# Patient Record
Sex: FEMALE | Race: WHITE | NOT HISPANIC OR LATINO | Employment: OTHER | URBAN - METROPOLITAN AREA
[De-identification: names, ages, dates, MRNs, and addresses within clinical notes are randomized per-mention and may not be internally consistent; named-entity substitution may affect disease eponyms.]

---

## 2017-01-10 ENCOUNTER — TELEPHONE (OUTPATIENT)
Dept: GENERAL RADIOLOGY | Facility: HOSPITAL | Age: 62
End: 2017-01-10

## 2017-01-10 ENCOUNTER — HOSPITAL ENCOUNTER (OUTPATIENT)
Dept: PET IMAGING | Facility: HOSPITAL | Age: 62
End: 2017-01-10
Attending: INTERNAL MEDICINE

## 2017-01-18 ENCOUNTER — APPOINTMENT (OUTPATIENT)
Dept: ONCOLOGY | Facility: HOSPITAL | Age: 62
End: 2017-01-18

## 2017-01-25 RX ORDER — MULTIVITAMIN WITH IRON
TABLET ORAL
COMMUNITY
Start: 2013-01-01 | End: 2019-08-13

## 2017-01-27 ENCOUNTER — HOSPITAL ENCOUNTER (OUTPATIENT)
Dept: PET IMAGING | Facility: HOSPITAL | Age: 62
Discharge: HOME | End: 2017-01-27
Attending: INTERNAL MEDICINE | Admitting: INTERNAL MEDICINE

## 2017-01-27 ENCOUNTER — LAB (OUTPATIENT)
Dept: LAB | Facility: HOSPITAL | Age: 62
End: 2017-01-27

## 2017-01-27 DIAGNOSIS — C50.912 CARCINOMA OF LEFT BREAST METASTATIC TO LUNG (HCC): ICD-10-CM

## 2017-01-27 DIAGNOSIS — C78.02 CARCINOMA OF LEFT BREAST METASTATIC TO LUNG (HCC): ICD-10-CM

## 2017-01-27 LAB
ALBUMIN SERPL-MCNC: 4.2 G/DL (ref 3.5–5.2)
ALBUMIN/GLOB SERPL: 1.4 G/DL (ref 1.1–2.4)
ALP SERPL-CCNC: 155 U/L (ref 38–116)
ALT SERPL W P-5'-P-CCNC: 20 U/L (ref 0–33)
ANION GAP SERPL CALCULATED.3IONS-SCNC: 11.7 MMOL/L
AST SERPL-CCNC: 18 U/L (ref 0–32)
BASOPHILS # BLD AUTO: 0.03 10*3/MM3 (ref 0–0.1)
BASOPHILS NFR BLD AUTO: 0.6 % (ref 0–1.1)
BILIRUB SERPL-MCNC: 0.3 MG/DL (ref 0.1–1.2)
BUN BLD-MCNC: 20 MG/DL (ref 6–20)
BUN/CREAT SERPL: 23.8 (ref 7.3–30)
CALCIUM SPEC-SCNC: 10.5 MG/DL (ref 8.5–10.2)
CANCER AG15-3 SERPL-ACNC: 21.8 U/ML
CHLORIDE SERPL-SCNC: 102 MMOL/L (ref 98–107)
CO2 SERPL-SCNC: 27.3 MMOL/L (ref 22–29)
CREAT BLD-MCNC: 0.84 MG/DL (ref 0.6–1.1)
CREAT BLDA-MCNC: 0.8 MG/DL (ref 0.6–1.3)
DEPRECATED RDW RBC AUTO: 39.9 FL (ref 37–49)
EOSINOPHIL # BLD AUTO: 0.16 10*3/MM3 (ref 0–0.36)
EOSINOPHIL NFR BLD AUTO: 3.3 % (ref 1–5)
ERYTHROCYTE [DISTWIDTH] IN BLOOD BY AUTOMATED COUNT: 12.1 % (ref 11.7–14.5)
GFR SERPL CREATININE-BSD FRML MDRD: 69 ML/MIN/1.73
GLOBULIN UR ELPH-MCNC: 3.1 GM/DL (ref 1.8–3.5)
GLUCOSE BLD-MCNC: 91 MG/DL (ref 74–124)
HCT VFR BLD AUTO: 40.4 % (ref 34–45)
HGB BLD-MCNC: 13.5 G/DL (ref 11.5–14.9)
IMM GRANULOCYTES # BLD: 0.01 10*3/MM3 (ref 0–0.03)
IMM GRANULOCYTES NFR BLD: 0.2 % (ref 0–0.5)
LYMPHOCYTES # BLD AUTO: 1.74 10*3/MM3 (ref 1–3.5)
LYMPHOCYTES NFR BLD AUTO: 35.7 % (ref 20–49)
MCH RBC QN AUTO: 30.3 PG (ref 27–33)
MCHC RBC AUTO-ENTMCNC: 33.4 G/DL (ref 32–35)
MCV RBC AUTO: 90.8 FL (ref 83–97)
MONOCYTES # BLD AUTO: 0.44 10*3/MM3 (ref 0.25–0.8)
MONOCYTES NFR BLD AUTO: 9 % (ref 4–12)
NEUTROPHILS # BLD AUTO: 2.5 10*3/MM3 (ref 1.5–7)
NEUTROPHILS NFR BLD AUTO: 51.2 % (ref 39–75)
NRBC BLD MANUAL-RTO: 0 /100 WBC (ref 0–0)
PLATELET # BLD AUTO: 315 10*3/MM3 (ref 150–375)
PMV BLD AUTO: 9.1 FL (ref 8.9–12.1)
POTASSIUM BLD-SCNC: 4.2 MMOL/L (ref 3.5–4.7)
PROT SERPL-MCNC: 7.3 G/DL (ref 6.3–8)
RBC # BLD AUTO: 4.45 10*6/MM3 (ref 3.9–5)
SODIUM BLD-SCNC: 141 MMOL/L (ref 134–145)
WBC NRBC COR # BLD: 4.88 10*3/MM3 (ref 4–10)

## 2017-01-27 PROCEDURE — 25510000001 DIATRIZOATE MEGLUMINE & SODIUM PER 1 ML: Performed by: INTERNAL MEDICINE

## 2017-01-27 PROCEDURE — 80053 COMPREHEN METABOLIC PANEL: CPT | Performed by: INTERNAL MEDICINE

## 2017-01-27 PROCEDURE — 82565 ASSAY OF CREATININE: CPT

## 2017-01-27 PROCEDURE — 85025 COMPLETE CBC W/AUTO DIFF WBC: CPT | Performed by: INTERNAL MEDICINE

## 2017-01-27 PROCEDURE — 74177 CT ABD & PELVIS W/CONTRAST: CPT

## 2017-01-27 PROCEDURE — 71260 CT THORAX DX C+: CPT

## 2017-01-27 PROCEDURE — 36415 COLL VENOUS BLD VENIPUNCTURE: CPT | Performed by: INTERNAL MEDICINE

## 2017-01-27 PROCEDURE — 0 IOPAMIDOL 61 % SOLUTION: Performed by: INTERNAL MEDICINE

## 2017-01-27 PROCEDURE — 86300 IMMUNOASSAY TUMOR CA 15-3: CPT | Performed by: INTERNAL MEDICINE

## 2017-01-27 RX ADMIN — IOPAMIDOL 85 ML: 612 INJECTION, SOLUTION INTRAVENOUS at 11:06

## 2017-01-27 RX ADMIN — DIATRIZOATE MEGLUMINE AND DIATRIZOATE SODIUM 30 ML: 660; 100 LIQUID ORAL; RECTAL at 10:25

## 2017-02-01 ENCOUNTER — OFFICE VISIT (OUTPATIENT)
Dept: ONCOLOGY | Facility: CLINIC | Age: 62
End: 2017-02-01

## 2017-02-01 ENCOUNTER — APPOINTMENT (OUTPATIENT)
Dept: ONCOLOGY | Facility: HOSPITAL | Age: 62
End: 2017-02-01

## 2017-02-01 VITALS
DIASTOLIC BLOOD PRESSURE: 69 MMHG | TEMPERATURE: 97.5 F | RESPIRATION RATE: 14 BRPM | OXYGEN SATURATION: 97 % | BODY MASS INDEX: 32.44 KG/M2 | HEIGHT: 64 IN | SYSTOLIC BLOOD PRESSURE: 103 MMHG | WEIGHT: 190 LBS | HEART RATE: 117 BPM

## 2017-02-01 DIAGNOSIS — C78.02 CARCINOMA OF LEFT BREAST METASTATIC TO LUNG (HCC): Primary | ICD-10-CM

## 2017-02-01 DIAGNOSIS — C50.912 CARCINOMA OF LEFT BREAST METASTATIC TO LUNG (HCC): Primary | ICD-10-CM

## 2017-02-01 PROCEDURE — 99215 OFFICE O/P EST HI 40 MIN: CPT | Performed by: INTERNAL MEDICINE

## 2017-02-01 PROCEDURE — G0463 HOSPITAL OUTPT CLINIC VISIT: HCPCS | Performed by: INTERNAL MEDICINE

## 2017-02-01 NOTE — PROGRESS NOTES
Subjective .     REASONS FOR FOLLOWUP:  Metastatic breast cancer.    HISTORY OF PRESENT ILLNESS:  The patient is a 61 y.o. year old female  who is here for follow-up with the above-mentioned history.    Denies neurological symptoms.  Denies nausea   Denies weight loss.  No significant problems with hot flashes.    Occasional discomfort with moving in certain directions, right lower anterior rib area.  Mild.  Present ×2 months.  Denies any significant areas of pain.    States her depression/anxiety has improved slightly but with like a second opinion.    Past Medical History   Diagnosis Date   • Allergy    • Asthma    • Breast cancer 2993     Left node positive   • Depression    • Esophageal reflux    • Hyperlipidemia    • Hypertension    • Obstructive sleep apnea    • Ovarian cyst      Past Surgical History   Procedure Laterality Date   • Colonoscopy     • Cholecystectomy     • Mastectomy radical Left 1993   • Knee arthroplasty         HEMATOLOGIC/ONCOLOGIC HISTORY:  (History from previous dates can be found in the separate document.)    MEDICATIONS    Current Outpatient Prescriptions:   •  anastrozole (ARIMIDEX) 1 MG tablet, TAKE 1 TABLET BY MOUTH EVERY DAY, Disp: 90 tablet, Rfl: 1  •  aspirin 81 MG chewable tablet, Chew daily., Disp: , Rfl:   •  Calcium Carb-Cholecalciferol (CALTRATE 600+D) 600-800 MG-UNIT tablet, , Disp: , Rfl:   •  Calcium-Vitamin D-Vitamin K 500-500-40 MG-UNT-MCG chewable tablet, , Disp: , Rfl:   •  Cholecalciferol 2000 UNITS capsule, Take by mouth., Disp: , Rfl:   •  desvenlafaxine (PRISTIQ) 100 MG 24 hr tablet, Take by mouth., Disp: , Rfl:   •  eszopiclone (LUNESTA) tablet, Take by mouth., Disp: , Rfl:   •  LORazepam (ATIVAN) 0.5 MG tablet, Take 1 tablet by mouth., Disp: , Rfl:   •  losartan (COZAAR) 100 MG tablet, Take 1 tablet by mouth daily., Disp: 90 tablet, Rfl: 1  •  Magnesium 250 MG tablet, , Disp: , Rfl:   •  Multiple Vitamin (MULTI VITAMIN PO), Take  by mouth Daily. Nikia pulse,  Disp: , Rfl:   •  Probiotic Product (PROBIOTIC PEARLS) capsule, Take by mouth., Disp: , Rfl:   •  Turmeric Curcumin 500 MG capsule, Take by mouth., Disp: , Rfl:     ALLERGIES:     Allergies   Allergen Reactions   • Ciprofloxacin        SOCIAL HISTORY:       Social History     Social History   • Marital status: Single     Spouse name: N/A   • Number of children: N/A   • Years of education: College     Occupational History   • RN [employer]     Social History Main Topics   • Smoking status: Former Smoker     Packs/day: 2.50     Years: 30.00     Types: Cigarettes     Quit date: 2008   • Smokeless tobacco: Not on file   • Alcohol use No   • Drug use: No   • Sexual activity: Not on file     Other Topics Concern   • Not on file     Social History Narrative         FAMILY HISTORY:  Family History   Problem Relation Age of Onset   • Hypertension Mother    • Leukemia Mother 72   • Diabetes Brother    • Pancreatic cancer Brother    • Glaucoma Brother    • Heart disease Brother    • Hypertension Brother    • Gallbladder disease Brother    • Gallbladder disease Father        REVIEW OF SYSTEMS:  GENERAL: No change in appetite or weight;   No fevers, chills, sweats.    SKIN: No nonhealing lesions.   No rashes.  HEME/LYMPH: No easy bruising, bleeding.   No swollen nodes.   EYES: No vision changes or diplopia.   ENT: No tinnitus, hearing loss, gum bleeding, epistaxis, hoarseness or dysphagia.   RESPIRATORY: No cough, shortness of breath, hemoptysis or wheezing.   CVS: No chest pain, palpitations, orthopnea, dyspnea on exertion or PND.   GI: No melena or hematochezia.   No abdominal pain.  No nausea, vomiting, constipation, diarrhea  : No lower tract obstructive symptoms, dysuria or hematuria.   MUSCULOSKELETAL: see HPI   NEUROLOGICAL: No global weakness, loss of consciousness or seizures.   PSYCHIATRIC: No increased nervousness, mood changes or depression.          Objective    Vitals:    02/01/17 1118   BP:  "103/69   Pulse: 117   Resp: 14   Temp: 97.5 °F (36.4 °C)   SpO2: 97%   Weight: 190 lb (86.2 kg)   Height: 64.17\" (163 cm)  Comment: NEW HT   PainSc: 0-No pain     Current Status 2/1/2017   ECOG score 0      PHYSICAL EXAM:    GENERAL:  Well-developed, well-nourished in no acute distress.   SKIN:  Warm, dry without rashes, purpura or petechiae.  HEAD:  Normocephalic.  EYES:  Pupils equal, round and reactive to light.  EOMs intact.  Conjunctivae normal.  EARS:  Hearing intact.  NOSE:  Septum midline.  No excoriations or nasal discharge.  MOUTH:  Tongue is well-papillated; no stomatitis or ulcers.  Lips normal.  THROAT:  Oropharynx without lesions or exudates.  NECK:  Supple with good range of motion; no thyromegaly or masses, no JVD.  LYMPHATICS:  No cervical, supraclavicular, axillary or inguinal adenopathy.  CHEST:  Lungs clear to percussion and auscultation. Good airflow.  CARDIAC:  Regular rate and rhythm without murmurs, rubs or gallops. Normal S1,S2.  ABDOMEN:  Soft, nontender with no organomegaly or masses.  EXTREMITIES:  No clubbing, cyanosis or edema.  NEUROLOGICAL:  Cranial Nerves II-XII grossly intact.  No focal neurological deficits.  PSYCHIATRIC:  Normal affect and mood.      RECENT LABS:        WBC   Date/Time Value Ref Range Status   01/27/2017 11:05 AM 4.88 4.00 - 10.00 10*3/mm3 Final   01/26/2016 11:27 AM 6.25 4.50 - 10.70 K/Cumm Final     HEMOGLOBIN   Date/Time Value Ref Range Status   01/27/2017 11:05 AM 13.5 11.5 - 14.9 g/dL Final   01/26/2016 11:27 AM 14.4 11.9 - 15.5 g/dL Final     PLATELETS   Date/Time Value Ref Range Status   01/27/2017 11:05  150 - 375 10*3/mm3 Final   01/26/2016 11:27  140 - 500 K/Cumm Final       Assessment/Plan     1.  Metastatic breast cancer to bilateral lungs and soft tissue in the mediastinum (likely the cause of her hoarseness). (Initial breast cancer diagnosed in 1993 treated with mastectomy, chemotherapy, radiation therapy and tamoxifen). Arimidex day 1 on " 02/17/2010. PET scan late August 2011 shows no abnormal hypermetabolic activity. This has improved compared to the prior PET scan January 2010 which showed mild hypermetabolic activity in areas of metastasis. (In the past, her  T6 lesion did not show up on CT scans or bone scan. It was seen by PET scan.) We have been following with CT scans only every 6 months and PET scans on an as needed only basis. Sometimes her CT scanned pulmonary nodules are read as benign since they have been stable through the course of years but we know they are malignant because they have been surgically sampled in the past.    · CT 8/16/16 shows increased sclerosis at T6 compared to 4/28/15.    · PET 10/11/16: Slight uptake at T6, SUV 3.2.  mild thickening of right adrenal gland with an SUV of 11.   · Continued Arimidex is minimal progression and had been on this since 2/17/10.  · Not referred for radiation since no pain at T6  · CT 1/27/17, unchanged.  CT images personally reviewed by me  2.  Bony metastases at T6. She was not started on Zometa as this was the only area of bony disease. We will hold off on Zometa until she has evidence of significant bone involvement. Not given radiation is asymptomatic.  I recommended she not allow the chiropractor to manipulate the T6 area.  If she develops pain at T6, would refer her to radiation.    3.  Bone health. Last bone density 10/11/16, worsened, but still normal with worst T score -0.9.  Patient stopped calcium January 2016, but remained on vitamin D.  I recommended she restart her calcium.      4.  On the 10/5/16 visit, Tachycardia, dyspnea on exertion, bilateral leg swelling, more sedentary recently.  CT PE protocol and bilateral lower extremity Dopplers 10/5/16, negative for clots.  She still has tachycardia and dyspnea on exertion.  Perhaps at least part of this is due to deconditioning.    5.  Right lower anterior rib discomfort.  CT unremarkable in that area.  No specific pain, just  discomfort.  I explained to the patient if disease was found by PET scan or bone scan, I would not  since she has been doing well on Arimidex since 2010.      6.  Depression/anxiety.  Although she has a psychiatrist and counselor, she would like a second opinion from Dr. Enma Bach.  This will be arranged.    PLAN:   · M.D. 6 months with 1 week prior:   · CAT scan CAP with contrast and CBC, CMP, CA-15-3.      The plan was:   MD visit in 6 months with CBC, CMP and CA 15-3 that day.   CT scans chest abdomen and pelvis with contrast annually.  We are now doing CT scans every 6 months.

## 2017-02-09 ENCOUNTER — TELEPHONE (OUTPATIENT)
Dept: PSYCHIATRY | Facility: HOSPITAL | Age: 62
End: 2017-02-09

## 2017-02-09 NOTE — TELEPHONE ENCOUNTER
Received a return call from patient.  Patient states she was hesitant to call and make this appt to see Behavioral Oncologist, Dr. Enma Bach , but now she is ready and agreeable to do this.  OSW scheduled her to see Dr. Bach in the [facility] on 3/8/2017 at 1pm.  OSW also mailed patient packet of information for her to complete prior to appointment.    Thank you,  Gale Harding, SONDRAW, CSW, OSW-C

## 2017-03-09 ENCOUNTER — DOCUMENTATION (OUTPATIENT)
Dept: PSYCHIATRY | Facility: HOSPITAL | Age: 62
End: 2017-03-09

## 2017-03-09 NOTE — PROGRESS NOTES
Patient scheduled to be seen in [facility], yesterday 3/8/2017.  Patient was a no call, no show.  OSW to follow up with pt via phone regarding supportive services.  Patient current with a Psychiatrist in the community.  Thank you,  Gale Harding, MSSW, CSW, OSW-C

## 2017-05-07 DIAGNOSIS — I10 BENIGN ESSENTIAL HTN: ICD-10-CM

## 2017-05-08 RX ORDER — LOSARTAN POTASSIUM 100 MG/1
TABLET ORAL
Qty: 90 TABLET | Refills: 0 | OUTPATIENT
Start: 2017-05-08

## 2017-05-12 DIAGNOSIS — I10 BENIGN ESSENTIAL HTN: ICD-10-CM

## 2017-05-12 RX ORDER — LOSARTAN POTASSIUM 100 MG/1
100 TABLET ORAL DAILY
Qty: 30 TABLET | Refills: 0 | Status: SHIPPED | OUTPATIENT
Start: 2017-05-12 | End: 2017-05-26 | Stop reason: SDUPTHER

## 2017-05-18 RX ORDER — ANASTROZOLE 1 MG/1
TABLET ORAL
Qty: 90 TABLET | Refills: 0 | Status: SHIPPED | OUTPATIENT
Start: 2017-05-18 | End: 2017-08-12 | Stop reason: SDUPTHER

## 2017-05-26 ENCOUNTER — OFFICE VISIT (OUTPATIENT)
Dept: FAMILY MEDICINE CLINIC | Facility: CLINIC | Age: 62
End: 2017-05-26

## 2017-05-26 VITALS
SYSTOLIC BLOOD PRESSURE: 126 MMHG | TEMPERATURE: 98.2 F | DIASTOLIC BLOOD PRESSURE: 80 MMHG | BODY MASS INDEX: 31.65 KG/M2 | OXYGEN SATURATION: 96 % | WEIGHT: 190 LBS | HEART RATE: 101 BPM | HEIGHT: 65 IN

## 2017-05-26 DIAGNOSIS — E78.2 MIXED HYPERLIPIDEMIA: ICD-10-CM

## 2017-05-26 DIAGNOSIS — Z13.9 SCREENING: ICD-10-CM

## 2017-05-26 DIAGNOSIS — I10 BENIGN ESSENTIAL HTN: ICD-10-CM

## 2017-05-26 DIAGNOSIS — E55.9 VITAMIN D DEFICIENCY: Primary | ICD-10-CM

## 2017-05-26 PROCEDURE — 99214 OFFICE O/P EST MOD 30 MIN: CPT | Performed by: FAMILY MEDICINE

## 2017-05-26 RX ORDER — LOSARTAN POTASSIUM 100 MG/1
100 TABLET ORAL DAILY
Qty: 90 TABLET | Refills: 1 | Status: SHIPPED | OUTPATIENT
Start: 2017-05-26 | End: 2017-09-18 | Stop reason: SDUPTHER

## 2017-05-27 LAB
25(OH)D3+25(OH)D2 SERPL-MCNC: 43.5 NG/ML (ref 30–100)
CHOLEST SERPL-MCNC: 262 MG/DL (ref 0–200)
HCV AB S/CO SERPL IA: <0.1 S/CO RATIO (ref 0–0.9)
HDLC SERPL-MCNC: 54 MG/DL (ref 40–60)
LDLC SERPL CALC-MCNC: 167 MG/DL (ref 0–100)
LDLC/HDLC SERPL: 3.1 {RATIO}
TRIGL SERPL-MCNC: 204 MG/DL (ref 0–150)
TSH SERPL DL<=0.005 MIU/L-ACNC: 2.36 UIU/ML (ref 0.45–4.5)
VLDLC SERPL CALC-MCNC: 40.8 MG/DL (ref 5–40)

## 2017-06-08 DIAGNOSIS — I10 BENIGN ESSENTIAL HTN: ICD-10-CM

## 2017-06-08 RX ORDER — LOSARTAN POTASSIUM 100 MG/1
TABLET ORAL
Qty: 90 TABLET | Refills: 0 | Status: SHIPPED | OUTPATIENT
Start: 2017-06-08 | End: 2018-05-09

## 2017-08-01 ENCOUNTER — LAB (OUTPATIENT)
Dept: LAB | Facility: HOSPITAL | Age: 62
End: 2017-08-01

## 2017-08-01 ENCOUNTER — HOSPITAL ENCOUNTER (OUTPATIENT)
Dept: PET IMAGING | Facility: HOSPITAL | Age: 62
Discharge: HOME | End: 2017-08-01
Attending: INTERNAL MEDICINE | Admitting: INTERNAL MEDICINE

## 2017-08-01 DIAGNOSIS — C50.912 CARCINOMA OF LEFT BREAST METASTATIC TO LUNG (HCC): ICD-10-CM

## 2017-08-01 DIAGNOSIS — C78.02 CARCINOMA OF LEFT BREAST METASTATIC TO LUNG (HCC): ICD-10-CM

## 2017-08-01 LAB
ALBUMIN SERPL-MCNC: 4.2 G/DL (ref 3.5–5.2)
ALBUMIN/GLOB SERPL: 1.4 G/DL (ref 1.1–2.4)
ALP SERPL-CCNC: 146 U/L (ref 38–116)
ALT SERPL W P-5'-P-CCNC: 39 U/L (ref 0–33)
ANION GAP SERPL CALCULATED.3IONS-SCNC: 15.9 MMOL/L
AST SERPL-CCNC: 25 U/L (ref 0–32)
BASOPHILS # BLD AUTO: 0.03 10*3/MM3 (ref 0–0.1)
BASOPHILS NFR BLD AUTO: 0.6 % (ref 0–1.1)
BILIRUB SERPL-MCNC: 0.3 MG/DL (ref 0.1–1.2)
BUN BLD-MCNC: 19 MG/DL (ref 6–20)
BUN/CREAT SERPL: 21.8 (ref 7.3–30)
CALCIUM SPEC-SCNC: 9.9 MG/DL (ref 8.5–10.2)
CANCER AG15-3 SERPL-ACNC: 21.6 U/ML
CHLORIDE SERPL-SCNC: 99 MMOL/L (ref 98–107)
CO2 SERPL-SCNC: 24.1 MMOL/L (ref 22–29)
CREAT BLD-MCNC: 0.87 MG/DL (ref 0.6–1.1)
CREAT BLDA-MCNC: 0.9 MG/DL (ref 0.6–1.3)
DEPRECATED RDW RBC AUTO: 40.3 FL (ref 37–49)
EOSINOPHIL # BLD AUTO: 0.14 10*3/MM3 (ref 0–0.36)
EOSINOPHIL NFR BLD AUTO: 2.8 % (ref 1–5)
ERYTHROCYTE [DISTWIDTH] IN BLOOD BY AUTOMATED COUNT: 12.1 % (ref 11.7–14.5)
GFR SERPL CREATININE-BSD FRML MDRD: 66 ML/MIN/1.73
GLOBULIN UR ELPH-MCNC: 3.1 GM/DL (ref 1.8–3.5)
GLUCOSE BLD-MCNC: 101 MG/DL (ref 74–124)
HCT VFR BLD AUTO: 41.5 % (ref 34–45)
HGB BLD-MCNC: 13.9 G/DL (ref 11.5–14.9)
IMM GRANULOCYTES # BLD: 0.01 10*3/MM3 (ref 0–0.03)
IMM GRANULOCYTES NFR BLD: 0.2 % (ref 0–0.5)
LYMPHOCYTES # BLD AUTO: 1.71 10*3/MM3 (ref 1–3.5)
LYMPHOCYTES NFR BLD AUTO: 33.7 % (ref 20–49)
MCH RBC QN AUTO: 30.7 PG (ref 27–33)
MCHC RBC AUTO-ENTMCNC: 33.5 G/DL (ref 32–35)
MCV RBC AUTO: 91.6 FL (ref 83–97)
MONOCYTES # BLD AUTO: 0.42 10*3/MM3 (ref 0.25–0.8)
MONOCYTES NFR BLD AUTO: 8.3 % (ref 4–12)
NEUTROPHILS # BLD AUTO: 2.76 10*3/MM3 (ref 1.5–7)
NEUTROPHILS NFR BLD AUTO: 54.4 % (ref 39–75)
NRBC BLD MANUAL-RTO: 0 /100 WBC (ref 0–0)
PLATELET # BLD AUTO: 324 10*3/MM3 (ref 150–375)
PMV BLD AUTO: 9.1 FL (ref 8.9–12.1)
POTASSIUM BLD-SCNC: 3.8 MMOL/L (ref 3.5–4.7)
PROT SERPL-MCNC: 7.3 G/DL (ref 6.3–8)
RBC # BLD AUTO: 4.53 10*6/MM3 (ref 3.9–5)
SODIUM BLD-SCNC: 139 MMOL/L (ref 134–145)
WBC NRBC COR # BLD: 5.07 10*3/MM3 (ref 4–10)

## 2017-08-01 PROCEDURE — 82565 ASSAY OF CREATININE: CPT

## 2017-08-01 PROCEDURE — 74177 CT ABD & PELVIS W/CONTRAST: CPT

## 2017-08-01 PROCEDURE — 0 IOPAMIDOL 61 % SOLUTION: Performed by: INTERNAL MEDICINE

## 2017-08-01 PROCEDURE — 80053 COMPREHEN METABOLIC PANEL: CPT | Performed by: INTERNAL MEDICINE

## 2017-08-01 PROCEDURE — 36415 COLL VENOUS BLD VENIPUNCTURE: CPT | Performed by: INTERNAL MEDICINE

## 2017-08-01 PROCEDURE — 0 DIATRIZOATE MEGLUMINE & SODIUM PER 1 ML: Performed by: INTERNAL MEDICINE

## 2017-08-01 PROCEDURE — 71260 CT THORAX DX C+: CPT

## 2017-08-01 PROCEDURE — 85025 COMPLETE CBC W/AUTO DIFF WBC: CPT | Performed by: INTERNAL MEDICINE

## 2017-08-01 PROCEDURE — 86300 IMMUNOASSAY TUMOR CA 15-3: CPT | Performed by: INTERNAL MEDICINE

## 2017-08-01 RX ADMIN — DIATRIZOATE MEGLUMINE AND DIATRIZOATE SODIUM 30 ML: 660; 100 LIQUID ORAL; RECTAL at 11:55

## 2017-08-01 RX ADMIN — IOPAMIDOL 85 ML: 612 INJECTION, SOLUTION INTRAVENOUS at 12:33

## 2017-08-14 RX ORDER — ANASTROZOLE 1 MG/1
TABLET ORAL
Qty: 90 TABLET | Refills: 0 | Status: SHIPPED | OUTPATIENT
Start: 2017-08-14 | End: 2017-11-06 | Stop reason: SDUPTHER

## 2017-09-18 ENCOUNTER — OFFICE VISIT (OUTPATIENT)
Dept: INTERNAL MEDICINE | Facility: CLINIC | Age: 62
End: 2017-09-18

## 2017-09-18 VITALS
BODY MASS INDEX: 31.32 KG/M2 | OXYGEN SATURATION: 96 % | HEIGHT: 65 IN | SYSTOLIC BLOOD PRESSURE: 128 MMHG | DIASTOLIC BLOOD PRESSURE: 80 MMHG | TEMPERATURE: 98.2 F | HEART RATE: 102 BPM | WEIGHT: 188 LBS

## 2017-09-18 DIAGNOSIS — R68.2 DRY MOUTH: ICD-10-CM

## 2017-09-18 DIAGNOSIS — B37.9 CANDIDIASIS: Primary | ICD-10-CM

## 2017-09-18 DIAGNOSIS — R20.8 BURNING SENSATION OF MOUTH: ICD-10-CM

## 2017-09-18 DIAGNOSIS — I10 BENIGN ESSENTIAL HTN: ICD-10-CM

## 2017-09-18 PROCEDURE — 99213 OFFICE O/P EST LOW 20 MIN: CPT | Performed by: FAMILY MEDICINE

## 2017-09-18 RX ORDER — LOSARTAN POTASSIUM 100 MG/1
100 TABLET ORAL DAILY
Qty: 90 TABLET | Refills: 1 | Status: SHIPPED | OUTPATIENT
Start: 2017-09-18 | End: 2018-03-11 | Stop reason: SDUPTHER

## 2017-09-18 NOTE — PROGRESS NOTES
Subjective   Marialuisa Vivar is a 61 y.o. female.   Chief Complaint   Patient presents with   • Mouth Lesions       History of Present Illness     #1 Mouth lesions-for years.  Patient has super sensitive mouth.  It is inside her lips and under her tongue.  Sometimes she has burning sensation.  Occasionally she gets little white spots, sometimes it gets red and inflamed.  She had biopsy done which showed candidiasis.  Patient was evaluated by dermatologist and was diagnosed  with candidiasis.  She uses nystatin and it helps some.  She has sensation of the dry mouth, but no dry eyes.  Her mother had dry mouth.  She was not diagnosed with Sjögren.    The following portions of the patient's history were reviewed and updated as appropriate: allergies, current medications, past family history, past medical history, past social history, past surgical history and problem list.    Review of Systems   Constitutional: Negative.    HENT: Positive for mouth sores. Negative for sore throat.    Skin: Negative.          Objective   Wt Readings from Last 3 Encounters:   09/18/17 188 lb (85.3 kg)   05/26/17 190 lb (86.2 kg)   02/01/17 190 lb (86.2 kg)      Vitals:    09/18/17 1552   BP: 128/80   Pulse: 102   Temp: 98.2 °F (36.8 °C)   SpO2: 96%     Temp Readings from Last 3 Encounters:   09/18/17 98.2 °F (36.8 °C)   05/26/17 98.2 °F (36.8 °C)   02/01/17 97.5 °F (36.4 °C)     BP Readings from Last 3 Encounters:   09/18/17 128/80   05/26/17 126/80   02/01/17 103/69     Pulse Readings from Last 3 Encounters:   09/18/17 102   05/26/17 101   02/01/17 117       Physical Exam   Constitutional: She appears well-developed and well-nourished.   HENT:   Head: Normocephalic and atraumatic.   Mouth/Throat: Mucous membranes are not pale and not dry. No oropharyngeal exudate, posterior oropharyngeal edema, posterior oropharyngeal erythema or tonsillar abscesses.   small areas of redness on the tongue.   Eyes: Conjunctivae and EOM are normal. Pupils  are equal, round, and reactive to light.   Lymphadenopathy:     She has no cervical adenopathy.       Assessment/Plan   Marialuisa was seen today for mouth lesions.    Diagnoses and all orders for this visit:    Candidiasis    Burning sensation of mouth  -     Vitamin B12  -     Sjogren's Antibody, Anti-SS-A / -SS-B  -     CHERRIE    Dry mouth  -     Vitamin B12  -     Sjogren's Antibody, Anti-SS-A / -SS-B  -     CHERRIE    Other orders  -     nystatin (MYCOSTATIN) [id number] UNIT/ML suspension; Swish and swallow 5 mL 4 (Four) Times a Day.        #1 Candidiasis- continue nystatin as needed.  Patient will continue to follow-up with her dermatologist/dentist.  There is a family history of dry mouth and patient reports similar symptoms.  We are going to check for Sjögren syndrome.

## 2017-09-19 LAB
ANA SER QL: NEGATIVE
ENA SS-A AB SER-ACNC: <0.2 AI (ref 0–0.9)
ENA SS-B AB SER-ACNC: <0.2 AI (ref 0–0.9)
VIT B12 SERPL-MCNC: 441 PG/ML (ref 211–946)

## 2017-11-06 ENCOUNTER — TELEPHONE (OUTPATIENT)
Dept: ONCOLOGY | Facility: HOSPITAL | Age: 62
End: 2017-11-06

## 2017-11-06 ENCOUNTER — TELEPHONE (OUTPATIENT)
Dept: GENERAL RADIOLOGY | Facility: HOSPITAL | Age: 62
End: 2017-11-06

## 2017-11-06 DIAGNOSIS — C78.02 CARCINOMA OF LEFT BREAST METASTATIC TO LUNG (HCC): Primary | ICD-10-CM

## 2017-11-06 DIAGNOSIS — C50.912 CARCINOMA OF LEFT BREAST METASTATIC TO LUNG (HCC): Primary | ICD-10-CM

## 2017-11-06 RX ORDER — ANASTROZOLE 1 MG/1
TABLET ORAL
Qty: 30 TABLET | Refills: 0 | Status: SHIPPED | OUTPATIENT
Start: 2017-11-06 | End: 2017-12-12 | Stop reason: SDUPTHER

## 2017-11-06 NOTE — TELEPHONE ENCOUNTER
Refill for arimidex given for 1 month. Pt needs f/u for refills. Pt missed last appts. Message sent to scheduling to set up f/u

## 2017-11-06 NOTE — TELEPHONE ENCOUNTER
----- Message from Sulema Rodrigues RN sent at 11/6/2017  1:48 PM EST -----  Pt needs f/u appt for refills. Missed last appts? Please call her and reschedule appt

## 2017-11-30 ENCOUNTER — OFFICE VISIT (OUTPATIENT)
Dept: ONCOLOGY | Facility: CLINIC | Age: 62
End: 2017-11-30

## 2017-11-30 ENCOUNTER — LAB (OUTPATIENT)
Dept: LAB | Facility: HOSPITAL | Age: 62
End: 2017-11-30

## 2017-11-30 VITALS
BODY MASS INDEX: 31.56 KG/M2 | WEIGHT: 189.4 LBS | SYSTOLIC BLOOD PRESSURE: 112 MMHG | HEIGHT: 65 IN | DIASTOLIC BLOOD PRESSURE: 70 MMHG | RESPIRATION RATE: 16 BRPM | HEART RATE: 92 BPM | TEMPERATURE: 97.8 F

## 2017-11-30 DIAGNOSIS — C78.02 CARCINOMA OF LEFT BREAST METASTATIC TO LUNG (HCC): ICD-10-CM

## 2017-11-30 DIAGNOSIS — C50.912 CARCINOMA OF LEFT BREAST METASTATIC TO LUNG (HCC): ICD-10-CM

## 2017-11-30 DIAGNOSIS — C50.912 CARCINOMA OF LEFT BREAST METASTATIC TO LUNG (HCC): Primary | ICD-10-CM

## 2017-11-30 DIAGNOSIS — C78.02 CARCINOMA OF LEFT BREAST METASTATIC TO LUNG (HCC): Primary | ICD-10-CM

## 2017-11-30 LAB
ALBUMIN SERPL-MCNC: 4 G/DL (ref 3.5–5.2)
ALBUMIN/GLOB SERPL: 1.3 G/DL (ref 1.1–2.4)
ALP SERPL-CCNC: 133 U/L (ref 38–116)
ALT SERPL W P-5'-P-CCNC: 30 U/L (ref 0–33)
ANION GAP SERPL CALCULATED.3IONS-SCNC: 14.5 MMOL/L
AST SERPL-CCNC: 24 U/L (ref 0–32)
BASOPHILS # BLD AUTO: 0.04 10*3/MM3 (ref 0–0.1)
BASOPHILS NFR BLD AUTO: 0.7 % (ref 0–1.1)
BILIRUB SERPL-MCNC: 0.3 MG/DL (ref 0.1–1.2)
BUN BLD-MCNC: 13 MG/DL (ref 6–20)
BUN/CREAT SERPL: 14.8 (ref 7.3–30)
CALCIUM SPEC-SCNC: 10.3 MG/DL (ref 8.5–10.2)
CHLORIDE SERPL-SCNC: 103 MMOL/L (ref 98–107)
CO2 SERPL-SCNC: 23.5 MMOL/L (ref 22–29)
CREAT BLD-MCNC: 0.88 MG/DL (ref 0.6–1.1)
DEPRECATED RDW RBC AUTO: 39.1 FL (ref 37–49)
EOSINOPHIL # BLD AUTO: 0.13 10*3/MM3 (ref 0–0.36)
EOSINOPHIL NFR BLD AUTO: 2.4 % (ref 1–5)
ERYTHROCYTE [DISTWIDTH] IN BLOOD BY AUTOMATED COUNT: 11.9 % (ref 11.7–14.5)
GFR SERPL CREATININE-BSD FRML MDRD: 65 ML/MIN/1.73
GLOBULIN UR ELPH-MCNC: 3.1 GM/DL (ref 1.8–3.5)
GLUCOSE BLD-MCNC: 123 MG/DL (ref 74–124)
HCT VFR BLD AUTO: 41.4 % (ref 34–45)
HGB BLD-MCNC: 13.8 G/DL (ref 11.5–14.9)
IMM GRANULOCYTES # BLD: 0.01 10*3/MM3 (ref 0–0.03)
IMM GRANULOCYTES NFR BLD: 0.2 % (ref 0–0.5)
LYMPHOCYTES # BLD AUTO: 1.75 10*3/MM3 (ref 1–3.5)
LYMPHOCYTES NFR BLD AUTO: 32.2 % (ref 20–49)
MCH RBC QN AUTO: 30 PG (ref 27–33)
MCHC RBC AUTO-ENTMCNC: 33.3 G/DL (ref 32–35)
MCV RBC AUTO: 90 FL (ref 83–97)
MONOCYTES # BLD AUTO: 0.44 10*3/MM3 (ref 0.25–0.8)
MONOCYTES NFR BLD AUTO: 8.1 % (ref 4–12)
NEUTROPHILS # BLD AUTO: 3.06 10*3/MM3 (ref 1.5–7)
NEUTROPHILS NFR BLD AUTO: 56.4 % (ref 39–75)
NRBC BLD MANUAL-RTO: 0 /100 WBC (ref 0–0)
PLATELET # BLD AUTO: 364 10*3/MM3 (ref 150–375)
PMV BLD AUTO: 8.2 FL (ref 8.9–12.1)
POTASSIUM BLD-SCNC: 4.1 MMOL/L (ref 3.5–4.7)
PROT SERPL-MCNC: 7.1 G/DL (ref 6.3–8)
RBC # BLD AUTO: 4.6 10*6/MM3 (ref 3.9–5)
SODIUM BLD-SCNC: 141 MMOL/L (ref 134–145)
WBC NRBC COR # BLD: 5.43 10*3/MM3 (ref 4–10)

## 2017-11-30 PROCEDURE — 85025 COMPLETE CBC W/AUTO DIFF WBC: CPT | Performed by: INTERNAL MEDICINE

## 2017-11-30 PROCEDURE — 80053 COMPREHEN METABOLIC PANEL: CPT | Performed by: INTERNAL MEDICINE

## 2017-11-30 PROCEDURE — 99215 OFFICE O/P EST HI 40 MIN: CPT | Performed by: INTERNAL MEDICINE

## 2017-11-30 PROCEDURE — 36415 COLL VENOUS BLD VENIPUNCTURE: CPT | Performed by: INTERNAL MEDICINE

## 2017-11-30 RX ORDER — AMOXICILLIN 500 MG/1
CAPSULE ORAL
Refills: 0 | COMMUNITY
Start: 2017-11-21 | End: 2018-03-07

## 2017-11-30 NOTE — PROGRESS NOTES
Subjective .     REASONS FOR FOLLOWUP:  Metastatic breast cancer.    HISTORY OF PRESENT ILLNESS:  The patient is a 62 y.o. year old female  who is here for follow-up with the above-mentioned history.    Denies neurological symptoms.  Denies nausea   Denies weight loss.  Denies pain.    She did not come in August as planned to review her CAT scan.  She continues to struggle with depression.  This limits follow-up.  She sees a psychiatrist regularly and is recently changed counselors.  She thinks this will help some.  She states cancer does not seem to be the driving reason for her depression but instead body image issues.  She would like to get in better shape has not continued with a program to do this.    Past Medical History:   Diagnosis Date   • Allergy    • Anxiety    • Asthma    • Bone metastasis    • Breast cancer     Left node positive   • Depression    • Esophageal reflux     cough   • History of colon polyps    • Hyperlipidemia    • Hypertension    • Left breast cancer metastasized to lung    • Obstructive sleep apnea    • Ovarian cyst      Past Surgical History:   Procedure Laterality Date   • CHOLECYSTECTOMY  2000   • COLONOSCOPY  2014    H/O polyps   • KNEE ARTHROPLASTY     • LUNG SURGERY  2010    Lung wedge resection   • MASTECTOMY RADICAL Left 1993       HEMATOLOGIC/ONCOLOGIC HISTORY:  (History from previous dates can be found in the separate document.)    MEDICATIONS    Current Outpatient Prescriptions:   •  amoxicillin (AMOXIL) 500 MG capsule, TK ONE C PO  TID FOR 10 DAYS, Disp: , Rfl: 0  •  anastrozole (ARIMIDEX) 1 MG tablet, TAKE 1 TABLET BY MOUTH EVERY DAY, Disp: 30 tablet, Rfl: 0  •  aspirin 81 MG chewable tablet, Chew daily., Disp: , Rfl:   •  Calcium-Vitamin D-Vitamin K 500-500-40 MG-UNT-MCG chewable tablet, , Disp: , Rfl:   •  Cholecalciferol 2000 UNITS capsule, Take by mouth., Disp: , Rfl:   •  desvenlafaxine (PRISTIQ) 100 MG 24 hr tablet, Take by mouth., Disp: , Rfl:   •  eszopiclone  (LUNESTA) tablet, Take by mouth., Disp: , Rfl:   •  LORazepam (ATIVAN) 0.5 MG tablet, Take 1 tablet by mouth., Disp: , Rfl:   •  losartan (COZAAR) 100 MG tablet, TAKE 1 TABLET BY MOUTH DAILY, Disp: 90 tablet, Rfl: 0  •  losartan (COZAAR) 100 MG tablet, Take 1 tablet by mouth Daily., Disp: 90 tablet, Rfl: 1  •  Magnesium 250 MG tablet, , Disp: , Rfl:   •  nystatin (MYCOSTATIN) [id number] UNIT/ML suspension, Swish and swallow 5 mL 4 (Four) Times a Day., Disp: 473 mL, Rfl: 1  •  Probiotic Product (PROBIOTIC PEARLS) capsule, Take by mouth., Disp: , Rfl:   •  Turmeric Curcumin 500 MG capsule, Take by mouth., Disp: , Rfl:     ALLERGIES:     Allergies   Allergen Reactions   • Ciprofloxacin        SOCIAL HISTORY:       Social History     Social History   • Marital status: Single     Spouse name: N/A   • Number of children: N/A   • Years of education: College     Occupational History   • RN [employer]     Social History Main Topics   • Smoking status: Former Smoker     Packs/day: 2.00     Years: 30.00     Types: Cigarettes     Quit date: 2008   • Smokeless tobacco: Not on file   • Alcohol use No   • Drug use: No   • Sexual activity: Not on file     Other Topics Concern   • Not on file     Social History Narrative         FAMILY HISTORY:  Family History   Problem Relation Age of Onset   • Hypertension Mother    • Leukemia Mother 72   • COPD Mother      smoker   • Diabetes Brother    • Pancreatic cancer Brother    • Glaucoma Brother    • Heart disease Brother    • Hypertension Brother    • Gallbladder disease Brother    • Gallbladder disease Father    • Stroke Other      Grandmother   • Lupus Other      Aunt   • Alcohol abuse Other      Aunt   • Glaucoma Other      Grandmother       REVIEW OF SYSTEMS:  GENERAL: No change in appetite or weight;   No fevers, chills, sweats.    SKIN: No nonhealing lesions.   No rashes.  HEME/LYMPH: No easy bruising, bleeding.   No swollen nodes.   EYES: No vision changes or  "diplopia.   ENT: No tinnitus, hearing loss, gum bleeding, epistaxis, hoarseness or dysphagia.   RESPIRATORY: No cough, shortness of breath, hemoptysis or wheezing.   CVS: No chest pain, palpitations, orthopnea, dyspnea on exertion or PND.   GI: No melena or hematochezia.   No abdominal pain.  No nausea, vomiting, constipation, diarrhea  : No lower tract obstructive symptoms, dysuria or hematuria.   MUSCULOSKELETAL: No bone pain.  No joint stiffness.   NEUROLOGICAL: No global weakness, loss of consciousness or seizures.   PSYCHIATRIC: see HPI      Objective    Vitals:    11/30/17 0900   BP: 112/70   Pulse: 92   Resp: 16   Temp: 97.8 °F (36.6 °C)   Weight: 189 lb 6.4 oz (85.9 kg)   Height: 64.96\" (165 cm)  Comment: new ht.without shoes   PainSc: 0-No pain     Current Status 11/30/2017   ECOG score 0      PHYSICAL EXAM:    GENERAL:  Well-developed, well-nourished in no acute distress.   SKIN:  Warm, dry without rashes, purpura or petechiae.  HEAD:  Normocephalic.  EYES:  Pupils equal, round and reactive to light.  EOMs intact.  Conjunctivae normal.  EARS:  Hearing intact.  NOSE:  Septum midline.  No excoriations or nasal discharge.  MOUTH:  Tongue is well-papillated; no stomatitis or ulcers.  Lips normal.  THROAT:  Oropharynx without lesions or exudates.  NECK:  Supple with good range of motion; no thyromegaly or masses, no JVD.  LYMPHATICS:  No cervical, supraclavicular, axillary or inguinal adenopathy.  CHEST:  Lungs clear to percussion and auscultation. Good airflow.  CARDIAC:  Regular rate and rhythm without murmurs, rubs or gallops. Normal S1,S2.  ABDOMEN:  Soft, nontender with no organomegaly or masses.  EXTREMITIES:  No clubbing, cyanosis or edema.  NEUROLOGICAL:  Cranial Nerves II-XII grossly intact.  No focal neurological deficits.  PSYCHIATRIC:  Normal affect and mood.      RECENT LABS:        WBC   Date/Time Value Ref Range Status   11/30/2017 08:48 AM 5.43 4.00 - 10.00 10*3/mm3 Final     Hemoglobin "   Date/Time Value Ref Range Status   11/30/2017 08:48 AM 13.8 11.5 - 14.9 g/dL Final     Platelets   Date/Time Value Ref Range Status   11/30/2017 08:48  150 - 375 10*3/mm3 Final       Assessment/Plan     *Metastatic breast cancer to bilateral lungs and soft tissue in the mediastinum (likely the cause of her hoarseness). (Initial breast cancer diagnosed in 1993 treated with mastectomy, chemotherapy, radiation therapy and tamoxifen). Arimidex day 1 on 02/17/2010. PET scan late August 2011 shows no abnormal hypermetabolic activity. This has improved compared to the prior PET scan January 2010 which showed mild hypermetabolic activity in areas of metastasis. (In the past, her  T6 lesion did not show up on CT scans or bone scan. It was seen by PET scan.) We have been following with CT scans only every 6 months and PET scans on an as needed only basis. Sometimes her CT scanned pulmonary nodules are read as benign since they have been stable through the course of years but we know they are malignant because they have been surgically sampled in the past.    · CT 8/16/16 shows increased sclerosis at T6 compared to 4/28/15.    · PET 10/11/16: Slight uptake at T6, SUV 3.2.  mild thickening of right adrenal gland with an SUV of 11.   · Continued Arimidex is minimal progression and had been on this since 2/17/10.  · Not referred for radiation since no pain at T6  · CT 1/27/17, unchanged.  · CT 8/1/17: Unchanged except subtle suggestion of mild increase in thickening of the right adrenal gland.  Patient did not return to the office until 11/30/17, today. She chose a follow-up CT 2 months later.  She does not want another CT at this time.    *Bony metastases at T6. She was not started on Zometa as this was the only area of bony disease. We will hold off on Zometa until she has evidence of significant bone involvement. Not given radiation is asymptomatic.  I recommended she not allow the chiropractor to manipulate the T6  area.  If she develops pain at T6, would refer her to radiation.    *Bone health. Last bone density 10/11/16, worsened, but still normal with worst T score -0.9.  Patient stopped calcium January 2016, but remained on vitamin D.  I recommended she restart her calcium.      *On the 10/5/16 visit, Tachycardia, dyspnea on exertion, bilateral leg swelling, more sedentary recently.  CT PE protocol and bilateral lower extremity Dopplers 10/5/16, negative for clots.  She still has tachycardia and dyspnea on exertion.  Perhaps at least part of this is due to deconditioning.    *Previously complained of colon Right lower anterior rib discomfort.  CT unremarkable in that area.  No specific pain, just discomfort.  I explained to the patient if disease was found by PET scan or bone scan, I would not  since she has been doing well on Arimidex since 2010.   Currently denies pain.     *Depression/anxiety.  This limits compliance.  She sees a psychiatrist and a counselor regularly.  We had a long talk about this today.  Her depression seems to be driven not by cancer but instead by body image issues.  She feels pressure to be thinner and to be more toned.  I reviewed with her the fact that I feel her body shape is typical and not unusually overweight/out of shape.  That being said, I did encourage her to lead a more healthy lifestyle and improve her eating habits and exercise.  I suggested she higher  to help her with these goals.      PLAN:   · M.D. 2 months with 1 week prior: (2 months from now will be 6 months from her last CT as she did not come for follow-up at the time of her CT in August)  · CAT scan CAP with contrast and CBC, CMP, CA-15-3.    · (Typically M.D. and CT every 6 months)    CT images personally reviewed by me.

## 2017-12-12 RX ORDER — ANASTROZOLE 1 MG/1
TABLET ORAL
Qty: 30 TABLET | Refills: 0 | Status: SHIPPED | OUTPATIENT
Start: 2017-12-12 | End: 2018-01-08 | Stop reason: SDUPTHER

## 2018-01-08 RX ORDER — ANASTROZOLE 1 MG/1
TABLET ORAL
Qty: 30 TABLET | Refills: 3 | Status: SHIPPED | OUTPATIENT
Start: 2018-01-08 | End: 2018-05-30 | Stop reason: SDUPTHER

## 2018-01-25 ENCOUNTER — TELEPHONE (OUTPATIENT)
Dept: GENERAL RADIOLOGY | Facility: HOSPITAL | Age: 63
End: 2018-01-25

## 2018-01-25 ENCOUNTER — HOSPITAL ENCOUNTER (OUTPATIENT)
Dept: PET IMAGING | Facility: HOSPITAL | Age: 63
Discharge: HOME | End: 2018-01-25
Attending: INTERNAL MEDICINE

## 2018-01-30 ENCOUNTER — APPOINTMENT (OUTPATIENT)
Dept: ONCOLOGY | Facility: CLINIC | Age: 63
End: 2018-01-30

## 2018-01-30 ENCOUNTER — APPOINTMENT (OUTPATIENT)
Dept: LAB | Facility: HOSPITAL | Age: 63
End: 2018-01-30

## 2018-02-22 ENCOUNTER — LAB (OUTPATIENT)
Dept: LAB | Facility: HOSPITAL | Age: 63
End: 2018-02-22

## 2018-02-22 ENCOUNTER — HOSPITAL ENCOUNTER (OUTPATIENT)
Dept: PET IMAGING | Facility: HOSPITAL | Age: 63
Discharge: HOME | End: 2018-02-22
Attending: INTERNAL MEDICINE | Admitting: INTERNAL MEDICINE

## 2018-02-22 DIAGNOSIS — C78.02 CARCINOMA OF LEFT BREAST METASTATIC TO LUNG (HCC): ICD-10-CM

## 2018-02-22 DIAGNOSIS — C50.912 CARCINOMA OF LEFT BREAST METASTATIC TO LUNG (HCC): ICD-10-CM

## 2018-02-22 LAB
ALBUMIN SERPL-MCNC: 4.8 G/DL (ref 3.5–5.2)
ALBUMIN/GLOB SERPL: 1.4 G/DL (ref 1.1–2.4)
ALP SERPL-CCNC: 162 U/L (ref 38–116)
ALT SERPL W P-5'-P-CCNC: 44 U/L (ref 0–33)
ANION GAP SERPL CALCULATED.3IONS-SCNC: 15.2 MMOL/L
AST SERPL-CCNC: 39 U/L (ref 0–32)
BASOPHILS # BLD AUTO: 0.06 10*3/MM3 (ref 0–0.1)
BASOPHILS NFR BLD AUTO: 1.1 % (ref 0–1.1)
BILIRUB SERPL-MCNC: 0.5 MG/DL (ref 0.1–1.2)
BUN BLD-MCNC: 17 MG/DL (ref 6–20)
BUN/CREAT SERPL: 19.3 (ref 7.3–30)
CALCIUM SPEC-SCNC: 10.9 MG/DL (ref 8.5–10.2)
CANCER AG15-3 SERPL-ACNC: 22.5 U/ML
CHLORIDE SERPL-SCNC: 99 MMOL/L (ref 98–107)
CO2 SERPL-SCNC: 25.8 MMOL/L (ref 22–29)
CREAT BLD-MCNC: 0.88 MG/DL (ref 0.6–1.1)
CREAT BLDA-MCNC: 1 MG/DL (ref 0.6–1.3)
DEPRECATED RDW RBC AUTO: 39.6 FL (ref 37–49)
EOSINOPHIL # BLD AUTO: 0.13 10*3/MM3 (ref 0–0.36)
EOSINOPHIL NFR BLD AUTO: 2.3 % (ref 1–5)
ERYTHROCYTE [DISTWIDTH] IN BLOOD BY AUTOMATED COUNT: 11.9 % (ref 11.7–14.5)
GFR SERPL CREATININE-BSD FRML MDRD: 65 ML/MIN/1.73
GLOBULIN UR ELPH-MCNC: 3.5 GM/DL (ref 1.8–3.5)
GLUCOSE BLD-MCNC: 81 MG/DL (ref 74–124)
HCT VFR BLD AUTO: 46.7 % (ref 34–45)
HGB BLD-MCNC: 15.5 G/DL (ref 11.5–14.9)
IMM GRANULOCYTES # BLD: 0.01 10*3/MM3 (ref 0–0.03)
IMM GRANULOCYTES NFR BLD: 0.2 % (ref 0–0.5)
LYMPHOCYTES # BLD AUTO: 1.92 10*3/MM3 (ref 1–3.5)
LYMPHOCYTES NFR BLD AUTO: 34.6 % (ref 20–49)
MCH RBC QN AUTO: 30.3 PG (ref 27–33)
MCHC RBC AUTO-ENTMCNC: 33.2 G/DL (ref 32–35)
MCV RBC AUTO: 91.4 FL (ref 83–97)
MONOCYTES # BLD AUTO: 0.41 10*3/MM3 (ref 0.25–0.8)
MONOCYTES NFR BLD AUTO: 7.4 % (ref 4–12)
NEUTROPHILS # BLD AUTO: 3.02 10*3/MM3 (ref 1.5–7)
NEUTROPHILS NFR BLD AUTO: 54.4 % (ref 39–75)
NRBC BLD MANUAL-RTO: 0 /100 WBC (ref 0–0)
PLATELET # BLD AUTO: 358 10*3/MM3 (ref 150–375)
PMV BLD AUTO: 9.1 FL (ref 8.9–12.1)
POTASSIUM BLD-SCNC: 3.4 MMOL/L (ref 3.5–4.7)
PROT SERPL-MCNC: 8.3 G/DL (ref 6.3–8)
RBC # BLD AUTO: 5.11 10*6/MM3 (ref 3.9–5)
SODIUM BLD-SCNC: 140 MMOL/L (ref 134–145)
WBC NRBC COR # BLD: 5.55 10*3/MM3 (ref 4–10)

## 2018-02-22 PROCEDURE — 36415 COLL VENOUS BLD VENIPUNCTURE: CPT | Performed by: INTERNAL MEDICINE

## 2018-02-22 PROCEDURE — 74177 CT ABD & PELVIS W/CONTRAST: CPT

## 2018-02-22 PROCEDURE — 0 IOPAMIDOL 61 % SOLUTION: Performed by: INTERNAL MEDICINE

## 2018-02-22 PROCEDURE — 0 DIATRIZOATE MEGLUMINE & SODIUM PER 1 ML: Performed by: INTERNAL MEDICINE

## 2018-02-22 PROCEDURE — 80053 COMPREHEN METABOLIC PANEL: CPT | Performed by: INTERNAL MEDICINE

## 2018-02-22 PROCEDURE — 85025 COMPLETE CBC W/AUTO DIFF WBC: CPT | Performed by: INTERNAL MEDICINE

## 2018-02-22 PROCEDURE — 86300 IMMUNOASSAY TUMOR CA 15-3: CPT | Performed by: INTERNAL MEDICINE

## 2018-02-22 PROCEDURE — 82565 ASSAY OF CREATININE: CPT

## 2018-02-22 PROCEDURE — 71260 CT THORAX DX C+: CPT

## 2018-02-22 RX ADMIN — DIATRIZOATE MEGLUMINE AND DIATRIZOATE SODIUM 30 ML: 660; 100 LIQUID ORAL; RECTAL at 11:23

## 2018-02-22 RX ADMIN — IOPAMIDOL 85 ML: 612 INJECTION, SOLUTION INTRAVENOUS at 11:26

## 2018-02-26 ENCOUNTER — TELEPHONE (OUTPATIENT)
Dept: GENERAL RADIOLOGY | Facility: HOSPITAL | Age: 63
End: 2018-02-26

## 2018-02-26 DIAGNOSIS — C78.02 CARCINOMA OF LEFT BREAST METASTATIC TO LUNG (HCC): Primary | ICD-10-CM

## 2018-02-26 DIAGNOSIS — C50.912 CARCINOMA OF LEFT BREAST METASTATIC TO LUNG (HCC): Primary | ICD-10-CM

## 2018-02-26 NOTE — TELEPHONE ENCOUNTER
----- Message from Jarod Hawkins II, MD sent at 2/26/2018 11:57 AM EST -----   Please call her and tell her because her calcium is slightly high, I would like her to have another lab checked tomorrow when she sees me.    Please arrange a stat BMP when she sees me tomorrow.

## 2018-03-07 ENCOUNTER — LAB (OUTPATIENT)
Dept: OTHER | Facility: HOSPITAL | Age: 63
End: 2018-03-07

## 2018-03-07 ENCOUNTER — OFFICE VISIT (OUTPATIENT)
Dept: ONCOLOGY | Facility: CLINIC | Age: 63
End: 2018-03-07

## 2018-03-07 VITALS
SYSTOLIC BLOOD PRESSURE: 124 MMHG | TEMPERATURE: 98.4 F | HEIGHT: 65 IN | OXYGEN SATURATION: 99 % | WEIGHT: 192.5 LBS | DIASTOLIC BLOOD PRESSURE: 77 MMHG | RESPIRATION RATE: 16 BRPM | BODY MASS INDEX: 32.07 KG/M2 | HEART RATE: 103 BPM

## 2018-03-07 DIAGNOSIS — C50.912 CARCINOMA OF LEFT BREAST METASTATIC TO LUNG (HCC): ICD-10-CM

## 2018-03-07 DIAGNOSIS — C78.02 CARCINOMA OF LEFT BREAST METASTATIC TO LUNG (HCC): ICD-10-CM

## 2018-03-07 DIAGNOSIS — C50.912 CARCINOMA OF LEFT BREAST METASTATIC TO LUNG (HCC): Primary | ICD-10-CM

## 2018-03-07 DIAGNOSIS — C78.02 CARCINOMA OF LEFT BREAST METASTATIC TO LUNG (HCC): Primary | ICD-10-CM

## 2018-03-07 LAB
ANION GAP SERPL CALCULATED.3IONS-SCNC: 12.8 MMOL/L
BUN BLD-MCNC: 18 MG/DL (ref 8–23)
BUN/CREAT SERPL: 20.2 (ref 7–25)
CALCIUM SPEC-SCNC: 9.9 MG/DL (ref 8.6–10.5)
CHLORIDE SERPL-SCNC: 105 MMOL/L (ref 98–107)
CO2 SERPL-SCNC: 25.2 MMOL/L (ref 22–29)
CREAT BLD-MCNC: 0.89 MG/DL (ref 0.57–1)
GFR SERPL CREATININE-BSD FRML MDRD: 64 ML/MIN/1.73
GLUCOSE BLD-MCNC: 137 MG/DL (ref 65–99)
POTASSIUM BLD-SCNC: 4.1 MMOL/L (ref 3.5–5.2)
SODIUM BLD-SCNC: 143 MMOL/L (ref 136–145)

## 2018-03-07 PROCEDURE — 36415 COLL VENOUS BLD VENIPUNCTURE: CPT

## 2018-03-07 PROCEDURE — 99215 OFFICE O/P EST HI 40 MIN: CPT | Performed by: INTERNAL MEDICINE

## 2018-03-07 PROCEDURE — 80048 BASIC METABOLIC PNL TOTAL CA: CPT | Performed by: INTERNAL MEDICINE

## 2018-03-07 NOTE — PROGRESS NOTES
Subjective .     REASONS FOR FOLLOWUP:  Metastatic breast cancer.    HISTORY OF PRESENT ILLNESS:  The patient is a 62 y.o. year old female  who is here for follow-up with the above-mentioned history.    She has missed more appointments recently.  She states overall her depression has improved.  However, she states she had some bad days on those days.    Denies nausea.  Denies weight loss.  Eating well.  Denies pain.  Denies shortness of air.  Hot flashes are tolerable.    Past Medical History:   Diagnosis Date   • Allergy    • Anxiety    • Asthma    • Bone metastasis    • Breast cancer     Left node positive   • Depression    • Esophageal reflux     cough   • History of colon polyps    • Hyperlipidemia    • Hypertension    • Left breast cancer metastasized to lung    • Obstructive sleep apnea    • Ovarian cyst      Past Surgical History:   Procedure Laterality Date   • CHOLECYSTECTOMY  2000   • COLONOSCOPY  2014    H/O polyps   • KNEE ARTHROPLASTY     • LUNG SURGERY  2010    Lung wedge resection   • MASTECTOMY RADICAL Left 1993       HEMATOLOGIC/ONCOLOGIC HISTORY:  (History from previous dates can be found in the separate document.)    MEDICATIONS    Current Outpatient Prescriptions:   •  anastrozole (ARIMIDEX) 1 MG tablet, TAKE 1 TABLET BY MOUTH EVERY DAY, Disp: 30 tablet, Rfl: 3  •  aspirin 81 MG chewable tablet, Chew daily., Disp: , Rfl:   •  Calcium-Vitamin D-Vitamin K 500-500-40 MG-UNT-MCG chewable tablet, , Disp: , Rfl:   •  Cholecalciferol 2000 UNITS capsule, Take by mouth., Disp: , Rfl:   •  desvenlafaxine (PRISTIQ) 100 MG 24 hr tablet, Take by mouth., Disp: , Rfl:   •  eszopiclone (LUNESTA) tablet, Take by mouth., Disp: , Rfl:   •  LORazepam (ATIVAN) 0.5 MG tablet, Take 1 tablet by mouth., Disp: , Rfl:   •  losartan (COZAAR) 100 MG tablet, TAKE 1 TABLET BY MOUTH DAILY, Disp: 90 tablet, Rfl: 0  •  losartan (COZAAR) 100 MG tablet, Take 1 tablet by mouth Daily., Disp: 90 tablet, Rfl: 1  •  Magnesium 250 MG  tablet, , Disp: , Rfl:   •  Probiotic Product (PROBIOTIC PEARLS) capsule, Take by mouth., Disp: , Rfl:     ALLERGIES:     Allergies   Allergen Reactions   • Ciprofloxacin        SOCIAL HISTORY:       Social History     Social History   • Marital status: Single     Spouse name: N/A   • Number of children: N/A   • Years of education: College     Occupational History   • RN [employer]     Social History Main Topics   • Smoking status: Former Smoker     Packs/day: 2.00     Years: 30.00     Types: Cigarettes     Quit date: 2008   • Smokeless tobacco: Not on file   • Alcohol use No   • Drug use: No   • Sexual activity: Not on file     Other Topics Concern   • Not on file     Social History Narrative         FAMILY HISTORY:  Family History   Problem Relation Age of Onset   • Hypertension Mother    • Leukemia Mother 72   • COPD Mother      smoker   • Diabetes Brother    • Pancreatic cancer Brother    • Glaucoma Brother    • Heart disease Brother    • Hypertension Brother    • Gallbladder disease Brother    • Gallbladder disease Father    • Stroke Other      Grandmother   • Lupus Other      Aunt   • Alcohol abuse Other      Aunt   • Glaucoma Other      Grandmother       REVIEW OF SYSTEMS:  Review of Systems   Constitutional: Negative for activity change.   HENT: Negative for nosebleeds and trouble swallowing.    Respiratory: Negative for shortness of breath and wheezing.    Cardiovascular: Negative for chest pain and palpitations.   Gastrointestinal: Negative for constipation, diarrhea and nausea.   Genitourinary: Negative for dysuria and hematuria.   Musculoskeletal: Negative for arthralgias and myalgias.   Skin: Negative for rash and wound.   Neurological: Negative for seizures and syncope.   Hematological: Negative for adenopathy. Does not bruise/bleed easily.   Psychiatric/Behavioral: Negative for confusion.        Objective    Vitals:    03/07/18 1104   BP: 124/77   Pulse: 103   Resp: 16   Temp:  "98.4 °F (36.9 °C)   TempSrc: Oral   SpO2: 99%   Weight: 87.3 kg (192 lb 8 oz)   Height: 165 cm (64.96\")   PainSc: 0-No pain     Current Status 3/7/2018   ECOG score 0      PHYSICAL EXAM:    CONSTITUTIONAL:  Vital signs reviewed.  No distress, looks comfortable.  EYES:  Conjunctiva and lids unremarkable.  PERRLA  EARS,NOSE,MOUTH,THROAT:  Ears and nose appear unremarkable.  Lips, teeth, gums appear unremarkable.  RESPIRATORY:  Normal respiratory effort.  Lungs clear to auscultation bilaterally.  CARDIOVASCULAR:  Normal S1, S2.  No murmurs rubs or gallops.  No significant lower extremity edema.  GASTROINTESTINAL: Abdomen appears unremarkable.  Nontender.  No hepatomegaly.  No splenomegaly.  LYMPHATIC:  No cervical, supraclavicular, axillary lymphadenopathy.  MUSCULOSKELETAL:  Unremarkable gait and station.  Unremarkable digits/nails.  No cyanosis or clubbing.  SKIN:  Warm.  No rashes.  PSYCHIATRIC:  Normal judgment and insight.  Normal mood and affect.     RECENT LABS:        WBC   Date/Time Value Ref Range Status   02/22/2018 11:20 AM 5.55 4.00 - 10.00 10*3/mm3 Final     Hemoglobin   Date/Time Value Ref Range Status   02/22/2018 11:20 AM 15.5 (H) 11.5 - 14.9 g/dL Final     Platelets   Date/Time Value Ref Range Status   02/22/2018 11:20  150 - 375 10*3/mm3 Final       Assessment/Plan     *Metastatic breast cancer to bilateral lungs and soft tissue in the mediastinum (likely the cause of her hoarseness). (Initial breast cancer diagnosed in 1993 treated with mastectomy, chemotherapy, radiation therapy and tamoxifen). Arimidex day 1 on 02/17/2010. PET scan late August 2011 shows no abnormal hypermetabolic activity. This has improved compared to the prior PET scan January 2010 which showed mild hypermetabolic activity in areas of metastasis. (In the past, her  T6 lesion did not show up on CT scans or bone scan. It was seen by PET scan.) We have been following with CT scans only every 6 months and PET scans on an as " needed only basis. Sometimes her CT scanned pulmonary nodules are read as benign since they have been stable through the course of years but we know they are malignant because they have been surgically sampled in the past.    · CT 8/16/16 shows increased sclerosis at T6 compared to 4/28/15.    · PET 10/11/16: Slight uptake at T6, SUV 3.2.  mild thickening of right adrenal gland with an SUV of 11.   · Continued Arimidex is minimal progression and had been on this since 2/17/10.  · Not referred for radiation since no pain at T6  · CT 1/27/17, unchanged.  · CT 8/1/17: Unchanged except subtle suggestion of mild increase in thickening of the right adrenal gland.    · CT 2/22/18, unchanged.  Cancer remains stable and appears to be well-controlled on Arimidex.  Tolerating therapy well.  Continue.  CT images personally reviewed by me.    *Bony metastases at T6. She was not started on Zometa as this was the only area of bony disease. We will hold off on Zometa until she has evidence of significant bone involvement. Not given radiation is asymptomatic.  I recommended she not allow the chiropractor to manipulate the T6 area.  If she develops pain at T6, would refer her to radiation.    *Bone health. Last bone density 10/11/16, worsened, but still normal with worst T score -0.9.  Patient stopped calcium January 2016, but remained on vitamin D.  I recommended she restart her calcium.  Stopped calcium early March 2018 due to hypercalcemia.    *Hypercalcemia.  Repeat calcium 3/7/18 normal, but patient self stopped calcium a few days prior.  Recommended she stay off calcium.    *On the 10/5/16 visit, Tachycardia, dyspnea on exertion, bilateral leg swelling, more sedentary recently.  CT PE protocol and bilateral lower extremity Dopplers 10/5/16, negative for clots.  She still has tachycardia and dyspnea on exertion.  Perhaps at least part of this is due to deconditioning.    *Previously complained of colon Right lower anterior rib  discomfort.  CT unremarkable in that area.  No specific pain, just discomfort.  I explained to the patient if disease was found by PET scan or bone scan, I would not  since she has been doing well on Arimidex since 2010.   Currently denies pain.     *Depression/anxiety.  This limits compliance.  She sees a psychiatrist and a counselor regularly.  She states this is mostly due to body image issues instead of cancer.    *Noncompliance due to depression/anxiety.  Although she misses appointments, she does reschedule and eventually come in.    *Polycythemia.  Mild.  I do not think this needs any further workup.    PLAN:   · M.D. 6 months with 1 week prior:   · CAT scan CAP with contrast and CBC, CMP, CA-15-3.    · (Typically M.D. and CT every 6 months)    CT images personally reviewed by me.

## 2018-03-11 DIAGNOSIS — I10 BENIGN ESSENTIAL HTN: ICD-10-CM

## 2018-03-12 RX ORDER — LOSARTAN POTASSIUM 100 MG/1
100 TABLET ORAL DAILY
Qty: 30 TABLET | Refills: 0 | Status: SHIPPED | OUTPATIENT
Start: 2018-03-12 | End: 2018-04-29 | Stop reason: SDUPTHER

## 2018-04-24 DIAGNOSIS — E55.9 VITAMIN D DEFICIENCY: ICD-10-CM

## 2018-04-24 DIAGNOSIS — C50.912 CARCINOMA OF LEFT BREAST METASTATIC TO LUNG (HCC): ICD-10-CM

## 2018-04-24 DIAGNOSIS — C78.02 CARCINOMA OF LEFT BREAST METASTATIC TO LUNG (HCC): ICD-10-CM

## 2018-04-24 DIAGNOSIS — E78.2 MIXED HYPERLIPIDEMIA: Primary | ICD-10-CM

## 2018-04-29 DIAGNOSIS — I10 BENIGN ESSENTIAL HTN: ICD-10-CM

## 2018-04-30 LAB
25(OH)D3+25(OH)D2 SERPL-MCNC: 31.2 NG/ML (ref 30–100)
ALBUMIN SERPL-MCNC: 4.3 G/DL (ref 3.6–4.8)
ALP SERPL-CCNC: 158 IU/L (ref 39–117)
ALT SERPL-CCNC: 22 IU/L (ref 0–32)
AST SERPL-CCNC: 19 IU/L (ref 0–40)
BILIRUB DIRECT SERPL-MCNC: 0.1 MG/DL (ref 0–0.4)
BILIRUB SERPL-MCNC: 0.3 MG/DL (ref 0–1.2)
CHOLEST SERPL-MCNC: 242 MG/DL (ref 100–199)
HDLC SERPL-MCNC: 49 MG/DL
LDLC SERPL CALC-MCNC: 145 MG/DL (ref 0–99)
LDLC/HDLC SERPL: 3 RATIO (ref 0–3.2)
PROT SERPL-MCNC: 7 G/DL (ref 6–8.5)
TRIGL SERPL-MCNC: 239 MG/DL (ref 0–149)
VLDLC SERPL CALC-MCNC: 48 MG/DL (ref 5–40)

## 2018-04-30 RX ORDER — LOSARTAN POTASSIUM 100 MG/1
100 TABLET ORAL DAILY
Qty: 30 TABLET | Refills: 0 | Status: SHIPPED | OUTPATIENT
Start: 2018-04-30 | End: 2018-05-09 | Stop reason: SDUPTHER

## 2018-05-09 ENCOUNTER — OFFICE VISIT (OUTPATIENT)
Dept: INTERNAL MEDICINE | Facility: CLINIC | Age: 63
End: 2018-05-09

## 2018-05-09 VITALS
WEIGHT: 190 LBS | HEART RATE: 100 BPM | SYSTOLIC BLOOD PRESSURE: 118 MMHG | TEMPERATURE: 98 F | OXYGEN SATURATION: 98 % | HEIGHT: 65 IN | BODY MASS INDEX: 31.65 KG/M2 | DIASTOLIC BLOOD PRESSURE: 70 MMHG

## 2018-05-09 DIAGNOSIS — I10 BENIGN ESSENTIAL HTN: Primary | ICD-10-CM

## 2018-05-09 DIAGNOSIS — E55.9 VITAMIN D DEFICIENCY: ICD-10-CM

## 2018-05-09 PROCEDURE — 99213 OFFICE O/P EST LOW 20 MIN: CPT | Performed by: FAMILY MEDICINE

## 2018-05-09 RX ORDER — LOSARTAN POTASSIUM 100 MG/1
100 TABLET ORAL DAILY
Qty: 90 TABLET | Refills: 1 | Status: SHIPPED | OUTPATIENT
Start: 2018-05-09 | End: 2018-11-06 | Stop reason: SDUPTHER

## 2018-05-09 NOTE — PROGRESS NOTES
Subjective   Marialuisa Vivar is a 62 y.o. female.   Chief Complaint   Patient presents with   • Hypertension   • Vitamin D Deficiency       History of Present Illness     #1 HTN-diagnosed when patient was in her 40s. She is on losartan 100 mg a day. She takes it everyday. She reports no side effects. She reports no chest pain, no dizziness, no palpitations. Sometimes she gets short of breath when she walks faster, she thinks it's secondary to being out of shape because she does not exercise.  She has a very sedentary lifestyle.  Kidney tests are normal.     which is down from 167, HDL 49.     #3 vitamin D deficiency- patient takes vitamin D every day at 2000 units daily.  Vitamin D level is normal at 31.2.    The following portions of the patient's history were reviewed and updated as appropriate: allergies, current medications, past family history, past medical history, past social history, past surgical history and problem list.    Review of Systems   Constitutional: Negative.    Respiratory: Positive for shortness of breath. Negative for cough.    Cardiovascular: Negative.  Negative for chest pain.         Objective   Wt Readings from Last 3 Encounters:   05/09/18 86.2 kg (190 lb)   03/07/18 87.3 kg (192 lb 8 oz)   11/30/17 85.9 kg (189 lb 6.4 oz)      Vitals:    05/09/18 1506   BP: 118/70   Pulse: 100   Temp: 98 °F (36.7 °C)   SpO2: 98%     Temp Readings from Last 3 Encounters:   05/09/18 98 °F (36.7 °C)   03/07/18 98.4 °F (36.9 °C) (Oral)   11/30/17 97.8 °F (36.6 °C)     BP Readings from Last 3 Encounters:   05/09/18 118/70   03/07/18 124/77   11/30/17 112/70     Pulse Readings from Last 3 Encounters:   05/09/18 100   03/07/18 103   11/30/17 92       Physical Exam   Constitutional: She is oriented to person, place, and time. She appears well-developed and well-nourished.   HENT:   Head: Normocephalic and atraumatic.   Neck: Neck supple. Carotid bruit is not present. No thyromegaly present.    Cardiovascular: Normal rate, regular rhythm and normal heart sounds.    Pulmonary/Chest: Effort normal and breath sounds normal.   Neurological: She is alert and oriented to person, place, and time.   Skin: Skin is warm, dry and intact.   Psychiatric: She has a normal mood and affect. Her behavior is normal.       Assessment/Plan   Marilauisa was seen today for hypertension and vitamin d deficiency.    Diagnoses and all orders for this visit:    Benign essential HTN  -     losartan (COZAAR) 100 MG tablet; Take 1 tablet by mouth Daily.    Vitamin D deficiency        #1 hypertension-continue current treatment.  Follow-up in 6 months.  Start exercise at least 30 minutes a day, 5 days a week.      #2 vitamin D deficiency-controlled.  Continue same.  Follow-up in 12 months.

## 2018-05-31 RX ORDER — ANASTROZOLE 1 MG/1
TABLET ORAL
Qty: 30 TABLET | Refills: 3 | Status: SHIPPED | OUTPATIENT
Start: 2018-05-31 | End: 2018-10-09 | Stop reason: SDUPTHER

## 2018-09-06 ENCOUNTER — APPOINTMENT (OUTPATIENT)
Dept: LAB | Facility: HOSPITAL | Age: 63
End: 2018-09-06

## 2018-09-06 ENCOUNTER — HOSPITAL ENCOUNTER (OUTPATIENT)
Dept: PET IMAGING | Facility: HOSPITAL | Age: 63
Discharge: HOME | End: 2018-09-06
Attending: INTERNAL MEDICINE | Admitting: INTERNAL MEDICINE

## 2018-09-06 DIAGNOSIS — C78.02 CARCINOMA OF LEFT BREAST METASTATIC TO LUNG (HCC): ICD-10-CM

## 2018-09-06 DIAGNOSIS — C50.912 CARCINOMA OF LEFT BREAST METASTATIC TO LUNG (HCC): ICD-10-CM

## 2018-09-06 LAB
ALBUMIN SERPL-MCNC: 4.3 G/DL (ref 3.5–5.2)
ALBUMIN/GLOB SERPL: 1.4 G/DL (ref 1.1–2.4)
ALP SERPL-CCNC: 157 U/L (ref 38–116)
ALT SERPL W P-5'-P-CCNC: 20 U/L (ref 0–33)
ANION GAP SERPL CALCULATED.3IONS-SCNC: 10.3 MMOL/L
AST SERPL-CCNC: 17 U/L (ref 0–32)
BASOPHILS # BLD AUTO: 0.05 10*3/MM3 (ref 0–0.1)
BASOPHILS NFR BLD AUTO: 0.9 % (ref 0–1.1)
BILIRUB SERPL-MCNC: 0.3 MG/DL (ref 0.1–1.2)
BUN BLD-MCNC: 15 MG/DL (ref 6–20)
BUN/CREAT SERPL: 18.1 (ref 7.3–30)
CALCIUM SPEC-SCNC: 10.2 MG/DL (ref 8.5–10.2)
CANCER AG15-3 SERPL-ACNC: 20 U/ML
CHLORIDE SERPL-SCNC: 102 MMOL/L (ref 98–107)
CO2 SERPL-SCNC: 26.7 MMOL/L (ref 22–29)
CREAT BLD-MCNC: 0.83 MG/DL (ref 0.6–1.1)
CREAT BLDA-MCNC: 0.8 MG/DL (ref 0.6–1.3)
DEPRECATED RDW RBC AUTO: 39.9 FL (ref 37–49)
EOSINOPHIL # BLD AUTO: 0.22 10*3/MM3 (ref 0–0.36)
EOSINOPHIL NFR BLD AUTO: 4 % (ref 1–5)
ERYTHROCYTE [DISTWIDTH] IN BLOOD BY AUTOMATED COUNT: 12.1 % (ref 11.7–14.5)
GFR SERPL CREATININE-BSD FRML MDRD: 69 ML/MIN/1.73
GLOBULIN UR ELPH-MCNC: 3 GM/DL (ref 1.8–3.5)
GLUCOSE BLD-MCNC: 98 MG/DL (ref 74–124)
HCT VFR BLD AUTO: 42.8 % (ref 34–45)
HGB BLD-MCNC: 14 G/DL (ref 11.5–14.9)
IMM GRANULOCYTES # BLD: 0.02 10*3/MM3 (ref 0–0.03)
IMM GRANULOCYTES NFR BLD: 0.4 % (ref 0–0.5)
LYMPHOCYTES # BLD AUTO: 1.76 10*3/MM3 (ref 1–3.5)
LYMPHOCYTES NFR BLD AUTO: 32.2 % (ref 20–49)
MCH RBC QN AUTO: 29.9 PG (ref 27–33)
MCHC RBC AUTO-ENTMCNC: 32.7 G/DL (ref 32–35)
MCV RBC AUTO: 91.3 FL (ref 83–97)
MONOCYTES # BLD AUTO: 0.53 10*3/MM3 (ref 0.25–0.8)
MONOCYTES NFR BLD AUTO: 9.7 % (ref 4–12)
NEUTROPHILS # BLD AUTO: 2.89 10*3/MM3 (ref 1.5–7)
NEUTROPHILS NFR BLD AUTO: 52.8 % (ref 39–75)
NRBC BLD MANUAL-RTO: 0 /100 WBC (ref 0–0)
PLATELET # BLD AUTO: 360 10*3/MM3 (ref 150–375)
PMV BLD AUTO: 8.9 FL (ref 8.9–12.1)
POTASSIUM BLD-SCNC: 4 MMOL/L (ref 3.5–4.7)
PROT SERPL-MCNC: 7.3 G/DL (ref 6.3–8)
RBC # BLD AUTO: 4.69 10*6/MM3 (ref 3.9–5)
SODIUM BLD-SCNC: 139 MMOL/L (ref 134–145)
WBC NRBC COR # BLD: 5.47 10*3/MM3 (ref 4–10)

## 2018-09-06 PROCEDURE — 71260 CT THORAX DX C+: CPT

## 2018-09-06 PROCEDURE — 85025 COMPLETE CBC W/AUTO DIFF WBC: CPT | Performed by: INTERNAL MEDICINE

## 2018-09-06 PROCEDURE — 0 DIATRIZOATE MEGLUMINE & SODIUM PER 1 ML: Performed by: INTERNAL MEDICINE

## 2018-09-06 PROCEDURE — 25010000002 IOPAMIDOL 61 % SOLUTION: Performed by: INTERNAL MEDICINE

## 2018-09-06 PROCEDURE — 36415 COLL VENOUS BLD VENIPUNCTURE: CPT | Performed by: INTERNAL MEDICINE

## 2018-09-06 PROCEDURE — 82565 ASSAY OF CREATININE: CPT

## 2018-09-06 PROCEDURE — 80053 COMPREHEN METABOLIC PANEL: CPT | Performed by: INTERNAL MEDICINE

## 2018-09-06 PROCEDURE — 86300 IMMUNOASSAY TUMOR CA 15-3: CPT | Performed by: INTERNAL MEDICINE

## 2018-09-06 PROCEDURE — 74177 CT ABD & PELVIS W/CONTRAST: CPT

## 2018-09-06 RX ADMIN — DIATRIZOATE MEGLUMINE AND DIATRIZOATE SODIUM 30 ML: 660; 100 LIQUID ORAL; RECTAL at 12:40

## 2018-09-06 RX ADMIN — IOPAMIDOL 85 ML: 612 INJECTION, SOLUTION INTRAVENOUS at 13:32

## 2018-09-11 ENCOUNTER — APPOINTMENT (OUTPATIENT)
Dept: OTHER | Facility: HOSPITAL | Age: 63
End: 2018-09-11

## 2018-09-11 ENCOUNTER — APPOINTMENT (OUTPATIENT)
Dept: ONCOLOGY | Facility: CLINIC | Age: 63
End: 2018-09-11

## 2018-10-04 ENCOUNTER — OFFICE VISIT (OUTPATIENT)
Dept: ONCOLOGY | Facility: CLINIC | Age: 63
End: 2018-10-04

## 2018-10-04 ENCOUNTER — APPOINTMENT (OUTPATIENT)
Dept: OTHER | Facility: HOSPITAL | Age: 63
End: 2018-10-04

## 2018-10-04 VITALS
WEIGHT: 185 LBS | HEART RATE: 98 BPM | TEMPERATURE: 98.7 F | BODY MASS INDEX: 30.82 KG/M2 | RESPIRATION RATE: 16 BRPM | HEIGHT: 65 IN | SYSTOLIC BLOOD PRESSURE: 106 MMHG | DIASTOLIC BLOOD PRESSURE: 69 MMHG | OXYGEN SATURATION: 96 %

## 2018-10-04 DIAGNOSIS — C78.02 CARCINOMA OF LEFT BREAST METASTATIC TO LUNG (HCC): Primary | ICD-10-CM

## 2018-10-04 DIAGNOSIS — C50.912 CARCINOMA OF LEFT BREAST METASTATIC TO LUNG (HCC): Primary | ICD-10-CM

## 2018-10-04 PROCEDURE — 99215 OFFICE O/P EST HI 40 MIN: CPT | Performed by: INTERNAL MEDICINE

## 2018-10-04 PROCEDURE — G0463 HOSPITAL OUTPT CLINIC VISIT: HCPCS | Performed by: INTERNAL MEDICINE

## 2018-10-04 NOTE — PROGRESS NOTES
Subjective .     REASONS FOR FOLLOWUP:  Metastatic breast cancer.    HISTORY OF PRESENT ILLNESS:  The patient is a 62 y.o. year old female  who is here for follow-up with the above-mentioned history.    Missed some follow-up appointments with me.  CT scan was about 1 month ago.  She has already reviewed the results on line herself    Tolerating Arimidex well.  Hot flashes are tolerable.  Denies shortness of air, pain    Diarrhea and nausea after eating.  Intermittent times 1-2 years.  Worsening.  Now occurring 2-3 times per week    Past Medical History:   Diagnosis Date   • Allergy    • Anxiety    • Asthma    • Bone metastasis (CMS/HCC)    • Breast cancer (CMS/HCC)     Left node positive   • Depression    • Esophageal reflux     cough   • History of colon polyps    • Hyperlipidemia    • Hypertension    • Left breast cancer metastasized to lung    • Obstructive sleep apnea    • Ovarian cyst      Past Surgical History:   Procedure Laterality Date   • CHOLECYSTECTOMY  2000   • COLONOSCOPY  2014    H/O polyps   • KNEE ARTHROPLASTY     • LUNG SURGERY  2010    Lung wedge resection   • MASTECTOMY RADICAL Left 1993       HEMATOLOGIC/ONCOLOGIC HISTORY:  (History from previous dates can be found in the separate document.)    MEDICATIONS    Current Outpatient Prescriptions:   •  anastrozole (ARIMIDEX) 1 MG tablet, TAKE 1 TABLET BY MOUTH EVERY DAY, Disp: 30 tablet, Rfl: 3  •  aspirin 81 MG chewable tablet, Chew daily., Disp: , Rfl:   •  Cholecalciferol 2000 UNITS capsule, Take by mouth., Disp: , Rfl:   •  desvenlafaxine (PRISTIQ) 100 MG 24 hr tablet, Take by mouth., Disp: , Rfl:   •  eszopiclone (LUNESTA) tablet, Take by mouth., Disp: , Rfl:   •  LORazepam (ATIVAN) 0.5 MG tablet, Take 1 tablet by mouth., Disp: , Rfl:   •  losartan (COZAAR) 100 MG tablet, Take 1 tablet by mouth Daily., Disp: 90 tablet, Rfl: 1  •  Magnesium 250 MG tablet, Pt takes this a few times per week, Disp: , Rfl:   •  Probiotic Product (PROBIOTIC PEARLS)  capsule, Take by mouth., Disp: , Rfl:     ALLERGIES:     Allergies   Allergen Reactions   • Ciprofloxacin        SOCIAL HISTORY:       Social History     Social History   • Marital status: Single     Spouse name: N/A   • Number of children: N/A   • Years of education: College     Occupational History   • RN [employer]     Social History Main Topics   • Smoking status: Former Smoker     Packs/day: 2.00     Years: 30.00     Types: Cigarettes     Quit date: 2008   • Smokeless tobacco: Not on file   • Alcohol use No   • Drug use: No   • Sexual activity: Not on file     Other Topics Concern   • Not on file     Social History Narrative   • No narrative on file         FAMILY HISTORY:  Family History   Problem Relation Age of Onset   • Hypertension Mother    • Leukemia Mother 72   • COPD Mother         smoker   • Diabetes Brother    • Pancreatic cancer Brother    • Glaucoma Brother    • Heart disease Brother    • Hypertension Brother    • Gallbladder disease Brother    • Gallbladder disease Father    • Stroke Other         Grandmother   • Lupus Other         Aunt   • Alcohol abuse Other         Aunt   • Glaucoma Other         Grandmother       REVIEW OF SYSTEMS:  Review of Systems   Constitutional: Negative for activity change.   HENT: Negative for nosebleeds and trouble swallowing.    Respiratory: Negative for shortness of breath and wheezing.    Cardiovascular: Negative for chest pain and palpitations.   Gastrointestinal: Negative for constipation, diarrhea and nausea.   Genitourinary: Negative for dysuria and hematuria.   Musculoskeletal: Negative for arthralgias and myalgias.   Skin: Negative for rash and wound.   Neurological: Negative for seizures and syncope.   Hematological: Negative for adenopathy. Does not bruise/bleed easily.   Psychiatric/Behavioral: Negative for confusion.        Objective    Vitals:    10/04/18 1512   BP: 106/69   Pulse: 98   Resp: 16   Temp: 98.7 °F (37.1 °C)   TempSrc:  "Oral   SpO2: 96%   Weight: 83.9 kg (185 lb)   Height: 165 cm (64.96\")   PainSc: 0-No pain     Current Status 10/4/2018   ECOG score 0      PHYSICAL EXAM:    CONSTITUTIONAL:  Vital signs reviewed.  No distress, looks comfortable.  EYES:  Conjunctiva and lids unremarkable.  PERRLA  EARS,NOSE,MOUTH,THROAT:  Ears and nose appear unremarkable.  Lips, teeth, gums appear unremarkable.  RESPIRATORY:  Normal respiratory effort.  Lungs clear to auscultation bilaterally.  CARDIOVASCULAR:  Normal S1, S2.  No murmurs rubs or gallops.  No significant lower extremity edema.  GASTROINTESTINAL: Abdomen appears unremarkable.  Nontender.  No hepatomegaly.  No splenomegaly.  LYMPHATIC:  No cervical, supraclavicular, axillary lymphadenopathy.  SKIN:  Warm.  No rashes.  PSYCHIATRIC:  Normal judgment and insight.  Normal mood and affect.      RECENT LABS:        WBC   Date/Time Value Ref Range Status   09/06/2018 01:25 PM 5.47 4.00 - 10.00 10*3/mm3 Final     Hemoglobin   Date/Time Value Ref Range Status   09/06/2018 01:25 PM 14.0 11.5 - 14.9 g/dL Final     Platelets   Date/Time Value Ref Range Status   09/06/2018 01:25  150 - 375 10*3/mm3 Final       Assessment/Plan     *Metastatic breast cancer to bilateral lungs and soft tissue in the mediastinum (likely the cause of her hoarseness). (Initial breast cancer diagnosed in 1993 treated with mastectomy, chemotherapy, radiation therapy and tamoxifen). Arimidex day 1 on 02/17/2010. PET scan late August 2011 shows no abnormal hypermetabolic activity. This has improved compared to the prior PET scan January 2010 which showed mild hypermetabolic activity in areas of metastasis. (In the past, her  T6 lesion did not show up on CT scans or bone scan. It was seen by PET scan.) We have been following with CT scans only every 6 months and PET scans on an as needed only basis. Sometimes her CT scanned pulmonary nodules are read as benign since they have been stable through the course of years but " we know they are malignant because they have been surgically sampled in the past.    · CT 8/16/16 shows increased sclerosis at T6 compared to 4/28/15.    · PET 10/11/16: Slight uptake at T6, SUV 3.2.  mild thickening of right adrenal gland with an SUV of 11.   · Continued Arimidex is minimal progression and had been on this since 2/17/10.  · Not referred for radiation since no pain at T6  · CT 1/27/17, unchanged.  · CT 8/1/17: Unchanged except subtle suggestion of mild increase in thickening of the right adrenal gland.    · CT 2/22/18, unchanged.  · CT 9/6/18: Unchanged except nodular thickening right adrenal gland significantly decreased.  CT images personally reviewed by me.  CA-15-3 remains normal.  Cancer remains controlled on Arimidex.  Continue Arimidex.    *Bony metastases at T6. She was not started on Zometa as this was the only area of bony disease. We will hold off on Zometa until she has evidence of significant bone involvement. Not given radiation is asymptomatic.  I recommended she not allow the chiropractor to manipulate the T6 area.  If she develops pain at T6, would refer her to radiation.    *Bone health. Last bone density 10/11/16, worsened, but still normal with worst T score -0.9.  Patient stopped calcium January 2016, but remained on vitamin D.  I recommended she restart her calcium.  Stopped calcium early March 2018 due to hypercalcemia.    *Hypercalcemia.  Repeat calcium 3/7/18 normal, but patient self stopped calcium a few days prior.  Recommended she stay off calcium.    *On the 10/5/16 visit, Tachycardia, dyspnea on exertion, bilateral leg swelling, more sedentary recently.  CT PE protocol and bilateral lower extremity Dopplers 10/5/16, negative for clots.  She still has tachycardia and dyspnea on exertion.  Perhaps at least part of this is due to deconditioning.    *Previously complained of colon Right lower anterior rib discomfort.  CT unremarkable in that area.  No specific pain, just  discomfort.  I explained to the patient if disease was found by PET scan or bone scan, I would not  since she has been doing well on Arimidex since 2010.   Currently denies pain.     *Depression/anxiety.  This limits compliance.  She sees a psychiatrist and a counselor regularly.  She states this is mostly due to body image issues instead of cancer.    *Noncompliance due to depression/anxiety.  Although she misses appointments, she does reschedule and eventually come in.    *Polycythemia.  Mild.  I do not think this needs any further workup.    PLAN:   · M.D. CBC CMP CA 15-3 in 6 months  · Annual CAT scan CAP with contrast and CBC, CMP, CA-15-3.  (October)  · (Typically M.D. every 6 months, CT every one year)  · (Patient requested to change CT to yearly instead of 6 months)  · Continue Arimidex.      CT images personally reviewed by me.

## 2018-10-09 RX ORDER — ANASTROZOLE 1 MG/1
TABLET ORAL
Qty: 30 TABLET | Refills: 5 | Status: SHIPPED | OUTPATIENT
Start: 2018-10-09 | End: 2019-04-24 | Stop reason: SDUPTHER

## 2018-11-02 DIAGNOSIS — E78.2 MIXED HYPERLIPIDEMIA: ICD-10-CM

## 2018-11-02 DIAGNOSIS — E78.2 MIXED HYPERLIPIDEMIA: Primary | ICD-10-CM

## 2018-11-06 ENCOUNTER — HOSPITAL ENCOUNTER (OUTPATIENT)
Dept: GENERAL RADIOLOGY | Facility: HOSPITAL | Age: 63
Discharge: HOME | End: 2018-11-06
Admitting: FAMILY MEDICINE

## 2018-11-06 ENCOUNTER — OFFICE VISIT (OUTPATIENT)
Dept: INTERNAL MEDICINE | Facility: CLINIC | Age: 63
End: 2018-11-06

## 2018-11-06 VITALS
HEIGHT: 65 IN | SYSTOLIC BLOOD PRESSURE: 122 MMHG | HEART RATE: 110 BPM | OXYGEN SATURATION: 96 % | WEIGHT: 189 LBS | TEMPERATURE: 98.5 F | BODY MASS INDEX: 31.49 KG/M2 | DIASTOLIC BLOOD PRESSURE: 70 MMHG

## 2018-11-06 DIAGNOSIS — R06.02 SOB (SHORTNESS OF BREATH): ICD-10-CM

## 2018-11-06 DIAGNOSIS — I10 BENIGN ESSENTIAL HTN: ICD-10-CM

## 2018-11-06 DIAGNOSIS — E78.2 MIXED HYPERLIPIDEMIA: Primary | ICD-10-CM

## 2018-11-06 PROCEDURE — 71046 X-RAY EXAM CHEST 2 VIEWS: CPT

## 2018-11-06 PROCEDURE — 99214 OFFICE O/P EST MOD 30 MIN: CPT | Performed by: FAMILY MEDICINE

## 2018-11-06 RX ORDER — ATORVASTATIN CALCIUM 10 MG/1
10 TABLET, FILM COATED ORAL NIGHTLY
Qty: 90 TABLET | Refills: 0 | Status: SHIPPED | OUTPATIENT
Start: 2018-11-06 | End: 2019-02-06 | Stop reason: SDUPTHER

## 2018-11-06 RX ORDER — LOSARTAN POTASSIUM 100 MG/1
100 TABLET ORAL DAILY
Qty: 90 TABLET | Refills: 1 | Status: SHIPPED | OUTPATIENT
Start: 2018-11-06 | End: 2019-06-26 | Stop reason: SDUPTHER

## 2018-11-06 NOTE — PROGRESS NOTES
Subjective   Marialuisa Vivar is a 63 y.o. female.   Chief Complaint   Patient presents with   • Hypertension   • Hyperlipidemia       History of Present Illness     #1 HTN-diagnosed when patient was in her 40s. She is on losartan 100 mg a day. She takes it everyday. She reports no side effects. She reports no chest pain, no dizziness, no palpitations. Sometimes she gets short of breath when she walks faster, she thinks it's secondary to being out of shape because she does not exercise.  No change since last office visit.  Occasionally she has cough on and off.  She has a very sedentary lifestyle.  High stress at work.   Kidney tests are normal.   Last chest CT in March 2018.    #2 HLP-currently on no medications.  Patient had blood work done on 10/20/18 with , HDL 52, triglycerides 244 and total cholesterol 284.    The following portions of the patient's history were reviewed and updated as appropriate: allergies, current medications, past family history, past medical history, past social history, past surgical history and problem list.    Review of Systems   Constitutional: Negative.    HENT: Negative.    Respiratory: Positive for shortness of breath.    Cardiovascular: Negative for chest pain and palpitations.         Objective   Wt Readings from Last 3 Encounters:   11/06/18 85.7 kg (189 lb)   10/04/18 83.9 kg (185 lb)   05/09/18 86.2 kg (190 lb)      Vitals:    11/06/18 1252   BP: 122/70   Pulse: 110   Temp: 98.5 °F (36.9 °C)   SpO2: 96%     Temp Readings from Last 3 Encounters:   11/06/18 98.5 °F (36.9 °C)   10/04/18 98.7 °F (37.1 °C) (Oral)   05/09/18 98 °F (36.7 °C)     BP Readings from Last 3 Encounters:   11/06/18 122/70   10/04/18 106/69   05/09/18 118/70     Pulse Readings from Last 3 Encounters:   11/06/18 110   10/04/18 98   05/09/18 100       Physical Exam   Constitutional: She is oriented to person, place, and time. She appears well-developed and well-nourished.   HENT:   Head: Normocephalic and  atraumatic.   Neck: Neck supple. Carotid bruit is not present. No thyromegaly present.   Cardiovascular: Normal rate, regular rhythm and normal heart sounds.    Pulmonary/Chest: Effort normal and breath sounds normal.   Decreased breath sounds at the left base.   Neurological: She is alert and oriented to person, place, and time.   Skin: Skin is warm, dry and intact.   Psychiatric: She has a normal mood and affect. Her behavior is normal.       Assessment/Plan   Marialuisa was seen today for hypertension and hyperlipidemia.    Diagnoses and all orders for this visit:    Mixed hyperlipidemia  -     Comprehensive Metabolic Panel; Future  -     Lipid Panel With LDL / HDL Ratio; Future    Benign essential HTN  -     losartan (COZAAR) 100 MG tablet; Take 1 tablet by mouth Daily.    SOB (shortness of breath)  -     XR Chest PA & Lateral    Other orders  -     atorvastatin (LIPITOR) 10 MG tablet; Take 1 tablet by mouth Every Night.        #1 HTN-will continue current treatment.  Follow-up in 6 months.    #2 hyperlipidemia-uncontrolled.  We are starting atorvastatin at 10 mg a day.  Labs in 3 months before office visit.    #3 shortness of breath-chronic, decreased breath sounds on the left side-will check an x-ray.

## 2018-12-21 ENCOUNTER — APPOINTMENT (OUTPATIENT)
Dept: WOMENS IMAGING | Facility: HOSPITAL | Age: 63
End: 2018-12-21

## 2018-12-21 PROCEDURE — 77067 SCR MAMMO BI INCL CAD: CPT | Performed by: RADIOLOGY

## 2019-01-31 DIAGNOSIS — E78.2 MIXED HYPERLIPIDEMIA: ICD-10-CM

## 2019-02-05 LAB
ALBUMIN SERPL-MCNC: 4.2 G/DL (ref 3.5–5.2)
ALBUMIN/GLOB SERPL: 1.9 G/DL
ALP SERPL-CCNC: 144 U/L (ref 39–117)
ALT SERPL-CCNC: 22 U/L (ref 1–33)
AST SERPL-CCNC: 21 U/L (ref 1–32)
BILIRUB SERPL-MCNC: 0.4 MG/DL (ref 0.1–1.2)
BUN SERPL-MCNC: 18 MG/DL (ref 8–23)
BUN/CREAT SERPL: 20.7 (ref 7–25)
CALCIUM SERPL-MCNC: 10.2 MG/DL (ref 8.6–10.5)
CHLORIDE SERPL-SCNC: 104 MMOL/L (ref 98–107)
CHOLEST SERPL-MCNC: 152 MG/DL (ref 0–200)
CO2 SERPL-SCNC: 24.9 MMOL/L (ref 22–29)
CREAT SERPL-MCNC: 0.87 MG/DL (ref 0.57–1)
GLOBULIN SER CALC-MCNC: 2.2 GM/DL
GLUCOSE SERPL-MCNC: 101 MG/DL (ref 65–99)
HDLC SERPL-MCNC: 55 MG/DL (ref 40–60)
LDLC SERPL CALC-MCNC: 68 MG/DL (ref 0–100)
LDLC/HDLC SERPL: 1.24 {RATIO}
POTASSIUM SERPL-SCNC: 5.1 MMOL/L (ref 3.5–5.2)
PROT SERPL-MCNC: 6.4 G/DL (ref 6–8.5)
SODIUM SERPL-SCNC: 141 MMOL/L (ref 136–145)
TRIGL SERPL-MCNC: 144 MG/DL (ref 0–150)
VLDLC SERPL CALC-MCNC: 28.8 MG/DL (ref 5–40)

## 2019-02-06 ENCOUNTER — OFFICE VISIT (OUTPATIENT)
Dept: INTERNAL MEDICINE | Facility: CLINIC | Age: 64
End: 2019-02-06

## 2019-02-06 VITALS
DIASTOLIC BLOOD PRESSURE: 64 MMHG | OXYGEN SATURATION: 97 % | HEIGHT: 65 IN | TEMPERATURE: 98 F | WEIGHT: 186 LBS | BODY MASS INDEX: 30.99 KG/M2 | HEART RATE: 110 BPM | SYSTOLIC BLOOD PRESSURE: 110 MMHG

## 2019-02-06 DIAGNOSIS — E66.9 CLASS 1 OBESITY WITHOUT SERIOUS COMORBIDITY WITH BODY MASS INDEX (BMI) OF 31.0 TO 31.9 IN ADULT, UNSPECIFIED OBESITY TYPE: ICD-10-CM

## 2019-02-06 DIAGNOSIS — E55.9 VITAMIN D DEFICIENCY: ICD-10-CM

## 2019-02-06 DIAGNOSIS — E78.2 MIXED HYPERLIPIDEMIA: Primary | ICD-10-CM

## 2019-02-06 PROCEDURE — 99213 OFFICE O/P EST LOW 20 MIN: CPT | Performed by: FAMILY MEDICINE

## 2019-02-06 RX ORDER — ARIPIPRAZOLE 5 MG/1
5 TABLET ORAL DAILY
COMMUNITY
End: 2019-08-13

## 2019-02-06 RX ORDER — ATORVASTATIN CALCIUM 10 MG/1
10 TABLET, FILM COATED ORAL NIGHTLY
Qty: 90 TABLET | Refills: 1 | Status: SHIPPED | OUTPATIENT
Start: 2019-02-06 | End: 2019-05-15 | Stop reason: SDUPTHER

## 2019-02-06 NOTE — PROGRESS NOTES
Subjective   Marialuisa Vivar is a 63 y.o. female.   Chief Complaint   Patient presents with   • Hyperlipidemia       History of Present Illness     #1 HLP-at last office visit we started atorvastatin at 10 mg a day.  Patient takes it everyday.  She has no side effects.  No muscle aches.  She has occasional muscle cramps, but they do not bother her.  She had them before she started atorvastatin.  LDL is at 68 from 145, HDL 55, total cholesterol 152 from 242.  LFTs normal.    #2 Obesity-BMI is 31.  She admits to having sweet tooth.  She tends to overeat in the evenings.  She does not exercise.    Patient was just started on Abilify by her psychiatrist.  She takes 5 mg a day.  She reports improvement.  Her mood is better.    The following portions of the patient's history were reviewed and updated as appropriate: allergies, current medications, past medical history, past social history and problem list.    Review of Systems   Constitutional: Negative.    Cardiovascular: Negative.    Musculoskeletal: Negative for myalgias.         Objective   Wt Readings from Last 3 Encounters:   02/06/19 84.4 kg (186 lb)   11/06/18 85.7 kg (189 lb)   10/04/18 83.9 kg (185 lb)      Vitals:    02/06/19 1304   BP: 110/64   Pulse: 110   Temp: 98 °F (36.7 °C)   SpO2: 97%     Temp Readings from Last 3 Encounters:   02/06/19 98 °F (36.7 °C)   11/06/18 98.5 °F (36.9 °C)   10/04/18 98.7 °F (37.1 °C) (Oral)     BP Readings from Last 3 Encounters:   02/06/19 110/64   11/06/18 122/70   10/04/18 106/69     Pulse Readings from Last 3 Encounters:   02/06/19 110   11/06/18 110   10/04/18 98       Physical Exam   Constitutional: She is oriented to person, place, and time. She appears well-developed and well-nourished.   HENT:   Head: Normocephalic and atraumatic.   Neck: Neck supple. Carotid bruit is not present. No thyromegaly present.   Cardiovascular: Normal rate, regular rhythm and normal heart sounds.   Pulmonary/Chest: Effort normal and breath  sounds normal.   Neurological: She is alert and oriented to person, place, and time.   Skin: Skin is warm, dry and intact.   Psychiatric: She has a normal mood and affect. Her behavior is normal.       Assessment/Plan   Marialuisa was seen today for hyperlipidemia.    Diagnoses and all orders for this visit:    Mixed hyperlipidemia  -     Basic Metabolic Panel; Future    Class 1 obesity without serious comorbidity with body mass index (BMI) of 31.0 to 31.9 in adult, unspecified obesity type    Vitamin D deficiency  -     Vitamin D 25 Hydroxy; Future    Other orders  -     atorvastatin (LIPITOR) 10 MG tablet; Take 1 tablet by mouth Every Night.        #1 hyperlipidemia-will continue current treatment.  Follow-up in 6 months.      #2 obesity-increased risk for developing diabetes and heart disease.  Dietary changes discussed.  Patient will decrease portion size, she will limit sweets to weekends only.  She will start exercise as tolerated and increase it to at least 30 minutes a day, 5 days a week.  Nutritionist offered.

## 2019-03-21 ENCOUNTER — LAB (OUTPATIENT)
Dept: OTHER | Facility: HOSPITAL | Age: 64
End: 2019-03-21

## 2019-03-21 ENCOUNTER — OFFICE VISIT (OUTPATIENT)
Dept: ONCOLOGY | Facility: CLINIC | Age: 64
End: 2019-03-21

## 2019-03-21 VITALS
HEIGHT: 65 IN | DIASTOLIC BLOOD PRESSURE: 81 MMHG | BODY MASS INDEX: 31.49 KG/M2 | RESPIRATION RATE: 12 BRPM | HEART RATE: 98 BPM | OXYGEN SATURATION: 96 % | TEMPERATURE: 98.2 F | WEIGHT: 189 LBS | SYSTOLIC BLOOD PRESSURE: 130 MMHG

## 2019-03-21 DIAGNOSIS — C50.912 CARCINOMA OF LEFT BREAST METASTATIC TO LUNG (HCC): Primary | ICD-10-CM

## 2019-03-21 DIAGNOSIS — Z78.0 POSTMENOPAUSAL: ICD-10-CM

## 2019-03-21 DIAGNOSIS — C50.912 CARCINOMA OF LEFT BREAST METASTATIC TO LUNG (HCC): ICD-10-CM

## 2019-03-21 DIAGNOSIS — C78.02 CARCINOMA OF LEFT BREAST METASTATIC TO LUNG (HCC): Primary | ICD-10-CM

## 2019-03-21 DIAGNOSIS — Z79.811 USE OF ANASTROZOLE (ARIMIDEX): ICD-10-CM

## 2019-03-21 DIAGNOSIS — C78.02 CARCINOMA OF LEFT BREAST METASTATIC TO LUNG (HCC): ICD-10-CM

## 2019-03-21 LAB
ALBUMIN SERPL-MCNC: 4.1 G/DL (ref 3.5–5.2)
ALBUMIN/GLOB SERPL: 1.4 G/DL
ALP SERPL-CCNC: 140 U/L (ref 39–117)
ALT SERPL W P-5'-P-CCNC: 23 U/L (ref 1–33)
ANION GAP SERPL CALCULATED.3IONS-SCNC: 9.4 MMOL/L
AST SERPL-CCNC: 18 U/L (ref 1–32)
BASOPHILS # BLD AUTO: 0.04 10*3/MM3 (ref 0–0.2)
BASOPHILS NFR BLD AUTO: 0.6 % (ref 0–1.5)
BILIRUB SERPL-MCNC: 0.4 MG/DL (ref 0.1–1.2)
BUN BLD-MCNC: 21 MG/DL (ref 8–23)
BUN/CREAT SERPL: 23.3 (ref 7–25)
CALCIUM SPEC-SCNC: 10.2 MG/DL (ref 8.6–10.5)
CANCER AG15-3 SERPL-ACNC: 16.6 U/ML
CHLORIDE SERPL-SCNC: 107 MMOL/L (ref 98–107)
CO2 SERPL-SCNC: 25.6 MMOL/L (ref 22–29)
CREAT BLD-MCNC: 0.9 MG/DL (ref 0.57–1)
DEPRECATED RDW RBC AUTO: 39.5 FL (ref 37–54)
EOSINOPHIL # BLD AUTO: 0.14 10*3/MM3 (ref 0–0.4)
EOSINOPHIL NFR BLD AUTO: 2.2 % (ref 0.3–6.2)
ERYTHROCYTE [DISTWIDTH] IN BLOOD BY AUTOMATED COUNT: 12 % (ref 12.3–15.4)
GFR SERPL CREATININE-BSD FRML MDRD: 63 ML/MIN/1.73
GLOBULIN UR ELPH-MCNC: 3 GM/DL
GLUCOSE BLD-MCNC: 135 MG/DL (ref 65–99)
HCT VFR BLD AUTO: 40 % (ref 34–46.6)
HGB BLD-MCNC: 13.4 G/DL (ref 12–15.9)
IMM GRANULOCYTES # BLD AUTO: 0.02 10*3/MM3 (ref 0–0.05)
IMM GRANULOCYTES NFR BLD AUTO: 0.3 % (ref 0–0.5)
LYMPHOCYTES # BLD AUTO: 1.75 10*3/MM3 (ref 0.7–3.1)
LYMPHOCYTES NFR BLD AUTO: 27.6 % (ref 19.6–45.3)
MCH RBC QN AUTO: 30.1 PG (ref 26.6–33)
MCHC RBC AUTO-ENTMCNC: 33.5 G/DL (ref 31.5–35.7)
MCV RBC AUTO: 89.9 FL (ref 79–97)
MONOCYTES # BLD AUTO: 0.38 10*3/MM3 (ref 0.1–0.9)
MONOCYTES NFR BLD AUTO: 6 % (ref 5–12)
NEUTROPHILS # BLD AUTO: 4 10*3/MM3 (ref 1.4–7)
NEUTROPHILS NFR BLD AUTO: 63.3 % (ref 42.7–76)
NRBC BLD AUTO-RTO: 0 /100 WBC (ref 0–0)
PLATELET # BLD AUTO: 330 10*3/MM3 (ref 140–450)
PMV BLD AUTO: 8.6 FL (ref 6–12)
POTASSIUM BLD-SCNC: 4.5 MMOL/L (ref 3.5–5.2)
PROT SERPL-MCNC: 7.1 G/DL (ref 6–8.5)
RBC # BLD AUTO: 4.45 10*6/MM3 (ref 3.77–5.28)
SODIUM BLD-SCNC: 142 MMOL/L (ref 136–145)
WBC NRBC COR # BLD: 6.33 10*3/MM3 (ref 3.4–10.8)

## 2019-03-21 PROCEDURE — 99214 OFFICE O/P EST MOD 30 MIN: CPT | Performed by: INTERNAL MEDICINE

## 2019-03-21 PROCEDURE — 85025 COMPLETE CBC W/AUTO DIFF WBC: CPT | Performed by: INTERNAL MEDICINE

## 2019-03-21 PROCEDURE — 86300 IMMUNOASSAY TUMOR CA 15-3: CPT | Performed by: INTERNAL MEDICINE

## 2019-03-21 PROCEDURE — 80053 COMPREHEN METABOLIC PANEL: CPT | Performed by: INTERNAL MEDICINE

## 2019-03-21 PROCEDURE — 36415 COLL VENOUS BLD VENIPUNCTURE: CPT

## 2019-03-21 NOTE — PROGRESS NOTES
Subjective .     REASONS FOR FOLLOWUP:  Metastatic breast cancer.    HISTORY OF PRESENT ILLNESS:  The patient is a 63 y.o. year old female  who is here for follow-up with the above-mentioned history.    No new areas of pain.  Continues to have anxiety and depression but states this is manageable.  Continues to see her counselor.    Denies weight loss.        Past Medical History:   Diagnosis Date   • Allergy    • Anxiety    • Asthma    • Bone metastasis (CMS/HCC)    • Breast cancer (CMS/HCC)     Left node positive   • Depression    • Esophageal reflux     cough   • History of colon polyps    • Hyperlipidemia    • Hypertension    • Left breast cancer metastasized to lung    • Obstructive sleep apnea    • Ovarian cyst      Past Surgical History:   Procedure Laterality Date   • CHOLECYSTECTOMY  2000   • COLONOSCOPY  2014    H/O polyps   • KNEE ARTHROPLASTY     • LUNG SURGERY  2010    Lung wedge resection   • MASTECTOMY RADICAL Left 1993       HEMATOLOGIC/ONCOLOGIC HISTORY:  (History from previous dates can be found in the separate document.)    MEDICATIONS    Current Outpatient Medications:   •  anastrozole (ARIMIDEX) 1 MG tablet, TAKE 1 TABLET BY MOUTH EVERY DAY, Disp: 30 tablet, Rfl: 5  •  ARIPiprazole (ABILIFY) 5 MG tablet, Take 5 mg by mouth Daily., Disp: , Rfl:   •  aspirin 81 MG chewable tablet, Chew daily., Disp: , Rfl:   •  atorvastatin (LIPITOR) 10 MG tablet, Take 1 tablet by mouth Every Night., Disp: 90 tablet, Rfl: 1  •  Cholecalciferol 2000 UNITS capsule, Take by mouth., Disp: , Rfl:   •  desvenlafaxine (PRISTIQ) 100 MG 24 hr tablet, Take by mouth., Disp: , Rfl:   •  eszopiclone (LUNESTA) tablet, Take by mouth., Disp: , Rfl:   •  LORazepam (ATIVAN) 0.5 MG tablet, Take 1 tablet by mouth., Disp: , Rfl:   •  losartan (COZAAR) 100 MG tablet, Take 1 tablet by mouth Daily., Disp: 90 tablet, Rfl: 1  •  Magnesium 250 MG tablet, Pt takes this a few times per week, Disp: , Rfl:   •  Probiotic Product (PROBIOTIC  PEARLS) capsule, Take by mouth., Disp: , Rfl:     ALLERGIES:     Allergies   Allergen Reactions   • Ciprofloxacin        SOCIAL HISTORY:       Social History     Socioeconomic History   • Marital status: Single     Spouse name: Not on file   • Number of children: Not on file   • Years of education: College   • Highest education level: Not on file   Occupational History   • Occupation: RN     Employer: [employer]   Tobacco Use   • Smoking status: Former Smoker     Packs/day: 2.00     Years: 30.00     Pack years: 60.00     Types: Cigarettes     Last attempt to quit:      Years since quittin.2   Substance and Sexual Activity   • Alcohol use: No   • Drug use: No   • Sexual activity: Defer         FAMILY HISTORY:  Family History   Problem Relation Age of Onset   • Hypertension Mother    • Leukemia Mother 72   • COPD Mother         smoker   • Diabetes Brother    • Pancreatic cancer Brother    • Glaucoma Brother    • Heart disease Brother    • Hypertension Brother    • Gallbladder disease Brother    • Gallbladder disease Father    • Stroke Other         Grandmother   • Lupus Other         Aunt   • Alcohol abuse Other         Aunt   • Glaucoma Other         Grandmother       REVIEW OF SYSTEMS:  Review of Systems   Constitutional: Negative for activity change.   HENT: Negative for nosebleeds and trouble swallowing.    Respiratory: Negative for shortness of breath and wheezing.    Cardiovascular: Negative for chest pain and palpitations.   Gastrointestinal: Negative for constipation, diarrhea and nausea.   Genitourinary: Negative for dysuria and hematuria.   Musculoskeletal: Negative for arthralgias and myalgias.   Skin: Negative for rash and wound.   Neurological: Negative for seizures and syncope.   Hematological: Negative for adenopathy. Does not bruise/bleed easily.   Psychiatric/Behavioral: Negative for confusion.        Objective    Vitals:    19 1339   BP: 130/81  Comment: right arm  "  Pulse: 98   Resp: 12   Temp: 98.2 °F (36.8 °C)   TempSrc: Oral   SpO2: 96%   Weight: 85.7 kg (189 lb)   Height: 165 cm (64.96\")  Comment: new ht   PainSc: 0-No pain     Current Status 3/21/2019   ECOG score 0      PHYSICAL EXAM:    CONSTITUTIONAL:  Vital signs reviewed.  No distress, looks comfortable.  EYES:  Conjunctiva and lids unremarkable.  PERRLA  EARS,NOSE,MOUTH,THROAT:  Ears and nose appear unremarkable.  Lips, teeth, gums appear unremarkable.  RESPIRATORY:  Normal respiratory effort.  Lungs clear to auscultation bilaterally.  CARDIOVASCULAR:  Normal S1, S2.  No murmurs rubs or gallops.  No significant lower extremity edema.  BREAST: no masses by palpation or inspection status post left mastectomy.  GASTROINTESTINAL: Abdomen appears unremarkable.  Nontender.  No hepatomegaly.  No splenomegaly.  LYMPHATIC:  No cervical, supraclavicular, axillary lymphadenopathy.  SKIN:  Warm.  No rashes.  PSYCHIATRIC:  Normal judgment and insight.  Normal mood and affect.      RECENT LABS:        WBC   Date/Time Value Ref Range Status   03/21/2019 01:18 PM 6.33 3.40 - 10.80 10*3/mm3 Final     Hemoglobin   Date/Time Value Ref Range Status   03/21/2019 01:18 PM 13.4 12.0 - 15.9 g/dL Final     Platelets   Date/Time Value Ref Range Status   03/21/2019 01:18  140 - 450 10*3/mm3 Final       Assessment/Plan     *Metastatic breast cancer to bilateral lungs and soft tissue in the mediastinum (likely the cause of her hoarseness). (Initial breast cancer diagnosed in 1993 treated with mastectomy, chemotherapy, radiation therapy and tamoxifen). Arimidex day 1 on 02/17/2010. PET scan late August 2011 shows no abnormal hypermetabolic activity. This has improved compared to the prior PET scan January 2010 which showed mild hypermetabolic activity in areas of metastasis. (In the past, her  T6 lesion did not show up on CT scans or bone scan. It was seen by PET scan.) We have been following with CT scans only every 6 months and PET " scans on an as needed only basis. Sometimes her CT scanned pulmonary nodules are read as benign since they have been stable through the course of years but we know they are malignant because they have been surgically sampled in the past.    · CT 8/16/16 shows increased sclerosis at T6 compared to 4/28/15.    · PET 10/11/16: Slight uptake at T6, SUV 3.2.  mild thickening of right adrenal gland with an SUV of 11.   · Continued Arimidex is minimal progression and had been on this since 2/17/10.  · Not referred for radiation since no pain at T6  · CT 1/27/17, unchanged.  · CT 8/1/17: Unchanged except subtle suggestion of mild increase in thickening of the right adrenal gland.    · CT 2/22/18, unchanged.  · CT 9/6/18: Unchanged except nodular thickening right adrenal gland significantly decreased.  CT images personally reviewed by me.  CA-15-3 pending  No clinical signs of cancer progression.  Next CT in 6 months    *Bony metastases at T6. She was not started on Zometa as this was the only area of bony disease. We will hold off on Zometa until she has evidence of significant bone involvement. Not given radiation is asymptomatic.  I recommended she not allow the chiropractor to manipulate the T6 area.  If she develops pain at T6, would refer her to radiation.    *Bone health. Last bone density 10/11/16, worsened, but still normal with worst T score -0.9.  Patient stopped calcium January 2016, but remained on vitamin D.  I recommended she restart her calcium.  Stopped calcium early March 2018 due to hypercalcemia.  Check another bone density before next visit.    *Hypercalcemia.  Repeat calcium 3/7/18 normal, but patient self stopped calcium a few days prior.  Recommended she stay off calcium.    *On the 10/5/16 visit, Tachycardia, dyspnea on exertion, bilateral leg swelling, more sedentary recently.  CT PE protocol and bilateral lower extremity Dopplers 10/5/16, negative for clots.  She still has tachycardia and dyspnea  on exertion.  Perhaps at least part of this is due to deconditioning.    *Previously complained of colon Right lower anterior rib discomfort.  CT unremarkable in that area.  No specific pain, just discomfort.  I explained to the patient if disease was found by PET scan or bone scan, I would not  since she has been doing well on Arimidex since 2010.   Currently denies pain.     *Depression/anxiety.  This limits compliance.  She sees a psychiatrist and a counselor regularly.  She states this is mostly due to body image issues instead of cancer.    *Noncompliance due to depression/anxiety.  Although she misses appointments, she does reschedule and eventually come in.    *Polycythemia.  Mild.  I do not think this needs any further workup.  HCT 40 today which is normal.    PLAN:   · MD 6 months.  One week prior:  · CBC, CMP, CA-15-3  · CT CAP with contrast  · DEXA scan  · M.D. CBC CMP CA 15-3 every 6 months (lab one day of MD unless has CT scan)  · Annual CAT scan CAP with contrast and CBC, CMP, CA-15-3.  (October)  · (Typically M.D. every 6 months, CT every one year)  · (Patient requested to change CT to yearly instead of 6 months)  · Continue Arimidex.  · She request to continue breast exams and continue mammograms.  Breast exam performed today.  She states she recently had a mammogram.  (I recommended not going through this and she has metastatic breast cancer but we will continue this since this is what she request)

## 2019-04-25 RX ORDER — ANASTROZOLE 1 MG/1
TABLET ORAL
Qty: 30 TABLET | Refills: 6 | Status: SHIPPED | OUTPATIENT
Start: 2019-04-25 | End: 2020-01-16

## 2019-05-16 RX ORDER — ATORVASTATIN CALCIUM 10 MG/1
10 TABLET, FILM COATED ORAL NIGHTLY
Qty: 90 TABLET | Refills: 1 | Status: SHIPPED | OUTPATIENT
Start: 2019-05-16 | End: 2019-08-13 | Stop reason: SDUPTHER

## 2019-06-26 DIAGNOSIS — I10 BENIGN ESSENTIAL HTN: ICD-10-CM

## 2019-06-26 RX ORDER — LOSARTAN POTASSIUM 100 MG/1
100 TABLET ORAL DAILY
Qty: 90 TABLET | Refills: 0 | Status: SHIPPED | OUTPATIENT
Start: 2019-06-26 | End: 2019-08-13 | Stop reason: SDUPTHER

## 2019-08-06 ENCOUNTER — RESULTS ENCOUNTER (OUTPATIENT)
Dept: INTERNAL MEDICINE | Facility: CLINIC | Age: 64
End: 2019-08-06

## 2019-08-06 DIAGNOSIS — E78.2 MIXED HYPERLIPIDEMIA: ICD-10-CM

## 2019-08-06 DIAGNOSIS — E55.9 VITAMIN D DEFICIENCY: ICD-10-CM

## 2019-08-10 LAB
25(OH)D3+25(OH)D2 SERPL-MCNC: 40 NG/ML (ref 30–100)
BUN SERPL-MCNC: 11 MG/DL (ref 8–23)
BUN/CREAT SERPL: 12.2 (ref 7–25)
CALCIUM SERPL-MCNC: 10.5 MG/DL (ref 8.6–10.5)
CHLORIDE SERPL-SCNC: 103 MMOL/L (ref 98–107)
CO2 SERPL-SCNC: 28.8 MMOL/L (ref 22–29)
CREAT SERPL-MCNC: 0.9 MG/DL (ref 0.57–1)
GLUCOSE SERPL-MCNC: 104 MG/DL (ref 65–99)
POTASSIUM SERPL-SCNC: 5 MMOL/L (ref 3.5–5.2)
SODIUM SERPL-SCNC: 142 MMOL/L (ref 136–145)

## 2019-08-13 ENCOUNTER — OFFICE VISIT (OUTPATIENT)
Dept: INTERNAL MEDICINE | Facility: CLINIC | Age: 64
End: 2019-08-13

## 2019-08-13 ENCOUNTER — HOSPITAL ENCOUNTER (OUTPATIENT)
Dept: GENERAL RADIOLOGY | Facility: HOSPITAL | Age: 64
Discharge: HOME | End: 2019-08-13
Admitting: FAMILY MEDICINE

## 2019-08-13 VITALS
HEART RATE: 100 BPM | SYSTOLIC BLOOD PRESSURE: 110 MMHG | WEIGHT: 162 LBS | DIASTOLIC BLOOD PRESSURE: 68 MMHG | HEIGHT: 65 IN | TEMPERATURE: 98.4 F | BODY MASS INDEX: 26.99 KG/M2 | OXYGEN SATURATION: 98 %

## 2019-08-13 DIAGNOSIS — E78.2 MIXED HYPERLIPIDEMIA: Primary | ICD-10-CM

## 2019-08-13 DIAGNOSIS — E55.9 VITAMIN D DEFICIENCY: ICD-10-CM

## 2019-08-13 DIAGNOSIS — M25.552 PAIN OF LEFT HIP JOINT: ICD-10-CM

## 2019-08-13 DIAGNOSIS — C02.9 TONGUE CANCER (HCC): ICD-10-CM

## 2019-08-13 DIAGNOSIS — I10 BENIGN ESSENTIAL HTN: ICD-10-CM

## 2019-08-13 PROCEDURE — 73502 X-RAY EXAM HIP UNI 2-3 VIEWS: CPT

## 2019-08-13 PROCEDURE — 99214 OFFICE O/P EST MOD 30 MIN: CPT | Performed by: FAMILY MEDICINE

## 2019-08-13 RX ORDER — PAROXETINE HYDROCHLORIDE 40 MG/1
40 TABLET, FILM COATED ORAL NIGHTLY
COMMUNITY

## 2019-08-13 RX ORDER — ATORVASTATIN CALCIUM 10 MG/1
10 TABLET, FILM COATED ORAL NIGHTLY
Qty: 90 TABLET | Refills: 1 | Status: SHIPPED | OUTPATIENT
Start: 2019-08-13 | End: 2020-02-26 | Stop reason: SDUPTHER

## 2019-08-13 RX ORDER — LOSARTAN POTASSIUM 50 MG/1
50 TABLET ORAL DAILY
Qty: 90 TABLET | Refills: 1 | Status: SHIPPED | OUTPATIENT
Start: 2019-08-13 | End: 2020-02-26 | Stop reason: SDUPTHER

## 2019-08-13 NOTE — PROGRESS NOTES
Subjective   Marialuisa Vivar is a 63 y.o. female.   Chief Complaint   Patient presents with   • Hypertension   • Hyperlipidemia   • Hip Pain     2 weeks    • Vitamin D Deficiency       History of Present Illness     #1 HTN-diagnosed when patient was in her 40s. She is on losartan 100 mg a day. She takes it everyday. She reports no side effects. She reports no chest pain, no dizziness, no palpitations. Sometimes she gets lightheaded when she stands up.  She lost 27 pounds from March.  She was diagnosed with tongue cancer in May and had partial tongue resection.  One of the lymph nodes was positive.  Her appetite was decreased.  She felt depressed.  Her depression medications were adjusted.  She retired and is under significantly less stress.  She feels better.  Her appetite is back.  She actually gained a few pounds over the last few weeks.  Kidney test and electrolytes are normal.    #2 HLP- on atorvastatin at 10 mg a day.  She takes it every day.  She has no side effects.  No muscle aches, muscle cramps.    3.  Vitamin D deficiency-currently on vitamin D3 taken 3 times a week.  Patient is not sure about the strength.  Vitamin D is at 40.0.    4.  Left hip pain -started 2 weeks ago.  There was no known injury.  It is worse with walking stairs and after standing from sitting position.  It is achy and sharp pain.  It is on and off, intensity 5 out of 10.  There is no redness, no swelling associated.  She did not use any medications to help it yet.  It resolves with rest.  She has known metastasis at T6.    The following portions of the patient's history were reviewed and updated as appropriate: allergies, current medications, past family history, past medical history, past social history, past surgical history and problem list.    Review of Systems   Constitutional: Negative.    Cardiovascular: Negative for chest pain and palpitations.   Neurological: Positive for light-headedness.         Objective   Wt Readings from  Last 3 Encounters:   08/13/19 73.5 kg (162 lb)   03/21/19 85.7 kg (189 lb)   02/06/19 84.4 kg (186 lb)      Vitals:    08/13/19 1258   BP: 110/68   Pulse: 100   Temp: 98.4 °F (36.9 °C)   SpO2: 98%     Temp Readings from Last 3 Encounters:   08/13/19 98.4 °F (36.9 °C)   03/21/19 98.2 °F (36.8 °C) (Oral)   02/06/19 98 °F (36.7 °C)     BP Readings from Last 3 Encounters:   08/13/19 110/68   03/21/19 130/81   02/06/19 110/64     Pulse Readings from Last 3 Encounters:   08/13/19 100   03/21/19 98   02/06/19 110       Physical Exam   Constitutional: She appears well-developed and well-nourished.   Neck: Neck supple.   Cardiovascular: Normal rate, regular rhythm and normal heart sounds.   Pulmonary/Chest: Effort normal and breath sounds normal.   Musculoskeletal: Normal range of motion.        Lumbar back: Normal. She exhibits no tenderness and no deformity.   SLR negative BL.   Neurological: She has normal strength and normal reflexes.   Skin: Skin is warm, dry and intact. No lesion noted. No erythema.   Psychiatric: She has a normal mood and affect. Her behavior is normal.       Assessment/Plan   Marialuisa was seen today for hypertension, hyperlipidemia, hip pain and vitamin d deficiency.    Diagnoses and all orders for this visit:    Mixed hyperlipidemia    Benign essential HTN  -     losartan (COZAAR) 50 MG tablet; Take 1 tablet by mouth Daily.    Vitamin D deficiency    Pain of left hip joint  -     XR Hip With or Without Pelvis 2 - 3 View Left; Future  -     Ambulatory Referral to Physical Therapy    Tongue cancer (CMS/HCC)    Other orders  -     atorvastatin (LIPITOR) 10 MG tablet; Take 1 tablet by mouth Every Night.        #1 hypertension-patient gets lightheaded after she lost significant amount of weight.  We are decreasing dose of losartan to 50 mg a day.  Follow-up in 6 months, or sooner if problems.    2.  Hyperlipidemia-continue current treatment.  Follow-up in 6 months.    3.  Vitamin D deficiency-continue same.   Follow-up in 12 months.    4.  Hip pain-we are checking x-ray.  Patient will use Tylenol as needed.  Referral to physical therapy.

## 2019-08-14 DIAGNOSIS — R93.7 ABNORMAL X-RAY OF PELVIS: ICD-10-CM

## 2019-08-14 DIAGNOSIS — M25.552 PAIN OF LEFT HIP JOINT: Primary | ICD-10-CM

## 2019-08-14 DIAGNOSIS — C50.919 METASTATIC BREAST CANCER: ICD-10-CM

## 2019-08-21 DIAGNOSIS — C50.919 METASTATIC BREAST CANCER: ICD-10-CM

## 2019-08-21 DIAGNOSIS — R93.7 ABNORMAL X-RAY OF PELVIS: ICD-10-CM

## 2019-08-21 DIAGNOSIS — M25.552 PAIN OF LEFT HIP JOINT: ICD-10-CM

## 2019-09-05 ENCOUNTER — HOSPITAL ENCOUNTER (OUTPATIENT)
Dept: CT IMAGING | Facility: HOSPITAL | Age: 64
Discharge: HOME | End: 2019-09-05
Admitting: INTERNAL MEDICINE

## 2019-09-05 ENCOUNTER — HOSPITAL ENCOUNTER (OUTPATIENT)
Dept: BONE DENSITY | Facility: HOSPITAL | Age: 64
Discharge: HOME | End: 2019-09-05

## 2019-09-05 ENCOUNTER — LAB (OUTPATIENT)
Dept: LAB | Facility: HOSPITAL | Age: 64
End: 2019-09-05

## 2019-09-05 DIAGNOSIS — C78.02 CARCINOMA OF LEFT BREAST METASTATIC TO LUNG (HCC): ICD-10-CM

## 2019-09-05 DIAGNOSIS — C50.912 CARCINOMA OF LEFT BREAST METASTATIC TO LUNG (HCC): ICD-10-CM

## 2019-09-05 DIAGNOSIS — Z78.0 POSTMENOPAUSAL: ICD-10-CM

## 2019-09-05 DIAGNOSIS — Z79.811 USE OF ANASTROZOLE (ARIMIDEX): ICD-10-CM

## 2019-09-05 LAB
ALBUMIN SERPL-MCNC: 4.2 G/DL (ref 3.5–5.2)
ALBUMIN/GLOB SERPL: 1.5 G/DL
ALP SERPL-CCNC: 122 U/L (ref 39–117)
ALT SERPL W P-5'-P-CCNC: 23 U/L (ref 1–33)
ANION GAP SERPL CALCULATED.3IONS-SCNC: 11.5 MMOL/L (ref 5–15)
AST SERPL-CCNC: 23 U/L (ref 1–32)
BASOPHILS # BLD AUTO: 0.05 10*3/MM3 (ref 0–0.2)
BASOPHILS NFR BLD AUTO: 1 % (ref 0–1.5)
BILIRUB SERPL-MCNC: 0.5 MG/DL (ref 0.2–1.2)
BUN BLD-MCNC: 17 MG/DL (ref 8–23)
BUN/CREAT SERPL: 22.4 (ref 7–25)
CALCIUM SPEC-SCNC: 10.1 MG/DL (ref 8.6–10.5)
CANCER AG15-3 SERPL-ACNC: 17.6 U/ML
CHLORIDE SERPL-SCNC: 98 MMOL/L (ref 98–107)
CO2 SERPL-SCNC: 24.5 MMOL/L (ref 22–29)
CREAT BLD-MCNC: 0.76 MG/DL (ref 0.57–1)
CREAT BLDA-MCNC: 0.8 MG/DL (ref 0.6–1.3)
DEPRECATED RDW RBC AUTO: 42.1 FL (ref 37–54)
EOSINOPHIL # BLD AUTO: 0.1 10*3/MM3 (ref 0–0.4)
EOSINOPHIL NFR BLD AUTO: 1.9 % (ref 0.3–6.2)
ERYTHROCYTE [DISTWIDTH] IN BLOOD BY AUTOMATED COUNT: 12.1 % (ref 12.3–15.4)
GFR SERPL CREATININE-BSD FRML MDRD: 77 ML/MIN/1.73
GLOBULIN UR ELPH-MCNC: 2.8 GM/DL
GLUCOSE BLD-MCNC: 92 MG/DL (ref 65–99)
HCT VFR BLD AUTO: 44.4 % (ref 34–46.6)
HGB BLD-MCNC: 14.3 G/DL (ref 12–15.9)
IMM GRANULOCYTES # BLD AUTO: 0.01 10*3/MM3 (ref 0–0.05)
IMM GRANULOCYTES NFR BLD AUTO: 0.2 % (ref 0–0.5)
LYMPHOCYTES # BLD AUTO: 1.95 10*3/MM3 (ref 0.7–3.1)
LYMPHOCYTES NFR BLD AUTO: 37.9 % (ref 19.6–45.3)
MCH RBC QN AUTO: 30.3 PG (ref 26.6–33)
MCHC RBC AUTO-ENTMCNC: 32.2 G/DL (ref 31.5–35.7)
MCV RBC AUTO: 94.1 FL (ref 79–97)
MONOCYTES # BLD AUTO: 0.4 10*3/MM3 (ref 0.1–0.9)
MONOCYTES NFR BLD AUTO: 7.8 % (ref 5–12)
NEUTROPHILS # BLD AUTO: 2.63 10*3/MM3 (ref 1.7–7)
NEUTROPHILS NFR BLD AUTO: 51.2 % (ref 42.7–76)
NRBC BLD AUTO-RTO: 0 /100 WBC (ref 0–0.2)
PLATELET # BLD AUTO: 301 10*3/MM3 (ref 140–450)
PMV BLD AUTO: 9 FL (ref 6–12)
POTASSIUM BLD-SCNC: 4 MMOL/L (ref 3.5–5.2)
PROT SERPL-MCNC: 7 G/DL (ref 6–8.5)
RBC # BLD AUTO: 4.72 10*6/MM3 (ref 3.77–5.28)
SODIUM BLD-SCNC: 134 MMOL/L (ref 136–145)
WBC NRBC COR # BLD: 5.14 10*3/MM3 (ref 3.4–10.8)

## 2019-09-05 PROCEDURE — 86300 IMMUNOASSAY TUMOR CA 15-3: CPT

## 2019-09-05 PROCEDURE — 74177 CT ABD & PELVIS W/CONTRAST: CPT

## 2019-09-05 PROCEDURE — 71260 CT THORAX DX C+: CPT

## 2019-09-05 PROCEDURE — 82565 ASSAY OF CREATININE: CPT

## 2019-09-05 PROCEDURE — 80053 COMPREHEN METABOLIC PANEL: CPT

## 2019-09-05 PROCEDURE — 36415 COLL VENOUS BLD VENIPUNCTURE: CPT

## 2019-09-05 PROCEDURE — 25010000002 IOPAMIDOL 61 % SOLUTION: Performed by: INTERNAL MEDICINE

## 2019-09-05 PROCEDURE — 77080 DXA BONE DENSITY AXIAL: CPT

## 2019-09-05 PROCEDURE — 85025 COMPLETE CBC W/AUTO DIFF WBC: CPT

## 2019-09-05 RX ADMIN — IOPAMIDOL 85 ML: 612 INJECTION, SOLUTION INTRAVENOUS at 12:16

## 2019-09-09 ENCOUNTER — TREATMENT (OUTPATIENT)
Dept: PHYSICAL THERAPY | Facility: CLINIC | Age: 64
End: 2019-09-09

## 2019-09-09 DIAGNOSIS — R26.2 DIFFICULTY WALKING: ICD-10-CM

## 2019-09-09 DIAGNOSIS — M25.552 LEFT HIP PAIN: Primary | ICD-10-CM

## 2019-09-09 DIAGNOSIS — M70.62 TROCHANTERIC BURSITIS OF LEFT HIP: ICD-10-CM

## 2019-09-09 PROCEDURE — 97162 PT EVAL MOD COMPLEX 30 MIN: CPT | Performed by: PHYSICAL THERAPIST

## 2019-09-09 PROCEDURE — 97110 THERAPEUTIC EXERCISES: CPT | Performed by: PHYSICAL THERAPIST

## 2019-09-09 NOTE — PROGRESS NOTES
Physical Therapy Initial Evaluation and Plan of Care    Patient: Marialuisa Vivar   : 1955  Diagnosis/ICD-10 Code:  Left hip pain [M25.552]  Referring practitioner: Jennifer Mantilla MD  Past Medical History Reviewed: 2019    PLOF: Independent and retired.    Subjective Evaluation    History of Present Illness  Date of onset: 2019  Mechanism of injury: I am having some left sided pain in my outside of my hip. A few weeks before it started hurting I did a lot of steps when I cleaning out my closet. It is tender to the touch. I have had some back pain in the past few years.   I have not tried ice or heat.  I wanted to start walking more, but I have not been able to.             Patient Occupation: retired Pain  Current pain ratin  At worst pain ratin  Location: (L) greater trochanter  Quality: dull ache and sharp  Relieving factors: rest  Aggravating factors: ambulation, stairs and sleeping  Progression: improved    Diagnostic Tests  X-ray: normal  MRI studies: normal             Objective       Postural Observations    Additional Postural Observation Details  Good pelvic alignment    Palpation   Left   Tenderness of the piriformis and TFL.     Tenderness     Left Hip   Tenderness in the greater trochanter.     Neurological Testing     Sensation     Lumbar   Left   Intact: light touch    Right   Intact: light touch    Active Range of Motion   Left Hip   Normal active range of motion    Right Hip   Normal active range of motion    Strength/Myotome Testing     Left Hip   Planes of Motion   Flexion: 4  External rotation: 4  Internal rotation: 5    Right Hip   Normal muscle strength    Additional Strength Details  Pain with ER    Tests     Left Hip   Positive BREANN.     Ambulation     Comments   Good gait mechanics    Functional Assessment     Single Leg Stance   Left: 2 seconds  Right: 5 seconds         Assessment & Plan     Assessment  Impairments: activity intolerance, impaired balance, impaired  physical strength, lacks appropriate home exercise program and pain with function  Assessment details: Pt presents to PT with symptoms consistent with (L) greater trochanteric bursitis and weakness in LLE.  Pt would benefit from skilled PT intervention to address the deficits noted.   Prognosis: good  Functional Limitations: sleeping, walking, uncomfortable because of pain and standing  Goals  Plan Goals:  SHORT TERM GOALS: 4-6 visits  1.  Patient to be compliant with HEP and demo good efficiency with TE  2.  Report < 2 sleep disturbances 2° hip pain.     3.  Increased Lumbar and SIJ mobility to allow for improved pelvic alignment and gait mechanics (equal step length and level pelvis throughout gait).    4. Pt will report minimal-no tenderness to palpation with firm pressure.   5. Pt. Able to ambulate up to 20 min with pain < 3/10 with acceptable pattern.      LONG TERM GOALS: 8-12 visits  1.  Pt. to score > 56/60 perceived ability on LEFS  2.  Pain level < 2/10 in hips with all activities including sitting > 1 hr continuously.   3. Hip strength to 5/5  to allow for pushing, pulling and more strenuous activities to occur without pain.  Walk > 30 min.  no pain  5. No palpable tenderness to the hip.         Plan  Therapy options: will be seen for skilled physical therapy services  Planned modality interventions: cryotherapy, electrical stimulation/Russian stimulation, iontophoresis, TENS, thermotherapy (hydrocollator packs), traction and ultrasound  Other planned modality interventions: Dry Needling  Planned therapy interventions: abdominal trunk stabilization, ADL retraining, body mechanics training, balance/weight-bearing training, flexibility, functional ROM exercises, gait training, home exercise program, joint mobilization, manual therapy, neuromuscular re-education, postural training, soft tissue mobilization, spinal/joint mobilization, strengthening, stretching and therapeutic activities  Duration in visits:  12  Treatment plan discussed with: patient        Manual Therapy:    -     mins  11775;  Therapeutic Exercise:    10     mins  69819;     Neuromuscular Jorge:    -    mins  33586;    Therapeutic Activity:     -     mins  53591;     Gait Training:      -     mins  70746;     Ultrasound:     -     mins  27003;    Electrical Stimulation:    -     mins  76788 ( );  Dry Needling     -     mins self-pay    Timed Treatment:   10   mins   Total Treatment:     60   mins      PT SIGNATURE: Iris Acosta, PT   DATE TREATMENT INITIATED: 9/9/2019    Initial Certification  Certification Period: 12/8/2019  I certify that the therapy services are furnished while this patient is under my care.  The services outlined above are required by this patient, and will be reviewed every 90 days.     PHYSICIAN: Jennifer Mantilla MD      DATE:     Please sign and return via fax to [phone].. Thank you, [facility].

## 2019-09-10 ENCOUNTER — OFFICE VISIT (OUTPATIENT)
Dept: ONCOLOGY | Facility: CLINIC | Age: 64
End: 2019-09-10

## 2019-09-10 ENCOUNTER — APPOINTMENT (OUTPATIENT)
Dept: OTHER | Facility: HOSPITAL | Age: 64
End: 2019-09-10

## 2019-09-10 VITALS
DIASTOLIC BLOOD PRESSURE: 66 MMHG | RESPIRATION RATE: 18 BRPM | OXYGEN SATURATION: 97 % | WEIGHT: 160.1 LBS | BODY MASS INDEX: 26.67 KG/M2 | SYSTOLIC BLOOD PRESSURE: 105 MMHG | HEART RATE: 90 BPM | HEIGHT: 65 IN

## 2019-09-10 DIAGNOSIS — C78.02 CARCINOMA OF LEFT BREAST METASTATIC TO LUNG (HCC): Primary | ICD-10-CM

## 2019-09-10 DIAGNOSIS — C50.912 CARCINOMA OF LEFT BREAST METASTATIC TO LUNG (HCC): Primary | ICD-10-CM

## 2019-09-10 PROCEDURE — 99215 OFFICE O/P EST HI 40 MIN: CPT | Performed by: INTERNAL MEDICINE

## 2019-09-10 PROCEDURE — G0463 HOSPITAL OUTPT CLINIC VISIT: HCPCS | Performed by: INTERNAL MEDICINE

## 2019-09-10 NOTE — PROGRESS NOTES
Subjective .     REASONS FOR FOLLOWUP:  Metastatic breast cancer.    HISTORY OF PRESENT ILLNESS:  The patient is a 63 y.o. year old female  who is here for follow-up with the above-mentioned history.    Since her last visit with me was diagnosed with tongue cancer.  States she noticed this because of a sensitive area on her tongue that was persistent.  She told her dentist which led to a work-up which led to resection.  States this was emotionally difficult for her at first but she is now doing much better emotionally.    Tolerating Arimidex well.  No complaints of nausea, pain, shortness of breath, hot flashes.    Past Medical History:   Diagnosis Date   • Allergy    • Anxiety    • Asthma    • Bone metastasis (CMS/HCC)    • Breast cancer (CMS/HCC)     Left node positive   • Depression    • Esophageal reflux     cough   • History of colon polyps    • Hyperlipidemia    • Hypertension    • Left breast cancer metastasized to lung    • Obstructive sleep apnea    • Ovarian cyst    • Tongue cancer (CMS/HCC) 05/2019    by ENT at [facility] dr Quinn     Past Surgical History:   Procedure Laterality Date   • CHOLECYSTECTOMY  2000   • COLONOSCOPY  2014    H/O polyps   • KNEE ARTHROPLASTY     • LUNG SURGERY  2010    Lung wedge resection   • MASTECTOMY RADICAL Left 1993   • TONGUE SURGERY  05/21/2019    tongue cancer       HEMATOLOGIC/ONCOLOGIC HISTORY:  (History from previous dates can be found in the separate document.)    MEDICATIONS    Current Outpatient Medications:   •  anastrozole (ARIMIDEX) 1 MG tablet, TAKE 1 TABLET BY MOUTH EVERY DAY, Disp: 30 tablet, Rfl: 6  •  atorvastatin (LIPITOR) 10 MG tablet, Take 1 tablet by mouth Every Night., Disp: 90 tablet, Rfl: 1  •  Cholecalciferol 2000 UNITS capsule, Take by mouth., Disp: , Rfl:   •  eszopiclone (LUNESTA) tablet, Take by mouth., Disp: , Rfl:   •  LORazepam (ATIVAN) 0.5 MG tablet, Take 1 tablet by mouth., Disp: , Rfl:   •  losartan (COZAAR) 50 MG tablet, Take 1 tablet by  mouth Daily., Disp: 90 tablet, Rfl: 1  •  PARoxetine (PAXIL) 40 MG tablet, Take 40 mg by mouth Every Morning., Disp: , Rfl:     ALLERGIES:     Allergies   Allergen Reactions   • Ciprofloxacin        SOCIAL HISTORY:       Social History     Socioeconomic History   • Marital status: Single     Spouse name: Not on file   • Number of children: Not on file   • Years of education: College   • Highest education level: Not on file   Occupational History   • Occupation: RN     Employer: [employer]   Tobacco Use   • Smoking status: Former Smoker     Packs/day: 2.00     Years: 30.00     Pack years: 60.00     Types: Cigarettes     Start date:      Last attempt to quit: 2008     Years since quittin.2   • Smokeless tobacco: Never Used   Substance and Sexual Activity   • Alcohol use: No   • Drug use: No   • Sexual activity: Defer         FAMILY HISTORY:  Family History   Problem Relation Age of Onset   • Hypertension Mother    • Leukemia Mother 72   • COPD Mother         smoker   • Diabetes Brother    • Pancreatic cancer Brother    • Glaucoma Brother    • Heart disease Brother    • Hypertension Brother    • Gallbladder disease Brother    • Gallbladder disease Father    • Stroke Other         Grandmother   • Lupus Other         Aunt   • Alcohol abuse Other         Aunt   • Glaucoma Other         Grandmother       REVIEW OF SYSTEMS:  Review of Systems   Constitutional: Negative for activity change.   HENT: Negative for nosebleeds and trouble swallowing.    Respiratory: Negative for shortness of breath and wheezing.    Cardiovascular: Negative for chest pain and palpitations.   Gastrointestinal: Negative for constipation, diarrhea and nausea.   Genitourinary: Negative for dysuria and hematuria.   Musculoskeletal: Negative for arthralgias and myalgias.   Skin: Negative for rash and wound.   Neurological: Negative for seizures and syncope.   Hematological: Negative for adenopathy. Does not bruise/bleed  "easily.   Psychiatric/Behavioral: Negative for confusion.        Objective    Vitals:    09/10/19 1309   BP: 105/66   Pulse: 90   Resp: 18   SpO2: 97%   Weight: 72.6 kg (160 lb 1.6 oz)   Height: 165 cm (64.96\")   PainSc: 0-No pain     Current Status 9/10/2019   ECOG score 0      PHYSICAL EXAM:    CONSTITUTIONAL:  Vital signs reviewed.  No distress, looks comfortable.  Overweight  EYES:  Conjunctiva and lids unremarkable.  PERRLA  EARS,NOSE,MOUTH,THROAT:  Ears and nose appear unremarkable.  Lips, teeth, gums appear unremarkable.  RESPIRATORY:  Normal respiratory effort.  Lungs clear to auscultation bilaterally.  CARDIOVASCULAR:  Normal S1, S2.  No murmurs rubs or gallops.  No significant lower extremity edema.  BREAST: no masses by palpation or inspection left mastectomy  GASTROINTESTINAL: Abdomen appears unremarkable.  Nontender.  No hepatomegaly.  No splenomegaly.  LYMPHATIC:  No cervical, supraclavicular, axillary lymphadenopathy.  SKIN:  Warm.  No rashes.  PSYCHIATRIC:  Normal judgment and insight.  Normal mood and affect.      RECENT LABS:        WBC   Date/Time Value Ref Range Status   09/05/2019 12:40 PM 5.14 3.40 - 10.80 10*3/mm3 Final     Hemoglobin   Date/Time Value Ref Range Status   09/05/2019 12:40 PM 14.3 12.0 - 15.9 g/dL Final     Platelets   Date/Time Value Ref Range Status   09/05/2019 12:40  140 - 450 10*3/mm3 Final       Assessment/Plan     *Metastatic breast cancer to bilateral lungs and soft tissue in the mediastinum (likely the cause of her hoarseness). (Initial breast cancer diagnosed in 1993 treated with mastectomy, chemotherapy, radiation therapy and tamoxifen). Arimidex day 1 on 02/17/2010. PET scan late August 2011 shows no abnormal hypermetabolic activity. This has improved compared to the prior PET scan January 2010 which showed mild hypermetabolic activity in areas of metastasis. (In the past, her  T6 lesion did not show up on CT scans or bone scan. It was seen by PET scan.) We " have been following with CT scans only every 6 months and PET scans on an as needed only basis. Sometimes her CT scanned pulmonary nodules are read as benign since they have been stable through the course of years but we know they are malignant because they have been surgically sampled in the past.    · CT 8/16/16 shows increased sclerosis at T6 compared to 4/28/15.    · PET 10/11/16: Slight uptake at T6, SUV 3.2.  mild thickening of right adrenal gland with an SUV of 11.   · Continued Arimidex is minimal progression and had been on this since 2/17/10.  · Not referred for radiation since no pain at T6  · CT 1/27/17, unchanged.  · CT 8/1/17: Unchanged except subtle suggestion of mild increase in thickening of the right adrenal gland.    · CT 2/22/18, unchanged.  · CT 9/6/18: Unchanged except nodular thickening right adrenal gland significantly decreased.  · CT 9/5/2019: Unchanged.  · CT images personally viewed by me.  Recent CA-15-3 normal at 17.6.  Therefore, no signs of progression.  Continue her Arimidex.  She has been on this almost 10 years now.    *Bony metastases at T6. She was not started on Zometa as this was the only area of bony disease. We will hold off on Zometa until she has evidence of significant bone involvement. Not given radiation is asymptomatic.  I recommended she not allow the chiropractor to manipulate the T6 area.  If she develops pain at T6, would refer her to radiation.    *Bone health. Last bone density 10/11/16, worsened, but still normal with worst T score -0.9.  Patient stopped calcium January 2016, but remained on vitamin D.  I recommended she restart her calcium.  Stopped calcium early March 2018 due to hypercalcemia.  · DEXA 9/5/2019: Normal bone density    *Hypercalcemia.  Repeat calcium 3/7/18 normal, but patient self stopped calcium a few days prior.  Recommended she stay off calcium.    *On the 10/5/16 visit, Tachycardia, dyspnea on exertion, bilateral leg swelling, more  sedentary recently.  CT PE protocol and bilateral lower extremity Dopplers 10/5/16, negative for clots.  She still has tachycardia and dyspnea on exertion.  Perhaps at least part of this is due to deconditioning.    *Previously complained of colon Right lower anterior rib discomfort.  CT unremarkable in that area.  No specific pain, just discomfort.  I explained to the patient if disease was found by PET scan or bone scan, I would not  since she has been doing well on Arimidex since 2010.   Currently denies pain.     *Depression/anxiety.  This limits compliance.  She sees a psychiatrist and a counselor regularly.  She states this is mostly due to body image issues instead of cancer.    *Noncompliance due to depression/anxiety.  Although she misses appointments, she does reschedule and eventually come in.    *Polycythemia.  Mild.  I do not think this needs any further workup.  HCT 40 today which is normal.    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  · Left partial glossectomy and left cervical sentinel node biopsy by Dr. Willie Quinn, [facility], on 5/21/2019.  Positive sentinel node.  · Left neck dissection by Dr. Willie Quinn, [facility], on 6/5/2019.    *Left hip pain x2 weeks.  This prompted an MRI pelvis 8/20/2019 at [facility] and open MRI which was negative for malignancy.    PLAN:   · MD CBC CMP CA-15-3 in 6 months.  · M.D. CBC CMP CA 15-3 every 6 months (lab on day of MD unless has CT scan)  · Annual CAT scan CAP with contrast and CBC, CMP, CA-15-3.  (October)  · (Typically M.D. every 6 months, CT every one year)  · (Patient likes to avoid CT scans any more often than annually)  · Continue Arimidex.  · She request to continue breast exams and continue mammograms.  Breast exam performed today.  [facility]  (I recommended not going through this as she has metastatic breast cancer but we will continue this since this is what she request)      For this visit  I reviewed and summarized records from Dr. Quinn regarding the new diagnosis of tongue cancer.       Send a copy of this note to Isaac Valdes MD

## 2019-09-12 ENCOUNTER — OFFICE VISIT (OUTPATIENT)
Dept: PHYSICAL THERAPY | Facility: CLINIC | Age: 64
End: 2019-09-12

## 2019-09-12 DIAGNOSIS — M25.552 LEFT HIP PAIN: Primary | ICD-10-CM

## 2019-09-12 DIAGNOSIS — R26.2 DIFFICULTY WALKING: ICD-10-CM

## 2019-09-12 DIAGNOSIS — M70.62 TROCHANTERIC BURSITIS OF LEFT HIP: ICD-10-CM

## 2019-09-12 PROCEDURE — 97110 THERAPEUTIC EXERCISES: CPT | Performed by: PHYSICAL THERAPIST

## 2019-09-12 NOTE — PROGRESS NOTES
Physical Therapy Daily Progress Note  Visit: 2    Marialuisa Vivar reports: I think the exercises are going well. The ache is a little less    Subjective     Objective   See Exercise, Manual, and Modality Logs for complete treatment.       Assessment & Plan     Assessment  Assessment details: Pt tolerated treatment very well. Was able to progress with standing exercises without difficulty. Added clamshell and TA mini marches to HEP      Plan  Plan details: Progress with rockerboard next session        Manual Therapy:    3     mins  37401;  Therapeutic Exercise:    39     mins  88414;     Neuromuscular Jorge:    -    mins  86714;    Therapeutic Activity:     -     mins  81746;     Gait Training:      -     mins  84233;     Ultrasound:     -     mins  21557;    Electrical Stimulation:    -     mins  11593 ( );  Dry Needling     -     mins self-pay    Timed Treatment:   42   mins   Total Treatment:     52   mins    Iris Acosta PT  [place] License #: [id number]    Physical Therapist

## 2019-09-24 ENCOUNTER — TREATMENT (OUTPATIENT)
Dept: PHYSICAL THERAPY | Facility: CLINIC | Age: 64
End: 2019-09-24

## 2019-09-24 DIAGNOSIS — M70.62 TROCHANTERIC BURSITIS OF LEFT HIP: ICD-10-CM

## 2019-09-24 DIAGNOSIS — R26.2 DIFFICULTY WALKING: ICD-10-CM

## 2019-09-24 DIAGNOSIS — M25.552 LEFT HIP PAIN: Primary | ICD-10-CM

## 2019-09-24 PROCEDURE — 97110 THERAPEUTIC EXERCISES: CPT | Performed by: PHYSICAL THERAPIST

## 2019-09-24 PROCEDURE — 97112 NEUROMUSCULAR REEDUCATION: CPT | Performed by: PHYSICAL THERAPIST

## 2019-09-24 PROCEDURE — 97140 MANUAL THERAPY 1/> REGIONS: CPT | Performed by: PHYSICAL THERAPIST

## 2019-09-24 NOTE — PROGRESS NOTES
Physical Therapy Daily Progress Note  Visit: 3    Marialuisa Vivar reports: My left hip is a little sore today. I have been lying around a little more than usual    Subjective     Objective   See Exercise, Manual, and Modality Logs for complete treatment.       Assessment & Plan     Assessment  Assessment details: Pt tolerated treatment well. Discussed that taking some walks around the house or outside may help with increasing energy levels.     Plan  Plan details: Progress as able        Manual Therapy:    8     mins  69294;  Therapeutic Exercise:    28     mins  94374;     Neuromuscular Jorge:    10    mins  82053;    Therapeutic Activity:     -     mins  99778;     Gait Training:      -     mins  91843;     Ultrasound:     -     mins  74588;    Electrical Stimulation:    -     mins  60934 ( );  Dry Needling     --     mins self-pay    Timed Treatment:   46   mins   Total Treatment:     57   mins    MIGUEL Moy License #: [id number]    Physical Therapist

## 2019-09-26 ENCOUNTER — TREATMENT (OUTPATIENT)
Dept: PHYSICAL THERAPY | Facility: CLINIC | Age: 64
End: 2019-09-26

## 2019-09-26 DIAGNOSIS — R26.2 DIFFICULTY WALKING: ICD-10-CM

## 2019-09-26 DIAGNOSIS — M25.552 LEFT HIP PAIN: Primary | ICD-10-CM

## 2019-09-26 DIAGNOSIS — M70.62 TROCHANTERIC BURSITIS OF LEFT HIP: ICD-10-CM

## 2019-09-26 PROCEDURE — 97112 NEUROMUSCULAR REEDUCATION: CPT | Performed by: PHYSICAL THERAPIST

## 2019-09-26 PROCEDURE — 97110 THERAPEUTIC EXERCISES: CPT | Performed by: PHYSICAL THERAPIST

## 2019-09-26 NOTE — PROGRESS NOTES
Physical Therapy Daily Progress Note  Visit: 4    Marialuisa Vivar reports: I was a little sore yesterday, but I feel good today    Subjective     Objective   See Exercise, Manual, and Modality Logs for complete treatment.       Assessment & Plan     Assessment  Assessment details: Pt doing better. Continues to have low energy levels. Did not progress with exercises due to soreness. Progress next session as able    Plan  Plan details: Begin single leg balance next session        Manual Therapy:    -     mins  14609;  Therapeutic Exercise:    30     mins  53800;     Neuromuscular Jorge:    9    mins  44105;    Therapeutic Activity:     -     mins  84657;     Gait Training:      -     mins  75716;     Ultrasound:     -     mins  54348;    Electrical Stimulation:    -     mins  30068 ( );  Dry Needling     -     mins self-pay    Timed Treatment:   39   mins   Total Treatment:     50   mins    Iris Acosta PT  [place] License #: [id number]    Physical Therapist

## 2019-10-01 ENCOUNTER — TREATMENT (OUTPATIENT)
Dept: PHYSICAL THERAPY | Facility: CLINIC | Age: 64
End: 2019-10-01

## 2019-10-01 DIAGNOSIS — R26.2 DIFFICULTY WALKING: ICD-10-CM

## 2019-10-01 DIAGNOSIS — M25.552 LEFT HIP PAIN: Primary | ICD-10-CM

## 2019-10-01 DIAGNOSIS — M70.62 TROCHANTERIC BURSITIS OF LEFT HIP: ICD-10-CM

## 2019-10-01 PROCEDURE — 97112 NEUROMUSCULAR REEDUCATION: CPT | Performed by: PHYSICAL THERAPIST

## 2019-10-01 PROCEDURE — 97110 THERAPEUTIC EXERCISES: CPT | Performed by: PHYSICAL THERAPIST

## 2019-10-01 NOTE — PROGRESS NOTES
Physical Therapy Daily Progress Note  Visit: 5    Marialuisa Vivar reports: My hip is feeling better    Subjective     Objective   See Exercise, Manual, and Modality Logs for complete treatment.       Assessment & Plan     Assessment  Assessment details: Pt doing very well. Educated to increase reps of strength exercises to hips to 3 x 10. Able to progress with standing exercises due to improved strength    Plan  Plan details: Progress with strength        Manual Therapy:    -     mins  12708;  Therapeutic Exercise:    31     mins  00231;     Neuromuscular Jorge:    12    mins  07748;    Therapeutic Activity:     -     mins  90929;     Gait Training:      -     mins  59158;     Ultrasound:     -     mins  98611;    Electrical Stimulation:    --     mins  45994 ( );  Dry Needling     -     mins self-pay    Timed Treatment:   43   mins   Total Treatment:     55   mins    MIGUEL Moy License #: [id number]    Physical Therapist

## 2019-10-08 ENCOUNTER — TREATMENT (OUTPATIENT)
Dept: PHYSICAL THERAPY | Facility: CLINIC | Age: 64
End: 2019-10-08

## 2019-10-08 DIAGNOSIS — M25.552 LEFT HIP PAIN: Primary | ICD-10-CM

## 2019-10-08 DIAGNOSIS — M70.62 TROCHANTERIC BURSITIS OF LEFT HIP: ICD-10-CM

## 2019-10-08 DIAGNOSIS — R26.2 DIFFICULTY WALKING: ICD-10-CM

## 2019-10-08 PROCEDURE — 97110 THERAPEUTIC EXERCISES: CPT | Performed by: PHYSICAL THERAPIST

## 2019-10-08 PROCEDURE — 97112 NEUROMUSCULAR REEDUCATION: CPT | Performed by: PHYSICAL THERAPIST

## 2019-10-08 NOTE — PROGRESS NOTES
Physical Therapy Daily Progress Note  Visit: 6    Marialuisa Vivar reports: The hip overall is better.     Subjective     Objective   See Exercise, Manual, and Modality Logs for complete treatment.       Assessment & Plan     Assessment  Assessment details: Demonstrated improved balance today. Good demonstration and technique with exercise. Reassess next visit    Plan  Plan details: Reassess next visit with probable DC        Manual Therapy:    -     mins  11947;  Therapeutic Exercise:    31     mins  11173;     Neuromuscular Jorge:    10    mins  32072;    Therapeutic Activity:     -     mins  47635;     Gait Training:      -     mins  06081;     Ultrasound:     -     mins  33297;    Electrical Stimulation:    -     mins  59627 ( );  Dry Needling     -     mins self-pay    Timed Treatment:   41   mins   Total Treatment:     52   mins    Iris Acosta, PT  [place] License #: [id number]    Physical Therapist

## 2019-10-10 ENCOUNTER — TREATMENT (OUTPATIENT)
Dept: PHYSICAL THERAPY | Facility: CLINIC | Age: 64
End: 2019-10-10

## 2019-10-10 DIAGNOSIS — M25.552 LEFT HIP PAIN: Primary | ICD-10-CM

## 2019-10-10 DIAGNOSIS — R26.2 DIFFICULTY WALKING: ICD-10-CM

## 2019-10-10 DIAGNOSIS — M70.62 TROCHANTERIC BURSITIS OF LEFT HIP: ICD-10-CM

## 2019-10-10 PROCEDURE — 97112 NEUROMUSCULAR REEDUCATION: CPT | Performed by: PHYSICAL THERAPIST

## 2019-10-10 PROCEDURE — 97110 THERAPEUTIC EXERCISES: CPT | Performed by: PHYSICAL THERAPIST

## 2019-10-10 NOTE — PROGRESS NOTES
Physical Therapy Daily Progress Note  Visit: 7    Marialuisa Vivar reports: The pain is varied, sometimes I am sore and sometimes I am not. I think it is more muscle soreness. It does not keep me up at night. Pain at worst is 2-3/10. I feel like my strength is a little better when I go up the stairs    Subjective     Objective       Strength/Myotome Testing     Left Hip   Planes of Motion   Flexion: 4+  Abduction: 4+  External rotation: 5    Tests     Left Hip   Negative BREANN.     Functional Assessment     Single Leg Stance   Left: 10 seconds     See Exercise, Manual, and Modality Logs for complete treatment.       Assessment & Plan     Assessment  Assessment details: Pt has made good progress in PT. Her pain levels are reported down, her strength and mobility are improved. Has good endurance in gym. Educated that if she begins at the gym or with a walking routine she ease into it in order to reduce re-injury or inflammation of bursa    Plan  Plan details: DC at this time        Manual Therapy:    -     mins  62225;  Therapeutic Exercise:    34     mins  80439;     Neuromuscular Jorge:    10    mins  61570;    Therapeutic Activity:     -     mins  45191;     Gait Training:      -     mins  66310;     Ultrasound:     -     mins  19912;    Electrical Stimulation:    -     mins  18033 ( );  Dry Needling     -     mins self-pay    Timed Treatment:   44   mins   Total Treatment:     55   mins    Iris Acosta PT  [place] License #: [id number]    Physical Therapist

## 2020-01-16 RX ORDER — ANASTROZOLE 1 MG/1
TABLET ORAL
Qty: 90 TABLET | Refills: 0 | Status: SHIPPED | OUTPATIENT
Start: 2020-01-16 | End: 2020-05-15

## 2020-02-11 DIAGNOSIS — E78.2 MIXED HYPERLIPIDEMIA: ICD-10-CM

## 2020-02-11 DIAGNOSIS — I10 BENIGN ESSENTIAL HTN: Primary | ICD-10-CM

## 2020-02-13 LAB
ALBUMIN SERPL-MCNC: 4.4 G/DL (ref 3.5–5.2)
ALBUMIN/GLOB SERPL: 1.5 G/DL
ALP SERPL-CCNC: 153 U/L (ref 39–117)
ALT SERPL-CCNC: 14 U/L (ref 1–33)
AST SERPL-CCNC: 19 U/L (ref 1–32)
BILIRUB SERPL-MCNC: 0.7 MG/DL (ref 0.2–1.2)
BUN SERPL-MCNC: 16 MG/DL (ref 8–23)
BUN/CREAT SERPL: 16.3 (ref 7–25)
CALCIUM SERPL-MCNC: 10.4 MG/DL (ref 8.6–10.5)
CHLORIDE SERPL-SCNC: 101 MMOL/L (ref 98–107)
CHOLEST SERPL-MCNC: 182 MG/DL (ref 0–200)
CO2 SERPL-SCNC: 25.5 MMOL/L (ref 22–29)
CREAT SERPL-MCNC: 0.98 MG/DL (ref 0.57–1)
GLOBULIN SER CALC-MCNC: 3 GM/DL
GLUCOSE SERPL-MCNC: 121 MG/DL (ref 65–99)
HDLC SERPL-MCNC: 52 MG/DL (ref 40–60)
LDLC SERPL CALC-MCNC: 102 MG/DL (ref 0–100)
LDLC/HDLC SERPL: 1.96 {RATIO}
POTASSIUM SERPL-SCNC: 4.5 MMOL/L (ref 3.5–5.2)
PROT SERPL-MCNC: 7.4 G/DL (ref 6–8.5)
SODIUM SERPL-SCNC: 139 MMOL/L (ref 136–145)
TRIGL SERPL-MCNC: 141 MG/DL (ref 0–150)
VLDLC SERPL CALC-MCNC: 28.2 MG/DL

## 2020-02-26 ENCOUNTER — OFFICE VISIT (OUTPATIENT)
Dept: INTERNAL MEDICINE | Facility: CLINIC | Age: 65
End: 2020-02-26

## 2020-02-26 VITALS
BODY MASS INDEX: 27.49 KG/M2 | DIASTOLIC BLOOD PRESSURE: 50 MMHG | WEIGHT: 165 LBS | HEIGHT: 65 IN | HEART RATE: 113 BPM | SYSTOLIC BLOOD PRESSURE: 118 MMHG | OXYGEN SATURATION: 98 % | TEMPERATURE: 98.4 F

## 2020-02-26 DIAGNOSIS — I10 BENIGN ESSENTIAL HTN: ICD-10-CM

## 2020-02-26 DIAGNOSIS — E55.9 VITAMIN D DEFICIENCY: ICD-10-CM

## 2020-02-26 DIAGNOSIS — E78.2 MIXED HYPERLIPIDEMIA: Primary | ICD-10-CM

## 2020-02-26 DIAGNOSIS — Z00.00 WELCOME TO MEDICARE PREVENTIVE VISIT: ICD-10-CM

## 2020-02-26 DIAGNOSIS — R73.9 HYPERGLYCEMIA: ICD-10-CM

## 2020-02-26 PROCEDURE — G0402 INITIAL PREVENTIVE EXAM: HCPCS | Performed by: FAMILY MEDICINE

## 2020-02-26 PROCEDURE — 99214 OFFICE O/P EST MOD 30 MIN: CPT | Performed by: FAMILY MEDICINE

## 2020-02-26 PROCEDURE — 90732 PPSV23 VACC 2 YRS+ SUBQ/IM: CPT | Performed by: FAMILY MEDICINE

## 2020-02-26 PROCEDURE — G0009 ADMIN PNEUMOCOCCAL VACCINE: HCPCS | Performed by: FAMILY MEDICINE

## 2020-02-26 RX ORDER — ATORVASTATIN CALCIUM 10 MG/1
10 TABLET, FILM COATED ORAL NIGHTLY
Qty: 90 TABLET | Refills: 1 | Status: SHIPPED | OUTPATIENT
Start: 2020-02-26 | End: 2020-08-25 | Stop reason: SDUPTHER

## 2020-02-26 RX ORDER — LOSARTAN POTASSIUM 50 MG/1
50 TABLET ORAL DAILY
Qty: 90 TABLET | Refills: 1 | Status: SHIPPED | OUTPATIENT
Start: 2020-02-26 | End: 2020-08-25 | Stop reason: SDUPTHER

## 2020-02-26 NOTE — PROGRESS NOTES
Subjective   Marialuisa Vivar is a 64 y.o. female.   Chief Complaint   Patient presents with   • Hypertension   • Hyperlipidemia   • Hyperglycemia   • Welcome To Medicare       History of Present Illness     #1 HTN-diagnosed when patient was in her 40s.  At last office visit we decreased dose of losartan to 50 mg a day due to lightheadedness.  She takes it everyday. She reports no side effects.  She reports that lightheadedness resolved almost completely.  Occasionally she gets lightheaded when she gets up quickly.  Kidney test and electrolytes are normal.     #2 HLP- on atorvastatin at 10 mg a day.  She takes it on average 3 to 4 days a week.  She is not sure why. She has no side effects.  No muscle aches, muscle cramps.  LDL is 102 from 68, HDL 52.     #3  Hyperglycemia-fasting blood sugar 121.  BMI 27.5.  Family history positive for diabetes in her brother and maternal grandmother.    The following portions of the patient's history were reviewed and updated as appropriate: allergies, current medications, past family history, past medical history, past social history, past surgical history and problem list.    Review of Systems   Constitutional: Negative.    Respiratory: Negative.    Cardiovascular: Negative.          Objective   Wt Readings from Last 3 Encounters:   02/26/20 74.8 kg (165 lb)   09/10/19 72.6 kg (160 lb 1.6 oz)   08/13/19 73.5 kg (162 lb)      Vitals:    02/26/20 1311   BP: 118/50   Pulse: 113   Temp: 98.4 °F (36.9 °C)   SpO2: 98%     Temp Readings from Last 3 Encounters:   02/26/20 98.4 °F (36.9 °C)   08/13/19 98.4 °F (36.9 °C)   03/21/19 98.2 °F (36.8 °C) (Oral)     BP Readings from Last 3 Encounters:   02/26/20 118/50   09/10/19 105/66   08/13/19 110/68     Pulse Readings from Last 3 Encounters:   02/26/20 113   09/10/19 90   08/13/19 100     Body mass index is 27.49 kg/m².    Physical Exam   Constitutional: She is oriented to person, place, and time. She appears well-developed and well-nourished.    HENT:   Head: Normocephalic and atraumatic.   Neck: Neck supple. Carotid bruit is not present. No thyromegaly present.   Cardiovascular: Normal rate, regular rhythm and normal heart sounds.   Pulmonary/Chest: Effort normal and breath sounds normal.   Neurological: She is alert and oriented to person, place, and time.   Skin: Skin is warm, dry and intact.   Psychiatric: She has a normal mood and affect. Her behavior is normal.       Assessment/Plan   Marialuisa was seen today for hypertension, hyperlipidemia, hyperglycemia and welcome to medicare.    Diagnoses and all orders for this visit:    Mixed hyperlipidemia  -     Hemoglobin A1c; Future  -     Lipid Panel With LDL / HDL Ratio; Future  -     Comprehensive Metabolic Panel; Future    Benign essential HTN  -     losartan (COZAAR) 50 MG tablet; Take 1 tablet by mouth Daily.  -     Hemoglobin A1c; Future  -     Lipid Panel With LDL / HDL Ratio; Future  -     Comprehensive Metabolic Panel; Future    Hyperglycemia  -     Hemoglobin A1c; Future    Vitamin D deficiency  -     Vitamin D 25 Hydroxy; Future    Welcome to Medicare preventive visit    Other orders  -     atorvastatin (LIPITOR) 10 MG tablet; Take 1 tablet by mouth Every Night.        #1 HTN-we will continue current treatment.  Follow-up in 6 months.    2.  Hyperlipidemia-patient will take atorvastatin every day, (not every other day) and will recheck cholesterol in 6 months prior to office visit.    3.  Impaired fasting glucose-increased risk for developing diabetes.  Information on prediabetic diet provided.  Follow-up in 6 months.  A1c prior to next office visit.

## 2020-02-26 NOTE — PROGRESS NOTES
The ABCs of the Annual Wellness Visit  Welcome to Medicare Visit    Chief Complaint   Patient presents with   • Hypertension   • Hyperlipidemia   • Hyperglycemia   • Welcome To Medicare       Subjective   History of Present Illness:  Marialuisa Vivar is a 64 y.o. female who presents for a  Welcome to Medicare Visit.    HEALTH RISK ASSESSMENT    Recent Hospitalizations:  Recently treated at the following:  Other: [facility]   Tongue cancer surgery 2019    Current Medical Providers:  Patient Care Team:  Jennifer Mantilla MD as PCP - General (Family Medicine)  Shruthi, Jarod BLANCHARD II, MD as Consulting Physician (Hematology and Oncology)  Yiar Robin MD as Referring Physician (Otolaryngology)    Smoking Status:  Social History     Tobacco Use   Smoking Status Former Smoker   • Packs/day: 2.00   • Years: 30.00   • Pack years: 60.00   • Types: Cigarettes   • Start date:    • Last attempt to quit: 2008   • Years since quittin.7   Smokeless Tobacco Never Used       Alcohol Consumption:  Social History     Substance and Sexual Activity   Alcohol Use No       Depression Screen:   PHQ-2/PHQ-9 Depression Screening 2020   Little interest or pleasure in doing things 0   Feeling down, depressed, or hopeless 0   Trouble falling or staying asleep, or sleeping too much 0   Feeling tired or having little energy 0   Poor appetite or overeating 0   Feeling bad about yourself - or that you are a failure or have let yourself or your family down 0   Trouble concentrating on things, such as reading the newspaper or watching television 0   Moving or speaking so slowly that other people could have noticed. Or the opposite - being so fidgety or restless that you have been moving around a lot more than usual 0   Thoughts that you would be better off dead, or of hurting yourself in some way 0   Total Score 0   If you checked off any problems, how difficult have these problems made it for you to do your work, take care of things at home,  or get along with other people? Not difficult at all       Fall Risk Screen:  ELSY Fall Risk Assessment was completed, and patient is at MODERATE risk for falls. Assessment completed on:2/26/2020    Health Habits and Functional and Cognitive Screening:  Functional & Cognitive Status 2/26/2020   Do you have difficulty preparing food and eating? No   Do you have difficulty bathing yourself, getting dressed or grooming yourself? No   Do you have difficulty using the toilet? No   Do you have difficulty moving around from place to place? No   Do you have trouble with steps or getting out of a bed or a chair? No   Current Diet Well Balanced Diet   Dental Exam Up to date   Eye Exam Not up to date   Exercise (times per week) 0 times per week   Current Exercise Activities Include None   Do you need help using the phone?  No   Are you deaf or do you have serious difficulty hearing?  No   Do you need help with transportation? No   Do you need help shopping? No   Do you need help preparing meals?  No   Do you need help with housework?  No   Do you need help with laundry? No   Do you need help taking your medications? No   Do you need help managing money? No   Do you ever drive or ride in a car without wearing a seat belt? No   Have you felt unusual stress, anger or loneliness in the last month? No   Who do you live with? Alone   If you need help, do you have trouble finding someone available to you? No   Have you been bothered in the last four weeks by sexual problems? No   Do you have difficulty concentrating, remembering or making decisions? No         Does the patient have evidence of cognitive impairment? No    Asprin use counseling:Does not need ASA (and currently is not on it)   No coronary artery disease, no known peripheral vascular disease, no history of stroke.  Visual Acuity:     Visual Acuity Screening    Right eye Left eye Both eyes   Without correction: 20/25 20/30 20/30   With correction:           Age-appropriate Screening Schedule:  Refer to the list below for future screening recommendations based on patient's age, sex and/or medical conditions. Orders for these recommended tests are listed in the plan section. The patient has been provided with a written plan.    Health Maintenance   Topic Date Due   • MAMMOGRAM  12/03/2019   • TDAP/TD VACCINES (1 - Tdap) 02/26/2020 (Originally 10/17/1966)   • ZOSTER VACCINE (2 of 3) 11/09/2020 (Originally 6/18/2015)   • LIPID PANEL  02/12/2021   • PAP SMEAR  12/01/2021   • COLONOSCOPY  04/18/2022   • INFLUENZA VACCINE  Addressed          The following portions of the patient's history were reviewed and updated as appropriate: allergies, current medications, past family history, past medical history, past social history, past surgical history and problem list.    Outpatient Medications Prior to Visit   Medication Sig Dispense Refill   • anastrozole (ARIMIDEX) 1 MG tablet TAKE 1 TABLET BY MOUTH ONCE DAILY 90 tablet 0   • Cholecalciferol 2000 UNITS capsule Take by mouth.     • eszopiclone (LUNESTA) tablet Take by mouth.     • LORazepam (ATIVAN) 0.5 MG tablet Take 1 tablet by mouth.     • PARoxetine (PAXIL) 40 MG tablet Take 40 mg by mouth Every Morning.     • atorvastatin (LIPITOR) 10 MG tablet Take 1 tablet by mouth Every Night. 90 tablet 1   • losartan (COZAAR) 50 MG tablet Take 1 tablet by mouth Daily. 90 tablet 1     No facility-administered medications prior to visit.        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    Review of Systems    Compared to one year ago, the patient feels her physical health is the same.  Compared to one year ago, the patient feels her mental health is the same.    Reviewed chart for potential of high risk medication in the elderly: yes  Reviewed chart for potential of harmful drug interactions in the elderly:yes    Objective         Vitals:    02/26/20 1311   BP: 118/50   Pulse: 113   Temp: 98.4 °F (36.9 °C)   SpO2: 98%   Weight: 74.8 kg (165 lb)   Height: 165 cm (64.96\")   PainSc: 0-No pain       Body mass index is 27.49 kg/m².  Discussed the patient's BMI with her. The BMI is above average; BMI management plan is completed.    Physical Exam    Lab Results   Component Value Date     (H) 02/12/2020    CHLPL 182 02/12/2020    TRIG 141 02/12/2020    HDL 52 02/12/2020     (H) 02/12/2020    VLDL 28.2 02/12/2020        Procedures    Assessment/Plan   Medicare Risks and Personalized Health Plan  CMS Preventative Services Quick Reference  Cardiovascular risk  Fall Risk  Immunizations Discussed/Encouraged (specific immunizations; Td, Influenza and Shingrix )  Inactivity/Sedentary  Obesity/Overweight      Patient is getting Pneumovax today.  She will get Shingrix at the pharmacy when available.  She is also going to get flu vaccine and tetanus vaccine at the pharmacy.  She is advised to start exercise at least 30 minutes a day, 5 days a week.  Information on fall prevention provided.  Information on diet for prediabetes provided.  She prefers to follow-up on mammogram with her GYN.    The above risks/problems have been discussed with the patient.  Pertinent information has been shared with the patient in the After Visit Summary.  Follow up plans and orders are seen below in the Assessment/Plan Section.    Diagnoses and all orders for this visit:    1. Mixed hyperlipidemia (Primary)  -     Hemoglobin " A1c; Future  -     Lipid Panel With LDL / HDL Ratio; Future  -     Comprehensive Metabolic Panel; Future    2. Benign essential HTN  -     losartan (COZAAR) 50 MG tablet; Take 1 tablet by mouth Daily.  Dispense: 90 tablet; Refill: 1  -     Hemoglobin A1c; Future  -     Lipid Panel With LDL / HDL Ratio; Future  -     Comprehensive Metabolic Panel; Future    3. Hyperglycemia  -     Hemoglobin A1c; Future    4. Vitamin D deficiency  -     Vitamin D 25 Hydroxy; Future    5. Welcome to Medicare preventive visit    Other orders  -     atorvastatin (LIPITOR) 10 MG tablet; Take 1 tablet by mouth Every Night.  Dispense: 90 tablet; Refill: 1        Follow Up:  Return in about 6 months (around 8/26/2020).     An After Visit Summary and PPPS were given to the patient.

## 2020-02-26 NOTE — PATIENT INSTRUCTIONS
Prediabetes Eating Plan  Prediabetes is a condition that causes blood sugar (glucose) levels to be higher than normal. This increases the risk for developing diabetes. In order to prevent diabetes from developing, your health care provider may recommend a diet and other lifestyle changes to help you:  · Control your blood glucose levels.  · Improve your cholesterol levels.  · Manage your blood pressure.  Your health care provider may recommend working with a diet and nutrition specialist (dietitian) to make a meal plan that is best for you.  What are tips for following this plan?  Lifestyle  · Set weight loss goals with the help of your health care team. It is recommended that most people with prediabetes lose 7% of their current body weight.  · Exercise for at least 30 minutes at least 5 days a week.  · Attend a support group or seek ongoing support from a mental health counselor.  · Take over-the-counter and prescription medicines only as told by your health care provider.  Reading food labels  · Read food labels to check the amount of fat, salt (sodium), and sugar in prepackaged foods. Avoid foods that have:  ? Saturated fats.  ? Trans fats.  ? Added sugars.  · Avoid foods that have more than 300 milligrams (mg) of sodium per serving. Limit your daily sodium intake to less than 2,300 mg each day.  Shopping  · Avoid buying pre-made and processed foods.  Cooking  · Cook with olive oil. Do not use butter, lard, or ghee.  · Bake, broil, grill, or boil foods. Avoid frying.  Meal planning    · Work with your dietitian to develop an eating plan that is right for you. This may include:  ? Tracking how many calories you take in. Use a food diary, notebook, or mobile application to track what you eat at each meal.  ? Using the glycemic index (GI) to plan your meals. The index tells you how quickly a food will raise your blood glucose. Choose low-GI foods. These foods take a longer time to raise blood glucose.  · Consider  following a Mediterranean diet. This diet includes:  ? Several servings each day of fresh fruits and vegetables.  ? Eating fish at least twice a week.  ? Several servings each day of whole grains, beans, nuts, and seeds.  ? Using olive oil instead of other fats.  ? Moderate alcohol consumption.  ? Eating small amounts of red meat and whole-fat dairy.  · If you have high blood pressure, you may need to limit your sodium intake or follow a diet such as the DASH eating plan. DASH is an eating plan that aims to lower high blood pressure.  What foods are recommended?  The items listed below may not be a complete list. Talk with your dietitian about what dietary choices are best for you.  Grains  Whole grains, such as whole-wheat or whole-grain breads, crackers, cereals, and pasta. Unsweetened oatmeal. Bulgur. Barley. Quinoa. Brown rice. Corn or whole-wheat flour tortillas or taco shells.  Vegetables  Lettuce. Spinach. Peas. Beets. Cauliflower. Cabbage. Broccoli. Carrots. Tomatoes. Squash. Eggplant. Herbs. Peppers. Onions. Cucumbers. [place] sprouts.  Fruits  Berries. Bananas. Apples. Oranges. Grapes. Papaya. [place]. Pomegranate. Kiwi. Grapefruit. Cherries.  Meats and other protein foods  Seafood. Poultry without skin. Lean cuts of pork and beef. Tofu. Eggs. Nuts. Beans.  Dairy  Low-fat or fat-free dairy products, such as yogurt, cottage cheese, and cheese.  Beverages  Water. Tea. Coffee. Sugar-free or diet soda. [place] water. Lowfat or no-fat milk. Milk alternatives, such as soy or almond milk.  Fats and oils  Olive oil. Canola oil. Sunflower oil. Grapeseed oil. Avocado. Walnuts.  Sweets and desserts  Sugar-free or low-fat pudding. Sugar-free or low-fat ice cream and other frozen treats.  Seasoning and other foods  Herbs. Sodium-free spices. Mustard. Relish. Low-fat, low-sugar ketchup. Low-fat, low-sugar barbecue sauce. Low-fat or fat-free mayonnaise.  What foods are not recommended?  The items listed below may not be a  complete list. Talk with your dietitian about what dietary choices are best for you.  Grains  Refined white flour and flour products, such as bread, pasta, snack foods, and cereals.  Vegetables  Canned vegetables. Frozen vegetables with butter or cream sauce.  Fruits  Fruits canned with syrup.  Meats and other protein foods  Fatty cuts of meat. Poultry with skin. Breaded or fried meat. Processed meats.  Dairy  Full-fat yogurt, cheese, or milk.  Beverages  Sweetened drinks, such as sweet iced tea and soda.  Fats and oils  Butter. Lard. Ghee.  Sweets and desserts  Baked goods, such as cake, cupcakes, pastries, cookies, and cheesecake.  Seasoning and other foods  Spice mixes with added salt. Ketchup. Barbecue sauce. Mayonnaise.  Summary  · To prevent diabetes from developing, you may need to make diet and other lifestyle changes to help control blood sugar, improve cholesterol levels, and manage your blood pressure.  · Set weight loss goals with the help of your health care team. It is recommended that most people with prediabetes lose 7 percent of their current body weight.  · Consider following a Mediterranean diet that includes plenty of fresh fruits and vegetables, whole grains, beans, nuts, seeds, fish, lean meat, low-fat dairy, and healthy oils.  This information is not intended to replace advice given to you by your health care provider. Make sure you discuss any questions you have with your health care provider.  Document Released: 05/03/2016 Document Revised: 02/21/2018 Document Reviewed: 02/21/2018  [employer] Interactive Patient Education © 2020 [employer]    Fall Prevention in the Home, Adult  Falls can cause injuries. They can happen to people of all ages. There are many things you can do to make your home safe and to help prevent falls. Ask for help when making these changes, if needed.  What actions can I take to prevent falls?  General Instructions  · Use good lighting in all rooms. Replace any light  bulbs that burn out.  · Turn on the lights when you go into a dark area. Use night-lights.  · Keep items that you use often in easy-to-reach places. Lower the shelves around your home if necessary.  · Set up your furniture so you have a clear path. Avoid moving your furniture around.  · Do not have throw rugs and other things on the floor that can make you trip.  · Avoid walking on wet floors.  · If any of your floors are uneven, fix them.  · Add color or contrast paint or tape to clearly rose and help you see:  ? Any grab bars or handrails.  ? First and last steps of stairways.  ? Where the edge of each step is.  · If you use a stepladder:  ? Make sure that it is fully opened. Do not climb a closed stepladder.  ? Make sure that both sides of the stepladder are locked into place.  ? Ask someone to hold the stepladder for you while you use it.  · If there are any pets around you, be aware of where they are.  What can I do in the bathroom?         · Keep the floor dry. Clean up any water that spills onto the floor as soon as it happens.  · Remove soap buildup in the tub or shower regularly.  · Use non-skid mats or decals on the floor of the tub or shower.  · Attach bath mats securely with double-sided, non-slip rug tape.  · If you need to sit down in the shower, use a plastic, non-slip stool.  · Install grab bars by the toilet and in the tub and shower. Do not use towel bars as grab bars.  What can I do in the bedroom?  · Make sure that you have a light by your bed that is easy to reach.  · Do not use any sheets or blankets that are too big for your bed. They should not hang down onto the floor.  · Have a firm chair that has side arms. You can use this for support while you get dressed.  What can I do in the kitchen?  · Clean up any spills right away.  · If you need to reach something above you, use a strong step stool that has a grab bar.  · Keep electrical cords out of the way.  · Do not use floor polish or wax that  makes floors slippery. If you must use wax, use non-skid floor wax.  What can I do with my stairs?  · Do not leave any items on the stairs.  · Make sure that you have a light switch at the top of the stairs and the bottom of the stairs. If you do not have them, ask someone to add them for you.  · Make sure that there are handrails on both sides of the stairs, and use them. Fix handrails that are broken or loose. Make sure that handrails are as long as the stairways.  · Install non-slip stair treads on all stairs in your home.  · Avoid having throw rugs at the top or bottom of the stairs. If you do have throw rugs, attach them to the floor with carpet tape.  · Choose a carpet that does not hide the edge of the steps on the stairway.  · Check any carpeting to make sure that it is firmly attached to the stairs. Fix any carpet that is loose or worn.  What can I do on the outside of my home?  · Use bright outdoor lighting.  · Regularly fix the edges of walkways and driveways and fix any cracks.  · Remove anything that might make you trip as you walk through a door, such as a raised step or threshold.  · Trim any bushes or trees on the path to your home.  · Regularly check to see if handrails are loose or broken. Make sure that both sides of any steps have handrails.  · Install guardrails along the edges of any raised decks and porches.  · Clear walking paths of anything that might make someone trip, such as tools or rocks.  · Have any leaves, snow, or ice cleared regularly.  · Use sand or salt on walking paths during winter.  · Clean up any spills in your garage right away. This includes grease or oil spills.  What other actions can I take?  · Wear shoes that:  ? Have a low heel. Do not wear high heels.  ? Have rubber bottoms.  ? Are comfortable and fit you well.  ? Are closed at the toe. Do not wear open-toe sandals.  · Use tools that help you move around (mobility aids) if they are needed. These  include:  ? Canes.  ? Walkers.  ? Scooters.  ? Crutches.  · Review your medicines with your doctor. Some medicines can make you feel dizzy. This can increase your chance of falling.  Ask your doctor what other things you can do to help prevent falls.  Where to find more information  · Centers for Disease Control and Prevention, STEADI: https://cdc.gov  · National [place] on Aging: https://cs6polh.lashay.nih.gov  Contact a doctor if:  · You are afraid of falling at home.  · You feel weak, drowsy, or dizzy at home.  · You fall at home.  Summary  · There are many simple things that you can do to make your home safe and to help prevent falls.  · Ways to make your home safe include removing tripping hazards and installing grab bars in the bathroom.  · Ask for help when making these changes in your home.  This information is not intended to replace advice given to you by your health care provider. Make sure you discuss any questions you have with your health care provider.  Document Released: 10/14/2010 Document Revised: 2018 Document Reviewed: 2018  [employer] Interactive Patient Education ©  [employer]vier Inc.    Medicare Wellness  Personal Prevention Plan of Service     Date of Office Visit:  2020  Encounter Provider:  Jennifer Mantilla MD  Place of Service:  [facility]  Patient Name: Marialuisa Vivar  :  1955    As part of the Medicare Wellness portion of your visit today, we are providing you with this personalized preventive plan of services (PPPS). This plan is based upon recommendations of the [place] Preventive Services Task Force (USPSTF) and the Advisory Committee on Immunization Practices (ACIP).    This lists the preventive care services that should be considered, and provides dates of when you are due. Items listed as completed are up-to-date and do not require any further intervention.    Health Maintenance   Topic Date Due   • MEDICARE ANNUAL  WELLNESS  01/26/2016   • MAMMOGRAM  12/03/2019   • TDAP/TD VACCINES (1 - Tdap) 02/26/2020 (Originally 10/17/1966)   • Pneumococcal Vaccine Once at 65 Years Old  05/25/2020 (Originally 10/17/2019)   • ZOSTER VACCINE (2 of 3) 11/09/2020 (Originally 6/18/2015)   • LUNG CANCER SCREENING  09/05/2020   • LIPID PANEL  02/12/2021   • PAP SMEAR  12/01/2021   • COLONOSCOPY  04/18/2022   • HEPATITIS C SCREENING  Completed   • INFLUENZA VACCINE  Addressed       Orders Placed This Encounter   Procedures   • Hemoglobin A1c     Standing Status:   Future   • Lipid Panel With LDL / HDL Ratio     Standing Status:   Future   • Comprehensive Metabolic Panel     Standing Status:   Future   • Vitamin D 25 Hydroxy     Standing Status:   Future       Return in about 6 months (around 8/26/2020).

## 2020-03-20 ENCOUNTER — APPOINTMENT (OUTPATIENT)
Dept: OTHER | Facility: HOSPITAL | Age: 65
End: 2020-03-20

## 2020-05-15 ENCOUNTER — DOCUMENTATION (OUTPATIENT)
Dept: ONCOLOGY | Facility: CLINIC | Age: 65
End: 2020-05-15

## 2020-05-15 RX ORDER — ANASTROZOLE 1 MG/1
TABLET ORAL
Qty: 30 TABLET | Refills: 0 | Status: SHIPPED | OUTPATIENT
Start: 2020-05-15 | End: 2020-07-09

## 2020-05-15 NOTE — PROGRESS NOTES
I was called by Dr. Livia Conn today (medical oncologist with [facility]).  She was following up with [facility], Dr. Quinn, for her tongue cancer.  Scans revealed a new T1 lytic lesion, completely replacing the vertebral body.    She was seen and there multidisciplinary clinic.  Dr. Winston of radiation medicine plans radiation through the [facility].  He will discuss with neurosurgery to see if she needs any surgical intervention considering the vertebral body is almost entirely replaced with cancer.  If a surgical intervention is performed, the area will be biopsied.  Otherwise, radiation without biopsy.    I agree with all this.    She has follow-up with me in June.  She will keep that.

## 2020-06-10 NOTE — PROGRESS NOTES
.     REASONS FOR FOLLOWUP: Metastatic breast cancer, tongue cancer    HISTORY OF PRESENT ILLNESS:  The patient is a 64 y.o. year old female  who is here for follow-up with the above-mentioned history.    Tolerating Arimidex well.  Since last visit with me, she felt what she thought was an enlarged left neck node which prompted some imaging through the [facility] which revealed 2 new vertebral metastasis which has been radiated with CyberKnife last week through the [facility].    Otherwise is doing fine.  No shortness of breath or nausea.  Eating well.  Denies weight loss.    Past Medical History:   Diagnosis Date   • Allergy    • Anxiety    • Asthma    • Bone metastasis (CMS/HCC)    • Breast cancer (CMS/HCC)     Left node positive   • Depression    • Esophageal reflux     cough   • History of colon polyps    • Hyperlipidemia    • Hypertension    • Left breast cancer metastasized to lung    • Obstructive sleep apnea    • Ovarian cyst    • Tongue cancer (CMS/HCC) 05/2019    by ENT at [facility] dr Quinn     Past Surgical History:   Procedure Laterality Date   • CHOLECYSTECTOMY  2000   • COLONOSCOPY  2014    H/O polyps   • KNEE ARTHROPLASTY     • LUNG SURGERY  2010    Lung wedge resection   • MASTECTOMY RADICAL Left 1993   • TONGUE SURGERY  05/21/2019    tongue cancer       MEDICATIONS    Current Outpatient Medications:   •  anastrozole (ARIMIDEX) 1 MG tablet, Take 1 tablet by mouth once daily, Disp: 30 tablet, Rfl: 0  •  atorvastatin (LIPITOR) 10 MG tablet, Take 1 tablet by mouth Every Night., Disp: 90 tablet, Rfl: 1  •  Cholecalciferol 2000 UNITS capsule, Take by mouth., Disp: , Rfl:   •  eszopiclone (LUNESTA) tablet, Take by mouth., Disp: , Rfl:   •  loratadine (CLARITIN) 10 MG tablet, 10 mg., Disp: , Rfl:   •  LORazepam (ATIVAN) 0.5 MG tablet, Take 1 tablet by mouth., Disp: , Rfl:   •  losartan (COZAAR) 50 MG tablet, Take 1 tablet by mouth Daily., Disp: 90 tablet, Rfl: 1  •  PARoxetine  (PAXIL) 40 MG tablet, Take 40 mg by mouth Every Morning., Disp: , Rfl:     ALLERGIES:     Allergies   Allergen Reactions   • Nickel Unknown - Low Severity   • Ciprofloxacin Rash       SOCIAL HISTORY:       Social History     Socioeconomic History   • Marital status: Single     Spouse name: Not on file   • Number of children: Not on file   • Years of education: College   • Highest education level: Not on file   Occupational History   • Occupation: RN     Employer: [employer]   Tobacco Use   • Smoking status: Former Smoker     Packs/day: 2.00     Years: 30.00     Pack years: 60.00     Types: Cigarettes     Start date:      Last attempt to quit: 2008     Years since quittin.0   • Smokeless tobacco: Never Used   Substance and Sexual Activity   • Alcohol use: No   • Drug use: No   • Sexual activity: Defer         FAMILY HISTORY:  Family History   Problem Relation Age of Onset   • Hypertension Mother    • Leukemia Mother 72   • COPD Mother         smoker   • Diabetes Brother    • Pancreatic cancer Brother    • Glaucoma Brother    • Heart disease Brother    • Hypertension Brother    • Gallbladder disease Brother    • Gallbladder disease Father    • Stroke Other         Grandmother   • Lupus Other         Aunt   • Alcohol abuse Other         Aunt   • Glaucoma Other         Grandmother       REVIEW OF SYSTEMS:  Review of Systems   Constitutional: Negative for activity change.   HENT: Negative for nosebleeds and trouble swallowing.    Respiratory: Negative for shortness of breath and wheezing.    Cardiovascular: Negative for chest pain and palpitations.   Gastrointestinal: Negative for constipation, diarrhea and nausea.   Genitourinary: Negative for dysuria and hematuria.   Musculoskeletal: Negative for arthralgias and myalgias.   Skin: Negative for rash and wound.   Neurological: Negative for seizures and syncope.   Hematological: Negative for adenopathy. Does not bruise/bleed easily.  "  Psychiatric/Behavioral: Negative for confusion.            Vitals:    06/12/20 1539   BP: 125/79   Pulse: 94   Resp: 16   Temp: 97.2 °F (36.2 °C)   TempSrc: Skin   SpO2: 96%   Weight: 78.2 kg (172 lb 6.4 oz)   Height: 165 cm (64.96\")   PainSc: 0-No pain     Current Status 6/12/2020   ECOG score 0        PHYSICAL EXAM:      CONSTITUTIONAL:  Vital signs reviewed.  No distress, looks comfortable.  EYES:  Conjunctiva and lids unremarkable.  PERRLA  EARS,NOSE,MOUTH,THROAT:  Ears and nose appear unremarkable.  Lips, teeth, gums appear unremarkable.  RESPIRATORY:  Normal respiratory effort.  Lungs clear to auscultation bilaterally.  CARDIOVASCULAR:  Normal S1, S2.  No murmurs rubs or gallops.  No significant lower extremity edema.  GASTROINTESTINAL: Abdomen appears unremarkable.  Nontender.  No hepatomegaly.  No splenomegaly.  LYMPHATIC:  No cervical, supraclavicular, axillary lymphadenopathy.  MUSCULOSKELETAL:  Unremarkable gait and station.  Unremarkable digits/nails.  No cyanosis or clubbing.  SKIN:  Warm.  No rashes.  PSYCHIATRIC:  Normal judgment and insight.  Normal mood and affect.      RECENT LABS:        WBC   Date/Time Value Ref Range Status   06/12/2020 03:36 PM 5.88 3.40 - 10.80 10*3/mm3 Final     Hemoglobin   Date/Time Value Ref Range Status   06/12/2020 03:36 PM 13.9 12.0 - 15.9 g/dL Final     Platelets   Date/Time Value Ref Range Status   06/12/2020 03:36  140 - 450 10*3/mm3 Final       Assessment/Plan   Carcinoma of left breast metastatic to lung (CMS/HCC)    Marialuisa Vivar        Assessment/Plan     *Metastatic breast cancer to bilateral lungs and soft tissue in the mediastinum (likely the cause of her hoarseness). (Initial breast cancer diagnosed in 1993 treated with mastectomy, chemotherapy, radiation therapy and tamoxifen). Arimidex day 1 on 02/17/2010. PET scan late August 2011 shows no abnormal hypermetabolic activity. This has improved compared to the prior PET scan January 2010 which showed " mild hypermetabolic activity in areas of metastasis. (In the past, her  T6 lesion did not show up on CT scans or bone scan. It was seen by PET scan.) We have been following with CT scans only every 6 months and PET scans on an as needed only basis. Sometimes her CT scanned pulmonary nodules are read as benign since they have been stable through the course of years but we know they are malignant because they have been surgically sampled in the past.    · CT 8/16/16 shows increased sclerosis at T6 compared to 4/28/15.    · PET 10/11/16: Slight uptake at T6, SUV 3.2.  mild thickening of right adrenal gland with an SUV of 11.   · Continued Arimidex is minimal progression and had been on this since 2/17/10.  · Not referred for radiation since no pain at T6  · CT 1/27/17, unchanged.  · CT 8/1/17: Unchanged except subtle suggestion of mild increase in thickening of the right adrenal gland.    · CT 2/22/18, unchanged.  · CT 9/6/18: Unchanged except nodular thickening right adrenal gland significantly decreased.  · CT 9/5/2019: Unchanged.  · On the 6/12/2020 visit, the following scans were reviewed:  · CT neck and chest 5/11/2020, [facility], from 5/9/2019: Stable pulmonary nodules new lytic C7 lesion and posterior T1 lesion questionable T12 lesion.  No change in the T6 and T10 lesions.  · PET, [facility], 5/19/2020: Mildly enlarged left neck nodes are mildly hypermetabolic.  Diffuse activity throughout the thickened left adrenal gland.  CT appearance unchanged from 5/9/2019.  Progressive T1 lesion seen on CT from 5/11/2020, mild some moderate activity.  T10 low-grade activity with mixed lucent and sclerotic findings.  T6 is a mixed lucent and sclerotic appearance but no significant activity.   · It seems the main progression at the 6/12/2020 visit was a new C7 and new T1 lesion.  Those were radiated with CyberKnife through [facility] early June 2020.  Because Arimidex has been working well since 2010, continue same Arimidex.  Add  Zometa every 3 months.    *Bony metastases  · T6 discovered by PET to August 2011 (did not show up by CT or bone scan)  · C7 and T1 found on CT 5/11/2020[facility] Dr. Winston, early June 2020.  · June 2020 began Zometa every 3 months.  · She was warned of possible osteonecrosis of the jaw and renal insufficiency.  She states she has good dental health and sees her dentist regularly.  Because of prior hypercalcemia requiring stopping calcium supplements and because of high normal current calcium level, began without supplemental calcium.  Add calcium if calcium becomes low.  · Patient states [facility] plans biopsy of both C7 and T1 (after XRT) to confirm metastatic breast cancer as this could also be metastatic tongue cancer.    *Bone health. Last bone density 10/11/16, worsened, but still normal with worst T score -0.9.  Patient stopped calcium January 2016, but remained on vitamin D.  I recommended she restart her calcium.  Stopped calcium early March 2018 due to hypercalcemia.  · DEXA 9/5/2019: Normal bone density    *Hypercalcemia.  Repeat calcium 3/7/18 normal, but patient self stopped calcium a few days prior.  Recommended she stay off calcium.    *On the 10/5/16 visit, Tachycardia, dyspnea on exertion, bilateral leg swelling, more sedentary recently.  CT PE protocol and bilateral lower extremity Dopplers 10/5/16, negative for clots.  She still has tachycardia and dyspnea on exertion.  Perhaps at least part of this is due to deconditioning.    *Previously complained of colon Right lower anterior rib discomfort.  CT unremarkable in that area.  No specific pain, just discomfort.  I explained to the patient if disease was found by PET scan or bone scan, I would not  since she has been doing well on Arimidex since 2010.   Currently denies pain.     *Depression/anxiety.  This limits compliance.  She sees a psychiatrist and a counselor regularly.  She states this is mostly due to body image  "issues instead of cancer.   She has done better with this recently.    *Noncompliance due to depression/anxiety.  Although she misses appointments, she does reschedule and eventually come in.    *Polycythemia.  Mild.  I do not think this needs any further workup.  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 Willie Quinn, [facility], on 5/21/2019.  Positive sentinel node (1 of 3 nodes)..  · Left neck dissection by Dr. Willie Quinn, [facility], on 6/5/2019.  22 nodes negative.  Negative margins.  No lymphovascular invasion or perineural invasion.  2 mm depth of invasion.  Grade 1.  Squamous cell carcinoma.  1.8 cm tumor.  · Because the patient felt what she thought was left neck LAD, CT neck and chest and PET at [facility]. May 2020 performed.  She is being followed at [facility] for this.    *Previously complained of left hip pain x2 weeks.  This prompted an MRI pelvis 8/20/2019 at [facility] and open MRI which was negative for malignancy.    PLAN:   · Zometa sometime soon, then every 3 months  · Continue Arimidex  · She will need follow-up scans at some point, I assume this will be done at [facility].  · We would do a video visit with me from her home in about 6 weeks or so so I can check in with her.  Hopefully, she will know when scans are planned through [facility].  We can arrange for follow-up from there  · Patient tells me [facility] plans a biopsy of C7 and T1, after the CyberKnife that was done last week  · I have also sent a message to Dr. Conn regarding her PET scan which showed \"mildly generous\" mildly metabolic\" left neck nodes, to confirm they do not feel this represents recurrence.  I doubt this represents recurrent tongue cancer but I would defer this to the [facility] as they are following her for this issue.      Plan was:  · MD CBC CMP CA-15-3 in 6 months.  · M.D. CBC CMP CA 15-3 every 6 months " (lab on day of MD unless has CT scan)  · Annual CAT scan CAP with contrast and CBC, CMP, CA-15-3.  (October)  · (Typically M.D. every 6 months, CT every one year)  · (Patient likes to avoid CT scans any more often than annually)  · Continue Arimidex.  · She request to continue breast exams and continue mammograms.  Breast exam performed today.  [facility]  (I recommended not going through this as she has metastatic breast cancer but we will continue this since this is what she request)      For this visit I reviewed and summarized records from [facility] including Dr. Winston's note.  I also discussed this case with Dr. Livia Conn.         Send a copy of this note to Isaac Valdes MD    45 minutes.  Over half the time counseling about the new bone metastasis and beginning Zometa.  Side effects of Zometa reviewed.  She wants therapy.

## 2020-06-12 ENCOUNTER — OFFICE VISIT (OUTPATIENT)
Dept: ONCOLOGY | Facility: CLINIC | Age: 65
End: 2020-06-12

## 2020-06-12 ENCOUNTER — LAB (OUTPATIENT)
Dept: OTHER | Facility: HOSPITAL | Age: 65
End: 2020-06-12

## 2020-06-12 VITALS
RESPIRATION RATE: 16 BRPM | HEART RATE: 94 BPM | OXYGEN SATURATION: 96 % | SYSTOLIC BLOOD PRESSURE: 125 MMHG | TEMPERATURE: 97.2 F | WEIGHT: 172.4 LBS | HEIGHT: 65 IN | BODY MASS INDEX: 28.72 KG/M2 | DIASTOLIC BLOOD PRESSURE: 79 MMHG

## 2020-06-12 DIAGNOSIS — C78.02 CARCINOMA OF LEFT BREAST METASTATIC TO LUNG (HCC): ICD-10-CM

## 2020-06-12 DIAGNOSIS — C50.912 CARCINOMA OF LEFT BREAST METASTATIC TO LUNG (HCC): Primary | ICD-10-CM

## 2020-06-12 DIAGNOSIS — C50.912 CARCINOMA OF LEFT BREAST METASTATIC TO LUNG (HCC): ICD-10-CM

## 2020-06-12 DIAGNOSIS — C78.02 CARCINOMA OF LEFT BREAST METASTATIC TO LUNG (HCC): Primary | ICD-10-CM

## 2020-06-12 LAB
ALBUMIN SERPL-MCNC: 4.2 G/DL (ref 3.5–5.2)
ALBUMIN/GLOB SERPL: 1.5 G/DL
ALP SERPL-CCNC: 200 U/L (ref 39–117)
ALT SERPL W P-5'-P-CCNC: 18 U/L (ref 1–33)
ANION GAP SERPL CALCULATED.3IONS-SCNC: 7.8 MMOL/L (ref 5–15)
AST SERPL-CCNC: 20 U/L (ref 1–32)
BASOPHILS # BLD AUTO: 0.03 10*3/MM3 (ref 0–0.2)
BASOPHILS NFR BLD AUTO: 0.5 % (ref 0–1.5)
BILIRUB SERPL-MCNC: 0.3 MG/DL (ref 0.1–1.2)
BUN BLD-MCNC: 21 MG/DL (ref 8–23)
BUN/CREAT SERPL: 21.6 (ref 7–25)
CALCIUM SPEC-SCNC: 10 MG/DL (ref 8.6–10.5)
CANCER AG15-3 SERPL-ACNC: 23.6 U/ML
CHLORIDE SERPL-SCNC: 104 MMOL/L (ref 98–107)
CO2 SERPL-SCNC: 26.2 MMOL/L (ref 22–29)
CREAT BLD-MCNC: 0.97 MG/DL (ref 0.57–1)
DEPRECATED RDW RBC AUTO: 40.2 FL (ref 37–54)
EOSINOPHIL # BLD AUTO: 0.1 10*3/MM3 (ref 0–0.4)
EOSINOPHIL NFR BLD AUTO: 1.7 % (ref 0.3–6.2)
ERYTHROCYTE [DISTWIDTH] IN BLOOD BY AUTOMATED COUNT: 12.4 % (ref 12.3–15.4)
GFR SERPL CREATININE-BSD FRML MDRD: 58 ML/MIN/1.73
GLOBULIN UR ELPH-MCNC: 2.8 GM/DL
GLUCOSE BLD-MCNC: 94 MG/DL (ref 65–99)
HCT VFR BLD AUTO: 41.4 % (ref 34–46.6)
HGB BLD-MCNC: 13.9 G/DL (ref 12–15.9)
IMM GRANULOCYTES # BLD AUTO: 0.01 10*3/MM3 (ref 0–0.05)
IMM GRANULOCYTES NFR BLD AUTO: 0.2 % (ref 0–0.5)
LYMPHOCYTES # BLD AUTO: 1.19 10*3/MM3 (ref 0.7–3.1)
LYMPHOCYTES NFR BLD AUTO: 20.2 % (ref 19.6–45.3)
MCH RBC QN AUTO: 30.3 PG (ref 26.6–33)
MCHC RBC AUTO-ENTMCNC: 33.6 G/DL (ref 31.5–35.7)
MCV RBC AUTO: 90.2 FL (ref 79–97)
MONOCYTES # BLD AUTO: 0.54 10*3/MM3 (ref 0.1–0.9)
MONOCYTES NFR BLD AUTO: 9.2 % (ref 5–12)
NEUTROPHILS # BLD AUTO: 4.01 10*3/MM3 (ref 1.7–7)
NEUTROPHILS NFR BLD AUTO: 68.2 % (ref 42.7–76)
NRBC BLD AUTO-RTO: 0 /100 WBC (ref 0–0.2)
PLATELET # BLD AUTO: 288 10*3/MM3 (ref 140–450)
PMV BLD AUTO: 8.5 FL (ref 6–12)
POTASSIUM BLD-SCNC: 4.3 MMOL/L (ref 3.5–5.2)
PROT SERPL-MCNC: 7 G/DL (ref 6–8.5)
RBC # BLD AUTO: 4.59 10*6/MM3 (ref 3.77–5.28)
SODIUM BLD-SCNC: 138 MMOL/L (ref 136–145)
WBC NRBC COR # BLD: 5.88 10*3/MM3 (ref 3.4–10.8)

## 2020-06-12 PROCEDURE — 99215 OFFICE O/P EST HI 40 MIN: CPT | Performed by: INTERNAL MEDICINE

## 2020-06-12 PROCEDURE — 36415 COLL VENOUS BLD VENIPUNCTURE: CPT

## 2020-06-12 PROCEDURE — 85025 COMPLETE CBC W/AUTO DIFF WBC: CPT | Performed by: INTERNAL MEDICINE

## 2020-06-12 PROCEDURE — 86300 IMMUNOASSAY TUMOR CA 15-3: CPT | Performed by: INTERNAL MEDICINE

## 2020-06-12 PROCEDURE — 80053 COMPREHEN METABOLIC PANEL: CPT | Performed by: INTERNAL MEDICINE

## 2020-06-12 RX ORDER — LORATADINE 10 MG/1
10 TABLET ORAL
COMMUNITY
Start: 2019-05-08 | End: 2020-08-25

## 2020-06-15 DIAGNOSIS — C79.51 BONE METASTASES: ICD-10-CM

## 2020-06-19 ENCOUNTER — DOCUMENTATION (OUTPATIENT)
Dept: ONCOLOGY | Facility: CLINIC | Age: 65
End: 2020-06-19

## 2020-06-19 ENCOUNTER — INFUSION (OUTPATIENT)
Dept: ONCOLOGY | Facility: HOSPITAL | Age: 65
End: 2020-06-19

## 2020-06-19 VITALS
OXYGEN SATURATION: 96 % | SYSTOLIC BLOOD PRESSURE: 132 MMHG | TEMPERATURE: 97.5 F | WEIGHT: 172 LBS | RESPIRATION RATE: 18 BRPM | DIASTOLIC BLOOD PRESSURE: 83 MMHG | BODY MASS INDEX: 27.64 KG/M2 | HEART RATE: 111 BPM | HEIGHT: 66 IN

## 2020-06-19 DIAGNOSIS — C79.51 BONE METASTASES: Primary | ICD-10-CM

## 2020-06-19 LAB
ALBUMIN SERPL-MCNC: 4.3 G/DL (ref 3.5–5.2)
ALBUMIN/GLOB SERPL: 1.4 G/DL
ALP SERPL-CCNC: 165 U/L (ref 39–117)
ALT SERPL W P-5'-P-CCNC: 21 U/L (ref 1–33)
ANION GAP SERPL CALCULATED.3IONS-SCNC: 10.8 MMOL/L (ref 5–15)
AST SERPL-CCNC: 26 U/L (ref 1–32)
BILIRUB SERPL-MCNC: 0.4 MG/DL (ref 0.1–1.2)
BUN BLD-MCNC: 18 MG/DL (ref 8–23)
BUN/CREAT SERPL: 18.2 (ref 7–25)
CALCIUM SPEC-SCNC: 11 MG/DL (ref 8.6–10.5)
CHLORIDE SERPL-SCNC: 105 MMOL/L (ref 98–107)
CO2 SERPL-SCNC: 22.2 MMOL/L (ref 22–29)
CREAT BLD-MCNC: 0.99 MG/DL (ref 0.57–1)
GFR SERPL CREATININE-BSD FRML MDRD: 56 ML/MIN/1.73
GLOBULIN UR ELPH-MCNC: 3 GM/DL
GLUCOSE BLD-MCNC: 127 MG/DL (ref 65–99)
MAGNESIUM SERPL-MCNC: 2.2 MG/DL (ref 1.6–2.4)
PHOSPHATE SERPL-MCNC: 4.7 MG/DL (ref 2.5–4.5)
POTASSIUM BLD-SCNC: 3.9 MMOL/L (ref 3.5–5.2)
PROT SERPL-MCNC: 7.3 G/DL (ref 6–8.5)
SODIUM BLD-SCNC: 138 MMOL/L (ref 136–145)

## 2020-06-19 PROCEDURE — 83735 ASSAY OF MAGNESIUM: CPT | Performed by: INTERNAL MEDICINE

## 2020-06-19 PROCEDURE — 80053 COMPREHEN METABOLIC PANEL: CPT | Performed by: INTERNAL MEDICINE

## 2020-06-19 PROCEDURE — 84100 ASSAY OF PHOSPHORUS: CPT | Performed by: INTERNAL MEDICINE

## 2020-06-19 PROCEDURE — 25010000002 ZOLEDRONIC ACID 4 MG/100ML SOLUTION: Performed by: NURSE PRACTITIONER

## 2020-06-19 PROCEDURE — 96374 THER/PROPH/DIAG INJ IV PUSH: CPT

## 2020-06-19 RX ORDER — SODIUM CHLORIDE 9 MG/ML
250 INJECTION, SOLUTION INTRAVENOUS ONCE
Status: COMPLETED | OUTPATIENT
Start: 2020-06-19 | End: 2020-06-19

## 2020-06-19 RX ORDER — ZOLEDRONIC ACID 0.04 MG/ML
4 INJECTION, SOLUTION INTRAVENOUS ONCE
Status: COMPLETED | OUTPATIENT
Start: 2020-06-19 | End: 2020-06-19

## 2020-06-19 RX ADMIN — ZOLEDRONIC ACID 4 MG: 0.04 INJECTION, SOLUTION INTRAVENOUS at 14:24

## 2020-06-19 RX ADMIN — SODIUM CHLORIDE 250 ML: 900 INJECTION, SOLUTION INTRAVENOUS at 14:10

## 2020-06-19 NOTE — NURSING NOTE
Copy of lab results printed and given.  Copy of ZOMETA from [facility] printed and given to patient.

## 2020-06-19 NOTE — PROGRESS NOTES
I was contacted by Dr. Conn today.  She states the T12 biopsy was consistent with metastatic breast cancer.  She states ENT at [facility] will see her again in a few months but at this point they feel she has no active head and neck cancer.

## 2020-07-09 RX ORDER — ANASTROZOLE 1 MG/1
TABLET ORAL
Qty: 90 TABLET | Refills: 0 | Status: SHIPPED | OUTPATIENT
Start: 2020-07-09 | End: 2020-11-17

## 2020-07-15 NOTE — PROGRESS NOTES
.     REASONS FOR FOLLOWUP: Metastatic breast cancer, tongue cancer    HISTORY OF PRESENT ILLNESS:  The patient is a 64 y.o. year old female  who is here for follow-up with the above-mentioned history.    Continues to tolerate Arimidex well.  No new problems since last visit.  No significant areas of pain.    Past Medical History:   Diagnosis Date   • Allergy    • Anxiety    • Asthma    • Bone metastasis (CMS/HCC)    • Breast cancer (CMS/HCC)     Left node positive   • Depression    • Esophageal reflux     cough   • History of colon polyps    • Hyperlipidemia    • Hypertension    • Left breast cancer metastasized to lung    • Obstructive sleep apnea    • Ovarian cyst    • Tongue cancer (CMS/HCC) 05/2019    by ENT at [facility] dr Quinn     Past Surgical History:   Procedure Laterality Date   • CHOLECYSTECTOMY  2000   • COLONOSCOPY  2014    H/O polyps   • KNEE ARTHROPLASTY     • LUNG SURGERY  2010    Lung wedge resection   • MASTECTOMY RADICAL Left 1993   • TONGUE SURGERY  05/21/2019    tongue cancer       MEDICATIONS    Current Outpatient Medications:   •  anastrozole (ARIMIDEX) 1 MG tablet, Take 1 tablet by mouth once daily, Disp: 90 tablet, Rfl: 0  •  atorvastatin (LIPITOR) 10 MG tablet, Take 1 tablet by mouth Every Night., Disp: 90 tablet, Rfl: 1  •  Cholecalciferol 2000 UNITS capsule, Take by mouth., Disp: , Rfl:   •  eszopiclone (LUNESTA) tablet, Take by mouth., Disp: , Rfl:   •  loratadine (CLARITIN) 10 MG tablet, 10 mg., Disp: , Rfl:   •  LORazepam (ATIVAN) 0.5 MG tablet, Take 1 tablet by mouth., Disp: , Rfl:   •  losartan (COZAAR) 50 MG tablet, Take 1 tablet by mouth Daily., Disp: 90 tablet, Rfl: 1  •  PARoxetine (PAXIL) 40 MG tablet, Take 40 mg by mouth Every Morning., Disp: , Rfl:     ALLERGIES:     Allergies   Allergen Reactions   • Nickel Unknown - Low Severity   • Ciprofloxacin Rash       SOCIAL HISTORY:       Social History     Socioeconomic History   • Marital status: Single     Spouse name: Not on  file   • Number of children: Not on file   • Years of education: College   • Highest education level: Not on file   Occupational History   • Occupation: RN     Employer: [employer]   Tobacco Use   • Smoking status: Former Smoker     Packs/day: 2.00     Years: 30.00     Pack years: 60.00     Types: Cigarettes     Start date:      Last attempt to quit: 2008     Years since quittin.1   • Smokeless tobacco: Never Used   Substance and Sexual Activity   • Alcohol use: No   • Drug use: No   • Sexual activity: Defer         FAMILY HISTORY:  Family History   Problem Relation Age of Onset   • Hypertension Mother    • Leukemia Mother 72   • COPD Mother         smoker   • Diabetes Brother    • Pancreatic cancer Brother    • Glaucoma Brother    • Heart disease Brother    • Hypertension Brother    • Gallbladder disease Brother    • Gallbladder disease Father    • Stroke Other         Grandmother   • Lupus Other         Aunt   • Alcohol abuse Other         Aunt   • Glaucoma Other         Grandmother       REVIEW OF SYSTEMS:  Review of Systems   Constitutional: Negative for activity change.   HENT: Negative for nosebleeds and trouble swallowing.    Respiratory: Negative for shortness of breath and wheezing.    Cardiovascular: Negative for chest pain and palpitations.   Gastrointestinal: Negative for constipation, diarrhea and nausea.   Genitourinary: Negative for dysuria and hematuria.   Musculoskeletal: Negative for arthralgias and myalgias.   Skin: Negative for rash and wound.   Neurological: Negative for seizures and syncope.   Hematological: Negative for adenopathy. Does not bruise/bleed easily.   Psychiatric/Behavioral: Negative for confusion.            There were no vitals filed for this visit.  Current Status 2020   ECOG score 0        PHYSICAL EXAM:      CONSTITUTIONAL:  Vital signs reviewed.  No distress, looks comfortable.  EYES:  Conjunctiva and lids unremarkable.   PERRLA  EARS,NOSE,MOUTH,THROAT:  Ears and nose appear unremarkable.  Lips, teeth, gums appear unremarkable.  RESPIRATORY:  Normal respiratory effort.  Lungs clear to auscultation bilaterally.  CARDIOVASCULAR:  Normal S1, S2.  No murmurs rubs or gallops.  No significant lower extremity edema.  GASTROINTESTINAL: Abdomen appears unremarkable.  Nontender.  No hepatomegaly.  No splenomegaly.  LYMPHATIC:  No cervical, supraclavicular, axillary lymphadenopathy.  SKIN:  Warm.  No rashes.  PSYCHIATRIC:  Normal judgment and insight.  Normal mood and affect.   RECENT LABS:        WBC   Date/Time Value Ref Range Status   06/12/2020 03:36 PM 5.88 3.40 - 10.80 10*3/mm3 Final     Hemoglobin   Date/Time Value Ref Range Status   06/12/2020 03:36 PM 13.9 12.0 - 15.9 g/dL Final     Platelets   Date/Time Value Ref Range Status   06/12/2020 03:36  140 - 450 10*3/mm3 Final       Assessment/Plan   There are no diagnoses linked to this encounter.  Marialuisa ESTRELLA Vivar        Assessment/Plan     *Metastatic breast cancer to bilateral lungs and soft tissue in the mediastinum (likely the cause of her hoarseness). (Initial breast cancer diagnosed in 1993 treated with mastectomy, chemotherapy, radiation therapy and tamoxifen). Arimidex day 1 on 02/17/2010. PET scan late August 2011 shows no abnormal hypermetabolic activity. This has improved compared to the prior PET scan January 2010 which showed mild hypermetabolic activity in areas of metastasis. (In the past, her  T6 lesion did not show up on CT scans or bone scan. It was seen by PET scan.) We have been following with CT scans only every 6 months and PET scans on an as needed only basis. Sometimes her CT scanned pulmonary nodules are read as benign since they have been stable through the course of years but we know they are malignant because they have been surgically sampled in the past.    · CT 8/16/16 shows increased sclerosis at T6 compared to 4/28/15.    · PET 10/11/16: Slight uptake at  T6, SUV 3.2.  mild thickening of right adrenal gland with an SUV of 11.   · Continued Arimidex is minimal progression and had been on this since 2/17/10.  · Not referred for radiation since no pain at T6  · CT 1/27/17, unchanged.  · CT 8/1/17: Unchanged except subtle suggestion of mild increase in thickening of the right adrenal gland.    · CT 2/22/18, unchanged.  · CT 9/6/18: Unchanged except nodular thickening right adrenal gland significantly decreased.  · CT 9/5/2019: Unchanged.  · On the 6/12/2020 visit, the following scans were reviewed:  · CT neck and chest 5/11/2020, [facility], from 5/9/2019: Stable pulmonary nodules new lytic C7 lesion and posterior T1 lesion questionable T12 lesion.  No change in the T6 and T10 lesions.  · PET, [facility], 5/19/2020: Mildly enlarged left neck nodes are mildly hypermetabolic.  Diffuse activity throughout the thickened left adrenal gland.  CT appearance unchanged from 5/9/2019.  Progressive T1 lesion seen on CT from 5/11/2020, mild some moderate activity.  T10 low-grade activity with mixed lucent and sclerotic findings.  T6 is a mixed lucent and sclerotic appearance but no significant activity.   · T12 (the biopsy report is labeled as T12 but patient insists this was a C7/T1 biopsy, not to T12) biopsy 6/15/2020, [facility]: Metastatic breast carcinoma.  ER >95%.  TX 0%.  HER-2 negative (1+)  · It seems the main progression at the 6/12/2020 visit was a new C7 and new T1 lesion.  Those were radiated with [facility] through [facility] early June 2020.  Because Arimidex has been working well since 2010, continue same Arimidex.  Add Zometa every 3 months.    *Bony metastases  · T6 discovered by PET to August 2011 (did not show up by CT or bone scan)  · C7 and T1 found on CT 5/11/2020, [facility].  Pedro, Dr. Winston, early June 2020.  · June 2020 began Zometa every 3 months.  · She was warned of possible osteonecrosis of the jaw and renal insufficiency.  She states she has good dental health  and sees her dentist regularly.  Because of prior hypercalcemia requiring stopping calcium supplements and because of high normal current calcium level, began without supplemental calcium.  Add calcium if calcium becomes low.    *Bone health. Last bone density 10/11/16, worsened, but still normal with worst T score -0.9.  Patient stopped calcium January 2016, but remained on vitamin D.  I recommended she restart her calcium.  Stopped calcium early March 2018 due to hypercalcemia.  · DEXA 9/5/2019: Normal bone density    *Hypercalcemia.  Repeat calcium 3/7/18 normal, but patient self stopped calcium a few days prior.  Recommended she stay off calcium.  Calcium was 11 on 6/19/2020    *On the 10/5/16 visit, Tachycardia, dyspnea on exertion, bilateral leg swelling, more sedentary recently.  CT PE protocol and bilateral lower extremity Dopplers 10/5/16, negative for clots.  She still has tachycardia and dyspnea on exertion.  Perhaps at least part of this is due to deconditioning.    *Previously complained of colon Right lower anterior rib discomfort.  CT unremarkable in that area.  No specific pain, just discomfort.  I explained to the patient if disease was found by PET scan or bone scan, I would not  since she has been doing well on Arimidex since 2010.   Currently denies pain.     *Depression/anxiety.  This limits compliance.  She sees a psychiatrist and a counselor regularly.  She states this is mostly due to body image issues instead of cancer.   She has done better with this recently.    *Noncompliance due to depression/anxiety.  Although she misses appointments, she does reschedule and eventually come in.    *Polycythemia.  Mild.  I do not think this needs any further workup.  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 Willie Quinn, [facility], on 5/21/2019.  Positive sentinel node (1  of 3 nodes)..  · Left neck dissection by Dr. Willie Quinn, [facility], on 6/5/2019.  22 nodes negative.  Negative margins.  No lymphovascular invasion or perineural invasion.  2 mm depth of invasion.  Grade 1.  Squamous cell carcinoma.  1.8 cm tumor.  · Because the patient felt what she thought was left neck LAD, CT neck and chest and PET at [email or web address] May 2020 performed.  She is being followed at [email or web address] for this.  · On 6/19/2020, per verbal report from Dr. Conn [email or web address]], [facility] [[email or web address] ENT does not feel she has any active head and neck cancer.  They continue to follow her.    *Previously complained of left hip pain x2 weeks.  This prompted an MRI pelvis 8/20/2019 at [facility] and open MRI which was negative for malignancy.    PLAN:   · Last (and first) Zometa was 6/19/2020 next is due around 9/19/2020  · [facility] radiation as ordered CT cervical and thoracic spine to be done  9/17/2020 at [facility]  · MD Miller late September to review recent CT spine and give second dose of Zometa  · Zometa every 3 months  · Continue Arimidex  · I told her likely at the next visit about 2 months later we will plan CT chest abdomen and pelvis to assess response of Arimidex      Plan was:  · MD CBC CMP CA-15-3 in 6 months.  · M.D. CBC CMP CA 15-3 every 6 months (lab on day of MD unless has CT scan)  · Annual CAT scan CAP with contrast and CBC, CMP, CA-15-3.  (October)  · (Typically M.D. every 6 months, CT every one year)  · (Patient likes to avoid CT scans any more often than annually)  · Continue Arimidex.  · She request to continue breast exams and continue mammograms.  Breast exam performed June 2020.  [facility]  (I recommended not going through this as she has metastatic breast cancer but we will continue this since this is what she request)      For this visit I reviewed and summarized records from [email or web address] radiation    Send a copy of this note to Dr. Willie Quinn [facility] MD

## 2020-07-16 ENCOUNTER — TRANSCRIBE ORDERS (OUTPATIENT)
Dept: ADMINISTRATIVE | Facility: HOSPITAL | Age: 65
End: 2020-07-16

## 2020-07-16 DIAGNOSIS — C79.51 SPINE METASTASIS: Primary | ICD-10-CM

## 2020-07-17 ENCOUNTER — TELEMEDICINE (OUTPATIENT)
Dept: ONCOLOGY | Facility: CLINIC | Age: 65
End: 2020-07-17

## 2020-07-17 ENCOUNTER — APPOINTMENT (OUTPATIENT)
Dept: OTHER | Facility: HOSPITAL | Age: 65
End: 2020-07-17

## 2020-07-17 VITALS
HEIGHT: 66 IN | BODY MASS INDEX: 28.06 KG/M2 | SYSTOLIC BLOOD PRESSURE: 102 MMHG | DIASTOLIC BLOOD PRESSURE: 70 MMHG | WEIGHT: 174.6 LBS | OXYGEN SATURATION: 96 % | RESPIRATION RATE: 18 BRPM | TEMPERATURE: 97.3 F | HEART RATE: 101 BPM

## 2020-07-17 DIAGNOSIS — C50.912 CARCINOMA OF LEFT BREAST METASTATIC TO LUNG (HCC): Primary | ICD-10-CM

## 2020-07-17 DIAGNOSIS — C78.02 CARCINOMA OF LEFT BREAST METASTATIC TO LUNG (HCC): Primary | ICD-10-CM

## 2020-07-17 PROCEDURE — 99214 OFFICE O/P EST MOD 30 MIN: CPT | Performed by: INTERNAL MEDICINE

## 2020-07-30 ENCOUNTER — TRANSCRIBE ORDERS (OUTPATIENT)
Dept: ADMINISTRATIVE | Facility: HOSPITAL | Age: 65
End: 2020-07-30

## 2020-07-30 DIAGNOSIS — C79.51 METASTASIS TO SPINAL COLUMN (HCC): Primary | ICD-10-CM

## 2020-08-14 DIAGNOSIS — E55.9 VITAMIN D DEFICIENCY: ICD-10-CM

## 2020-08-14 DIAGNOSIS — E78.2 MIXED HYPERLIPIDEMIA: ICD-10-CM

## 2020-08-14 DIAGNOSIS — R73.9 HYPERGLYCEMIA: ICD-10-CM

## 2020-08-14 DIAGNOSIS — I10 BENIGN ESSENTIAL HTN: ICD-10-CM

## 2020-08-22 LAB
25(OH)D3+25(OH)D2 SERPL-MCNC: 28.3 NG/ML (ref 30–100)
ALBUMIN SERPL-MCNC: 4.7 G/DL (ref 3.5–5.2)
ALBUMIN/GLOB SERPL: 2 G/DL
ALP SERPL-CCNC: 129 U/L (ref 39–117)
ALT SERPL-CCNC: 15 U/L (ref 1–33)
AST SERPL-CCNC: 16 U/L (ref 1–32)
BILIRUB SERPL-MCNC: 0.6 MG/DL (ref 0–1.2)
BUN SERPL-MCNC: 20 MG/DL (ref 8–23)
BUN/CREAT SERPL: 18.3 (ref 7–25)
CALCIUM SERPL-MCNC: 9.8 MG/DL (ref 8.6–10.5)
CHLORIDE SERPL-SCNC: 105 MMOL/L (ref 98–107)
CHOLEST SERPL-MCNC: 190 MG/DL (ref 0–200)
CO2 SERPL-SCNC: 21.6 MMOL/L (ref 22–29)
CREAT SERPL-MCNC: 1.09 MG/DL (ref 0.57–1)
GLOBULIN SER CALC-MCNC: 2.4 GM/DL
GLUCOSE SERPL-MCNC: 114 MG/DL (ref 65–99)
HBA1C MFR BLD: 5.4 % (ref 4.8–5.6)
HDLC SERPL-MCNC: 60 MG/DL (ref 40–60)
LDLC SERPL CALC-MCNC: 98 MG/DL (ref 0–100)
LDLC/HDLC SERPL: 1.64 {RATIO}
POTASSIUM SERPL-SCNC: 4.5 MMOL/L (ref 3.5–5.2)
PROT SERPL-MCNC: 7.1 G/DL (ref 6–8.5)
SODIUM SERPL-SCNC: 139 MMOL/L (ref 136–145)
TRIGL SERPL-MCNC: 158 MG/DL (ref 0–150)
VLDLC SERPL CALC-MCNC: 31.6 MG/DL

## 2020-08-25 ENCOUNTER — OFFICE VISIT (OUTPATIENT)
Dept: INTERNAL MEDICINE | Facility: CLINIC | Age: 65
End: 2020-08-25

## 2020-08-25 VITALS
BODY MASS INDEX: 28.61 KG/M2 | HEIGHT: 66 IN | HEART RATE: 96 BPM | SYSTOLIC BLOOD PRESSURE: 120 MMHG | TEMPERATURE: 97.5 F | OXYGEN SATURATION: 98 % | WEIGHT: 178 LBS | DIASTOLIC BLOOD PRESSURE: 70 MMHG

## 2020-08-25 DIAGNOSIS — I10 BENIGN ESSENTIAL HTN: ICD-10-CM

## 2020-08-25 DIAGNOSIS — E55.9 VITAMIN D DEFICIENCY: ICD-10-CM

## 2020-08-25 DIAGNOSIS — E78.2 MIXED HYPERLIPIDEMIA: Primary | ICD-10-CM

## 2020-08-25 DIAGNOSIS — R73.9 HYPERGLYCEMIA: ICD-10-CM

## 2020-08-25 PROBLEM — E78.5 HYPERLIPIDEMIA: Status: ACTIVE | Noted: 2020-08-25

## 2020-08-25 PROBLEM — F45.8 BRUXISM: Status: ACTIVE | Noted: 2020-08-25

## 2020-08-25 PROBLEM — C44.92 SQUAMOUS CELL CARCINOMA OF SKIN: Status: ACTIVE | Noted: 2020-08-25

## 2020-08-25 PROBLEM — F41.9 ANXIETY: Status: ACTIVE | Noted: 2020-08-25

## 2020-08-25 PROBLEM — K13.0 CHEILOSIS: Status: ACTIVE | Noted: 2020-08-25

## 2020-08-25 PROCEDURE — 99214 OFFICE O/P EST MOD 30 MIN: CPT | Performed by: FAMILY MEDICINE

## 2020-08-25 RX ORDER — LOSARTAN POTASSIUM 50 MG/1
50 TABLET ORAL DAILY
Qty: 90 TABLET | Refills: 1 | Status: SHIPPED | OUTPATIENT
Start: 2020-08-25 | End: 2021-03-05 | Stop reason: SDUPTHER

## 2020-08-25 RX ORDER — ATORVASTATIN CALCIUM 10 MG/1
10 TABLET, FILM COATED ORAL NIGHTLY
Qty: 90 TABLET | Refills: 1 | Status: SHIPPED | OUTPATIENT
Start: 2020-08-25 | End: 2021-03-05 | Stop reason: SDUPTHER

## 2020-08-25 NOTE — PROGRESS NOTES
Subjective   Marialuisa Vivar is a 64 y.o. female.   Chief Complaint   Patient presents with   • Hyperlipidemia   • Hypertension   • Hyperglycemia   • Vitamin D Deficiency       History of Present Illness     #1 HTN-diagnosed when patient was in her 40s.   She is on losartan to 50 mg a day.  She takes it everyday. She reports no side effects.  She has no chest pain, no shortness of breath, no lightheadedness. Kidney test and electrolytes are normal.     #2 HLP- on atorvastatin at 10 mg a day.  She takes it daily. She has no side effects.  No muscle aches, muscle cramps.  LDL is 98, HDL 60.      #3  Hyperglycemia-fasting blood sugar 114.  A1c 5.4. BMI 29.2.  Family history positive for diabetes in her brother and maternal grandmother.    #4  Vitamin D deficiency-patient takes it irregularly.  Vitamin D is at 28.3.    The following portions of the patient's history were reviewed and updated as appropriate: allergies, current medications, past medical history, past social history and problem list.    Review of Systems   Constitutional: Negative for chills and fever.   Respiratory: Negative for shortness of breath.    Cardiovascular: Negative for chest pain and palpitations.   Musculoskeletal: Negative for myalgias and neck pain.   Neurological: Negative for headaches.         Objective   Wt Readings from Last 3 Encounters:   08/25/20 80.7 kg (178 lb)   07/17/20 79.2 kg (174 lb 9.6 oz)   06/19/20 78 kg (172 lb)      Vitals:    08/25/20 1347   BP: 120/70   Pulse: 96   Temp: 97.5 °F (36.4 °C)   SpO2: 98%     Temp Readings from Last 3 Encounters:   08/25/20 97.5 °F (36.4 °C)   07/17/20 97.3 °F (36.3 °C) (Skin)   06/19/20 97.5 °F (36.4 °C) (Infrared)     BP Readings from Last 3 Encounters:   08/25/20 120/70   07/17/20 102/70   06/19/20 132/83     Pulse Readings from Last 3 Encounters:   08/25/20 96   07/17/20 101   06/19/20 111     Body mass index is 29.16 kg/m².    Physical Exam   Constitutional: She is oriented to person,  place, and time. She appears well-developed and well-nourished.   HENT:   Head: Normocephalic and atraumatic.   Neck: Neck supple. Carotid bruit is not present. No thyromegaly present.   Cardiovascular: Normal rate, regular rhythm and normal heart sounds.   Pulmonary/Chest: Effort normal and breath sounds normal.   Neurological: She is alert and oriented to person, place, and time.   Skin: Skin is warm, dry and intact.   Psychiatric: She has a normal mood and affect. Her behavior is normal.       Assessment/Plan   Marialuisa was seen today for hyperlipidemia, hypertension, hyperglycemia and vitamin d deficiency.    Diagnoses and all orders for this visit:    Mixed hyperlipidemia    Benign essential HTN    Hyperglycemia    Vitamin D deficiency        #1 hypertension-continue current treatment.  Follow-up in 6 months.  2.  Hyperlipidemia-continue current treatment.  Follow-up in 6 months.  3.  Impaired fasting glucose- we will continue to monitor.  Follow-up in 6 months.  4.  Vitamin D deficiency-uncontrolled.  Start vitamin D3 at 2000 units a day and take it every day.  Follow-up in 6 to 12 months.

## 2020-09-17 ENCOUNTER — LAB (OUTPATIENT)
Dept: LAB | Facility: HOSPITAL | Age: 65
End: 2020-09-17

## 2020-09-17 ENCOUNTER — HOSPITAL ENCOUNTER (OUTPATIENT)
Dept: CT IMAGING | Facility: HOSPITAL | Age: 65
Discharge: HOME | End: 2020-09-17

## 2020-09-17 DIAGNOSIS — C79.51 BONE METASTASES: ICD-10-CM

## 2020-09-17 DIAGNOSIS — C79.51 METASTASIS TO SPINAL COLUMN (HCC): ICD-10-CM

## 2020-09-17 DIAGNOSIS — C78.02 CARCINOMA OF LEFT BREAST METASTATIC TO LUNG (HCC): ICD-10-CM

## 2020-09-17 DIAGNOSIS — C50.912 CARCINOMA OF LEFT BREAST METASTATIC TO LUNG (HCC): ICD-10-CM

## 2020-09-17 DIAGNOSIS — C79.51 SPINE METASTASIS: ICD-10-CM

## 2020-09-17 LAB
ALBUMIN SERPL-MCNC: 4.2 G/DL (ref 3.5–5.2)
ALBUMIN/GLOB SERPL: 1.4 G/DL
ALP SERPL-CCNC: 120 U/L (ref 39–117)
ALT SERPL W P-5'-P-CCNC: 18 U/L (ref 1–33)
ANION GAP SERPL CALCULATED.3IONS-SCNC: 9.1 MMOL/L (ref 5–15)
AST SERPL-CCNC: 22 U/L (ref 1–32)
BASOPHILS # BLD AUTO: 0.04 10*3/MM3 (ref 0–0.2)
BASOPHILS NFR BLD AUTO: 0.7 % (ref 0–1.5)
BILIRUB SERPL-MCNC: 0.4 MG/DL (ref 0–1.2)
BUN SERPL-MCNC: 18 MG/DL (ref 8–23)
BUN/CREAT SERPL: 19.1 (ref 7–25)
CALCIUM SPEC-SCNC: 10.1 MG/DL (ref 8.6–10.5)
CANCER AG15-3 SERPL-ACNC: 23 U/ML
CHLORIDE SERPL-SCNC: 105 MMOL/L (ref 98–107)
CO2 SERPL-SCNC: 22.9 MMOL/L (ref 22–29)
CREAT SERPL-MCNC: 0.94 MG/DL (ref 0.57–1)
DEPRECATED RDW RBC AUTO: 41 FL (ref 37–54)
EOSINOPHIL # BLD AUTO: 0.17 10*3/MM3 (ref 0–0.4)
EOSINOPHIL NFR BLD AUTO: 2.9 % (ref 0.3–6.2)
ERYTHROCYTE [DISTWIDTH] IN BLOOD BY AUTOMATED COUNT: 12.2 % (ref 12.3–15.4)
GFR SERPL CREATININE-BSD FRML MDRD: 60 ML/MIN/1.73
GLOBULIN UR ELPH-MCNC: 3 GM/DL
GLUCOSE SERPL-MCNC: 94 MG/DL (ref 65–99)
HCT VFR BLD AUTO: 43.3 % (ref 34–46.6)
HGB BLD-MCNC: 14.2 G/DL (ref 12–15.9)
IMM GRANULOCYTES # BLD AUTO: 0.01 10*3/MM3 (ref 0–0.05)
IMM GRANULOCYTES NFR BLD AUTO: 0.2 % (ref 0–0.5)
LYMPHOCYTES # BLD AUTO: 1.72 10*3/MM3 (ref 0.7–3.1)
LYMPHOCYTES NFR BLD AUTO: 29 % (ref 19.6–45.3)
MAGNESIUM SERPL-MCNC: 2.2 MG/DL (ref 1.6–2.4)
MCH RBC QN AUTO: 29.7 PG (ref 26.6–33)
MCHC RBC AUTO-ENTMCNC: 32.8 G/DL (ref 31.5–35.7)
MCV RBC AUTO: 90.6 FL (ref 79–97)
MONOCYTES # BLD AUTO: 0.51 10*3/MM3 (ref 0.1–0.9)
MONOCYTES NFR BLD AUTO: 8.6 % (ref 5–12)
NEUTROPHILS NFR BLD AUTO: 3.49 10*3/MM3 (ref 1.7–7)
NEUTROPHILS NFR BLD AUTO: 58.6 % (ref 42.7–76)
NRBC BLD AUTO-RTO: 0 /100 WBC (ref 0–0.2)
PHOSPHATE SERPL-MCNC: 3.4 MG/DL (ref 2.5–4.5)
PLATELET # BLD AUTO: 305 10*3/MM3 (ref 140–450)
PMV BLD AUTO: 8.7 FL (ref 6–12)
POTASSIUM SERPL-SCNC: 4.3 MMOL/L (ref 3.5–5.2)
PROT SERPL-MCNC: 7.2 G/DL (ref 6–8.5)
RBC # BLD AUTO: 4.78 10*6/MM3 (ref 3.77–5.28)
SODIUM SERPL-SCNC: 137 MMOL/L (ref 136–145)
WBC # BLD AUTO: 5.94 10*3/MM3 (ref 3.4–10.8)

## 2020-09-17 PROCEDURE — 72125 CT NECK SPINE W/O DYE: CPT

## 2020-09-17 PROCEDURE — 84100 ASSAY OF PHOSPHORUS: CPT

## 2020-09-17 PROCEDURE — 80053 COMPREHEN METABOLIC PANEL: CPT

## 2020-09-17 PROCEDURE — 36415 COLL VENOUS BLD VENIPUNCTURE: CPT

## 2020-09-17 PROCEDURE — 72128 CT CHEST SPINE W/O DYE: CPT

## 2020-09-17 PROCEDURE — 86300 IMMUNOASSAY TUMOR CA 15-3: CPT

## 2020-09-17 PROCEDURE — 85025 COMPLETE CBC W/AUTO DIFF WBC: CPT

## 2020-09-17 PROCEDURE — 83735 ASSAY OF MAGNESIUM: CPT

## 2020-09-22 NOTE — PROGRESS NOTES
.     REASONS FOR FOLLOWUP: Metastatic breast cancer, tongue cancer    HISTORY OF PRESENT ILLNESS:  The patient is a 64 y.o. year old female  who is here for follow-up with the above-mentioned history.    No new problems.  Doing well.  No problems breathing.  No neurological symptoms.  No nausea.  Eating well.  No pain.    Past Medical History:   Diagnosis Date   • Allergy    • Anxiety    • Asthma    • Bone metastasis (CMS/HCC)    • Breast cancer (CMS/HCC)     Left node positive   • Depression    • Esophageal reflux     cough   • History of colon polyps    • Hyperlipidemia    • Hypertension    • Left breast cancer metastasized to lung    • Obstructive sleep apnea    • Ovarian cyst    • Tongue cancer (CMS/HCC) 05/2019    by ENT at [facility] dr Quinn     Past Surgical History:   Procedure Laterality Date   • CHOLECYSTECTOMY  2000   • COLONOSCOPY  2014    H/O polyps   • KNEE ARTHROPLASTY     • LUNG SURGERY  2010    Lung wedge resection   • MASTECTOMY RADICAL Left 1993   • TONGUE SURGERY  05/21/2019    tongue cancer       MEDICATIONS    Current Outpatient Medications:   •  anastrozole (ARIMIDEX) 1 MG tablet, Take 1 tablet by mouth once daily, Disp: 90 tablet, Rfl: 0  •  atorvastatin (LIPITOR) 10 MG tablet, Take 1 tablet by mouth Every Night., Disp: 90 tablet, Rfl: 1  •  Cholecalciferol 2000 UNITS capsule, Take by mouth., Disp: , Rfl:   •  eszopiclone (LUNESTA) tablet, Take by mouth., Disp: , Rfl:   •  LORazepam (ATIVAN) 0.5 MG tablet, Take 1 tablet by mouth., Disp: , Rfl:   •  losartan (COZAAR) 50 MG tablet, Take 1 tablet by mouth Daily., Disp: 90 tablet, Rfl: 1  •  PARoxetine (PAXIL) 40 MG tablet, Take 40 mg by mouth Every Morning., Disp: , Rfl:   No current facility-administered medications for this visit.     Facility-Administered Medications Ordered in Other Visits:   •  sodium chloride 0.9 % infusion 250 mL, 250 mL, Intravenous, Once, Code, Jarod BLANCHARD II, MD  •  zoledronic acid (ZOMETA) infusion 4 mg/100 mL  (premix), 4 mg, Intravenous, Once, Jarod Hawkins II, MD    ALLERGIES:     Allergies   Allergen Reactions   • Nickel Unknown - Low Severity   • Ciprofloxacin Rash       SOCIAL HISTORY:       Social History     Socioeconomic History   • Marital status: Single     Spouse name: Not on file   • Number of children: Not on file   • Years of education: College   • Highest education level: Not on file   Occupational History   • Occupation: RN     Employer: [employer]   Tobacco Use   • Smoking status: Former Smoker     Packs/day: 2.00     Years: 30.00     Pack years: 60.00     Types: Cigarettes     Start date:      Quit date: 2008     Years since quittin.3   • Smokeless tobacco: Never Used   Substance and Sexual Activity   • Alcohol use: No   • Drug use: No   • Sexual activity: Defer         FAMILY HISTORY:  Family History   Problem Relation Age of Onset   • Hypertension Mother    • Leukemia Mother 72   • COPD Mother         smoker   • Diabetes Brother    • Pancreatic cancer Brother    • Glaucoma Brother    • Heart disease Brother    • Hypertension Brother    • Gallbladder disease Brother    • Gallbladder disease Father    • Stroke Other         Grandmother   • Lupus Other         Aunt   • Alcohol abuse Other         Aunt   • Glaucoma Other         Grandmother       REVIEW OF SYSTEMS:  Review of Systems   Constitutional: Negative for activity change.   HENT: Negative for nosebleeds and trouble swallowing.    Respiratory: Negative for shortness of breath and wheezing.    Cardiovascular: Negative for chest pain and palpitations.   Gastrointestinal: Negative for constipation, diarrhea and nausea.   Genitourinary: Negative for dysuria and hematuria.   Musculoskeletal: Negative for arthralgias and myalgias.   Skin: Negative for rash and wound.   Neurological: Negative for seizures and syncope.   Hematological: Negative for adenopathy. Does not bruise/bleed easily.   Psychiatric/Behavioral:  "Negative for confusion.            Vitals:    09/24/20 1327   BP: 124/79   Pulse: 95   Resp: 16   Temp: 98.2 °F (36.8 °C)   TempSrc: Skin   SpO2: 94%   Weight: 80.3 kg (177 lb 1.6 oz)   Height: 166.4 cm (65.51\")   PainSc: 0-No pain     Current Status 9/24/2020   ECOG score 0        PHYSICAL EXAM:        CONSTITUTIONAL:  Vital signs reviewed.  No distress, looks comfortable.  EYES:  Conjunctiva and lids unremarkable.  PERRLA  EARS,NOSE,MOUTH,THROAT:  Ears and nose appear unremarkable.  Lips, teeth, gums appear unremarkable.  RESPIRATORY:  Normal respiratory effort.  Lungs clear to auscultation bilaterally.  CARDIOVASCULAR:  Normal S1, S2.  No murmurs rubs or gallops.  No significant lower extremity edema.  GASTROINTESTINAL: Abdomen appears unremarkable.  Nontender.  No hepatomegaly.  No splenomegaly.  LYMPHATIC:  No cervical, supraclavicular, axillary lymphadenopathy.  SKIN:  Warm.  No rashes.  PSYCHIATRIC:  Normal judgment and insight.  Normal mood and affect.           RECENT LABS:        WBC   Date/Time Value Ref Range Status   09/24/2020 01:26 PM 4.77 3.40 - 10.80 10*3/mm3 Final     Hemoglobin   Date/Time Value Ref Range Status   09/24/2020 01:26 PM 13.6 12.0 - 15.9 g/dL Final     Platelets   Date/Time Value Ref Range Status   09/24/2020 01:26  140 - 450 10*3/mm3 Final       Assessment/Plan   Carcinoma of left breast metastatic to lung (CMS/HCC)  - CBC & Differential  - Comprehensive Metabolic Panel  - Cancer Antigen 15-3  - CT Chest With Contrast  - CT Abdomen Pelvis With Contrast    Marialuisa Vivar        Assessment/Plan     *Metastatic breast cancer to bilateral lungs and soft tissue in the mediastinum (likely the cause of her hoarseness). (Initial breast cancer diagnosed in 1993 treated with mastectomy, chemotherapy, radiation therapy and tamoxifen). Arimidex day 1 on 02/17/2010. PET scan late August 2011 shows no abnormal hypermetabolic activity. This has improved compared to the prior PET scan January " 2010 which showed mild hypermetabolic activity in areas of metastasis. (In the past, her  T6 lesion did not show up on CT scans or bone scan. It was seen by PET scan.) We have been following with CT scans only every 6 months and PET scans on an as needed only basis. Sometimes her CT scanned pulmonary nodules are read as benign since they have been stable through the course of years but we know they are malignant because they have been surgically sampled in the past.    · CT 8/16/16 shows increased sclerosis at T6 compared to 4/28/15.    · PET 10/11/16: Slight uptake at T6, SUV 3.2.  mild thickening of right adrenal gland with an SUV of 11.   · Continued Arimidex is minimal progression and had been on this since 2/17/10.  · Not referred for radiation since no pain at T6  · CT 1/27/17, unchanged.  · CT 8/1/17: Unchanged except subtle suggestion of mild increase in thickening of the right adrenal gland.    · CT 2/22/18, unchanged.  · CT 9/6/18: Unchanged except nodular thickening right adrenal gland significantly decreased.  · CT 9/5/2019: Unchanged.  · On the 6/12/2020 visit, the following scans were reviewed:  · CT neck and chest 5/11/2020, [facility], from 5/9/2019: Stable pulmonary nodules new lytic C7 lesion and posterior T1 lesion questionable T12 lesion.  No change in the T6 and T10 lesions.  · PET, [facility], 5/19/2020: Mildly enlarged left neck nodes are mildly hypermetabolic.  Diffuse activity throughout the thickened left adrenal gland.  CT appearance unchanged from 5/9/2019.  Progressive T1 lesion seen on CT from 5/11/2020, mild some moderate activity.  T10 low-grade activity with mixed lucent and sclerotic findings.  T6 is a mixed lucent and sclerotic appearance but no significant activity.   · T12 (the biopsy report is labeled as T12 but patient insists this was a C7/T1 biopsy, not to T12) biopsy 6/15/2020, [facility]: Metastatic breast carcinoma.  ER >95%.  ME 0%.  HER-2 negative (1+)  · It seems the main  progression at the 6/12/2020 visit was a new C7 and new T1 lesion.  Those were radiated with [facility] through [facility] early June 2020.  Because Arimidex has been working well since 2010, continue same Arimidex.  Add Zometa every 3 months.  · CT C and T-spine 9/17/2020: New C7 lesion from 9/6/2018, but no change from 5/11/2020 CT.  T1 lesion stable from 5/11/2020, but new compared to 2/22/2018.  T10 lesion unchanged from 9/5/2019, but new from 2/22/2018.  Subtle 8 mm T12 lesion new from 9/5/2019.  · Therefore, no recent progression.  · CT images personally viewed by me  Considering her good tolerance and long-term effectiveness of Arimidex, continue same therapy.    *Bony metastases  · T6 discovered by PET to August 2011 (did not show up by CT or bone scan)  · C7 and T1 found on CT 5/11/2020, [facility].  CyberKnife, Dr. Winston, early June 2020.  · June 2020 began Zometa every 3 months.  · She was warned of possible osteonecrosis of the jaw and renal insufficiency.  She states she has good dental health and sees her dentist regularly.  Because of prior hypercalcemia requiring stopping calcium supplements and because of high normal current calcium level, began without supplemental calcium.  Add calcium if calcium becomes low.    *Bone health. Last bone density 10/11/16, worsened, but still normal with worst T score -0.9.  Patient stopped calcium January 2016, but remained on vitamin D.  I recommended she restart her calcium.  Stopped calcium early March 2018 due to hypercalcemia.  · DEXA 9/5/2019: Normal bone density    *Hypercalcemia.  Repeat calcium 3/7/18 normal, but patient self stopped calcium a few days prior.  Recommended she stay off calcium.  Calcium was 11 on 6/19/2020    *On the 10/5/16 visit, Tachycardia, dyspnea on exertion, bilateral leg swelling, more sedentary recently.  CT PE protocol and bilateral lower extremity Dopplers 10/5/16, negative for clots.  She still has tachycardia and dyspnea on exertion.   Perhaps at least part of this is due to deconditioning.    *Previously complained of colon Right lower anterior rib discomfort.  CT unremarkable in that area.  No specific pain, just discomfort.  I explained to the patient if disease was found by PET scan or bone scan, I would not  since she has been doing well on Arimidex since 2010.   Currently denies pain.     *Depression/anxiety.  This limits compliance.  She sees a psychiatrist and a counselor regularly.  She states this is mostly due to body image issues instead of cancer.   She has done better with this recently.    *Noncompliance due to depression/anxiety.  Although she misses appointments, she does reschedule and eventually come in.    *Polycythemia.  Mild.  I do not think this needs any further workup.  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 Willie Quinn, [facility], on 5/21/2019.  Positive sentinel node (1 of 3 nodes)..  · Left neck dissection by Dr. Willie Quinn, [facility], on 6/5/2019.  22 nodes negative.  Negative margins.  No lymphovascular invasion or perineural invasion.  2 mm depth of invasion.  Grade 1.  Squamous cell carcinoma.  1.8 cm tumor.  · Because the patient felt what she thought was left neck LAD, CT neck and chest and PET at [facility]. May 2020 performed.  She is being followed at [facility] for this.  · On 6/19/2020, per verbal report from Dr. Senia [place] [facility], [facility] ENT does not feel she has any active head and neck cancer.  They continue to follow her.    *Previously complained of left hip pain x2 weeks.  This prompted an MRI pelvis 8/20/2019 at [facility] and open MRI which was negative for malignancy.    *Possible intolerance of Zometa  · After first dose, June 2020, 2 days of chills, bone pain  · She would like to try Zometa again.  If this occurs again, we will try to switch her to Xgeva monthly.  (No good data on every  3-month Xgeva)    PLAN:   · Zometa today  · MD Zometa 3 months.  1 week prior: CT CAP with contrast  · If she does not tolerate the second dose of Zometa well either, plan to switch to monthly Xgeva  · Port flush every 6 weeks  · Continue Arimidex  · She sees [facility] ENT and radiation medicine tomorrow.      Plan was:  · MD CBC CMP CA-15-3 in 6 months.  · M.D. CBC CMP CA 15-3 every 6 months (lab on day of MD unless has CT scan)  · Annual CAT scan CAP with contrast and CBC, CMP, CA-15-3.  (October)  · (Typically M.D. every 6 months, CT every one year)  · (Patient likes to avoid CT scans any more often than annually)  · Continue Arimidex.  · She request to continue breast exams and continue mammograms.  Breast exam performed June 2020.  [facility]  (I recommended not going through this as she has metastatic breast cancer but we will continue this since this is what she request)      Send a copy of this note to Isaac Valdes MD

## 2020-09-24 ENCOUNTER — INFUSION (OUTPATIENT)
Dept: ONCOLOGY | Facility: HOSPITAL | Age: 65
End: 2020-09-24

## 2020-09-24 ENCOUNTER — OFFICE VISIT (OUTPATIENT)
Dept: ONCOLOGY | Facility: CLINIC | Age: 65
End: 2020-09-24

## 2020-09-24 VITALS
RESPIRATION RATE: 16 BRPM | WEIGHT: 177.1 LBS | HEART RATE: 95 BPM | HEIGHT: 66 IN | TEMPERATURE: 98.2 F | DIASTOLIC BLOOD PRESSURE: 79 MMHG | OXYGEN SATURATION: 94 % | BODY MASS INDEX: 28.46 KG/M2 | SYSTOLIC BLOOD PRESSURE: 124 MMHG

## 2020-09-24 DIAGNOSIS — C79.51 BONE METASTASES: ICD-10-CM

## 2020-09-24 DIAGNOSIS — C78.02 CARCINOMA OF LEFT BREAST METASTATIC TO LUNG (HCC): Primary | ICD-10-CM

## 2020-09-24 DIAGNOSIS — C50.912 CARCINOMA OF LEFT BREAST METASTATIC TO LUNG (HCC): Primary | ICD-10-CM

## 2020-09-24 LAB
BASOPHILS # BLD AUTO: 0.03 10*3/MM3 (ref 0–0.2)
BASOPHILS NFR BLD AUTO: 0.6 % (ref 0–1.5)
DEPRECATED RDW RBC AUTO: 39.5 FL (ref 37–54)
EOSINOPHIL # BLD AUTO: 0.16 10*3/MM3 (ref 0–0.4)
EOSINOPHIL NFR BLD AUTO: 3.4 % (ref 0.3–6.2)
ERYTHROCYTE [DISTWIDTH] IN BLOOD BY AUTOMATED COUNT: 12.2 % (ref 12.3–15.4)
HCT VFR BLD AUTO: 40.6 % (ref 34–46.6)
HGB BLD-MCNC: 13.6 G/DL (ref 12–15.9)
IMM GRANULOCYTES # BLD AUTO: 0.01 10*3/MM3 (ref 0–0.05)
IMM GRANULOCYTES NFR BLD AUTO: 0.2 % (ref 0–0.5)
LYMPHOCYTES # BLD AUTO: 1.37 10*3/MM3 (ref 0.7–3.1)
LYMPHOCYTES NFR BLD AUTO: 28.7 % (ref 19.6–45.3)
MCH RBC QN AUTO: 29.7 PG (ref 26.6–33)
MCHC RBC AUTO-ENTMCNC: 33.5 G/DL (ref 31.5–35.7)
MCV RBC AUTO: 88.6 FL (ref 79–97)
MONOCYTES # BLD AUTO: 0.42 10*3/MM3 (ref 0.1–0.9)
MONOCYTES NFR BLD AUTO: 8.8 % (ref 5–12)
NEUTROPHILS NFR BLD AUTO: 2.78 10*3/MM3 (ref 1.7–7)
NEUTROPHILS NFR BLD AUTO: 58.3 % (ref 42.7–76)
NRBC BLD AUTO-RTO: 0 /100 WBC (ref 0–0.2)
PLATELET # BLD AUTO: 271 10*3/MM3 (ref 140–450)
PMV BLD AUTO: 8.7 FL (ref 6–12)
RBC # BLD AUTO: 4.58 10*6/MM3 (ref 3.77–5.28)
WBC # BLD AUTO: 4.77 10*3/MM3 (ref 3.4–10.8)

## 2020-09-24 PROCEDURE — 25010000002 ZOLEDRONIC ACID 4 MG/100ML SOLUTION: Performed by: INTERNAL MEDICINE

## 2020-09-24 PROCEDURE — 96374 THER/PROPH/DIAG INJ IV PUSH: CPT

## 2020-09-24 PROCEDURE — 99215 OFFICE O/P EST HI 40 MIN: CPT | Performed by: INTERNAL MEDICINE

## 2020-09-24 PROCEDURE — 85025 COMPLETE CBC W/AUTO DIFF WBC: CPT | Performed by: INTERNAL MEDICINE

## 2020-09-24 RX ORDER — SODIUM CHLORIDE 9 MG/ML
250 INJECTION, SOLUTION INTRAVENOUS ONCE
Status: COMPLETED | OUTPATIENT
Start: 2020-09-24 | End: 2020-09-24

## 2020-09-24 RX ORDER — ZOLEDRONIC ACID 0.04 MG/ML
4 INJECTION, SOLUTION INTRAVENOUS ONCE
Status: COMPLETED | OUTPATIENT
Start: 2020-09-24 | End: 2020-09-24

## 2020-09-24 RX ADMIN — SODIUM CHLORIDE 250 ML: 9 INJECTION, SOLUTION INTRAVENOUS at 14:24

## 2020-09-24 RX ADMIN — ZOLEDRONIC ACID 4 MG: 0.04 INJECTION, SOLUTION INTRAVENOUS at 14:24

## 2020-11-17 RX ORDER — ANASTROZOLE 1 MG/1
TABLET ORAL
Qty: 90 TABLET | Refills: 0 | Status: SHIPPED | OUTPATIENT
Start: 2020-11-17 | End: 2021-02-18

## 2020-12-10 ENCOUNTER — TELEPHONE (OUTPATIENT)
Dept: ONCOLOGY | Facility: CLINIC | Age: 65
End: 2020-12-10

## 2020-12-10 ENCOUNTER — LAB (OUTPATIENT)
Dept: LAB | Facility: HOSPITAL | Age: 65
End: 2020-12-10

## 2020-12-10 ENCOUNTER — HOSPITAL ENCOUNTER (OUTPATIENT)
Dept: CT IMAGING | Facility: HOSPITAL | Age: 65
Discharge: HOME | End: 2020-12-10

## 2020-12-10 ENCOUNTER — DOCUMENTATION (OUTPATIENT)
Dept: ONCOLOGY | Facility: CLINIC | Age: 65
End: 2020-12-10

## 2020-12-10 DIAGNOSIS — C78.02 CARCINOMA OF LEFT BREAST METASTATIC TO LUNG (HCC): ICD-10-CM

## 2020-12-10 DIAGNOSIS — C50.912 CARCINOMA OF LEFT BREAST METASTATIC TO LUNG (HCC): ICD-10-CM

## 2020-12-10 DIAGNOSIS — C79.51 BONE METASTASES: ICD-10-CM

## 2020-12-10 DIAGNOSIS — C50.912 CARCINOMA OF LEFT BREAST METASTATIC TO LUNG (HCC): Primary | ICD-10-CM

## 2020-12-10 DIAGNOSIS — C78.02 CARCINOMA OF LEFT BREAST METASTATIC TO LUNG (HCC): Primary | ICD-10-CM

## 2020-12-10 LAB
ALBUMIN SERPL-MCNC: 4.5 G/DL (ref 3.5–5.2)
ALBUMIN/GLOB SERPL: 1.5 G/DL
ALP SERPL-CCNC: 109 U/L (ref 39–117)
ALT SERPL W P-5'-P-CCNC: 16 U/L (ref 1–33)
ANION GAP SERPL CALCULATED.3IONS-SCNC: 5.1 MMOL/L (ref 5–15)
AST SERPL-CCNC: 21 U/L (ref 1–32)
BASOPHILS # BLD AUTO: 0.04 10*3/MM3 (ref 0–0.2)
BASOPHILS NFR BLD AUTO: 0.6 % (ref 0–1.5)
BILIRUB SERPL-MCNC: 0.6 MG/DL (ref 0–1.2)
BUN SERPL-MCNC: 19 MG/DL (ref 8–23)
BUN/CREAT SERPL: 18.4 (ref 7–25)
CALCIUM SPEC-SCNC: 11 MG/DL (ref 8.6–10.5)
CHLORIDE SERPL-SCNC: 103 MMOL/L (ref 98–107)
CO2 SERPL-SCNC: 30.9 MMOL/L (ref 22–29)
CREAT SERPL-MCNC: 1.03 MG/DL (ref 0.57–1)
DEPRECATED RDW RBC AUTO: 40 FL (ref 37–54)
EOSINOPHIL # BLD AUTO: 0.14 10*3/MM3 (ref 0–0.4)
EOSINOPHIL NFR BLD AUTO: 2.2 % (ref 0.3–6.2)
ERYTHROCYTE [DISTWIDTH] IN BLOOD BY AUTOMATED COUNT: 12.2 % (ref 12.3–15.4)
GFR SERPL CREATININE-BSD FRML MDRD: 54 ML/MIN/1.73
GLOBULIN UR ELPH-MCNC: 3.1 GM/DL
GLUCOSE SERPL-MCNC: 112 MG/DL (ref 65–99)
HCT VFR BLD AUTO: 44 % (ref 34–46.6)
HGB BLD-MCNC: 14.8 G/DL (ref 12–15.9)
IMM GRANULOCYTES # BLD AUTO: 0.02 10*3/MM3 (ref 0–0.05)
IMM GRANULOCYTES NFR BLD AUTO: 0.3 % (ref 0–0.5)
LYMPHOCYTES # BLD AUTO: 1.33 10*3/MM3 (ref 0.7–3.1)
LYMPHOCYTES NFR BLD AUTO: 20.8 % (ref 19.6–45.3)
MAGNESIUM SERPL-MCNC: 2.2 MG/DL (ref 1.6–2.4)
MCH RBC QN AUTO: 30.3 PG (ref 26.6–33)
MCHC RBC AUTO-ENTMCNC: 33.6 G/DL (ref 31.5–35.7)
MCV RBC AUTO: 90 FL (ref 79–97)
MONOCYTES # BLD AUTO: 0.49 10*3/MM3 (ref 0.1–0.9)
MONOCYTES NFR BLD AUTO: 7.7 % (ref 5–12)
NEUTROPHILS NFR BLD AUTO: 4.36 10*3/MM3 (ref 1.7–7)
NEUTROPHILS NFR BLD AUTO: 68.4 % (ref 42.7–76)
NRBC BLD AUTO-RTO: 0 /100 WBC (ref 0–0.2)
PHOSPHATE SERPL-MCNC: 3.2 MG/DL (ref 2.5–4.5)
PLATELET # BLD AUTO: 304 10*3/MM3 (ref 140–450)
PMV BLD AUTO: 8.3 FL (ref 6–12)
POTASSIUM SERPL-SCNC: 4.5 MMOL/L (ref 3.5–5.2)
PROT SERPL-MCNC: 7.6 G/DL (ref 6–8.5)
RBC # BLD AUTO: 4.89 10*6/MM3 (ref 3.77–5.28)
SODIUM SERPL-SCNC: 139 MMOL/L (ref 136–145)
WBC # BLD AUTO: 6.38 10*3/MM3 (ref 3.4–10.8)

## 2020-12-10 PROCEDURE — 80053 COMPREHEN METABOLIC PANEL: CPT | Performed by: INTERNAL MEDICINE

## 2020-12-10 PROCEDURE — 83735 ASSAY OF MAGNESIUM: CPT | Performed by: INTERNAL MEDICINE

## 2020-12-10 PROCEDURE — 82565 ASSAY OF CREATININE: CPT

## 2020-12-10 PROCEDURE — 71260 CT THORAX DX C+: CPT

## 2020-12-10 PROCEDURE — 85025 COMPLETE CBC W/AUTO DIFF WBC: CPT | Performed by: INTERNAL MEDICINE

## 2020-12-10 PROCEDURE — 0 DIATRIZOATE MEGLUMINE & SODIUM PER 1 ML: Performed by: INTERNAL MEDICINE

## 2020-12-10 PROCEDURE — 25010000002 IOPAMIDOL 61 % SOLUTION: Performed by: INTERNAL MEDICINE

## 2020-12-10 PROCEDURE — 74177 CT ABD & PELVIS W/CONTRAST: CPT

## 2020-12-10 PROCEDURE — 84100 ASSAY OF PHOSPHORUS: CPT | Performed by: INTERNAL MEDICINE

## 2020-12-10 RX ADMIN — DIATRIZOATE MEGLUMINE AND DIATRIZOATE SODIUM 30 ML: 660; 100 LIQUID ORAL; RECTAL at 11:50

## 2020-12-10 RX ADMIN — IOPAMIDOL 85 ML: 612 INJECTION, SOLUTION INTRAVENOUS at 13:14

## 2020-12-10 NOTE — TELEPHONE ENCOUNTER
Called the patient to let her know that her calcium was elevated and that Dr. Hawkins wanted to make sure she was not taking any calcium supplements and to drink noncaffineated beverages. She stated that she was not on any calcium supplements. Let her know that I was going to place an order for another lab to be drawn to compare results for Monday and that scheduling would be reaching out to her shortly.

## 2020-12-10 NOTE — PROGRESS NOTES
Margie, can you please call her and tell her that her calcium is high again, and 11.  It has been high in the past.  Please make sure she is on no calcium supplements.  Ask her to drink plenty of noncaffeinated beverages.  Please arrange for her to have a repeat BMP on Monday

## 2020-12-10 NOTE — TELEPHONE ENCOUNTER
----- Message from Jarod Hawkins II, MD sent at 12/10/2020  2:02 PM EST -----  Margie, can you please call her and tell her that her calcium is high again, and 11.  It has been high in the past.  Please make sure she is on no calcium supplements.  Ask her to drink plenty of noncaffeinated beverages.  Please arrange for her to have a repeat BMP on Monday    Keyonna please schedule.    Thanks!

## 2020-12-11 LAB — CREAT BLDA-MCNC: 1 MG/DL (ref 0.6–1.3)

## 2020-12-14 ENCOUNTER — LAB (OUTPATIENT)
Dept: OTHER | Facility: HOSPITAL | Age: 65
End: 2020-12-14

## 2020-12-14 DIAGNOSIS — C79.51 BONE METASTASES: ICD-10-CM

## 2020-12-14 DIAGNOSIS — C50.912 CARCINOMA OF LEFT BREAST METASTATIC TO LUNG (HCC): ICD-10-CM

## 2020-12-14 DIAGNOSIS — C78.02 CARCINOMA OF LEFT BREAST METASTATIC TO LUNG (HCC): ICD-10-CM

## 2020-12-14 LAB
ALBUMIN SERPL-MCNC: 4.2 G/DL (ref 3.5–5.2)
ALBUMIN/GLOB SERPL: 1.4 G/DL
ALP SERPL-CCNC: 109 U/L (ref 39–117)
ALT SERPL W P-5'-P-CCNC: 18 U/L (ref 1–33)
ANION GAP SERPL CALCULATED.3IONS-SCNC: 5.6 MMOL/L (ref 5–15)
AST SERPL-CCNC: 20 U/L (ref 1–32)
BASOPHILS # BLD AUTO: 0.04 10*3/MM3 (ref 0–0.2)
BASOPHILS NFR BLD AUTO: 0.7 % (ref 0–1.5)
BILIRUB SERPL-MCNC: 0.4 MG/DL (ref 0–1.2)
BUN SERPL-MCNC: 24 MG/DL (ref 8–23)
BUN/CREAT SERPL: 22.6 (ref 7–25)
CALCIUM SPEC-SCNC: 10.5 MG/DL (ref 8.6–10.5)
CANCER AG15-3 SERPL-ACNC: 23.3 U/ML
CHLORIDE SERPL-SCNC: 105 MMOL/L (ref 98–107)
CO2 SERPL-SCNC: 25.4 MMOL/L (ref 22–29)
CREAT SERPL-MCNC: 1.06 MG/DL (ref 0.57–1)
DEPRECATED RDW RBC AUTO: 40.2 FL (ref 37–54)
EOSINOPHIL # BLD AUTO: 0.14 10*3/MM3 (ref 0–0.4)
EOSINOPHIL NFR BLD AUTO: 2.4 % (ref 0.3–6.2)
ERYTHROCYTE [DISTWIDTH] IN BLOOD BY AUTOMATED COUNT: 12.3 % (ref 12.3–15.4)
GFR SERPL CREATININE-BSD FRML MDRD: 52 ML/MIN/1.73
GLOBULIN UR ELPH-MCNC: 3.1 GM/DL
GLUCOSE SERPL-MCNC: 118 MG/DL (ref 65–99)
HCT VFR BLD AUTO: 42.5 % (ref 34–46.6)
HGB BLD-MCNC: 14 G/DL (ref 12–15.9)
IMM GRANULOCYTES # BLD AUTO: 0.02 10*3/MM3 (ref 0–0.05)
IMM GRANULOCYTES NFR BLD AUTO: 0.3 % (ref 0–0.5)
LYMPHOCYTES # BLD AUTO: 1.97 10*3/MM3 (ref 0.7–3.1)
LYMPHOCYTES NFR BLD AUTO: 33.4 % (ref 19.6–45.3)
MCH RBC QN AUTO: 29.5 PG (ref 26.6–33)
MCHC RBC AUTO-ENTMCNC: 32.9 G/DL (ref 31.5–35.7)
MCV RBC AUTO: 89.7 FL (ref 79–97)
MONOCYTES # BLD AUTO: 0.56 10*3/MM3 (ref 0.1–0.9)
MONOCYTES NFR BLD AUTO: 9.5 % (ref 5–12)
NEUTROPHILS NFR BLD AUTO: 3.17 10*3/MM3 (ref 1.7–7)
NEUTROPHILS NFR BLD AUTO: 53.7 % (ref 42.7–76)
NRBC BLD AUTO-RTO: 0 /100 WBC (ref 0–0.2)
PLATELET # BLD AUTO: 342 10*3/MM3 (ref 140–450)
PMV BLD AUTO: 8.4 FL (ref 6–12)
POTASSIUM SERPL-SCNC: 4.5 MMOL/L (ref 3.5–5.2)
PROT SERPL-MCNC: 7.3 G/DL (ref 6–8.5)
RBC # BLD AUTO: 4.74 10*6/MM3 (ref 3.77–5.28)
SODIUM SERPL-SCNC: 136 MMOL/L (ref 136–145)
WBC # BLD AUTO: 5.9 10*3/MM3 (ref 3.4–10.8)

## 2020-12-14 PROCEDURE — 80053 COMPREHEN METABOLIC PANEL: CPT | Performed by: INTERNAL MEDICINE

## 2020-12-14 PROCEDURE — 36415 COLL VENOUS BLD VENIPUNCTURE: CPT

## 2020-12-14 PROCEDURE — 85025 COMPLETE CBC W/AUTO DIFF WBC: CPT | Performed by: INTERNAL MEDICINE

## 2020-12-14 PROCEDURE — 86300 IMMUNOASSAY TUMOR CA 15-3: CPT | Performed by: INTERNAL MEDICINE

## 2020-12-15 NOTE — PROGRESS NOTES
.     REASONS FOR FOLLOWUP: Metastatic breast cancer, tongue cancer    HISTORY OF PRESENT ILLNESS:  The patient is a 65 y.o. year old female  who is here for follow-up with the above-mentioned history.    No significant pain.  Occasional mild nausea but she feels this is related to heartburn.  Relieved with medicines.  No complaints of shortness of breath.    Following with her counselor.  States she is having some difficulty emotionally due to social isolation from the viral pandemic.  She is hopeful the pandemic will be coming to an end with the vaccinations    Past Medical History:   Diagnosis Date   • Allergy    • Anxiety    • Asthma    • Bone metastasis (CMS/HCC)    • Breast cancer (CMS/HCC)     Left node positive   • Depression    • Esophageal reflux     cough   • History of colon polyps    • Hyperlipidemia    • Hypertension    • Left breast cancer metastasized to lung    • Obstructive sleep apnea    • Ovarian cyst    • Tongue cancer (CMS/HCC) 05/2019    by ENT at [facility] dr Quinn     Past Surgical History:   Procedure Laterality Date   • CHOLECYSTECTOMY  2000   • COLONOSCOPY  2014    H/O polyps   • KNEE ARTHROPLASTY     • LUNG SURGERY  2010    Lung wedge resection   • MASTECTOMY RADICAL Left 1993   • TONGUE SURGERY  05/21/2019    tongue cancer       MEDICATIONS    Current Outpatient Medications:   •  anastrozole (ARIMIDEX) 1 MG tablet, Take 1 tablet by mouth once daily, Disp: 90 tablet, Rfl: 0  •  atorvastatin (LIPITOR) 10 MG tablet, Take 1 tablet by mouth Every Night., Disp: 90 tablet, Rfl: 1  •  Cholecalciferol 2000 UNITS capsule, Take by mouth., Disp: , Rfl:   •  eszopiclone (LUNESTA) tablet, Take by mouth., Disp: , Rfl:   •  LORazepam (ATIVAN) 0.5 MG tablet, Take 1 tablet by mouth., Disp: , Rfl:   •  losartan (COZAAR) 50 MG tablet, Take 1 tablet by mouth Daily., Disp: 90 tablet, Rfl: 1  •  PARoxetine (PAXIL) 40 MG tablet, Take 40 mg by mouth Every Morning., Disp: , Rfl:     ALLERGIES:     Allergies    Allergen Reactions   • Nickel Unknown - Low Severity   • Ciprofloxacin Rash       SOCIAL HISTORY:       Social History     Socioeconomic History   • Marital status: Single     Spouse name: Not on file   • Number of children: Not on file   • Years of education: College   • Highest education level: Not on file   Occupational History   • Occupation: RN     Employer: [employer]   Tobacco Use   • Smoking status: Former Smoker     Packs/day: 2.00     Years: 30.00     Pack years: 60.00     Types: Cigarettes     Start date:      Quit date: 2008     Years since quittin.5   • Smokeless tobacco: Never Used   Substance and Sexual Activity   • Alcohol use: No   • Drug use: No   • Sexual activity: Defer         FAMILY HISTORY:  Family History   Problem Relation Age of Onset   • Hypertension Mother    • Leukemia Mother 72   • COPD Mother         smoker   • Diabetes Brother    • Pancreatic cancer Brother    • Glaucoma Brother    • Heart disease Brother    • Hypertension Brother    • Gallbladder disease Brother    • Gallbladder disease Father    • Stroke Other         Grandmother   • Lupus Other         Aunt   • Alcohol abuse Other         Aunt   • Glaucoma Other         Grandmother       REVIEW OF SYSTEMS:  Review of Systems   Constitutional: Negative for activity change.   HENT: Negative for nosebleeds and trouble swallowing.    Respiratory: Negative for shortness of breath and wheezing.    Cardiovascular: Negative for chest pain and palpitations.   Gastrointestinal: Negative for constipation, diarrhea and nausea.   Genitourinary: Negative for dysuria and hematuria.   Musculoskeletal: Negative for arthralgias and myalgias.   Skin: Negative for rash and wound.   Neurological: Negative for seizures and syncope.   Hematological: Negative for adenopathy. Does not bruise/bleed easily.   Psychiatric/Behavioral: Negative for confusion.            Vitals:    20 0913   BP: 108/69   Pulse: 110  "  Resp: 18   Temp: 97.6 °F (36.4 °C)   TempSrc: Temporal   SpO2: 97%   Weight: 82.5 kg (181 lb 14.4 oz)   Height: 166.4 cm (65.51\")   PainSc: 0-No pain     Current Status 12/17/2020   ECOG score 0        PHYSICAL EXAM:        CONSTITUTIONAL:  Vital signs reviewed.  No distress, looks comfortable.  EYES:  Conjunctiva and lids unremarkable.  PERRLA  EARS,NOSE,MOUTH,THROAT:  Ears and nose appear unremarkable.  Lips, teeth, gums appear unremarkable.  RESPIRATORY:  Normal respiratory effort.  Lungs clear to auscultation bilaterally.  CARDIOVASCULAR:  Normal S1, S2.  No murmurs rubs or gallops.  No significant lower extremity edema.  GASTROINTESTINAL: Abdomen appears unremarkable.  Nontender.  No hepatomegaly.  No splenomegaly.  LYMPHATIC:  No cervical, supraclavicular, axillary lymphadenopathy.  SKIN:  Warm.  No rashes.  PSYCHIATRIC:  Normal judgment and insight.  Normal mood and affect.       RECENT LABS:        WBC   Date/Time Value Ref Range Status   12/14/2020 12:59 PM 5.90 3.40 - 10.80 10*3/mm3 Final     Hemoglobin   Date/Time Value Ref Range Status   12/14/2020 12:59 PM 14.0 12.0 - 15.9 g/dL Final     Platelets   Date/Time Value Ref Range Status   12/14/2020 12:59  140 - 450 10*3/mm3 Final       Assessment/Plan   Carcinoma of left breast metastatic to lung (CMS/HCC)  - CBC & Differential  - Comprehensive Metabolic Panel  - Cancer Antigen 15-3    Marialuisa DE LA ROSA Ghazala        Assessment/Plan     *Metastatic breast cancer to bilateral lungs and soft tissue in the mediastinum (likely the cause of her hoarseness). (Initial breast cancer diagnosed in 1993 treated with mastectomy, chemotherapy, radiation therapy and tamoxifen). Arimidex day 1 on 02/17/2010. PET scan late August 2011 shows no abnormal hypermetabolic activity. This has improved compared to the prior PET scan January 2010 which showed mild hypermetabolic activity in areas of metastasis. (In the past, her  T6 lesion did not show up on CT scans or bone scan. It " was seen by PET scan.) We have been following with CT scans only every 6 months and PET scans on an as needed only basis. Sometimes her CT scanned pulmonary nodules are read as benign since they have been stable through the course of years but we know they are malignant because they have been surgically sampled in the past.    · CT 8/16/16 shows increased sclerosis at T6 compared to 4/28/15.    · PET 10/11/16: Slight uptake at T6, SUV 3.2.  mild thickening of right adrenal gland with an SUV of 11.   · Continued Arimidex is minimal progression and had been on this since 2/17/10.  · Not referred for radiation since no pain at T6  · CT 1/27/17, unchanged.  · CT 8/1/17: Unchanged except subtle suggestion of mild increase in thickening of the right adrenal gland.    · CT 2/22/18, unchanged.  · CT 9/6/18: Unchanged except nodular thickening right adrenal gland significantly decreased.  · CT 9/5/2019: Unchanged.  · On the 6/12/2020 visit, the following scans were reviewed:  · CT neck and chest 5/11/2020, [facility], from 5/9/2019: Stable pulmonary nodules new lytic C7 lesion and posterior T1 lesion questionable T12 lesion.  No change in the T6 and T10 lesions.  · PET, [facility], 5/19/2020: Mildly enlarged left neck nodes are mildly hypermetabolic.  Diffuse activity throughout the thickened left adrenal gland.  CT appearance unchanged from 5/9/2019.  Progressive T1 lesion seen on CT from 5/11/2020, mild some moderate activity.  T10 low-grade activity with mixed lucent and sclerotic findings.  T6 is a mixed lucent and sclerotic appearance but no significant activity.   · T12 (the biopsy report is labeled as T12 but patient insists this was a C7/T1 biopsy, not to T12) biopsy 6/15/2020, [facility]: Metastatic breast carcinoma.  ER >95%.  WV 0%.  HER-2 negative (1+)  · It seems the main progression at the 6/12/2020 visit was a new C7 and new T1 lesion.  Those were radiated with [facility]Knife through [facility] early June 2020.  Because Arimidex  has been working well since 2010, continue same Arimidex.  Add Zometa every 3 months.  · C7 and T1 found on CT 5/11/2020, [facility].  Dr. Catrachito Vasquez, early June 2020.  · CT C and T-spine 9/17/2020: New C7 lesion from 9/6/2018, but no change from 5/11/2020 CT.  T1 lesion stable from 5/11/2020, but new compared to 2/22/2018.  T10 lesion unchanged from 9/5/2019, but new from 2/22/2018.  Subtle 8 mm T12 lesion new from 9/5/2019.  · Therefore, no recent progression.  · Considering her good tolerance and long-term effectiveness of Arimidex, continue same therapy.  · CT CAP 12/10/2020: No progression  · CT images personally viewed by me  · Therefore, continue Arimidex.  CA 15-3 23.3 on 12/14/2020, normal, and stable    *Bony metastases  · T6 discovered by PET to August 2011 (did not show up by CT or bone scan)  · C7 and T1 found on CT 5/11/2020, [facility].  Dr. Catrachito Vasquez, early June 2020.  · June 2020 began Zometa every 3 months.  · She was warned of possible osteonecrosis of the jaw and renal insufficiency.  She states she has good dental health and sees her dentist regularly.  Because of prior hypercalcemia requiring stopping calcium supplements and because of high normal current calcium level, began without supplemental calcium.  Add calcium if calcium becomes low.    *Bone health. Last bone density 10/11/16, worsened, but still normal with worst T score -0.9.  Patient stopped calcium January 2016, but remained on vitamin D.  I recommended she restart her calcium.  Stopped calcium early March 2018 due to hypercalcemia.  · DEXA 9/5/2019: Normal bone density    *Hypercalcemia.  Repeat calcium 3/7/18 normal, but patient self stopped calcium a few days prior.  Recommended she stay off calcium.  · Calcium was 11 on 6/19/2020  · Calcium 11 again on 12/10/2020  · Repeat calcium 10.5 on 12/14/2020    *On the 10/5/16 visit, Tachycardia, dyspnea on exertion, bilateral leg swelling, more sedentary recently.  CT PE  protocol and bilateral lower extremity Dopplers 10/5/16, negative for clots.  She still has tachycardia and dyspnea on exertion.  Perhaps at least part of this is due to deconditioning.    *Previously complained of colon Right lower anterior rib discomfort.  CT unremarkable in that area.  No specific pain, just discomfort.  I explained to the patient if disease was found by PET scan or bone scan, I would not  since she has been doing well on Arimidex since 2010.   Currently denies pain.     *Depression/anxiety.  This limits compliance.  She sees a psychiatrist and a counselor regularly.  She states this is mostly due to body image issues instead of cancer.   She continues with her counselor and psychiatrist.  Viral pandemic is making this a little more difficult.    *Noncompliance due to depression/anxiety.  Although she misses appointments, she does reschedule and eventually come in.    *Polycythemia.  Mild.  I do not think this needs any further workup.  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 Willie Quinn [facility], on 5/21/2019.  Positive sentinel node (1 of 3 nodes)..  · Left neck dissection by Dr. Willie Quinn, [facility], on 6/5/2019.  22 nodes negative.  Negative margins.  No lymphovascular invasion or perineural invasion.  2 mm depth of invasion.  Grade 1.  Squamous cell carcinoma.  1.8 cm tumor.  · Because the patient felt what she thought was left neck LAD, CT neck and chest and PET at [facility]. May 2020 performed.  She is being followed at [facility] for this.  · On 6/19/2020, per verbal report from Dr. Senia [place], [facility] ENT does not feel she has any active head and neck cancer.  They continue to follow her.    *Previously complained of left hip pain x2 weeks.  This prompted an MRI pelvis 8/20/2019 at [facility] and open MRI which was negative for malignancy.    *Possible intolerance of  Zometa  · After first dose, June 2020, 2 days of chills, bone pain  · She would like to try Zometa again.  If this occurs again, we will try to switch her to Xgeva monthly.  (No good data on every 3-month Xgeva)  · She did fine with the second dose of Zometa.    PLAN:   · Zometa today  · MD, CA 15-3, CBC, CMP, Zometa 3 months.    · Annual (December) CT CAP with contrast  ·   · Continue Arimidex  · She sees [facility] radiation medicine next week.      Plan was:  · MD CBC CMP CA-15-3 in 6 months.  · M.D. CBC CMP CA 15-3 every 6 months (lab on day of MD unless has CT scan)  · Annual CAT scan CAP with contrast and CBC, CMP, CA-15-3.  (October)  · (Typically M.D. every 6 months, CT every one year)  · (Patient likes to avoid CT scans any more often than annually)  · Continue Arimidex.  · She request to continue breast exams and continue mammograms.  Breast exam performed June 2020.  [facility]  (I recommended not going through this as she has metastatic breast cancer but we will continue this since this is what she request)      Send a copy of this note to Isaac Valdes MD, Dr., radiation medicine, [facility]

## 2020-12-17 ENCOUNTER — INFUSION (OUTPATIENT)
Dept: ONCOLOGY | Facility: HOSPITAL | Age: 65
End: 2020-12-17

## 2020-12-17 ENCOUNTER — OFFICE VISIT (OUTPATIENT)
Dept: ONCOLOGY | Facility: CLINIC | Age: 65
End: 2020-12-17

## 2020-12-17 VITALS
RESPIRATION RATE: 18 BRPM | HEIGHT: 66 IN | DIASTOLIC BLOOD PRESSURE: 69 MMHG | OXYGEN SATURATION: 97 % | TEMPERATURE: 97.6 F | HEART RATE: 110 BPM | SYSTOLIC BLOOD PRESSURE: 108 MMHG | BODY MASS INDEX: 29.23 KG/M2 | WEIGHT: 181.9 LBS

## 2020-12-17 DIAGNOSIS — C78.02 CARCINOMA OF LEFT BREAST METASTATIC TO LUNG (HCC): Primary | ICD-10-CM

## 2020-12-17 DIAGNOSIS — C79.51 BONE METASTASES: Primary | ICD-10-CM

## 2020-12-17 DIAGNOSIS — C50.912 CARCINOMA OF LEFT BREAST METASTATIC TO LUNG (HCC): Primary | ICD-10-CM

## 2020-12-17 PROCEDURE — 25010000002 ZOLEDRONIC ACID 4 MG/100ML SOLUTION: Performed by: INTERNAL MEDICINE

## 2020-12-17 PROCEDURE — 99215 OFFICE O/P EST HI 40 MIN: CPT | Performed by: INTERNAL MEDICINE

## 2020-12-17 PROCEDURE — 96374 THER/PROPH/DIAG INJ IV PUSH: CPT

## 2020-12-17 RX ORDER — SODIUM CHLORIDE 9 MG/ML
250 INJECTION, SOLUTION INTRAVENOUS ONCE
Status: COMPLETED | OUTPATIENT
Start: 2020-12-17 | End: 2020-12-17

## 2020-12-17 RX ORDER — ZOLEDRONIC ACID 0.04 MG/ML
4 INJECTION, SOLUTION INTRAVENOUS ONCE
Status: COMPLETED | OUTPATIENT
Start: 2020-12-17 | End: 2020-12-17

## 2020-12-17 RX ADMIN — SODIUM CHLORIDE 250 ML: 9 INJECTION, SOLUTION INTRAVENOUS at 11:00

## 2020-12-17 RX ADMIN — ZOLEDRONIC ACID 4 MG: 0.04 INJECTION, SOLUTION INTRAVENOUS at 11:00

## 2021-02-19 DIAGNOSIS — E55.9 VITAMIN D DEFICIENCY: ICD-10-CM

## 2021-02-19 DIAGNOSIS — I10 BENIGN ESSENTIAL HTN: Primary | ICD-10-CM

## 2021-02-19 DIAGNOSIS — E78.2 MIXED HYPERLIPIDEMIA: ICD-10-CM

## 2021-02-19 DIAGNOSIS — R73.9 HYPERGLYCEMIA: ICD-10-CM

## 2021-02-19 RX ORDER — ANASTROZOLE 1 MG/1
TABLET ORAL
Qty: 90 TABLET | Refills: 0 | Status: SHIPPED | OUTPATIENT
Start: 2021-02-19 | End: 2021-07-06

## 2021-03-05 ENCOUNTER — OFFICE VISIT (OUTPATIENT)
Dept: INTERNAL MEDICINE | Facility: CLINIC | Age: 66
End: 2021-03-05

## 2021-03-05 VITALS
DIASTOLIC BLOOD PRESSURE: 70 MMHG | OXYGEN SATURATION: 95 % | TEMPERATURE: 97 F | HEIGHT: 66 IN | SYSTOLIC BLOOD PRESSURE: 132 MMHG | HEART RATE: 104 BPM | WEIGHT: 182 LBS | BODY MASS INDEX: 29.25 KG/M2

## 2021-03-05 DIAGNOSIS — E55.9 VITAMIN D DEFICIENCY: ICD-10-CM

## 2021-03-05 DIAGNOSIS — I10 BENIGN ESSENTIAL HTN: ICD-10-CM

## 2021-03-05 DIAGNOSIS — Z00.00 MEDICARE ANNUAL WELLNESS VISIT, INITIAL: Primary | ICD-10-CM

## 2021-03-05 DIAGNOSIS — E78.2 MIXED HYPERLIPIDEMIA: ICD-10-CM

## 2021-03-05 DIAGNOSIS — R73.01 IFG (IMPAIRED FASTING GLUCOSE): ICD-10-CM

## 2021-03-05 PROCEDURE — 99214 OFFICE O/P EST MOD 30 MIN: CPT | Performed by: FAMILY MEDICINE

## 2021-03-05 PROCEDURE — G0438 PPPS, INITIAL VISIT: HCPCS | Performed by: FAMILY MEDICINE

## 2021-03-05 RX ORDER — LOSARTAN POTASSIUM 50 MG/1
50 TABLET ORAL DAILY
Qty: 90 TABLET | Refills: 1 | Status: SHIPPED | OUTPATIENT
Start: 2021-03-05 | End: 2022-01-20

## 2021-03-05 RX ORDER — ATORVASTATIN CALCIUM 10 MG/1
10 TABLET, FILM COATED ORAL NIGHTLY
Qty: 90 TABLET | Refills: 1 | Status: SHIPPED | OUTPATIENT
Start: 2021-03-05 | End: 2022-01-20

## 2021-03-05 NOTE — PATIENT INSTRUCTIONS
Advance Care Planning and Advance Directives     You make decisions on a daily basis - decisions about where you want to live, your career, your home, your life. Perhaps one of the most important decisions you face is your choice for future medical care. Take time to talk with your family and your healthcare team and start planning today.  Advance Care Planning is a process that can help you:  · Understand possible future healthcare decisions in light of your own experiences  · Reflect on those decision in light of your goals and values  · Discuss your decisions with those closest to you and the healthcare professionals that care for you  · Make a plan by creating a document that reflects your wishes    Surrogate Decision Maker  In the event of a medical emergency, which has left you unable to communicate or to make your own decisions, you would need someone to make decisions for you.  It is important to discuss your preferences for medical treatment with this person while you are in good health.     Qualities of a surrogate decision maker:  • Willing to take on this role and responsibility  • Knows what you want for future medical care  • Willing to follow your wishes even if they don't agree with them  • Able to make difficult medical decisions under stressful circumstances    Advance Directives  These are legal documents you can create that will guide your healthcare team and decision maker(s) when needed. These documents can be stored in the electronic medical record.    · Living Will - a legal document to guide your care if you have a terminal condition or a serious illness and are unable to communicate. States vary by statute in document names/types, but most forms may include one or more of the following:        -  Directions regarding life-prolonging treatments        -  Directions regarding artificially provided nutrition/hydration        -  Choosing a healthcare decision maker        -  Direction  regarding organ/tissue donation    · Durable Power of  for Healthcare - this document names an -in-fact to make medical decisions for you, but it may also allow this person to make personal and financial decisions for you. Please seek the advice of an  if you need this type of document.    **Advance Directives are not required and no one may discriminate against you if you do not sign one.    Medical Orders  Many states allow specific forms/orders signed by your physician to record your wishes for medical treatment in your current state of health. This form, signed in personal communication with your physician, addresses resuscitation and other medical interventions that you may or may not want.      For more information or to schedule a time with a [facility] Advance Care Planning Facilitator contact: [facility] or call [phone] and someone will contact you directly.    Medicare Wellness  Personal Prevention Plan of Service     Date of Office Visit:  2021  Encounter Provider:  Jennifer Mantilla MD  Place of Service:  [facility]  Patient Name: Marialuisa Vivar  :  1955    As part of the Medicare Wellness portion of your visit today, we are providing you with this personalized preventive plan of services (PPPS). This plan is based upon recommendations of the [place] Preventive Services Task Force (USPSTF) and the Advisory Committee on Immunization Practices (ACIP).    This lists the preventive care services that should be considered, and provides dates of when you are due. Items listed as completed are up-to-date and do not require any further intervention.    Health Maintenance   Topic Date Due   • MAMMOGRAM  2019   • ANNUAL WELLNESS VISIT  2021   • INFLUENZA VACCINE  2022 (Originally 2020)   • ZOSTER VACCINE (3 of 3) 2022 (Originally 10/16/2020)   • DXA SCAN  2021   • PAP SMEAR  2021   •  LUNG CANCER SCREENING  12/10/2021   • LIPID PANEL  02/26/2022   • COLONOSCOPY  04/18/2022   • TDAP/TD VACCINES (2 - Td) 08/21/2030   • HEPATITIS C SCREENING  Completed   • Pneumococcal Vaccine 65+  Completed   • MENINGOCOCCAL VACCINE  Aged Out       No orders of the defined types were placed in this encounter.      Return in about 6 months (around 9/5/2021).        Calorie Counting for Weight Loss  Calories are units of energy. Your body needs a certain amount of calories from food to keep you going throughout the day. When you eat more calories than your body needs, your body stores the extra calories as fat. When you eat fewer calories than your body needs, your body burns fat to get the energy it needs.  Calorie counting means keeping track of how many calories you eat and drink each day. Calorie counting can be helpful if you need to lose weight. If you make sure to eat fewer calories than your body needs, you should lose weight. Ask your health care provider what a healthy weight is for you.  For calorie counting to work, you will need to eat the right number of calories in a day in order to lose a healthy amount of weight per week. A dietitian can help you determine how many calories you need in a day and will give you suggestions on how to reach your calorie goal.  · A healthy amount of weight to lose per week is usually 1-2 lb (0.5-0.9 kg). This usually means that your daily calorie intake should be reduced by 500-750 calories.  · Eating 1,200 - 1,500 calories per day can help most women lose weight.  · Eating 1,500 - 1,800 calories per day can help most men lose weight.  What is my plan?  My goal is to have __________ calories per day.  If I have this many calories per day, I should lose around __________ pounds per week.  What do I need to know about calorie counting?  In order to meet your daily calorie goal, you will need to:  · Find out how many calories are in each food you would like to eat. Try to do  this before you eat.  · Decide how much of the food you plan to eat.  · Write down what you ate and how many calories it had. Doing this is called keeping a food log.  To successfully lose weight, it is important to balance calorie counting with a healthy lifestyle that includes regular activity. Aim for 150 minutes of moderate exercise (such as walking) or 75 minutes of vigorous exercise (such as running) each week.  Where do I find calorie information?    The number of calories in a food can be found on a Nutrition Facts label. If a food does not have a Nutrition Facts label, try to look up the calories online or ask your dietitian for help.  Remember that calories are listed per serving. If you choose to have more than one serving of a food, you will have to multiply the calories per serving by the amount of servings you plan to eat. For example, the label on a package of bread might say that a serving size is 1 slice and that there are 90 calories in a serving. If you eat 1 slice, you will have eaten 90 calories. If you eat 2 slices, you will have eaten 180 calories.  How do I keep a food log?  Immediately after each meal, record the following information in your food log:  · What you ate. Don't forget to include toppings, sauces, and other extras on the food.  · How much you ate. This can be measured in cups, ounces, or number of items.  · How many calories each food and drink had.  · The total number of calories in the meal.  Keep your food log near you, such as in a small notebook in your pocket, or use a mobile sally or website. Some programs will calculate calories for you and show you how many calories you have left for the day to meet your goal.  What are some calorie counting tips?    · Use your calories on foods and drinks that will fill you up and not leave you hungry:  ? Some examples of foods that fill you up are nuts and nut butters, vegetables, lean proteins, and high-fiber foods like whole grains.  "High-fiber foods are foods with more than 5 g fiber per serving.  ? Drinks such as sodas, specialty coffee drinks, alcohol, and juices have a lot of calories, yet do not fill you up.  · Eat nutritious foods and avoid empty calories. Empty calories are calories you get from foods or beverages that do not have many vitamins or protein, such as candy, sweets, and soda. It is better to have a nutritious high-calorie food (such as an avocado) than a food with few nutrients (such as a bag of chips).  · Know how many calories are in the foods you eat most often. This will help you calculate calorie counts faster.  · Pay attention to calories in drinks. Low-calorie drinks include water and unsweetened drinks.  · Pay attention to nutrition labels for \"low fat\" or \"fat free\" foods. These foods sometimes have the same amount of calories or more calories than the full fat versions. They also often have added sugar, starch, or salt, to make up for flavor that was removed with the fat.  · Find a way of tracking calories that works for you. Get creative. Try different apps or programs if writing down calories does not work for you.  What are some portion control tips?  · Know how many calories are in a serving. This will help you know how many servings of a certain food you can have.  · Use a measuring cup to measure serving sizes. You could also try weighing out portions on a kitchen scale. With time, you will be able to estimate serving sizes for some foods.  · Take some time to put servings of different foods on your favorite plates, bowls, and cups so you know what a serving looks like.  · Try not to eat straight from a bag or box. Doing this can lead to overeating. Put the amount you would like to eat in a cup or on a plate to make sure you are eating the right portion.  · Use smaller plates, glasses, and bowls to prevent overeating.  · Try not to multitask (for example, watch TV or use your computer) while eating. If it is " "time to eat, sit down at a table and enjoy your food. This will help you to know when you are full. It will also help you to be aware of what you are eating and how much you are eating.  What are tips for following this plan?  Reading food labels  · Check the calorie count compared to the serving size. The serving size may be smaller than what you are used to eating.  · Check the source of the calories. Make sure the food you are eating is high in vitamins and protein and low in saturated and trans fats.  Shopping  · Read nutrition labels while you shop. This will help you make healthy decisions before you decide to purchase your food.  · Make a grocery list and stick to it.  Cooking  · Try to cook your favorite foods in a healthier way. For example, try baking instead of frying.  · Use low-fat dairy products.  Meal planning  · Use more fruits and vegetables. Half of your plate should be fruits and vegetables.  · Include lean proteins like poultry and fish.  How do I count calories when eating out?  · Ask for smaller portion sizes.  · Consider sharing an entree and sides instead of getting your own entree.  · If you get your own entree, eat only half. Ask for a box at the beginning of your meal and put the rest of your entree in it so you are not tempted to eat it.  · If calories are listed on the menu, choose the lower calorie options.  · Choose dishes that include vegetables, fruits, whole grains, low-fat dairy products, and lean protein.  · Choose items that are boiled, broiled, grilled, or steamed. Stay away from items that are buttered, battered, fried, or served with cream sauce. Items labeled \"crispy\" are usually fried, unless stated otherwise.  · Choose water, low-fat milk, unsweetened iced tea, or other drinks without added sugar. If you want an alcoholic beverage, choose a lower calorie option such as a glass of wine or light beer.  · Ask for dressings, sauces, and syrups on the side. These are usually " high in calories, so you should limit the amount you eat.  · If you want a salad, choose a garden salad and ask for grilled meats. Avoid extra toppings like oden, cheese, or fried items. Ask for the dressing on the side, or ask for olive oil and vinegar or lemon to use as dressing.  · Estimate how many servings of a food you are given. For example, a serving of cooked rice is ½ cup or about the size of half a baseball. Knowing serving sizes will help you be aware of how much food you are eating at restaurants. The list below tells you how big or small some common portion sizes are based on everyday objects:  ? 1 oz--4 stacked dice.  ? 3 oz--1 deck of cards.  ? 1 tsp--1 die.  ? 1 Tbsp--½ a ping-pong ball.  ? 2 Tbsp--1 ping-pong ball.  ? ½ cup--½ baseball.  ? 1 cup--1 baseball.  Summary  · Calorie counting means keeping track of how many calories you eat and drink each day. If you eat fewer calories than your body needs, you should lose weight.  · A healthy amount of weight to lose per week is usually 1-2 lb (0.5-0.9 kg). This usually means reducing your daily calorie intake by 500-750 calories.  · The number of calories in a food can be found on a Nutrition Facts label. If a food does not have a Nutrition Facts label, try to look up the calories online or ask your dietitian for help.  · Use your calories on foods and drinks that will fill you up, and not on foods and drinks that will leave you hungry.  · Use smaller plates, glasses, and bowls to prevent overeating.  This information is not intended to replace advice given to you by your health care provider. Make sure you discuss any questions you have with your health care provider.  Document Revised: 09/06/2019 Document Reviewed: 11/17/2017  Elsevier Patient Education © 2020 [employer]    Exercise Information for Aging Adults  Staying physically active is important as you age. The four types of exercises that are best for older adults are endurance, strength,  balance, and flexibility. Contact your health care provider before you start any exercise routine. Ask your health care provider what activities are safe for you.  What are the risks?  Risks associated with exercising include:  · Overdoing it. This may lead to sore muscles or fatigue.  · Falls.  · Injuries.  · Dehydration.  How to do these exercises  Endurance exercises  Endurance (aerobic) exercises raise your breathing rate and heart rate. Increasing your endurance helps you to do everyday tasks and stay healthy. By improving the health of your body system that includes your heart, lungs, and blood vessels (circulatory system), you may also delay or prevent diseases such as heart disease, diabetes, and bone loss (osteoporosis). Types of endurance exercises include:  · Sports.  · Indoor activities, such as using gym equipment, doing water aerobics, or dancing.  · Outdoor activities, such as biking or jogging.  · Tasks around the house, such as gardening, yard work, and heavy household chores like cleaning.  · Walking, such as hiking or walking around your neighborhood.  When doing endurance exercises, make sure you:  · Are aware of your surroundings.  · Use safety equipment as directed.  · Dress in layers when exercising outdoors.  · Drink plenty of water to stay well hydrated.  Build up endurance slowly. Start with 10 minutes at a time, and gradually build up to doing 30 minutes at a time. Unless your health care provider gave you different instructions, aim to exercise for a total of 150 minutes a week. Spread out that time so you are working on endurance on 3 or more days a week.  Strength exercises  Lifting, pulling, or pushing weights helps to strengthen muscles. Having stronger muscles makes it easier to do everyday activities, such as getting up from a chair, climbing stairs, carrying groceries, and playing with grandchildren. Strength exercises include arm and leg exercises that may be done:  · With  weights.  · Without weights (using your own body weight).  · With a resistance band.  When doing strength exercises:  · Move smoothly and steadily. Do not suddenly thrust or jerk the weights, the resistance band, or your body.  · Start with no weights or with light weights, and gradually add more weight over time. Eventually, aim to use weights that are hard or very hard for you to lift. This means that you are able to do 8 repetitions with the weight, and the last few repetitions are very challenging.  · Lift or push weights into position for 3 seconds, hold the position for 1 second, and then take 3 seconds to return to your starting position.  · Breathe out (exhale) during difficult movements, like lifting or pushing weights. Breathe in (inhale) to relax your muscles before the next repetition.  · Consider alternating arms or legs, especially when you first start strength exercises.  · Expect some slight muscle soreness after each session.  Do strength exercises on 2 or more days a week, for 30 minutes at a time. Avoid exercising the same muscle groups two days in a row. For example, if you work on your leg muscles one day, work on your arm muscles the next day. When you can do two sets of 10-15 repetitions with a certain weight, increase the amount of weight.  Balance  Balance exercises can help to prevent falls. Balance exercises include:  · Standing on one foot.  · Heel-to-toe walk.  · Balance walk.  · Rodrigue chi.  Make sure you have something sturdy to hold onto while doing balance exercises, such as a sturdy chair. As your balance improves, challenge yourself by holding onto the chair with one hand instead of two, and then with no hands. Trying exercises with your eyes closed also challenges your balance, but be sure to have a sturdy surface (like a countertop) close by in case you need it.  Do balance exercises as often as you want, or as often as directed by your health care provider. Strength exercises for  "the lower body also help to improve balance.  Flexibility    Flexibility exercises improve how far you can bend, straighten, move, or rotate parts of your body (range of motion). These exercises also help you to do everyday activities such as getting dressed or reaching for objects. Flexibility exercises include stretching different parts of the body, and they may be done in a standing or seated position or on the floor.  When stretching, make sure you:  · Keep a slight bend in your arms and legs. Avoid completely straightening (\"locking\") your joints.  · Do not stretch so far that you feel pain. You should feel a mild stretching feeling. You may try stretching farther as you become more flexible over time.  · Relax and breathe between stretches.  · Hold onto something sturdy for balance as needed.  Hold each stretch for 10-30 seconds. Repeat each stretch 3-5 times.  General safety tips  · Exercise in well-lit areas.  · Do not hold your breath during exercises or stretches.  · Warm up before exercising, and cool down after exercising. This can help prevent injury.  · Drink plenty of water during exercise or any activity that makes you sweat.  · Use smooth, steady movements. Do not use sudden, jerking movements, especially when lifting weights or doing flexibility exercises.  · If you are not sure if an exercise is safe for you, or you are not sure how to do an exercise, talk with your health care provider. This is especially important if you have had surgery on muscles, bones, or joints (orthopedic surgery).  Where to find more information  You can find more information about exercise for older adults from:  · Your local health department, fitness center, or community center. These facilities may have programs for aging adults.  · National [place] on Aging: www.lashay.nih.gov  · National [place] on Aging: www.ncoa.org  Summary  · Staying physically active is important as you age.  · Make sure to contact your health " care provider before you start any exercise routine. Ask your health care provider what activities are safe for you.  · Doing endurance, strength, balance, and flexibility exercises can help to delay or prevent certain diseases, such as heart disease, diabetes, and bone loss (osteoporosis).  This information is not intended to replace advice given to you by your health care provider. Make sure you discuss any questions you have with your health care provider.  Document Revised: 10/10/2019 Document Reviewed: 05/09/2018  Elsevier Patient Education © 2020 [employer]

## 2021-03-05 NOTE — PROGRESS NOTES
The ABCs of the Annual Wellness Visit  Initial Medicare Wellness Visit    Chief Complaint   Patient presents with   • Medicare Wellness-subsequent   • Hypertension   • Hyperlipidemia   • Vitamin D Deficiency   • Hyperglycemia       Subjective   History of Present Illness:  Marialuisa Vivar is a 65 y.o. female who presents for an Initial Medicare Wellness Visit.    HEALTH RISK ASSESSMENT    Recent Hospitalizations:  No hospitalization(s) within the last year.    Current Medical Providers:  Patient Care Team:  Jennifer Mantilla MD as PCP - General (Family Medicine)  Code, Jarod BLANCHARD II, MD as Consulting Physician (Hematology and Oncology)  Yair Robin MD as Referring Physician (Otolaryngology)    Smoking Status:  Social History     Tobacco Use   Smoking Status Former Smoker   • Packs/day: 2.00   • Years: 30.00   • Pack years: 60.00   • Types: Cigarettes   • Start date:    • Quit date: 2008   • Years since quittin.7   Smokeless Tobacco Never Used       Alcohol Consumption:  Social History     Substance and Sexual Activity   Alcohol Use No       Depression Screen:   PHQ-2/PHQ-9 Depression Screening 3/5/2021   Little interest or pleasure in doing things 0   Feeling down, depressed, or hopeless 0   Trouble falling or staying asleep, or sleeping too much 0   Feeling tired or having little energy 0   Poor appetite or overeating 0   Feeling bad about yourself - or that you are a failure or have let yourself or your family down 0   Trouble concentrating on things, such as reading the newspaper or watching television 0   Moving or speaking so slowly that other people could have noticed. Or the opposite - being so fidgety or restless that you have been moving around a lot more than usual 0   Thoughts that you would be better off dead, or of hurting yourself in some way 0   Total Score 0   If you checked off any problems, how difficult have these problems made it for you to do your work, take care of things at home,  or get along with other people? Not difficult at all       Fall Risk Screen:  ELSY Fall Risk Assessment was completed, and patient is at LOW risk for falls.Assessment completed on:3/5/2021    Health Habits and Functional and Cognitive Screening:  Functional & Cognitive Status 3/5/2021   Do you have difficulty preparing food and eating? No   Do you have difficulty bathing yourself, getting dressed or grooming yourself? No   Do you have difficulty using the toilet? No   Do you have difficulty moving around from place to place? No   Do you have trouble with steps or getting out of a bed or a chair? No   Current Diet Well Balanced Diet   Dental Exam Up to date   Eye Exam Not up to date   Exercise (times per week) 0 times per week   Current Exercise Activities Include -   Do you need help using the phone?  No   Are you deaf or do you have serious difficulty hearing?  No   Do you need help with transportation? No   Do you need help shopping? No   Do you need help preparing meals?  No   Do you need help with housework?  No   Do you need help with laundry? No   Do you need help taking your medications? No   Do you need help managing money? No   Do you ever drive or ride in a car without wearing a seat belt? No   Have you felt unusual stress, anger or loneliness in the last month? No   Who do you live with? Alone   If you need help, do you have trouble finding someone available to you? No   Have you been bothered in the last four weeks by sexual problems? No   Do you have difficulty concentrating, remembering or making decisions? Yes         Does the patient have evidence of cognitive impairment? No    Asprin use counseling:Does not need ASA (and currently is not on it)    Age-appropriate Screening Schedule:  Refer to the list below for future screening recommendations based on patient's age, sex and/or medical conditions. Orders for these recommended tests are listed in the plan section. The patient has been provided with  a written plan.    Health Maintenance   Topic Date Due   • MAMMOGRAM  12/03/2019   • INFLUENZA VACCINE  03/05/2022 (Originally 8/1/2020)   • ZOSTER VACCINE (3 of 3) 03/14/2022 (Originally 10/16/2020)   • DXA SCAN  09/05/2021   • PAP SMEAR  12/01/2021   • LIPID PANEL  02/26/2022   • COLONOSCOPY  04/18/2022   • TDAP/TD VACCINES (2 - Td) 08/21/2030          The following portions of the patient's history were reviewed and updated as appropriate: allergies, current medications, past family history, past medical history, past social history, past surgical history and problem list.    Outpatient Medications Prior to Visit   Medication Sig Dispense Refill   • anastrozole (ARIMIDEX) 1 MG tablet Take 1 tablet by mouth once daily 90 tablet 0   • Cholecalciferol 2000 UNITS capsule Take by mouth.     • eszopiclone (LUNESTA) tablet Take by mouth.     • LORazepam (ATIVAN) 0.5 MG tablet Take 1 tablet by mouth.     • PARoxetine (PAXIL) 40 MG tablet Take 40 mg by mouth Every Morning.     • Zoledronic Acid (ZOMETA IV) Infuse  into a venous catheter.     • atorvastatin (LIPITOR) 10 MG tablet Take 1 tablet by mouth Every Night. 90 tablet 1   • losartan (COZAAR) 50 MG tablet Take 1 tablet by mouth Daily. 90 tablet 1     No facility-administered medications prior to visit.        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 Patient does not have an advance directive, information provided.    Review of Systems    Compared to one year ago, the patient feels her physical health is worse.out of shape  Compared to one year ago, the patient feels her mental health is better.    Reviewed chart for potential of high risk medication in the elderly: yes  Reviewed chart for potential of harmful drug interactions in the elderly:yes    Objective         Vitals:    03/05/21 1315   BP: 132/70   Pulse: 104   Temp: 97 °F (36.1 °C)   SpO2: 95%   Weight: 82.6 kg (182 lb)   Height: 166.4 cm (65.51\")   PainSc:   2   PainLoc: Back       Body mass index is 29.81 kg/m².  Discussed the patient's BMI with her. The BMI is above average; BMI management plan is completed.    Physical Exam    Lab Results   Component Value Date     (H) 02/26/2021    CHLPL 190 02/26/2021    TRIG 159 (H) 02/26/2021    HDL 57 02/26/2021     (H) 02/26/2021    VLDL 28 02/26/2021    HGBA1C 5.70 (H) 02/26/2021        Assessment/Plan   Medicare Risks and Personalized Health Plan  CMS Preventative Services Quick Reference  Advance Directive Discussion  Breast Cancer/Mammogram Screening  Cardiovascular risk  Immunizations Discussed/Encouraged (specific immunizations; covid vaccine )  Inactivity/Sedentary  Obesity/Overweight      I discussed advanced directives with patient.  She does not have healthcare surrogate.  She is going to discuss it with her brother and is going to ask him for it, but she did not talk to him yet.  She is due for screening for mammogram and she is going to discuss it with her oncologist Dr. Hawkins.  She is advised to get Covid vaccine when available.  She is advised to start exercise at least 30 minutes day, 5 days a week.  Work on decreasing portion size.  Information on calorie count to lose weight provided.  She is overdue for eye exam and is advised to schedule it.    The above risks/problems have been discussed " with the patient.  Pertinent information has been shared with the patient in the After Visit Summary.  Follow up plans and orders are seen below in the Assessment/Plan Section.    Diagnoses and all orders for this visit:    1. Medicare annual wellness visit, initial (Primary)    2. Benign essential HTN  -     losartan (COZAAR) 50 MG tablet; Take 1 tablet by mouth Daily.  Dispense: 90 tablet; Refill: 1    3. Mixed hyperlipidemia    4. IFG (impaired fasting glucose)    5. Vitamin D deficiency    Other orders  -     atorvastatin (LIPITOR) 10 MG tablet; Take 1 tablet by mouth Every Night.  Dispense: 90 tablet; Refill: 1      Follow Up:  Return in about 6 months (around 9/5/2021).     An After Visit Summary and PPPS were given to the patient.

## 2021-03-05 NOTE — PROGRESS NOTES
Subjective   Marialuisa Vivar is a 65 y.o. female.   Chief Complaint   Patient presents with   • Medicare Wellness-subsequent   • Hypertension   • Hyperlipidemia   • Vitamin D Deficiency   • Hyperglycemia       History of Present Illness     #1 HTN-diagnosed when patient was in her 40s.   She is on losartan to 50 mg a day.  She takes it everyday. She reports no side effects.  She has no chest pain, no shortness of breath, no lightheadedness.  Creatinine 0.99, GFR 56.  Sodium and potassium are normal.     #2 HLP- on atorvastatin at 10 mg a day.  She takes it daily. She has no side effects.  Occasionally she has muscle aches, no muscle cramps.  LDL is 105, HDL 57.      #3  Hyperglycemia-she gained 4 pounds from last office visit.  Fasting blood sugar 111  A1c 5.7 from 5.4. BMI 29.8.  She does not exercise regularly.  Family history positive for diabetes in her brother and maternal grandmother.     #4  Vitamin D deficiency-she started taking vitamin D3 at 4000 units a day.  She takes it on average 2 or 3 times a week.  Vitamin D is at 31.3 from 28.3.    The following portions of the patient's history were reviewed and updated as appropriate: allergies, current medications, past family history, past medical history, past social history, past surgical history and problem list.    Review of Systems   Respiratory: Negative for cough.    Cardiovascular: Negative for chest pain.   Musculoskeletal: Positive for myalgias.   Neurological: Negative for light-headedness.         Objective   Wt Readings from Last 3 Encounters:   03/05/21 82.6 kg (182 lb)   12/17/20 82.5 kg (181 lb 14.4 oz)   09/24/20 80.3 kg (177 lb 1.6 oz)      Vitals:    03/05/21 1315   BP: 132/70   Pulse: (!) 133   Temp: 97 °F (36.1 °C)   SpO2: 95%     Temp Readings from Last 3 Encounters:   03/05/21 97 °F (36.1 °C)   12/17/20 97.6 °F (36.4 °C) (Temporal)   09/24/20 98.2 °F (36.8 °C) (Skin)     BP Readings from Last 3 Encounters:   03/05/21 132/70   12/17/20  108/69   09/24/20 124/79     Pulse Readings from Last 3 Encounters:   03/05/21 (!) 133   12/17/20 110   09/24/20 95     Body mass index is 29.81 kg/m².    Physical Exam  Constitutional:       Appearance: Normal appearance. She is well-developed.   HENT:      Head: Normocephalic and atraumatic.   Neck:      Musculoskeletal: Neck supple.      Vascular: No carotid bruit.   Cardiovascular:      Rate and Rhythm: Normal rate and regular rhythm.      Heart sounds: Normal heart sounds.   Pulmonary:      Effort: Pulmonary effort is normal.      Breath sounds: Normal breath sounds.   Skin:     General: Skin is warm and dry.   Neurological:      Mental Status: She is alert and oriented to person, place, and time.   Psychiatric:         Behavior: Behavior normal.         Assessment/Plan   Diagnoses and all orders for this visit:    1. Medicare annual wellness visit, initial (Primary)    2. Benign essential HTN  -     losartan (COZAAR) 50 MG tablet; Take 1 tablet by mouth Daily.  Dispense: 90 tablet; Refill: 1    3. Mixed hyperlipidemia    4. IFG (impaired fasting glucose)    5. Vitamin D deficiency    Other orders  -     atorvastatin (LIPITOR) 10 MG tablet; Take 1 tablet by mouth Every Night.  Dispense: 90 tablet; Refill: 1        #1 hypertension-continue current treatment.  Follow-up in 6 months.  2.  Hyperlipidemia-continue current treatment.  Follow-up in 6 months.  3.  Impaired fasting glucose-weight loss can decrease risk of developing diabetes.  Information on calorie count to lose weight provided.  Patient is advised to start to exercise at least 30 minutes a, 5 days a week.  4.  Vitamin D deficiency-continue same.  Follow-up in 12 months.

## 2021-03-11 ENCOUNTER — APPOINTMENT (OUTPATIENT)
Dept: ONCOLOGY | Facility: HOSPITAL | Age: 66
End: 2021-03-11

## 2021-03-16 ENCOUNTER — BULK ORDERING (OUTPATIENT)
Dept: CASE MANAGEMENT | Facility: OTHER | Age: 66
End: 2021-03-16

## 2021-03-16 DIAGNOSIS — Z23 IMMUNIZATION DUE: ICD-10-CM

## 2021-04-12 NOTE — PROGRESS NOTES
.     REASONS FOR FOLLOWUP: Metastatic breast cancer, tongue cancer    HISTORY OF PRESENT ILLNESS:  The patient is a 65 y.o. year old female  who is here for follow-up with the above-mentioned history.    Overall, doing well.  No SOA, pain, neurological symptoms.        Past Medical History:   Diagnosis Date   • Allergy    • Anxiety    • Asthma    • Bone metastasis (CMS/HCC)    • Breast cancer (CMS/HCC)     Left node positive   • Depression    • Esophageal reflux     cough   • History of colon polyps    • Hyperlipidemia    • Hypertension    • Left breast cancer metastasized to lung    • Obstructive sleep apnea    • Ovarian cyst    • Tongue cancer (CMS/HCC) 05/2019    by ENT at [facility] dr Quinn     Past Surgical History:   Procedure Laterality Date   • CHOLECYSTECTOMY  2000   • COLONOSCOPY  2014    H/O polyps   • KNEE ARTHROPLASTY     • LUNG SURGERY  2010    Lung wedge resection   • MASTECTOMY RADICAL Left 1993   • TONGUE SURGERY  05/21/2019    tongue cancer       MEDICATIONS    Current Outpatient Medications:   •  anastrozole (ARIMIDEX) 1 MG tablet, Take 1 tablet by mouth once daily, Disp: 90 tablet, Rfl: 0  •  atorvastatin (LIPITOR) 10 MG tablet, Take 1 tablet by mouth Every Night., Disp: 90 tablet, Rfl: 1  •  Cholecalciferol 2000 UNITS capsule, Take by mouth., Disp: , Rfl:   •  eszopiclone (LUNESTA) tablet, Take by mouth., Disp: , Rfl:   •  LORazepam (ATIVAN) 0.5 MG tablet, Take 1 tablet by mouth., Disp: , Rfl:   •  LORazepam (ATIVAN) 1 MG tablet, TAKE 1 2 (ONE HALF) TABLET BY MOUTH THREE TIMES DAILY, Disp: , Rfl:   •  losartan (COZAAR) 50 MG tablet, Take 1 tablet by mouth Daily., Disp: 90 tablet, Rfl: 1  •  PARoxetine (PAXIL) 40 MG tablet, Take 40 mg by mouth Every Morning., Disp: , Rfl:   •  Zoledronic Acid (ZOMETA IV), Infuse  into a venous catheter., Disp: , Rfl:     ALLERGIES:     Allergies   Allergen Reactions   • Nickel Unknown - Low Severity   • Ciprofloxacin Rash       SOCIAL HISTORY:       Social  "History     Socioeconomic History   • Marital status: Single     Spouse name: Not on file   • Number of children: Not on file   • Years of education: College   • Highest education level: Not on file   Tobacco Use   • Smoking status: Former Smoker     Packs/day: 2.00     Years: 30.00     Pack years: 60.00     Types: Cigarettes     Start date:      Quit date: 2008     Years since quittin.8   • Smokeless tobacco: Never Used   Vaping Use   • Vaping Use: Never used   Substance and Sexual Activity   • Alcohol use: No   • Drug use: No   • Sexual activity: Defer         FAMILY HISTORY:  Family History   Problem Relation Age of Onset   • Hypertension Mother    • Leukemia Mother 72   • COPD Mother         smoker   • Diabetes Brother    • Pancreatic cancer Brother    • Glaucoma Brother    • Heart disease Brother    • Hypertension Brother    • Gallbladder disease Brother    • Gallbladder disease Father    • Stroke Other         Grandmother   • Lupus Other         Aunt   • Alcohol abuse Other         Aunt   • Glaucoma Other         Grandmother       REVIEW OF SYSTEMS:  Review of Systems   Constitutional: Negative for activity change.   HENT: Negative for nosebleeds and trouble swallowing.    Respiratory: Negative for shortness of breath and wheezing.    Cardiovascular: Negative for chest pain and palpitations.   Gastrointestinal: Negative for constipation, diarrhea and nausea.   Genitourinary: Negative for dysuria and hematuria.   Musculoskeletal: Negative for arthralgias and myalgias.   Skin: Negative for rash and wound.   Neurological: Negative for seizures and syncope.   Hematological: Negative for adenopathy. Does not bruise/bleed easily.   Psychiatric/Behavioral: Negative for confusion.            Vitals:    21 1355   BP: 110/75   Pulse: 110   Resp: 18   Temp: 97.2 °F (36.2 °C)   TempSrc: Temporal   SpO2: 94%   Weight: 83.3 kg (183 lb 9.6 oz)   Height: 166.4 cm (65.51\")   PainSc: 0-No pain     Current " Status 4/13/2021   ECOG score 0        PHYSICAL EXAM:          CONSTITUTIONAL:  Vital signs reviewed.  No distress, looks comfortable.  EYES:  Conjunctiva and lids unremarkable.  PERRLA  EARS,NOSE,MOUTH,THROAT:  Ears and nose appear unremarkable.  Lips, teeth, gums appear unremarkable.  RESPIRATORY:  Normal respiratory effort.  Lungs clear to auscultation bilaterally.  CARDIOVASCULAR:  Normal S1, S2.  No murmurs rubs or gallops.  No significant lower extremity edema.  GASTROINTESTINAL: Abdomen appears unremarkable.  Nontender.  No hepatomegaly.  No splenomegaly.  LYMPHATIC:  No cervical, supraclavicular, axillary lymphadenopathy.  SKIN:  Warm.  No rashes.  PSYCHIATRIC:  Normal judgment and insight.  Normal mood and affect.          RECENT LABS:        WBC   Date/Time Value Ref Range Status   04/13/2021 01:25 PM 4.69 3.40 - 10.80 10*3/mm3 Final     Hemoglobin   Date/Time Value Ref Range Status   04/13/2021 01:25 PM 13.6 12.0 - 15.9 g/dL Final     Platelets   Date/Time Value Ref Range Status   04/13/2021 01:25  140 - 450 10*3/mm3 Final       Assessment/Plan   Carcinoma of left breast metastatic to lung (CMS/HCC)  - CBC & Differential  - Comprehensive Metabolic Panel  - Cancer Antigen 15-3    Marialuisa DE LA ROSA Ghazala        Assessment/Plan     *Metastatic breast cancer to bilateral lungs and soft tissue in the mediastinum (likely the cause of her hoarseness). (Initial breast cancer diagnosed in 1993 treated with mastectomy, chemotherapy, radiation therapy and tamoxifen). Arimidex day 1 on 02/17/2010. PET scan late August 2011 shows no abnormal hypermetabolic activity. This has improved compared to the prior PET scan January 2010 which showed mild hypermetabolic activity in areas of metastasis. (In the past, her  T6 lesion did not show up on CT scans or bone scan. It was seen by PET scan.) We have been following with CT scans only every 6 months and PET scans on an as needed only basis. Sometimes her CT scanned pulmonary  nodules are read as benign since they have been stable through the course of years but we know they are malignant because they have been surgically sampled in the past.    · CT 8/16/16 shows increased sclerosis at T6 compared to 4/28/15.    · PET 10/11/16: Slight uptake at T6, SUV 3.2.  mild thickening of right adrenal gland with an SUV of 11.   · Continued Arimidex is minimal progression and had been on this since 2/17/10.  · Not referred for radiation since no pain at T6  · CT 1/27/17, unchanged.  · CT 8/1/17: Unchanged except subtle suggestion of mild increase in thickening of the right adrenal gland.    · CT 2/22/18, unchanged.  · CT 9/6/18: Unchanged except nodular thickening right adrenal gland significantly decreased.  · CT 9/5/2019: Unchanged.  · On the 6/12/2020 visit, the following scans were reviewed:  · CT neck and chest 5/11/2020, [facility], from 5/9/2019: Stable pulmonary nodules new lytic C7 lesion and posterior T1 lesion questionable T12 lesion.  No change in the T6 and T10 lesions.  · PET, [facility], 5/19/2020: Mildly enlarged left neck nodes are mildly hypermetabolic.  Diffuse activity throughout the thickened left adrenal gland.  CT appearance unchanged from 5/9/2019.  Progressive T1 lesion seen on CT from 5/11/2020, mild some moderate activity.  T10 low-grade activity with mixed lucent and sclerotic findings.  T6 is a mixed lucent and sclerotic appearance but no significant activity.   · T12 (the biopsy report is labeled as T12 but patient insists this was a C7/T1 biopsy, not to T12) biopsy 6/15/2020, [facility]: Metastatic breast carcinoma.  ER >95%.  WA 0%.  HER-2 negative (1+)  · It seems the main progression at the 6/12/2020 visit was a new C7 and new T1 lesion.  Those were radiated with [facility] through [facility] early June 2020.  Because Arimidex has been working well since 2010, continue same Arimidex.  Add Zometa every 3 months.  · C7 and T1 found on CT 5/11/2020, [facility].  Dr. Catrachito Vasquez,  early June 2020.  · CT C and T-spine 9/17/2020: New C7 lesion from 9/6/2018, but no change from 5/11/2020 CT.  T1 lesion stable from 5/11/2020, but new compared to 2/22/2018.  T10 lesion unchanged from 9/5/2019, but new from 2/22/2018.  Subtle 8 mm T12 lesion new from 9/5/2019.  · Therefore, no recent progression.  · Considering her good tolerance and long-term effectiveness of Arimidex, continue same therapy.  · CT CAP 12/10/2020: No progression  · Therefore, continue Arimidex.  CA 15-3 23.3 on 12/14/2020, normal, and stable  Await today's CA 15-3.  No clinical signs of progression.    *Bony metastases  · T6 discovered by PET to August 2011 (did not show up by CT or bone scan)  · C7 and T1 found on CT 5/11/2020, [facility].  Cy[facility]Knife, Dr. Winston, early June 2020.  · June 2020 began Zometa every 3 months.  · She was warned of possible osteonecrosis of the jaw and renal insufficiency.  She states she has good dental health and sees her dentist regularly.  Because of prior hypercalcemia requiring stopping calcium supplements and because of high normal current calcium level, began without supplemental calcium.  Add calcium if calcium becomes low.    *Bone health. Last bone density 10/11/16, worsened, but still normal with worst T score -0.9.  Patient stopped calcium January 2016, but remained on vitamin D.  I recommended she restart her calcium.  Stopped calcium early March 2018 due to hypercalcemia.  · DEXA 9/5/2019: Normal bone density    *Hypercalcemia.  Repeat calcium 3/7/18 normal, but patient self stopped calcium a few days prior.  Recommended she stay off calcium.  · Calcium was 11 on 6/19/2020  · Calcium 11 again on 12/10/2020  · Repeat calcium 10.5 on 12/14/2020  · Await calcium today    *On the 10/5/16 visit, Tachycardia, dyspnea on exertion, bilateral leg swelling, more sedentary recently.  CT PE protocol and bilateral lower extremity Dopplers 10/5/16, negative for clots.  She still has tachycardia and dyspnea  on exertion.  Perhaps at least part of this is due to deconditioning.    *Previously complained of colon Right lower anterior rib discomfort.  CT unremarkable in that area.  No specific pain, just discomfort.  I explained to the patient if disease was found by PET scan or bone scan, I would not  since she has been doing well on Arimidex since 2010.   Currently denies pain.     *Depression/anxiety.  This limits compliance.  She sees a psychiatrist and a counselor regularly.  She states this is mostly due to body image issues instead of cancer.   · She continues with her counselor and psychiatrist.  Viral pandemic is making this a little more difficult.  · This seems to be doing better now.    *Noncompliance due to depression/anxiety.  Although she misses appointments, she does reschedule and eventually come in.    *Polycythemia.  Mild.  I do not think this needs any further workup.  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 Willie Quinn [facility], on 5/21/2019.  Positive sentinel node (1 of 3 nodes)..  · Left neck dissection by Dr. Willie Quinn, [facility], on 6/5/2019.  22 nodes negative.  Negative margins.  No lymphovascular invasion or perineural invasion.  2 mm depth of invasion.  Grade 1.  Squamous cell carcinoma.  1.8 cm tumor.  · Because the patient felt what she thought was left neck LAD, CT neck and chest and PET at [facility]. May 2020 performed.  She is being followed at [facility] for this.  · On 6/19/2020, per verbal report from Dr. Senia COLORADO [facility], [facility] ENT does not feel she has any active head and neck cancer.   · She states she has a follow-up with Dr. Kapadia ENT at [facility], in June with CT neck 1 week prior    *Previously complained of left hip pain x2 weeks.  This prompted an MRI pelvis 8/20/2019 at [facility] and open MRI which was negative for malignancy.    *Possible intolerance of  Zometa  · After first dose, June 2020, 2 days of chills, bone pain  · She would like to try Zometa again.  If this occurs again, we will try to switch her to Xgeva monthly.  (No good data on every 3-month Xgeva)  · She did fine with the second dose of Zometa.    PLAN:   · Zometa today  · MD, CA 15-3, CBC, CMP, Zometa 3 months.    · Annual (December) CT CAP with contrast  ·   · Continue Arimidex  · She sees [facility] ENT, Dr. Bedoya, with CT neck 1 week prior, June 2021      Plan was:  · MD CBC CMP CA-15-3 in 6 months.  · M.D. CBC CMP CA 15-3 every 6 months (lab on day of MD unless has CT scan)  · Annual CAT scan CAP with contrast and CBC, CMP, CA-15-3.  (October)  · (Typically M.D. every 6 months, CT every one year)  · (Patient likes to avoid CT scans any more often than annually)  · Continue Arimidex.  · She request to continue breast exams and continue mammograms.  Breast exam performed June 2020.  [facility]  (I recommended not going through this as she has metastatic breast cancer but we will continue this since this is what she request)      Send a copy of this note to Dr. [facility] Quinn[facility]    41 minutes.  Total time.  Same day.  Dr. Steve Winston, radiation medicine, [facility]

## 2021-04-13 ENCOUNTER — INFUSION (OUTPATIENT)
Dept: ONCOLOGY | Facility: HOSPITAL | Age: 66
End: 2021-04-13

## 2021-04-13 ENCOUNTER — OFFICE VISIT (OUTPATIENT)
Dept: ONCOLOGY | Facility: CLINIC | Age: 66
End: 2021-04-13

## 2021-04-13 VITALS
RESPIRATION RATE: 18 BRPM | SYSTOLIC BLOOD PRESSURE: 110 MMHG | BODY MASS INDEX: 29.51 KG/M2 | HEIGHT: 66 IN | HEART RATE: 110 BPM | OXYGEN SATURATION: 94 % | DIASTOLIC BLOOD PRESSURE: 75 MMHG | WEIGHT: 183.6 LBS | TEMPERATURE: 97.2 F

## 2021-04-13 DIAGNOSIS — C50.912 CARCINOMA OF LEFT BREAST METASTATIC TO LUNG (HCC): ICD-10-CM

## 2021-04-13 DIAGNOSIS — C78.02 CARCINOMA OF LEFT BREAST METASTATIC TO LUNG (HCC): Primary | ICD-10-CM

## 2021-04-13 DIAGNOSIS — C79.51 BONE METASTASES: ICD-10-CM

## 2021-04-13 DIAGNOSIS — C50.912 CARCINOMA OF LEFT BREAST METASTATIC TO LUNG (HCC): Primary | ICD-10-CM

## 2021-04-13 DIAGNOSIS — C78.02 CARCINOMA OF LEFT BREAST METASTATIC TO LUNG (HCC): ICD-10-CM

## 2021-04-13 DIAGNOSIS — C79.51 BONE METASTASES: Primary | ICD-10-CM

## 2021-04-13 LAB
ALBUMIN SERPL-MCNC: 4.1 G/DL (ref 3.5–5.2)
ALBUMIN/GLOB SERPL: 1.4 G/DL
ALP SERPL-CCNC: 109 U/L (ref 39–117)
ALT SERPL W P-5'-P-CCNC: 9 U/L (ref 1–33)
ANION GAP SERPL CALCULATED.3IONS-SCNC: 12.2 MMOL/L (ref 5–15)
AST SERPL-CCNC: 15 U/L (ref 1–32)
BASOPHILS # BLD AUTO: 0.02 10*3/MM3 (ref 0–0.2)
BASOPHILS NFR BLD AUTO: 0.4 % (ref 0–1.5)
BILIRUB SERPL-MCNC: 0.4 MG/DL (ref 0–1.2)
BUN SERPL-MCNC: 18 MG/DL (ref 8–23)
BUN/CREAT SERPL: 18.4 (ref 7–25)
CALCIUM SPEC-SCNC: 9.9 MG/DL (ref 8.6–10.5)
CANCER AG15-3 SERPL-ACNC: 27 U/ML
CHLORIDE SERPL-SCNC: 106 MMOL/L (ref 98–107)
CO2 SERPL-SCNC: 19.8 MMOL/L (ref 22–29)
CREAT SERPL-MCNC: 0.98 MG/DL (ref 0.57–1)
DEPRECATED RDW RBC AUTO: 39.8 FL (ref 37–54)
EOSINOPHIL # BLD AUTO: 0.17 10*3/MM3 (ref 0–0.4)
EOSINOPHIL NFR BLD AUTO: 3.6 % (ref 0.3–6.2)
ERYTHROCYTE [DISTWIDTH] IN BLOOD BY AUTOMATED COUNT: 12.1 % (ref 12.3–15.4)
GFR SERPL CREATININE-BSD FRML MDRD: 57 ML/MIN/1.73
GLOBULIN UR ELPH-MCNC: 2.9 GM/DL
GLUCOSE SERPL-MCNC: 146 MG/DL (ref 65–99)
HCT VFR BLD AUTO: 40.7 % (ref 34–46.6)
HGB BLD-MCNC: 13.6 G/DL (ref 12–15.9)
IMM GRANULOCYTES # BLD AUTO: 0.01 10*3/MM3 (ref 0–0.05)
IMM GRANULOCYTES NFR BLD AUTO: 0.2 % (ref 0–0.5)
LYMPHOCYTES # BLD AUTO: 1.17 10*3/MM3 (ref 0.7–3.1)
LYMPHOCYTES NFR BLD AUTO: 24.9 % (ref 19.6–45.3)
MAGNESIUM SERPL-MCNC: 2.1 MG/DL (ref 1.6–2.4)
MCH RBC QN AUTO: 30 PG (ref 26.6–33)
MCHC RBC AUTO-ENTMCNC: 33.4 G/DL (ref 31.5–35.7)
MCV RBC AUTO: 89.6 FL (ref 79–97)
MONOCYTES # BLD AUTO: 0.34 10*3/MM3 (ref 0.1–0.9)
MONOCYTES NFR BLD AUTO: 7.2 % (ref 5–12)
NEUTROPHILS NFR BLD AUTO: 2.98 10*3/MM3 (ref 1.7–7)
NEUTROPHILS NFR BLD AUTO: 63.7 % (ref 42.7–76)
NRBC BLD AUTO-RTO: 0 /100 WBC (ref 0–0.2)
PHOSPHATE SERPL-MCNC: 2.9 MG/DL (ref 2.5–4.5)
PLATELET # BLD AUTO: 279 10*3/MM3 (ref 140–450)
PMV BLD AUTO: 8.4 FL (ref 6–12)
POTASSIUM SERPL-SCNC: 4.2 MMOL/L (ref 3.5–5.2)
PROT SERPL-MCNC: 7 G/DL (ref 6–8.5)
RBC # BLD AUTO: 4.54 10*6/MM3 (ref 3.77–5.28)
SODIUM SERPL-SCNC: 138 MMOL/L (ref 136–145)
WBC # BLD AUTO: 4.69 10*3/MM3 (ref 3.4–10.8)

## 2021-04-13 PROCEDURE — 86300 IMMUNOASSAY TUMOR CA 15-3: CPT | Performed by: INTERNAL MEDICINE

## 2021-04-13 PROCEDURE — 83735 ASSAY OF MAGNESIUM: CPT | Performed by: INTERNAL MEDICINE

## 2021-04-13 PROCEDURE — 25010000002 ZOLEDRONIC ACID 4 MG/100ML SOLUTION: Performed by: INTERNAL MEDICINE

## 2021-04-13 PROCEDURE — 84100 ASSAY OF PHOSPHORUS: CPT | Performed by: INTERNAL MEDICINE

## 2021-04-13 PROCEDURE — 99215 OFFICE O/P EST HI 40 MIN: CPT | Performed by: INTERNAL MEDICINE

## 2021-04-13 PROCEDURE — 80053 COMPREHEN METABOLIC PANEL: CPT | Performed by: INTERNAL MEDICINE

## 2021-04-13 PROCEDURE — 85025 COMPLETE CBC W/AUTO DIFF WBC: CPT | Performed by: INTERNAL MEDICINE

## 2021-04-13 PROCEDURE — 96374 THER/PROPH/DIAG INJ IV PUSH: CPT

## 2021-04-13 RX ORDER — ZOLEDRONIC ACID 0.04 MG/ML
4 INJECTION, SOLUTION INTRAVENOUS ONCE
Status: COMPLETED | OUTPATIENT
Start: 2021-04-13 | End: 2021-04-13

## 2021-04-13 RX ORDER — LORAZEPAM 1 MG/1
TABLET ORAL
COMMUNITY
Start: 2021-04-07 | End: 2021-07-20

## 2021-04-13 RX ORDER — SODIUM CHLORIDE 9 MG/ML
250 INJECTION, SOLUTION INTRAVENOUS ONCE
Status: COMPLETED | OUTPATIENT
Start: 2021-04-13 | End: 2021-04-13

## 2021-04-13 RX ADMIN — ZOLEDRONIC ACID 4 MG: 0.04 INJECTION, SOLUTION INTRAVENOUS at 15:07

## 2021-04-13 RX ADMIN — SODIUM CHLORIDE 250 ML: 9 INJECTION, SOLUTION INTRAVENOUS at 14:44

## 2021-07-06 RX ORDER — ANASTROZOLE 1 MG/1
TABLET ORAL
Qty: 90 TABLET | Refills: 1 | Status: SHIPPED | OUTPATIENT
Start: 2021-07-06 | End: 2022-01-20

## 2021-07-09 ENCOUNTER — TELEPHONE (OUTPATIENT)
Dept: ONCOLOGY | Facility: CLINIC | Age: 66
End: 2021-07-09

## 2021-07-09 NOTE — TELEPHONE ENCOUNTER
Caller: PT    Relationship to patient: SELF    Best call back number: [phone]    Chief complaint: MAKE APPT    Type of visit: INFUSION AND F/U     Requested date: ASAP WHEN YOU HAVE AN OPENING PREFERS AFTERNOON APT OR LATE MORNING APPT    If rescheduling, when is the original appointment: 7/6    Additional notes: PT WOULD LIKE TO MAKE AN INFUSION AND F/U APPT

## 2021-07-19 NOTE — PROGRESS NOTES
.     REASONS FOR FOLLOWUP: Metastatic breast cancer, tongue cancer    HISTORY OF PRESENT ILLNESS:  The patient is a 65 y.o. year old female  who is here for follow-up with the above-mentioned history.    She returns today for follow-up and Zometa, which she receives every 3 months.  She is feeling well today.  She is eating and drinking adequately, and her weight is stable.  Her bowels are moving regularly.  She denies any nausea or vomiting.  She denies arthralgias or myalgias.  She does experience occasional hot flashes, however these are mild in nature.    She followed up with [facility] clinic last week, stating her CT scan was stable and she would follow-up with them again in 1 year.    Past Medical History:   Diagnosis Date   • Allergy    • Anxiety    • Asthma    • Bone metastasis (CMS/HCC)    • Breast cancer (CMS/HCC)     Left node positive   • Depression    • Esophageal reflux     cough   • History of colon polyps    • Hyperlipidemia    • Hypertension    • Left breast cancer metastasized to lung    • Obstructive sleep apnea    • Ovarian cyst    • Tongue cancer (CMS/HCC) 05/2019    by ENT at [facility] dr Quinn     Past Surgical History:   Procedure Laterality Date   • CHOLECYSTECTOMY  2000   • COLONOSCOPY  2014    H/O polyps   • KNEE ARTHROPLASTY     • LUNG SURGERY  2010    Lung wedge resection   • MASTECTOMY RADICAL Left 1993   • TONGUE SURGERY  05/21/2019    tongue cancer       MEDICATIONS    Current Outpatient Medications:   •  anastrozole (ARIMIDEX) 1 MG tablet, Take 1 tablet by mouth once daily, Disp: 90 tablet, Rfl: 1  •  atorvastatin (LIPITOR) 10 MG tablet, Take 1 tablet by mouth Every Night., Disp: 90 tablet, Rfl: 1  •  Cholecalciferol 2000 UNITS capsule, Take by mouth., Disp: , Rfl:   •  eszopiclone (LUNESTA) tablet, Take by mouth., Disp: , Rfl:   •  LORazepam (ATIVAN) 0.5 MG tablet, Take 1 tablet by mouth., Disp: , Rfl:   •  losartan (COZAAR) 50 MG tablet, Take 1 tablet by mouth Daily.,  Disp: 90 tablet, Rfl: 1  •  PARoxetine (PAXIL) 40 MG tablet, Take 40 mg by mouth Every Morning., Disp: , Rfl:   •  Zoledronic Acid (ZOMETA IV), Infuse  into a venous catheter., Disp: , Rfl:     ALLERGIES:     Allergies   Allergen Reactions   • Nickel Unknown - Low Severity   • Ciprofloxacin Rash       SOCIAL HISTORY:       Social History     Socioeconomic History   • Marital status: Single     Spouse name: Not on file   • Number of children: Not on file   • Years of education: College   • Highest education level: Not on file   Tobacco Use   • Smoking status: Former Smoker     Packs/day: 2.00     Years: 30.00     Pack years: 60.00     Types: Cigarettes     Start date:      Quit date: 2008     Years since quittin.1   • Smokeless tobacco: Never Used   Vaping Use   • Vaping Use: Never used   Substance and Sexual Activity   • Alcohol use: No   • Drug use: No   • Sexual activity: Defer         FAMILY HISTORY:  Family History   Problem Relation Age of Onset   • Hypertension Mother    • Leukemia Mother 72   • COPD Mother         smoker   • Diabetes Brother    • Pancreatic cancer Brother    • Glaucoma Brother    • Heart disease Brother    • Hypertension Brother    • Gallbladder disease Brother    • Gallbladder disease Father    • Stroke Other         Grandmother   • Lupus Other         Aunt   • Alcohol abuse Other         Aunt   • Glaucoma Other         Grandmother     REVIEW OF SYSTEMS:  Review of Systems   Constitutional: Negative for activity change.   HENT: Negative for nosebleeds and trouble swallowing.    Respiratory: Negative for shortness of breath and wheezing.    Cardiovascular: Negative for chest pain and palpitations.   Gastrointestinal: Negative for constipation, diarrhea and nausea.   Genitourinary: Negative for dysuria and hematuria.   Musculoskeletal: Negative for arthralgias and myalgias.   Skin: Negative for rash and wound.   Neurological: Negative for seizures and syncope.   Hematological:  "Negative for adenopathy. Does not bruise/bleed easily.   Psychiatric/Behavioral: Negative for confusion.            Vitals:    07/20/21 1227   BP: 101/70   Pulse: 104   Resp: 18   Temp: 97.5 °F (36.4 °C)   TempSrc: Temporal   SpO2: 94%   Weight: 81.8 kg (180 lb 4.8 oz)   Height: 166.4 cm (65.51\")   PainSc: 0-No pain     Current Status 7/20/2021   ECOG score 0        PHYSICAL EXAM:        CONSTITUTIONAL:  Vital signs reviewed.  No distress, looks comfortable.  EYES:  Conjunctiva and lids unremarkable.  PERRLA  EARS,NOSE,MOUTH,THROAT:  Ears and nose appear unremarkable.  Lips, teeth, gums appear unremarkable.  RESPIRATORY:  Normal respiratory effort.  Lungs clear to auscultation bilaterally.  CARDIOVASCULAR:  Normal S1, S2.  No murmurs rubs or gallops.  No significant lower extremity edema.  GASTROINTESTINAL: Abdomen appears unremarkable.  Nontender.  No hepatomegaly.  No splenomegaly.  LYMPHATIC:  No cervical, supraclavicular, axillary lymphadenopathy.  SKIN:  Warm.  No rashes.  PSYCHIATRIC:  Normal judgment and insight.  Normal mood and affect.       RECENT LABS:        WBC   Date/Time Value Ref Range Status   04/13/2021 01:25 PM 4.69 3.40 - 10.80 10*3/mm3 Final     Hemoglobin   Date/Time Value Ref Range Status   04/13/2021 01:25 PM 13.6 12.0 - 15.9 g/dL Final     Platelets   Date/Time Value Ref Range Status   04/13/2021 01:25  140 - 450 10*3/mm3 Final       Assessment/Plan   There are no diagnoses linked to this encounter.  Marialuisa Vivar        Assessment/Plan     *Metastatic breast cancer to bilateral lungs and soft tissue in the mediastinum (likely the cause of her hoarseness). (Initial breast cancer diagnosed in 1993 treated with mastectomy, chemotherapy, radiation therapy and tamoxifen). Arimidex day 1 on 02/17/2010. PET scan late August 2011 shows no abnormal hypermetabolic activity. This has improved compared to the prior PET scan January 2010 which showed mild hypermetabolic activity in areas of " metastasis. (In the past, her  T6 lesion did not show up on CT scans or bone scan. It was seen by PET scan.) We have been following with CT scans only every 6 months and PET scans on an as needed only basis. Sometimes her CT scanned pulmonary nodules are read as benign since they have been stable through the course of years but we know they are malignant because they have been surgically sampled in the past.    · CT 8/16/16 shows increased sclerosis at T6 compared to 4/28/15.    · PET 10/11/16: Slight uptake at T6, SUV 3.2.  mild thickening of right adrenal gland with an SUV of 11.   · Continued Arimidex is minimal progression and had been on this since 2/17/10.  · Not referred for radiation since no pain at T6  · CT 1/27/17, unchanged.  · CT 8/1/17: Unchanged except subtle suggestion of mild increase in thickening of the right adrenal gland.    · CT 2/22/18, unchanged.  · CT 9/6/18: Unchanged except nodular thickening right adrenal gland significantly decreased.  · CT 9/5/2019: Unchanged.  · On the 6/12/2020 visit, the following scans were reviewed:  · CT neck and chest 5/11/2020, [facility], from 5/9/2019: Stable pulmonary nodules new lytic C7 lesion and posterior T1 lesion questionable T12 lesion.  No change in the T6 and T10 lesions.  · PET, [facility], 5/19/2020: Mildly enlarged left neck nodes are mildly hypermetabolic.  Diffuse activity throughout the thickened left adrenal gland.  CT appearance unchanged from 5/9/2019.  Progressive T1 lesion seen on CT from 5/11/2020, mild some moderate activity.  T10 low-grade activity with mixed lucent and sclerotic findings.  T6 is a mixed lucent and sclerotic appearance but no significant activity.   · T12 (the biopsy report is labeled as T12 but patient insists this was a C7/T1 biopsy, not to T12) biopsy 6/15/2020, [facility]: Metastatic breast carcinoma.  ER >95%.  CO 0%.  HER-2 negative (1+)  · It seems the main progression at the 6/12/2020 visit was a new C7 and new T1 lesion.   Those were radiated with CyberKnife through [facility] early June 2020.  Because Arimidex has been working well since 2010, continue same Arimidex.  Add Zometa every 3 months.  · C7 and T1 found on CT 5/11/2020, [facility].  Dr. Catrachito Vasquez, early June 2020.  · CT C and T-spine 9/17/2020: New C7 lesion from 9/6/2018, but no change from 5/11/2020 CT.  T1 lesion stable from 5/11/2020, but new compared to 2/22/2018.  T10 lesion unchanged from 9/5/2019, but new from 2/22/2018.  Subtle 8 mm T12 lesion new from 9/5/2019.  · Therefore, no recent progression.  · Considering her good tolerance and long-term effectiveness of Arimidex, continue same therapy.  · CT CAP 12/10/2020: No progression  · Therefore, continue Arimidex.  CA 15-3 23.3 on 12/14/2020, normal, and stable  · 7/20/2021, continues on Arimidex 1 mg daily.  Tolerating well.    *Bony metastases  · T6 discovered by PET to August 2011 (did not show up by CT or bone scan)  · C7 and T1 found on CT 5/11/2020, [facility].  Dr. Catrachito Vasquez, early June 2020.  · June 2020 began Zometa every 3 months.  · She was warned of possible osteonecrosis of the jaw and renal insufficiency.  She states she has good dental health and sees her dentist regularly.  Because of prior hypercalcemia requiring stopping calcium supplements and because of high normal current calcium level, began without supplemental calcium.  Add calcium if calcium becomes low.  · 7/20/2021, proceed with Zometa today.     *Bone health. Last bone density 10/11/16, worsened, but still normal with worst T score -0.9.  Patient stopped calcium January 2016, but remained on vitamin D.  I recommended she restart her calcium.  Stopped calcium early March 2018 due to hypercalcemia.  · DEXA 9/5/2019: Normal bone density    *Hypercalcemia.  Repeat calcium 3/7/18 normal, but patient self stopped calcium a few days prior.  Recommended she stay off calcium.  · Calcium was 11 on 6/19/2020  · Calcium 11 again on  12/10/2020  · Repeat calcium 10.5 on 12/14/2020  · Calcium today 10.1    *On the 10/5/16 visit, Tachycardia, dyspnea on exertion, bilateral leg swelling, more sedentary recently.  CT PE protocol and bilateral lower extremity Dopplers 10/5/16, negative for clots.  She still has tachycardia and dyspnea on exertion.  Perhaps at least part of this is due to deconditioning.    *Previously complained of colon Right lower anterior rib discomfort.  CT unremarkable in that area.  No specific pain, just discomfort.  I explained to the patient if disease was found by PET scan or bone scan, I would not  since she has been doing well on Arimidex since 2010.   Currently denies pain.     *Depression/anxiety.  This limits compliance.  She sees a psychiatrist and a counselor regularly.  She states this is mostly due to body image issues instead of cancer.   · She continues with her counselor and psychiatrist.  Viral pandemic is making this a little more difficult.  · This seems to be doing better now.    *Noncompliance due to depression/anxiety.  Although she misses appointments, she does reschedule and eventually come in.    *Polycythemia.  Mild.  I do not think this needs any further workup.  HCT 42.3 today.    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  Positive sentinel node (1 of 3 nodes)..  · Left neck dissection by Dr. Willie Quinn, [facility], on 6/5/2019.  22 nodes negative.  Negative margins.  No lymphovascular invasion or perineural invasion.  2 mm depth of invasion.  Grade 1.  Squamous cell carcinoma.  1.8 cm tumor.  · Because the patient felt what she thought was left neck LAD, CT neck and chest and PET at [facility]. May 2020 performed.  She is being followed at [facility] for this.  · On 6/19/2020, per verbal report from Dr. Senia Way , [facility] ENT does not feel she has any active  head and neck cancer.   · She states she has a follow-up with Dr. Kapadia, ENT at [facility], in June with CT neck 1 week prior  · 7/20/2021, the patient had follow-up with [facility] clinic last week.  She reports her CT scan was stable and she will follow-up with them again in 1 year.    *Previously complained of left hip pain x2 weeks.  This prompted an MRI pelvis 8/20/2019 at [facility] and open MRI which was negative for malignancy.    *Possible intolerance of Zometa  · After first dose, June 2020, 2 days of chills, bone pain  · She would like to try Zometa again.  If this occurs again, we will try to switch her to Xgeva monthly.  (No good data on every 3-month Xgeva)  · She did fine with the second dose of Zometa.    PLAN:   · Zometa today   · MD, CA 15-3, CBC, CMP, Zometa 3 months.    · Annual (December) CT CAP with contrast  · Continue Arimidex  · She sees [facility] ENT, Dr. Bedoya, with CT neck 1 week prior, next follow-up in 1 year (July 2022).      Plan was:   · MD CBC CMP CA-15-3 in 6 months.  · M.D. CBC CMP CA 15-3 every 6 months (lab on day of MD unless has CT scan)  · Annual CAT scan CAP with contrast and CBC, CMP, CA-15-3.  (October)  · (Typically M.D. every 6 months, CT every one year)  · (Patient likes to avoid CT scans any more often than annually)  · Continue Arimidex.  · She request to continue breast exams and continue mammograms.  Breast exam performed June 2020.  [facility]  (Dr. Hawkins recommended not going through this as she has metastatic breast cancer but we will continue this since this is what she request)    ARON Ruth  07/20/2021    ADDENDUM: CA 15-3 elevated today at 29.3.  This was discussed with Dr. Hawkins.  We will still have the patient return in 3 months for MD follow-up, however we will proceed with CT chest abdomen pelvis prior to this visit due to elevated CA 15-3.  I called to discuss this with the patient.    CC: Dr. Wilson  Catrachito, radiation medicine, [facility]

## 2021-07-20 ENCOUNTER — DOCUMENTATION (OUTPATIENT)
Dept: ONCOLOGY | Facility: CLINIC | Age: 66
End: 2021-07-20

## 2021-07-20 ENCOUNTER — OFFICE VISIT (OUTPATIENT)
Dept: ONCOLOGY | Facility: CLINIC | Age: 66
End: 2021-07-20

## 2021-07-20 ENCOUNTER — INFUSION (OUTPATIENT)
Dept: ONCOLOGY | Facility: HOSPITAL | Age: 66
End: 2021-07-20

## 2021-07-20 VITALS
HEIGHT: 66 IN | SYSTOLIC BLOOD PRESSURE: 101 MMHG | WEIGHT: 180.3 LBS | RESPIRATION RATE: 18 BRPM | TEMPERATURE: 97.5 F | DIASTOLIC BLOOD PRESSURE: 70 MMHG | HEART RATE: 104 BPM | OXYGEN SATURATION: 94 % | BODY MASS INDEX: 28.98 KG/M2

## 2021-07-20 DIAGNOSIS — C50.912 CARCINOMA OF LEFT BREAST METASTATIC TO LUNG (HCC): ICD-10-CM

## 2021-07-20 DIAGNOSIS — C79.51 BONE METASTASES: Primary | ICD-10-CM

## 2021-07-20 DIAGNOSIS — C79.51 BONE METASTASES: ICD-10-CM

## 2021-07-20 DIAGNOSIS — C78.02 CARCINOMA OF LEFT BREAST METASTATIC TO LUNG (HCC): Primary | ICD-10-CM

## 2021-07-20 DIAGNOSIS — C50.912 CARCINOMA OF LEFT BREAST METASTATIC TO LUNG (HCC): Primary | ICD-10-CM

## 2021-07-20 DIAGNOSIS — C78.02 CARCINOMA OF LEFT BREAST METASTATIC TO LUNG (HCC): ICD-10-CM

## 2021-07-20 LAB
ALBUMIN SERPL-MCNC: 3.9 G/DL (ref 3.5–5.2)
ALBUMIN/GLOB SERPL: 1.3 G/DL
ALP SERPL-CCNC: 106 U/L (ref 39–117)
ALT SERPL W P-5'-P-CCNC: 11 U/L (ref 1–33)
ANION GAP SERPL CALCULATED.3IONS-SCNC: 8.4 MMOL/L (ref 5–15)
AST SERPL-CCNC: 17 U/L (ref 1–32)
BASOPHILS # BLD AUTO: 0.05 10*3/MM3 (ref 0–0.2)
BASOPHILS NFR BLD AUTO: 1 % (ref 0–1.5)
BILIRUB SERPL-MCNC: 0.3 MG/DL (ref 0–1.2)
BUN SERPL-MCNC: 14 MG/DL (ref 8–23)
BUN/CREAT SERPL: 14 (ref 7–25)
CALCIUM SPEC-SCNC: 10.1 MG/DL (ref 8.6–10.5)
CANCER AG15-3 SERPL-ACNC: 29.3 U/ML
CHLORIDE SERPL-SCNC: 108 MMOL/L (ref 98–107)
CO2 SERPL-SCNC: 24.6 MMOL/L (ref 22–29)
CREAT SERPL-MCNC: 1 MG/DL (ref 0.57–1)
DEPRECATED RDW RBC AUTO: 39.7 FL (ref 37–54)
EOSINOPHIL # BLD AUTO: 0.13 10*3/MM3 (ref 0–0.4)
EOSINOPHIL NFR BLD AUTO: 2.5 % (ref 0.3–6.2)
ERYTHROCYTE [DISTWIDTH] IN BLOOD BY AUTOMATED COUNT: 12.2 % (ref 12.3–15.4)
GFR SERPL CREATININE-BSD FRML MDRD: 55 ML/MIN/1.73
GLOBULIN UR ELPH-MCNC: 3.1 GM/DL
GLUCOSE SERPL-MCNC: 101 MG/DL (ref 65–99)
HCT VFR BLD AUTO: 42.3 % (ref 34–46.6)
HGB BLD-MCNC: 13.7 G/DL (ref 12–15.9)
IMM GRANULOCYTES # BLD AUTO: 0.01 10*3/MM3 (ref 0–0.05)
IMM GRANULOCYTES NFR BLD AUTO: 0.2 % (ref 0–0.5)
LYMPHOCYTES # BLD AUTO: 1.5 10*3/MM3 (ref 0.7–3.1)
LYMPHOCYTES NFR BLD AUTO: 28.8 % (ref 19.6–45.3)
MAGNESIUM SERPL-MCNC: 2.1 MG/DL (ref 1.6–2.4)
MCH RBC QN AUTO: 29.1 PG (ref 26.6–33)
MCHC RBC AUTO-ENTMCNC: 32.4 G/DL (ref 31.5–35.7)
MCV RBC AUTO: 89.8 FL (ref 79–97)
MONOCYTES # BLD AUTO: 0.27 10*3/MM3 (ref 0.1–0.9)
MONOCYTES NFR BLD AUTO: 5.2 % (ref 5–12)
NEUTROPHILS NFR BLD AUTO: 3.24 10*3/MM3 (ref 1.7–7)
NEUTROPHILS NFR BLD AUTO: 62.3 % (ref 42.7–76)
NRBC BLD AUTO-RTO: 0 /100 WBC (ref 0–0.2)
PHOSPHATE SERPL-MCNC: 3.2 MG/DL (ref 2.5–4.5)
PLATELET # BLD AUTO: 348 10*3/MM3 (ref 140–450)
PMV BLD AUTO: 8.9 FL (ref 6–12)
POTASSIUM SERPL-SCNC: 3.8 MMOL/L (ref 3.5–5.2)
PROT SERPL-MCNC: 7 G/DL (ref 6–8.5)
RBC # BLD AUTO: 4.71 10*6/MM3 (ref 3.77–5.28)
SODIUM SERPL-SCNC: 141 MMOL/L (ref 136–145)
WBC # BLD AUTO: 5.2 10*3/MM3 (ref 3.4–10.8)

## 2021-07-20 PROCEDURE — 36415 COLL VENOUS BLD VENIPUNCTURE: CPT

## 2021-07-20 PROCEDURE — 85025 COMPLETE CBC W/AUTO DIFF WBC: CPT | Performed by: INTERNAL MEDICINE

## 2021-07-20 PROCEDURE — 86300 IMMUNOASSAY TUMOR CA 15-3: CPT | Performed by: INTERNAL MEDICINE

## 2021-07-20 PROCEDURE — 83735 ASSAY OF MAGNESIUM: CPT | Performed by: INTERNAL MEDICINE

## 2021-07-20 PROCEDURE — 25010000002 ZOLEDRONIC ACID 4 MG/100ML SOLUTION: Performed by: NURSE PRACTITIONER

## 2021-07-20 PROCEDURE — 99213 OFFICE O/P EST LOW 20 MIN: CPT | Performed by: NURSE PRACTITIONER

## 2021-07-20 PROCEDURE — 80053 COMPREHEN METABOLIC PANEL: CPT | Performed by: INTERNAL MEDICINE

## 2021-07-20 PROCEDURE — 96374 THER/PROPH/DIAG INJ IV PUSH: CPT

## 2021-07-20 PROCEDURE — 84100 ASSAY OF PHOSPHORUS: CPT | Performed by: INTERNAL MEDICINE

## 2021-07-20 RX ORDER — SODIUM CHLORIDE 9 MG/ML
250 INJECTION, SOLUTION INTRAVENOUS ONCE
Status: COMPLETED | OUTPATIENT
Start: 2021-07-20 | End: 2021-07-20

## 2021-07-20 RX ORDER — ZOLEDRONIC ACID 0.04 MG/ML
4 INJECTION, SOLUTION INTRAVENOUS ONCE
Status: COMPLETED | OUTPATIENT
Start: 2021-07-20 | End: 2021-07-20

## 2021-07-20 RX ADMIN — ZOLEDRONIC ACID 4 MG: 0.04 INJECTION, SOLUTION INTRAVENOUS at 14:26

## 2021-07-20 RX ADMIN — SODIUM CHLORIDE 250 ML: 9 INJECTION, SOLUTION INTRAVENOUS at 14:25

## 2021-07-20 NOTE — NURSING NOTE
Seen per UMAIR Matta. Will proceed with zometa after lab values are reported and WNL.     LAbs reported, copy printed and given to patient.

## 2021-07-20 NOTE — PROGRESS NOTES
Glenn de la rosa.    It looks like you saw this lady today.    Please call her and tell her that her tumor marker is slightly elevated.  I realize it is almost the same as April, but April was slightly elevated (and higher than before), and this is slightly higher than April.  Since we don't see her that often, i'd be more comfortable if we checked scans before the upcoming visit with me.  I don't think she needs the scans now, but I'd rather do them in October instead of waiting until January.    Please change the upcoming visit with me to a 2 UNIT with one wk prior-ct chest abdomen pelvis with contrast, cbc, cmp, ca 15-3      Thanks!

## 2021-07-21 ENCOUNTER — TELEPHONE (OUTPATIENT)
Dept: ONCOLOGY | Facility: CLINIC | Age: 66
End: 2021-07-21

## 2021-07-21 DIAGNOSIS — C78.02 CARCINOMA OF LEFT BREAST METASTATIC TO LUNG (HCC): Primary | ICD-10-CM

## 2021-07-21 DIAGNOSIS — C50.912 CARCINOMA OF LEFT BREAST METASTATIC TO LUNG (HCC): Primary | ICD-10-CM

## 2021-07-21 NOTE — TELEPHONE ENCOUNTER
----- Message from Jarod Hawkins II, MD sent at 7/20/2021  4:31 PM EDT -----  Glenn de la rosa.    It looks like you saw this lady today.    Please call her and tell her that her tumor marker is slightly elevated.  I realize it is almost the same as April, but April was slightly elevated (and higher than before), and this is slightly higher than April.  Since we don't see her that often, i'd be more comfortable if we checked scans before the upcoming visit with me.  I don't think she needs the scans now, but I'd rather do them in October instead of waiting until January.    Please change the upcoming visit with me to a 2 UNIT with one wk prior-ct chest abdomen pelvis with contrast, cbc, cmp, ca 15-3      Thanks!

## 2021-09-03 DIAGNOSIS — I10 BENIGN ESSENTIAL HTN: Primary | ICD-10-CM

## 2021-09-03 DIAGNOSIS — R73.01 IFG (IMPAIRED FASTING GLUCOSE): ICD-10-CM

## 2021-09-03 DIAGNOSIS — E55.9 VITAMIN D DEFICIENCY: ICD-10-CM

## 2021-09-03 DIAGNOSIS — E78.2 MODERATE MIXED HYPERLIPIDEMIA NOT REQUIRING STATIN THERAPY: ICD-10-CM

## 2021-09-04 LAB
25(OH)D3+25(OH)D2 SERPL-MCNC: 26.5 NG/ML (ref 30–100)
ALBUMIN SERPL-MCNC: 4.5 G/DL (ref 3.8–4.8)
ALBUMIN/GLOB SERPL: 1.6 {RATIO} (ref 1.2–2.2)
ALP SERPL-CCNC: 129 IU/L (ref 48–121)
ALT SERPL-CCNC: 13 IU/L (ref 0–32)
AST SERPL-CCNC: 18 IU/L (ref 0–40)
BILIRUB SERPL-MCNC: 0.6 MG/DL (ref 0–1.2)
BUN SERPL-MCNC: 14 MG/DL (ref 8–27)
BUN/CREAT SERPL: 14 (ref 12–28)
CALCIUM SERPL-MCNC: 9.4 MG/DL (ref 8.7–10.3)
CHLORIDE SERPL-SCNC: 104 MMOL/L (ref 96–106)
CHOLEST SERPL-MCNC: 193 MG/DL (ref 100–199)
CO2 SERPL-SCNC: 22 MMOL/L (ref 20–29)
CREAT SERPL-MCNC: 0.99 MG/DL (ref 0.57–1)
GLOBULIN SER CALC-MCNC: 2.8 G/DL (ref 1.5–4.5)
GLUCOSE SERPL-MCNC: 115 MG/DL (ref 65–99)
HBA1C MFR BLD: 5.8 % (ref 4.8–5.6)
HDLC SERPL-MCNC: 44 MG/DL
LDLC SERPL CALC-MCNC: 106 MG/DL (ref 0–99)
LDLC/HDLC SERPL: 2.4 RATIO (ref 0–3.2)
POTASSIUM SERPL-SCNC: 4.2 MMOL/L (ref 3.5–5.2)
PROT SERPL-MCNC: 7.3 G/DL (ref 6–8.5)
SODIUM SERPL-SCNC: 141 MMOL/L (ref 134–144)
TRIGL SERPL-MCNC: 253 MG/DL (ref 0–149)
VLDLC SERPL CALC-MCNC: 43 MG/DL (ref 5–40)

## 2021-10-05 ENCOUNTER — OFFICE VISIT (OUTPATIENT)
Dept: INTERNAL MEDICINE | Facility: CLINIC | Age: 66
End: 2021-10-05

## 2021-10-05 VITALS
WEIGHT: 181 LBS | HEIGHT: 66 IN | TEMPERATURE: 97.3 F | BODY MASS INDEX: 29.09 KG/M2 | HEART RATE: 97 BPM | SYSTOLIC BLOOD PRESSURE: 118 MMHG | OXYGEN SATURATION: 99 % | DIASTOLIC BLOOD PRESSURE: 78 MMHG

## 2021-10-05 DIAGNOSIS — E55.9 VITAMIN D DEFICIENCY: ICD-10-CM

## 2021-10-05 DIAGNOSIS — I10 BENIGN ESSENTIAL HTN: Primary | ICD-10-CM

## 2021-10-05 DIAGNOSIS — R73.01 IFG (IMPAIRED FASTING GLUCOSE): ICD-10-CM

## 2021-10-05 DIAGNOSIS — E78.2 MIXED HYPERLIPIDEMIA: ICD-10-CM

## 2021-10-05 PROCEDURE — 99214 OFFICE O/P EST MOD 30 MIN: CPT | Performed by: FAMILY MEDICINE

## 2021-10-05 RX ORDER — BUSPIRONE HYDROCHLORIDE 10 MG/1
5 TABLET ORAL 2 TIMES DAILY
COMMUNITY
Start: 2021-09-09 | End: 2022-09-16

## 2021-10-05 NOTE — PATIENT INSTRUCTIONS
Prediabetes Eating Plan  Prediabetes is a condition that causes blood sugar (glucose) levels to be higher than normal. This increases the risk for developing type 2 diabetes (type 2 diabetes mellitus). Working with a health care provider or nutrition specialist (dietitian) to make diet and lifestyle changes can help prevent the onset of diabetes. These changes may help you:  · Control your blood glucose levels.  · Improve your cholesterol levels.  · Manage your blood pressure.  What are tips for following this plan?  Reading food labels  · Read food labels to check the amount of fat, salt (sodium), and sugar in prepackaged foods. Avoid foods that have:  ? Saturated fats.  ? Trans fats.  ? Added sugars.  · Avoid foods that have more than 300 milligrams (mg) of sodium per serving. Limit your sodium intake to less than 2,300 mg each day.  Shopping  · Avoid buying pre-made and processed foods.  · Avoid buying drinks with added sugar.  Cooking  · Cook with olive oil. Do not use butter, lard, or ghee.  · Bake, broil, grill, steam, or boil foods. Avoid frying.  Meal planning    · Work with your dietitian to create an eating plan that is right for you. This may include tracking how many calories you take in each day. Use a food diary, notebook, or mobile application to track what you eat at each meal.  · Consider following a Mediterranean diet. This includes:  ? Eating several servings of fresh fruits and vegetables each day.  ? Eating fish at least twice a week.  ? Eating one serving each day of whole grains, beans, nuts, and seeds.  ? Using olive oil instead of other fats.  ? Limiting alcohol.  ? Limiting red meat.  ? Using nonfat or low-fat dairy products.  · Consider following a plant-based diet. This includes dietary choices that focus on eating mostly vegetables and fruit, grains, beans, nuts, and seeds.  · If you have high blood pressure, you may need to limit your sodium intake or follow a diet such as the DASH  (Dietary Approaches to Stop Hypertension) eating plan. The DASH diet aims to lower high blood pressure.    Lifestyle  · Set weight loss goals with help from your health care team. It is recommended that most people with prediabetes lose 7% of their body weight.  · Exercise for at least 30 minutes 5 or more days a week.  · Attend a support group or seek support from a mental health counselor.  · Take over-the-counter and prescription medicines only as told by your health care provider.  What foods are recommended?  Fruits  Berries. Bananas. Apples. Oranges. Grapes. Papaya. Pankaj. Pomegranate. Kiwi. Grapefruit. Cherries.  Vegetables  Lettuce. Spinach. Peas. Beets. Cauliflower. Cabbage. Broccoli. Carrots. Tomatoes. Squash. Eggplant. Herbs. Peppers. Onions. Cucumbers. [place] sprouts.  Grains  Whole grains, such as whole-wheat or whole-grain breads, crackers, cereals, and pasta. Unsweetened oatmeal. Bulgur. Barley. Quinoa. Brown rice. Corn or whole-wheat flour tortillas or taco shells.  Meats and other proteins  Seafood. Poultry without skin. Lean cuts of pork and beef. Tofu. Eggs. Nuts. Beans.  Dairy  Low-fat or fat-free dairy products, such as yogurt, cottage cheese, and cheese.  Beverages  Water. Tea. Coffee. Sugar-free or diet soda. [place] water. Low-fat or nonfat milk. Milk alternatives, such as soy or almond milk.  Fats and oils  Olive oil. Canola oil. Sunflower oil. Grapeseed oil. Avocado. Walnuts.  Sweets and desserts  Sugar-free or low-fat pudding. Sugar-free or low-fat ice cream and other frozen treats.  Seasonings and condiments  Herbs. Sodium-free spices. Mustard. Relish. Low-salt, low-sugar ketchup. Low-salt, low-sugar barbecue sauce. Low-fat or fat-free mayonnaise.  The items listed above may not be a complete list of recommended foods and beverages. Contact a dietitian for more information.  What foods are not recommended?  Fruits  Fruits canned with syrup.  Vegetables  Canned vegetables. Frozen  vegetables with butter or cream sauce.  Grains  Refined white flour and flour products, such as bread, pasta, snack foods, and cereals.  Meats and other proteins  Fatty cuts of meat. Poultry with skin. Breaded or fried meat. Processed meats.  Dairy  Full-fat yogurt, cheese, or milk.  Beverages  Sweetened drinks, such as iced tea and soda.  Fats and oils  Butter. Lard. Ghee.  Sweets and desserts  Baked goods, such as cake, cupcakes, pastries, cookies, and cheesecake.  Seasonings and condiments  Spice mixes with added salt. Ketchup. Barbecue sauce. Mayonnaise.  The items listed above may not be a complete list of foods and beverages that are not recommended. Contact a dietitian for more information.  Where to find more information  · American Diabetes Association: www.diabetes.org  Summary  · You may need to make diet and lifestyle changes to help prevent the onset of diabetes. These changes can help you control blood sugar, improve cholesterol levels, and manage blood pressure.  · Set weight loss goals with help from your health care team. It is recommended that most people with prediabetes lose 7% of their body weight.  · Consider following a Mediterranean diet. This includes eating plenty of fresh fruits and vegetables, whole grains, beans, nuts, seeds, fish, and low-fat dairy, and using olive oil instead of other fats.  This information is not intended to replace advice given to you by your health care provider. Make sure you discuss any questions you have with your health care provider.  Document Revised: 03/18/2021 Document Reviewed: 03/18/2021  Elsevier Patient Education © 2021 [employer]

## 2021-10-05 NOTE — PROGRESS NOTES
Subjective   Marialuisa Vivar is a 65 y.o. female.   Chief Complaint   Patient presents with   • Hypertension     6 MON F/U    • Hyperlipidemia   • Hyperglycemia   • Vitamin D Deficiency       History of Present Illness     #1 HTN-diagnosed when patient was in her 40s.   She is on losartan to 50 mg a day.  She takes it everyday. She reports no side effects.  She has no chest pain, no shortness of breath, no lightheadedness.  Creatinine 0.99, GFR 60.  Sodium and potassium are normal.     #2 HLP- on atorvastatin at 10 mg a day.  She takes it daily. She has no side effects.  Occasionally she has muscle aches, no muscle cramps.  LDL is 106, HDL 44, .  LFTs are normal.     #3  Hyperglycemia-weight is stable.  Fasting blood sugar 115  A1c 5.8 from 5.7. BMI 29.7.  She does not exercise regularly.  Family history positive for diabetes in her brother and maternal grandmother.     #4  Vitamin D deficiency-she does not take vitamin D regularly. Vitamin D is at  26.5.    The following portions of the patient's history were reviewed and updated as appropriate: allergies, current medications, past family history, past medical history, past social history, past surgical history and problem list.    Review of Systems   Respiratory: Negative.    Cardiovascular: Negative.          Objective   Wt Readings from Last 3 Encounters:   10/05/21 82.1 kg (181 lb)   07/20/21 81.8 kg (180 lb 4.8 oz)   04/13/21 83.3 kg (183 lb 9.6 oz)      Vitals:    10/05/21 1142   BP: 118/78   Pulse: 97   Temp: 97.3 °F (36.3 °C)   SpO2: 99%     Temp Readings from Last 3 Encounters:   10/05/21 97.3 °F (36.3 °C)   07/20/21 97.5 °F (36.4 °C) (Temporal)   04/13/21 97.2 °F (36.2 °C) (Temporal)     BP Readings from Last 3 Encounters:   10/05/21 118/78   07/20/21 101/70   04/13/21 110/75     Pulse Readings from Last 3 Encounters:   10/05/21 97   07/20/21 104   04/13/21 110     Body mass index is 29.65 kg/m².    Physical Exam  Constitutional:       Appearance:  Normal appearance. She is well-developed.   HENT:      Head: Normocephalic and atraumatic.   Neck:      Thyroid: No thyromegaly.      Vascular: No carotid bruit.   Cardiovascular:      Rate and Rhythm: Normal rate and regular rhythm.      Heart sounds: Normal heart sounds.   Pulmonary:      Effort: Pulmonary effort is normal.      Breath sounds: Normal breath sounds.   Musculoskeletal:      Cervical back: Neck supple.   Skin:     General: Skin is warm and dry.   Neurological:      Mental Status: She is alert and oriented to person, place, and time.   Psychiatric:         Behavior: Behavior normal.         Assessment/Plan   Diagnoses and all orders for this visit:    1. Benign essential HTN (Primary)  -     Comprehensive Metabolic Panel; Future    2. Mixed hyperlipidemia  -     Comprehensive Metabolic Panel; Future    3. IFG (impaired fasting glucose)  -     Hemoglobin A1c; Future  -     Comprehensive Metabolic Panel; Future    4. Vitamin D deficiency  -     Vitamin D 25 Hydroxy; Future        #1 hypertension-continue current treatment.  Follow-up in 6 months.  2.  Hyperlipidemia-lowering carbs can help with decreasing triglycerides.  We will continue current treatment.  Follow-up in 6 months.  3.  Impaired fasting glucose-information on prediabetic diet provided.  Follow-up in 6 months.  4.  Vitamin D deficiency-start vitamin D3 at 4000 units a day and take it every day.  Follow-up in 6 months.    She is getting flu vaccine today.

## 2021-10-12 ENCOUNTER — HOSPITAL ENCOUNTER (OUTPATIENT)
Dept: CT IMAGING | Facility: HOSPITAL | Age: 66
Discharge: HOME | End: 2021-10-12
Admitting: INTERNAL MEDICINE

## 2021-10-12 DIAGNOSIS — C78.02 CARCINOMA OF LEFT BREAST METASTATIC TO LUNG (HCC): ICD-10-CM

## 2021-10-12 DIAGNOSIS — C50.912 CARCINOMA OF LEFT BREAST METASTATIC TO LUNG (HCC): ICD-10-CM

## 2021-10-12 LAB
ALBUMIN SERPL-MCNC: 4.5 G/DL (ref 3.5–5.2)
ALBUMIN/GLOB SERPL: 1.4 G/DL
ALP SERPL-CCNC: 112 U/L (ref 39–117)
ALT SERPL W P-5'-P-CCNC: 12 U/L (ref 1–33)
ANION GAP SERPL CALCULATED.3IONS-SCNC: 8.9 MMOL/L (ref 5–15)
AST SERPL-CCNC: 15 U/L (ref 1–32)
BASOPHILS # BLD AUTO: 0.03 10*3/MM3 (ref 0–0.2)
BASOPHILS NFR BLD AUTO: 0.5 % (ref 0–1.5)
BILIRUB SERPL-MCNC: 0.5 MG/DL (ref 0–1.2)
BUN SERPL-MCNC: 18 MG/DL (ref 8–23)
BUN/CREAT SERPL: 16.5 (ref 7–25)
CALCIUM SPEC-SCNC: 10.4 MG/DL (ref 8.6–10.5)
CANCER AG15-3 SERPL-ACNC: 34.3 U/ML
CHLORIDE SERPL-SCNC: 104 MMOL/L (ref 98–107)
CO2 SERPL-SCNC: 25.1 MMOL/L (ref 22–29)
CREAT SERPL-MCNC: 1.09 MG/DL (ref 0.57–1)
DEPRECATED RDW RBC AUTO: 41.9 FL (ref 37–54)
EOSINOPHIL # BLD AUTO: 0.13 10*3/MM3 (ref 0–0.4)
EOSINOPHIL NFR BLD AUTO: 2 % (ref 0.3–6.2)
ERYTHROCYTE [DISTWIDTH] IN BLOOD BY AUTOMATED COUNT: 12.9 % (ref 12.3–15.4)
GFR SERPL CREATININE-BSD FRML MDRD: 50 ML/MIN/1.73
GLOBULIN UR ELPH-MCNC: 3.2 GM/DL
GLUCOSE SERPL-MCNC: 111 MG/DL (ref 65–99)
HCT VFR BLD AUTO: 40.8 % (ref 34–46.6)
HGB BLD-MCNC: 13.7 G/DL (ref 12–15.9)
IMM GRANULOCYTES # BLD AUTO: 0.02 10*3/MM3 (ref 0–0.05)
IMM GRANULOCYTES NFR BLD AUTO: 0.3 % (ref 0–0.5)
LYMPHOCYTES # BLD AUTO: 1.76 10*3/MM3 (ref 0.7–3.1)
LYMPHOCYTES NFR BLD AUTO: 26.5 % (ref 19.6–45.3)
MCH RBC QN AUTO: 30 PG (ref 26.6–33)
MCHC RBC AUTO-ENTMCNC: 33.6 G/DL (ref 31.5–35.7)
MCV RBC AUTO: 89.5 FL (ref 79–97)
MONOCYTES # BLD AUTO: 0.46 10*3/MM3 (ref 0.1–0.9)
MONOCYTES NFR BLD AUTO: 6.9 % (ref 5–12)
NEUTROPHILS NFR BLD AUTO: 4.24 10*3/MM3 (ref 1.7–7)
NEUTROPHILS NFR BLD AUTO: 63.8 % (ref 42.7–76)
NRBC BLD AUTO-RTO: 0 /100 WBC (ref 0–0.2)
PLATELET # BLD AUTO: 290 10*3/MM3 (ref 140–450)
PMV BLD AUTO: 8.4 FL (ref 6–12)
POTASSIUM SERPL-SCNC: 3.9 MMOL/L (ref 3.5–5.2)
PROT SERPL-MCNC: 7.7 G/DL (ref 6–8.5)
RBC # BLD AUTO: 4.56 10*6/MM3 (ref 3.77–5.28)
SODIUM SERPL-SCNC: 138 MMOL/L (ref 136–145)
WBC # BLD AUTO: 6.64 10*3/MM3 (ref 3.4–10.8)

## 2021-10-12 PROCEDURE — 85025 COMPLETE CBC W/AUTO DIFF WBC: CPT | Performed by: INTERNAL MEDICINE

## 2021-10-12 PROCEDURE — 80053 COMPREHEN METABOLIC PANEL: CPT | Performed by: INTERNAL MEDICINE

## 2021-10-12 PROCEDURE — 74177 CT ABD & PELVIS W/CONTRAST: CPT

## 2021-10-12 PROCEDURE — 71260 CT THORAX DX C+: CPT

## 2021-10-12 PROCEDURE — 82565 ASSAY OF CREATININE: CPT

## 2021-10-12 PROCEDURE — 25010000002 IOPAMIDOL 61 % SOLUTION: Performed by: INTERNAL MEDICINE

## 2021-10-12 PROCEDURE — 0 DIATRIZOATE MEGLUMINE & SODIUM PER 1 ML: Performed by: INTERNAL MEDICINE

## 2021-10-12 PROCEDURE — 86300 IMMUNOASSAY TUMOR CA 15-3: CPT | Performed by: INTERNAL MEDICINE

## 2021-10-12 RX ADMIN — IOPAMIDOL 90 ML: 612 INJECTION, SOLUTION INTRAVENOUS at 15:30

## 2021-10-12 RX ADMIN — DIATRIZOATE MEGLUMINE AND DIATRIZOATE SODIUM 30 ML: 660; 100 LIQUID ORAL; RECTAL at 14:15

## 2021-10-13 LAB — CREAT BLDA-MCNC: 1 MG/DL (ref 0.6–1.3)

## 2021-10-18 NOTE — PROGRESS NOTES
.     REASONS FOR FOLLOWUP: Metastatic breast cancer, tongue cancer    HISTORY OF PRESENT ILLNESS:  The patient is a 66 y.o. year old female  who is here for follow-up with the above-mentioned history.    States she continues to juárez with depression.  She continues therapy.    CT shows a new right ovarian mass.    She states for at least a year she has felt abdominal bloating    Past Medical History:   Diagnosis Date   • Allergy    • Anxiety    • Asthma    • Bone metastasis (HCC)    • Breast cancer (HCC)     Left node positive   • Depression    • Esophageal reflux     cough   • History of colon polyps    • Hyperlipidemia    • Hypertension    • Left breast cancer metastasized to lung    • Obstructive sleep apnea    • Ovarian cyst    • Tongue cancer (HCC) 05/2019    by ENT at [facility] dr Quinn     Past Surgical History:   Procedure Laterality Date   • CHOLECYSTECTOMY  2000   • COLONOSCOPY  2014    H/O polyps   • KNEE ARTHROPLASTY     • LUNG SURGERY  2010    Lung wedge resection   • MASTECTOMY RADICAL Left 1993   • TONGUE SURGERY  05/21/2019    tongue cancer       MEDICATIONS    Current Outpatient Medications:   •  anastrozole (ARIMIDEX) 1 MG tablet, Take 1 tablet by mouth once daily, Disp: 90 tablet, Rfl: 1  •  atorvastatin (LIPITOR) 10 MG tablet, Take 1 tablet by mouth Every Night., Disp: 90 tablet, Rfl: 1  •  busPIRone (BUSPAR) 10 MG tablet, Take 10 mg by mouth 2 (Two) Times a Day., Disp: , Rfl:   •  Cholecalciferol 2000 UNITS capsule, Take by mouth., Disp: , Rfl:   •  eszopiclone (LUNESTA) tablet, Take by mouth., Disp: , Rfl:   •  LORazepam (ATIVAN) 0.5 MG tablet, Take 1 tablet by mouth., Disp: , Rfl:   •  losartan (COZAAR) 50 MG tablet, Take 1 tablet by mouth Daily., Disp: 90 tablet, Rfl: 1  •  Omeprazole 20 MG Tablet Delayed Release Dispersible, , Disp: , Rfl:   •  PARoxetine (PAXIL) 40 MG tablet, Take 40 mg by mouth Every Morning., Disp: , Rfl:   •  Zoledronic Acid (ZOMETA IV), Infuse  into a venous  catheter., Disp: , Rfl:   No current facility-administered medications for this visit.    Facility-Administered Medications Ordered in Other Visits:   •  sodium chloride 0.9 % infusion 250 mL, 250 mL, Intravenous, Once, Jarod Hawkins II, MD    ALLERGIES:     Allergies   Allergen Reactions   • Nickel Unknown - Low Severity   • Ciprofloxacin Rash       SOCIAL HISTORY:       Social History     Socioeconomic History   • Marital status: Single   • Years of education: College   Tobacco Use   • Smoking status: Former Smoker     Packs/day: 2.00     Years: 30.00     Pack years: 60.00     Types: Cigarettes     Start date:      Quit date: 2008     Years since quittin.3   • Smokeless tobacco: Never Used   Vaping Use   • Vaping Use: Never used   Substance and Sexual Activity   • Alcohol use: No   • Drug use: No   • Sexual activity: Defer         FAMILY HISTORY:  Family History   Problem Relation Age of Onset   • Hypertension Mother    • Leukemia Mother 72   • COPD Mother         smoker   • Diabetes Brother    • Pancreatic cancer Brother    • Glaucoma Brother    • Heart disease Brother    • Hypertension Brother    • Gallbladder disease Brother    • Gallbladder disease Father    • Stroke Other         Grandmother   • Lupus Other         Aunt   • Alcohol abuse Other         Aunt   • Glaucoma Other         Grandmother   • Diabetes type II Brother    • Hypertension Brother    • Hyperlipidemia Brother        REVIEW OF SYSTEMS:  Review of Systems   Constitutional: Negative for activity change.   HENT: Negative for nosebleeds and trouble swallowing.    Respiratory: Negative for shortness of breath and wheezing.    Cardiovascular: Negative for chest pain and palpitations.   Gastrointestinal: Negative for constipation, diarrhea and nausea.   Genitourinary: Negative for dysuria and hematuria.   Musculoskeletal: Negative for arthralgias and myalgias.   Skin: Negative for rash and wound.   Neurological: Negative for seizures and  "syncope.   Hematological: Negative for adenopathy. Does not bruise/bleed easily.   Psychiatric/Behavioral: Negative for confusion.            Vitals:    10/19/21 1304   BP: 126/77   Pulse: 106   Resp: 16   Temp: 97.1 °F (36.2 °C)   TempSrc: Temporal   SpO2: 96%   Weight: 81.5 kg (179 lb 9.6 oz)   Height: 166.4 cm (65.51\")   PainSc: 0-No pain     Current Status 7/20/2021   ECOG score 0        PHYSICAL EXAM:          CONSTITUTIONAL:  Vital signs reviewed.  No distress, looks comfortable.  EYES:  Conjunctiva and lids unremarkable.  PERRLA  EARS,NOSE,MOUTH,THROAT:  Ears and nose appear unremarkable.  Lips, teeth, gums appear unremarkable.  RESPIRATORY:  Normal respiratory effort.  Lungs clear to auscultation bilaterally.  CARDIOVASCULAR:  Normal S1, S2.  No murmurs rubs or gallops.  No significant lower extremity edema.  GASTROINTESTINAL: Abdomen appears unremarkable.  Nontender.  No hepatomegaly.  No splenomegaly.  LYMPHATIC:  No cervical, supraclavicular, axillary lymphadenopathy.  SKIN:  Warm.  No rashes.  PSYCHIATRIC:  Normal judgment and insight.  Normal mood and affect.          RECENT LABS:        WBC   Date/Time Value Ref Range Status   10/19/2021 02:21 PM 6.32 3.40 - 10.80 10*3/mm3 Final     Hemoglobin   Date/Time Value Ref Range Status   10/19/2021 02:21 PM 13.8 12.0 - 15.9 g/dL Final     Platelets   Date/Time Value Ref Range Status   10/19/2021 02:21  140 - 450 10*3/mm3 Final       Assessment/Plan   Carcinoma of left breast metastatic to lung (HCC)  - Cancer Antigen 15-3    Ovarian mass, right    Marialuisa S Ghazala        Assessment/Plan     *Metastatic breast cancer to bilateral lungs and soft tissue in the mediastinum (likely the cause of her hoarseness). (Initial breast cancer diagnosed in 1993 treated with mastectomy, chemotherapy, radiation therapy and tamoxifen). Arimidex day 1 on 02/17/2010. PET scan late August 2011 shows no abnormal hypermetabolic activity. This has improved compared to the prior " PET scan January 2010 which showed mild hypermetabolic activity in areas of metastasis. (In the past, her  T6 lesion did not show up on CT scans or bone scan. It was seen by PET scan.) We have been following with CT scans only every 6 months and PET scans on an as needed only basis. Sometimes her CT scanned pulmonary nodules are read as benign since they have been stable through the course of years but we know they are malignant because they have been surgically sampled in the past.    · CT 8/16/16 shows increased sclerosis at T6 compared to 4/28/15.    · PET 10/11/16: Slight uptake at T6, SUV 3.2.  mild thickening of right adrenal gland with an SUV of 11.   · Continued Arimidex is minimal progression and had been on this since 2/17/10.  · Not referred for radiation since no pain at T6  · CT 1/27/17, unchanged.  · CT 8/1/17: Unchanged except subtle suggestion of mild increase in thickening of the right adrenal gland.    · CT 2/22/18, unchanged.  · CT 9/6/18: Unchanged except nodular thickening right adrenal gland significantly decreased.  · CT 9/5/2019: Unchanged.  · On the 6/12/2020 visit, the following scans were reviewed:  · CT neck and chest 5/11/2020, [facility], from 5/9/2019: Stable pulmonary nodules new lytic C7 lesion and posterior T1 lesion questionable T12 lesion.  No change in the T6 and T10 lesions.  · PET, [facility], 5/19/2020: Mildly enlarged left neck nodes are mildly hypermetabolic.  Diffuse activity throughout the thickened left adrenal gland.  CT appearance unchanged from 5/9/2019.  Progressive T1 lesion seen on CT from 5/11/2020, mild some moderate activity.  T10 low-grade activity with mixed lucent and sclerotic findings.  T6 is a mixed lucent and sclerotic appearance but no significant activity.   · T12 (the biopsy report is labeled as T12 but patient insists this was a C7/T1 biopsy, not to T12) biopsy 6/15/2020, [facility]: Metastatic breast carcinoma.  ER >95%.  MD 0%.  HER-2 negative (1+)  · It seems  the main progression at the 6/12/2020 visit was a new C7 and new T1 lesion.  Those were radiated with [facility] through [facility] early June 2020.  Because Arimidex has been working well since 2010, continue same Arimidex.  Add Zometa every 3 months.  · C7 and T1 found on CT 5/11/2020, [facility].  Pedro, Dr. Winston, early June 2020.  · CT C and T-spine 9/17/2020: New C7 lesion from 9/6/2018, but no change from 5/11/2020 CT.  T1 lesion stable from 5/11/2020, but new compared to 2/22/2018.  T10 lesion unchanged from 9/5/2019, but new from 2/22/2018.  Subtle 8 mm T12 lesion new from 9/5/2019.  · Therefore, no recent progression.  · Considering her good tolerance and long-term effectiveness of Arimidex, continue same therapy.  · CT CAP 12/10/2020: No progression  · Therefore, continue Arimidex.  · CA 15-3 normal through 12/14/2020  · Because CA 15-3 fabian to 27 on 4/13/2021, then 29.3 on 7/20/2021, then 34.3 on 10/12/2021, CT scans done earlier than planned:  · CT CAP with contrast 10/12/2021: New 5.2 x 4.6 cm mixed cystic and solid right ovarian mass compared to 12/10/2020.  Continued stability of bilateral pulmonary nodules and stable sclerotic bone metastasis.    *New right ovarian mass, found on CT 10/12/2021.  · Referral to Dr. Ricci  · (I tried to order CA-125 but the diagnosis of ovarian mass would not allow coverage of the test)    *Bony metastases  · T6 discovered by PET to August 2011 (did not show up by CT or bone scan)  · C7 and T1 found on CT 5/11/2020, [facility].  Pedro, Dr. Winston, early June 2020.  · June 2020 began Zometa every 3 months.  · She was warned of possible osteonecrosis of the jaw and renal insufficiency.  She states she has good dental health and sees her dentist regularly.  Because of prior hypercalcemia requiring stopping calcium supplements and because of high normal current calcium level, began without supplemental calcium.  Add calcium if calcium becomes low.    *Bone health. Last  bone density 10/11/16, worsened, but still normal with worst T score -0.9.  Patient stopped calcium January 2016, but remained on vitamin D.  I recommended she restart her calcium.  Stopped calcium early March 2018 due to hypercalcemia.  · DEXA 9/5/2019: Normal bone density    *Hypercalcemia.  Repeat calcium 3/7/18 normal, but patient self stopped calcium a few days prior.  Recommended she stay off calcium.  · Calcium was 11 on 6/19/2020  · Calcium 11 again on 12/10/2020  · Repeat calcium 10.5 on 12/14/2020  · Calcium 10.6.  Minimally elevated.  (Normal range up to 10.5)    *On the 10/5/16 visit, Tachycardia, dyspnea on exertion, bilateral leg swelling, more sedentary recently.  CT PE protocol and bilateral lower extremity Dopplers 10/5/16, negative for clots.  She still has tachycardia and dyspnea on exertion.  Perhaps at least part of this is due to deconditioning.    *Previously complained of colon Right lower anterior rib discomfort.  CT unremarkable in that area.  No specific pain, just discomfort.  I explained to the patient if disease was found by PET scan or bone scan, I would not  since she has been doing well on Arimidex since 2010.   Currently denies pain.     *Depression/anxiety.  This limits compliance.  She sees a psychiatrist and a counselor regularly.  She states this is mostly due to body image issues instead of cancer.   · She continues with her counselor and psychiatrist.  Viral pandemic is making this a little more difficult.  · This seems to be doing better now.    *Noncompliance due to depression/anxiety.  Although she misses appointments, she does reschedule and eventually come in.    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 Willie Quinn, [facility], on 5/21/2019.  Positive sentinel node (1 of 3 nodes)..  · Left neck dissection by Dr. Willie Quinn, [facility], on 6/5/2019.  22 nodes  negative.  Negative margins.  No lymphovascular invasion or perineural invasion.  2 mm depth of invasion.  Grade 1.  Squamous cell carcinoma.  1.8 cm tumor.  · Because the patient felt what she thought was left neck LAD, CT neck and chest and PET at [facility]. May 2020 performed.  She is being followed at [facility] for this.  · On 6/19/2020, per verbal report from Dr. Conn [facility], [facility] ENT does not feel she has any active head and neck cancer.   · She states she has a follow-up with Dr. Kapadia, ENT at [facility], in June with CT neck 1 week prior  · No symptoms suggesting recurrence    *Previously complained of left hip pain x2 weeks.  This prompted an MRI pelvis 8/20/2019 at [facility] and open MRI which was negative for malignancy.    *Possible intolerance of Zometa  · After first dose, June 2020, 2 days of chills, bone pain  · She would like to try Zometa again.  If this occurs again, we will try to switch her to Xgeva monthly.  (No good data on every 3-month Xgeva)  · She did fine with the second dose of Zometa.    PLAN:   · Referral to Dr. Ricci regarding new right ovarian mass  · Zometa today  · Appointment with me in about a month after the appointment with Dr. Hastings  · I told the patient I suspect Dr. Hastings will resect this ovarian mass.  Even if it is metastatic breast cancer to ovary, no other areas of progression are seen and I would ideally like to continue the Arimidex that she has been on since 2/17/2010, if we can locally treat the ovarian mass with resection.  Patient also understands this might be a new ovarian primary.    Usual plan is:  · MD, CA 15-3, CBC, CMP, Zometa 3 months.    · Annual (December) CT CAP with contrast  ·   · Continue Arimidex  · She sees [facility]NT, Dr. Bedoya, with CT neck 1 week prior, June 2021      Plan was:  · MD CBC CMP CA-15-3 in 6 months.  · M.JESSICA CBC CMP CA 15-3 every 6 months (lab on day of MD unless has CT scan)  · Annual CAT scan CAP with contrast  and CBC, CMP, CA-15-3.  (October)  · (Typically M.D. every 6 months, CT every one year)  · (Patient likes to avoid CT scans any more often than annually)  · Continue Arimidex.  · She request to continue breast exams and continue mammograms.  Breast exam performed June 2020.  [facility]  (I recommended not going through this as she has metastatic breast cancer but we will continue this since this is what she request)      Send a copy of this note to Isaac Valdes MD    41 minutes.  Total time.  Same day.  Dr. Steve Winston, radiation medicine, [facility]

## 2021-10-19 ENCOUNTER — OFFICE VISIT (OUTPATIENT)
Dept: ONCOLOGY | Facility: CLINIC | Age: 66
End: 2021-10-19

## 2021-10-19 ENCOUNTER — APPOINTMENT (OUTPATIENT)
Dept: OTHER | Facility: HOSPITAL | Age: 66
End: 2021-10-19

## 2021-10-19 ENCOUNTER — INFUSION (OUTPATIENT)
Dept: ONCOLOGY | Facility: HOSPITAL | Age: 66
End: 2021-10-19

## 2021-10-19 VITALS
BODY MASS INDEX: 28.87 KG/M2 | DIASTOLIC BLOOD PRESSURE: 77 MMHG | SYSTOLIC BLOOD PRESSURE: 126 MMHG | WEIGHT: 179.6 LBS | TEMPERATURE: 97.1 F | HEIGHT: 66 IN | HEART RATE: 106 BPM | OXYGEN SATURATION: 96 % | RESPIRATION RATE: 16 BRPM

## 2021-10-19 DIAGNOSIS — N83.8 OVARIAN MASS, RIGHT: ICD-10-CM

## 2021-10-19 DIAGNOSIS — C50.912 CARCINOMA OF LEFT BREAST METASTATIC TO LUNG (HCC): Primary | ICD-10-CM

## 2021-10-19 DIAGNOSIS — C79.51 BONE METASTASES: Primary | ICD-10-CM

## 2021-10-19 DIAGNOSIS — C78.02 CARCINOMA OF LEFT BREAST METASTATIC TO LUNG (HCC): Primary | ICD-10-CM

## 2021-10-19 LAB
ALBUMIN SERPL-MCNC: 4.6 G/DL (ref 3.5–5.2)
ALBUMIN/GLOB SERPL: 1.4 G/DL
ALP SERPL-CCNC: 111 U/L (ref 39–117)
ALT SERPL W P-5'-P-CCNC: 10 U/L (ref 1–33)
ANION GAP SERPL CALCULATED.3IONS-SCNC: 10.5 MMOL/L (ref 5–15)
AST SERPL-CCNC: 15 U/L (ref 1–32)
BASOPHILS # BLD AUTO: 0.05 10*3/MM3 (ref 0–0.2)
BASOPHILS NFR BLD AUTO: 0.8 % (ref 0–1.5)
BILIRUB SERPL-MCNC: 0.5 MG/DL (ref 0–1.2)
BUN SERPL-MCNC: 17 MG/DL (ref 8–23)
BUN/CREAT SERPL: 16 (ref 7–25)
CALCIUM SPEC-SCNC: 10.6 MG/DL (ref 8.6–10.5)
CHLORIDE SERPL-SCNC: 105 MMOL/L (ref 98–107)
CO2 SERPL-SCNC: 25.5 MMOL/L (ref 22–29)
CREAT SERPL-MCNC: 1.06 MG/DL (ref 0.57–1)
DEPRECATED RDW RBC AUTO: 41.9 FL (ref 37–54)
EOSINOPHIL # BLD AUTO: 0.1 10*3/MM3 (ref 0–0.4)
EOSINOPHIL NFR BLD AUTO: 1.6 % (ref 0.3–6.2)
ERYTHROCYTE [DISTWIDTH] IN BLOOD BY AUTOMATED COUNT: 12.8 % (ref 12.3–15.4)
GFR SERPL CREATININE-BSD FRML MDRD: 52 ML/MIN/1.73
GLOBULIN UR ELPH-MCNC: 3.2 GM/DL
GLUCOSE SERPL-MCNC: 91 MG/DL (ref 65–99)
HCT VFR BLD AUTO: 42.9 % (ref 34–46.6)
HGB BLD-MCNC: 13.8 G/DL (ref 12–15.9)
IMM GRANULOCYTES # BLD AUTO: 0.03 10*3/MM3 (ref 0–0.05)
IMM GRANULOCYTES NFR BLD AUTO: 0.5 % (ref 0–0.5)
LYMPHOCYTES # BLD AUTO: 1.13 10*3/MM3 (ref 0.7–3.1)
LYMPHOCYTES NFR BLD AUTO: 17.9 % (ref 19.6–45.3)
MAGNESIUM SERPL-MCNC: 2.3 MG/DL (ref 1.6–2.4)
MCH RBC QN AUTO: 29.2 PG (ref 26.6–33)
MCHC RBC AUTO-ENTMCNC: 32.2 G/DL (ref 31.5–35.7)
MCV RBC AUTO: 90.7 FL (ref 79–97)
MONOCYTES # BLD AUTO: 0.41 10*3/MM3 (ref 0.1–0.9)
MONOCYTES NFR BLD AUTO: 6.5 % (ref 5–12)
NEUTROPHILS NFR BLD AUTO: 4.6 10*3/MM3 (ref 1.7–7)
NEUTROPHILS NFR BLD AUTO: 72.7 % (ref 42.7–76)
NRBC BLD AUTO-RTO: 0 /100 WBC (ref 0–0.2)
PHOSPHATE SERPL-MCNC: 3.2 MG/DL (ref 2.5–4.5)
PLATELET # BLD AUTO: 301 10*3/MM3 (ref 140–450)
PMV BLD AUTO: 8.9 FL (ref 6–12)
POTASSIUM SERPL-SCNC: 4.2 MMOL/L (ref 3.5–5.2)
PROT SERPL-MCNC: 7.8 G/DL (ref 6–8.5)
RBC # BLD AUTO: 4.73 10*6/MM3 (ref 3.77–5.28)
SODIUM SERPL-SCNC: 141 MMOL/L (ref 136–145)
WBC # BLD AUTO: 6.32 10*3/MM3 (ref 3.4–10.8)

## 2021-10-19 PROCEDURE — 96374 THER/PROPH/DIAG INJ IV PUSH: CPT

## 2021-10-19 PROCEDURE — 96365 THER/PROPH/DIAG IV INF INIT: CPT

## 2021-10-19 PROCEDURE — 25010000002 ZOLEDRONIC ACID 4 MG/100ML SOLUTION: Performed by: INTERNAL MEDICINE

## 2021-10-19 PROCEDURE — 85025 COMPLETE CBC W/AUTO DIFF WBC: CPT | Performed by: INTERNAL MEDICINE

## 2021-10-19 PROCEDURE — 84100 ASSAY OF PHOSPHORUS: CPT | Performed by: INTERNAL MEDICINE

## 2021-10-19 PROCEDURE — 80053 COMPREHEN METABOLIC PANEL: CPT | Performed by: INTERNAL MEDICINE

## 2021-10-19 PROCEDURE — 83735 ASSAY OF MAGNESIUM: CPT | Performed by: INTERNAL MEDICINE

## 2021-10-19 PROCEDURE — 99214 OFFICE O/P EST MOD 30 MIN: CPT | Performed by: INTERNAL MEDICINE

## 2021-10-19 RX ORDER — SODIUM CHLORIDE 9 MG/ML
250 INJECTION, SOLUTION INTRAVENOUS ONCE
Status: DISCONTINUED | OUTPATIENT
Start: 2021-10-19 | End: 2021-10-19 | Stop reason: HOSPADM

## 2021-10-19 RX ORDER — ZOLEDRONIC ACID 0.04 MG/ML
4 INJECTION, SOLUTION INTRAVENOUS ONCE
Status: COMPLETED | OUTPATIENT
Start: 2021-10-19 | End: 2021-10-19

## 2021-10-19 RX ADMIN — ZOLEDRONIC ACID 4 MG: 0.04 INJECTION, SOLUTION INTRAVENOUS at 15:32

## 2021-10-22 ENCOUNTER — TELEPHONE (OUTPATIENT)
Dept: GYNECOLOGIC ONCOLOGY | Facility: CLINIC | Age: 66
End: 2021-10-22

## 2021-10-22 NOTE — TELEPHONE ENCOUNTER
PT RETURNED CALL. CONFIRMED APPT 10/26 WITH DR KEEN, COMP TRAVEL SCREEN, COMP WELL TORREY, NO OUTSIDE RECS, COMP PRE REG/SSN, NO NPP SENT WILL NOT REACH PT IN TIME

## 2021-10-26 ENCOUNTER — PREP FOR SURGERY (OUTPATIENT)
Dept: OTHER | Facility: HOSPITAL | Age: 66
End: 2021-10-26

## 2021-10-26 ENCOUNTER — OFFICE VISIT (OUTPATIENT)
Dept: GYNECOLOGIC ONCOLOGY | Facility: CLINIC | Age: 66
End: 2021-10-26

## 2021-10-26 VITALS
SYSTOLIC BLOOD PRESSURE: 105 MMHG | TEMPERATURE: 97.3 F | OXYGEN SATURATION: 95 % | BODY MASS INDEX: 29.67 KG/M2 | DIASTOLIC BLOOD PRESSURE: 70 MMHG | RESPIRATION RATE: 20 BRPM | WEIGHT: 178.1 LBS | HEIGHT: 65 IN | HEART RATE: 102 BPM

## 2021-10-26 DIAGNOSIS — Z79.01 ANTICOAGULATED: ICD-10-CM

## 2021-10-26 DIAGNOSIS — N83.8 OVARIAN MASS, RIGHT: Primary | ICD-10-CM

## 2021-10-26 PROBLEM — N94.89 ADNEXAL MASS: Status: ACTIVE | Noted: 2021-10-26

## 2021-10-26 PROCEDURE — 99204 OFFICE O/P NEW MOD 45 MIN: CPT | Performed by: OBSTETRICS & GYNECOLOGY

## 2021-10-26 RX ORDER — SODIUM CHLORIDE 0.9 % (FLUSH) 0.9 %
10 SYRINGE (ML) INJECTION AS NEEDED
Status: CANCELLED | OUTPATIENT
Start: 2021-11-04

## 2021-10-26 RX ORDER — SODIUM CHLORIDE 0.9 % (FLUSH) 0.9 %
10 SYRINGE (ML) INJECTION EVERY 12 HOURS SCHEDULED
Status: CANCELLED | OUTPATIENT
Start: 2021-11-04

## 2021-10-26 RX ORDER — SODIUM CHLORIDE 9 MG/ML
100 INJECTION, SOLUTION INTRAVENOUS CONTINUOUS
Status: CANCELLED | OUTPATIENT
Start: 2021-11-04

## 2021-10-26 RX ORDER — ONDANSETRON 2 MG/ML
4 INJECTION INTRAMUSCULAR; INTRAVENOUS EVERY 6 HOURS PRN
Status: CANCELLED | OUTPATIENT
Start: 2021-10-26

## 2021-10-26 RX ORDER — CHLORHEXIDINE GLUCONATE 0.12 MG/ML
15 RINSE ORAL EVERY 12 HOURS SCHEDULED
Status: CANCELLED | OUTPATIENT
Start: 2021-10-26

## 2021-10-26 RX ORDER — CEFAZOLIN SODIUM 2 G/100ML
2 INJECTION, SOLUTION INTRAVENOUS ONCE
Status: CANCELLED | OUTPATIENT
Start: 2021-11-04 | End: 2021-10-26

## 2021-10-26 NOTE — PROGRESS NOTES
Age: 66 y.o.  Sex: female  :  1955  MRN: [id number]       REFERRING PHYSICIAN: Jarod Hawkins II, MD  DATE OF VISIT: 10/26/2021        Marialuisa Vivar presents to [facility] for evaluation and management of new ovarian mass that presented on most recent imaging of 10/2021. She has known metastatic breast cancer and has been on arimidex since 2010. She has pulmonary and bone mets but overall with good health and performance status. She denies feeling pain or pressure in abdomen.       Oncology/Hematology History Overview Note   Marialuisa Vivar is a 66 y.o. female referred by Dr. Jarod Hawkins for bilateral ovarian masses, previously diagnosed with metastatic breast cancer (-mastectomy, chemo, xrt, tamoxifen), tongue cancer (-surgery), bone mets (-Arimidex)    • 10/12/21: CT CAP-there is a new 5.2 x 4.6 cm mixed cystic and solid right ovarian mass, no free fluid or lymphadenopathy within abdomen or pelvis. Continued stability of several bilateral pulmonary nodules, no new abnormality within the chest. Stable sclerotic bone metastases.          Past Medical History:  Past Medical History:   Diagnosis Date   • Allergy    • Anxiety    • Asthma    • Bone metastasis (HCC)    • Breast cancer (HCC)     Left node positive   • Depression    • Esophageal reflux     cough   • History of colon polyps    • Hyperlipidemia    • Hypertension    • Left breast cancer metastasized to lung    • Obstructive sleep apnea    • Ovarian cyst    • Tongue cancer (HCC) 2019    by ENT at [facility] dr Quinn       Past Surgical History:  Past Surgical History:   Procedure Laterality Date   • CHOLECYSTECTOMY     • COLONOSCOPY      H/O polyps   • KNEE ARTHROPLASTY     • LUNG SURGERY      Lung wedge resection   • MASTECTOMY RADICAL Left    • TONGUE SURGERY  2019    tongue cancer        MEDICATIONS:    Current Outpatient Medications:   •  anastrozole (ARIMIDEX) 1 MG tablet, Take 1 tablet by  "mouth once daily, Disp: 90 tablet, Rfl: 1  •  atorvastatin (LIPITOR) 10 MG tablet, Take 1 tablet by mouth Every Night., Disp: 90 tablet, Rfl: 1  •  busPIRone (BUSPAR) 10 MG tablet, Take 10 mg by mouth 2 (Two) Times a Day., Disp: , Rfl:   •  Cholecalciferol 2000 UNITS capsule, Take by mouth., Disp: , Rfl:   •  eszopiclone (LUNESTA) tablet, Take by mouth., Disp: , Rfl:   •  LORazepam (ATIVAN) 0.5 MG tablet, Take 1 tablet by mouth., Disp: , Rfl:   •  losartan (COZAAR) 50 MG tablet, Take 1 tablet by mouth Daily., Disp: 90 tablet, Rfl: 1  •  Omeprazole 20 MG Tablet Delayed Release Dispersible, , Disp: , Rfl:   •  PARoxetine (PAXIL) 40 MG tablet, Take 40 mg by mouth Every Morning., Disp: , Rfl:   •  Zoledronic Acid (ZOMETA IV), Infuse  into a venous catheter., Disp: , Rfl:     ALLERGIES:  Allergies   Allergen Reactions   • Nickel Unknown - Low Severity   • Ciprofloxacin Rash         ROS:  CONSTITUTIONAL:  Denies fever or chills.   NEUROLOGIC:  Denies headache, focal weakness or sensory changes.   EYES:  Denies change in visual acuity.  HEENT:  Denies nasal congestion or sore throat.   RESPIRATORY:  Denies cough or shortness of breath.   CARDIOVASCULAR:  Denies chest pain or edema.   GI:  Denies abdominal pain, nausea, vomiting, bloody stools or diarrhea.   :  Denies dysuria, leaking or incontinence.  MUSCULOSKELETAL:  Denies back pain or joint pain.   INTEGUMENT:  Denies rash.   ENDOCRINE:  Denies polyuria or polydipsia.   LYMPHATIC:  Denies swollen glands or lymphedema.   PSYCHIATRIC:  Denies depression or anxiety.        PHYSICAL EXAM:  Vitals:    10/26/21 1524   BP: 105/70   Pulse: 102   Resp: 20   Temp: 97.3 °F (36.3 °C)   TempSrc: Oral   SpO2: 95%   Weight: 80.8 kg (178 lb 1.6 oz)   Height: 163.8 cm (64.5\")  Comment: new patient height   PainSc: 0-No pain     Body mass index is 30.1 kg/m².  Current Status 10/26/2021   ECOG score 0     PHQ-9 Total Score:         GEN: alert and oriented x 3, normal affect, well " nourished and hydrated.  CARDIO: regular rate and rhythm.  PULM: Lungs CTA b.l, no RRW   ABD: Soft, nontender, nondistended.   GYN: External genitalia normal, no lesions. Speculum with normal vagina, normal appearingcervix without lesions. Bimanual exam with mild adnexal fullness on R>L   EXT: No petechiae, bruising, rash, candida. No CCE.       Result Review :  The pertinent labs, images, and/or pathology as noted in the oncology history were reviewed independently and discussed with the patient.   Kaylynn FOREMAN Radhames, DO   10/26/2021    [facility] [place] LABS:   WBC   Date Value Ref Range Status   10/19/2021 6.32 3.40 - 10.80 10*3/mm3 Final     RBC   Date Value Ref Range Status   10/19/2021 4.73 3.77 - 5.28 10*6/mm3 Final     Hemoglobin   Date Value Ref Range Status   10/19/2021 13.8 12.0 - 15.9 g/dL Final     Hematocrit   Date Value Ref Range Status   10/19/2021 42.9 34.0 - 46.6 % Final     Platelets   Date Value Ref Range Status   10/19/2021 301 140 - 450 10*3/mm3 Final     No results found for:     [facility] IMAGING:  CT Chest With Contrast Diagnostic    Result Date: 10/13/2021  1. There is a new 5.2 x 4.6 cm mixed cystic and solid right ovarian mass. Gynecology consultation is recommended. There is no free fluid or lymphadenopathy within the abdomen or pelvis. 2. Continued stability of several bilateral pulmonary nodules. There is no new abnormality within the chest. Stable sclerotic bone metastases.  This report was finalized on 10/13/2021 4:26 PM by Dr. La Wilde M.D.      CT Abdomen Pelvis With Contrast    Result Date: 10/13/2021  1. There is a new 5.2 x 4.6 cm mixed cystic and solid right ovarian mass. Gynecology consultation is recommended. There is no free fluid or lymphadenopathy within the abdomen or pelvis. 2. Continued stability of several bilateral pulmonary nodules. There is no new abnormality within the chest. Stable sclerotic bone metastases.  This report was finalized on 10/13/2021 4:26 PM by Dr. La Wilde  M.D.          ASSESSMENT :  • New right adnexal mass 6cm in the setting of metastatic breast cancer   • H/o breast cancer - bone and mediastinal/pulm mets  • H/o Tongue cancer   • H/o SCC skin   • Vocal cord paralysis   • HLD   • Anxiety           PLAN :  We discussed that this could be metastatic breast, ovarian de gil cancer or benign. The patient is amenable to surgery to gain definitive diagnosis.       The case was reviewed with the patient in detail, taking into consideration symptoms, laboratory results, imaging, pathology and physical exam findings.  At this point in time, I am recommending Robotic assisted total laparoscopic hysterectomy, bilateral salpingoophorecotmy, sentinel lymph node mapping, possible staging, possible laparotomy.   .     Risks, benefits, alternatives and indications to the procedure were reviewed in detail.    Risks of surgery include bleeding/hemorrhage which may require transfusion and associated transfusion risk (transfusion reaction, HCV, TRALI ); Infection, which may require antibiotic therapy or abscess drainage; Damage to surrounding internal organs (bowel, bladder, or urinary tract) which may result in obstruction or fistula; Nerve, or vascular injury which may result in numbness, pain, swelling or loss of strength. Risk of possible incomplete resolution of symptoms or failure to cure.  She understands that it is possible that intraoperative findings may warrant further treatment.  There is a low, but real, risk of unanticipated return to the OR for a problem associated with the surgery and a remote possibility of death.      • All questions were addressed and answered to the patient's satisfaction. She indicates understanding of these risks and desires to proceed. She wishes me to use my judgement to do the procedure that I feel is in her best interest. Surgical consents were signed.  •             Kaylynn Ricci D.O  10/26/2021    Gynecologic Oncology   [facility]  [address]  [place] [address]  [phone] office

## 2021-11-02 ENCOUNTER — APPOINTMENT (OUTPATIENT)
Dept: OTHER | Facility: HOSPITAL | Age: 66
End: 2021-11-02

## 2021-11-02 ENCOUNTER — PRE-ADMISSION TESTING (OUTPATIENT)
Dept: PREADMISSION TESTING | Facility: HOSPITAL | Age: 66
End: 2021-11-02

## 2021-11-02 ENCOUNTER — HOSPITAL ENCOUNTER (OUTPATIENT)
Dept: GENERAL RADIOLOGY | Facility: HOSPITAL | Age: 66
Discharge: HOME | End: 2021-11-02

## 2021-11-02 VITALS
DIASTOLIC BLOOD PRESSURE: 83 MMHG | WEIGHT: 177 LBS | HEART RATE: 99 BPM | HEIGHT: 65 IN | RESPIRATION RATE: 16 BRPM | BODY MASS INDEX: 29.49 KG/M2 | OXYGEN SATURATION: 96 % | TEMPERATURE: 97.9 F | SYSTOLIC BLOOD PRESSURE: 152 MMHG

## 2021-11-02 DIAGNOSIS — Z79.01 ANTICOAGULATED: ICD-10-CM

## 2021-11-02 DIAGNOSIS — N83.8 OVARIAN MASS, RIGHT: ICD-10-CM

## 2021-11-02 LAB
ABO GROUP BLD: NORMAL
ANION GAP SERPL CALCULATED.3IONS-SCNC: 11.1 MMOL/L (ref 5–15)
APTT PPP: 30.3 SECONDS (ref 22.7–35.4)
B-HCG UR QL: NEGATIVE
BACTERIA UR QL AUTO: ABNORMAL /HPF
BASOPHILS # BLD AUTO: 0.03 10*3/MM3 (ref 0–0.2)
BASOPHILS NFR BLD AUTO: 0.7 % (ref 0–1.5)
BILIRUB UR QL STRIP: NEGATIVE
BLD GP AB SCN SERPL QL: NEGATIVE
BUN SERPL-MCNC: 15 MG/DL (ref 8–23)
BUN/CREAT SERPL: 13.6 (ref 7–25)
CALCIUM SPEC-SCNC: 9.8 MG/DL (ref 8.6–10.5)
CHLORIDE SERPL-SCNC: 108 MMOL/L (ref 98–107)
CLARITY UR: CLEAR
CO2 SERPL-SCNC: 20.9 MMOL/L (ref 22–29)
COLOR UR: YELLOW
CREAT SERPL-MCNC: 1.1 MG/DL (ref 0.57–1)
DEPRECATED RDW RBC AUTO: 40.7 FL (ref 37–54)
EOSINOPHIL # BLD AUTO: 0.12 10*3/MM3 (ref 0–0.4)
EOSINOPHIL NFR BLD AUTO: 2.8 % (ref 0.3–6.2)
ERYTHROCYTE [DISTWIDTH] IN BLOOD BY AUTOMATED COUNT: 12.4 % (ref 12.3–15.4)
GFR SERPL CREATININE-BSD FRML MDRD: 50 ML/MIN/1.73
GLUCOSE SERPL-MCNC: 115 MG/DL (ref 65–99)
GLUCOSE UR STRIP-MCNC: NEGATIVE MG/DL
HCT VFR BLD AUTO: 43.3 % (ref 34–46.6)
HGB BLD-MCNC: 14.4 G/DL (ref 12–15.9)
HGB UR QL STRIP.AUTO: ABNORMAL
HYALINE CASTS UR QL AUTO: ABNORMAL /LPF
IMM GRANULOCYTES # BLD AUTO: 0.01 10*3/MM3 (ref 0–0.05)
IMM GRANULOCYTES NFR BLD AUTO: 0.2 % (ref 0–0.5)
INR PPP: 1 (ref 0.9–1.1)
KETONES UR QL STRIP: NEGATIVE
LEUKOCYTE ESTERASE UR QL STRIP.AUTO: NEGATIVE
LYMPHOCYTES # BLD AUTO: 1.16 10*3/MM3 (ref 0.7–3.1)
LYMPHOCYTES NFR BLD AUTO: 27.3 % (ref 19.6–45.3)
MCH RBC QN AUTO: 29.7 PG (ref 26.6–33)
MCHC RBC AUTO-ENTMCNC: 33.3 G/DL (ref 31.5–35.7)
MCV RBC AUTO: 89.3 FL (ref 79–97)
MONOCYTES # BLD AUTO: 0.26 10*3/MM3 (ref 0.1–0.9)
MONOCYTES NFR BLD AUTO: 6.1 % (ref 5–12)
NEUTROPHILS NFR BLD AUTO: 2.67 10*3/MM3 (ref 1.7–7)
NEUTROPHILS NFR BLD AUTO: 62.9 % (ref 42.7–76)
NITRITE UR QL STRIP: NEGATIVE
NRBC BLD AUTO-RTO: 0 /100 WBC (ref 0–0.2)
PH UR STRIP.AUTO: 5.5 [PH] (ref 5–8)
PLATELET # BLD AUTO: 314 10*3/MM3 (ref 140–450)
PMV BLD AUTO: 8.6 FL (ref 6–12)
POTASSIUM SERPL-SCNC: 3.4 MMOL/L (ref 3.5–5.2)
PROT UR QL STRIP: ABNORMAL
PROTHROMBIN TIME: 13 SECONDS (ref 11.7–14.2)
QT INTERVAL: 362 MS
RBC # BLD AUTO: 4.85 10*6/MM3 (ref 3.77–5.28)
RBC # UR: ABNORMAL /HPF
REF LAB TEST METHOD: ABNORMAL
RH BLD: POSITIVE
SARS-COV-2 ORF1AB RESP QL NAA+PROBE: NOT DETECTED
SODIUM SERPL-SCNC: 140 MMOL/L (ref 136–145)
SP GR UR STRIP: 1.01 (ref 1–1.03)
SQUAMOUS #/AREA URNS HPF: ABNORMAL /HPF
T&S EXPIRATION DATE: NORMAL
UROBILINOGEN UR QL STRIP: ABNORMAL
WBC # BLD AUTO: 4.25 10*3/MM3 (ref 3.4–10.8)
WBC UR QL AUTO: ABNORMAL /HPF

## 2021-11-02 PROCEDURE — 86900 BLOOD TYPING SEROLOGIC ABO: CPT

## 2021-11-02 PROCEDURE — 85730 THROMBOPLASTIN TIME PARTIAL: CPT

## 2021-11-02 PROCEDURE — 81025 URINE PREGNANCY TEST: CPT

## 2021-11-02 PROCEDURE — U0005 INFEC AGEN DETEC AMPLI PROBE: HCPCS

## 2021-11-02 PROCEDURE — 71046 X-RAY EXAM CHEST 2 VIEWS: CPT

## 2021-11-02 PROCEDURE — C9803 HOPD COVID-19 SPEC COLLECT: HCPCS

## 2021-11-02 PROCEDURE — 86850 RBC ANTIBODY SCREEN: CPT

## 2021-11-02 PROCEDURE — U0004 COV-19 TEST NON-CDC HGH THRU: HCPCS

## 2021-11-02 PROCEDURE — 85610 PROTHROMBIN TIME: CPT

## 2021-11-02 PROCEDURE — 93005 ELECTROCARDIOGRAM TRACING: CPT

## 2021-11-02 PROCEDURE — 93010 ELECTROCARDIOGRAM REPORT: CPT | Performed by: INTERNAL MEDICINE

## 2021-11-02 PROCEDURE — 81001 URINALYSIS AUTO W/SCOPE: CPT

## 2021-11-02 PROCEDURE — 86901 BLOOD TYPING SEROLOGIC RH(D): CPT

## 2021-11-02 PROCEDURE — 36415 COLL VENOUS BLD VENIPUNCTURE: CPT

## 2021-11-02 PROCEDURE — 85025 COMPLETE CBC W/AUTO DIFF WBC: CPT

## 2021-11-02 PROCEDURE — 80048 BASIC METABOLIC PNL TOTAL CA: CPT

## 2021-11-02 RX ORDER — CHLORHEXIDINE GLUCONATE 0.12 MG/ML
15 RINSE ORAL EVERY 12 HOURS SCHEDULED
Status: DISPENSED | OUTPATIENT
Start: 2021-11-02

## 2021-11-02 RX ORDER — CHLORHEXIDINE GLUCONATE 500 MG/1
CLOTH TOPICAL
COMMUNITY
End: 2021-11-05 | Stop reason: HOSPADM

## 2021-11-02 RX ORDER — CHLORHEXIDINE GLUCONATE 0.12 MG/ML
15 RINSE ORAL 2 TIMES DAILY
COMMUNITY
End: 2021-11-05 | Stop reason: HOSPADM

## 2021-11-02 NOTE — DISCHARGE INSTRUCTIONS
Take the following medications the morning of surgery:    BUSPAR  OMEPRAZOLE    ARRIVE AT 0800.      If you are on prescription narcotic pain medication to control your pain you may also take that medication the morning of surgery.    General Instructions:  • Do not eat solid food after midnight the night before surgery.  • You may drink clear liquids day of surgery but must stop at least one hour before your hospital arrival time.  • It is beneficial for you to have a clear drink that contains carbohydrates the day of surgery.  We suggest a 12 to 20 ounce bottle of Gatorade or Powerade for non-diabetic patients or a 12 to 20 ounce bottle of G2 or Powerade Zero for diabetic patients. (Pediatric patients, are not advised to drink a 12 to 20 ounce carbohydrate drink)    Clear liquids are liquids you can see through.  Nothing red in color.     Plain water                               Sports drinks  Sodas                                   Gelatin (Jell-O)  Fruit juices without pulp such as white grape juice and apple juice  Popsicles that contain no fruit or yogurt  Tea or coffee (no cream or milk added)  Gatorade / Powerade  G2 / Powerade Zero    • Infants may have breast milk up to four hours before surgery.  • Infants drinking formula may drink formula up to six hours before surgery.   • Patients who avoid smoking, chewing tobacco and alcohol for 4 weeks prior to surgery have a reduced risk of post-operative complications.  Quit smoking as many days before surgery as you can.  • Do not smoke, use chewing tobacco or drink alcohol the day of surgery.   • If applicable bring your C-PAP/ BI-PAP machine.  • Bring any papers given to you in the doctor’s office.  • Wear clean comfortable clothes.  • Do not wear contact lenses, false eyelashes or make-up.  Bring a case for your glasses.   • Bring crutches or walker if applicable.  • Remove all piercings.  Leave jewelry and any other valuables at home.  • Hair extensions  with metal clips must be removed prior to surgery.  • The Pre-Admission Testing nurse will instruct you to bring medications if unable to obtain an accurate list in Pre-Admission Testing.        If you were given a blood bank ID arm band remember to bring it with you the day of surgery.    Preventing a Surgical Site Infection:  • For 2 to 3 days before surgery, avoid shaving with a razor because the razor can irritate skin and make it easier to develop an infection.    • Any areas of open skin can increase the risk of a post-operative wound infection by allowing bacteria to enter and travel throughout the body.  Notify your surgeon if you have any skin wounds / rashes even if it is not near the expected surgical site.  The area will need assessed to determine if surgery should be delayed until it is healed.  • The night prior to surgery shower using a fresh bar of anti-bacterial soap (such as Dial) and clean washcloth.  Sleep in a clean bed with clean clothing.  Do not allow pets to sleep with you.  • Shower on the morning of surgery using a fresh bar of anti-bacterial soap (such as Dial) and clean washcloth.  Dry with a clean towel and dress in clean clothing.  • Ask your surgeon if you will be receiving antibiotics prior to surgery.  • Make sure you, your family, and all healthcare providers clean their hands with soap and water or an alcohol based hand  before caring for you or your wound.    Day of surgery:  Your arrival time is approximately two hours before your scheduled surgery time.  Upon arrival, a Pre-op nurse and Anesthesiologist will review your health history, obtain vital signs, and answer questions you may have.  The only belongings needed at this time will be a list of your home medications and if applicable your C-PAP/BI-PAP machine.  A Pre-op nurse will start an IV and you may receive medication in preparation for surgery, including something to help you relax.     Please be aware that  surgery does come with discomfort.  We want to make every effort to control your discomfort so please discuss any uncontrolled symptoms with your nurse.   Your doctor will most likely have prescribed pain medications.      If you are going home after surgery you will receive individualized written care instructions before being discharged.  A responsible adult must drive you to and from the hospital on the day of your surgery and stay with you for 24 hours.  Discharge prescriptions can be filled by the hospital pharmacy during regular pharmacy hours.  If you are having surgery late in the day/evening your prescription may be e-prescribed to your pharmacy.  Please verify your pharmacy hours or chose a 24 hour pharmacy to avoid not having access to your prescription because your pharmacy has closed for the day.    If you are staying overnight following surgery, you will be transported to your hospital room following the recovery period.  The [facility] has all private rooms.    If you have any questions please call Pre-Admission Testing at [phone].  Deductibles and co-payments are collected on the day of service. Please be prepared to pay the required co-pay, deductible or deposit on the day of service as defined by your plan.    Patient Education for Self-Quarantine Process    • Following your COVID testing, we strongly recommend that you wear a mask when you are with other people and practice social distancing.   • Limit your activities to only required outings.  • Wash your hands with soap and water frequently for at least 20 seconds.   • Avoid touching your eyes, nose and mouth with unwashed hands.  • Do not share anything - utensils, drinking glasses, food from the same bowl.   • Sanitize household surfaces daily. Include all high touch areas (door handles, light switches, phones, countertops, etc.)    Call your surgeon immediately if you experience any of the following symptoms:  • Sore  Throat  • Shortness of Breath or difficulty breathing  • Cough  • Chills  • Body soreness or muscle pain  • Headache  • Fever  • New loss of taste or smell  • Do not arrive for your surgery ill.  Your procedure will need to be rescheduled to another time.  You will need to call your physician before the day of surgery to avoid any unnecessary exposure to hospital staff as well as other patients.      CHLORHEXIDINE CLOTH INSTRUCTIONS  The morning of surgery follow these instructions using the Chlorhexidine cloths you've been given.  These steps reduce bacteria on the body.  Do not use the cloths near your eyes, ears mouth, genitalia or on open wounds.  Throw the cloths away after use but do not try to flush them down a toilet.      • Open and remove one cloth at a time from the package.    • Leave the cloth unfolded and begin the bathing.  • Massage the skin with the cloths using gentle pressure to remove bacteria.  Do not scrub harshly.   • Follow the steps below with one 2% CHG cloth per area (6 total cloths).  • One cloth for neck, shoulders and chest.  • One cloth for both arms, hands, fingers and underarms (do underarms last).  • One cloth for the abdomen followed by groin.  • One cloth for right leg and foot including between the toes.  • One cloth for left leg and foot including between the toes.  • The last cloth is to be used for the back of the neck, back and buttocks.    Allow the CHG to air dry 3 minutes on the skin which will give it time to work and decrease the chance of irritation.  The skin may feel sticky until it is dry.  Do not rinse with water or any other liquid or you will lose the beneficial effects of the CHG.  If mild skin irritation occurs, do rinse the skin to remove the CHG.  Report this to the nurse at time of admission.  Do not apply lotions, creams, ointments, deodorants or perfumes after using the clothes. Dress in clean clothes before coming to the hospital.    BACTROBAN NASAL  OINTMENT  There are many germs normally in your nose. Bactroban is an ointment that will help reduce these germs. Please follow these instructions for Bactroban use:      ____The day before surgery in the morning  Date________    ____The day before surgery in the evening              Date________    ____The day of surgery in the morning    Date________    **Squirt ½ package of Bactroban Ointment onto a cotton applicator and apply to inside of 1st nostril.  Squirt the remaining Bactroban and apply to the inside of the other nostril.    PERIDEX- ORAL:  Use only if your surgeon has ordered  Use the night before and morning of surgery - Swish, gargle, and spit - do not swallow.

## 2021-11-04 ENCOUNTER — ANESTHESIA EVENT (OUTPATIENT)
Dept: PERIOP | Facility: HOSPITAL | Age: 66
End: 2021-11-04

## 2021-11-04 ENCOUNTER — HOSPITAL ENCOUNTER (OUTPATIENT)
Facility: HOSPITAL | Age: 66
Discharge: HOME | End: 2021-11-05
Attending: OBSTETRICS & GYNECOLOGY | Admitting: OBSTETRICS & GYNECOLOGY

## 2021-11-04 ENCOUNTER — ANESTHESIA (OUTPATIENT)
Dept: PERIOP | Facility: HOSPITAL | Age: 66
End: 2021-11-04

## 2021-11-04 DIAGNOSIS — N83.8 OVARIAN MASS, RIGHT: ICD-10-CM

## 2021-11-04 PROCEDURE — 88341 IMHCHEM/IMCYTCHM EA ADD ANTB: CPT | Performed by: OBSTETRICS & GYNECOLOGY

## 2021-11-04 PROCEDURE — 88331 PATH CONSLTJ SURG 1 BLK 1SPC: CPT | Performed by: OBSTETRICS & GYNECOLOGY

## 2021-11-04 PROCEDURE — 25010000002 MIDAZOLAM PER 1 MG: Performed by: ANESTHESIOLOGY

## 2021-11-04 PROCEDURE — 25010000002 HYDROMORPHONE PER 4 MG: Performed by: NURSE ANESTHETIST, CERTIFIED REGISTERED

## 2021-11-04 PROCEDURE — 58571 TLH W/T/O 250 G OR LESS: CPT | Performed by: OBSTETRICS & GYNECOLOGY

## 2021-11-04 PROCEDURE — 25010000002 PROPOFOL 10 MG/ML EMULSION: Performed by: NURSE ANESTHETIST, CERTIFIED REGISTERED

## 2021-11-04 PROCEDURE — 0 CEFAZOLIN IN DEXTROSE 2-4 GM/100ML-% SOLUTION: Performed by: OBSTETRICS & GYNECOLOGY

## 2021-11-04 PROCEDURE — 88305 TISSUE EXAM BY PATHOLOGIST: CPT | Performed by: OBSTETRICS & GYNECOLOGY

## 2021-11-04 PROCEDURE — 88307 TISSUE EXAM BY PATHOLOGIST: CPT | Performed by: OBSTETRICS & GYNECOLOGY

## 2021-11-04 PROCEDURE — 25010000002 DEXAMETHASONE PER 1 MG: Performed by: NURSE ANESTHETIST, CERTIFIED REGISTERED

## 2021-11-04 PROCEDURE — 88342 IMHCHEM/IMCYTCHM 1ST ANTB: CPT | Performed by: OBSTETRICS & GYNECOLOGY

## 2021-11-04 PROCEDURE — 25010000002 KETOROLAC TROMETHAMINE PER 15 MG: Performed by: OBSTETRICS & GYNECOLOGY

## 2021-11-04 PROCEDURE — 25010000002 HEPARIN (PORCINE) PER 1000 UNITS: Performed by: OBSTETRICS & GYNECOLOGY

## 2021-11-04 PROCEDURE — 25010000002 ONDANSETRON PER 1 MG: Performed by: NURSE ANESTHETIST, CERTIFIED REGISTERED

## 2021-11-04 PROCEDURE — 25010000002 NEOSTIGMINE 5 MG/10ML SOLUTION: Performed by: NURSE ANESTHETIST, CERTIFIED REGISTERED

## 2021-11-04 PROCEDURE — 25010000002 FENTANYL CITRATE (PF) 50 MCG/ML SOLUTION: Performed by: ANESTHESIOLOGY

## 2021-11-04 PROCEDURE — 25010000002 FENTANYL CITRATE (PF) 50 MCG/ML SOLUTION: Performed by: NURSE ANESTHETIST, CERTIFIED REGISTERED

## 2021-11-04 PROCEDURE — 88332 PATH CONSLTJ SURG EA ADD BLK: CPT | Performed by: OBSTETRICS & GYNECOLOGY

## 2021-11-04 PROCEDURE — C1889 IMPLANT/INSERT DEVICE, NOC: HCPCS | Performed by: OBSTETRICS & GYNECOLOGY

## 2021-11-04 PROCEDURE — 25010000002 HYDROMORPHONE PER 4 MG: Performed by: OBSTETRICS & GYNECOLOGY

## 2021-11-04 PROCEDURE — 88360 TUMOR IMMUNOHISTOCHEM/MANUAL: CPT | Performed by: OBSTETRICS & GYNECOLOGY

## 2021-11-04 PROCEDURE — G0378 HOSPITAL OBSERVATION PER HR: HCPCS

## 2021-11-04 PROCEDURE — 88112 CYTOPATH CELL ENHANCE TECH: CPT | Performed by: OBSTETRICS & GYNECOLOGY

## 2021-11-04 DEVICE — SEALANT WND FIBRIN TISSEEL PREFIL/SYR/PRIMAFZ 10ML: Type: IMPLANTABLE DEVICE | Site: PELVIS | Status: FUNCTIONAL

## 2021-11-04 RX ORDER — SIMETHICONE 80 MG
120 TABLET,CHEWABLE ORAL
Status: DISCONTINUED | OUTPATIENT
Start: 2021-11-04 | End: 2021-11-05 | Stop reason: HOSPADM

## 2021-11-04 RX ORDER — FENTANYL CITRATE 50 UG/ML
INJECTION, SOLUTION INTRAMUSCULAR; INTRAVENOUS AS NEEDED
Status: DISCONTINUED | OUTPATIENT
Start: 2021-11-04 | End: 2021-11-04 | Stop reason: SURG

## 2021-11-04 RX ORDER — GLYCOPYRROLATE 0.2 MG/ML
INJECTION INTRAMUSCULAR; INTRAVENOUS AS NEEDED
Status: DISCONTINUED | OUTPATIENT
Start: 2021-11-04 | End: 2021-11-04 | Stop reason: SURG

## 2021-11-04 RX ORDER — SODIUM CHLORIDE 0.9 % (FLUSH) 0.9 %
10 SYRINGE (ML) INJECTION AS NEEDED
Status: DISCONTINUED | OUTPATIENT
Start: 2021-11-04 | End: 2021-11-04 | Stop reason: HOSPADM

## 2021-11-04 RX ORDER — HYDROMORPHONE HCL 110MG/55ML
PATIENT CONTROLLED ANALGESIA SYRINGE INTRAVENOUS AS NEEDED
Status: DISCONTINUED | OUTPATIENT
Start: 2021-11-04 | End: 2021-11-04 | Stop reason: SURG

## 2021-11-04 RX ORDER — HYDROMORPHONE HYDROCHLORIDE 1 MG/ML
0.5 INJECTION, SOLUTION INTRAMUSCULAR; INTRAVENOUS; SUBCUTANEOUS
Status: DISCONTINUED | OUTPATIENT
Start: 2021-11-04 | End: 2021-11-05 | Stop reason: HOSPADM

## 2021-11-04 RX ORDER — OXYCODONE AND ACETAMINOPHEN 10; 325 MG/1; MG/1
1 TABLET ORAL EVERY 4 HOURS PRN
Status: DISCONTINUED | OUTPATIENT
Start: 2021-11-04 | End: 2021-11-04 | Stop reason: HOSPADM

## 2021-11-04 RX ORDER — HYDROMORPHONE HYDROCHLORIDE 1 MG/ML
0.5 INJECTION, SOLUTION INTRAMUSCULAR; INTRAVENOUS; SUBCUTANEOUS
Status: DISCONTINUED | OUTPATIENT
Start: 2021-11-04 | End: 2021-11-04 | Stop reason: HOSPADM

## 2021-11-04 RX ORDER — FLUMAZENIL 0.1 MG/ML
0.2 INJECTION INTRAVENOUS AS NEEDED
Status: DISCONTINUED | OUTPATIENT
Start: 2021-11-04 | End: 2021-11-04 | Stop reason: HOSPADM

## 2021-11-04 RX ORDER — SODIUM CHLORIDE 0.9 % (FLUSH) 0.9 %
10 SYRINGE (ML) INJECTION EVERY 12 HOURS SCHEDULED
Status: DISCONTINUED | OUTPATIENT
Start: 2021-11-04 | End: 2021-11-04 | Stop reason: HOSPADM

## 2021-11-04 RX ORDER — FENTANYL CITRATE 50 UG/ML
INJECTION, SOLUTION INTRAMUSCULAR; INTRAVENOUS
Status: COMPLETED | OUTPATIENT
Start: 2021-11-04 | End: 2021-11-04

## 2021-11-04 RX ORDER — EPHEDRINE SULFATE 50 MG/ML
5 INJECTION, SOLUTION INTRAVENOUS ONCE AS NEEDED
Status: DISCONTINUED | OUTPATIENT
Start: 2021-11-04 | End: 2021-11-04 | Stop reason: HOSPADM

## 2021-11-04 RX ORDER — ONDANSETRON 2 MG/ML
4 INJECTION INTRAMUSCULAR; INTRAVENOUS EVERY 6 HOURS PRN
Status: DISCONTINUED | OUTPATIENT
Start: 2021-11-04 | End: 2021-11-05 | Stop reason: HOSPADM

## 2021-11-04 RX ORDER — FAMOTIDINE 10 MG/ML
20 INJECTION, SOLUTION INTRAVENOUS ONCE
Status: COMPLETED | OUTPATIENT
Start: 2021-11-04 | End: 2021-11-04

## 2021-11-04 RX ORDER — FENTANYL CITRATE 50 UG/ML
50 INJECTION, SOLUTION INTRAMUSCULAR; INTRAVENOUS
Status: DISCONTINUED | OUTPATIENT
Start: 2021-11-04 | End: 2021-11-04 | Stop reason: HOSPADM

## 2021-11-04 RX ORDER — HYDROCODONE BITARTRATE AND ACETAMINOPHEN 10; 325 MG/1; MG/1
1 TABLET ORAL EVERY 4 HOURS PRN
Status: DISCONTINUED | OUTPATIENT
Start: 2021-11-04 | End: 2021-11-05 | Stop reason: HOSPADM

## 2021-11-04 RX ORDER — LIDOCAINE HYDROCHLORIDE 40 MG/ML
SOLUTION TOPICAL AS NEEDED
Status: DISCONTINUED | OUTPATIENT
Start: 2021-11-04 | End: 2021-11-04 | Stop reason: SURG

## 2021-11-04 RX ORDER — LABETALOL HYDROCHLORIDE 5 MG/ML
5 INJECTION, SOLUTION INTRAVENOUS
Status: DISCONTINUED | OUTPATIENT
Start: 2021-11-04 | End: 2021-11-04 | Stop reason: HOSPADM

## 2021-11-04 RX ORDER — CALCIUM CARBONATE 200(500)MG
2 TABLET,CHEWABLE ORAL 3 TIMES DAILY PRN
Status: DISCONTINUED | OUTPATIENT
Start: 2021-11-04 | End: 2021-11-05 | Stop reason: HOSPADM

## 2021-11-04 RX ORDER — PROPOFOL 10 MG/ML
VIAL (ML) INTRAVENOUS AS NEEDED
Status: DISCONTINUED | OUTPATIENT
Start: 2021-11-04 | End: 2021-11-04 | Stop reason: SURG

## 2021-11-04 RX ORDER — HYDROCODONE BITARTRATE AND ACETAMINOPHEN 7.5; 325 MG/1; MG/1
1 TABLET ORAL ONCE AS NEEDED
Status: DISCONTINUED | OUTPATIENT
Start: 2021-11-04 | End: 2021-11-04 | Stop reason: HOSPADM

## 2021-11-04 RX ORDER — SODIUM CHLORIDE 9 MG/ML
100 INJECTION, SOLUTION INTRAVENOUS CONTINUOUS
Status: DISCONTINUED | OUTPATIENT
Start: 2021-11-04 | End: 2021-11-05 | Stop reason: HOSPADM

## 2021-11-04 RX ORDER — DIPHENHYDRAMINE HCL 25 MG
25 CAPSULE ORAL NIGHTLY PRN
Status: DISCONTINUED | OUTPATIENT
Start: 2021-11-04 | End: 2021-11-05 | Stop reason: HOSPADM

## 2021-11-04 RX ORDER — PROMETHAZINE HYDROCHLORIDE 25 MG/1
25 TABLET ORAL ONCE AS NEEDED
Status: DISCONTINUED | OUTPATIENT
Start: 2021-11-04 | End: 2021-11-04 | Stop reason: HOSPADM

## 2021-11-04 RX ORDER — MIDAZOLAM HYDROCHLORIDE 1 MG/ML
0.5 INJECTION INTRAMUSCULAR; INTRAVENOUS
Status: DISCONTINUED | OUTPATIENT
Start: 2021-11-04 | End: 2021-11-04 | Stop reason: HOSPADM

## 2021-11-04 RX ORDER — IBUPROFEN 600 MG/1
600 TABLET ORAL ONCE AS NEEDED
Status: DISCONTINUED | OUTPATIENT
Start: 2021-11-04 | End: 2021-11-04 | Stop reason: HOSPADM

## 2021-11-04 RX ORDER — ONDANSETRON 2 MG/ML
4 INJECTION INTRAMUSCULAR; INTRAVENOUS ONCE AS NEEDED
Status: DISCONTINUED | OUTPATIENT
Start: 2021-11-04 | End: 2021-11-04 | Stop reason: HOSPADM

## 2021-11-04 RX ORDER — DOCUSATE SODIUM 100 MG/1
100 CAPSULE, LIQUID FILLED ORAL 2 TIMES DAILY PRN
Status: DISCONTINUED | OUTPATIENT
Start: 2021-11-04 | End: 2021-11-05 | Stop reason: HOSPADM

## 2021-11-04 RX ORDER — KETOROLAC TROMETHAMINE 15 MG/ML
15 INJECTION, SOLUTION INTRAMUSCULAR; INTRAVENOUS EVERY 8 HOURS
Status: DISCONTINUED | OUTPATIENT
Start: 2021-11-04 | End: 2021-11-05 | Stop reason: HOSPADM

## 2021-11-04 RX ORDER — HYDROCODONE BITARTRATE AND ACETAMINOPHEN 5; 325 MG/1; MG/1
1 TABLET ORAL EVERY 4 HOURS PRN
Status: DISCONTINUED | OUTPATIENT
Start: 2021-11-04 | End: 2021-11-05 | Stop reason: HOSPADM

## 2021-11-04 RX ORDER — SODIUM CHLORIDE 9 MG/ML
INJECTION, SOLUTION INTRAVENOUS CONTINUOUS PRN
Status: DISCONTINUED | OUTPATIENT
Start: 2021-11-04 | End: 2021-11-04 | Stop reason: SURG

## 2021-11-04 RX ORDER — BUPIVACAINE HYDROCHLORIDE AND EPINEPHRINE 5; 5 MG/ML; UG/ML
INJECTION, SOLUTION EPIDURAL; INTRACAUDAL; PERINEURAL AS NEEDED
Status: DISCONTINUED | OUTPATIENT
Start: 2021-11-04 | End: 2021-11-04 | Stop reason: HOSPADM

## 2021-11-04 RX ORDER — NEOSTIGMINE METHYLSULFATE 0.5 MG/ML
INJECTION, SOLUTION INTRAVENOUS AS NEEDED
Status: DISCONTINUED | OUTPATIENT
Start: 2021-11-04 | End: 2021-11-04 | Stop reason: SURG

## 2021-11-04 RX ORDER — ACETAMINOPHEN 500 MG
1000 TABLET ORAL EVERY 8 HOURS
Status: DISCONTINUED | OUTPATIENT
Start: 2021-11-04 | End: 2021-11-05 | Stop reason: HOSPADM

## 2021-11-04 RX ORDER — SODIUM CHLORIDE 0.9 % (FLUSH) 0.9 %
3 SYRINGE (ML) INJECTION EVERY 12 HOURS SCHEDULED
Status: DISCONTINUED | OUTPATIENT
Start: 2021-11-04 | End: 2021-11-04 | Stop reason: HOSPADM

## 2021-11-04 RX ORDER — BUSPIRONE HYDROCHLORIDE 10 MG/1
10 TABLET ORAL 2 TIMES DAILY
Status: DISCONTINUED | OUTPATIENT
Start: 2021-11-04 | End: 2021-11-05 | Stop reason: HOSPADM

## 2021-11-04 RX ORDER — ZOLPIDEM TARTRATE 5 MG/1
5 TABLET ORAL NIGHTLY
Status: DISCONTINUED | OUTPATIENT
Start: 2021-11-04 | End: 2021-11-05 | Stop reason: HOSPADM

## 2021-11-04 RX ORDER — LOSARTAN POTASSIUM 50 MG/1
50 TABLET ORAL DAILY
Status: DISCONTINUED | OUTPATIENT
Start: 2021-11-04 | End: 2021-11-05 | Stop reason: HOSPADM

## 2021-11-04 RX ORDER — BISACODYL 5 MG/1
10 TABLET, DELAYED RELEASE ORAL DAILY PRN
Status: DISCONTINUED | OUTPATIENT
Start: 2021-11-04 | End: 2021-11-05 | Stop reason: HOSPADM

## 2021-11-04 RX ORDER — PROMETHAZINE HYDROCHLORIDE 25 MG/1
25 SUPPOSITORY RECTAL ONCE AS NEEDED
Status: DISCONTINUED | OUTPATIENT
Start: 2021-11-04 | End: 2021-11-04 | Stop reason: HOSPADM

## 2021-11-04 RX ORDER — BISACODYL 10 MG
10 SUPPOSITORY, RECTAL RECTAL DAILY PRN
Status: DISCONTINUED | OUTPATIENT
Start: 2021-11-04 | End: 2021-11-05 | Stop reason: HOSPADM

## 2021-11-04 RX ORDER — ONDANSETRON 2 MG/ML
INJECTION INTRAMUSCULAR; INTRAVENOUS AS NEEDED
Status: DISCONTINUED | OUTPATIENT
Start: 2021-11-04 | End: 2021-11-04 | Stop reason: SURG

## 2021-11-04 RX ORDER — ANASTROZOLE 1 MG/1
1 TABLET ORAL DAILY
Status: DISCONTINUED | OUTPATIENT
Start: 2021-11-04 | End: 2021-11-05 | Stop reason: HOSPADM

## 2021-11-04 RX ORDER — HEPARIN SODIUM 1000 [USP'U]/ML
INJECTION, SOLUTION INTRAVENOUS; SUBCUTANEOUS AS NEEDED
Status: DISCONTINUED | OUTPATIENT
Start: 2021-11-04 | End: 2021-11-04 | Stop reason: HOSPADM

## 2021-11-04 RX ORDER — LORAZEPAM 0.5 MG/1
0.5 TABLET ORAL EVERY 8 HOURS PRN
Status: DISCONTINUED | OUTPATIENT
Start: 2021-11-04 | End: 2021-11-05 | Stop reason: HOSPADM

## 2021-11-04 RX ORDER — LIDOCAINE HYDROCHLORIDE 20 MG/ML
INJECTION, SOLUTION INFILTRATION; PERINEURAL AS NEEDED
Status: DISCONTINUED | OUTPATIENT
Start: 2021-11-04 | End: 2021-11-04 | Stop reason: SURG

## 2021-11-04 RX ORDER — DEXAMETHASONE SODIUM PHOSPHATE 10 MG/ML
INJECTION INTRAMUSCULAR; INTRAVENOUS AS NEEDED
Status: DISCONTINUED | OUTPATIENT
Start: 2021-11-04 | End: 2021-11-04 | Stop reason: SURG

## 2021-11-04 RX ORDER — SCOLOPAMINE TRANSDERMAL SYSTEM 1 MG/1
1 PATCH, EXTENDED RELEASE TRANSDERMAL ONCE
Status: DISCONTINUED | OUTPATIENT
Start: 2021-11-04 | End: 2021-11-04

## 2021-11-04 RX ORDER — NALOXONE HCL 0.4 MG/ML
0.4 VIAL (ML) INJECTION
Status: DISCONTINUED | OUTPATIENT
Start: 2021-11-04 | End: 2021-11-05 | Stop reason: HOSPADM

## 2021-11-04 RX ORDER — HYDRALAZINE HYDROCHLORIDE 20 MG/ML
5 INJECTION INTRAMUSCULAR; INTRAVENOUS
Status: DISCONTINUED | OUTPATIENT
Start: 2021-11-04 | End: 2021-11-04 | Stop reason: HOSPADM

## 2021-11-04 RX ORDER — MAGNESIUM HYDROXIDE 1200 MG/15ML
LIQUID ORAL AS NEEDED
Status: DISCONTINUED | OUTPATIENT
Start: 2021-11-04 | End: 2021-11-04 | Stop reason: HOSPADM

## 2021-11-04 RX ORDER — DIPHENHYDRAMINE HYDROCHLORIDE 50 MG/ML
25 INJECTION INTRAMUSCULAR; INTRAVENOUS NIGHTLY PRN
Status: DISCONTINUED | OUTPATIENT
Start: 2021-11-04 | End: 2021-11-05 | Stop reason: HOSPADM

## 2021-11-04 RX ORDER — PAROXETINE HYDROCHLORIDE 20 MG/1
40 TABLET, FILM COATED ORAL NIGHTLY
Status: DISCONTINUED | OUTPATIENT
Start: 2021-11-04 | End: 2021-11-05 | Stop reason: HOSPADM

## 2021-11-04 RX ORDER — ATORVASTATIN CALCIUM 20 MG/1
10 TABLET, FILM COATED ORAL NIGHTLY
Status: DISCONTINUED | OUTPATIENT
Start: 2021-11-04 | End: 2021-11-05 | Stop reason: HOSPADM

## 2021-11-04 RX ORDER — ACETAMINOPHEN 160 MG/5ML
650 SOLUTION ORAL EVERY 4 HOURS PRN
Status: DISCONTINUED | OUTPATIENT
Start: 2021-11-04 | End: 2021-11-05 | Stop reason: HOSPADM

## 2021-11-04 RX ORDER — FAMOTIDINE 20 MG/1
20 TABLET, FILM COATED ORAL DAILY
Status: DISCONTINUED | OUTPATIENT
Start: 2021-11-04 | End: 2021-11-05 | Stop reason: HOSPADM

## 2021-11-04 RX ORDER — NALOXONE HCL 0.4 MG/ML
0.1 VIAL (ML) INJECTION
Status: DISCONTINUED | OUTPATIENT
Start: 2021-11-04 | End: 2021-11-05 | Stop reason: HOSPADM

## 2021-11-04 RX ORDER — MIDAZOLAM HYDROCHLORIDE 1 MG/ML
INJECTION INTRAMUSCULAR; INTRAVENOUS
Status: COMPLETED | OUTPATIENT
Start: 2021-11-04 | End: 2021-11-04

## 2021-11-04 RX ORDER — DIPHENHYDRAMINE HCL 25 MG
25 CAPSULE ORAL
Status: DISCONTINUED | OUTPATIENT
Start: 2021-11-04 | End: 2021-11-04 | Stop reason: HOSPADM

## 2021-11-04 RX ORDER — NALOXONE HCL 0.4 MG/ML
0.2 VIAL (ML) INJECTION AS NEEDED
Status: DISCONTINUED | OUTPATIENT
Start: 2021-11-04 | End: 2021-11-04 | Stop reason: HOSPADM

## 2021-11-04 RX ORDER — SODIUM CHLORIDE 9 MG/ML
INJECTION, SOLUTION INTRAVENOUS AS NEEDED
Status: DISCONTINUED | OUTPATIENT
Start: 2021-11-04 | End: 2021-11-04 | Stop reason: HOSPADM

## 2021-11-04 RX ORDER — ALUMINA, MAGNESIA, AND SIMETHICONE 2400; 2400; 240 MG/30ML; MG/30ML; MG/30ML
15 SUSPENSION ORAL EVERY 6 HOURS
Status: DISCONTINUED | OUTPATIENT
Start: 2021-11-04 | End: 2021-11-05 | Stop reason: HOSPADM

## 2021-11-04 RX ORDER — SODIUM CHLORIDE, SODIUM LACTATE, POTASSIUM CHLORIDE, CALCIUM CHLORIDE 600; 310; 30; 20 MG/100ML; MG/100ML; MG/100ML; MG/100ML
9 INJECTION, SOLUTION INTRAVENOUS CONTINUOUS
Status: DISCONTINUED | OUTPATIENT
Start: 2021-11-04 | End: 2021-11-05 | Stop reason: HOSPADM

## 2021-11-04 RX ORDER — LIDOCAINE HYDROCHLORIDE 10 MG/ML
0.5 INJECTION, SOLUTION EPIDURAL; INFILTRATION; INTRACAUDAL; PERINEURAL ONCE AS NEEDED
Status: DISCONTINUED | OUTPATIENT
Start: 2021-11-04 | End: 2021-11-04 | Stop reason: HOSPADM

## 2021-11-04 RX ORDER — ROCURONIUM BROMIDE 10 MG/ML
INJECTION, SOLUTION INTRAVENOUS AS NEEDED
Status: DISCONTINUED | OUTPATIENT
Start: 2021-11-04 | End: 2021-11-04 | Stop reason: SURG

## 2021-11-04 RX ORDER — ONDANSETRON 4 MG/1
4 TABLET, FILM COATED ORAL EVERY 6 HOURS PRN
Status: DISCONTINUED | OUTPATIENT
Start: 2021-11-04 | End: 2021-11-05 | Stop reason: HOSPADM

## 2021-11-04 RX ORDER — DIPHENHYDRAMINE HYDROCHLORIDE 50 MG/ML
12.5 INJECTION INTRAMUSCULAR; INTRAVENOUS
Status: DISCONTINUED | OUTPATIENT
Start: 2021-11-04 | End: 2021-11-04 | Stop reason: HOSPADM

## 2021-11-04 RX ORDER — SODIUM CHLORIDE 0.9 % (FLUSH) 0.9 %
3-10 SYRINGE (ML) INJECTION AS NEEDED
Status: DISCONTINUED | OUTPATIENT
Start: 2021-11-04 | End: 2021-11-04 | Stop reason: HOSPADM

## 2021-11-04 RX ORDER — CEFAZOLIN SODIUM 2 G/100ML
2 INJECTION, SOLUTION INTRAVENOUS ONCE
Status: COMPLETED | OUTPATIENT
Start: 2021-11-04 | End: 2021-11-04

## 2021-11-04 RX ADMIN — FAMOTIDINE 20 MG: 10 INJECTION INTRAVENOUS at 09:51

## 2021-11-04 RX ADMIN — ACETAMINOPHEN 1000 MG: 500 TABLET, FILM COATED ORAL at 17:27

## 2021-11-04 RX ADMIN — FENTANYL CITRATE 50 MCG: 50 INJECTION INTRAMUSCULAR; INTRAVENOUS at 15:07

## 2021-11-04 RX ADMIN — LIDOCAINE HYDROCHLORIDE 60 MG: 20 INJECTION, SOLUTION INFILTRATION; PERINEURAL at 12:26

## 2021-11-04 RX ADMIN — FENTANYL CITRATE 50 MCG: 0.05 INJECTION, SOLUTION INTRAMUSCULAR; INTRAVENOUS at 12:26

## 2021-11-04 RX ADMIN — PROPOFOL 150 MG: 10 INJECTION, EMULSION INTRAVENOUS at 12:26

## 2021-11-04 RX ADMIN — SODIUM CHLORIDE, POTASSIUM CHLORIDE, SODIUM LACTATE AND CALCIUM CHLORIDE 9 ML/HR: 600; 310; 30; 20 INJECTION, SOLUTION INTRAVENOUS at 09:51

## 2021-11-04 RX ADMIN — FENTANYL CITRATE 50 MCG: 0.05 INJECTION, SOLUTION INTRAMUSCULAR; INTRAVENOUS at 10:32

## 2021-11-04 RX ADMIN — SIMETHICONE 120 MG: 80 TABLET, CHEWABLE ORAL at 20:06

## 2021-11-04 RX ADMIN — CEFAZOLIN SODIUM 2 G: 2 INJECTION, SOLUTION INTRAVENOUS at 12:10

## 2021-11-04 RX ADMIN — HYDROMORPHONE HYDROCHLORIDE 0.5 MG: 2 INJECTION, SOLUTION INTRAMUSCULAR; INTRAVENOUS; SUBCUTANEOUS at 14:10

## 2021-11-04 RX ADMIN — KETOROLAC TROMETHAMINE 15 MG: 15 INJECTION, SOLUTION INTRAMUSCULAR; INTRAVENOUS at 21:34

## 2021-11-04 RX ADMIN — SODIUM CHLORIDE 100 ML/HR: 9 INJECTION, SOLUTION INTRAVENOUS at 17:27

## 2021-11-04 RX ADMIN — MAGNESIUM HYDROXIDE,ALUMINUM HYDROXICE,SIMETHICONE 15 ML: 240; 2400; 2400 SUSPENSION ORAL at 17:26

## 2021-11-04 RX ADMIN — PROPOFOL 25 MG: 10 INJECTION, EMULSION INTRAVENOUS at 12:51

## 2021-11-04 RX ADMIN — MIDAZOLAM 0.5 MG: 1 INJECTION INTRAMUSCULAR; INTRAVENOUS at 09:51

## 2021-11-04 RX ADMIN — BUSPIRONE HYDROCHLORIDE 10 MG: 10 TABLET ORAL at 20:06

## 2021-11-04 RX ADMIN — DEXAMETHASONE SODIUM PHOSPHATE 4 MG: 10 INJECTION INTRAMUSCULAR; INTRAVENOUS at 12:50

## 2021-11-04 RX ADMIN — FAMOTIDINE 20 MG: 20 TABLET, FILM COATED ORAL at 17:27

## 2021-11-04 RX ADMIN — SODIUM CHLORIDE 100 ML/HR: 9 INJECTION, SOLUTION INTRAVENOUS at 17:28

## 2021-11-04 RX ADMIN — ROCURONIUM BROMIDE 50 MG: 50 INJECTION INTRAVENOUS at 12:26

## 2021-11-04 RX ADMIN — SODIUM CHLORIDE: 0.9 INJECTION, SOLUTION INTRAVENOUS at 12:30

## 2021-11-04 RX ADMIN — LIDOCAINE HYDROCHLORIDE 1 EACH: 40 SOLUTION TOPICAL at 12:27

## 2021-11-04 RX ADMIN — SIMETHICONE 120 MG: 80 TABLET, CHEWABLE ORAL at 17:27

## 2021-11-04 RX ADMIN — MIDAZOLAM 1 MG: 1 INJECTION INTRAMUSCULAR; INTRAVENOUS at 10:32

## 2021-11-04 RX ADMIN — ANASTROZOLE 1 MG: 1 TABLET ORAL at 18:35

## 2021-11-04 RX ADMIN — FENTANYL CITRATE 50 MCG: 0.05 INJECTION, SOLUTION INTRAMUSCULAR; INTRAVENOUS at 12:51

## 2021-11-04 RX ADMIN — SCOLOPAMINE TRANSDERMAL SYSTEM 1 PATCH: 1 PATCH, EXTENDED RELEASE TRANSDERMAL at 09:51

## 2021-11-04 RX ADMIN — ONDANSETRON 4 MG: 2 INJECTION INTRAMUSCULAR; INTRAVENOUS at 13:35

## 2021-11-04 RX ADMIN — NEOSTIGMINE METHYLSULFATE 2 MG: 0.5 INJECTION INTRAVENOUS at 14:10

## 2021-11-04 RX ADMIN — PAROXETINE HYDROCHLORIDE HEMIHYDRATE 40 MG: 20 TABLET, FILM COATED ORAL at 20:06

## 2021-11-04 RX ADMIN — ATORVASTATIN CALCIUM 10 MG: 20 TABLET, FILM COATED ORAL at 20:06

## 2021-11-04 RX ADMIN — ZOLPIDEM TARTRATE 5 MG: 5 TABLET ORAL at 20:06

## 2021-11-04 RX ADMIN — HYDROMORPHONE HYDROCHLORIDE 0.5 MG: 1 INJECTION, SOLUTION INTRAMUSCULAR; INTRAVENOUS; SUBCUTANEOUS at 15:34

## 2021-11-04 RX ADMIN — GLYCOPYRROLATE 0.4 MG: 0.2 INJECTION INTRAMUSCULAR; INTRAVENOUS at 14:10

## 2021-11-04 RX ADMIN — HYDROMORPHONE HYDROCHLORIDE 0.5 MG: 1 INJECTION, SOLUTION INTRAMUSCULAR; INTRAVENOUS; SUBCUTANEOUS at 17:27

## 2021-11-04 NOTE — ANESTHESIA PROCEDURE NOTES
Airway  Urgency: elective    Date/Time: 11/4/2021 12:27 PM  Airway not difficult    General Information and Staff    Patient location during procedure: OR  Anesthesiologist: Yoan Garcia MD  CRNA: Kirk Moore CRNA    Indications and Patient Condition  Indications for airway management: airway protection    Preoxygenated: yes  MILS maintained throughout  Mask difficulty assessment: 1 - vent by mask    Final Airway Details  Final airway type: endotracheal airway      Successful airway: ETT  Cuffed: yes   Successful intubation technique: direct laryngoscopy  Endotracheal tube insertion site: oral  Blade: Deb  Blade size: 3  ETT size (mm): 7.0  Cormack-Lehane Classification: grade I - full view of glottis  Placement verified by: chest auscultation and capnometry   Measured from: lips  ETT/EBT  to lips (cm): 20  Number of attempts at approach: 1  Assessment: lips, teeth, and gum same as pre-op and atraumatic intubation

## 2021-11-04 NOTE — ANESTHESIA PREPROCEDURE EVALUATION
Anesthesia Evaluation     Patient summary reviewed and Nursing notes reviewed   history of anesthetic complications: PONV  NPO Solid Status: > 8 hours  NPO Liquid Status: > 2 hours           Airway   Mallampati: II  Neck ROM: full  No difficulty expected  Comment: Hoarse voice  Dental - normal exam     Pulmonary     breath sounds clear to auscultation  (+) a smoker Former, lung cancer, asthma,sleep apnea,     ROS comment: Vocal cord dysfunction, asthma  Cardiovascular     Rhythm: regular    (+) hypertension, hyperlipidemia,       Neuro/Psych  (+) psychiatric history Depression,     GI/Hepatic/Renal/Endo    (+)  GERD,      Musculoskeletal     Abdominal    Substance History      OB/GYN          Other      history of cancer (breast cancer with lung mets, bone mets, oral cancer, skin cancer) active                  Anesthesia Plan    ASA 4     general     intravenous induction     Anesthetic plan, all risks, benefits, and alternatives have been provided, discussed and informed consent has been obtained with: patient.

## 2021-11-04 NOTE — NURSING NOTE
BP low this evening, (88/58), after receiving dose of dilaudid. She denies feeling dizzy. Still with small amount of bleeding to peripad. Heart rate WNL. Will monitor.

## 2021-11-04 NOTE — ANESTHESIA POSTPROCEDURE EVALUATION
"Patient: Marialuisa Vivar    Procedure Summary     Date: 11/04/21 Room / Location: [facility] OR  / [facility] MAIN OR    Anesthesia Start: 1215 Anesthesia Stop: 1443    Procedure: Robotic assisted total laparoscopic hysterectomy, bilateral salpingoophorecotmy, bilateral ureteralysis  (N/A Abdomen) Diagnosis:       Ovarian mass, right      (Ovarian mass, right [N83.8])    Surgeons: Kaylynn Ricci DO Provider: Yoan Garcia MD    Anesthesia Type: general ASA Status: 4          Anesthesia Type: general    Vitals  Vitals Value Taken Time   /60 11/04/21 1606   Temp 36.4 °C (97.5 °F) 11/04/21 1605   Pulse 88 11/04/21 1617   Resp 18 11/04/21 1605   SpO2 100 % 11/04/21 1617   Vitals shown include unvalidated device data.        Post Anesthesia Care and Evaluation    Patient location during evaluation: bedside  Patient participation: complete - patient participated  Level of consciousness: awake and alert  Pain score: 0  Pain management: adequate  Airway patency: patent  Anesthetic complications: No anesthetic complications  PONV Status: none  Cardiovascular status: acceptable and hemodynamically stable  Respiratory status: acceptable and spontaneous ventilation  Hydration status: acceptable    Comments: BP 96/61 (BP Location: Right arm, Patient Position: Lying)   Pulse 92   Temp 36.3 °C (97.3 °F) (Oral)   Resp 18   Ht 163.8 cm (64.5\")   Wt 81.1 kg (178 lb 14.4 oz)   SpO2 95%   BMI 30.23 kg/m²         "

## 2021-11-04 NOTE — NURSING NOTE
Kirk campos anesthesia at bedside to eval pt hoarse voice is how it was pre-op this is normal for pt per pt. It can come and go and she doesn't do anything special when the hoarseness is worse. No RADHA, VSS, pt resting comfortably eyes closed ok to go to floor per anesthesia.

## 2021-11-04 NOTE — INTERVAL H&P NOTE
Pt seen and examined in pre op holding area. There are no changes to her original H & P.   We reviewed the procedure as planned, as well as the benefits, risks, alternatives and possible complications (bleeding, infection, hemorrhage, damage to surrournding structures including bladder or bowel, failure to cure, need for additional treatment, as well as perioperative cardiopulmonary, thromboembolic or other medical events.)   We reviewed post operative care and home instructions.  All questions were answered and she wishes to proceed with surgery.           Kaylynn Ricci D.O  11/4/2021  11:22 EDT    Gynecologic Oncology   [address] [place] [id number]    [phone] office

## 2021-11-04 NOTE — ANESTHESIA PROCEDURE NOTES
Arterial Line      Patient reassessed immediately prior to procedure    Patient location during procedure: holding area  Start time: 11/4/2021 10:20 AM  Stop Time:11/4/2021 10:43 AM       Line placed for hemodynamic monitoring.  Performed By   Anesthesiologist: Yoan Garcia MD  Preanesthetic Checklist  Completed: patient identified, IV checked, site marked, risks and benefits discussed, surgical consent, monitors and equipment checked, pre-op evaluation and timeout performed  Arterial Line Prep   Sterile Tech: mask and cap  Prep: ChloraPrep  Patient monitoring: blood pressure monitoring, continuous pulse oximetry and EKG  Arterial Line Procedure   Laterality:left  Location:  radial artery  Catheter size: 20 G   Guidance: landmark technique  Number of attempts: 2  Successful placement: yes  Post Assessment   Dressing Type: occlusive dressing applied, secured with tape and wrist guard applied.   Complications no  Circ/Move/Sens Assessment: unchanged.   Patient Tolerance: patient tolerated the procedure well with no apparent complications

## 2021-11-04 NOTE — PLAN OF CARE
Goal Outcome Evaluation:  Plan of Care Reviewed With: patient        Progress: no change  Outcome Summary: Minimal pain. Lap sites x5 with small border dressings intact. Vag packing and camejo catheter present - f/c to be removed in the AM. SCDs on. IVFs infusing. VSS. Will continue to monitor.

## 2021-11-04 NOTE — OP NOTE
Gynecologic Oncology - Operative Report         PATIENT NAME: Marialuisa Vivar  YOB: 1955   AGE: 66 y.o.  MRN#: [id number]  [facility]#: [id number]      DATE OF OPERATION: 11/4/2021    PREOPERATIVE DIAGNOSIS:   1. Ovarian mass     POSTOPERATIVE DIAGNOSIS:  1. Same     OPERATION PERFORMED:    1. Robotic assited total laparoscopic hysterecotmy   2. Bilateral salpingooophorectomy    SURGEON: Kaylynn Ricci D.O     ASSISTANT: n/a    ANESTHESIA: GETA     ESTIMATED BLOOD LOSS: 25mL   IVF: 750ml NACl, 250mL LR   UO: 250mL   LINES/DRAINS: n/a    INDICATIONS: ovarian mass     FINDINGS: frozen section malignancy found, apocrine suggesting breast met, not ovarian de gil      PROCEDURE:   After assuring informed consent the patient was taken back to the operating suite and induction of general anesthesia was performed.  The patient was placed in the modified dorsal lithotomy position in Danny stirrups then prepped and draped in the normal sterile fashion.  The open sided bi-valve speculum was placed in the patient's vagina and the cervix was grasped at the 12 O'clock position with a single tooth tenaculum.  The uterus was sounded to 8 cm and the cervix was dilated with the deyanira dilators up to a number 15.  A V-Care uterine manipulator was placed inside the uterine cavity.  Thomas catheter was placed.  Gloves were changed and attention was then taken to the abdomen.    A supraumbilical skin incision was then made approximately 26cm above the pubic symphysis and a veress needle was introduced into this incision.  Proper placement was confirmed by sterile water flowing easily into this incision and a low opening CO2 pressure.  High flow insufflation was utilized to create pneumoperitoneum to a maximum pressure of 15 mmHg.  An 8mm trocar was then inserted through this incision and the Robotic camera verified proper placement and safe entry for camera port/robotic arm #3.  Three additional 8mm ports were placed.  Robotic  arm #4 port was placed 10cm right lateral and 15 degrees inferiorly.  Robotic arm #2 port was placed 10 cm left lateral and 15 degrees inferiorly. Arm 1 was placed 10cm left lateral and 15 degrees superior to arm #2.  A 10/12 accessory port was placed beneath the right costal margin.  The patient was then placed in steep Trendelenberg position.  Pelvic washings were obtained.  Blunt grasper was used to move bowel and omentum into the upper abdomen for maximum visualization. The robot was docked.  Monopolar scissors were placed in arm #4; bipolar fenestrated graspers in arm #2 and the double fenestrated graspers were placed in arm #1.      Bilateral round ligaments were taken down with monopolar cautery and the retroperitoneal space was opened.  The underlying ureters were visualized. A window was created in the posterior leaf of the broad ligament.  The infundibulopelvic ligaments bilaterally were cauterized with bipolar cautery and cut with monopolar cautery.  The vesicouterine peritoneum was incised and the bladder was dissected off the lower uterine segment and cervix.  The uterine vessels were then cauterized bilaterally with the bipolar and transected with the monopolar cautery.  The cardinal and uterosacral ligaments were then cauterized with the bipolar and transected with the monopolar cautery.  A posterior colpotomy was made at the level of the Vcare and brought around circumferentially freeing up the specimen, which was brought out through the vagina.    The vaginal cuff was then reapproximated with 0 PDS suture in a running fashion, starting from either end and tying in the middle.  The pelvis was then irrigated and the vaginal cuff was noted to be hemostatic.  The infundibulopelvic ligaments were noted to be hemostatic as well.  The instruments were then removed and the robot was undocked.  The pneumoperitoneum was then evacuated and all trocars were removed.  The 10-12 mm trocar site fascia was  reapproximated with a figure of eight stitch of 0 vicryl.  All skin incisions were closed with a 4-0 vicryl subcuticular stitch.  Incisions were reinjected with additional local anesthetic. Steri-strips were placed as well as bandages.  The vagina was examined with sponge sticks x 2 and noted to be hemostatic.  Sponge, lap, needle and instrument counts were correct x 2.  The patient was taken to the recovery room in awake and stable condition.      Kaylynn Ricci D.O  11/4/2021    Gynecologic Oncology   [address] [place] [address]  [phone] office

## 2021-11-05 ENCOUNTER — READMISSION MANAGEMENT (OUTPATIENT)
Dept: CALL CENTER | Facility: HOSPITAL | Age: 66
End: 2021-11-05

## 2021-11-05 VITALS
DIASTOLIC BLOOD PRESSURE: 87 MMHG | TEMPERATURE: 98 F | HEART RATE: 104 BPM | WEIGHT: 178.9 LBS | RESPIRATION RATE: 18 BRPM | BODY MASS INDEX: 29.81 KG/M2 | OXYGEN SATURATION: 100 % | SYSTOLIC BLOOD PRESSURE: 155 MMHG | HEIGHT: 65 IN

## 2021-11-05 LAB
ANION GAP SERPL CALCULATED.3IONS-SCNC: 6.4 MMOL/L (ref 5–15)
BASOPHILS # BLD AUTO: 0.01 10*3/MM3 (ref 0–0.2)
BASOPHILS NFR BLD AUTO: 0.1 % (ref 0–1.5)
BUN SERPL-MCNC: 11 MG/DL (ref 8–23)
BUN/CREAT SERPL: 14.9 (ref 7–25)
CALCIUM SPEC-SCNC: 6.5 MG/DL (ref 8.6–10.5)
CHLORIDE SERPL-SCNC: 115 MMOL/L (ref 98–107)
CO2 SERPL-SCNC: 18.6 MMOL/L (ref 22–29)
CREAT SERPL-MCNC: 0.74 MG/DL (ref 0.57–1)
CYTO UR: NORMAL
DEPRECATED RDW RBC AUTO: 41.2 FL (ref 37–54)
EOSINOPHIL # BLD AUTO: 0 10*3/MM3 (ref 0–0.4)
EOSINOPHIL NFR BLD AUTO: 0 % (ref 0.3–6.2)
ERYTHROCYTE [DISTWIDTH] IN BLOOD BY AUTOMATED COUNT: 12.5 % (ref 12.3–15.4)
GFR SERPL CREATININE-BSD FRML MDRD: 78 ML/MIN/1.73
GLUCOSE SERPL-MCNC: 108 MG/DL (ref 65–99)
HCT VFR BLD AUTO: 31.9 % (ref 34–46.6)
HGB BLD-MCNC: 10.6 G/DL (ref 12–15.9)
IMM GRANULOCYTES # BLD AUTO: 0.03 10*3/MM3 (ref 0–0.05)
IMM GRANULOCYTES NFR BLD AUTO: 0.4 % (ref 0–0.5)
LAB AP CASE REPORT: NORMAL
LAB AP DIAGNOSIS COMMENT: NORMAL
LYMPHOCYTES # BLD AUTO: 0.58 10*3/MM3 (ref 0.7–3.1)
LYMPHOCYTES NFR BLD AUTO: 8 % (ref 19.6–45.3)
MCH RBC QN AUTO: 29.9 PG (ref 26.6–33)
MCHC RBC AUTO-ENTMCNC: 33.2 G/DL (ref 31.5–35.7)
MCV RBC AUTO: 89.9 FL (ref 79–97)
MONOCYTES # BLD AUTO: 0.39 10*3/MM3 (ref 0.1–0.9)
MONOCYTES NFR BLD AUTO: 5.4 % (ref 5–12)
NEUTROPHILS NFR BLD AUTO: 6.26 10*3/MM3 (ref 1.7–7)
NEUTROPHILS NFR BLD AUTO: 86.1 % (ref 42.7–76)
NRBC BLD AUTO-RTO: 0 /100 WBC (ref 0–0.2)
PATH REPORT.FINAL DX SPEC: NORMAL
PATH REPORT.GROSS SPEC: NORMAL
PLATELET # BLD AUTO: 216 10*3/MM3 (ref 140–450)
PMV BLD AUTO: 8.8 FL (ref 6–12)
POTASSIUM SERPL-SCNC: 3.3 MMOL/L (ref 3.5–5.2)
RBC # BLD AUTO: 3.55 10*6/MM3 (ref 3.77–5.28)
SODIUM SERPL-SCNC: 140 MMOL/L (ref 136–145)
WBC # BLD AUTO: 7.27 10*3/MM3 (ref 3.4–10.8)

## 2021-11-05 PROCEDURE — 25010000002 KETOROLAC TROMETHAMINE PER 15 MG: Performed by: OBSTETRICS & GYNECOLOGY

## 2021-11-05 PROCEDURE — 85025 COMPLETE CBC W/AUTO DIFF WBC: CPT | Performed by: OBSTETRICS & GYNECOLOGY

## 2021-11-05 PROCEDURE — 25010000002 ENOXAPARIN PER 10 MG: Performed by: OBSTETRICS & GYNECOLOGY

## 2021-11-05 PROCEDURE — G0378 HOSPITAL OBSERVATION PER HR: HCPCS

## 2021-11-05 PROCEDURE — 80048 BASIC METABOLIC PNL TOTAL CA: CPT | Performed by: OBSTETRICS & GYNECOLOGY

## 2021-11-05 RX ORDER — HYDROCODONE BITARTRATE AND ACETAMINOPHEN 5; 325 MG/1; MG/1
1 TABLET ORAL EVERY 4 HOURS PRN
Qty: 20 TABLET | Refills: 0 | Status: SHIPPED | OUTPATIENT
Start: 2021-11-05 | End: 2021-11-11

## 2021-11-05 RX ADMIN — SIMETHICONE 120 MG: 80 TABLET, CHEWABLE ORAL at 06:43

## 2021-11-05 RX ADMIN — MAGNESIUM HYDROXIDE,ALUMINUM HYDROXICE,SIMETHICONE 15 ML: 240; 2400; 2400 SUSPENSION ORAL at 00:10

## 2021-11-05 RX ADMIN — ACETAMINOPHEN 1000 MG: 500 TABLET, FILM COATED ORAL at 08:50

## 2021-11-05 RX ADMIN — HYDROCODONE BITARTRATE AND ACETAMINOPHEN 1 TABLET: 10; 325 TABLET ORAL at 14:45

## 2021-11-05 RX ADMIN — BUSPIRONE HYDROCHLORIDE 10 MG: 10 TABLET ORAL at 08:51

## 2021-11-05 RX ADMIN — MAGNESIUM HYDROXIDE,ALUMINUM HYDROXICE,SIMETHICONE 15 ML: 240; 2400; 2400 SUSPENSION ORAL at 12:22

## 2021-11-05 RX ADMIN — ANASTROZOLE 1 MG: 1 TABLET ORAL at 08:51

## 2021-11-05 RX ADMIN — ACETAMINOPHEN 1000 MG: 500 TABLET, FILM COATED ORAL at 00:49

## 2021-11-05 RX ADMIN — LOSARTAN POTASSIUM 50 MG: 50 TABLET, FILM COATED ORAL at 08:51

## 2021-11-05 RX ADMIN — SIMETHICONE 120 MG: 80 TABLET, CHEWABLE ORAL at 12:22

## 2021-11-05 RX ADMIN — DOCUSATE SODIUM 100 MG: 100 CAPSULE, LIQUID FILLED ORAL at 06:43

## 2021-11-05 RX ADMIN — KETOROLAC TROMETHAMINE 15 MG: 15 INJECTION, SOLUTION INTRAMUSCULAR; INTRAVENOUS at 06:28

## 2021-11-05 RX ADMIN — MAGNESIUM HYDROXIDE,ALUMINUM HYDROXICE,SIMETHICONE 15 ML: 240; 2400; 2400 SUSPENSION ORAL at 06:27

## 2021-11-05 RX ADMIN — ENOXAPARIN SODIUM 40 MG: 40 INJECTION SUBCUTANEOUS at 08:51

## 2021-11-05 RX ADMIN — SODIUM CHLORIDE 100 ML/HR: 9 INJECTION, SOLUTION INTRAVENOUS at 00:52

## 2021-11-05 NOTE — PROGRESS NOTES
Case Management Discharge Note      Final Note: Discharged home. Aline Bloom, SAMMY         Transportation Services  Private: Car    Final Discharge Disposition Code: 01 - home or self-care

## 2021-11-05 NOTE — PLAN OF CARE
Goal Outcome Evaluation:  Plan of Care Reviewed With: patient        Progress: improving  Outcome Summary: VSS, afebrile, c/o of mild pain but tolerable w/o pain medication, denies nausea, Lap sites x5 w/ white boarder dressings, Vaginal packing in place, F/C to be removed in am, ambulated in anaya w/ assist, scds on while in bed, scheduled tylenol/toradol/mylicon given, lisette regular diet, encouraged IS use, Lovenox scheduled

## 2021-11-05 NOTE — PROGRESS NOTES
Continued Stay Note  [facility]     Patient Name: Marialuisa Vivar  MRN: [id number]  Today's Date: 11/5/2021    Admit Date: 11/4/2021     Discharge Plan     Row Name 11/05/21 1252       Plan    Plan Home no needs    Plan Comments Discharged order noted. Met with patient who confirmed DC plan is home. Stated her friend will assist as needed and will provide transportation at DC. Denies any needs/equipment.               Discharge Codes    No documentation.               Expected Discharge Date and Time     Expected Discharge Date Expected Discharge Time    Nov 5, 2021             Aline Bloom RN

## 2021-11-05 NOTE — DISCHARGE SUMMARY
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)   • Tachycardia   • Therapeutic drug monitoring   • Lung metastasis (HCC)   • Vitamin D deficiency   • Class 1 obesity without serious comorbidity with body mass index (BMI) of 31.0 to 31.9 in adult   • Tongue cancer (HCC)   • Anxiety   • Bruxism   • Cheilosis   • Squamous cell carcinoma of skin   • Benign essential hypertension   • Hyperlipidemia   • Ovarian mass, right   • Adnexal mass   •         HOSPITAL CONSULTANTS:   •  None      Surgical Procedures Since Admission:  Procedure(s):  Robotic assisted total laparoscopic hysterectomy, bilateral salpingoophorecotmy, bilateral ureteralysis   Surgeon:  Kaylynn Ricci, DO  Status:  1 Day Post-Op  -------------------  •        OUTPATIENT CARE TEAM:   • Patient Care Team:  • Jennifer Mantilla MD as PCP - General (Family Medicine)  • Jarod Hawkins II, MD as Consulting Physician (Hematology and Oncology)  • Yair Robin MD as Referring Physician (Otolaryngology)    HOSPITAL COURSE:   Admitted post op   Did well no complications   Clear for dc post op day 1          DC MEDICATIONS:   Current Discharge Medication List      CONTINUE these medications which have NOT CHANGED    Details   anastrozole (ARIMIDEX) 1 MG tablet Take 1 tablet by mouth once daily  Qty: 90 tablet, Refills: 1      atorvastatin (LIPITOR) 10 MG tablet Take 1 tablet by mouth Every Night.  Qty: 90 tablet, Refills: 1      busPIRone (BUSPAR) 10 MG tablet Take 10 mg by mouth 2 (Two) Times a Day.       LORazepam (ATIVAN) 0.5 MG tablet Take 1 tablet by mouth Every 8 (Eight) Hours As Needed.      losartan (COZAAR) 50 MG tablet Take 1 tablet by mouth Daily.  Qty: 90 tablet, Refills: 1    Associated Diagnoses: Benign essential HTN      Omeprazole 20 MG Tablet Delayed Release Dispersible Take 20 mg by mouth As Needed.      PARoxetine (PAXIL) 40 MG tablet Take 40 mg by mouth Every Night.      Cholecalciferol 2000 UNITS capsule Take 4,000 Units by mouth 3 (Three) Times a Week.      eszopiclone (LUNESTA) tablet Take 3 mg by mouth At Night As Needed.      Zoledronic Acid (ZOMETA IV) Infuse  into a venous catheter. EVERY THREE MONTHS            Current Discharge Medication List      START taking these medications    Details   HYDROcodone-acetaminophen (NORCO) 5-325 MG per tablet Take 1 tablet by mouth Every 4 (Four) Hours As Needed for Moderate Pain  for up to 6 days.  Qty: 20 tablet, Refills: 0    Associated Diagnoses: Ovarian mass, right                DISCHARGE INSTRUCTIONS:    • Maintain proper balance of hydration, nutrition, mobility and rest.   • Continue to use your incentive spirometer for the rest of the week.    • Do not lift anything heavier than 10 lbs. for the next two weeks.   • Do not perform strenuous activity.  • Continue PELVC REST for 6 weeks, which means no tampons, intercourse, douching or submerging in sitting water (baths, jacuzzis or swimming).  • Keep your wound clean and dry.     • Notify the office if you experience:   - Fever > 101F.  - Foul wound drainage, redness or large separation.  - Severe nausea and vomiting.  - Severe increase in pain.   - Severe swelling in either calf.     • Driving is OK if you are not taking narcotics.  • Stairs are OK, just take it slow.                  Kaylynn Ricci D.O  11/5/2021    Gynecologic Oncology   [address] [place] [address] [address] [place] [address]  [phone] office

## 2021-11-06 NOTE — OUTREACH NOTE
Prep Survey      Responses   [facility] facility patient discharged from? [place]   Is LACE score < 7 ? No   Emergency Room discharge w/ pulse ox? No   Eligibility [facility]   Date of Admission 11/04/21   Date of Discharge 11/05/21   Discharge Disposition Home or Self Care   Discharge diagnosis total lap hysterectomy BSO for ovarian mass   Does the patient have one of the following disease processes/diagnoses(primary or secondary)? General Surgery   Does the patient have Home health ordered? No   Is there a DME ordered? No   Prep survey completed? Yes          Elly Baca RN

## 2021-11-08 ENCOUNTER — TRANSITIONAL CARE MANAGEMENT TELEPHONE ENCOUNTER (OUTPATIENT)
Dept: CALL CENTER | Facility: HOSPITAL | Age: 66
End: 2021-11-08

## 2021-11-08 NOTE — OUTREACH NOTE
Call Center TCM Note      Responses   [facility] patient discharged from? [place]   Does the patient have one of the following disease processes/diagnoses(primary or secondary)? General Surgery   TCM attempt successful? No   Unsuccessful attempts Attempt 2          Enma Urias MA    11/8/2021, 17:07 EST

## 2021-11-08 NOTE — OUTREACH NOTE
Call Center TCM Note      Responses   [facility] patient discharged from? [place]   Does the patient have one of the following disease processes/diagnoses(primary or secondary)? General Surgery   TCM attempt successful? No   Unsuccessful attempts Attempt 1          Enma Urias MA    11/8/2021, 14:53 EST

## 2021-11-09 ENCOUNTER — TRANSITIONAL CARE MANAGEMENT TELEPHONE ENCOUNTER (OUTPATIENT)
Dept: CALL CENTER | Facility: HOSPITAL | Age: 66
End: 2021-11-09

## 2021-11-09 NOTE — OUTREACH NOTE
Call Center TCM Note      Responses   [facility] patient discharged from? [place]   Does the patient have one of the following disease processes/diagnoses(primary or secondary)? General Surgery   TCM attempt successful? No   Unsuccessful attempts Attempt 3          Tasneem Bradley RN    11/9/2021, 12:40 EST

## 2021-11-12 NOTE — PROGRESS NOTES
.     REASONS FOR FOLLOWUP: Metastatic breast cancer, tongue cancer    HISTORY OF PRESENT ILLNESS:  The patient is a 66 y.o. year old female  who is here for follow-up with the above-mentioned history.    She is here for review after hysterectomy and BSO  Surgery went well.  However, she states she continues to have vaginal bleeding since the surgery.  She has informed Dr. Hastings and she was told to keep monitoring this, as it is expected to clear with some more time    No new issues.  No complaints of SOA.  No neurological problems.    Past Medical History:   Diagnosis Date   • Allergy    • Anxiety    • Asthma    • Bone metastasis (HCC)    • Breast cancer (HCC)     Left node positive   • Depression    • Esophageal reflux     cough   • History of colon polyps    • Hyperlipidemia    • Hypertension    • Left breast cancer metastasized to lung    • Obstructive sleep apnea    • Ovarian cyst    • PONV (postoperative nausea and vomiting)    • Tongue cancer (HCC) 05/2019    by ENT at [facility] dr Quinn     Past Surgical History:   Procedure Laterality Date   • CHOLECYSTECTOMY  2000   • COLONOSCOPY  2014    H/O polyps   • KNEE ARTHROPLASTY     • LUNG SURGERY  2010    Lung wedge resection   • MASTECTOMY RADICAL Left 1993   • TONGUE SURGERY  05/21/2019    tongue cancer   • TOTAL LAPAROSCOPIC HYSTERECTOMY N/A 11/4/2021    Procedure: Robotic assisted total laparoscopic hysterectomy, bilateral salpingoophorecotmy, bilateral ureteralysis ;  Surgeon: Kaylynn Ricci DO;  Location: [facility];  Service: Robotics - [facility];  Laterality: N/A;       MEDICATIONS    Current Outpatient Medications:   •  anastrozole (ARIMIDEX) 1 MG tablet, Take 1 tablet by mouth once daily, Disp: 90 tablet, Rfl: 1  •  atorvastatin (LIPITOR) 10 MG tablet, Take 1 tablet by mouth Every Night., Disp: 90 tablet, Rfl: 1  •  busPIRone (BUSPAR) 10 MG tablet, Take 10 mg by mouth 2 (Two) Times a Day., Disp: , Rfl:   •  Cholecalciferol 2000 UNITS capsule,  Take 4,000 Units by mouth 3 (Three) Times a Week., Disp: , Rfl:   •  eszopiclone (LUNESTA) tablet, Take 3 mg by mouth At Night As Needed., Disp: , Rfl:   •  LORazepam (ATIVAN) 0.5 MG tablet, Take 1 tablet by mouth Every 8 (Eight) Hours As Needed., Disp: , Rfl:   •  losartan (COZAAR) 50 MG tablet, Take 1 tablet by mouth Daily., Disp: 90 tablet, Rfl: 1  •  Omeprazole 20 MG Tablet Delayed Release Dispersible, Take 20 mg by mouth As Needed., Disp: , Rfl:   •  PARoxetine (PAXIL) 40 MG tablet, Take 40 mg by mouth Every Night., Disp: , Rfl:   •  Zoledronic Acid (ZOMETA IV), Infuse  into a venous catheter. EVERY THREE MONTHS, Disp: , Rfl:   No current facility-administered medications for this visit.    Facility-Administered Medications Ordered in Other Visits:   •  chlorhexidine (PERIDEX) 0.12 % solution 15 mL, 15 mL, Mouth/Throat, Q12H, Kaylynn Ricci, DO  •  mupirocin (BACTROBAN) 2 % nasal ointment, , Nasal, BID, Kaylynn Ricci, DO    ALLERGIES:     Allergies   Allergen Reactions   • Nickel Unknown - Low Severity   • Ciprofloxacin Rash       SOCIAL HISTORY:       Social History     Socioeconomic History   • Marital status: Single   • Years of education: College   Tobacco Use   • Smoking status: Former Smoker     Packs/day: 2.00     Years: 30.00     Pack years: 60.00     Types: Cigarettes     Start date:      Quit date: 2008     Years since quittin.4   • Smokeless tobacco: Never Used   Vaping Use   • Vaping Use: Never used   Substance and Sexual Activity   • Alcohol use: No   • Drug use: No   • Sexual activity: Defer         FAMILY HISTORY:  Family History   Problem Relation Age of Onset   • Hypertension Mother    • Leukemia Mother 72   • COPD Mother         smoker   • Diabetes Brother    • Pancreatic cancer Brother    • Glaucoma Brother    • Heart disease Brother    • Hypertension Brother    • Gallbladder disease Brother    • Gallbladder disease Father    • Stroke Other         Grandmother   • Lupus  "Other         Aunt   • Alcohol abuse Other         Aunt   • Glaucoma Other         Grandmother   • Diabetes type II Brother    • Hypertension Brother    • Hyperlipidemia Brother    • Malig Hyperthermia Neg Hx        REVIEW OF SYSTEMS:  Review of Systems   Constitutional: Negative for activity change.   HENT: Negative for nosebleeds and trouble swallowing.    Respiratory: Negative for shortness of breath and wheezing.    Cardiovascular: Negative for chest pain and palpitations.   Gastrointestinal: Negative for constipation, diarrhea and nausea.   Genitourinary: Negative for dysuria and hematuria.   Musculoskeletal: Negative for arthralgias and myalgias.   Skin: Negative for rash and wound.   Neurological: Negative for seizures and syncope.   Hematological: Negative for adenopathy. Does not bruise/bleed easily.   Psychiatric/Behavioral: Negative for confusion.            Vitals:    11/22/21 0902   BP: 161/77   Pulse: 102   Resp: 18   Temp: 96.9 °F (36.1 °C)   TempSrc: Temporal   SpO2: 98%   Weight: 79.2 kg (174 lb 11.2 oz)   Height: 163.8 cm (64.49\")   PainSc: 0-No pain     Current Status 11/22/2021   ECOG score 0        PHYSICAL EXAM:        CONSTITUTIONAL:  Vital signs reviewed.  No distress, looks comfortable.  EYES:  Conjunctiva and lids unremarkable.  PERRLA  EARS,NOSE,MOUTH,THROAT:  Ears and nose appear unremarkable.  Lips, teeth, gums appear unremarkable.  RESPIRATORY:  Normal respiratory effort.  Lungs clear to auscultation bilaterally.  CARDIOVASCULAR:  Normal S1, S2.  No murmurs rubs or gallops.  No significant lower extremity edema.  GASTROINTESTINAL: Abdomen appears unremarkable.  Nontender.  No hepatomegaly.  No splenomegaly.  LYMPHATIC:  No cervical, supraclavicular, axillary lymphadenopathy.  SKIN:  Warm.  No rashes.  PSYCHIATRIC:  Normal judgment and insight.  Normal mood and affect.         RECENT LABS:        WBC   Date/Time Value Ref Range Status   11/22/2021 10:06 AM 7.82 3.40 - 10.80 10*3/mm3 " Final     Hemoglobin   Date/Time Value Ref Range Status   11/22/2021 10:06 AM 13.6 12.0 - 15.9 g/dL Final     Platelets   Date/Time Value Ref Range Status   11/22/2021 10:06  140 - 450 10*3/mm3 Final       Assessment/Plan   Carcinoma of left breast metastatic to lung (HCC)  - CBC & Differential  - Comprehensive Metabolic Panel  - Cancer Antigen 15-3  - Ferritin  - Iron Profile  - Retic With IRF & RET-He  - CBC & Differential  - Cancer Antigen 15-3  - CT Chest With Contrast Diagnostic  - CT Abdomen Pelvis With Contrast    Marialuisa Vivar        Assessment/Plan     *Metastatic breast cancer to bilateral lungs and soft tissue in the mediastinum (likely the cause of her hoarseness). (Initial breast cancer diagnosed in 1993 treated with mastectomy, chemotherapy, radiation therapy and tamoxifen). Arimidex day 1 on 02/17/2010. PET scan late August 2011 shows no abnormal hypermetabolic activity. This has improved compared to the prior PET scan January 2010 which showed mild hypermetabolic activity in areas of metastasis. (In the past, her  T6 lesion did not show up on CT scans or bone scan. It was seen by PET scan.) We have been following with CT scans only every 6 months and PET scans on an as needed only basis. Sometimes her CT scanned pulmonary nodules are read as benign since they have been stable through the course of years but we know they are malignant because they have been surgically sampled in the past.    · CT 8/16/16 shows increased sclerosis at T6 compared to 4/28/15.    · PET 10/11/16: Slight uptake at T6, SUV 3.2.  mild thickening of right adrenal gland with an SUV of 11.   · Continued Arimidex is minimal progression and had been on this since 2/17/10.  · Not referred for radiation since no pain at T6  · CT 1/27/17, unchanged.  · CT 8/1/17: Unchanged except subtle suggestion of mild increase in thickening of the right adrenal gland.    · CT 2/22/18, unchanged.  · CT 9/6/18: Unchanged except nodular  thickening right adrenal gland significantly decreased.  · CT 9/5/2019: Unchanged.  · On the 6/12/2020 visit, the following scans were reviewed:  · CT neck and chest 5/11/2020, [facility], from 5/9/2019: Stable pulmonary nodules new lytic C7 lesion and posterior T1 lesion questionable T12 lesion.  No change in the T6 and T10 lesions.  · PET, [facility], 5/19/2020: Mildly enlarged left neck nodes are mildly hypermetabolic.  Diffuse activity throughout the thickened left adrenal gland.  CT appearance unchanged from 5/9/2019.  Progressive T1 lesion seen on CT from 5/11/2020, mild some moderate activity.  T10 low-grade activity with mixed lucent and sclerotic findings.  T6 is a mixed lucent and sclerotic appearance but no significant activity.   · T12 (the biopsy report is labeled as T12 but patient insists this was a C7/T1 biopsy, not to T12) biopsy 6/15/2020, [facility]: Metastatic breast carcinoma.  ER >95%.  DE 0%.  HER-2 negative (1+)  · It seems the main progression at the 6/12/2020 visit was a new C7 and new T1 lesion.  Those were radiated with [facility]Knife through [facility] early June 2020.  Because Arimidex has been working well since 2010, continue same Arimidex.  Add Zometa every 3 months.  · C7 and T1 found on CT 5/11/2020, [facility].  Pedro, Dr. Winston, early June 2020.  · CT C and T-spine 9/17/2020: New C7 lesion from 9/6/2018, but no change from 5/11/2020 CT.  T1 lesion stable from 5/11/2020, but new compared to 2/22/2018.  T10 lesion unchanged from 9/5/2019, but new from 2/22/2018.  Subtle 8 mm T12 lesion new from 9/5/2019.  · Therefore, no recent progression.  · Considering her good tolerance and long-term effectiveness of Arimidex, continue same therapy.  · CT CAP 12/10/2020: No progression  · Therefore, continue Arimidex.  · CA 15-3 normal through 12/14/2020  · Because CA 15-3 fabian to 27 on 4/13/2021, then 29.3 on 7/20/2021, then 34.3 on 10/12/2021, CT scans done earlier than planned:  · CT CAP with contrast  10/12/2021: New 5.2 x 4.6 cm mixed cystic and solid right ovarian mass compared to 12/10/2020.  Continued stability of bilateral pulmonary nodules and stable sclerotic bone metastasis.  · 11/4/2021: TLH/BSO (due to new 5 cm right ovarian mass on CT, 11/12/2021).  · Metastatic breast cancer involving bilateral ovaries and posterior uterine serosa.  ER 81-90%.  [place] 61-70%.  HER-2 negative  · Pelvic washings: Adenocarcinoma  · Because this was the only area of progression on CT, and has been resected, continue Arimidex which she has been on since 2/17/2010.  · 11/22/2021: Discussed with Dr. Blum, who has a focus on breast cancer.  We both agree: Continue Arimidex for now.  In the future, if significant progression is seen, then plan Faslodex injections with Ibrance    *Bony metastases  · T6 discovered by PET to August 2011 (did not show up by CT or bone scan)  · C7 and T1 found on CT 5/11/2020, [facility].  CyberKnife, Dr. Winston, early June 2020.  · June 2020 began Zometa every 3 months.  · She was warned of possible osteonecrosis of the jaw and renal insufficiency.  She states she has good dental health and sees her dentist regularly.  Because of prior hypercalcemia requiring stopping calcium supplements and because of high normal current calcium level, began without supplemental calcium.  Add calcium if calcium becomes low.    *Bone health. Last bone density 10/11/16, worsened, but still normal with worst T score -0.9.  Patient stopped calcium January 2016, but remained on vitamin D.  I recommended she restart her calcium.  Stopped calcium early March 2018 due to hypercalcemia.  · DEXA 9/5/2019: Normal bone density    *Hypercalcemia.  Repeat calcium 3/7/18 normal, but patient self stopped calcium a few days prior.  Recommended she stay off calcium.  · Calcium was 11 on 6/19/2020  · Calcium 11 again on 12/10/2020  · Repeat calcium 10.5 on 12/14/2020  · Calcium 10.6.  Minimally elevated.  (Normal range up to  10.5)  · Calcium was low the day after surgery, 11/5/2021, 6.5.  Therefore, check CMP today, 11/22/2021.    *On the 10/5/16 visit, Tachycardia, dyspnea on exertion, bilateral leg swelling, more sedentary recently.  CT PE protocol and bilateral lower extremity Dopplers 10/5/16, negative for clots.  She still has tachycardia and dyspnea on exertion.  Perhaps at least part of this is due to deconditioning.    *Previously complained of colon Right lower anterior rib discomfort.  CT unremarkable in that area.  No specific pain, just discomfort.  I explained to the patient if disease was found by PET scan or bone scan, I would not  since she has been doing well on Arimidex since 2010.   Currently denies pain.     *Depression/anxiety.  This limits compliance.  She sees a psychiatrist and a counselor regularly.  She states this is mostly due to body image issues instead of cancer.   · She continues with her counselor and psychiatrist.  Viral pandemic is making this a little more difficult.  · This seems to be doing better now.    *Noncompliance due to depression/anxiety.  Although she misses appointments, she does reschedule and eventually come in.    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 Willie Quinn [facility], on 5/21/2019.  Positive sentinel node (1 of 3 nodes)..  · Left neck dissection by Dr. Willie Quinn, [facility], on 6/5/2019.  22 nodes negative.  Negative margins.  No lymphovascular invasion or perineural invasion.  2 mm depth of invasion.  Grade 1.  Squamous cell carcinoma.  1.8 cm tumor.  · Because the patient felt what she thought was left neck LAD, CT neck and chest and PET at [facility]. May 2020 performed.  She is being followed at [facility] for this.  · On 6/19/2020, per verbal report from Dr. Senia [place] [facility], [facility] ENT does not feel she has any active head and neck cancer.   · She states she has a  follow-up with Dr. Kapadia, ENT at [facility], in June with CT neck 1 week prior  · No symptoms suggesting recurrence    *Previously complained of left hip pain x2 weeks.  This prompted an MRI pelvis 8/20/2019 at [facility] and open MRI which was negative for malignancy.    *Possible intolerance of Zometa  · After first dose, June 2020, 2 days of chills, bone pain  · She would like to try Zometa again.  If this occurs again, we will try to switch her to Xgeva monthly.  (No good data on every 3-month Xgeva)  · She did fine with the second dose of Zometa.    PLAN:   · MD, Zometa, with Zometa treatment labs, around 1/19/2021 (3 months since the last Zometa).    · 1 week prior: CT CAP with contrast, CBC, CMP, CA 15-3   · (we were checking annual CT.  However, with the solitary progression that has been resected, check CT more often for now)  · CMP today.  If calcium remains low, begin calcium supplement  · CA 15-3 today for new baseline after the hysterectomy and BSO  · CBC today due to the anemia on 11/5/2021.  Iron labs today due to ongoing bleeding since a hysterectomy/BSO on 11/4/2021  · MD, CA 15-3, CBC, CMP, Zometa 3 months.    · Continue Arimidex  · She sees [facility] ENT, Dr. eBdoya annually, next is summer 2022      42 minutes.  Total time.  Same day.    Send a copy of this note to   Dr. Willie Quinn, Isaac Winston, radiation medicine, [facility]

## 2021-11-13 ENCOUNTER — READMISSION MANAGEMENT (OUTPATIENT)
Dept: CALL CENTER | Facility: HOSPITAL | Age: 66
End: 2021-11-13

## 2021-11-13 NOTE — OUTREACH NOTE
General Surgery Week 2 Survey      Responses   [facility] patient discharged from? [place]   Does the patient have one of the following disease processes/diagnoses(primary or secondary)? General Surgery   Week 2 attempt successful? No   Unsuccessful attempts Attempt 1   Call end time [id number]   Week 2 call completed? Yes   Revoked No further contact(revokes)-requires comment   Is the patient interested in additional calls from an ambulatory ?  NOTE:  applies to high risk patients requiring additional follow-up. No   Graduated/Revoked comments UTR x 4          Zenia Brown RN

## 2021-11-19 ENCOUNTER — OFFICE VISIT (OUTPATIENT)
Dept: GYNECOLOGIC ONCOLOGY | Facility: CLINIC | Age: 66
End: 2021-11-19

## 2021-11-19 VITALS
BODY MASS INDEX: 29.47 KG/M2 | DIASTOLIC BLOOD PRESSURE: 79 MMHG | HEIGHT: 64 IN | SYSTOLIC BLOOD PRESSURE: 124 MMHG | HEART RATE: 112 BPM | RESPIRATION RATE: 22 BRPM | OXYGEN SATURATION: 96 % | WEIGHT: 172.6 LBS

## 2021-11-19 DIAGNOSIS — N94.89 ADNEXAL MASS: Primary | ICD-10-CM

## 2021-11-19 PROCEDURE — 99024 POSTOP FOLLOW-UP VISIT: CPT | Performed by: OBSTETRICS & GYNECOLOGY

## 2021-11-19 NOTE — PROGRESS NOTES
Age: 66 y.o.  Sex: female  :  1955  MRN: [id number]       REFERRING PHYSICIAN: No ref. provider found  DATE OF VISIT: 2021        Marialuisa Vivar presents to [facility] for post op visit. She is healing well no n/v/d/f. Path shows metastatic breast carcinoma, no GYN maignancy.  She has known metastatic breast cancer and has been on arimidex since 2010. She has pulmonary and bone mets but overall with good health and performance status.     Oncology/Hematology History Overview Note   Marialuisa Vivar is a 66 y.o. female referred by Dr. Jarod Hawkins for bilateral ovarian masses, previously diagnosed with metastatic breast cancer (-mastectomy, chemo, xrt, tamoxifen), tongue cancer (-surgery), bone mets (-Arimidex)    • 10/12/21: CT CAP-there is a new 5.2 x 4.6 cm mixed cystic and solid right ovarian mass, no free fluid or lymphadenopathy within abdomen or pelvis. Continued stability of several bilateral pulmonary nodules, no new abnormality within the chest. Stable sclerotic bone metastases.  • 21: TLH, BSO, bilateral ureterolysis   • 21: Pathology-Metastatic mammary carcinoma involving bilateral ovaries and posterior uterine serosa, bilateral ovaries w/ endometriosis, bilateral fallopian tubes, benign cervix, benign endometrium, leiomyoma. ER positive, PgR positive, HER2 negative.Pelvic washings-positive for malignant cells, consistent with adenocarcinoma.       21: Post op         Past Medical History:  Past Medical History:   Diagnosis Date   • Allergy    • Anxiety    • Asthma    • Bone metastasis (HCC)    • Breast cancer (HCC)     Left node positive   • Depression    • Esophageal reflux     cough   • History of colon polyps    • Hyperlipidemia    • Hypertension    • Left breast cancer metastasized to lung    • Obstructive sleep apnea    • Ovarian cyst    • PONV (postoperative nausea and vomiting)    • Tongue cancer (HCC) 2019    by ENT at [facility]   [place]       Past Surgical History:  Past Surgical History:   Procedure Laterality Date   • CHOLECYSTECTOMY  2000   • COLONOSCOPY  2014    H/O polyps   • KNEE ARTHROPLASTY     • LUNG SURGERY  2010    Lung wedge resection   • MASTECTOMY RADICAL Left 1993   • TONGUE SURGERY  05/21/2019    tongue cancer   • TOTAL LAPAROSCOPIC HYSTERECTOMY N/A 11/4/2021    Procedure: Robotic assisted total laparoscopic hysterectomy, bilateral salpingoophorecotmy, bilateral ureteralysis ;  Surgeon: Kaylynn Ricci DO;  Location: [facility];  Service: [facility]otics - [facility];  Laterality: N/A;        MEDICATIONS:    Current Outpatient Medications:   •  anastrozole (ARIMIDEX) 1 MG tablet, Take 1 tablet by mouth once daily, Disp: 90 tablet, Rfl: 1  •  atorvastatin (LIPITOR) 10 MG tablet, Take 1 tablet by mouth Every Night., Disp: 90 tablet, Rfl: 1  •  busPIRone (BUSPAR) 10 MG tablet, Take 10 mg by mouth 2 (Two) Times a Day., Disp: , Rfl:   •  Cholecalciferol 2000 UNITS capsule, Take 4,000 Units by mouth 3 (Three) Times a Week., Disp: , Rfl:   •  eszopiclone (LUNESTA) tablet, Take 3 mg by mouth At Night As Needed., Disp: , Rfl:   •  LORazepam (ATIVAN) 0.5 MG tablet, Take 1 tablet by mouth Every 8 (Eight) Hours As Needed., Disp: , Rfl:   •  losartan (COZAAR) 50 MG tablet, Take 1 tablet by mouth Daily., Disp: 90 tablet, Rfl: 1  •  Omeprazole 20 MG Tablet Delayed Release Dispersible, Take 20 mg by mouth As Needed., Disp: , Rfl:   •  PARoxetine (PAXIL) 40 MG tablet, Take 40 mg by mouth Every Night., Disp: , Rfl:   •  Zoledronic Acid (ZOMETA IV), Infuse  into a venous catheter. EVERY THREE MONTHS, Disp: , Rfl:   No current facility-administered medications for this visit.    Facility-Administered Medications Ordered in Other Visits:   •  chlorhexidine (PERIDEX) 0.12 % solution 15 mL, 15 mL, Mouth/Throat, Q12H, Kaylynn Ricci, DO  •  mupirocin (BACTROBAN) 2 % nasal ointment, , Nasal, BID, Kaylynn Ricci, DO    ALLERGIES:  Allergies  "  Allergen Reactions   • Nickel Unknown - Low Severity   • Ciprofloxacin Rash         ROS:  CONSTITUTIONAL:  Denies fever or chills.   NEUROLOGIC:  Denies headache, focal weakness or sensory changes.   EYES:  Denies change in visual acuity.  HEENT:  Denies nasal congestion or sore throat.   RESPIRATORY:  Denies cough or shortness of breath.   CARDIOVASCULAR:  Denies chest pain or edema.   GI:  Denies abdominal pain, nausea, vomiting, bloody stools or diarrhea.   :  Denies dysuria, leaking or incontinence.  MUSCULOSKELETAL:  Denies back pain or joint pain.   INTEGUMENT:  Denies rash.   ENDOCRINE:  Denies polyuria or polydipsia.   LYMPHATIC:  Denies swollen glands or lymphedema.   PSYCHIATRIC:  Denies depression or anxiety.        PHYSICAL EXAM:  Vitals:    11/19/21 1058   Resp: 22   Weight: 78.3 kg (172 lb 9.6 oz)   Height: 163.8 cm (64.49\")   PainSc:   3   PainLoc: Back     Body mass index is 29.18 kg/m².  Current Status 11/19/2021   ECOG score 0     PHQ-9 Total Score:         GEN: alert and oriented x 3, normal affect, well nourished and hydrated.  CARDIO: regular rate and rhythm.  PULM: Lungs CTA b.l, no RRW   ABD: Soft, nontender, nondistended.   GYN: incisions well healing    EXT: No petechiae, bruising, rash, candida. No CCE.       Result Review :  The pertinent labs, images, and/or pathology as noted in the oncology history were reviewed independently and discussed with the patient.   Kaylynn Ricci DO   10/26/2021    [facility]:   WBC   Date Value Ref Range Status   11/05/2021 7.27 3.40 - 10.80 10*3/mm3 Final     RBC   Date Value Ref Range Status   11/05/2021 3.55 (L) 3.77 - 5.28 10*6/mm3 Final     Hemoglobin   Date Value Ref Range Status   11/05/2021 10.6 (L) 12.0 - 15.9 g/dL Final     Hematocrit   Date Value Ref Range Status   11/05/2021 31.9 (L) 34.0 - 46.6 % Final     Platelets   Date Value Ref Range Status   11/05/2021 216 140 - 450 10*3/mm3 Final     No results found for:     [facility]:  CT " Chest With Contrast Diagnostic    Result Date: 10/13/2021  1. There is a new 5.2 x 4.6 cm mixed cystic and solid right ovarian mass. Gynecology consultation is recommended. There is no free fluid or lymphadenopathy within the abdomen or pelvis. 2. Continued stability of several bilateral pulmonary nodules. There is no new abnormality within the chest. Stable sclerotic bone metastases.  This report was finalized on 10/13/2021 4:26 PM by Dr. La Wilde M.D.      CT Abdomen Pelvis With Contrast    Result Date: 10/13/2021  1. There is a new 5.2 x 4.6 cm mixed cystic and solid right ovarian mass. Gynecology consultation is recommended. There is no free fluid or lymphadenopathy within the abdomen or pelvis. 2. Continued stability of several bilateral pulmonary nodules. There is no new abnormality within the chest. Stable sclerotic bone metastases.  This report was finalized on 10/13/2021 4:26 PM by Dr. La Wilde M.D.          ASSESSMENT :  • Post oeprative state   - Post RALTH BSO  - Final path metastatic breast carcinoma    • H/o breast cancer - bone and mediastinal/pulm mets  • H/o Tongue cancer   • H/o SCC skin   • Vocal cord paralysis   • HLD   • Anxiety           PLAN :  • Normal post op exam   • Continue to honor pelvic rest  • Will follow up with Dr Hawkins for ongoing onc management.   • Call if questions or problems.             Kaylynn Ricci D.O  11/19/2021    Gynecologic Oncology   [address] [place] [address]  [phone] office

## 2021-11-22 ENCOUNTER — OFFICE VISIT (OUTPATIENT)
Dept: ONCOLOGY | Facility: CLINIC | Age: 66
End: 2021-11-22

## 2021-11-22 ENCOUNTER — OFFICE VISIT (OUTPATIENT)
Dept: LAB | Facility: HOSPITAL | Age: 66
End: 2021-11-22

## 2021-11-22 VITALS
WEIGHT: 174.7 LBS | SYSTOLIC BLOOD PRESSURE: 161 MMHG | OXYGEN SATURATION: 98 % | HEIGHT: 64 IN | BODY MASS INDEX: 29.82 KG/M2 | TEMPERATURE: 96.9 F | HEART RATE: 102 BPM | RESPIRATION RATE: 18 BRPM | DIASTOLIC BLOOD PRESSURE: 77 MMHG

## 2021-11-22 DIAGNOSIS — C50.912 CARCINOMA OF LEFT BREAST METASTATIC TO LUNG (HCC): ICD-10-CM

## 2021-11-22 DIAGNOSIS — C79.51 BONE METASTASES: ICD-10-CM

## 2021-11-22 DIAGNOSIS — C78.02 CARCINOMA OF LEFT BREAST METASTATIC TO LUNG (HCC): Primary | ICD-10-CM

## 2021-11-22 DIAGNOSIS — C50.912 CARCINOMA OF LEFT BREAST METASTATIC TO LUNG (HCC): Primary | ICD-10-CM

## 2021-11-22 DIAGNOSIS — C78.02 CARCINOMA OF LEFT BREAST METASTATIC TO LUNG (HCC): ICD-10-CM

## 2021-11-22 LAB
ALBUMIN SERPL-MCNC: 4.5 G/DL (ref 3.5–5.2)
ALBUMIN/GLOB SERPL: 1.7 G/DL
ALP SERPL-CCNC: 98 U/L (ref 39–117)
ALT SERPL W P-5'-P-CCNC: 8 U/L (ref 1–33)
ANION GAP SERPL CALCULATED.3IONS-SCNC: 11 MMOL/L (ref 5–15)
AST SERPL-CCNC: 12 U/L (ref 1–32)
BASOPHILS # BLD AUTO: 0.09 10*3/MM3 (ref 0–0.2)
BASOPHILS NFR BLD AUTO: 1.2 % (ref 0–1.5)
BILIRUB SERPL-MCNC: 0.4 MG/DL (ref 0–1.2)
BUN SERPL-MCNC: 19 MG/DL (ref 8–23)
BUN/CREAT SERPL: 17.9 (ref 7–25)
CALCIUM SPEC-SCNC: 10.6 MG/DL (ref 8.6–10.5)
CANCER AG15-3 SERPL-ACNC: 32.6 U/ML
CHLORIDE SERPL-SCNC: 105 MMOL/L (ref 98–107)
CO2 SERPL-SCNC: 26 MMOL/L (ref 22–29)
CREAT SERPL-MCNC: 1.06 MG/DL (ref 0.57–1)
DEPRECATED RDW RBC AUTO: 41.9 FL (ref 37–54)
EOSINOPHIL # BLD AUTO: 0.74 10*3/MM3 (ref 0–0.4)
EOSINOPHIL NFR BLD AUTO: 9.5 % (ref 0.3–6.2)
ERYTHROCYTE [DISTWIDTH] IN BLOOD BY AUTOMATED COUNT: 12.7 % (ref 12.3–15.4)
FERRITIN SERPL-MCNC: 85.9 NG/ML (ref 13–150)
GFR SERPL CREATININE-BSD FRML MDRD: 52 ML/MIN/1.73
GLOBULIN UR ELPH-MCNC: 2.6 GM/DL
GLUCOSE SERPL-MCNC: 103 MG/DL (ref 65–99)
HCT VFR BLD AUTO: 41.7 % (ref 34–46.6)
HGB BLD-MCNC: 13.6 G/DL (ref 12–15.9)
HGB RETIC QN AUTO: 34.3 PG (ref 29.8–36.1)
IMM GRANULOCYTES # BLD AUTO: 0.02 10*3/MM3 (ref 0–0.05)
IMM GRANULOCYTES NFR BLD AUTO: 0.3 % (ref 0–0.5)
IMM RETICS NFR: 7.7 % (ref 3–15.8)
IRON 24H UR-MRATE: 69 MCG/DL (ref 37–145)
IRON SATN MFR SERPL: 19 % (ref 20–50)
LYMPHOCYTES # BLD AUTO: 1.72 10*3/MM3 (ref 0.7–3.1)
LYMPHOCYTES NFR BLD AUTO: 22 % (ref 19.6–45.3)
MCH RBC QN AUTO: 30 PG (ref 26.6–33)
MCHC RBC AUTO-ENTMCNC: 32.6 G/DL (ref 31.5–35.7)
MCV RBC AUTO: 91.9 FL (ref 79–97)
MONOCYTES # BLD AUTO: 0.63 10*3/MM3 (ref 0.1–0.9)
MONOCYTES NFR BLD AUTO: 8.1 % (ref 5–12)
NEUTROPHILS NFR BLD AUTO: 4.62 10*3/MM3 (ref 1.7–7)
NEUTROPHILS NFR BLD AUTO: 58.9 % (ref 42.7–76)
NRBC BLD AUTO-RTO: 0 /100 WBC (ref 0–0.2)
PLATELET # BLD AUTO: 377 10*3/MM3 (ref 140–450)
PMV BLD AUTO: 8.7 FL (ref 6–12)
POTASSIUM SERPL-SCNC: 3.5 MMOL/L (ref 3.5–5.2)
PROT SERPL-MCNC: 7.1 G/DL (ref 6–8.5)
RBC # BLD AUTO: 4.54 10*6/MM3 (ref 3.77–5.28)
RETICS # AUTO: 0.06 10*6/MM3 (ref 0.02–0.13)
RETICS/RBC NFR AUTO: 1.28 % (ref 0.7–1.9)
SODIUM SERPL-SCNC: 142 MMOL/L (ref 136–145)
TIBC SERPL-MCNC: 367 MCG/DL (ref 298–536)
TRANSFERRIN SERPL-MCNC: 246 MG/DL (ref 200–360)
WBC NRBC COR # BLD: 7.82 10*3/MM3 (ref 3.4–10.8)

## 2021-11-22 PROCEDURE — 36415 COLL VENOUS BLD VENIPUNCTURE: CPT | Performed by: INTERNAL MEDICINE

## 2021-11-22 PROCEDURE — 82728 ASSAY OF FERRITIN: CPT | Performed by: INTERNAL MEDICINE

## 2021-11-22 PROCEDURE — 99215 OFFICE O/P EST HI 40 MIN: CPT | Performed by: INTERNAL MEDICINE

## 2021-11-22 PROCEDURE — 85025 COMPLETE CBC W/AUTO DIFF WBC: CPT | Performed by: INTERNAL MEDICINE

## 2021-11-22 PROCEDURE — 86300 IMMUNOASSAY TUMOR CA 15-3: CPT | Performed by: INTERNAL MEDICINE

## 2021-11-22 PROCEDURE — 85046 RETICYTE/HGB CONCENTRATE: CPT | Performed by: INTERNAL MEDICINE

## 2021-11-22 PROCEDURE — 84466 ASSAY OF TRANSFERRIN: CPT | Performed by: INTERNAL MEDICINE

## 2021-11-22 PROCEDURE — 83540 ASSAY OF IRON: CPT | Performed by: INTERNAL MEDICINE

## 2021-11-22 PROCEDURE — 80053 COMPREHEN METABOLIC PANEL: CPT | Performed by: INTERNAL MEDICINE

## 2021-11-23 ENCOUNTER — TELEPHONE (OUTPATIENT)
Dept: ONCOLOGY | Facility: CLINIC | Age: 66
End: 2021-11-23

## 2021-11-23 NOTE — TELEPHONE ENCOUNTER
Left voicemail with RN direct number for patient to call back with more information regarding her labs

## 2021-11-23 NOTE — TELEPHONE ENCOUNTER
----- Message from Jarod Hawkins II, MD sent at 11/22/2021  5:30 PM EST -----      Please call her and let her know her Hb is up to normal, around 13.6.  Therefore, it appears the anemia resolved with some time away from surgery.  Tell her she is not iron deficient.

## 2021-11-24 ENCOUNTER — TELEPHONE (OUTPATIENT)
Dept: ONCOLOGY | Facility: CLINIC | Age: 66
End: 2021-11-24

## 2021-11-24 NOTE — TELEPHONE ENCOUNTER
Reviewed Dr. Hawkins's note on patient's voicemail and left RN direct number if she has any questions.

## 2022-01-07 ENCOUNTER — DOCUMENTATION (OUTPATIENT)
Dept: ONCOLOGY | Facility: CLINIC | Age: 67
End: 2022-01-07

## 2022-01-07 ENCOUNTER — TELEPHONE (OUTPATIENT)
Dept: GENERAL RADIOLOGY | Facility: HOSPITAL | Age: 67
End: 2022-01-07

## 2022-01-07 NOTE — TELEPHONE ENCOUNTER
----- Message from Jarod Hawkins II, MD sent at 1/7/2022  2:10 PM EST -----  She is having a tooth extracted today with Dr. Dolan, [phone] (office).  Therefore, Zometa needs to be delayed by at least 1 month from today.To make things easier for her, please delay everything to match up to Zometa    Please change the appointment with me to 5 weeks from now with Zometa that day.  2 units.  2 units.  2 units.  Delay the scans to be a week before that.    Thanks!

## 2022-01-07 NOTE — PROGRESS NOTES
She is having a tooth extracted today with Dr. Dolan, [phone] (office).  Therefore, Zometa needs to be delayed by at least 1 month from today.    To make things easier for her, please delay everything to match up to Zometa    Please change the appointment with me to 5 weeks from now with Zometa that day.  2 units.  2 units.  2 units.  Delay the scans to be a week before that.    Thanks!        Note to self:  Dr. Dolan states she will monitor her afterwards and let us know if she needs longer than at least 1 month after the extraction to resume Zometa but he thinks 1 month will probably work.

## 2022-01-17 ENCOUNTER — APPOINTMENT (OUTPATIENT)
Dept: CT IMAGING | Facility: HOSPITAL | Age: 67
End: 2022-01-17

## 2022-01-19 DIAGNOSIS — I10 BENIGN ESSENTIAL HTN: ICD-10-CM

## 2022-01-20 RX ORDER — LOSARTAN POTASSIUM 50 MG/1
TABLET ORAL
Qty: 90 TABLET | Refills: 0 | Status: SHIPPED | OUTPATIENT
Start: 2022-01-20 | End: 2022-04-05 | Stop reason: SDUPTHER

## 2022-01-20 RX ORDER — ATORVASTATIN CALCIUM 10 MG/1
TABLET, FILM COATED ORAL
Qty: 90 TABLET | Refills: 0 | Status: SHIPPED | OUTPATIENT
Start: 2022-01-20 | End: 2022-04-25 | Stop reason: SDUPTHER

## 2022-01-20 RX ORDER — ANASTROZOLE 1 MG/1
TABLET ORAL
Qty: 90 TABLET | Refills: 0 | Status: SHIPPED | OUTPATIENT
Start: 2022-01-20 | End: 2022-05-06 | Stop reason: ALTCHOICE

## 2022-02-01 ENCOUNTER — HOSPITAL ENCOUNTER (OUTPATIENT)
Dept: CT IMAGING | Facility: HOSPITAL | Age: 67
Discharge: HOME | End: 2022-02-01

## 2022-02-01 ENCOUNTER — DOCUMENTATION (OUTPATIENT)
Dept: ONCOLOGY | Facility: CLINIC | Age: 67
End: 2022-02-01

## 2022-02-01 ENCOUNTER — LAB (OUTPATIENT)
Dept: LAB | Facility: HOSPITAL | Age: 67
End: 2022-02-01

## 2022-02-01 DIAGNOSIS — C78.02 CARCINOMA OF LEFT BREAST METASTATIC TO LUNG: ICD-10-CM

## 2022-02-01 DIAGNOSIS — C50.912 CARCINOMA OF LEFT BREAST METASTATIC TO LUNG: ICD-10-CM

## 2022-02-01 LAB
ALBUMIN SERPL-MCNC: 4.2 G/DL (ref 3.5–5.2)
ALBUMIN/GLOB SERPL: 1.4 G/DL
ALP SERPL-CCNC: 105 U/L (ref 39–117)
ALT SERPL W P-5'-P-CCNC: 15 U/L (ref 1–33)
ANION GAP SERPL CALCULATED.3IONS-SCNC: 11.3 MMOL/L (ref 5–15)
AST SERPL-CCNC: 21 U/L (ref 1–32)
BASOPHILS # BLD AUTO: 0.04 10*3/MM3 (ref 0–0.2)
BASOPHILS NFR BLD AUTO: 0.8 % (ref 0–1.5)
BILIRUB SERPL-MCNC: 0.8 MG/DL (ref 0–1.2)
BUN SERPL-MCNC: 22 MG/DL (ref 8–23)
BUN/CREAT SERPL: 18.8 (ref 7–25)
CALCIUM SPEC-SCNC: 10.7 MG/DL (ref 8.6–10.5)
CANCER AG15-3 SERPL-ACNC: 26.3 U/ML
CHLORIDE SERPL-SCNC: 107 MMOL/L (ref 98–107)
CO2 SERPL-SCNC: 22.7 MMOL/L (ref 22–29)
CREAT SERPL-MCNC: 1.17 MG/DL (ref 0.57–1)
DEPRECATED RDW RBC AUTO: 39.4 FL (ref 37–54)
EOSINOPHIL # BLD AUTO: 0.2 10*3/MM3 (ref 0–0.4)
EOSINOPHIL NFR BLD AUTO: 4.1 % (ref 0.3–6.2)
ERYTHROCYTE [DISTWIDTH] IN BLOOD BY AUTOMATED COUNT: 12.3 % (ref 12.3–15.4)
GFR SERPL CREATININE-BSD FRML MDRD: 46 ML/MIN/1.73
GLOBULIN UR ELPH-MCNC: 3.1 GM/DL
GLUCOSE SERPL-MCNC: 103 MG/DL (ref 65–99)
HCT VFR BLD AUTO: 39 % (ref 34–46.6)
HGB BLD-MCNC: 13.3 G/DL (ref 12–15.9)
IMM GRANULOCYTES # BLD AUTO: 0.01 10*3/MM3 (ref 0–0.05)
IMM GRANULOCYTES NFR BLD AUTO: 0.2 % (ref 0–0.5)
LYMPHOCYTES # BLD AUTO: 1.49 10*3/MM3 (ref 0.7–3.1)
LYMPHOCYTES NFR BLD AUTO: 30.8 % (ref 19.6–45.3)
MCH RBC QN AUTO: 29.9 PG (ref 26.6–33)
MCHC RBC AUTO-ENTMCNC: 34.1 G/DL (ref 31.5–35.7)
MCV RBC AUTO: 87.6 FL (ref 79–97)
MONOCYTES # BLD AUTO: 0.45 10*3/MM3 (ref 0.1–0.9)
MONOCYTES NFR BLD AUTO: 9.3 % (ref 5–12)
NEUTROPHILS NFR BLD AUTO: 2.64 10*3/MM3 (ref 1.7–7)
NEUTROPHILS NFR BLD AUTO: 54.8 % (ref 42.7–76)
NRBC BLD AUTO-RTO: 0 /100 WBC (ref 0–0.2)
PLATELET # BLD AUTO: 289 10*3/MM3 (ref 140–450)
PMV BLD AUTO: 8.7 FL (ref 6–12)
POTASSIUM SERPL-SCNC: 4.3 MMOL/L (ref 3.5–5.2)
PROT SERPL-MCNC: 7.3 G/DL (ref 6–8.5)
RBC # BLD AUTO: 4.45 10*6/MM3 (ref 3.77–5.28)
SODIUM SERPL-SCNC: 141 MMOL/L (ref 136–145)
WBC NRBC COR # BLD: 4.83 10*3/MM3 (ref 3.4–10.8)

## 2022-02-01 PROCEDURE — 71260 CT THORAX DX C+: CPT

## 2022-02-01 PROCEDURE — 74177 CT ABD & PELVIS W/CONTRAST: CPT

## 2022-02-01 PROCEDURE — 80053 COMPREHEN METABOLIC PANEL: CPT

## 2022-02-01 PROCEDURE — 85025 COMPLETE CBC W/AUTO DIFF WBC: CPT

## 2022-02-01 PROCEDURE — 25010000002 IOPAMIDOL 61 % SOLUTION: Performed by: INTERNAL MEDICINE

## 2022-02-01 PROCEDURE — 0 DIATRIZOATE MEGLUMINE & SODIUM PER 1 ML: Performed by: INTERNAL MEDICINE

## 2022-02-01 PROCEDURE — 86300 IMMUNOASSAY TUMOR CA 15-3: CPT

## 2022-02-01 RX ADMIN — DIATRIZOATE MEGLUMINE AND DIATRIZOATE SODIUM 30 ML: 660; 100 LIQUID ORAL; RECTAL at 13:20

## 2022-02-01 RX ADMIN — IOPAMIDOL 100 ML: 612 INJECTION, SOLUTION INTRAVENOUS at 14:28

## 2022-02-01 NOTE — PROGRESS NOTES
Hi April,    Please call her and tell her that her calcium is a little elevated, 10.7, not much different than her baseline.  Make sure she is not taking any calcium supplements and please ask her to eat a low calcium diet    Thanks!

## 2022-02-02 ENCOUNTER — TELEPHONE (OUTPATIENT)
Dept: ONCOLOGY | Facility: CLINIC | Age: 67
End: 2022-02-02

## 2022-02-02 NOTE — TELEPHONE ENCOUNTER
----- Message from Jarod Hawkins II, MD sent at 2/1/2022  2:15 PM EST -----  Hi April,    Please call her and tell her that her calcium is a little elevated, 10.7, not much different than her baseline.  Make sure she is not taking any calcium supplements and please ask her to eat a low calcium diet    Thanks!

## 2022-02-02 NOTE — TELEPHONE ENCOUNTER
Phoned pt to inform her that her calcium is a little elevated, 10.7, not much different than her baseline. Informed her to make sure she is not taking any calcium supplements and eat a low calcium diet. Pt states she eats yogurt daily and she will cut back. She has appointment scheduled with Dr. Hawkins next week.

## 2022-02-08 ENCOUNTER — TELEPHONE (OUTPATIENT)
Dept: ONCOLOGY | Facility: CLINIC | Age: 67
End: 2022-02-08

## 2022-02-08 NOTE — TELEPHONE ENCOUNTER
Caller: Marialuisa Vivar    Relationship to patient: Self    Best call back number: [phone]    Type of visit: VITALS, FOLLOW UP, AND INFUSION    Requested date: ANY LATE MORNING OR AFTERNOON EXCEPT 02/15, 02/22, 02/28, OR 03/14    If rescheduling, when is the original appointment: 02/08    Additional notes: PLEASE CALL ONCE R/S.

## 2022-02-11 NOTE — TELEPHONE ENCOUNTER
CALLED PT WITH HER APPT DATE AND TIME AND SHE HAS CONFIRMED HER APPT DATE AND TIME AT THE [employer] OFFICE

## 2022-02-11 NOTE — TELEPHONE ENCOUNTER
CALLED PT ENZO TO RESCHEDULE HER APPT AS SHE CANNOT DO THIS DATE, I HAVE LEFT THE APPT ON FOR 3/14/22 AS I AM AWAITING ON A CALL BACK FROM THE PT I WILL SEND A MESSAGE TO April TO SEE IF WE COULD ADD PT WITH THE NP

## 2022-02-16 ENCOUNTER — OFFICE VISIT (OUTPATIENT)
Dept: INTERNAL MEDICINE | Facility: CLINIC | Age: 67
End: 2022-02-16

## 2022-02-16 VITALS
BODY MASS INDEX: 27.76 KG/M2 | DIASTOLIC BLOOD PRESSURE: 60 MMHG | WEIGHT: 166.6 LBS | HEART RATE: 97 BPM | TEMPERATURE: 97.1 F | HEIGHT: 65 IN | RESPIRATION RATE: 20 BRPM | SYSTOLIC BLOOD PRESSURE: 110 MMHG | OXYGEN SATURATION: 99 %

## 2022-02-16 DIAGNOSIS — L03.114 CELLULITIS OF LEFT UPPER EXTREMITY: Primary | ICD-10-CM

## 2022-02-16 PROCEDURE — 99213 OFFICE O/P EST LOW 20 MIN: CPT | Performed by: FAMILY MEDICINE

## 2022-02-16 RX ORDER — AMOXICILLIN AND CLAVULANATE POTASSIUM 875; 125 MG/1; MG/1
1 TABLET, FILM COATED ORAL 2 TIMES DAILY
Qty: 14 TABLET | Refills: 0 | Status: SHIPPED | OUTPATIENT
Start: 2022-02-16 | End: 2022-03-22

## 2022-02-16 NOTE — PROGRESS NOTES
Subjective   Marialuisa Vivar is a 66 y.o. female.   Chief Complaint   Patient presents with   • Upper Extremity Issue     c/o cat bite to lt wrist hand forearm 02/09/22 red swollen arm feels hot        History of Present Illness     1.  Cat bite-patient is not sure if it was bite or if it was a claw of her cat that caused the problem.  7 to 10 days ago she noticed tiny pinprick marks on left forearm.  No bleeding.  She rinsed it well.  1-2 days later she noticed redness and some swelling.  Initially it was warm to touch and it hurt.  She applied hydrocortisone cream and triple antibiotic, she is not sure if it helped.  She says that overall it is a little better.  She still has some redness on and off, it is not warm to touch anymore.  It does not hurt.  She has no fever, no chills.  It was her own cat.  The cat stays inside.  The cat is up-to-date with vaccinations.  Patient had tetanus vaccine on 8/21/2020.    The following portions of the patient's history were reviewed and updated as appropriate: allergies, current medications, past family history, past medical history, past social history, past surgical history and problem list.    Review of Systems   Constitutional: Negative for chills and fever.   Skin: Negative for wound.         Objective   Wt Readings from Last 3 Encounters:   02/16/22 75.6 kg (166 lb 9.6 oz)   11/22/21 79.2 kg (174 lb 11.2 oz)   11/19/21 78.3 kg (172 lb 9.6 oz)      Vitals:    02/16/22 1530   BP: 110/60   Pulse: 97   Resp: 20   Temp: 97.1 °F (36.2 °C)   SpO2: 99%     Temp Readings from Last 3 Encounters:   02/16/22 97.1 °F (36.2 °C) (Temporal)   11/22/21 96.9 °F (36.1 °C) (Temporal)   11/05/21 98 °F (36.7 °C) (Oral)     BP Readings from Last 3 Encounters:   02/16/22 110/60   11/22/21 161/77   11/19/21 124/79     Pulse Readings from Last 3 Encounters:   02/16/22 97   11/22/21 102   11/19/21 112     Body mass index is 27.72 kg/m².    Physical Exam  Constitutional:       Appearance: Normal  appearance.   Skin:     Comments: Dark red areas on left forearm.  No tenderness to palpation.  Normal temperature to touch.  No lacerations.   Neurological:      Mental Status: She is alert.         Assessment/Plan   Diagnoses and all orders for this visit:    1. Cellulitis of left upper extremity (Primary)    Other orders  -     Cancel: DEXA Bone Density Axial  -     amoxicillin-clavulanate (Augmentin) 875-125 MG per tablet; Take 1 tablet by mouth 2 (Two) Times a Day.  Dispense: 14 tablet; Refill: 0        #1 cellulitis-not clear if it was a cat bite or scratch.  No lacerations on exam.  Will treat with Augmentin twice a day.  Patient is up-to-date with tetanus vaccine.  Animal control forms done.  Follow-up if symptoms not resolved with treatment, or sooner if worsening.

## 2022-03-14 NOTE — PROGRESS NOTES
.     REASONS FOR FOLLOWUP: Metastatic breast cancer, tongue cancer    HISTORY OF PRESENT ILLNESS:  The patient is a 66 y.o. year old female  who is here for follow-up with the above-mentioned history.    She did not come to her follow-up with me in February to review CT scans because she states she was too worried about with the scans would mean.    She is doing better emotionally now.    Right hip discomfort x6 months.  Mild, but persistent.  Not taking any narcotics for this.  She thinks this might be in part due to taking care of the cat, bending over for the litter box.  However, it also hurts her to sit sometimes.    Past Medical History:   Diagnosis Date   • Allergy    • Anxiety    • Asthma    • Bone metastasis (HCC)    • Breast cancer (HCC)     Left node positive   • Depression    • Esophageal reflux     cough   • History of colon polyps    • Hyperlipidemia    • Hypertension    • Left breast cancer metastasized to lung    • Obstructive sleep apnea    • Ovarian cyst    • PONV (postoperative nausea and vomiting)    • Tongue cancer (HCC) 05/2019    by [facility]NT at [facility] dr Quinn     Past Surgical History:   Procedure Laterality Date   • CHOLECYSTECTOMY  2000   • COLONOSCOPY  2014    H/O polyps   • KNEE ARTHROPLASTY     • LUNG SURGERY  2010    Lung wedge resection   • MASTECTOMY RADICAL Left 1993   • TONGUE SURGERY  05/21/2019    tongue cancer   • TOTAL LAPAROSCOPIC HYSTERECTOMY N/A 11/4/2021    Procedure: Robotic assisted total laparoscopic hysterectomy, bilateral salpingoophorecotmy, bilateral ureteralysis ;  Surgeon: Kaylynn Ricci DO;  Location: [facility];  Service: Robotics - [facility];  Laterality: N/A;       MEDICATIONS    Current Outpatient Medications:   •  amoxicillin-clavulanate (Augmentin) 875-125 MG per tablet, Take 1 tablet by mouth 2 (Two) Times a Day., Disp: 14 tablet, Rfl: 0  •  anastrozole (ARIMIDEX) 1 MG tablet, Take 1 tablet by mouth once daily, Disp: 90 tablet, Rfl: 0  •  atorvastatin  (LIPITOR) 10 MG tablet, TAKE 1 TABLET BY MOUTH ONCE DAILY AT NIGHT, Disp: 90 tablet, Rfl: 0  •  busPIRone (BUSPAR) 10 MG tablet, Take 10 mg by mouth 2 (Two) Times a Day., Disp: , Rfl:   •  Cholecalciferol 2000 UNITS capsule, Take 4,000 Units by mouth 3 (Three) Times a Week., Disp: , Rfl:   •  eszopiclone (LUNESTA) tablet, Take 3 mg by mouth At Night As Needed., Disp: , Rfl:   •  LORazepam (ATIVAN) 0.5 MG tablet, Take 1 tablet by mouth Every 8 (Eight) Hours As Needed., Disp: , Rfl:   •  losartan (COZAAR) 50 MG tablet, Take 1 tablet by mouth once daily, Disp: 90 tablet, Rfl: 0  •  Omeprazole 20 MG Tablet Delayed Release Dispersible, Take 20 mg by mouth As Needed., Disp: , Rfl:   •  PARoxetine (PAXIL) 40 MG tablet, Take 40 mg by mouth Every Night., Disp: , Rfl:   •  Zoledronic Acid (ZOMETA IV), Infuse  into a venous catheter. EVERY THREE MONTHS, Disp: , Rfl:   No current facility-administered medications for this visit.    Facility-Administered Medications Ordered in Other Visits:   •  chlorhexidine (PERIDEX) 0.12 % solution 15 mL, 15 mL, Mouth/Throat, Q12H, Kaylynn Ricci,   •  mupirocin (BACTROBAN) 2 % nasal ointment, , Nasal, BID, Kaylynn Ricci, DO    ALLERGIES:     Allergies   Allergen Reactions   • Nickel Unknown - Low Severity   • Ciprofloxacin Rash       SOCIAL HISTORY:       Social History     Socioeconomic History   • Marital status: Single   • Years of education: College   Tobacco Use   • Smoking status: Former Smoker     Packs/day: 2.00     Years: 30.00     Pack years: 60.00     Types: Cigarettes     Start date:      Quit date: 2008     Years since quittin.7   • Smokeless tobacco: Never Used   Vaping Use   • Vaping Use: Never used   Substance and Sexual Activity   • Alcohol use: No   • Drug use: No   • Sexual activity: Defer         FAMILY HISTORY:  Family History   Problem Relation Age of Onset   • Hypertension Mother    • Leukemia Mother 72   • COPD Mother         smoker   • Diabetes  "Brother    • Pancreatic cancer Brother    • Glaucoma Brother    • Heart disease Brother    • Hypertension Brother    • Gallbladder disease Brother    • Gallbladder disease Father    • Stroke Other         Grandmother   • Lupus Other         Aunt   • Alcohol abuse Other         Aunt   • Glaucoma Other         Grandmother   • Diabetes type II Brother    • Hypertension Brother    • Hyperlipidemia Brother    • Malig Hyperthermia Neg Hx        REVIEW OF SYSTEMS:  Review of Systems   Constitutional: Negative for activity change.   HENT: Negative for nosebleeds and trouble swallowing.    Respiratory: Negative for shortness of breath and wheezing.    Cardiovascular: Negative for chest pain and palpitations.   Gastrointestinal: Negative for constipation, diarrhea and nausea.   Genitourinary: Negative for dysuria and hematuria.   Musculoskeletal: Negative for arthralgias and myalgias.   Skin: Negative for rash and wound.   Neurological: Negative for seizures and syncope.   Hematological: Negative for adenopathy. Does not bruise/bleed easily.   Psychiatric/Behavioral: Negative for confusion.            Vitals:    03/15/22 0857   BP: 118/76   Pulse: 90   Resp: 18   Temp: 97.1 °F (36.2 °C)   TempSrc: Temporal   SpO2: 98%   Weight: 76.2 kg (168 lb)   Height: 165.1 cm (65\")   PainSc: 0-No pain     Current Status 3/15/2022   ECOG score 0        PHYSICAL EXAM:        CONSTITUTIONAL:  Vital signs reviewed.  No distress, looks comfortable.  EYES:  Conjunctiva and lids unremarkable.  PERRLA  EARS,NOSE,MOUTH,THROAT:  Ears and nose appear unremarkable.  Lips, teeth, gums appear unremarkable.  RESPIRATORY:  Normal respiratory effort.  Lungs clear to auscultation bilaterally.  CARDIOVASCULAR:  Normal S1, S2.  No murmurs rubs or gallops.  No significant lower extremity edema.  GASTROINTESTINAL: Abdomen appears unremarkable.  Nontender.  No hepatomegaly.  No splenomegaly.  LYMPHATIC:  No cervical, supraclavicular, axillary " lymphadenopathy.  SKIN:  Warm.  No rashes.  PSYCHIATRIC:  Normal judgment and insight.  Normal mood and affect.          RECENT LABS:        WBC   Date/Time Value Ref Range Status   03/15/2022 08:45 AM 4.65 3.40 - 10.80 10*3/mm3 Final     Hemoglobin   Date/Time Value Ref Range Status   03/15/2022 08:45 AM 13.2 12.0 - 15.9 g/dL Final     Platelets   Date/Time Value Ref Range Status   03/15/2022 08:45  140 - 450 10*3/mm3 Final       Assessment/Plan   Carcinoma of left breast metastatic to lung (HCC)  - Ambulatory Referral to Gastroenterology  - Ambulatory Referral to Radiation Oncology  - CBC & Differential  - Comprehensive Metabolic Panel  - NM Bone Scan Whole Body    Bone metastases (HCC)  - Ambulatory Referral to Gastroenterology  - Ambulatory Referral to Radiation Oncology  - CBC & Differential  - Comprehensive Metabolic Panel  - NM Bone Scan Whole Body    Marialuisa Vivar        Assessment/Plan     *Metastatic breast cancer to bilateral lungs and soft tissue in the mediastinum (likely the cause of her hoarseness). (Initial breast cancer diagnosed in 1993 treated with mastectomy, chemotherapy, radiation therapy and tamoxifen). Arimidex day 1 on 02/17/2010. PET scan late August 2011 shows no abnormal hypermetabolic activity. This has improved compared to the prior PET scan January 2010 which showed mild hypermetabolic activity in areas of metastasis. (In the past, her  T6 lesion did not show up on CT scans or bone scan. It was seen by PET scan.) We have been following with CT scans only every 6 months and PET scans on an as needed only basis. Sometimes her CT scanned pulmonary nodules are read as benign since they have been stable through the course of years but we know they are malignant because they have been surgically sampled in the past.    · CT 8/16/16 shows increased sclerosis at T6 compared to 4/28/15.    · PET 10/11/16: Slight uptake at T6, SUV 3.2.  mild thickening of right adrenal gland with an SUV of  11.   · Continued Arimidex is minimal progression and had been on this since 2/17/10.  · Not referred for radiation since no pain at T6  · CT 1/27/17, unchanged.  · CT 8/1/17: Unchanged except subtle suggestion of mild increase in thickening of the right adrenal gland.    · CT 2/22/18, unchanged.  · CT 9/6/18: Unchanged except nodular thickening right adrenal gland significantly decreased.  · CT 9/5/2019: Unchanged.  · On the 6/12/2020 visit, the following scans were reviewed:  · CT neck and chest 5/11/2020, [facility], from 5/9/2019: Stable pulmonary nodules new lytic C7 lesion and posterior T1 lesion questionable T12 lesion.  No change in the T6 and T10 lesions.  · PET, [facility], 5/19/2020: Mildly enlarged left neck nodes are mildly hypermetabolic.  Diffuse activity throughout the thickened left adrenal gland.  CT appearance unchanged from 5/9/2019.  Progressive T1 lesion seen on CT from 5/11/2020, mild some moderate activity.  T10 low-grade activity with mixed lucent and sclerotic findings.  T6 is a mixed lucent and sclerotic appearance but no significant activity.   · T12 (the biopsy report is labeled as T12 but patient insists this was a C7/T1 biopsy, not to T12) biopsy 6/15/2020, [facility]: Metastatic breast carcinoma.  ER >95%.  CO 0%.  HER-2 negative (1+)  · It seems the main progression at the 6/12/2020 visit was a new C7 and new T1 lesion.  Those were radiated with [facility] through [facility] early June 2020.  Because Arimidex has been working well since 2010, continue same Arimidex.  Add Zometa every 3 months.  · C7 and T1 found on CT 5/11/2020, [facility].  Pedro, Dr. Winston, early June 2020.  · CT C and T-spine 9/17/2020: New C7 lesion from 9/6/2018, but no change from 5/11/2020 CT.  T1 lesion stable from 5/11/2020, but new compared to 2/22/2018.  T10 lesion unchanged from 9/5/2019, but new from 2/22/2018.  Subtle 8 mm T12 lesion new from 9/5/2019.  · Therefore, no recent progression.  · Considering her good  tolerance and long-term effectiveness of Arimidex, continue same therapy.  · CT CAP 12/10/2020: No progression  · Therefore, continue Arimidex.  · CA 15-3 normal through 12/14/2020  · Because CA 15-3 fabian to 27 on 4/13/2021, then 29.3 on 7/20/2021, then 34.3 on 10/12/2021, CT scans done earlier than planned:  · CT CAP with contrast 10/12/2021: New 5.2 x 4.6 cm mixed cystic and solid right ovarian mass compared to 12/10/2020.  Continued stability of bilateral pulmonary nodules and stable sclerotic bone metastasis.  · 11/4/2021: TLH/BSO (due to new 5 cm right ovarian mass on CT, 11/12/2021).  · Metastatic breast cancer involving bilateral ovaries and posterior uterine serosa.  ER 81-90%.  WA 61-70%.  HER-2 negative  · Pelvic washings: Adenocarcinoma  · Because this was the only area of progression on CT, and has been resected, continue Arimidex which she has been on since 2/17/2010.  · 11/22/2021: Discussed with Dr. Blum, who has a focus on breast cancer.  We both agree: Continue Arimidex for now.  In the future, if significant progression is seen, then plan Faslodex injections with Ibrance  · CT CAP 2/1/2022: A few T-spine sclerotic lesions progressively more sclerotic over multiple prior CTs of indeterminate significance.  No clear signs of progression of disease.  · 3/15/2022: Although tumor marker is not rising and CT showed no clear signs of progression.  CT revealed progressively more sclerotic bone metastasis over multiple prior CTs, she is having increased right hip discomfort.  Sometimes this can be due to healing of metastasis (but she has been on the same treatment since 2010), or this could mean progression of bone metastasis.  It is reassuring that her tumor marker is not worsening.  She would like to see [facility] medicine to see if they feel radiation to the hip would help her discomfort.  We will do a bone scan before that appointment as this might help [facility] radiation.  (Although she has  seen [facility] radiation for her neck, she would like to see [facility] radiation for this hip issue).    CA 15-3:  · 26.3 on 2/1/2022, from 32.6 on 11/22/2021, from 34.3 on 10/12/2021, from 29.3 on 7/20/2021, from 27 on 4/13/2021, from 23.3 on 12/14/2020.    *Bony metastases  · T6 discovered by PET to August 2011 (did not show up by CT or bone scan)  · C7 and T1 found on CT 5/11/2020, [facility].  CyberKnife, Dr. Winston, early June 2020.  · June 2020 began Zometa every 3 months.  · She was warned of possible osteonecrosis of the jaw and renal insufficiency.  She states she has good dental health and sees her dentist regularly.  Because of prior hypercalcemia requiring stopping calcium supplements and because of high normal current calcium level, began without supplemental calcium.  Add calcium if calcium becomes low.  · 1/7/2022: Tooth extracted.  Plan was to wait 5 weeks to resume Zometa.  Patient delayed return until 3/15/2022 (over 8 weeks)    *Bone health. Last bone density 10/11/16, worsened, but still normal with worst T score -0.9.  Patient stopped calcium January 2016, but remained on vitamin D.  I recommended she restart her calcium.  Stopped calcium early March 2018 due to hypercalcemia.  · DEXA 9/5/2019: Normal bone density    *Hypercalcemia.  Repeat calcium 3/7/18 normal, but patient self stopped calcium a few days prior.  Recommended she stay off calcium.  · Calcium was 11 on 6/19/2020  · Calcium 11 again on 12/10/2020  · Repeat calcium 10.5 on 12/14/2020  · Calcium 10.6.  Minimally elevated.  (Normal range up to 10.5)  · Calcium 10.8 on 3/15/2022.  Hopefully, Zometa will help with this.    *On the 10/5/16 visit, Tachycardia, dyspnea on exertion, bilateral leg swelling, more sedentary recently.  CT PE protocol and bilateral lower extremity Dopplers 10/5/16, negative for clots.  She still has tachycardia and dyspnea on exertion.  Perhaps at least part of this is due to  deconditioning.    *Previously complained of colon Right lower anterior rib discomfort.  CT unremarkable in that area.  No specific pain, just discomfort.  I explained to the patient if disease was found by PET scan or bone scan, I would not  since she has been doing well on Arimidex since 2010.   Currently denies pain.     *Depression/anxiety.  This limits compliance.  She sees a psychiatrist and a counselor regularly.  She states this is mostly due to body image issues instead of cancer.   · She continues with her counselor and psychiatrist.  Viral pandemic is making this a little more difficult.  · This seems to be doing better now.    *Noncompliance due to depression/anxiety.  Although she misses appointments, she does reschedule and eventually come in.    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 Willie Quinn, [facility], on 5/21/2019.  Positive sentinel node (1 of 3 nodes)..  · Left neck dissection by Dr. Willie Quinn, [facility], on 6/5/2019.  22 nodes negative.  Negative margins.  No lymphovascular invasion or perineural invasion.  2 mm depth of invasion.  Grade 1.  Squamous cell carcinoma.  1.8 cm tumor.  · Because the patient felt what she thought was left neck LAD, CT neck and chest and PET at [facility]. May 2020 performed.  She is being followed at [facility] for this.  · On 6/19/2020, per verbal report from Dr. Senia [facility], [facility] ENT does not feel she has any active head and neck cancer.   · She states she has a follow-up with AMADEO Whalen at [facility], in June with CT neck 1 week prior  · No symptoms suggesting recurrence.  She has follow-up with [facility] in July    *Previously complained of left hip pain x2 weeks.  This prompted an MRI pelvis 8/20/2019 at [facility] and open MRI which was negative for malignancy.    *Possible intolerance of Zometa  · After first dose, June 2020, 2 days of chills, bone  pain  · She would like to try Zometa again.  If this occurs again, we will try to switch her to Xgeva monthly.  (No good data on every 3-month Xgeva)  · She did fine with the second dose of Zometa.    *Thickening of wall of transverse colon on CT 2/1/2022.  Last colonoscopy was 4/18/2019.  She wants the next colonoscopy done at [facility]  · Referral to Loc[facility] within [facility]    PLAN:   · Zometa and Zometa labs today (scheduled)  · Last Zometa was 10/19/2021.  · Referral to [facility] radiation medicine with bone scan a few days prior to see if they feel she needs any radiation to her right hip  · Referral to [facility] to see if they feel she needs another colonoscopy.  Last was 2019 (3 years ago), and recent CT shows thickening of the wall of transverse colon).  · Appointment with me a few weeks after the appointment with [facility] radiation.  I told her that visit we might discuss changing Arimidex to something else if [facility] radiation medicine clearly feels the hip is progressing and needs XRT.  However, tumor marker is gradually coming down and no clear signs of progression on CT, and she has been doing well on Arimidex since 2010.  Therefore, another option would be to continue the Arimidex      Usual plan is:  · MD, CA 15-3, CBC, CMP, Zometa 3 months.    · (Zometa every 3 months)  · MD Miller 6 months.  1 week prior:  · 1 week prior: CT CAP with contrast, CBC, CMP, CA 15-3   · (we were checking annual CT.  However, with the solitary progression that has been resected, check CT more often for now)  · CMP today.  If calcium remains low, begin calcium supplement  · Continue Arimidex  · She sees [facility]NT, Dr. Bedoya annually, next is summer 2022      41 minutes.  Total time.  Same day.    Send a copy of this note to   Isaac Valdes MD, Dr., radiation medicine, [facility]

## 2022-03-15 ENCOUNTER — INFUSION (OUTPATIENT)
Dept: ONCOLOGY | Facility: HOSPITAL | Age: 67
End: 2022-03-15

## 2022-03-15 ENCOUNTER — APPOINTMENT (OUTPATIENT)
Dept: OTHER | Facility: HOSPITAL | Age: 67
End: 2022-03-15

## 2022-03-15 ENCOUNTER — OFFICE VISIT (OUTPATIENT)
Dept: ONCOLOGY | Facility: CLINIC | Age: 67
End: 2022-03-15

## 2022-03-15 VITALS
BODY MASS INDEX: 27.99 KG/M2 | OXYGEN SATURATION: 98 % | WEIGHT: 168 LBS | RESPIRATION RATE: 18 BRPM | SYSTOLIC BLOOD PRESSURE: 118 MMHG | HEART RATE: 90 BPM | HEIGHT: 65 IN | DIASTOLIC BLOOD PRESSURE: 76 MMHG | TEMPERATURE: 97.1 F

## 2022-03-15 DIAGNOSIS — C79.51 BONE METASTASES: ICD-10-CM

## 2022-03-15 DIAGNOSIS — C78.02 CARCINOMA OF LEFT BREAST METASTATIC TO LUNG: Primary | ICD-10-CM

## 2022-03-15 DIAGNOSIS — C50.912 CARCINOMA OF LEFT BREAST METASTATIC TO LUNG: Primary | ICD-10-CM

## 2022-03-15 DIAGNOSIS — C79.51 BONE METASTASES: Primary | ICD-10-CM

## 2022-03-15 LAB
ALBUMIN SERPL-MCNC: 4.3 G/DL (ref 3.5–5.2)
ALBUMIN/GLOB SERPL: 1.5 G/DL
ALP SERPL-CCNC: 124 U/L (ref 39–117)
ALT SERPL W P-5'-P-CCNC: 19 U/L (ref 1–33)
ANION GAP SERPL CALCULATED.3IONS-SCNC: 9.8 MMOL/L (ref 5–15)
AST SERPL-CCNC: 21 U/L (ref 1–32)
BASOPHILS # BLD AUTO: 0.03 10*3/MM3 (ref 0–0.2)
BASOPHILS NFR BLD AUTO: 0.6 % (ref 0–1.5)
BILIRUB SERPL-MCNC: 0.4 MG/DL (ref 0–1.2)
BUN SERPL-MCNC: 22 MG/DL (ref 8–23)
BUN/CREAT SERPL: 21.4 (ref 7–25)
CALCIUM SPEC-SCNC: 10.8 MG/DL (ref 8.6–10.5)
CHLORIDE SERPL-SCNC: 104 MMOL/L (ref 98–107)
CO2 SERPL-SCNC: 25.2 MMOL/L (ref 22–29)
CREAT SERPL-MCNC: 1.03 MG/DL (ref 0.57–1)
DEPRECATED RDW RBC AUTO: 41.9 FL (ref 37–54)
EGFRCR SERPLBLD CKD-EPI 2021: 59.7 ML/MIN/1.73
EOSINOPHIL # BLD AUTO: 0.14 10*3/MM3 (ref 0–0.4)
EOSINOPHIL NFR BLD AUTO: 3 % (ref 0.3–6.2)
ERYTHROCYTE [DISTWIDTH] IN BLOOD BY AUTOMATED COUNT: 12.9 % (ref 12.3–15.4)
GLOBULIN UR ELPH-MCNC: 2.8 GM/DL
GLUCOSE SERPL-MCNC: 108 MG/DL (ref 65–99)
HCT VFR BLD AUTO: 39.8 % (ref 34–46.6)
HGB BLD-MCNC: 13.2 G/DL (ref 12–15.9)
IMM GRANULOCYTES # BLD AUTO: 0.01 10*3/MM3 (ref 0–0.05)
IMM GRANULOCYTES NFR BLD AUTO: 0.2 % (ref 0–0.5)
LYMPHOCYTES # BLD AUTO: 1.53 10*3/MM3 (ref 0.7–3.1)
LYMPHOCYTES NFR BLD AUTO: 32.9 % (ref 19.6–45.3)
MAGNESIUM SERPL-MCNC: 2.2 MG/DL (ref 1.6–2.4)
MCH RBC QN AUTO: 29.7 PG (ref 26.6–33)
MCHC RBC AUTO-ENTMCNC: 33.2 G/DL (ref 31.5–35.7)
MCV RBC AUTO: 89.4 FL (ref 79–97)
MONOCYTES # BLD AUTO: 0.36 10*3/MM3 (ref 0.1–0.9)
MONOCYTES NFR BLD AUTO: 7.7 % (ref 5–12)
NEUTROPHILS NFR BLD AUTO: 2.58 10*3/MM3 (ref 1.7–7)
NEUTROPHILS NFR BLD AUTO: 55.6 % (ref 42.7–76)
NRBC BLD AUTO-RTO: 0 /100 WBC (ref 0–0.2)
PHOSPHATE SERPL-MCNC: 3.6 MG/DL (ref 2.5–4.5)
PLATELET # BLD AUTO: 269 10*3/MM3 (ref 140–450)
PMV BLD AUTO: 9.3 FL (ref 6–12)
POTASSIUM SERPL-SCNC: 4.4 MMOL/L (ref 3.5–5.2)
PROT SERPL-MCNC: 7.1 G/DL (ref 6–8.5)
RBC # BLD AUTO: 4.45 10*6/MM3 (ref 3.77–5.28)
SODIUM SERPL-SCNC: 139 MMOL/L (ref 136–145)
WBC NRBC COR # BLD: 4.65 10*3/MM3 (ref 3.4–10.8)

## 2022-03-15 PROCEDURE — 83735 ASSAY OF MAGNESIUM: CPT | Performed by: INTERNAL MEDICINE

## 2022-03-15 PROCEDURE — 85025 COMPLETE CBC W/AUTO DIFF WBC: CPT | Performed by: INTERNAL MEDICINE

## 2022-03-15 PROCEDURE — 25010000002 ZOLEDRONIC ACID 4 MG/100ML SOLUTION: Performed by: INTERNAL MEDICINE

## 2022-03-15 PROCEDURE — 96374 THER/PROPH/DIAG INJ IV PUSH: CPT

## 2022-03-15 PROCEDURE — 99215 OFFICE O/P EST HI 40 MIN: CPT | Performed by: INTERNAL MEDICINE

## 2022-03-15 PROCEDURE — 84100 ASSAY OF PHOSPHORUS: CPT | Performed by: INTERNAL MEDICINE

## 2022-03-15 PROCEDURE — 80053 COMPREHEN METABOLIC PANEL: CPT | Performed by: INTERNAL MEDICINE

## 2022-03-15 RX ORDER — SODIUM CHLORIDE 9 MG/ML
250 INJECTION, SOLUTION INTRAVENOUS ONCE
Status: COMPLETED | OUTPATIENT
Start: 2022-03-15 | End: 2022-03-15

## 2022-03-15 RX ORDER — ZOLEDRONIC ACID 0.04 MG/ML
4 INJECTION, SOLUTION INTRAVENOUS ONCE
Status: COMPLETED | OUTPATIENT
Start: 2022-03-15 | End: 2022-03-15

## 2022-03-15 RX ADMIN — SODIUM CHLORIDE 250 ML: 9 INJECTION, SOLUTION INTRAVENOUS at 11:02

## 2022-03-15 RX ADMIN — ZOLEDRONIC ACID 4 MG: 0.04 INJECTION, SOLUTION INTRAVENOUS at 11:02

## 2022-03-18 ENCOUNTER — HOSPITAL ENCOUNTER (OUTPATIENT)
Dept: NUCLEAR MEDICINE | Facility: HOSPITAL | Age: 67
Discharge: HOME | End: 2022-03-18

## 2022-03-18 DIAGNOSIS — C78.02 CARCINOMA OF LEFT BREAST METASTATIC TO LUNG: ICD-10-CM

## 2022-03-18 DIAGNOSIS — C79.51 BONE METASTASES: ICD-10-CM

## 2022-03-18 DIAGNOSIS — C50.912 CARCINOMA OF LEFT BREAST METASTATIC TO LUNG: ICD-10-CM

## 2022-03-18 PROCEDURE — 0 TECHNETIUM MEDRONATE KIT: Performed by: INTERNAL MEDICINE

## 2022-03-18 PROCEDURE — 78306 BONE IMAGING WHOLE BODY: CPT

## 2022-03-18 PROCEDURE — A9503 TC99M MEDRONATE: HCPCS | Performed by: INTERNAL MEDICINE

## 2022-03-18 RX ORDER — TC 99M MEDRONATE 20 MG/10ML
21.2 INJECTION, POWDER, LYOPHILIZED, FOR SOLUTION INTRAVENOUS
Status: COMPLETED | OUTPATIENT
Start: 2022-03-18 | End: 2022-03-18

## 2022-03-18 RX ADMIN — Medication 21.2 MILLICURIE: at 10:58

## 2022-03-22 ENCOUNTER — CONSULT (OUTPATIENT)
Dept: RADIATION ONCOLOGY | Facility: HOSPITAL | Age: 67
End: 2022-03-22

## 2022-03-22 ENCOUNTER — APPOINTMENT (OUTPATIENT)
Dept: RADIATION ONCOLOGY | Facility: HOSPITAL | Age: 67
End: 2022-03-22

## 2022-03-22 VITALS
DIASTOLIC BLOOD PRESSURE: 73 MMHG | WEIGHT: 169 LBS | OXYGEN SATURATION: 97 % | BODY MASS INDEX: 28.12 KG/M2 | SYSTOLIC BLOOD PRESSURE: 110 MMHG | HEART RATE: 93 BPM

## 2022-03-22 DIAGNOSIS — C79.51 BONE METASTASES: ICD-10-CM

## 2022-03-22 DIAGNOSIS — C78.02 CARCINOMA OF LEFT BREAST METASTATIC TO LUNG: Primary | ICD-10-CM

## 2022-03-22 DIAGNOSIS — C50.912 CARCINOMA OF LEFT BREAST METASTATIC TO LUNG: Primary | ICD-10-CM

## 2022-03-22 PROCEDURE — G0463 HOSPITAL OUTPT CLINIC VISIT: HCPCS | Performed by: RADIOLOGY

## 2022-03-22 PROCEDURE — 99204 OFFICE O/P NEW MOD 45 MIN: CPT | Performed by: RADIOLOGY

## 2022-03-22 RX ORDER — AMOXICILLIN 250 MG
1 CAPSULE ORAL DAILY
COMMUNITY
End: 2022-12-15

## 2022-03-22 NOTE — PROGRESS NOTES
.     REASONS FOR FOLLOWUP: Metastatic breast cancer, tongue cancer    HISTORY OF PRESENT ILLNESS:  The patient is a 66 y.o. year old female  who is here for follow-up with the above-mentioned history.    Hip discomfort continues.  She has seen Dr. Llanes who recommended stereotactic XRT.  She is first going to have an MRI to see if anything else in the area needs to be radiated.    She has been taking an NSAID for this.  Does not have any narcotics at home.  Would like to have some narcotics on hand if the pain gets worse.    Pain is worse when she sits for a long time such as a long drive to [place].    No change in baseline RODRÍGUEZ.  No complaints of nausea.  No new issues.    Past Medical History:   Diagnosis Date   • Allergy    • Anxiety    • Asthma    • Bone metastasis (HCC)    • Breast cancer (HCC)     Left node positive   • Depression    • Esophageal reflux     cough   • History of colon polyps    • Hyperlipidemia    • Hypertension    • Left breast cancer metastasized to lung    • Obstructive sleep apnea    • Ovarian cyst    • PONV (postoperative nausea and vomiting)    • Tongue cancer (HCC) 05/2019    by ENT at [facility] dr Quinn     Past Surgical History:   Procedure Laterality Date   • CHOLECYSTECTOMY  2000   • COLONOSCOPY  2014    H/O polyps   • KNEE ARTHROPLASTY     • LUNG SURGERY  2010    Lung wedge resection   • MASTECTOMY RADICAL Left 1993   • TONGUE SURGERY  05/21/2019    tongue cancer   • TOTAL LAPAROSCOPIC HYSTERECTOMY N/A 11/4/2021    Procedure: Robotic assisted total laparoscopic hysterectomy, bilateral salpingoophorecotmy, bilateral ureteralysis ;  Surgeon: Kaylynn Ricci DO;  Location: [facility];  Service: Robotics - [facility] [place];  Laterality: N/A;       MEDICATIONS    Current Outpatient Medications:   •  anastrozole (ARIMIDEX) 1 MG tablet, Take 1 tablet by mouth once daily, Disp: 90 tablet, Rfl: 0  •  atorvastatin (LIPITOR) 10 MG tablet, TAKE 1 TABLET BY MOUTH ONCE DAILY AT NIGHT, Disp:  90 tablet, Rfl: 0  •  busPIRone (BUSPAR) 10 MG tablet, Take 10 mg by mouth 2 (Two) Times a Day., Disp: , Rfl:   •  Cholecalciferol 2000 UNITS capsule, Take 4,000 Units by mouth 3 (Three) Times a Week., Disp: , Rfl:   •  eszopiclone (LUNESTA) tablet, Take 3 mg by mouth At Night As Needed., Disp: , Rfl:   •  Ibuprofen (MOTRIN PO), Take  by mouth., Disp: , Rfl:   •  LORazepam (ATIVAN) 0.5 MG tablet, Take 1 tablet by mouth Every 8 (Eight) Hours As Needed., Disp: , Rfl:   •  losartan (COZAAR) 50 MG tablet, Take 1 tablet by mouth once daily, Disp: 90 tablet, Rfl: 0  •  Omeprazole 20 MG Tablet Delayed Release Dispersible, Take 20 mg by mouth As Needed., Disp: , Rfl:   •  PARoxetine (PAXIL) 40 MG tablet, Take 40 mg by mouth Every Night., Disp: , Rfl:   •  Probiotic Product (PROBIOTIC-10 PO), Take  by mouth., Disp: , Rfl:   •  sennosides-docusate (PERICOLACE) 8.6-50 MG per tablet, Take 1 tablet by mouth Daily., Disp: , Rfl:   •  Zoledronic Acid (ZOMETA IV), Infuse  into a venous catheter. EVERY THREE MONTHS, Disp: , Rfl:   No current facility-administered medications for this visit.    Facility-Administered Medications Ordered in Other Visits:   •  chlorhexidine (PERIDEX) 0.12 % solution 15 mL, 15 mL, Mouth/Throat, Q12H, Kaylynn Ricci, DO  •  mupirocin (BACTROBAN) 2 % nasal ointment, , Nasal, BID, Kaylynn Ricci, DO    ALLERGIES:     Allergies   Allergen Reactions   • Nickel Unknown - Low Severity   • Ciprofloxacin Rash       SOCIAL HISTORY:       Social History     Socioeconomic History   • Marital status: Single   • Years of education: College   Tobacco Use   • Smoking status: Former Smoker     Packs/day: 2.00     Years: 30.00     Pack years: 60.00     Types: Cigarettes     Start date:      Quit date: 2008     Years since quittin.8   • Smokeless tobacco: Never Used   Vaping Use   • Vaping Use: Never used   Substance and Sexual Activity   • Alcohol use: No   • Drug use: No   • Sexual activity: Defer        "  FAMILY HISTORY:  Family History   Problem Relation Age of Onset   • Hypertension Mother    • Leukemia Mother 72   • COPD Mother         smoker   • Diabetes Brother    • Pancreatic cancer Brother    • Glaucoma Brother    • Heart disease Brother    • Hypertension Brother    • Gallbladder disease Brother    • Gallbladder disease Father    • Stroke Other         Grandmother   • Lupus Other         Aunt   • Alcohol abuse Other         Aunt   • Glaucoma Other         Grandmother   • Diabetes type II Brother    • Hypertension Brother    • Hyperlipidemia Brother    • Malig Hyperthermia Neg Hx        REVIEW OF SYSTEMS:  Review of Systems   Constitutional: Negative for activity change.   HENT: Negative for nosebleeds and trouble swallowing.    Respiratory: Negative for shortness of breath and wheezing.    Cardiovascular: Negative for chest pain and palpitations.   Gastrointestinal: Negative for constipation, diarrhea and nausea.   Genitourinary: Negative for dysuria and hematuria.   Musculoskeletal: Negative for arthralgias and myalgias.   Skin: Negative for rash and wound.   Neurological: Negative for seizures and syncope.   Hematological: Negative for adenopathy. Does not bruise/bleed easily.   Psychiatric/Behavioral: Negative for confusion.            Vitals:    03/24/22 1516   BP: 130/83   Pulse: 100   Resp: 18   Temp: 97.2 °F (36.2 °C)   TempSrc: Temporal   SpO2: 95%   Weight: 76.1 kg (167 lb 12.8 oz)   Height: 165.1 cm (65\")   PainSc: 0-No pain     Current Status 3/24/2022   ECOG score 0        PHYSICAL EXAM:        CONSTITUTIONAL:  Vital signs reviewed.  No distress, looks comfortable.  EYES:  Conjunctiva and lids unremarkable.  PERRLA  EARS,NOSE,MOUTH,THROAT:  Ears and nose appear unremarkable.  Lips, teeth, gums appear unremarkable.  RESPIRATORY:  Normal respiratory effort.  Lungs clear to auscultation bilaterally.  CARDIOVASCULAR:  Normal S1, S2.  No murmurs rubs or gallops.  No significant lower extremity " edema.  GASTROINTESTINAL: Abdomen appears unremarkable.  Nontender.  No hepatomegaly.  No splenomegaly.  LYMPHATIC:  No cervical, supraclavicular, axillary lymphadenopathy.  SKIN:  Warm.  No rashes.  PSYCHIATRIC:  Normal judgment and insight.  Normal mood and affect.        RECENT LABS:        WBC   Date/Time Value Ref Range Status   03/24/2022 03:09 PM 4.16 3.40 - 10.80 10*3/mm3 Final     Hemoglobin   Date/Time Value Ref Range Status   03/24/2022 03:09 PM 13.3 12.0 - 15.9 g/dL Final     Platelets   Date/Time Value Ref Range Status   03/24/2022 03:09  140 - 450 10*3/mm3 Final       Assessment/Plan   There are no diagnoses linked to this encounter.  Marialuisa Vivar        Assessment/Plan     *Metastatic breast cancer to bilateral lungs and soft tissue in the mediastinum (likely the cause of her hoarseness). (Initial breast cancer diagnosed in 1993 treated with mastectomy, chemotherapy, radiation therapy and tamoxifen). Arimidex day 1 on 02/17/2010. PET scan late August 2011 shows no abnormal hypermetabolic activity. This has improved compared to the prior PET scan January 2010 which showed mild hypermetabolic activity in areas of metastasis. (In the past, her  T6 lesion did not show up on CT scans or bone scan. It was seen by PET scan.) We have been following with CT scans only every 6 months and PET scans on an as needed only basis. Sometimes her CT scanned pulmonary nodules are read as benign since they have been stable through the course of years but we know they are malignant because they have been surgically sampled in the past.    · CT 8/16/16 shows increased sclerosis at T6 compared to 4/28/15.    · PET 10/11/16: Slight uptake at T6, SUV 3.2.  mild thickening of right adrenal gland with an SUV of 11.   · Continued Arimidex is minimal progression and had been on this since 2/17/10.  · Not referred for radiation since no pain at T6  · CT 1/27/17, unchanged.  · CT 8/1/17: Unchanged except subtle suggestion of  mild increase in thickening of the right adrenal gland.    · CT 2/22/18, unchanged.  · CT 9/6/18: Unchanged except nodular thickening right adrenal gland significantly decreased.  · CT 9/5/2019: Unchanged.  · On the 6/12/2020 visit, the following scans were reviewed:  · CT neck and chest 5/11/2020, [facility], from 5/9/2019: Stable pulmonary nodules new lytic C7 lesion and posterior T1 lesion questionable T12 lesion.  No change in the T6 and T10 lesions.  · PET, [facility], 5/19/2020: Mildly enlarged left neck nodes are mildly hypermetabolic.  Diffuse activity throughout the thickened left adrenal gland.  CT appearance unchanged from 5/9/2019.  Progressive T1 lesion seen on CT from 5/11/2020, mild some moderate activity.  T10 low-grade activity with mixed lucent and sclerotic findings.  T6 is a mixed lucent and sclerotic appearance but no significant activity.   · T12 (the biopsy report is labeled as T12 but patient insists this was a C7/T1 biopsy, not to T12) biopsy 6/15/2020, [facility]: Metastatic breast carcinoma.  ER >95%.  NH 0%.  HER-2 negative (1+)  · It seems the main progression at the 6/12/2020 visit was a new C7 and new T1 lesion.  Those were radiated with CyberKnife through [facility] early June 2020.  Because Arimidex has been working well since 2010, continue same Arimidex.  Add Zometa every 3 months.  · C7 and T1 found on CT 5/11/2020, [facility].  [place], Dr. Winston, early June 2020.  · CT C and T-spine 9/17/2020: New C7 lesion from 9/6/2018, but no change from 5/11/2020 CT.  T1 lesion stable from 5/11/2020, but new compared to 2/22/2018.  T10 lesion unchanged from 9/5/2019, but new from 2/22/2018.  Subtle 8 mm T12 lesion new from 9/5/2019.  · Therefore, no recent progression.  · Considering her good tolerance and long-term effectiveness of Arimidex, continue same therapy.  · CT CAP 12/10/2020: No progression  · Therefore, continue Arimidex.  · CA 15-3 normal through 12/14/2020  · Because CA 15-3 fabian to 27 on  4/13/2021, then 29.3 on 7/20/2021, then 34.3 on 10/12/2021, CT scans done earlier than planned:  · CT CAP with contrast 10/12/2021: New 5.2 x 4.6 cm mixed cystic and solid right ovarian mass compared to 12/10/2020.  Continued stability of bilateral pulmonary nodules and stable sclerotic bone metastasis.  · 11/4/2021: TLH/BSO (due to new 5 cm right ovarian mass on CT, 11/12/2021).  · Metastatic breast cancer involving bilateral ovaries and posterior uterine serosa.  ER 81-90%.  IL 61-70%.  HER-2 negative  · Pelvic washings: Adenocarcinoma  · Because this was the only area of progression on CT, and has been resected, continue Arimidex which she has been on since 2/17/2010.  · 11/22/2021: Discussed with Dr. Blum, who has a focus on breast cancer.  We both agree: Continue Arimidex for now.  In the future, if significant progression is seen, then plan Faslodex injections with Ibrance  · CT CAP 2/1/2022: A few T-spine sclerotic lesions progressively more sclerotic over multiple prior CTs of indeterminate significance.  No clear signs of progression of disease.  · 3/15/2022: Although tumor marker is not rising and CT showed no clear signs of progression.  CT revealed progressively more sclerotic bone metastasis over multiple prior CTs, she is having increased right hip discomfort.  Sometimes this can be due to healing of metastasis (but she has been on the same treatment since 2010), or this could mean progression of bone metastasis.  It is reassuring that her tumor marker is not worsening.  She would like to see [facility] to see if they feel radiation to the hip would help her discomfort.  We will do a bone scan before that appointment as this might help [facility].  (Although she has seen [facility] radiation for her neck, she would like to see [facility] for this hip issue).  · 3/18/2022, bone scan: Metastatic disease at T9 and T10, corresponding to CT lesions.  Uptake  also at T6, but to lesser degree.  These areas are new compared to last bone scan, 6/3/2009.  Therefore, overall, appears consistent with progression.  · 3/24/2021: Discussed changing  Arimidex to Faslodex/Ibrance 125 mg D1-21/28 days.  · However, Dr. Llanes will decide on 4/12/2022 if the patient needs any traditional XRT to hip/pelvis.  If so, this may cause cytopenias due to the location of XRT.  Ibrance can also cause cytopenias.  If this is the case, we will wait to begin Ibrance until at least a few weeks after XRT completes, provided blood counts allow.  If she does not need to do any more XRT than the stereotactic XRT which she is currently planning based on current imaging, then we could go ahead and start Ibrance anytime.    CA 15-3:  · 26.3 on 2/1/2022, from 32.6 on 11/22/2021, from 34.3 on 10/12/2021, from 29.3 on 7/20/2021, from 27 on 4/13/2021, from 23.3 on 12/14/2020.    *Bony metastases  · T6 discovered by PET to August 2011 (did not show up by CT or bone scan)  · C7 and T1 found on CT 5/11/2020, [facility].  CyberKnife, Dr. Winston, early June 2020.  · June 2020 began Zometa every 3 months.  · She was warned of possible osteonecrosis of the jaw and renal insufficiency.  She states she has good dental health and sees her dentist regularly.  Because of prior hypercalcemia requiring stopping calcium supplements and because of high normal current calcium level, began without supplemental calcium.  Add calcium if calcium becomes low.  · 1/7/2022: Tooth extracted.  Plan was to wait 5 weeks to resume Zometa.  Patient delayed return until 3/15/2022 (over 8 weeks)    *Bone health. Last bone density 10/11/16, worsened, but still normal with worst T score -0.9.  Patient stopped calcium January 2016, but remained on vitamin D.  I recommended she restart her calcium.  Stopped calcium early March 2018 due to hypercalcemia.  · DEXA 9/5/2019: Normal bone density    *Hypercalcemia.  Repeat calcium 3/7/18 normal, but  patient self stopped calcium a few days prior.  Recommended she stay off calcium.  · Calcium was 11 on 6/19/2020  · Calcium 11 again on 12/10/2020  · Repeat calcium 10.5 on 12/14/2020  · Calcium 10.6.  Minimally elevated.  (Normal range up to 10.5)  · Calcium 10.8 on 3/15/2022.  Hopefully, Zometa will help with this.    *On the 10/5/16 visit, Tachycardia, dyspnea on exertion, bilateral leg swelling, more sedentary recently.  CT PE protocol and bilateral lower extremity Dopplers 10/5/16, negative for clots.  She still has tachycardia and dyspnea on exertion.  Perhaps at least part of this is due to deconditioning.    *Previously complained of colon Right lower anterior rib discomfort.  CT unremarkable in that area.  No specific pain, just discomfort.  I explained to the patient if disease was found by PET scan or bone scan, I would not  since she has been doing well on Arimidex since 2010.   Currently denies pain.     *Depression/anxiety.  This limits compliance.  She sees a psychiatrist and a counselor regularly.  She states this is mostly due to body image issues instead of cancer.   · She continues with her counselor and psychiatrist.  Viral pandemic is making this a little more difficult.  · This seems to be doing better currently.    *Noncompliance due to depression/anxiety.  Although she misses appointments, she does reschedule and eventually come in.    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 Willie Quinn, [facility], on 5/21/2019.  Positive sentinel node (1 of 3 nodes)..  · Left neck dissection by Dr. Willie Quinn, [facility], on 6/5/2019.  22 nodes negative.  Negative margins.  No lymphovascular invasion or perineural invasion.  2 mm depth of invasion.  Grade 1.  Squamous cell carcinoma.  1.8 cm tumor.  · Because the patient felt what she thought was left neck LAD, CT neck and chest and PET at [facility].  May 2020 performed.  She is being followed at [facility] for this.  · On 6/19/2020, per verbal report from Dr. Conn [facility], [facility] does not feel she has any active head and neck cancer.   · She states she has a follow-up with Dr. Kapadia, ENT at [facility], in June with CT neck 1 week prior  · No symptoms to suggest recurrence.  She has follow-up with [facility] in July    *Previously complained of left hip pain x2 weeks.  This prompted an MRI pelvis 8/20/2019 at [facility] and open MRI which was negative for malignancy.    *Possible intolerance of Zometa  · After first dose, June 2020, 2 days of chills, bone pain  · She would like to try Zometa again.  If this occurs again, we will try to switch her to Xgeva monthly.  (No good data on every 3-month Xgeva)  · She did fine with the second dose of Zometa.    *Thickening of wall of transverse colon on CT 2/1/2022.  Last colonoscopy was 4/18/2019.  She wants the next colonoscopy done at [facility], [place]  · Dr. Galeas will see her on 4/7/2022 to evaluate this    PLAN:   · Every 3-month Zometa, last was 3/15/2022  · Awaits Dr. Llanes's XRT plan.  She will see her on 4/12/2022.  (After MRI she has ordered).  · Appointment with me around 4/19/2022.  At that visit, review [facility] and consider changing Arimidex to Faslodex/Ibrance.  Wait to begin Faslodex/Ibrance until at least a few weeks after traditional XRT completes, but could be anytime during stereotactic XRT.  When Faslodex/Ibrance begins, patient understands we may want to order new baseline scans  · If Faslodex Ibrance begins, she will need teaching      Usual plan is:  · MD, CA 15-3, CBC, CMP, Zometa 3 months.    · (Zometa every 3 months)  · MD Zometa 6 months.  1 week prior:  · 1 week prior: CT CAP with contrast, CBC, CMP, CA 15-3   · (we were checking annual CT.  However, with the solitary progression that has been resected, check CT more often for now)  · CMP today.  If calcium remains low, begin calcium  supplement  · Continue Arimidex  · She sees [facility], Dr. Bedoya annually, next is summer 2022      40 minutes.  Total time.  Same day.    Send a copy of this note to   Isaac Valdes MD, Dr., radiation medicine, [facility]

## 2022-03-22 NOTE — PROGRESS NOTES
Subjective     Jarod Hawkins II, MD    Cancer Staging  Stage IV (TX, NX, M1)    CC: breast cancer metastases to bone with right hip pain                                Dear Jarod Hawkins II, MD    I had the pleasure of seeing Marialuisa Vivar  today [facility]    The patient is a 66 y.o. female with metastatic breast cancer.  She was first diagnosed in 1993 at which time she underwent mastectomy, chemo and radiation therapy.  She developed metastatic disease to the bone and lungs in 2010.  In November 2021 she developed metastases to bilaterl ovaries and uterine serosa and underwent TLH/BSO.  She also has previously received stereotactic treatment to C7 and T1 at  with Dr. Kranthi Winston in June 2020.      She has continued on arimidex for several years with minimal progression of disease.  Her most recent CT scans on 2/1/22 showed no change in bilateral pulmonary nodules, however, several osseous lesions have become more sclerotic when compared to scans from 12/10/20 with index lesions at T9 and T10.      She had a bone scan on 3/18/22 which showed increased uptake at T6, T9, T10, and mild uptake in right acetabular roof may be small metastatic lesion and uptak in right frontal bone.      She reports increased pain in her right hip over the past few months, both with movement and when sitting. She ranks the pain 2-4 on the pain scale.  She denies any numbness or weakness of her leg.      Review of Systems   Constitutional: Negative.    HENT: Negative.    Respiratory: Positive for cough and shortness of breath.    Cardiovascular: Positive for leg swelling.   Gastrointestinal: Positive for abdominal pain and constipation.   Endocrine: Negative.    Musculoskeletal: Positive for arthralgias, back pain and myalgias.   Skin: Negative.    Neurological: Negative.    Psychiatric/Behavioral: The patient is nervous/anxious.          Past Medical History:   Diagnosis Date   • Allergy    • Anxiety    • Asthma    •  Bone metastasis (HCC)    • Breast cancer (HCC)     Left node positive   • Depression    • Esophageal reflux     cough   • History of colon polyps    • Hyperlipidemia    • Hypertension    • Left breast cancer metastasized to lung    • Obstructive sleep apnea    • Ovarian cyst    • PONV (postoperative nausea and vomiting)    • Tongue cancer (HCC) 2019    by ENT at [facility] dr Quinn         Past Surgical History:   Procedure Laterality Date   • CHOLECYSTECTOMY     • COLONOSCOPY      H/O polyps   • KNEE ARTHROPLASTY     • LUNG SURGERY      Lung wedge resection   • MASTECTOMY RADICAL Left 1993   • TONGUE SURGERY  2019    tongue cancer   • TOTAL LAPAROSCOPIC HYSTERECTOMY N/A 2021    Procedure: Robotic assisted total laparoscopic hysterectomy, bilateral salpingoophorecotmy, bilateral ureteralysis ;  Surgeon: Kaylynn Ricci DO;  Location: [facility];  Service: Robotics - [facility];  Laterality: N/A;         Social History     Socioeconomic History   • Marital status: Single   • Years of education: College   Tobacco Use   • Smoking status: Former Smoker     Packs/day: 2.00     Years: 30.00     Pack years: 60.00     Types: Cigarettes     Start date:      Quit date: 2008     Years since quittin.8   • Smokeless tobacco: Never Used   Vaping Use   • Vaping Use: Never used   Substance and Sexual Activity   • Alcohol use: No   • Drug use: No   • Sexual activity: Defer         Family History   Problem Relation Age of Onset   • Hypertension Mother    • Leukemia Mother 72   • COPD Mother         smoker   • Diabetes Brother    • Pancreatic cancer Brother    • Glaucoma Brother    • Heart disease Brother    • Hypertension Brother    • Gallbladder disease Brother    • Gallbladder disease Father    • Stroke Other         Grandmother   • Lupus Other         Aunt   • Alcohol abuse Other         Aunt   • Glaucoma Other         Grandmother   • Diabetes type II Brother    • Hypertension Brother    •  Hyperlipidemia Brother    • Malig Hyperthermia Neg Hx           Objective    Physical Exam  Constitutional:       Appearance: Normal appearance.   HENT:      Head: Atraumatic.   Eyes:      Extraocular Movements: Extraocular movements intact.   Pulmonary:      Effort: Pulmonary effort is normal.   Musculoskeletal:         General: Normal range of motion.   Skin:     General: Skin is warm.   Neurological:      General: No focal deficit present.      Mental Status: She is alert and oriented to person, place, and time.      Motor: No weakness.      Gait: Gait normal.   Psychiatric:         Mood and Affect: Mood normal.         Behavior: Behavior normal.         Thought Content: Thought content normal.           Current Outpatient Medications on File Prior to Visit   Medication Sig Dispense Refill   • amoxicillin-clavulanate (Augmentin) 875-125 MG per tablet Take 1 tablet by mouth 2 (Two) Times a Day. 14 tablet 0   • anastrozole (ARIMIDEX) 1 MG tablet Take 1 tablet by mouth once daily 90 tablet 0   • atorvastatin (LIPITOR) 10 MG tablet TAKE 1 TABLET BY MOUTH ONCE DAILY AT NIGHT 90 tablet 0   • busPIRone (BUSPAR) 10 MG tablet Take 10 mg by mouth 2 (Two) Times a Day.     • Cholecalciferol 2000 UNITS capsule Take 4,000 Units by mouth 3 (Three) Times a Week.     • eszopiclone (LUNESTA) tablet Take 3 mg by mouth At Night As Needed.     • LORazepam (ATIVAN) 0.5 MG tablet Take 1 tablet by mouth Every 8 (Eight) Hours As Needed.     • losartan (COZAAR) 50 MG tablet Take 1 tablet by mouth once daily 90 tablet 0   • Omeprazole 20 MG Tablet Delayed Release Dispersible Take 20 mg by mouth As Needed.     • PARoxetine (PAXIL) 40 MG tablet Take 40 mg by mouth Every Night.     • Zoledronic Acid (ZOMETA IV) Infuse  into a venous catheter. EVERY THREE MONTHS       Current Facility-Administered Medications on File Prior to Visit   Medication Dose Route Frequency Provider Last Rate Last Admin   • chlorhexidine (PERIDEX) 0.12 % solution 15 mL   15 mL Mouth/Throat Q12H Kaylynn Ricci L, DO       • mupirocin (BACTROBAN) 2 % nasal ointment   Nasal BID Kaylynn Ricci L, DO           ALLERGIES:    Allergies   Allergen Reactions   • Nickel Unknown - Low Severity   • Ciprofloxacin Rash       There were no vitals taken for this visit.     Current Status 3/15/2022   ECOG score 0         Assessment/Plan     66 year old female with stage IV breast cancer with painful metastases to right acetabular roof.  Her metastatic disease is fairly well controlled and I discussed with her my recommendation for stereotactic radiotherapy to the right acetabular lesion.  I also recommend getting a mri of the pelvis/right hip to see if there is any other disease there causing her pain.  She would like to get the mri first before proceeding with treatment.  I will order this today and see her back in 1-2 weeks after she has it performed.  I discussed with her possible acute side effects of radiation to the right hip to include fatigue, erythema, or abdominal pain/diarrhea.  I discussed possible late effects of fracture or lymphedema of the leg.  She voiced understanding.      I personally spent greater than 45 minutes today assessing, managing, discussing and documenting my visit with the patient. That time includes review of records, imaging and pathology reports, obtaining my own history, performing a medically appropriate evaluation, counseling and educating the patient, discussing goals, logistics, alternatives and risks of my recommendations, surveillance options and potential outcomes. It also includes the time documenting the clinical information in the EMR and communicating my recommendations to the other involved physicians.               Thank you very much for allowing me to participate in the care of this very pleasant patient.    Sincerely,      Brianna Orozco MD

## 2022-03-24 ENCOUNTER — OFFICE VISIT (OUTPATIENT)
Dept: ONCOLOGY | Facility: CLINIC | Age: 67
End: 2022-03-24

## 2022-03-24 ENCOUNTER — APPOINTMENT (OUTPATIENT)
Dept: ONCOLOGY | Facility: HOSPITAL | Age: 67
End: 2022-03-24

## 2022-03-24 ENCOUNTER — LAB (OUTPATIENT)
Dept: OTHER | Facility: HOSPITAL | Age: 67
End: 2022-03-24

## 2022-03-24 VITALS
OXYGEN SATURATION: 95 % | DIASTOLIC BLOOD PRESSURE: 83 MMHG | HEART RATE: 100 BPM | TEMPERATURE: 97.2 F | WEIGHT: 167.8 LBS | HEIGHT: 65 IN | SYSTOLIC BLOOD PRESSURE: 130 MMHG | RESPIRATION RATE: 18 BRPM | BODY MASS INDEX: 27.96 KG/M2

## 2022-03-24 DIAGNOSIS — C50.912 CARCINOMA OF LEFT BREAST METASTATIC TO LUNG: ICD-10-CM

## 2022-03-24 DIAGNOSIS — C50.912 CARCINOMA OF LEFT BREAST METASTATIC TO LUNG: Primary | ICD-10-CM

## 2022-03-24 DIAGNOSIS — C79.51 BONE METASTASES: ICD-10-CM

## 2022-03-24 DIAGNOSIS — C78.02 CARCINOMA OF LEFT BREAST METASTATIC TO LUNG: ICD-10-CM

## 2022-03-24 DIAGNOSIS — C78.02 CARCINOMA OF LEFT BREAST METASTATIC TO LUNG: Primary | ICD-10-CM

## 2022-03-24 DIAGNOSIS — C79.51 BONE METASTASES: Primary | ICD-10-CM

## 2022-03-24 LAB
ALBUMIN SERPL-MCNC: 4.3 G/DL (ref 3.5–5.2)
ALBUMIN/GLOB SERPL: 1.6 G/DL
ALP SERPL-CCNC: 112 U/L (ref 39–117)
ALT SERPL W P-5'-P-CCNC: 21 U/L (ref 1–33)
ANION GAP SERPL CALCULATED.3IONS-SCNC: 9 MMOL/L (ref 5–15)
AST SERPL-CCNC: 26 U/L (ref 1–32)
BASOPHILS # BLD AUTO: 0.03 10*3/MM3 (ref 0–0.2)
BASOPHILS NFR BLD AUTO: 0.7 % (ref 0–1.5)
BILIRUB SERPL-MCNC: 0.6 MG/DL (ref 0–1.2)
BUN SERPL-MCNC: 15 MG/DL (ref 8–23)
BUN/CREAT SERPL: 15 (ref 7–25)
CALCIUM SPEC-SCNC: 10.3 MG/DL (ref 8.6–10.5)
CHLORIDE SERPL-SCNC: 108 MMOL/L (ref 98–107)
CO2 SERPL-SCNC: 23 MMOL/L (ref 22–29)
CREAT SERPL-MCNC: 1 MG/DL (ref 0.57–1)
DEPRECATED RDW RBC AUTO: 40.6 FL (ref 37–54)
EGFRCR SERPLBLD CKD-EPI 2021: 61.9 ML/MIN/1.73
EOSINOPHIL # BLD AUTO: 0.13 10*3/MM3 (ref 0–0.4)
EOSINOPHIL NFR BLD AUTO: 3.1 % (ref 0.3–6.2)
ERYTHROCYTE [DISTWIDTH] IN BLOOD BY AUTOMATED COUNT: 12.5 % (ref 12.3–15.4)
GLOBULIN UR ELPH-MCNC: 2.7 GM/DL
GLUCOSE SERPL-MCNC: 105 MG/DL (ref 65–99)
HCT VFR BLD AUTO: 40.5 % (ref 34–46.6)
HGB BLD-MCNC: 13.3 G/DL (ref 12–15.9)
IMM GRANULOCYTES # BLD AUTO: 0.01 10*3/MM3 (ref 0–0.05)
IMM GRANULOCYTES NFR BLD AUTO: 0.2 % (ref 0–0.5)
LYMPHOCYTES # BLD AUTO: 1.36 10*3/MM3 (ref 0.7–3.1)
LYMPHOCYTES NFR BLD AUTO: 32.7 % (ref 19.6–45.3)
MCH RBC QN AUTO: 29.2 PG (ref 26.6–33)
MCHC RBC AUTO-ENTMCNC: 32.8 G/DL (ref 31.5–35.7)
MCV RBC AUTO: 89 FL (ref 79–97)
MONOCYTES # BLD AUTO: 0.35 10*3/MM3 (ref 0.1–0.9)
MONOCYTES NFR BLD AUTO: 8.4 % (ref 5–12)
NEUTROPHILS NFR BLD AUTO: 2.28 10*3/MM3 (ref 1.7–7)
NEUTROPHILS NFR BLD AUTO: 54.9 % (ref 42.7–76)
NRBC BLD AUTO-RTO: 0 /100 WBC (ref 0–0.2)
PLATELET # BLD AUTO: 297 10*3/MM3 (ref 140–450)
PMV BLD AUTO: 8.5 FL (ref 6–12)
POTASSIUM SERPL-SCNC: 4.6 MMOL/L (ref 3.5–5.2)
PROT SERPL-MCNC: 7 G/DL (ref 6–8.5)
RBC # BLD AUTO: 4.55 10*6/MM3 (ref 3.77–5.28)
SODIUM SERPL-SCNC: 140 MMOL/L (ref 136–145)
WBC NRBC COR # BLD: 4.16 10*3/MM3 (ref 3.4–10.8)

## 2022-03-24 PROCEDURE — 36415 COLL VENOUS BLD VENIPUNCTURE: CPT

## 2022-03-24 PROCEDURE — 80053 COMPREHEN METABOLIC PANEL: CPT | Performed by: INTERNAL MEDICINE

## 2022-03-24 PROCEDURE — 99215 OFFICE O/P EST HI 40 MIN: CPT | Performed by: INTERNAL MEDICINE

## 2022-03-24 PROCEDURE — 85025 COMPLETE CBC W/AUTO DIFF WBC: CPT | Performed by: INTERNAL MEDICINE

## 2022-03-25 RX ORDER — HYDROCODONE BITARTRATE AND ACETAMINOPHEN 5; 325 MG/1; MG/1
1 TABLET ORAL EVERY 4 HOURS PRN
Qty: 30 TABLET | Refills: 0 | Status: SHIPPED | OUTPATIENT
Start: 2022-03-25 | End: 2022-10-14

## 2022-04-05 DIAGNOSIS — I10 BENIGN ESSENTIAL HTN: ICD-10-CM

## 2022-04-05 RX ORDER — LOSARTAN POTASSIUM 50 MG/1
50 TABLET ORAL DAILY
Qty: 30 TABLET | Refills: 0 | Status: SHIPPED | OUTPATIENT
Start: 2022-04-05 | End: 2022-04-25 | Stop reason: SDUPTHER

## 2022-04-07 ENCOUNTER — OFFICE VISIT (OUTPATIENT)
Dept: GASTROENTEROLOGY | Facility: CLINIC | Age: 67
End: 2022-04-07

## 2022-04-07 ENCOUNTER — TELEPHONE (OUTPATIENT)
Dept: GASTROENTEROLOGY | Facility: CLINIC | Age: 67
End: 2022-04-07

## 2022-04-07 VITALS — BODY MASS INDEX: 27.99 KG/M2 | TEMPERATURE: 98.3 F | HEIGHT: 65 IN | WEIGHT: 168 LBS

## 2022-04-07 DIAGNOSIS — R93.3 ABNORMAL CT SCAN, COLON: Primary | ICD-10-CM

## 2022-04-07 PROCEDURE — 99204 OFFICE O/P NEW MOD 45 MIN: CPT | Performed by: INTERNAL MEDICINE

## 2022-04-07 NOTE — PROGRESS NOTES
Chief Complaint   Patient presents with   • Abnormal Imaging   • Abdominal Pain   • Constipation     Marialuisa Vivar is a 66 y.o. female who presents with a history of abnormal CT of the colon  HPI     Patient 66-year-old female with history of metastatic breast cancer with hypertension hyperlipidemia had follow-up CT for the breast cancer showing what was described is thickening of the transverse colon wall.  Patient reports no diarrhea actually tends to be constipated though when the stool finally does move she will have a very large bowel movement and finally some loose stool at the very end to finish the movement.  Patient reports cycles through the constipation episodes like this.  Patient denies any bright red blood per rectum or mucus.  Patient with no history of infectious colitis or inflammatory bowel disease.    Past Medical History:   Diagnosis Date   • Allergy    • Anxiety    • Asthma    • Bone metastasis (HCC)    • Breast cancer (HCC)     Left node positive   • Cholelithiasis 2000    Lap Marily   • Colon polyp Past 10-15 yrs?    found at colonoscopies   • Depression    • Esophageal reflux     cough   • History of colon polyps    • Hyperlipidemia    • Hypertension    • Left breast cancer metastasized to lung    • Obstructive sleep apnea    • Ovarian cyst    • PONV (postoperative nausea and vomiting)    • Tongue cancer (HCC) 05/2019    by ENT at [facility] dr Quinn       Current Outpatient Medications:   •  anastrozole (ARIMIDEX) 1 MG tablet, Take 1 tablet by mouth once daily, Disp: 90 tablet, Rfl: 0  •  atorvastatin (LIPITOR) 10 MG tablet, TAKE 1 TABLET BY MOUTH ONCE DAILY AT NIGHT, Disp: 90 tablet, Rfl: 0  •  busPIRone (BUSPAR) 10 MG tablet, Take 10 mg by mouth 2 (Two) Times a Day., Disp: , Rfl:   •  Cholecalciferol 2000 UNITS capsule, Take 4,000 Units by mouth 3 (Three) Times a Week., Disp: , Rfl:   •  eszopiclone (LUNESTA) tablet, Take 3 mg by mouth At Night As Needed., Disp: , Rfl:   •   HYDROcodone-acetaminophen (NORCO) 5-325 MG per tablet, Take 1 tablet by mouth Every 4 (Four) Hours As Needed for Moderate Pain ., Disp: 30 tablet, Rfl: 0  •  Ibuprofen (MOTRIN PO), Take  by mouth., Disp: , Rfl:   •  LORazepam (ATIVAN) 0.5 MG tablet, Take 1 tablet by mouth Every 8 (Eight) Hours As Needed., Disp: , Rfl:   •  losartan (COZAAR) 50 MG tablet, Take 1 tablet by mouth Daily., Disp: 30 tablet, Rfl: 0  •  Omeprazole 20 MG Tablet Delayed Release Dispersible, Take 20 mg by mouth As Needed., Disp: , Rfl:   •  PARoxetine (PAXIL) 40 MG tablet, Take 40 mg by mouth Every Night., Disp: , Rfl:   •  Probiotic Product (PROBIOTIC-10 PO), Take  by mouth., Disp: , Rfl:   •  sennosides-docusate (PERICOLACE) 8.6-50 MG per tablet, Take 1 tablet by mouth Daily., Disp: , Rfl:   •  Zoledronic Acid (ZOMETA IV), Infuse  into a venous catheter. EVERY THREE MONTHS, Disp: , Rfl:   •  linaclotide (Linzess) 72 MCG capsule capsule, Take 1 capsule by mouth Every Morning Before Breakfast., Disp: 30 capsule, Rfl: 11  No current facility-administered medications for this visit.    Facility-Administered Medications Ordered in Other Visits:   •  chlorhexidine (PERIDEX) 0.12 % solution 15 mL, 15 mL, Mouth/Throat, Q12H, Kaylynn Ricci, DO  •  mupirocin (BACTROBAN) 2 % nasal ointment, , Nasal, BID, Kaylynn Ricci, DO  Allergies   Allergen Reactions   • Nickel Unknown - Low Severity   • Ciprofloxacin Rash     Social History     Socioeconomic History   • Marital status: Single   • Years of education: College   Tobacco Use   • Smoking status: Former Smoker     Packs/day: 2.00     Years: 30.00     Pack years: 60.00     Types: Cigarettes     Start date: 1973     Quit date: 2008     Years since quittin.8   • Smokeless tobacco: Never Used   Vaping Use   • Vaping Use: Never used   Substance and Sexual Activity   • Alcohol use: No   • Drug use: No   • Sexual activity: Not Currently     Birth control/protection: None     Family History    Problem Relation Age of Onset   • Hypertension Mother    • Leukemia Mother 72   • COPD Mother         smoker   • Diabetes Brother    • Pancreatic cancer Brother    • Glaucoma Brother    • Heart disease Brother    • Hypertension Brother    • Gallbladder disease Brother    • Pancreatitis Brother          from pancreatic csncer   • Gallbladder disease Father    • Colon polyps Father    • Stroke Other         Grandmother   • Lupus Other         Aunt   • Alcohol abuse Other         Aunt   • Glaucoma Other         Grandmother   • Diabetes type II Brother    • Hypertension Brother    • Hyperlipidemia Brother    • Liver cancer Paternal Uncle    • Stomach cancer Paternal Aunt    • Alcohol abuse Maternal Aunt    • Malig Hyperthermia Neg Hx      Review of Systems   Constitutional: Negative.    HENT: Negative.    Eyes: Negative.    Respiratory: Negative.    Cardiovascular: Negative.    Gastrointestinal: Negative.    Endocrine: Negative.    Musculoskeletal: Negative.    Skin: Negative.    Allergic/Immunologic: Negative.    Hematological: Negative.      Vitals:    22 1318   Temp: 98.3 °F (36.8 °C)     Physical Exam  Vitals and nursing note reviewed.   Constitutional:       Appearance: Normal appearance. She is well-developed and normal weight.   HENT:      Head: Normocephalic and atraumatic.   Eyes:      General: No scleral icterus.     Pupils: Pupils are equal, round, and reactive to light.   Cardiovascular:      Rate and Rhythm: Normal rate and regular rhythm.      Heart sounds: Normal heart sounds. No murmur heard.    No friction rub. No gallop.   Pulmonary:      Effort: Pulmonary effort is normal.      Breath sounds: Normal breath sounds. No wheezing or rales.   Abdominal:      General: Bowel sounds are normal. There is no distension or abdominal bruit.      Palpations: Abdomen is soft. Abdomen is not rigid. There is no shifting dullness, fluid wave, mass or pulsatile mass.      Tenderness: There is no  abdominal tenderness. There is no guarding.      Hernia: No hernia is present.   Musculoskeletal:         General: No swelling or tenderness. Normal range of motion.      Cervical back: Normal range of motion and neck supple. No rigidity.   Skin:     General: Skin is warm and dry.      Coloration: Skin is not jaundiced.      Findings: No rash.   Neurological:      General: No focal deficit present.      Mental Status: She is alert and oriented to person, place, and time.      Cranial Nerves: No cranial nerve deficit.   Psychiatric:         Behavior: Behavior normal.         Thought Content: Thought content normal.       Diagnoses and all orders for this visit:    Abnormal CT scan, colon  -     Case Request; Standing  -     Case Request    Other orders  -     linaclotide (Linzess) 72 MCG capsule capsule; Take 1 capsule by mouth Every Morning Before Breakfast.    Patient 66-year-old female with history of metastatic breast cancer, hypertension and hyperlipidemia with recurrent colon polyps scoped in the past and Dr. Hood's office.  Unable to get those records as they are unavailable patient presents with abnormal CT scan.  Review of CT shows some hyperenhancement of the transverse colon but no significant thickening of the colon walls noted the segment is somewhat constipated with increased stool in the transverse without diarrhea.  Patient denies any nausea vomiting does tend to be constipated though usually when she finally does release her bowels will have some loose stool at the end.  Patient denies any fever or chills no mucus or blood in the stool noted.  We will proceed with colonoscopy but likely changes related to the constipation not any intrinsic wall abnormality but will confirm this endoscopically.

## 2022-04-08 ENCOUNTER — APPOINTMENT (OUTPATIENT)
Dept: OTHER | Facility: HOSPITAL | Age: 67
End: 2022-04-08

## 2022-04-08 ENCOUNTER — HOSPITAL ENCOUNTER (OUTPATIENT)
Dept: MRI IMAGING | Facility: HOSPITAL | Age: 67
Discharge: HOME | End: 2022-04-08

## 2022-04-08 DIAGNOSIS — C79.51 BONE METASTASES: ICD-10-CM

## 2022-04-08 DIAGNOSIS — C50.912 CARCINOMA OF LEFT BREAST METASTATIC TO LUNG: ICD-10-CM

## 2022-04-08 DIAGNOSIS — Z09 FOLLOW-UP EXAM: ICD-10-CM

## 2022-04-08 DIAGNOSIS — C78.02 CARCINOMA OF LEFT BREAST METASTATIC TO LUNG: ICD-10-CM

## 2022-04-08 PROCEDURE — A9577 INJ MULTIHANCE: HCPCS | Performed by: RADIOLOGY

## 2022-04-08 PROCEDURE — 73723 MRI JOINT LWR EXTR W/O&W/DYE: CPT

## 2022-04-08 PROCEDURE — 0 GADOBENATE DIMEGLUMINE 529 MG/ML SOLUTION: Performed by: RADIOLOGY

## 2022-04-08 RX ADMIN — GADOBENATE DIMEGLUMINE 15 ML: 529 INJECTION, SOLUTION INTRAVENOUS at 11:23

## 2022-04-12 ENCOUNTER — APPOINTMENT (OUTPATIENT)
Dept: RADIATION ONCOLOGY | Facility: HOSPITAL | Age: 67
End: 2022-04-12

## 2022-04-13 LAB
LAB AP CASE REPORT: NORMAL
LAB AP DIAGNOSIS COMMENT: NORMAL
LAB AP SPECIAL STAINS: NORMAL
LAB AP SYNOPTIC CHECKLIST: NORMAL
Lab: NORMAL
Lab: NORMAL
PATH REPORT.ADDENDUM SPEC: NORMAL
PATH REPORT.FINAL DX SPEC: NORMAL
PATH REPORT.GROSS SPEC: NORMAL

## 2022-04-18 ENCOUNTER — TELEPHONE (OUTPATIENT)
Dept: ONCOLOGY | Facility: CLINIC | Age: 67
End: 2022-04-18

## 2022-04-18 NOTE — TELEPHONE ENCOUNTER
Left message to inform her Dr. Hawkins would like to reschedule her appt for tomorrow to 04/28. Left call back number [phone].

## 2022-04-19 ENCOUNTER — APPOINTMENT (OUTPATIENT)
Dept: OTHER | Facility: HOSPITAL | Age: 67
End: 2022-04-19

## 2022-04-22 ENCOUNTER — OFFICE VISIT (OUTPATIENT)
Dept: RADIATION ONCOLOGY | Facility: HOSPITAL | Age: 67
End: 2022-04-22

## 2022-04-22 VITALS
HEART RATE: 97 BPM | SYSTOLIC BLOOD PRESSURE: 128 MMHG | OXYGEN SATURATION: 98 % | BODY MASS INDEX: 28.62 KG/M2 | WEIGHT: 172 LBS | DIASTOLIC BLOOD PRESSURE: 77 MMHG

## 2022-04-22 DIAGNOSIS — C78.00 MALIGNANT NEOPLASM METASTATIC TO LUNG, UNSPECIFIED LATERALITY: Primary | ICD-10-CM

## 2022-04-22 PROCEDURE — 99213 OFFICE O/P EST LOW 20 MIN: CPT | Performed by: RADIOLOGY

## 2022-04-22 NOTE — PROGRESS NOTES
Subjective     No ref. provider found    Cancer Staging  Stage IV (TX, NX, M1)   No chief complaint on file.    CC: breast cancer metastases to bone with right hip pain                                Dear Jarod Hawkins II, MD     I had the pleasure of seeing Marialuisa Vivar  today in the Radiation Center    The patient is a 66 y.o. female with metastatic breast cancer.  She was first diagnosed in 1993 at which time she underwent mastectomy, chemo and radiation therapy.  She developed metastatic disease to the bone and lungs in 2010.  In November 2021 she developed metastases to bilaterl ovaries and uterine serosa and underwent TLH/BSO.  She also has previously received stereotactic treatment to C7 and T1 at  with Dr. Kranthi Winston in June 2020.       She has continued on arimidex for several years with minimal progression of disease.  Her most recent CT scans on 2/1/22 showed no change in bilateral pulmonary nodules, however, several osseous lesions have become more sclerotic when compared to scans from 12/10/20 with index lesions at T9 and T10.       She had a bone scan on 3/18/22 which showed increased uptake at T6, T9, T10, and mild uptake in right acetabular roof may be small metastatic lesion and uptak in right frontal bone.       She reports increased pain in her right hip over the past few months, both with movement and when sitting. She ranks the pain 2-4 on the pain scale.  She denies any numbness or weakness of her leg.    Interval hx 4/22/22:  She had a mri of her right hip on 4/19/22 which showed metastatic lesion within the L3 vertebral body with similar size to that demonstrated on previous CTs. Subcentimeter enhancing lesion within the central to left sacrum is most likely a small metastatic lesion. There are small bilateral supra-acetabular sclerotic foci which may represent bone islands and appear to be chronic and were demonstrated on old CTs. There is no fracture or definite metastaticdisease to the  right hip.      She still reports pain in her right hip at times which she ranks 3/10 on pain scale.  She denies any numbness or weakness of her right leg.       Review of Systems   Constitutional: Negative.    HENT: Positive for voice change.    Musculoskeletal: Positive for arthralgias and back pain.   Psychiatric/Behavioral: Negative.          Past Medical History:   Diagnosis Date   • Allergy    • Anxiety    • Asthma    • Bone metastasis (HCC)    • Breast cancer (HCC)     Left node positive   • Cholelithiasis     Lap Marily   • Colon polyp Past 10-15 yrs?    found at colonoscopies   • Depression    • Esophageal reflux     cough   • History of colon polyps    • Hyperlipidemia    • Hypertension    • Left breast cancer metastasized to lung    • Obstructive sleep apnea    • Ovarian cyst    • PONV (postoperative nausea and vomiting)    • Tongue cancer (HCC) 2019    by ENT at [facility] dr Quinn         Past Surgical History:   Procedure Laterality Date   • CHOLECYSTECTOMY     • COLONOSCOPY      H/O polyps   • KNEE ARTHROPLASTY     • LUNG SURGERY      Lung wedge resection   • MASTECTOMY RADICAL Left 1993   • TONGUE SURGERY  2019    tongue cancer   • TOTAL LAPAROSCOPIC HYSTERECTOMY N/A 2021    Procedure: Robotic assisted total laparoscopic hysterectomy, bilateral salpingoophorecotmy, bilateral ureteralysis ;  Surgeon: Kaylynn Ricci DO;  Location: [facility];  Service: Robotics - [facility];  Laterality: N/A;         Social History     Socioeconomic History   • Marital status: Single   • Years of education: College   Tobacco Use   • Smoking status: Former Smoker     Packs/day: 2.00     Years: 30.00     Pack years: 60.00     Types: Cigarettes     Start date: 1973     Quit date: 2008     Years since quittin.8   • Smokeless tobacco: Never Used   Vaping Use   • Vaping Use: Never used   Substance and Sexual Activity   • Alcohol use: No   • Drug use: No   • Sexual activity: Not  Currently     Birth control/protection: None         Family History   Problem Relation Age of Onset   • Hypertension Mother    • Leukemia Mother 72   • COPD Mother         smoker   • Diabetes Brother    • Pancreatic cancer Brother    • Glaucoma Brother    • Heart disease Brother    • Hypertension Brother    • Gallbladder disease Brother    • Pancreatitis Brother          from pancreatic csncer   • Gallbladder disease Father    • Colon polyps Father    • Stroke Other         Grandmother   • Lupus Other         Aunt   • Alcohol abuse Other         Aunt   • Glaucoma Other         Grandmother   • Diabetes type II Brother    • Hypertension Brother    • Hyperlipidemia Brother    • Liver cancer Paternal Uncle    • Stomach cancer Paternal Aunt    • Alcohol abuse Maternal Aunt    • Malig Hyperthermia Neg Hx           Objective    Physical Exam  Constitutional:       Appearance: Normal appearance.   HENT:      Head: Atraumatic.   Musculoskeletal:         General: No swelling. Normal range of motion.   Neurological:      General: No focal deficit present.      Mental Status: She is alert and oriented to person, place, and time.   Psychiatric:         Mood and Affect: Mood normal.           Current Outpatient Medications on File Prior to Visit   Medication Sig Dispense Refill   • anastrozole (ARIMIDEX) 1 MG tablet Take 1 tablet by mouth once daily 90 tablet 0   • atorvastatin (LIPITOR) 10 MG tablet TAKE 1 TABLET BY MOUTH ONCE DAILY AT NIGHT 90 tablet 0   • busPIRone (BUSPAR) 10 MG tablet Take 10 mg by mouth 2 (Two) Times a Day.     • Cholecalciferol 2000 UNITS capsule Take 4,000 Units by mouth 3 (Three) Times a Week.     • eszopiclone (LUNESTA) tablet Take 3 mg by mouth At Night As Needed.     • HYDROcodone-acetaminophen (NORCO) 5-325 MG per tablet Take 1 tablet by mouth Every 4 (Four) Hours As Needed for Moderate Pain . 30 tablet 0   • Ibuprofen (MOTRIN PO) Take  by mouth.     • linaclotide (Linzess) 72 MCG capsule  capsule Take 1 capsule by mouth Every Morning Before Breakfast. 30 capsule 11   • LORazepam (ATIVAN) 0.5 MG tablet Take 1 tablet by mouth Every 8 (Eight) Hours As Needed.     • losartan (COZAAR) 50 MG tablet Take 1 tablet by mouth Daily. 30 tablet 0   • Omeprazole 20 MG Tablet Delayed Release Dispersible Take 20 mg by mouth As Needed.     • PARoxetine (PAXIL) 40 MG tablet Take 40 mg by mouth Every Night.     • Probiotic Product (PROBIOTIC-10 PO) Take  by mouth.     • sennosides-docusate (PERICOLACE) 8.6-50 MG per tablet Take 1 tablet by mouth Daily.     • Zoledronic Acid (ZOMETA IV) Infuse  into a venous catheter. EVERY THREE MONTHS       Current Facility-Administered Medications on File Prior to Visit   Medication Dose Route Frequency Provider Last Rate Last Admin   • chlorhexidine (PERIDEX) 0.12 % solution 15 mL  15 mL Mouth/Throat Q12H Kaylynn Ricci, DO       • mupirocin (BACTROBAN) 2 % nasal ointment   Nasal BID Kaylynn Ricci, DO           ALLERGIES:    Allergies   Allergen Reactions   • Nickel Unknown - Low Severity   • Ciprofloxacin Rash       There were no vitals taken for this visit.     Current Status 3/24/2022   ECOG score 0         Assessment/Plan     66 year old female with stage IV breast cancer with right hip pain.  I personally reviewed her recent mri and bone scan.  the mri does not show any metastatic lesion to the right hip or acetabulum to explain her pain, therefore I do not recommend radiation. This may be due to arthritic/degenerative changes.  I explained that if she develops pain in other sites of known disease, we could treat her down the road but for now will hold off.  She voiced understanding.     I personally spent greater than 30 minutes today assessing, managing, discussing and documenting my visit with the patient. That time includes review of records, imaging and pathology reports, obtaining my own history, performing a medically appropriate evaluation, counseling and  educating the patient, discussing goals, logistics, alternatives and risks of my recommendations, surveillance options and potential outcomes. It also includes the time documenting the clinical information in the EMR and communicating my recommendations to the other involved physicians.               Thank you very much for allowing me to participate in the care of this very pleasant patient.    Sincerely,      Brianna Orozco MD

## 2022-04-25 ENCOUNTER — OFFICE VISIT (OUTPATIENT)
Dept: INTERNAL MEDICINE | Facility: CLINIC | Age: 67
End: 2022-04-25

## 2022-04-25 VITALS
TEMPERATURE: 97.3 F | DIASTOLIC BLOOD PRESSURE: 76 MMHG | WEIGHT: 168 LBS | OXYGEN SATURATION: 97 % | HEART RATE: 113 BPM | BODY MASS INDEX: 27.99 KG/M2 | SYSTOLIC BLOOD PRESSURE: 118 MMHG | HEIGHT: 65 IN

## 2022-04-25 DIAGNOSIS — R73.01 IFG (IMPAIRED FASTING GLUCOSE): ICD-10-CM

## 2022-04-25 DIAGNOSIS — E55.9 VITAMIN D DEFICIENCY: ICD-10-CM

## 2022-04-25 DIAGNOSIS — E78.2 MIXED HYPERLIPIDEMIA: Primary | ICD-10-CM

## 2022-04-25 DIAGNOSIS — I10 BENIGN ESSENTIAL HTN: ICD-10-CM

## 2022-04-25 PROCEDURE — 99214 OFFICE O/P EST MOD 30 MIN: CPT | Performed by: FAMILY MEDICINE

## 2022-04-25 RX ORDER — ATORVASTATIN CALCIUM 10 MG/1
10 TABLET, FILM COATED ORAL NIGHTLY
Qty: 90 TABLET | Refills: 1 | Status: SHIPPED | OUTPATIENT
Start: 2022-04-25 | End: 2022-09-16

## 2022-04-25 RX ORDER — LOSARTAN POTASSIUM 50 MG/1
50 TABLET ORAL DAILY
Qty: 90 TABLET | Refills: 1 | Status: SHIPPED | OUTPATIENT
Start: 2022-04-25 | End: 2022-04-25

## 2022-04-25 RX ORDER — METOPROLOL SUCCINATE 25 MG/1
25 TABLET, EXTENDED RELEASE ORAL DAILY
Qty: 30 TABLET | Refills: 1 | Status: SHIPPED | OUTPATIENT
Start: 2022-04-25 | End: 2022-07-01

## 2022-04-25 NOTE — PROGRESS NOTES
Subjective   Marialuisa Vivar is a 66 y.o. female.   Chief Complaint   Patient presents with   • Hyperlipidemia   • Hypertension   • Hyperglycemia       History of Present Illness     #1 HTN-diagnosed when patient was in her 40s.   She is on losartan to 50 mg a day.  She takes it everyday. She reports no side effects.  She has no chest pain,  no lightheadedness, no palpitations.  She is getting short of breath especially if she walks up the stairs.  She thinks that this is from being out of shape.  She does not move much.  She sits a lot in the last few months.  Creatinine 0.99, GFR 62.  Sodium and potassium are normal.     #2 HLP- on atorvastatin at 10 mg a day.  She takes it daily. She has no side effects.  Occasionally she has muscle aches, no muscle cramps.  LDL is 80, HDL 48,  from 253.  LFTs are normal.     #3  Hyperglycemia-weight is stable.  Fasting blood sugar 107  A1c  5.7 from 5.8.  She does not exercise.  Family history positive for diabetes in her brother and maternal grandmother.     #4  Vitamin D deficiency-she  takes vitamin D3 at 5000 units 3 times a week. Vitamin D is at  30.0 from 26.5.    The following portions of the patient's history were reviewed and updated as appropriate: allergies, current medications, past family history, past medical history, past social history, past surgical history and problem list.    Review of Systems   Constitutional: Positive for fatigue.   Respiratory: Positive for shortness of breath. Negative for cough and wheezing.    Cardiovascular: Negative for chest pain and palpitations.   Neurological: Negative for light-headedness.         Objective   Wt Readings from Last 3 Encounters:   04/25/22 76.2 kg (168 lb)   04/22/22 78 kg (172 lb)   04/07/22 76.2 kg (168 lb)      Vitals:    04/25/22 1347   BP: 118/76   Pulse: (!) 127   Temp: 97.3 °F (36.3 °C)   SpO2: 97%     Temp Readings from Last 3 Encounters:   04/25/22 97.3 °F (36.3 °C)   04/07/22 98.3 °F (36.8 °C)    03/24/22 97.2 °F (36.2 °C) (Temporal)     BP Readings from Last 3 Encounters:   04/25/22 118/76   04/22/22 128/77   03/24/22 130/83     Pulse Readings from Last 3 Encounters:   04/25/22 (!) 127   04/22/22 97   03/24/22 100     Body mass index is 27.96 kg/m².    Physical Exam  Constitutional:       Appearance: She is well-developed.   HENT:      Head: Normocephalic and atraumatic.   Neck:      Thyroid: No thyromegaly.      Vascular: No carotid bruit.   Cardiovascular:      Rate and Rhythm: Normal rate and regular rhythm.      Heart sounds: Normal heart sounds.   Pulmonary:      Effort: Pulmonary effort is normal.      Breath sounds: Normal breath sounds.   Musculoskeletal:      Cervical back: Neck supple.   Skin:     General: Skin is warm and dry.   Neurological:      Mental Status: She is alert and oriented to person, place, and time.   Psychiatric:         Behavior: Behavior normal.         Assessment/Plan   Diagnoses and all orders for this visit:    1. Mixed hyperlipidemia (Primary)    2. Benign essential HTN  -     Discontinue: losartan (COZAAR) 50 MG tablet; Take 1 tablet by mouth Daily.  Dispense: 90 tablet; Refill: 1    3. IFG (impaired fasting glucose)    4. Vitamin D deficiency    Other orders  -     atorvastatin (LIPITOR) 10 MG tablet; Take 1 tablet by mouth Every Night.  Dispense: 90 tablet; Refill: 1  -     metoprolol succinate XL (Toprol XL) 25 MG 24 hr tablet; Take 1 tablet by mouth Daily.  Dispense: 30 tablet; Refill: 1        1.  Hypertension-we will stop losartan.  We will start Toprol 25 mg a day.  Follow-up in 1 month.  2.  Hyperlipidemia-continue current treatment.  Follow-up in 6 months.  3.  Vitamin D deficiency-continue current treatment.  Follow-up in 12 months.  4.  Impaired fasting glucose- less carbs, more physical activity can help.  Follow-up in 6 months.  Labs before office visit.

## 2022-04-26 ENCOUNTER — TELEPHONE (OUTPATIENT)
Dept: INTERNAL MEDICINE | Facility: CLINIC | Age: 67
End: 2022-04-26

## 2022-04-26 ENCOUNTER — SPECIALTY PHARMACY (OUTPATIENT)
Dept: PHARMACY | Facility: HOSPITAL | Age: 67
End: 2022-04-26

## 2022-04-26 DIAGNOSIS — N94.89 ADNEXAL MASS: Primary | ICD-10-CM

## 2022-04-26 DIAGNOSIS — C50.912 CARCINOMA OF LEFT BREAST METASTATIC TO LUNG: ICD-10-CM

## 2022-04-26 DIAGNOSIS — C78.02 CARCINOMA OF LEFT BREAST METASTATIC TO LUNG: ICD-10-CM

## 2022-04-26 NOTE — PROGRESS NOTES
I received the following staff msg:  From: Jarod Hawkins II, MD   Sent: 4/26/2022   7:23 AM EDT   To: April Hortensia Sam RN, *     Hi.     Needs to discontinue Arimidex and begin      Faslodex/Ibrance 125 mg D1-21/28 days.     I see her tomorrow.     I am guessing this would not be ready by tomorrow and you will need about a week to get the Ibrance?   I am not sure if we already obtained Ibrance for her?     She is not on the schedule for Faslodex tomorrow.  However, I would like to wait to begin Faslodex until we can begin Ibrance     Pharmacy, please let April know if we will be able to begin Ibrance tomorrow in which case April can make sure Faslodex injection gets added to her schedule tomorrow.  (I doubt this will be possible)     Thanks!     I informed April that it may take up to a week for the patient to receive Ibrance but from time to time the office has samples. If we have samples available, then the patient can start as early as tomorrow.    The PA has been approved from 4/26/2022 to 10/26/2022.    I have secured a 1-month free voucher  BIN: [id number], GROUP: [id number], ID:[id number]. I will give this information to Evan GUILLERMO for processing.     I will submit a free drug application for future refills.     Sarai Werner - Care Coordinator   4/26/2022  13:51 EDT

## 2022-04-26 NOTE — PROGRESS NOTES
.     REASONS FOR FOLLOWUP: Metastatic breast cancer, tongue cancer    HISTORY OF PRESENT ILLNESS:  The patient is a 66 y.o. year old female  who is here for follow-up with the above-mentioned history.    No new problems.  Today we spent a while talking about her emotional struggles.  She has been having a hard time lately with loneliness.  She does have a counselor and a psychiatrist she sees regularly.  She states it is difficult for her to make friends and this is only gotten to be more of an issue as she has aged.  She has also been worrying about her cancer more.  She is interested in getting involved in some cancer support groups.  She would like our help with this.    Pain is controlled.  Dr. Llanes is not planning radiation.    Past Medical History:   Diagnosis Date   • Allergy    • Anxiety    • Asthma    • Bone metastasis (HCC)    • Breast cancer (HCC)     Left node positive   • Cholelithiasis 2000    Lap Marily   • Colon polyp Past 10-15 yrs?    found at colonoscopies   • Depression    • Esophageal reflux     cough   • History of colon polyps    • Hyperlipidemia    • Hypertension    • Left breast cancer metastasized to lung    • Obstructive sleep apnea    • Ovarian cyst    • PONV (postoperative nausea and vomiting)    • Tongue cancer (HCC) 05/2019    by ENT at [facility] dr Quinn     Past Surgical History:   Procedure Laterality Date   • CHOLECYSTECTOMY  2000   • COLONOSCOPY  2014    H/O polyps   • KNEE ARTHROPLASTY     • LUNG SURGERY  2010    Lung wedge resection   • MASTECTOMY RADICAL Left 1993   • TONGUE SURGERY  05/21/2019    tongue cancer   • TOTAL LAPAROSCOPIC HYSTERECTOMY N/A 11/04/2021    Procedure: Robotic assisted total laparoscopic hysterectomy, bilateral salpingoophorecotmy, bilateral ureteralysis ;  Surgeon: Kaylynn Ricci DO;  Location: [facility];  Service: Robotics - DaVinci;  Laterality: N/A;       MEDICATIONS    Current Outpatient Medications:   •  atorvastatin (LIPITOR) 10 MG  tablet, Take 1 tablet by mouth Every Night., Disp: 90 tablet, Rfl: 1  •  busPIRone (BUSPAR) 10 MG tablet, Take 10 mg by mouth 2 (Two) Times a Day., Disp: , Rfl:   •  Cholecalciferol 2000 UNITS capsule, Take 4,000 Units by mouth 3 (Three) Times a Week., Disp: , Rfl:   •  eszopiclone (LUNESTA) tablet, Take 3 mg by mouth At Night As Needed., Disp: , Rfl:   •  HYDROcodone-acetaminophen (NORCO) 5-325 MG per tablet, Take 1 tablet by mouth Every 4 (Four) Hours As Needed for Moderate Pain ., Disp: 30 tablet, Rfl: 0  •  Ibuprofen (MOTRIN PO), Take  by mouth., Disp: , Rfl:   •  LORazepam (ATIVAN) 0.5 MG tablet, Take 1 tablet by mouth Every 8 (Eight) Hours As Needed., Disp: , Rfl:   •  metoprolol succinate XL (Toprol XL) 25 MG 24 hr tablet, Take 1 tablet by mouth Daily., Disp: 30 tablet, Rfl: 1  •  Omeprazole 20 MG Tablet Delayed Release Dispersible, Take 20 mg by mouth As Needed., Disp: , Rfl:   •  [START ON 5/2/2022] Palbociclib 125 MG tablet, Take 1 tablet by mouth Daily for 21 days. Then off for 7 days., Disp: 21 tablet, Rfl: 0  •  PARoxetine (PAXIL) 40 MG tablet, Take 40 mg by mouth Every Night., Disp: , Rfl:   •  Probiotic Product (PROBIOTIC-10 PO), Take  by mouth., Disp: , Rfl:   •  sennosides-docusate (PERICOLACE) 8.6-50 MG per tablet, Take 1 tablet by mouth Daily., Disp: , Rfl:   •  Zoledronic Acid (ZOMETA IV), Infuse  into a venous catheter. EVERY THREE MONTHS, Disp: , Rfl:   •  anastrozole (ARIMIDEX) 1 MG tablet, Take 1 tablet by mouth once daily, Disp: 90 tablet, Rfl: 0  •  losartan (COZAAR) 50 MG tablet, , Disp: , Rfl:   •  Palbociclib 125 MG capsule capsule, Take 1 capsule by mouth Daily., Disp: 21 capsule, Rfl: 0  No current facility-administered medications for this visit.    Facility-Administered Medications Ordered in Other Visits:   •  chlorhexidine (PERIDEX) 0.12 % solution 15 mL, 15 mL, Mouth/Throat, Q12H, Kaylynn Ricci,   •  mupirocin (BACTROBAN) 2 % nasal ointment, , Nasal, BID, Takimoto, Kaylynn L,  DO    ALLERGIES:     Allergies   Allergen Reactions   • Nickel Unknown - Low Severity   • Ciprofloxacin Rash       SOCIAL HISTORY:       Social History     Socioeconomic History   • Marital status: Single   • Years of education: College   Tobacco Use   • Smoking status: Former Smoker     Packs/day: 2.00     Years: 30.00     Pack years: 60.00     Types: Cigarettes     Start date: 1973     Quit date: 2008     Years since quittin.9   • Smokeless tobacco: Never Used   Vaping Use   • Vaping Use: Never used   Substance and Sexual Activity   • Alcohol use: No   • Drug use: No   • Sexual activity: Not Currently     Birth control/protection: None         FAMILY HISTORY:  Family History   Problem Relation Age of Onset   • Hypertension Mother    • Leukemia Mother 72   • COPD Mother         smoker   • Diabetes Brother    • Pancreatic cancer Brother    • Glaucoma Brother    • Heart disease Brother    • Hypertension Brother    • Gallbladder disease Brother    • Pancreatitis Brother          from pancreatic csncer   • Gallbladder disease Father    • Colon polyps Father    • Stroke Other         Grandmother   • Lupus Other         Aunt   • Alcohol abuse Other         Aunt   • Glaucoma Other         Grandmother   • Diabetes type II Brother    • Hypertension Brother    • Hyperlipidemia Brother    • Liver cancer Paternal Uncle    • Stomach cancer Paternal Aunt    • Alcohol abuse Maternal Aunt    • Malig Hyperthermia Neg Hx        REVIEW OF SYSTEMS:  Review of Systems   Constitutional: Negative for activity change.   HENT: Negative for nosebleeds and trouble swallowing.    Respiratory: Negative for shortness of breath and wheezing.    Cardiovascular: Negative for chest pain and palpitations.   Gastrointestinal: Negative for constipation, diarrhea and nausea.   Genitourinary: Negative for dysuria and hematuria.   Musculoskeletal: Negative for arthralgias and myalgias.   Skin: Negative for rash and wound.  "  Neurological: Negative for seizures and syncope.   Hematological: Negative for adenopathy. Does not bruise/bleed easily.   Psychiatric/Behavioral: Negative for confusion.            Vitals:    04/27/22 0807   BP: 99/65   Pulse: 70   Resp: 17   Temp: 97.4 °F (36.3 °C)   TempSrc: Temporal   SpO2: 97%   Weight: 76.1 kg (167 lb 12.8 oz)   Height: 165.1 cm (65\")   PainSc: 0-No pain     Current Status 4/27/2022   ECOG score 0        PHYSICAL EXAM:        CONSTITUTIONAL:  Vital signs reviewed.  No distress, looks comfortable.  EYES:  Conjunctiva and lids unremarkable.  PERRLA  EARS,NOSE,MOUTH,THROAT:  Ears and nose appear unremarkable.  Lips, teeth, gums appear unremarkable.  RESPIRATORY:  Normal respiratory effort.  Lungs clear to auscultation bilaterally.  CARDIOVASCULAR:  Normal S1, S2.  No murmurs rubs or gallops.  No significant lower extremity edema.  GASTROINTESTINAL: Abdomen appears unremarkable.  Nontender.  No hepatomegaly.  No splenomegaly.  LYMPHATIC:  No cervical, supraclavicular, axillary lymphadenopathy.  SKIN:  Warm.  No rashes.  PSYCHIATRIC:  Normal judgment and insight.  Normal mood and affect.          RECENT LABS:        WBC   Date/Time Value Ref Range Status   03/24/2022 03:09 PM 4.16 3.40 - 10.80 10*3/mm3 Final     Hemoglobin   Date/Time Value Ref Range Status   03/24/2022 03:09 PM 13.3 12.0 - 15.9 g/dL Final     Platelets   Date/Time Value Ref Range Status   03/24/2022 03:09  140 - 450 10*3/mm3 Final       Assessment/Plan   There are no diagnoses linked to this encounter.  Marialuisa Vivar        Assessment/Plan     *Metastatic breast cancer to bilateral lungs and soft tissue in the mediastinum (likely the cause of her hoarseness). (Initial breast cancer diagnosed in 1993 treated with mastectomy, chemotherapy, radiation therapy and tamoxifen). Arimidex day 1 on 02/17/2010. PET scan late August 2011 shows no abnormal hypermetabolic activity. This has improved compared to the prior PET scan " January 2010 which showed mild hypermetabolic activity in areas of metastasis. (In the past, her  T6 lesion did not show up on CT scans or bone scan. It was seen by PET scan.) We have been following with CT scans only every 6 months and PET scans on an as needed only basis. Sometimes her CT scanned pulmonary nodules are read as benign since they have been stable through the course of years but we know they are malignant because they have been surgically sampled in the past.    · CT 8/16/16 shows increased sclerosis at T6 compared to 4/28/15.    · PET 10/11/16: Slight uptake at T6, SUV 3.2.  mild thickening of right adrenal gland with an SUV of 11.   · Continued Arimidex is minimal progression and had been on this since 2/17/10.  · Not referred for radiation since no pain at T6  · CT 1/27/17, unchanged.  · CT 8/1/17: Unchanged except subtle suggestion of mild increase in thickening of the right adrenal gland.    · CT 2/22/18, unchanged.  · CT 9/6/18: Unchanged except nodular thickening right adrenal gland significantly decreased.  · CT 9/5/2019: Unchanged.  · On the 6/12/2020 visit, the following scans were reviewed:  · CT neck and chest 5/11/2020, [facility], from 5/9/2019: Stable pulmonary nodules new lytic C7 lesion and posterior T1 lesion questionable T12 lesion.  No change in the T6 and T10 lesions.  · PET, [facility], 5/19/2020: Mildly enlarged left neck nodes are mildly hypermetabolic.  Diffuse activity throughout the thickened left adrenal gland.  CT appearance unchanged from 5/9/2019.  Progressive T1 lesion seen on CT from 5/11/2020, mild some moderate activity.  T10 low-grade activity with mixed lucent and sclerotic findings.  T6 is a mixed lucent and sclerotic appearance but no significant activity.   · T12 (the biopsy report is labeled as T12 but patient insists this was a C7/T1 biopsy, not to T12) biopsy 6/15/2020, [facility]: Metastatic breast carcinoma.  ER >95%.  [place] 0%.  HER-2 negative (1+)  · It seems the main  progression at the 6/12/2020 visit was a new C7 and new T1 lesion.  Those were radiated with [facility] through [facility] early June 2020.  Because Arimidex has been working well since 2010, continue same Arimidex.  Add Zometa every 3 months.  · C7 and T1 found on CT 5/11/2020, [facility].  CyberKnife, Dr. Winston, early June 2020.  · CT C and T-spine 9/17/2020: New C7 lesion from 9/6/2018, but no change from 5/11/2020 CT.  T1 lesion stable from 5/11/2020, but new compared to 2/22/2018.  T10 lesion unchanged from 9/5/2019, but new from 2/22/2018.  Subtle 8 mm T12 lesion new from 9/5/2019.  · Therefore, no recent progression.  · Considering her good tolerance and long-term effectiveness of Arimidex, continue same therapy.  · CT CAP 12/10/2020: No progression  · Therefore, continue Arimidex.  · CA 15-3 normal through 12/14/2020  · Because CA 15-3 fabian to 27 on 4/13/2021, then 29.3 on 7/20/2021, then 34.3 on 10/12/2021, CT scans done earlier than planned:  · CT CAP with contrast 10/12/2021: New 5.2 x 4.6 cm mixed cystic and solid right ovarian mass compared to 12/10/2020.  Continued stability of bilateral pulmonary nodules and stable sclerotic bone metastasis.  · 11/4/2021: TLH/BSO (due to new 5 cm right ovarian mass on CT, 11/12/2021).  · Metastatic breast cancer involving bilateral ovaries and posterior uterine serosa.  ER 81-90%.  VA 61-70%.  HER-2 negative  · Caris NGS: ER 98%.  VA 90%.  HER-2/eb negative.  AKT1 pathogenic variant, exon 3.  AR+, 95%.  No other significant mutations including negative PIK3CA  · Pelvic washings: Adenocarcinoma  · Because this was the only area of progression on CT, and has been resected, continue Arimidex which she has been on since 2/17/2010.  · 11/22/2021: Discussed with Dr. Blum, who has a focus on breast cancer.  We both agree: Continue Arimidex for now.  In the future, if significant progression is seen, then plan Faslodex injections with Ibrance  · CT CAP 2/1/2022: A few T-spine  sclerotic lesions progressively more sclerotic over multiple prior CTs of indeterminate significance.  No clear signs of progression of disease.  · 3/15/2022: Although tumor marker is not rising and CT showed no clear signs of progression.  CT revealed progressively more sclerotic bone metastasis over multiple prior CTs, she is having increased right hip discomfort.  Sometimes this can be due to healing of metastasis (but she has been on the same treatment since 2010), or this could mean progression of bone metastasis.  It is reassuring that her tumor marker is not worsening.  She would like to see [facility] radiation medicine to see if they feel radiation to the hip would help her discomfort.  We will do a bone scan before that appointment as this might help [facility] radiation.  (Although she has seen [facility] radiation for her neck, she would like to see [facility] radiation for this hip issue).  · 3/18/2022, bone scan: Metastatic disease at T9 and T10, corresponding to CT lesions.  Uptake also at T6, but to lesser degree.  These areas are new compared to last bone scan, 6/3/2009.  Therefore, overall, appears consistent with progression.  · 3/24/2021: Discussed changing  Arimidex to Faslodex/Ibrance 125 mg D1-21/28 days.  · However, Dr. Llanes will decide on 4/12/2022 if the patient needs any traditional XRT to hip/pelvis.  If so, this may cause cytopenias due to the location of XRT.  Ibrance can also cause cytopenias.  If this is the case, we will wait to begin Ibrance until at least a few weeks after XRT completes, provided blood counts allow.   · 4/22/2022: Dr. Llanes reviewed MRI right hip from 4/19/2022, revealing L3 metastasis, similar size of prior CTs.  Therefore, she plans no XRT.  · 4/27/2022: Begin Faslodex Ibrance, when Ibrance becomes available to her    CA 15-3:  · 26.3 on 2/1/2022, from 32.6 on 11/22/2021, from 34.3 on 10/12/2021, from 29.3 on 7/20/2021, from 27 on 4/13/2021, from 23.3  on 12/14/2020.    *Bony metastases  · T6 discovered by PET to August 2011 (did not show up by CT or bone scan)  · C7 and T1 found on CT 5/11/2020, [facility].  [facility]on, Dr. Winston, early June 2020.  · June 2020 began Zometa every 3 months.  · She was warned of possible osteonecrosis of the jaw and renal insufficiency.  She states she has good dental health and sees her dentist regularly.  Because of prior hypercalcemia requiring stopping calcium supplements and because of high normal current calcium level, began without supplemental calcium.  Add calcium if calcium becomes low.  · 1/7/2022: Tooth extracted.  Plan was to wait 5 weeks to resume Zometa.  Patient delayed return until 3/15/2022 (over 8 weeks)    *Bone health. Last bone density 10/11/16, worsened, but still normal with worst T score -0.9.  Patient stopped calcium January 2016, but remained on vitamin D.  I recommended she restart her calcium.  Stopped calcium early March 2018 due to hypercalcemia.  · DEXA 9/5/2019: Normal bone density    *Hypercalcemia.  Repeat calcium 3/7/18 normal, but patient self stopped calcium a few days prior.  Recommended she stay off calcium.  · Calcium was 11 on 6/19/2020  · Calcium 11 again on 12/10/2020  · Repeat calcium 10.5 on 12/14/2020  · Calcium 10.6.  Minimally elevated.  (Normal range up to 10.5)  · Calcium 10.8 on 3/15/2022.  Hopefully, Zometa will help with this.  · Calcium 10.3 on 4/5/2022    *On the 10/5/16 visit, Tachycardia, dyspnea on exertion, bilateral leg swelling, more sedentary recently.  CT PE protocol and bilateral lower extremity Dopplers 10/5/16, negative for clots.  She still has tachycardia and dyspnea on exertion.  Perhaps at least part of this is due to deconditioning.    *Previously complained of colon Right lower anterior rib discomfort.  CT unremarkable in that area.  No specific pain, just discomfort.  I explained to the patient if disease was found by PET scan or bone scan, I would not change  management since she has been doing well on Arimidex since 2010.   Currently denies pain.     *Depression/anxiety.  This limits compliance.  She sees a psychiatrist and a counselor regularly.  She states this is mostly due to body image issues instead of cancer.   · She continues with her counselor and psychiatrist.  Viral pandemic is making this a little more difficult.  · 4/27/2022: We will try to get her involved in some cancer support groups.  I also encouraged her to try out some small groups at [employer] (she struggles with loneliness).    *Noncompliance due to depression/anxiety.  Although she misses appointments, she does reschedule and eventually come in.    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  Positive sentinel node (1 of 3 nodes)..  · Left neck dissection by Dr. Willie Quinn, [facility], on 6/5/2019.  22 nodes negative.  Negative margins.  No lymphovascular invasion or perineural invasion.  2 mm depth of invasion.  Grade 1.  Squamous cell carcinoma.  1.8 cm tumor.  · Because the patient felt what she thought was left neck LAD, CT neck and chest and PET at [facility]. May 2020 performed.  She is being followed at [facility] for this.  · On 6/19/2020, per verbal report from Dr. Senia COLORADO [facility], [facility] ENT does not feel she has any active head and neck cancer.   · She states she has a follow-up with Dr. Kapadia, ENT at [facility], in June with CT neck 1 week prior  · No symptoms to suggest recurrence.  She has follow-up with [facility] in July    *Previously complained of left hip pain x2 weeks.  This prompted an MRI pelvis 8/20/2019 at [facility] and open MRI which was negative for malignancy.    *Possible intolerance of Zometa  · After first dose, June 2020, 2 days of chills, bone pain  · She would like to try Zometa again.  If this occurs again, we will try to switch her to Xgeva monthly.  (No  good data on every 3-month Xgeva)  · She did fine with the second dose of Zometa.    *Thickening of wall of transverse colon on CT 2/1/2022.  Last colonoscopy was 4/18/2019.  She wants the next colonoscopy done at [facility]  · Dr. Galeas will see her on 4/7/2022 to evaluate this    Monitoring on Ibrance:  CBC every 2 weeks for the first 2 months then monthly and as indicated.  After 6 months, if neutropenia grade 1 or 2 only, then CBC can be every 3 months    PLAN:   · Every 3-month Zometa, last was 3/15/2022  · Arrange teaching for Faslodex/Ibrance.  Needs CBC, CMP, CA 15-3 on that day.  · Begin Faslodex Ibrance when Ibrance becomes available.  Faslodex is initially every 2 weeks x 3 doses, then every 4 weeks  · MD or NP with CBC and CMP every 2 weeks x 4 appointments after beginning Faslodex Ibrance.  · We will arrange for her to get involved with some cancer support groups    Usual plan is:  · MD, CA 15-3, CBC, CMP, Zometa 3 months.    · (Zometa every 3 months)  · MD Zometa 6 months.  1 week prior:  · 1 week prior: CT CAP with contrast, CBC, CMP, CA 15-3   · (we were checking annual CT.  However, with the solitary progression that has been resected, check CT more often for now)  · CMP today.  If calcium remains low, begin calcium supplement  · Continue Arimidex  · She sees [facility] ENT, Dr. Bedoya annually, next is summer 2022      40 minutes.  Total time.  Same day.    Send a copy of this note to   Dr. Willie Quinn [facility], radiation medicine, [facility]    41 minutes.  Total time.  Same day

## 2022-04-27 ENCOUNTER — TELEPHONE (OUTPATIENT)
Dept: ONCOLOGY | Facility: CLINIC | Age: 67
End: 2022-04-27

## 2022-04-27 ENCOUNTER — OFFICE VISIT (OUTPATIENT)
Dept: ONCOLOGY | Facility: CLINIC | Age: 67
End: 2022-04-27

## 2022-04-27 ENCOUNTER — APPOINTMENT (OUTPATIENT)
Dept: OTHER | Facility: HOSPITAL | Age: 67
End: 2022-04-27

## 2022-04-27 ENCOUNTER — APPOINTMENT (OUTPATIENT)
Dept: LAB | Facility: HOSPITAL | Age: 67
End: 2022-04-27

## 2022-04-27 VITALS
WEIGHT: 167.8 LBS | TEMPERATURE: 97.4 F | OXYGEN SATURATION: 97 % | DIASTOLIC BLOOD PRESSURE: 65 MMHG | RESPIRATION RATE: 17 BRPM | SYSTOLIC BLOOD PRESSURE: 99 MMHG | BODY MASS INDEX: 27.96 KG/M2 | HEIGHT: 65 IN | HEART RATE: 70 BPM

## 2022-04-27 DIAGNOSIS — C79.51 BONE METASTASES: Primary | ICD-10-CM

## 2022-04-27 DIAGNOSIS — C50.912 CARCINOMA OF LEFT BREAST METASTATIC TO LUNG: ICD-10-CM

## 2022-04-27 DIAGNOSIS — C78.02 CARCINOMA OF LEFT BREAST METASTATIC TO LUNG: ICD-10-CM

## 2022-04-27 PROCEDURE — 99215 OFFICE O/P EST HI 40 MIN: CPT | Performed by: INTERNAL MEDICINE

## 2022-04-27 RX ORDER — LOSARTAN POTASSIUM 50 MG/1
TABLET ORAL
COMMUNITY
Start: 2022-04-25 | End: 2022-05-23

## 2022-04-27 NOTE — TELEPHONE ENCOUNTER
----- Message from Mariana Mendez RPH sent at 4/27/2022 11:48 AM EDT -----  Regarding: specialty pharmacy  Please schedule a full specialty pharmacy education for [facility]. This can be done telephone, telemedicine, or in person.     Thanks,   Mariana

## 2022-04-27 NOTE — TELEPHONE ENCOUNTER
Pt was seen today in office by Dr. Hawkins. David 125mg samples given to Marialuisa and she was instructed to stop her Armidex. She is scheduled for her Faslodex injection Friday 04/27 and will  her schedule for additional appointments. [employer] also spoke with pt regarding financial assistance for Ibrance.

## 2022-04-27 NOTE — TELEPHONE ENCOUNTER
----- Message from Sarai Werner Pharmacy Technician sent at 4/26/2022  8:08 AM EDT -----  Good Morning April,    Dr. Hawkins is correct, we need at least a week to get Ibrance and that depends on Insurance approval which I will start the process right now.    We do typically have Ibrance samples in stock so this may be an option for the patient to start tomorrow if Dr. Hawkins is ok with the patient receiving samples ( and if we have this strength on hand).    Sarai Werner - Care Coordinator   4/26/2022  08:13 EDT      ----- Message -----  From: Mariana Mendez [facility]  Sent: 4/26/2022   8:00 AM EDT  To: Sarai Werner Pharmacy Technician      ----- Message -----  From: Urszula Odell, PharmD  Sent: 4/26/2022   7:55 AM EDT  To: Mariana Mendez RP, #      ----- Message -----  From: Jarod Hawkins II, MD  Sent: 4/26/2022   7:23 AM EDT  To: Shefali Sam RN, #    Hi.    Needs to discontinue Arimidex and begin     Faslodex/Ibrance 125 mg D1-21/28 days.    I see her tomorrow.    I am guessing this would not be ready by tomorrow and you will need about a week to get the Ibrance?  I am not sure if we already obtained Ibrance for her?    She is not on the schedule for Faslodex tomorrow.  However, I would like to wait to begin Faslodex until we can begin Ibrance    Pharmacy, please let April know if we will be able to begin Ibrance tomorrow in which case April can make sure Faslodex injection gets added to her schedule tomorrow.  (I doubt this will be possible)    Thanks!

## 2022-04-28 ENCOUNTER — DOCUMENTATION (OUTPATIENT)
Dept: PHARMACY | Facility: HOSPITAL | Age: 67
End: 2022-04-28

## 2022-04-28 NOTE — PROGRESS NOTES
Per Elyse @ [facility], Ms. Vivar's Ibrance has shipped and is set to arrive 4/29. Her Co-pay is $0.    The patient also received samples from our office on 4/27/22.    Sarai Werner - Care Coordinator   4/28/2022  08:30 EDT

## 2022-04-29 ENCOUNTER — APPOINTMENT (OUTPATIENT)
Dept: ONCOLOGY | Facility: HOSPITAL | Age: 67
End: 2022-04-29

## 2022-05-03 ENCOUNTER — TELEPHONE (OUTPATIENT)
Dept: ONCOLOGY | Facility: CLINIC | Age: 67
End: 2022-05-03

## 2022-05-03 ENCOUNTER — APPOINTMENT (OUTPATIENT)
Dept: ONCOLOGY | Facility: HOSPITAL | Age: 67
End: 2022-05-03

## 2022-05-04 ENCOUNTER — DOCUMENTATION (OUTPATIENT)
Dept: PHARMACY | Facility: HOSPITAL | Age: 67
End: 2022-05-04

## 2022-05-04 ENCOUNTER — APPOINTMENT (OUTPATIENT)
Dept: ONCOLOGY | Facility: HOSPITAL | Age: 67
End: 2022-05-04

## 2022-05-04 NOTE — PROGRESS NOTES
MsWesly Ghazala confirmed that she has received her Ibrance from [facility].     Sarai Werner - Care Coordinator   5/4/2022  15:27 EDT

## 2022-05-06 ENCOUNTER — INFUSION (OUTPATIENT)
Dept: ONCOLOGY | Facility: HOSPITAL | Age: 67
End: 2022-05-06

## 2022-05-06 ENCOUNTER — TELEPHONE (OUTPATIENT)
Dept: ONCOLOGY | Facility: CLINIC | Age: 67
End: 2022-05-06

## 2022-05-06 ENCOUNTER — LAB (OUTPATIENT)
Dept: LAB | Facility: HOSPITAL | Age: 67
End: 2022-05-06

## 2022-05-06 ENCOUNTER — SPECIALTY PHARMACY (OUTPATIENT)
Dept: ONCOLOGY | Facility: HOSPITAL | Age: 67
End: 2022-05-06

## 2022-05-06 VITALS — TEMPERATURE: 97.9 F | WEIGHT: 170 LBS | RESPIRATION RATE: 17 BRPM | HEIGHT: 65 IN | BODY MASS INDEX: 28.32 KG/M2

## 2022-05-06 DIAGNOSIS — N94.89 ADNEXAL MASS: ICD-10-CM

## 2022-05-06 DIAGNOSIS — C78.02 CARCINOMA OF LEFT BREAST METASTATIC TO LUNG: ICD-10-CM

## 2022-05-06 DIAGNOSIS — N94.89 ADNEXAL MASS: Primary | ICD-10-CM

## 2022-05-06 DIAGNOSIS — C50.912 CARCINOMA OF LEFT BREAST METASTATIC TO LUNG: ICD-10-CM

## 2022-05-06 DIAGNOSIS — C79.51 BONE METASTASES: ICD-10-CM

## 2022-05-06 LAB
ALBUMIN SERPL-MCNC: 4.3 G/DL (ref 3.5–5.2)
ALBUMIN/GLOB SERPL: 1.5 G/DL (ref 1.1–2.4)
ALP SERPL-CCNC: 103 U/L (ref 38–116)
ALT SERPL W P-5'-P-CCNC: 17 U/L (ref 0–33)
ANION GAP SERPL CALCULATED.3IONS-SCNC: 8.6 MMOL/L (ref 5–15)
AST SERPL-CCNC: 20 U/L (ref 0–32)
BASOPHILS # BLD AUTO: 0.07 10*3/MM3 (ref 0–0.2)
BASOPHILS NFR BLD AUTO: 1.4 % (ref 0–1.5)
BILIRUB SERPL-MCNC: 0.6 MG/DL (ref 0.2–1.2)
BUN SERPL-MCNC: 25 MG/DL (ref 6–20)
BUN/CREAT SERPL: 15.4 (ref 7.3–30)
CALCIUM SPEC-SCNC: 9.9 MG/DL (ref 8.5–10.2)
CANCER AG15-3 SERPL-ACNC: 22.5 U/ML
CHLORIDE SERPL-SCNC: 106 MMOL/L (ref 98–107)
CO2 SERPL-SCNC: 24.4 MMOL/L (ref 22–29)
CREAT SERPL-MCNC: 1.62 MG/DL (ref 0.6–1.1)
DEPRECATED RDW RBC AUTO: 39.5 FL (ref 37–54)
EGFRCR SERPLBLD CKD-EPI 2021: 34.7 ML/MIN/1.73
EOSINOPHIL # BLD AUTO: 0.18 10*3/MM3 (ref 0–0.4)
EOSINOPHIL NFR BLD AUTO: 3.6 % (ref 0.3–6.2)
ERYTHROCYTE [DISTWIDTH] IN BLOOD BY AUTOMATED COUNT: 11.9 % (ref 12.3–15.4)
GLOBULIN UR ELPH-MCNC: 2.9 GM/DL (ref 1.8–3.5)
GLUCOSE SERPL-MCNC: 129 MG/DL (ref 74–124)
HCT VFR BLD AUTO: 40.7 % (ref 34–46.6)
HGB BLD-MCNC: 13.8 G/DL (ref 12–15.9)
IMM GRANULOCYTES # BLD AUTO: 0.02 10*3/MM3 (ref 0–0.05)
IMM GRANULOCYTES NFR BLD AUTO: 0.4 % (ref 0–0.5)
LYMPHOCYTES # BLD AUTO: 1.37 10*3/MM3 (ref 0.7–3.1)
LYMPHOCYTES NFR BLD AUTO: 27.7 % (ref 19.6–45.3)
MCH RBC QN AUTO: 30.7 PG (ref 26.6–33)
MCHC RBC AUTO-ENTMCNC: 33.9 G/DL (ref 31.5–35.7)
MCV RBC AUTO: 90.6 FL (ref 79–97)
MONOCYTES # BLD AUTO: 0.1 10*3/MM3 (ref 0.1–0.9)
MONOCYTES NFR BLD AUTO: 2 % (ref 5–12)
NEUTROPHILS NFR BLD AUTO: 3.2 10*3/MM3 (ref 1.7–7)
NEUTROPHILS NFR BLD AUTO: 64.9 % (ref 42.7–76)
NRBC BLD AUTO-RTO: 0 /100 WBC (ref 0–0.2)
PLATELET # BLD AUTO: 329 10*3/MM3 (ref 140–450)
PMV BLD AUTO: 8.5 FL (ref 6–12)
POTASSIUM SERPL-SCNC: 4.9 MMOL/L (ref 3.5–4.7)
PROT SERPL-MCNC: 7.2 G/DL (ref 6.3–8)
RBC # BLD AUTO: 4.49 10*6/MM3 (ref 3.77–5.28)
SODIUM SERPL-SCNC: 139 MMOL/L (ref 134–145)
WBC NRBC COR # BLD: 4.94 10*3/MM3 (ref 3.4–10.8)

## 2022-05-06 PROCEDURE — 96402 CHEMO HORMON ANTINEOPL SQ/IM: CPT

## 2022-05-06 PROCEDURE — 86300 IMMUNOASSAY TUMOR CA 15-3: CPT | Performed by: INTERNAL MEDICINE

## 2022-05-06 PROCEDURE — 36415 COLL VENOUS BLD VENIPUNCTURE: CPT

## 2022-05-06 PROCEDURE — 25010000002 FULVESTRANT PER 25 MG: Performed by: NURSE PRACTITIONER

## 2022-05-06 PROCEDURE — 80053 COMPREHEN METABOLIC PANEL: CPT

## 2022-05-06 PROCEDURE — 85025 COMPLETE CBC W/AUTO DIFF WBC: CPT

## 2022-05-06 RX ADMIN — FULVESTRANT 500 MG: 250 INJECTION INTRAMUSCULAR at 15:35

## 2022-05-06 NOTE — TELEPHONE ENCOUNTER
Met with Ms. Ghazala while she was here for her Faslodex injection and gave her handouts for [employer] club, [employer] and the information on the Metastatic Breast Cancer Patient support session hosted by Swapna Nicole RN. Pt was appreciative of the information.

## 2022-05-06 NOTE — TELEPHONE ENCOUNTER
----- Message from Jarod Hawkins II, MD sent at 4/27/2022 11:04 AM EDT -----  Hi April,    She already has a counselor and a psychiatrist    She struggles emotionally and is currently struggling with loneliness.  She would like to get involved with some cancer support groups.  Please direct her to whoever she needs to see to get involved with some cancer groups.    Thanks!

## 2022-05-06 NOTE — PROGRESS NOTES
[facility] Hematology/Oncology Treatment Plan Summary    Name: Marialuisa Vivar  [facility]# [id number]  MD: Dr. Hawkins    Diagnosis: Breast Cancer Stage: IV    Goal of chemotherapy: disease control    Treatment Medication(s) / Frequency and Dosing    1. Ibrance 125 mg tablets- take one po daily for 21/28 days  2. Faslodex 500 mg IM every other week for the first month, then once monthly thereafter    Number of cycles: until disease progression or unacceptable toxicity    Starting on: 5/6/2022     Follow-up Testing to be determined after TBD cycles by MD.     Items for home use: Senokot-S (for constipation), Milk of Magnesia (for constipation)  and Imodium AD (for diarrhea)    Rx written for: [x] Nausea    [] Pre-Chemo   ondansetron 8 mg by mouth every 8 hours as needed for nausea      Completing Pharmacist: Mariana Mendez RPH             Date/time: 05/06/2022 16:05 EDT    Please note: You will be seen by a provider frequently with your treatment plan. This plan may change depending on many factors, if so, this will be discussed with you by your physician.  Last update 12/2020.       Counseling Provided:  - Side effects reviewed with patient including: decreased WBC/increased risk for infection , decreased platelets/increased risk for bleed, decreased hemoglobin, fatigue, nausea/vomiting, diarrhea, headache, mouth sores/irritation, abdominal pain, changes in liver function and hot flashes, injection site reactions, hair loss, weakness, rare risk of blood clots   - Reviewed proper administration: Discussed if the patient is handling their own medications, then they need to wash their hands properly after touching the medication.  If a caregiver is assisting with handling medication, need to wear disposal gloves and wash hands properly afterwards. Patient was counseled to take Ibrance with or without food, at the same time each day. Additional precautions: avoid grapefruit and grapefruit juice  - Reviewed prior storage  of medication: Store medication safe away from children and pets, away from light, and at room temperature. If you use a pill box, use a separate pill box from other medication.   - Counseled patient on safe handling of soiled linen and proper flush technique and reviewed proper disposal of medication.   - Reviewed what to do in the event of a missed dose: Do not take an extra dose or two doses at one time. Simply take your next dose at the regularly scheduled time.  - Reviewed expected goals/outcomes, contraindications and safety precautions, including when to seek medical care.  - Counseled that women should not become pregnant and men should not get a partner pregnant while taking; men and women of childbearing age and potential should use effective contraception during and after therapy.    Provided patient with:   Education sheets about the medication, 24-hour clinic phone number and my contact information and instructions to call should additional questions arise.     Completed medication reconciliation today to assess for drug interactions.   Reviewed concomitant medications, allergies, labs, comorbidities/medical history, and immunization history.   Drug-drug interactions noted: no significant drug interactions noted.   Advised pt to call the clinic if any new medications are started so we can assess for drug-drug interactions     Wrap-up:  Discussed aforementioned material with patient in person, face-to-face, in clinic.   Chemo consents/CCA were signed at today's visit.   Medication availability: patient already has medication on hand  Patient expressed understanding.   Patient demonstrates ability to self-administer medication. No barriers to adherence identified. . All questions and concerns addressed.     Mariana Mendez [facility]  5/6/2022  16:05 EDT

## 2022-05-13 ENCOUNTER — APPOINTMENT (OUTPATIENT)
Dept: ONCOLOGY | Facility: HOSPITAL | Age: 67
End: 2022-05-13

## 2022-05-20 ENCOUNTER — LAB (OUTPATIENT)
Dept: OTHER | Facility: HOSPITAL | Age: 67
End: 2022-05-20

## 2022-05-20 ENCOUNTER — DOCUMENTATION (OUTPATIENT)
Dept: ONCOLOGY | Facility: CLINIC | Age: 67
End: 2022-05-20

## 2022-05-20 ENCOUNTER — INFUSION (OUTPATIENT)
Dept: ONCOLOGY | Facility: HOSPITAL | Age: 67
End: 2022-05-20

## 2022-05-20 ENCOUNTER — OFFICE VISIT (OUTPATIENT)
Dept: ONCOLOGY | Facility: CLINIC | Age: 67
End: 2022-05-20

## 2022-05-20 VITALS
RESPIRATION RATE: 16 BRPM | HEART RATE: 91 BPM | SYSTOLIC BLOOD PRESSURE: 117 MMHG | TEMPERATURE: 97.6 F | DIASTOLIC BLOOD PRESSURE: 69 MMHG | BODY MASS INDEX: 28.09 KG/M2 | OXYGEN SATURATION: 94 % | HEIGHT: 65 IN | WEIGHT: 168.6 LBS

## 2022-05-20 DIAGNOSIS — C78.02 CARCINOMA OF LEFT BREAST METASTATIC TO LUNG: ICD-10-CM

## 2022-05-20 DIAGNOSIS — N28.9 FUNCTION KIDNEY DECREASED: ICD-10-CM

## 2022-05-20 DIAGNOSIS — N94.89 ADNEXAL MASS: Primary | ICD-10-CM

## 2022-05-20 DIAGNOSIS — C50.912 CARCINOMA OF LEFT BREAST METASTATIC TO LUNG: ICD-10-CM

## 2022-05-20 DIAGNOSIS — N94.89 ADNEXAL MASS: ICD-10-CM

## 2022-05-20 DIAGNOSIS — C78.02 CARCINOMA OF LEFT BREAST METASTATIC TO LUNG: Primary | ICD-10-CM

## 2022-05-20 DIAGNOSIS — R26.89 BALANCE PROBLEMS: ICD-10-CM

## 2022-05-20 DIAGNOSIS — C50.912 CARCINOMA OF LEFT BREAST METASTATIC TO LUNG: Primary | ICD-10-CM

## 2022-05-20 LAB
ALBUMIN SERPL-MCNC: 4.2 G/DL (ref 3.5–5.2)
ALBUMIN/GLOB SERPL: 1.6 G/DL
ALP SERPL-CCNC: 99 U/L (ref 39–117)
ALT SERPL W P-5'-P-CCNC: 13 U/L (ref 1–33)
ANION GAP SERPL CALCULATED.3IONS-SCNC: 6.4 MMOL/L (ref 5–15)
AST SERPL-CCNC: 15 U/L (ref 1–32)
BASOPHILS # BLD AUTO: 0.01 10*3/MM3 (ref 0–0.2)
BASOPHILS NFR BLD AUTO: 0.5 % (ref 0–1.5)
BILIRUB SERPL-MCNC: 0.6 MG/DL (ref 0–1.2)
BUN SERPL-MCNC: 22 MG/DL (ref 8–23)
BUN/CREAT SERPL: 13.4 (ref 7–25)
CALCIUM SPEC-SCNC: 9.8 MG/DL (ref 8.6–10.5)
CHLORIDE SERPL-SCNC: 105 MMOL/L (ref 98–107)
CO2 SERPL-SCNC: 26.6 MMOL/L (ref 22–29)
CREAT SERPL-MCNC: 1.64 MG/DL (ref 0.57–1)
DEPRECATED RDW RBC AUTO: 37.1 FL (ref 37–54)
EGFRCR SERPLBLD CKD-EPI 2021: 34.2 ML/MIN/1.73
EOSINOPHIL # BLD AUTO: 0.01 10*3/MM3 (ref 0–0.4)
EOSINOPHIL NFR BLD AUTO: 0.5 % (ref 0.3–6.2)
ERYTHROCYTE [DISTWIDTH] IN BLOOD BY AUTOMATED COUNT: 12.2 % (ref 12.3–15.4)
GLOBULIN UR ELPH-MCNC: 2.7 GM/DL
GLUCOSE SERPL-MCNC: 120 MG/DL (ref 65–99)
HCT VFR BLD AUTO: 32.5 % (ref 34–46.6)
HGB BLD-MCNC: 11 G/DL (ref 12–15.9)
IMM GRANULOCYTES # BLD AUTO: 0 10*3/MM3 (ref 0–0.05)
IMM GRANULOCYTES NFR BLD AUTO: 0 % (ref 0–0.5)
LYMPHOCYTES # BLD AUTO: 1.16 10*3/MM3 (ref 0.7–3.1)
LYMPHOCYTES NFR BLD AUTO: 56.9 % (ref 19.6–45.3)
MCH RBC QN AUTO: 30.6 PG (ref 26.6–33)
MCHC RBC AUTO-ENTMCNC: 33.8 G/DL (ref 31.5–35.7)
MCV RBC AUTO: 90.5 FL (ref 79–97)
MONOCYTES # BLD AUTO: 0.08 10*3/MM3 (ref 0.1–0.9)
MONOCYTES NFR BLD AUTO: 3.9 % (ref 5–12)
NEUTROPHILS NFR BLD AUTO: 0.78 10*3/MM3 (ref 1.7–7)
NEUTROPHILS NFR BLD AUTO: 38.2 % (ref 42.7–76)
NRBC BLD AUTO-RTO: 0 /100 WBC (ref 0–0.2)
PLAT MORPH BLD: NORMAL
PLATELET # BLD AUTO: 86 10*3/MM3 (ref 140–450)
PMV BLD AUTO: 9.1 FL (ref 6–12)
POTASSIUM SERPL-SCNC: 4.6 MMOL/L (ref 3.5–5.2)
PROT SERPL-MCNC: 6.9 G/DL (ref 6–8.5)
RBC # BLD AUTO: 3.59 10*6/MM3 (ref 3.77–5.28)
RBC MORPH BLD: NORMAL
SODIUM SERPL-SCNC: 138 MMOL/L (ref 136–145)
WBC MORPH BLD: NORMAL
WBC NRBC COR # BLD: 2.04 10*3/MM3 (ref 3.4–10.8)

## 2022-05-20 PROCEDURE — 85025 COMPLETE CBC W/AUTO DIFF WBC: CPT | Performed by: INTERNAL MEDICINE

## 2022-05-20 PROCEDURE — 36415 COLL VENOUS BLD VENIPUNCTURE: CPT

## 2022-05-20 PROCEDURE — 96401 CHEMO ANTI-NEOPL SQ/IM: CPT

## 2022-05-20 PROCEDURE — 85007 BL SMEAR W/DIFF WBC COUNT: CPT | Performed by: INTERNAL MEDICINE

## 2022-05-20 PROCEDURE — 80053 COMPREHEN METABOLIC PANEL: CPT | Performed by: INTERNAL MEDICINE

## 2022-05-20 PROCEDURE — 99214 OFFICE O/P EST MOD 30 MIN: CPT

## 2022-05-20 PROCEDURE — 25010000002 FULVESTRANT PER 25 MG

## 2022-05-20 PROCEDURE — 96402 CHEMO HORMON ANTINEOPL SQ/IM: CPT

## 2022-05-20 RX ADMIN — FULVESTRANT 500 MG: 50 INJECTION INTRAMUSCULAR at 15:06

## 2022-05-20 NOTE — NURSING NOTE
Pt here for Faslodex injection labs reviewed with Nikki thompson ok to treat.     Injection  Faslodex Injection performed in r/L gluteal  by MATTHIEU ELKINS RN. Patient tolerated the procedure well without complications.  05/20/22   MATTHIEU ELKINS RN    Lab Results   Component Value Date    WBC 2.04 (L) 05/20/2022    HGB 11.0 (L) 05/20/2022    HCT 32.5 (L) 05/20/2022    MCV 90.5 05/20/2022    PLT 86 (L) 05/20/2022

## 2022-05-20 NOTE — PROGRESS NOTES
Nikki, since you saw her today, do you mind to call her and let her know that her creatinine is higher than it has been both today and on the last lab check, around 1.6.  Tell her this is higher than it has been in the past.  Please make sure she is drinking plenty of noncaffeinated beverages.  Please make sure she is avoiding NSAIDs.    Tell her I do not think Ibrance or Faslodex would be the cause of this.  To lower Zometa can worsen kidney function, but she had some normal creatinines after her last Zometa dose.  Therefore, I doubt Zometa caused this.  However, I do think we should switch Zometa to Xgeva.  Xgeva every 3 months from the last dose of Zometa which was 3/15/2022.  April, please make sure billing gets Xgeva approved.    Also, future CT scans  WITHOUT IV CONTRAST (again, to avoid further kidney damage.)    She did have normal creatinines after her last CT scan so I would not expect CT contrast dye to be the problem but again, minimize nephrotoxins.    Unless she is taking a lot of NSAIDs which she is going to stop, I think she should see a nephrologist.  Please arrange this.  Please try to get her into nephrology within the next 3 weeks.            Thanks!

## 2022-05-20 NOTE — PROGRESS NOTES
.     REASONS FOR FOLLOWUP: Metastatic breast cancer, tongue cancer    HISTORY OF PRESENT ILLNESS:     Marialuisa Vivar is a 66-year-old female with the above-mentioned history returning to the office today in follow-up.  She is currently receiving with Faslodex and Ibrance and 25 mg.  Overall, she is doing well.  However, she reports over the last week noticing some changes to her balance.  She states she initially believed that this was related to her recent change in blood pressure medication. However, he states she has had issues with her blood pressure being low before and thinks these issues are more related to her balance.  Unfortunately, earlier this week she had a fall at home due to losing her balance.  She reports experiencing similar episode last weekend where she was standing and started to walk forward but fell to the side and had to lean against the wall.  She also reports noticing recent taste changes.  This in turn has caused her appetite to be decreased.  She denies fevers or signs of infection.  She reports that she is leaving for vacation tomorrow (5/21/2022) and will not return until Memorial Day (5/30/2022).        Past Medical History:   Diagnosis Date   • Allergy    • Anxiety    • Asthma    • Bone metastasis (HCC)    • Breast cancer (HCC)     Left node positive   • Cholelithiasis 2000    Lap Marily   • Colon polyp Past 10-15 yrs?    found at colonoscopies   • Depression    • Esophageal reflux     cough   • History of colon polyps    • Hyperlipidemia    • Hypertension    • Left breast cancer metastasized to lung    • Obstructive sleep apnea    • Ovarian cyst    • PONV (postoperative nausea and vomiting)    • Tongue cancer (HCC) 05/2019    by ENT at [facility] dr Quinn     Past Surgical History:   Procedure Laterality Date   • CHOLECYSTECTOMY  2000   • COLONOSCOPY  2014    H/O polyps   • KNEE ARTHROPLASTY     • LUNG SURGERY  2010    Lung wedge resection   • MASTECTOMY RADICAL Left 1993   • TONGUE  SURGERY  05/21/2019    tongue cancer   • TOTAL LAPAROSCOPIC HYSTERECTOMY N/A 11/04/2021    Procedure: Robotic assisted total laparoscopic hysterectomy, bilateral salpingoophorecotmy, bilateral ureteralysis ;  Surgeon: Kaylynn Ricci DO;  Location: [facility];  Service: Robotics - [facility];  Laterality: N/A;       MEDICATIONS    Current Outpatient Medications:   •  atorvastatin (LIPITOR) 10 MG tablet, Take 1 tablet by mouth Every Night., Disp: 90 tablet, Rfl: 1  •  busPIRone (BUSPAR) 10 MG tablet, Take 10 mg by mouth 2 (Two) Times a Day., Disp: , Rfl:   •  cholecalciferol (VITAMIN D3) 1.25 MG (05827 UT) capsule, Take 5,000 Units by mouth 3 (Three) Times a Week., Disp: , Rfl:   •  eszopiclone (LUNESTA) tablet, Take 3 mg by mouth At Night As Needed., Disp: , Rfl:   •  HYDROcodone-acetaminophen (NORCO) 5-325 MG per tablet, Take 1 tablet by mouth Every 4 (Four) Hours As Needed for Moderate Pain ., Disp: 30 tablet, Rfl: 0  •  Ibuprofen (MOTRIN PO), Take  by mouth., Disp: , Rfl:   •  LORazepam (ATIVAN) 0.5 MG tablet, Take 1 tablet by mouth Every 8 (Eight) Hours As Needed., Disp: , Rfl:   •  metoprolol succinate XL (Toprol XL) 25 MG 24 hr tablet, Take 1 tablet by mouth Daily., Disp: 30 tablet, Rfl: 1  •  Omeprazole 20 MG Tablet Delayed Release Dispersible, Take 20 mg by mouth As Needed., Disp: , Rfl:   •  ondansetron (ZOFRAN) 8 MG tablet, Take 1 tablet by mouth 3 (Three) Times a Day As Needed for Nausea or Vomiting., Disp: 30 tablet, Rfl: 5  •  Palbociclib 125 MG tablet, Take 1 tablet by mouth Daily for 21 days. Then off for 7 days., Disp: 21 tablet, Rfl: 0  •  PARoxetine (PAXIL) 40 MG tablet, Take 40 mg by mouth Every Night., Disp: , Rfl:   •  Probiotic Product (PROBIOTIC-10 PO), Take  by mouth., Disp: , Rfl:   •  sennosides-docusate (PERICOLACE) 8.6-50 MG per tablet, Take 1 tablet by mouth Daily., Disp: , Rfl:   •  Zoledronic Acid (ZOMETA IV), Infuse  into a venous catheter. EVERY THREE MONTHS, Disp: , Rfl:   No  current facility-administered medications for this visit.    Facility-Administered Medications Ordered in Other Visits:   •  chlorhexidine (PERIDEX) 0.12 % solution 15 mL, 15 mL, Mouth/Throat, Q12H, Kaylynn Ricci DO  •  mupirocin (BACTROBAN) 2 % nasal ointment, , Nasal, BID, Kaylynn Ricci, DO    ALLERGIES:     Allergies   Allergen Reactions   • Nickel Unknown - Low Severity   • Ciprofloxacin Rash       SOCIAL HISTORY:       Social History     Socioeconomic History   • Marital status: Single   • Years of education: College   Tobacco Use   • Smoking status: Former Smoker     Packs/day: 2.00     Years: 30.00     Pack years: 60.00     Types: Cigarettes     Start date: 1973     Quit date: 2008     Years since quittin.9   • Smokeless tobacco: Never Used   Vaping Use   • Vaping Use: Never used   Substance and Sexual Activity   • Alcohol use: No   • Drug use: No   • Sexual activity: Not Currently     Birth control/protection: None         FAMILY HISTORY:  Family History   Problem Relation Age of Onset   • Hypertension Mother    • Leukemia Mother 72   • COPD Mother         smoker   • Diabetes Brother    • Pancreatic cancer Brother    • Glaucoma Brother    • Heart disease Brother    • Hypertension Brother    • Gallbladder disease Brother    • Pancreatitis Brother          from pancreatic csncer   • Gallbladder disease Father    • Colon polyps Father    • Stroke Other         Grandmother   • Lupus Other         Aunt   • Alcohol abuse Other         Aunt   • Glaucoma Other         Grandmother   • Diabetes type II Brother    • Hypertension Brother    • Hyperlipidemia Brother    • Liver cancer Paternal Uncle    • Stomach cancer Paternal Aunt    • Alcohol abuse Maternal Aunt    • Malig Hyperthermia Neg Hx        REVIEW OF SYSTEMS:  Review of Systems   Constitutional: Negative for activity change.   HENT: Negative for nosebleeds and trouble swallowing.    Respiratory: Negative for shortness of breath  "and wheezing.    Cardiovascular: Negative for chest pain and palpitations.   Gastrointestinal: Negative for constipation, diarrhea and nausea.   Genitourinary: Negative for dysuria and hematuria.   Musculoskeletal: Negative for arthralgias and myalgias.   Skin: Negative for rash and wound.   Neurological: Negative for seizures and syncope.   Hematological: Negative for adenopathy. Does not bruise/bleed easily.   Psychiatric/Behavioral: Negative for confusion.            Vitals:    05/20/22 1257   BP: 117/69   Pulse: 91   Resp: 16   Temp: 97.6 °F (36.4 °C)   TempSrc: Temporal   SpO2: 94%   Weight: 76.5 kg (168 lb 9.6 oz)   Height: 165.1 cm (65\")   PainSc: 0-No pain     Current Status 5/6/2022   ECOG score 0        PHYSICAL EXAM:    CONSTITUTIONAL:  Vital signs reviewed.  No distress, looks comfortable.  EYES:  Conjunctiva and lids unremarkable.  PERRLA  EARS,NOSE,MOUTH,THROAT:  Ears and nose appear unremarkable.  Lips, teeth, gums appear unremarkable.  RESPIRATORY:  Normal respiratory effort.  Lungs clear to auscultation bilaterally.  CARDIOVASCULAR:  Normal S1, S2.  No murmurs rubs or gallops.  No significant lower extremity edema.  GASTROINTESTINAL: Abdomen appears unremarkable.  Nontender.  No hepatomegaly.  No splenomegaly.  LYMPHATIC:  No cervical, supraclavicular, axillary lymphadenopathy.  SKIN:  Warm.  No rashes.  PSYCHIATRIC:  Normal judgment and insight.  Normal mood and affect.    I have reexamined the patient and the results are consistent with the previously documented exam. ARON Chung       RECENT LABS:        WBC   Date/Time Value Ref Range Status   05/20/2022 12:47 PM 2.04 (L) 3.40 - 10.80 10*3/mm3 Final     Hemoglobin   Date/Time Value Ref Range Status   05/20/2022 12:47 PM 11.0 (L) 12.0 - 15.9 g/dL Final     Platelets   Date/Time Value Ref Range Status   05/20/2022 12:47 PM 86 (L) 140 - 450 10*3/mm3 Final       Assessment & Plan   Adnexal mass  - CBC and Differential  - Comprehensive " metabolic panel    Carcinoma of left breast metastatic to lung (HCC)  - CBC and Differential  - Comprehensive metabolic panel  - MRI Brain With & Without Contrast  - Ambulatory Referral to Nephrology  - MRI Brain Without Contrast    Balance problems  - MRI Brain With & Without Contrast  - MRI Brain Without Contrast    Function kidney decreased  - Ambulatory Referral to Nephrology  - MRI Brain Without Contrast    Marialuisa Vivar      Assessment/Plan     *Metastatic breast cancer to bilateral lungs and soft tissue in the mediastinum (likely the cause of her hoarseness). (Initial breast cancer diagnosed in 1993 treated with mastectomy, chemotherapy, radiation therapy and tamoxifen). Arimidex day 1 on 02/17/2010. PET scan late August 2011 shows no abnormal hypermetabolic activity. This has improved compared to the prior PET scan January 2010 which showed mild hypermetabolic activity in areas of metastasis. (In the past, her  T6 lesion did not show up on CT scans or bone scan. It was seen by PET scan.) We have been following with CT scans only every 6 months and PET scans on an as needed only basis. Sometimes her CT scanned pulmonary nodules are read as benign since they have been stable through the course of years but we know they are malignant because they have been surgically sampled in the past.    · CT 8/16/16 shows increased sclerosis at T6 compared to 4/28/15.    · PET 10/11/16: Slight uptake at T6, SUV 3.2.  mild thickening of right adrenal gland with an SUV of 11.   · Continued Arimidex is minimal progression and had been on this since 2/17/10.  · Not referred for radiation since no pain at T6  · CT 1/27/17, unchanged.  · CT 8/1/17: Unchanged except subtle suggestion of mild increase in thickening of the right adrenal gland.    · CT 2/22/18, unchanged.  · CT 9/6/18: Unchanged except nodular thickening right adrenal gland significantly decreased.  · CT 9/5/2019: Unchanged.  · On the 6/12/2020 visit, the following  scans were reviewed:  · CT neck and chest 5/11/2020, [facility], from 5/9/2019: Stable pulmonary nodules new lytic C7 lesion and posterior T1 lesion questionable T12 lesion.  No change in the T6 and T10 lesions.  · PET, [facility], 5/19/2020: Mildly enlarged left neck nodes are mildly hypermetabolic.  Diffuse activity throughout the thickened left adrenal gland.  CT appearance unchanged from 5/9/2019.  Progressive T1 lesion seen on CT from 5/11/2020, mild some moderate activity.  T10 low-grade activity with mixed lucent and sclerotic findings.  T6 is a mixed lucent and sclerotic appearance but no significant activity.   · T12 (the biopsy report is labeled as T12 but patient insists this was a C7/T1 biopsy, not to T12) biopsy 6/15/2020, [facility]: Metastatic breast carcinoma.  ER >95%.  AK 0%.  HER-2 negative (1+)  · It seems the main progression at the 6/12/2020 visit was a new C7 and new T1 lesion.  Those were radiated with [facility] through [facility] early June 2020.  Because Arimidex has been working well since 2010, continue same Arimidex.  Add Zometa every 3 months.  · C7 and T1 found on CT 5/11/2020, [facility].  [facility], Dr. Winston, early June 2020.  · CT C and T-spine 9/17/2020: New C7 lesion from 9/6/2018, but no change from 5/11/2020 CT.  T1 lesion stable from 5/11/2020, but new compared to 2/22/2018.  T10 lesion unchanged from 9/5/2019, but new from 2/22/2018.  Subtle 8 mm T12 lesion new from 9/5/2019.  · Therefore, no recent progression.  · Considering her good tolerance and long-term effectiveness of Arimidex, continue same therapy.  · CT CAP 12/10/2020: No progression  · Therefore, continue Arimidex.  · CA 15-3 normal through 12/14/2020  · Because CA 15-3 fabian to 27 on 4/13/2021, then 29.3 on 7/20/2021, then 34.3 on 10/12/2021, CT scans done earlier than planned:  · CT CAP with contrast 10/12/2021: New 5.2 x 4.6 cm mixed cystic and solid right ovarian mass compared to 12/10/2020.  Continued stability of bilateral  pulmonary nodules and stable sclerotic bone metastasis.  · 11/4/2021: TLH/BSO (due to new 5 cm right ovarian mass on CT, 11/12/2021).  · Metastatic breast cancer involving bilateral ovaries and posterior uterine serosa.  ER 81-90%.  AR 61-70%.  HER-2 negative  · [facility] NGS: ER 98%.  AR 90%.  HER-2/eb negative.  AKT1 pathogenic variant, exon 3.  AR+, 95%.  No other significant mutations including negative PIK3CA  · Pelvic washings: Adenocarcinoma  · Because this was the only area of progression on CT, and has been resected, continue Arimidex which she has been on since 2/17/2010.  · 11/22/2021: Discussed with Dr. Blum, who has a focus on breast cancer.  We both agree: Continue Arimidex for now.  In the future, if significant progression is seen, then plan Faslodex injections with Ibrance  · CT CAP 2/1/2022: A few T-spine sclerotic lesions progressively more sclerotic over multiple prior CTs of indeterminate significance.  No clear signs of progression of disease.  · 3/15/2022: Although tumor marker is not rising and CT showed no clear signs of progression.  CT revealed progressively more sclerotic bone metastasis over multiple prior CTs, she is having increased right hip discomfort.  Sometimes this can be due to healing of metastasis (but she has been on the same treatment since 2010), or this could mean progression of bone metastasis.  It is reassuring that her tumor marker is not worsening.  She would like to see [facility] to see if they feel radiation to the hip would help her discomfort.  We will do a bone scan before that appointment as this might help [facility].  (Although she has seen [facility] radiation for her neck, she would like to see [facility] radiation for this hip issue).  · 3/18/2022, bone scan: Metastatic disease at T9 and T10, corresponding to CT lesions.  Uptake also at T6, but to lesser degree.  These areas are new compared to last bone scan, 6/3/2009.   Therefore, overall, appears consistent with progression.  · 3/24/2021: Discussed changing  Arimidex to Faslodex/Ibrance 125 mg D1-21/28 days.  · However, Dr. Llanes will decide on 4/12/2022 if the patient needs any traditional XRT to hip/pelvis.  If so, this may cause cytopenias due to the location of XRT.  Ibrance can also cause cytopenias.  If this is the case, we will wait to begin Ibrance until at least a few weeks after XRT completes, provided blood counts allow.   · 4/22/2022: Dr. Llanes reviewed MRI right hip from 4/19/2022, revealing L3 metastasis, similar size of prior CTs.  Therefore, she plans no XRT.  · 4/27/2022: Begin Faslodex Ibrance, when Ibrance becomes available to her  · 5/20/2022: Patient began her off week of Ibrance 5/19/2022.  She is due to restart on 5/26/2022 ANC today 0.78, platelets 86,000, WBC 2.04.  These lab results were discussed with Dr. Hawkins today.  Due to patient being out of town when she is due to begin her next cycle of Ibrance, we will withhold restarting her Ibrance until she is able to have a repeat CBC drawn.  Therefore, she will return on 5/31/2022 for lab review and NP visit.  Pending lab results, plan to restart Ibrance 125 mg.     CA 15-3:  · 26.3 on 2/1/2022, from 32.6 on 11/22/2021, from 34.3 on 10/12/2021, from 29.3 on 7/20/2021, from 27 on 4/13/2021, from 23.3 on 12/14/2020.    *Bony metastases  · T6 discovered by PET to August 2011 (did not show up by CT or bone scan)  · C7 and T1 found on CT 5/11/2020, [facility].  Pedro, Dr. Winston, early June 2020.  · June 2020 began Zometa every 3 months.  · She was warned of possible osteonecrosis of the jaw and renal insufficiency.  She states she has good dental health and sees her dentist regularly.  Because of prior hypercalcemia requiring stopping calcium supplements and because of high normal current calcium level, began without supplemental calcium.  Add calcium if calcium becomes low.  · 1/7/2022: Tooth extracted.   Plan was to wait 5 weeks to resume Zometa.  Patient delayed return until 3/15/2022 (over 8 weeks)  · 5/20/2022: Switched from Zometa to Xgeva every 3 months.  Plan to give Xgeva starting on 6/7/2022.     *Bone health. Last bone density 10/11/16, worsened, but still normal with worst T score -0.9.  Patient stopped calcium January 2016, but remained on vitamin D.  I recommended she restart her calcium.  Stopped calcium early March 2018 due to hypercalcemia.  · DEXA 9/5/2019: Normal bone density    *Hypercalcemia.  Repeat calcium 3/7/18 normal, but patient self stopped calcium a few days prior.  Recommended she stay off calcium.  · Calcium was 11 on 6/19/2020  · Calcium 11 again on 12/10/2020  · Repeat calcium 10.5 on 12/14/2020  · Calcium 10.6.  Minimally elevated.  (Normal range up to 10.5)  · Calcium 10.8 on 3/15/2022.  Hopefully, Zometa will help with this.  · Calcium 9.8 on 5/20/2020    *On the 10/5/16 visit, Tachycardia, dyspnea on exertion, bilateral leg swelling, more sedentary recently.  CT PE protocol and bilateral lower extremity Dopplers 10/5/16, negative for clots.  She still has tachycardia and dyspnea on exertion.  Perhaps at least part of this is due to deconditioning.    *Previously complained of colon Right lower anterior rib discomfort.  CT unremarkable in that area.  No specific pain, just discomfort.  I explained to the patient if disease was found by PET scan or bone scan, I would not  since she has been doing well on Arimidex since 2010.   Currently denies pain.     *Depression/anxiety.  This limits compliance.  She sees a psychiatrist and a counselor regularly.  She states this is mostly due to body image issues instead of cancer.   · She continues with her counselor and psychiatrist.  Viral pandemic is making this a little more difficult.  · 4/27/2022: We will try to get her involved in some cancer support groups.  I also encouraged her to try out some small groups at [employer]  (she struggles with loneliness).    *Noncompliance due to depression/anxiety.  Although she misses appointments, she does reschedule and eventually come in.    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 Willie Quinn, [facility], on 5/21/2019.  Positive sentinel node (1 of 3 nodes)..  · Left neck dissection by Dr. Willie Quinn, [facility], on 6/5/2019.  22 nodes negative.  Negative margins.  No lymphovascular invasion or perineural invasion.  2 mm depth of invasion.  Grade 1.  Squamous cell carcinoma.  1.8 cm tumor.  · Because the patient felt what she thought was left neck LAD, CT neck and chest and PET at [facility]. May 2020 performed.  She is being followed at [facility] for this.  · On 6/19/2020, per verbal report from Dr. Senia [place] [facility], [facility] ENT does not feel she has any active head and neck cancer.   · She states she has a follow-up with Dr. Kapadia, ENT at [facility], in June with CT neck 1 week prior  · No symptoms to suggest recurrence.  She has follow-up with [facility] in July    *Previously complained of left hip pain x2 weeks.  This prompted an MRI pelvis 8/20/2019 at [facility] and open MRI which was negative for malignancy.    *Possible intolerance of Zometa  · After first dose, June 2020, 2 days of chills, bone pain  · She would like to try Zometa again.  If this occurs again, we will try to switch her to Xgeva monthly.  (No good data on every 3-month Xgeva)  · She did fine with the second dose of Zometa.  · 5/20/2022: Continue Zometa due kidney function.    *Thickening of wall of transverse colon on CT 2/1/2022.  Last colonoscopy was 4/18/2019.  She wants the next colonoscopy done at [facility]  · Dr. Galeas will see her on 4/7/2022 to evaluate this    *Recent falls and changes to balance  · MRI of the brain ordered 5/21/2022    *Elevated creatinine  · Referral placed to nephrology 5/20/2022  · Future scans to be done without  contrast due to elevated creatinine    Monitoring on Ibrance:  CBC every 2 weeks for the first 2 months then monthly and as indicated.  After 6 months, if neutropenia grade 1 or 2 only, then CBC can be every 3 months    PLAN:   · Discontinue Zometa, switch to Xgeva.  Plan to continue Xgeva every 3 months with first dose being 3 months from 3/15/2022  · Return on 5/31/2022 for NP visit and lab review.  We will start cycle #2 of Ibrance on that day pending resolution of neutropenia as discussed with Dr. Hawkins.  · Referral to nephrology for elevated creatinine  · MRI of the brain the first week of June.  Patient will be out of town until 5/31/2022.   · Faslodex is initially every 2 weeks x 3 doses, then every 4 weeks  · MD or NP with CBC and CMP every 2 weeks x 4 appointments after beginning Faslodex Ibrance.    Usual plan is:  · MD, CA 15-3, CBC, CMP, Zometa 3 months.    · (Zometa every 3 months)  · MD Zometa 6 months.  1 week prior:  · 1 week prior: CT CAP with contrast, CBC, CMP, CA 15-3   · (we were checking annual CT.  However, with the solitary progression that has been resected, check CT more often for now)  · CMP today.  If calcium remains low, begin calcium supplement  · Continue Arimidex  · She sees [facility]NT, Dr. Bedoya annually, next is summer 2022    Also, future CT scans  WITHOUT IV CONTRAST (to avoid further kidney damage.)        Send a copy of this note to   Dr. Willie Quinn, Isaac Winston, radiation medicine, [facility]

## 2022-05-27 NOTE — PROGRESS NOTES
.     REASONS FOR FOLLOWUP: Metastatic breast cancer, tongue cancer    HISTORY OF PRESENT ILLNESS:    The patient returns today for follow up.  She continues to receive Ibrance 125 mg daily days 1 through 21 every 28 days along with Faslodex.  She was last seen 5/20/2022 with an ANC of 0.78.  She was due to start her next cycle of Ibrance on 5/26/2022, however was out of town until 5/30/2022.  We advised the patient not to resume Ibrance until we could recheck her counts to ensure they have recovered.      Today, she complains of feeling achy and experiencing taste changes while on Ibrance.  Achiness improved during her off time from Ibrance.  Despite taste changes, she is eating and drinking adequately, and her weight remains stable.  She has experienced intermittent constipation, relieved by senna S as needed.  She denies fever or chills.  She denies nausea or vomiting.  She denies new or worsening pain.  She denies any further falls.    While on vacation, she received a voicemail from nephrology to schedule an appointment.  She has not yet called them back, but plans on calling them after her appointment today to schedule consultation.    She has no new concerns today.    Past Medical History:   Diagnosis Date   • Allergy    • Anxiety    • Asthma    • Bone metastasis (HCC)    • Breast cancer (HCC)     Left node positive   • Cholelithiasis 2000    Lap Marily   • Colon polyp Past 10-15 yrs?    found at colonoscopies   • Depression    • Esophageal reflux     cough   • History of colon polyps    • Hyperlipidemia    • Hypertension    • Left breast cancer metastasized to lung    • Obstructive sleep apnea    • Ovarian cyst    • PONV (postoperative nausea and vomiting)    • Tongue cancer (HCC) 05/2019    by ENT at [facility] dr Quinn     Past Surgical History:   Procedure Laterality Date   • CHOLECYSTECTOMY  2000   • COLONOSCOPY  2014    H/O polyps   • KNEE ARTHROPLASTY     • LUNG SURGERY  2010    Lung wedge resection    • MASTECTOMY RADICAL Left 1993   • TONGUE SURGERY  05/21/2019    tongue cancer   • TOTAL LAPAROSCOPIC HYSTERECTOMY N/A 11/04/2021    Procedure: Robotic assisted total laparoscopic hysterectomy, bilateral salpingoophorecotmy, bilateral ureteralysis ;  Surgeon: Kaylynn Ricci DO;  Location: [facility];  Service: Robotics - [facility];  Laterality: N/A;       MEDICATIONS    Current Outpatient Medications:   •  atorvastatin (LIPITOR) 10 MG tablet, Take 1 tablet by mouth Every Night., Disp: 90 tablet, Rfl: 1  •  busPIRone (BUSPAR) 10 MG tablet, Take 10 mg by mouth 2 (Two) Times a Day., Disp: , Rfl:   •  cholecalciferol (VITAMIN D3) 1.25 MG (36889 UT) capsule, Take 5,000 Units by mouth 3 (Three) Times a Week., Disp: , Rfl:   •  eszopiclone (LUNESTA) tablet, Take 3 mg by mouth At Night As Needed., Disp: , Rfl:   •  HYDROcodone-acetaminophen (NORCO) 5-325 MG per tablet, Take 1 tablet by mouth Every 4 (Four) Hours As Needed for Moderate Pain ., Disp: 30 tablet, Rfl: 0  •  Ibuprofen (MOTRIN PO), Take  by mouth., Disp: , Rfl:   •  LORazepam (ATIVAN) 0.5 MG tablet, Take 1 tablet by mouth Every 8 (Eight) Hours As Needed., Disp: , Rfl:   •  metoprolol succinate XL (Toprol XL) 25 MG 24 hr tablet, Take 1 tablet by mouth Daily., Disp: 30 tablet, Rfl: 1  •  Omeprazole 20 MG Tablet Delayed Release Dispersible, Take 20 mg by mouth As Needed., Disp: , Rfl:   •  ondansetron (ZOFRAN) 8 MG tablet, Take 1 tablet by mouth 3 (Three) Times a Day As Needed for Nausea or Vomiting., Disp: 30 tablet, Rfl: 5  •  Palbociclib (Ibrance) 125 MG tablet, TAKE 1 TABLET BY MOUTH DAILY FOR 21 DAYS, THEN OFF FOR 7 DAYS, Disp: 21 tablet, Rfl: 1  •  PARoxetine (PAXIL) 40 MG tablet, Take 40 mg by mouth Every Night., Disp: , Rfl:   •  Probiotic Product (PROBIOTIC-10 PO), Take  by mouth., Disp: , Rfl:   •  sennosides-docusate (PERICOLACE) 8.6-50 MG per tablet, Take 1 tablet by mouth Daily., Disp: , Rfl:   •  Zoledronic Acid (ZOMETA IV), Infuse  into a  venous catheter. EVERY THREE MONTHS, Disp: , Rfl:   No current facility-administered medications for this visit.    Facility-Administered Medications Ordered in Other Visits:   •  chlorhexidine (PERIDEX) 0.12 % solution 15 mL, 15 mL, Mouth/Throat, Q12H, Kaylynn Ricci,   •  mupirocin (BACTROBAN) 2 % nasal ointment, , Nasal, BID, Kaylynn Ricci, DO    ALLERGIES:     Allergies   Allergen Reactions   • Nickel Unknown - Low Severity   • Ciprofloxacin Rash       SOCIAL HISTORY:       Social History     Socioeconomic History   • Marital status: Single   • Years of education: College   Tobacco Use   • Smoking status: Former Smoker     Packs/day: 2.00     Years: 30.00     Pack years: 60.00     Types: Cigarettes     Start date: 1973     Quit date: 2008     Years since quittin.0   • Smokeless tobacco: Never Used   Vaping Use   • Vaping Use: Never used   Substance and Sexual Activity   • Alcohol use: No   • Drug use: No   • Sexual activity: Not Currently     Birth control/protection: None         FAMILY HISTORY:  Family History   Problem Relation Age of Onset   • Hypertension Mother    • Leukemia Mother 72   • COPD Mother         smoker   • Diabetes Brother    • Pancreatic cancer Brother    • Glaucoma Brother    • Heart disease Brother    • Hypertension Brother    • Gallbladder disease Brother    • Pancreatitis Brother          from pancreatic csncer   • Gallbladder disease Father    • Colon polyps Father    • Stroke Other         Grandmother   • Lupus Other         Aunt   • Alcohol abuse Other         Aunt   • Glaucoma Other         Grandmother   • Diabetes type II Brother    • Hypertension Brother    • Hyperlipidemia Brother    • Liver cancer Paternal Uncle    • Stomach cancer Paternal Aunt    • Alcohol abuse Maternal Aunt    • Malig Hyperthermia Neg Hx      REVIEW OF SYSTEMS:  Review of Systems   Constitutional: Negative for activity change.   HENT: Negative for nosebleeds and trouble  "swallowing.    Respiratory: Negative for shortness of breath and wheezing.    Cardiovascular: Negative for chest pain and palpitations.   Gastrointestinal: Negative for constipation, diarrhea and nausea.   Genitourinary: Negative for dysuria and hematuria.   Musculoskeletal: Negative for arthralgias and myalgias.   Skin: Negative for rash and wound.   Neurological: Negative for seizures and syncope.   Hematological: Negative for adenopathy. Does not bruise/bleed easily.   Psychiatric/Behavioral: Negative for confusion.          Vitals:    05/31/22 1127   Resp: 18   Temp: 97.1 °F (36.2 °C)   TempSrc: Temporal   Weight: 77.1 kg (170 lb)   Height: 165.1 cm (65\")   PainSc:   2   PainLoc: Back     Current Status 5/31/2022   ECOG score 0      PHYSICAL EXAM:    CONSTITUTIONAL:  Vital signs reviewed.  No distress, looks comfortable.  EYES:  Conjunctiva and lids unremarkable.  PERRLA  EARS,NOSE,MOUTH,THROAT:  Ears and nose appear unremarkable.  Lips, teeth, gums appear unremarkable.  RESPIRATORY:  Normal respiratory effort.  Lungs clear to auscultation bilaterally.  CARDIOVASCULAR:  Normal S1, S2.  No murmurs rubs or gallops.  No significant lower extremity edema.  GASTROINTESTINAL: Abdomen appears unremarkable.  Nontender.  No hepatomegaly.  No splenomegaly.  LYMPHATIC:  No cervical, supraclavicular, axillary lymphadenopathy.  SKIN:  Warm.  No rashes.  PSYCHIATRIC:  Normal judgment and insight.  Normal mood and affect.    I have reexamined the patient and the results are consistent with the previously documented exam. ARON Ruth       RECENT LABS:        WBC   Date/Time Value Ref Range Status   05/31/2022 11:09 AM 3.13 (L) 3.40 - 10.80 10*3/mm3 Final     Hemoglobin   Date/Time Value Ref Range Status   05/31/2022 11:09 AM 11.2 (L) 12.0 - 15.9 g/dL Final     Platelets   Date/Time Value Ref Range Status   05/31/2022 11:09  140 - 450 10*3/mm3 Final       Assessment & Plan   There are no diagnoses linked to this " encounter.  Marialuisa Vivar      Assessment/Plan     *Metastatic breast cancer to bilateral lungs and soft tissue in the mediastinum (likely the cause of her hoarseness). (Initial breast cancer diagnosed in 1993 treated with mastectomy, chemotherapy, radiation therapy and tamoxifen). Arimidex day 1 on 02/17/2010. PET scan late August 2011 shows no abnormal hypermetabolic activity. This has improved compared to the prior PET scan January 2010 which showed mild hypermetabolic activity in areas of metastasis. (In the past, her  T6 lesion did not show up on CT scans or bone scan. It was seen by PET scan.) We have been following with CT scans only every 6 months and PET scans on an as needed only basis. Sometimes her CT scanned pulmonary nodules are read as benign since they have been stable through the course of years but we know they are malignant because they have been surgically sampled in the past.    · CT 8/16/16 shows increased sclerosis at T6 compared to 4/28/15.    · PET 10/11/16: Slight uptake at T6, SUV 3.2.  mild thickening of right adrenal gland with an SUV of 11.   · Continued Arimidex is minimal progression and had been on this since 2/17/10.  · Not referred for radiation since no pain at T6  · CT 1/27/17, unchanged.  · CT 8/1/17: Unchanged except subtle suggestion of mild increase in thickening of the right adrenal gland.    · CT 2/22/18, unchanged.  · CT 9/6/18: Unchanged except nodular thickening right adrenal gland significantly decreased.  · CT 9/5/2019: Unchanged.  · On the 6/12/2020 visit, the following scans were reviewed:  · CT neck and chest 5/11/2020, [facility], from 5/9/2019: Stable pulmonary nodules new lytic C7 lesion and posterior T1 lesion questionable T12 lesion.  No change in the T6 and T10 lesions.  · PET, [facility], 5/19/2020: Mildly enlarged left neck nodes are mildly hypermetabolic.  Diffuse activity throughout the thickened left adrenal gland.  CT appearance unchanged from 5/9/2019.   Progressive T1 lesion seen on CT from 5/11/2020, mild some moderate activity.  T10 low-grade activity with mixed lucent and sclerotic findings.  T6 is a mixed lucent and sclerotic appearance but no significant activity.   · T12 (the biopsy report is labeled as T12 but patient insists this was a C7/T1 biopsy, not to T12) biopsy 6/15/2020, [facility]: Metastatic breast carcinoma.  ER >95%.  ND 0%.  HER-2 negative (1+)  · It seems the main progression at the 6/12/2020 visit was a new C7 and new T1 lesion.  Those were radiated with [facility] through [facility] early June 2020.  Because Arimidex has been working well since 2010, continue same Arimidex.  Add Zometa every 3 months.  · C7 and T1 found on CT 5/11/2020, [facility].  CyberKnife, Dr. Winston, early June 2020.  · CT C and T-spine 9/17/2020: New C7 lesion from 9/6/2018, but no change from 5/11/2020 CT.  T1 lesion stable from 5/11/2020, but new compared to 2/22/2018.  T10 lesion unchanged from 9/5/2019, but new from 2/22/2018.  Subtle 8 mm T12 lesion new from 9/5/2019.  · Therefore, no recent progression.  · Considering her good tolerance and long-term effectiveness of Arimidex, continue same therapy.  · CT CAP 12/10/2020: No progression  · Therefore, continue Arimidex.  · CA 15-3 normal through 12/14/2020  · Because CA 15-3 fabian to 27 on 4/13/2021, then 29.3 on 7/20/2021, then 34.3 on 10/12/2021, CT scans done earlier than planned:  · CT CAP with contrast 10/12/2021: New 5.2 x 4.6 cm mixed cystic and solid right ovarian mass compared to 12/10/2020.  Continued stability of bilateral pulmonary nodules and stable sclerotic bone metastasis.  · 11/4/2021: TLH/BSO (due to new 5 cm right ovarian mass on CT, 11/12/2021).  · Metastatic breast cancer involving bilateral ovaries and posterior uterine serosa.  ER 81-90%.  ND 61-70%.  HER-2 negative  · [facility] NGS: ER 98%.  ND 90%.  HER-2/eb negative.  AKT1 pathogenic variant, exon 3.  AR+, 95%.  No other significant mutations including  negative PIK3CA  · Pelvic washings: Adenocarcinoma  · Because this was the only area of progression on CT, and has been resected, continue Arimidex which she has been on since 2/17/2010.  · 11/22/2021: Discussed with Dr. Blum, who has a focus on breast cancer.  We both agree: Continue Arimidex for now.  In the future, if significant progression is seen, then plan Faslodex injections with Ibrance  · CT CAP 2/1/2022: A few T-spine sclerotic lesions progressively more sclerotic over multiple prior CTs of indeterminate significance.  No clear signs of progression of disease.  · 3/15/2022: Although tumor marker is not rising and CT showed no clear signs of progression.  CT revealed progressively more sclerotic bone metastasis over multiple prior CTs, she is having increased right hip discomfort.  Sometimes this can be due to healing of metastasis (but she has been on the same treatment since 2010), or this could mean progression of bone metastasis.  It is reassuring that her tumor marker is not worsening.  She would like to see [facility] medicine to see if they feel radiation to the hip would help her discomfort.  We will do a bone scan before that appointment as this might help [facility] radiation.  (Although she has seen [facility] radiation for her neck, she would like to see [facility] radiation for this hip issue).  · 3/18/2022, bone scan: Metastatic disease at T9 and T10, corresponding to CT lesions.  Uptake also at T6, but to lesser degree.  These areas are new compared to last bone scan, 6/3/2009.  Therefore, overall, appears consistent with progression.  · 3/24/2021: Discussed changing  Arimidex to Faslodex/Ibrance 125 mg D1-21/28 days.  · However, Dr. Llanes will decide on 4/12/2022 if the patient needs any traditional XRT to hip/pelvis.  If so, this may cause cytopenias due to the location of XRT.  Ibrance can also cause cytopenias.  If this is the case, we will wait to begin Ibrance until  at least a few weeks after XRT completes, provided blood counts allow.   · 4/22/2022: Dr. Llanes reviewed MRI right hip from 4/19/2022, revealing L3 metastasis, similar size of prior CTs.  Therefore, she plans no XRT.  · 4/27/2022: Begin Faslodex Ibrance, when Ibrance becomes available to her  · 5/20/2022: Patient began her off week of Ibrance 5/19/2022.  She is due to restart on 5/26/2022 ANC today 0.78, platelets 86,000, WBC 2.04.  These lab results were discussed with Dr. Hawkins today.  Due to patient being out of town when she is due to begin her next cycle of Ibrance, we will withhold restarting her Ibrance until she is able to have a repeat CBC drawn.  Therefore, she will return on 5/31/2022 for lab review and NP visit.  Pending lab results, plan to restart Ibrance 125 mg.   · 5/31/2022 WBC 3.31, ANC 1.13.  Patient reports she is scheduled for colonoscopy tomorrow.  We will go ahead and have her resume Ibrance at 125 mg daily days 1-14 every 28 days, to resume today.  This was all discussed with Dr. Hawkins who is in agreement with the plan.    CA 15-3:  · 26.3 on 2/1/2022, from 32.6 on 11/22/2021, from 34.3 on 10/12/2021, from 29.3 on 7/20/2021, from 27 on 4/13/2021, from 23.3 on 12/14/2020.    *Bony metastases  · T6 discovered by PET to August 2011 (did not show up by CT or bone scan)  · C7 and T1 found on CT 5/11/2020, [facility].  CyberKnife, Dr. Winston, early June 2020.  · June 2020 began Zometa every 3 months.  · She was warned of possible osteonecrosis of the jaw and renal insufficiency.  She states she has good dental health and sees her dentist regularly.  Because of prior hypercalcemia requiring stopping calcium supplements and because of high normal current calcium level, began without supplemental calcium.  Add calcium if calcium becomes low.  · 1/7/2022: Tooth extracted.  Plan was to wait 5 weeks to resume Zometa.  Patient delayed return until 3/15/2022 (over 8 weeks)  · 5/20/2022: Switched from Zometa to  Xgeva every 3 months.  Plan to give Xgeva starting on 6/7/2022.     *Bone health. Last bone density 10/11/16, worsened, but still normal with worst T score -0.9.  Patient stopped calcium January 2016, but remained on vitamin D.  I recommended she restart her calcium.  Stopped calcium early March 2018 due to hypercalcemia.  · DEXA 9/5/2019: Normal bone density    *Hypercalcemia.  Repeat calcium 3/7/18 normal, but patient self stopped calcium a few days prior.  Recommended she stay off calcium.  · Calcium was 11 on 6/19/2020  · Calcium 11 again on 12/10/2020  · Repeat calcium 10.5 on 12/14/2020  · Calcium 10.6.  Minimally elevated.  (Normal range up to 10.5)  · Calcium 10.8 on 3/15/2022.  Hopefully, Zometa will help with this.  · Calcium 9.8 on 5/20/2020  · Calcium today 10.0.    *On the 10/5/16 visit, Tachycardia, dyspnea on exertion, bilateral leg swelling, more sedentary recently.  CT PE protocol and bilateral lower extremity Dopplers 10/5/16, negative for clots.  She still has tachycardia and dyspnea on exertion.  Perhaps at least part of this is due to deconditioning.    *Previously complained of colon Right lower anterior rib discomfort.  CT unremarkable in that area.  No specific pain, just discomfort.  I explained to the patient if disease was found by PET scan or bone scan, I would not  since she has been doing well on Arimidex since 2010.   Currently denies pain.     *Depression/anxiety.  This limits compliance.  She sees a psychiatrist and a counselor regularly.  She states this is mostly due to body image issues instead of cancer.   · She continues with her counselor and psychiatrist.  Viral pandemic is making this a little more difficult.  · 4/27/2022: We will try to get her involved in some cancer support groups.  Encouraged her to try out some small groups at [employer] (she struggles with loneliness).    *Noncompliance due to depression/anxiety.  Although she misses appointments, she does  reschedule and eventually come in.    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  Positive sentinel node (1 of 3 nodes)..  · [facility]  22 nodes negative.  Negative margins.  No lymphovascular invasion or perineural invasion.  2 mm depth of invasion.  Grade 1.  Squamous cell carcinoma.  1.8 cm tumor.  · Because the patient felt what she thought was left neck LAD, CT neck and chest and PET at [facility]. May 2020 performed.  She is being followed at [facility] for this.  · On 6/19/2020, per verbal report from Dr. Senia COLORADO [facility], [facility] ENT does not feel she has any active head and neck cancer.   · She states she has a follow-up with Dr. Kapadia, ENT at [facility], in June with CT neck 1 week prior  · No symptoms to suggest recurrence.  She has follow-up with [facility] in July    *Previously complained of left hip pain x2 weeks.  This prompted an MRI pelvis 8/20/2019 at [facility] and open MRI which was negative for malignancy.    *Possible intolerance of Zometa  · After first dose, June 2020, 2 days of chills, bone pain  · She would like to try Zometa again.  If this occurs again, we will try to switch her to Xgeva monthly.  (No good data on every 3-month Xgeva)  · She did fine with the second dose of Zometa.  · 5/20/2022: Continue Zometa due kidney function.    *Thickening of wall of transverse colon on CT 2/1/2022.  Last colonoscopy was 4/18/2019.  She wants the next colonoscopy done at [facility], [place]  · Dr. Galeas will see her on 4/7/2022 to evaluate this    *Recent falls and changes to balance  · MRI of the brain ordered 5/21/2022    *Elevated creatinine  · Referral placed to nephrology 5/20/2022  · Future scans to be done without contrast due to elevated creatinine  · Patient switched from Zometa to Xgeva due to creatinine.  · Referral placed to  nephrology.  Patient reports while on vacation she received a voicemail asking her to call back to schedule appointment.  She plans on calling them back today.  · Creatinine today improved to 1.10.    Monitoring on Ibrance:  CBC every 2 weeks for the first 2 months then monthly and as indicated.  After 6 months, if neutropenia grade 1 or 2 only, then CBC can be every 3 months    PLAN:   · Resume Ibrance.  Patient is starting cycle #2 Ibrance 125 mg daily days 1-21 every 28 days.  · Discontinue Zometa, switch to Xgeva.  Plan to continue Xgeva every 3 months with first dose being 3 months from 3/15/2022.  Plan first dose of Xgeva on 6/7/2022.  · Referral to nephrology for elevated creatinine, patient awaiting appointment.  · MRI of the brain the first week of June.  Patient will be out of town until 5/31/2022.   · Faslodex is initially every 2 weeks x 3 doses, then every 4 weeks  · MD or NP with CBC and CMP every 2 weeks x 4 appointments after beginning Faslodex Ibrance.    Usual plan is:  · MD, CA 15-3, CBC, CMP, Zometa 3 months.    · (Zometa every 3 months)  · MD Zometa 6 months.  1 week prior:  · 1 week prior: CT CAP with contrast, CBC, CMP, CA 15-3   · (we were checking annual CT.  However, with the solitary progression that has been resected, check CT more often for now)  · CMP today.  If calcium remains low, begin calcium supplement  · Continue Arimidex  · She sees [facility], Dr. Bedoya annually, next is summer 2022    Also, future CT scans  WITHOUT IV CONTRAST (to avoid further kidney damage.)    The patient is on high risk medication that requires close monitoring for toxicity.  The patient was discussed with Dr. Hawkins who is in agreement with the above-mentioned plan.    ARON Ruth  05/31/22      Send a copy of this note to   Isaac Valdes MD, Dr., radiation medicine, [facility]

## 2022-05-31 ENCOUNTER — OFFICE VISIT (OUTPATIENT)
Dept: ONCOLOGY | Facility: CLINIC | Age: 67
End: 2022-05-31

## 2022-05-31 ENCOUNTER — LAB (OUTPATIENT)
Dept: OTHER | Facility: HOSPITAL | Age: 67
End: 2022-05-31

## 2022-05-31 VITALS — HEIGHT: 65 IN | TEMPERATURE: 97.1 F | WEIGHT: 170 LBS | BODY MASS INDEX: 28.32 KG/M2 | RESPIRATION RATE: 18 BRPM

## 2022-05-31 DIAGNOSIS — N94.89 ADNEXAL MASS: ICD-10-CM

## 2022-05-31 DIAGNOSIS — C78.02 CARCINOMA OF LEFT BREAST METASTATIC TO LUNG: ICD-10-CM

## 2022-05-31 DIAGNOSIS — C79.51 BONE METASTASES: ICD-10-CM

## 2022-05-31 DIAGNOSIS — C50.912 CARCINOMA OF LEFT BREAST METASTATIC TO LUNG: Primary | ICD-10-CM

## 2022-05-31 DIAGNOSIS — Z79.899 HIGH RISK MEDICATION USE: ICD-10-CM

## 2022-05-31 DIAGNOSIS — C78.02 CARCINOMA OF LEFT BREAST METASTATIC TO LUNG: Primary | ICD-10-CM

## 2022-05-31 DIAGNOSIS — C50.912 CARCINOMA OF LEFT BREAST METASTATIC TO LUNG: ICD-10-CM

## 2022-05-31 LAB
ALBUMIN SERPL-MCNC: 4 G/DL (ref 3.5–5.2)
ALBUMIN/GLOB SERPL: 1.5 G/DL
ALP SERPL-CCNC: 92 U/L (ref 39–117)
ALT SERPL W P-5'-P-CCNC: 11 U/L (ref 1–33)
ANION GAP SERPL CALCULATED.3IONS-SCNC: 6.8 MMOL/L (ref 5–15)
AST SERPL-CCNC: 16 U/L (ref 1–32)
BASOPHILS # BLD AUTO: 0.08 10*3/MM3 (ref 0–0.2)
BASOPHILS NFR BLD AUTO: 2.6 % (ref 0–1.5)
BILIRUB SERPL-MCNC: 0.3 MG/DL (ref 0–1.2)
BUN SERPL-MCNC: 19 MG/DL (ref 8–23)
BUN/CREAT SERPL: 17.3 (ref 7–25)
CALCIUM SPEC-SCNC: 10 MG/DL (ref 8.6–10.5)
CHLORIDE SERPL-SCNC: 106 MMOL/L (ref 98–107)
CO2 SERPL-SCNC: 26.2 MMOL/L (ref 22–29)
CREAT SERPL-MCNC: 1.1 MG/DL (ref 0.57–1)
DEPRECATED RDW RBC AUTO: 40.8 FL (ref 37–54)
EGFRCR SERPLBLD CKD-EPI 2021: 55.2 ML/MIN/1.73
EOSINOPHIL # BLD AUTO: 0.02 10*3/MM3 (ref 0–0.4)
EOSINOPHIL NFR BLD AUTO: 0.6 % (ref 0.3–6.2)
ERYTHROCYTE [DISTWIDTH] IN BLOOD BY AUTOMATED COUNT: 14.6 % (ref 12.3–15.4)
GLOBULIN UR ELPH-MCNC: 2.6 GM/DL
GLUCOSE SERPL-MCNC: 132 MG/DL (ref 65–99)
HCT VFR BLD AUTO: 33.8 % (ref 34–46.6)
HGB BLD-MCNC: 11.2 G/DL (ref 12–15.9)
IMM GRANULOCYTES # BLD AUTO: 0.01 10*3/MM3 (ref 0–0.05)
IMM GRANULOCYTES NFR BLD AUTO: 0.3 % (ref 0–0.5)
LYMPHOCYTES # BLD AUTO: 1.48 10*3/MM3 (ref 0.7–3.1)
LYMPHOCYTES NFR BLD AUTO: 47.3 % (ref 19.6–45.3)
MCH RBC QN AUTO: 30.8 PG (ref 26.6–33)
MCHC RBC AUTO-ENTMCNC: 33.1 G/DL (ref 31.5–35.7)
MCV RBC AUTO: 92.9 FL (ref 79–97)
MONOCYTES # BLD AUTO: 0.41 10*3/MM3 (ref 0.1–0.9)
MONOCYTES NFR BLD AUTO: 13.1 % (ref 5–12)
NEUTROPHILS NFR BLD AUTO: 1.13 10*3/MM3 (ref 1.7–7)
NEUTROPHILS NFR BLD AUTO: 36.1 % (ref 42.7–76)
NRBC BLD AUTO-RTO: 0 /100 WBC (ref 0–0.2)
PLAT MORPH BLD: NORMAL
PLATELET # BLD AUTO: 297 10*3/MM3 (ref 140–450)
PMV BLD AUTO: 8.8 FL (ref 6–12)
POTASSIUM SERPL-SCNC: 4.1 MMOL/L (ref 3.5–5.2)
PROT SERPL-MCNC: 6.6 G/DL (ref 6–8.5)
RBC # BLD AUTO: 3.64 10*6/MM3 (ref 3.77–5.28)
RBC MORPH BLD: NORMAL
SODIUM SERPL-SCNC: 139 MMOL/L (ref 136–145)
WBC MORPH BLD: NORMAL
WBC NRBC COR # BLD: 3.13 10*3/MM3 (ref 3.4–10.8)

## 2022-05-31 PROCEDURE — 80053 COMPREHEN METABOLIC PANEL: CPT

## 2022-05-31 PROCEDURE — 85025 COMPLETE CBC W/AUTO DIFF WBC: CPT

## 2022-05-31 PROCEDURE — 99214 OFFICE O/P EST MOD 30 MIN: CPT | Performed by: NURSE PRACTITIONER

## 2022-05-31 PROCEDURE — 85007 BL SMEAR W/DIFF WBC COUNT: CPT

## 2022-05-31 PROCEDURE — 36415 COLL VENOUS BLD VENIPUNCTURE: CPT

## 2022-06-01 ENCOUNTER — SPECIALTY PHARMACY (OUTPATIENT)
Dept: PHARMACY | Facility: HOSPITAL | Age: 67
End: 2022-06-01

## 2022-06-01 ENCOUNTER — HOSPITAL ENCOUNTER (OUTPATIENT)
Facility: HOSPITAL | Age: 67
Setting detail: HOSPITAL OUTPATIENT SURGERY
Discharge: HOME | End: 2022-06-01
Attending: INTERNAL MEDICINE | Admitting: INTERNAL MEDICINE

## 2022-06-01 ENCOUNTER — ANESTHESIA EVENT (OUTPATIENT)
Dept: GASTROENTEROLOGY | Facility: HOSPITAL | Age: 67
End: 2022-06-01

## 2022-06-01 ENCOUNTER — ANESTHESIA (OUTPATIENT)
Dept: GASTROENTEROLOGY | Facility: HOSPITAL | Age: 67
End: 2022-06-01

## 2022-06-01 VITALS
BODY MASS INDEX: 27.82 KG/M2 | RESPIRATION RATE: 16 BRPM | HEART RATE: 68 BPM | SYSTOLIC BLOOD PRESSURE: 110 MMHG | HEIGHT: 65 IN | OXYGEN SATURATION: 100 % | DIASTOLIC BLOOD PRESSURE: 52 MMHG | WEIGHT: 167 LBS

## 2022-06-01 DIAGNOSIS — R93.3 ABNORMAL CT SCAN, COLON: ICD-10-CM

## 2022-06-01 PROCEDURE — 45385 COLONOSCOPY W/LESION REMOVAL: CPT | Performed by: INTERNAL MEDICINE

## 2022-06-01 PROCEDURE — 88305 TISSUE EXAM BY PATHOLOGIST: CPT | Performed by: INTERNAL MEDICINE

## 2022-06-01 PROCEDURE — 25010000002 PHENYLEPHRINE 10 MG/ML SOLUTION: Performed by: ANESTHESIOLOGY

## 2022-06-01 PROCEDURE — S0260 H&P FOR SURGERY: HCPCS | Performed by: INTERNAL MEDICINE

## 2022-06-01 PROCEDURE — 25010000002 PROPOFOL 10 MG/ML EMULSION: Performed by: ANESTHESIOLOGY

## 2022-06-01 RX ORDER — LIDOCAINE HYDROCHLORIDE 10 MG/ML
0.5 INJECTION, SOLUTION INFILTRATION; PERINEURAL ONCE AS NEEDED
Status: DISCONTINUED | OUTPATIENT
Start: 2022-06-01 | End: 2022-06-01 | Stop reason: HOSPADM

## 2022-06-01 RX ORDER — SODIUM CHLORIDE, SODIUM LACTATE, POTASSIUM CHLORIDE, CALCIUM CHLORIDE 600; 310; 30; 20 MG/100ML; MG/100ML; MG/100ML; MG/100ML
1000 INJECTION, SOLUTION INTRAVENOUS CONTINUOUS
Status: DISCONTINUED | OUTPATIENT
Start: 2022-06-01 | End: 2022-06-01 | Stop reason: HOSPADM

## 2022-06-01 RX ORDER — LIDOCAINE HYDROCHLORIDE 20 MG/ML
INJECTION, SOLUTION INFILTRATION; PERINEURAL AS NEEDED
Status: DISCONTINUED | OUTPATIENT
Start: 2022-06-01 | End: 2022-06-01 | Stop reason: SURG

## 2022-06-01 RX ORDER — PHENYLEPHRINE HYDROCHLORIDE 10 MG/ML
INJECTION INTRAVENOUS AS NEEDED
Status: DISCONTINUED | OUTPATIENT
Start: 2022-06-01 | End: 2022-06-01 | Stop reason: SURG

## 2022-06-01 RX ORDER — SODIUM CHLORIDE 0.9 % (FLUSH) 0.9 %
10 SYRINGE (ML) INJECTION AS NEEDED
Status: DISCONTINUED | OUTPATIENT
Start: 2022-06-01 | End: 2022-06-01 | Stop reason: HOSPADM

## 2022-06-01 RX ORDER — PROPOFOL 10 MG/ML
VIAL (ML) INTRAVENOUS AS NEEDED
Status: DISCONTINUED | OUTPATIENT
Start: 2022-06-01 | End: 2022-06-01 | Stop reason: SURG

## 2022-06-01 RX ADMIN — SODIUM CHLORIDE, POTASSIUM CHLORIDE, SODIUM LACTATE AND CALCIUM CHLORIDE 1000 ML: 600; 310; 30; 20 INJECTION, SOLUTION INTRAVENOUS at 11:46

## 2022-06-01 RX ADMIN — PROPOFOL 100 MCG/KG/MIN: 10 INJECTION, EMULSION INTRAVENOUS at 12:07

## 2022-06-01 RX ADMIN — LIDOCAINE HYDROCHLORIDE 60 MG: 20 INJECTION, SOLUTION INFILTRATION; PERINEURAL at 12:04

## 2022-06-01 RX ADMIN — PHENYLEPHRINE HYDROCHLORIDE 100 MCG: 10 INJECTION, SOLUTION INTRAVENOUS at 12:19

## 2022-06-01 RX ADMIN — PROPOFOL 70 MG: 10 INJECTION, EMULSION INTRAVENOUS at 12:04

## 2022-06-01 RX ADMIN — PHENYLEPHRINE HYDROCHLORIDE 100 MCG: 10 INJECTION, SOLUTION INTRAVENOUS at 12:24

## 2022-06-01 NOTE — BRIEF OP NOTE
COLONOSCOPY  Progress Note    Marialuisa Vivar  6/1/2022    Pre-op Diagnosis:   Abnormal CT scan, colon [R93.3]       Post-Op Diagnosis Codes:     * Abnormal CT scan, colon [R93.3]     * Colon polyps [K63.5]     * Diverticulosis [K57.90]     * Internal hemorrhoids [K64.8]    Procedure/CPT® Codes:        Procedure(s):  COLONOSCOPY to cecum and TI with cold / hot snare polypectomies    Surgeon(s):  Luis Enrique Galeas MD    Anesthesia: Monitored Anesthesia Care    Staff:   Endo Technician: Mari Adams  Endo Nurse: Mallory Borja RN         Estimated Blood Loss: minimal    Urine Voided: * No values recorded between 6/1/2022 12:00 PM and 6/1/2022 12:27 PM *    Specimens:                Specimens     ID Source Type Tests Collected By Collected At Frozen?    A Large Intestine, Cecum Polyp · TISSUE PATHOLOGY EXAM   Luis Enrique Galeas MD 6/1/22 1215     Description: polyp bx     Comment: Hot snare    B Large Intestine, Transverse Colon Polyp · TISSUE PATHOLOGY EXAM   Luis Enrique Galeas MD 6/1/22 1217     Description: polyp bx     Comment: Hot snare                Drains: * No LDAs found *    Findings: Colonoscopy the terminal ileum with normal ileum mucosa.  Colon polyps in the cecum and transverse colon removed hot snare and cold snare.  Sigmoid diverticulosis and internal hemorrhoids noted.        Complications: None          Luis Enrique Galeas MD     Date: 6/1/2022  Time: 12:28 EDT

## 2022-06-01 NOTE — DISCHARGE INSTRUCTIONS
For the next 24 hours patient needs to be with a responsible adult.    For 24 hours DO NOT drive, operate machinery, appliances, drink alcohol, make important decisions or sign legal documents.    Start with a light or bland diet and advance to regular diet as tolerated.    Follow recommendations on procedure report provided by your doctor.    Call Dr Galeas for problems [phone]    Problems may include but not limited to: large amounts of bleeding, trouble breathing, repeated vomiting, severe unrelieved pain, fever or chills.

## 2022-06-01 NOTE — ANESTHESIA POSTPROCEDURE EVALUATION
Patient: Marialuisa Vivar    Procedure Summary     Date: 06/01/22 Room / Location:  [facility]    Anesthesia Start: 1159 Anesthesia Stop: 1233    Procedure: COLONOSCOPY to cecum and TI with cold / hot snare polypectomies (N/A ) Diagnosis:       Abnormal CT scan, colon      Colon polyps      Diverticulosis      Internal hemorrhoids      (Abnormal CT scan, colon [R93.3])    Surgeons: Luis Enrique Galeas MD Provider: Kelsey Lane MD    Anesthesia Type: MAC ASA Status: 3          Anesthesia Type: MAC    Vitals  Vitals Value Taken Time   /63 06/01/22 1228   Temp     Pulse 73 06/01/22 1228   Resp 16 06/01/22 1228   SpO2 99 % 06/01/22 1228           Anesthesia Post Evaluation

## 2022-06-01 NOTE — ANESTHESIA PREPROCEDURE EVALUATION
Anesthesia Evaluation     history of anesthetic complications: PONV               Airway   Mallampati: III  TM distance: >3 FB  Neck ROM: full  No difficulty expected  Dental          Pulmonary - normal exam   (+) lung cancer, asthma,sleep apnea,   Cardiovascular - normal exam    Patient on routine beta blocker and Beta blocker given within 24 hours of surgery    (+) hypertension, hyperlipidemia,       Neuro/Psych  (+) psychiatric history,    GI/Hepatic/Renal/Endo    (+) obesity, morbid obesity, GERD,      Musculoskeletal     Abdominal    Substance History      OB/GYN          Other      history of cancer                    Anesthesia Plan    ASA 3     MAC     intravenous induction     Anesthetic plan, all risks, benefits, and alternatives have been provided, discussed and informed consent has been obtained with: patient.        CODE STATUS:

## 2022-06-01 NOTE — H&P
[facility]  Pre Procedure History & Physical    Chief Complaint:   Abnormal CT of the colon    Subjective     HPI:   Patient 66-year-old female with history of breast cancer, hypertension hyperlipidemia with history of chronic constipation reportedly with abnormality of the transverse colon with hyperenhancement and possible thickening of the colon wall referred for evaluation.  Patient with no other GI complaints.    Past Medical History:   Past Medical History:   Diagnosis Date   • Allergy    • Anxiety    • Asthma    • Bone metastasis (HCC)    • Breast cancer (HCC)     Left node positive   • Cholelithiasis 2000    Lap Marily   • Colon polyp Past 10-15 yrs?    found at colonoscopies   • Depression    • Esophageal reflux     cough   • History of colon polyps    • Hyperlipidemia    • Hypertension    • Left breast cancer metastasized to lung    • Obstructive sleep apnea     does not wear cpap   • Ovarian cyst    • PONV (postoperative nausea and vomiting)    • Tongue cancer (HCC) 05/2019    by ENT at [facility] dr Quinn       Past Surgical History:  Past Surgical History:   Procedure Laterality Date   • CHOLECYSTECTOMY  2000   • COLONOSCOPY  2014    H/O polyps   • KNEE ARTHROPLASTY     • LUNG SURGERY  2010    Lung wedge resection   • MASTECTOMY RADICAL Left 1993   • TONGUE SURGERY  05/21/2019    tongue cancer   • TOTAL LAPAROSCOPIC HYSTERECTOMY N/A 11/04/2021    Procedure: Robotic assisted total laparoscopic hysterectomy, bilateral salpingoophorecotmy, bilateral ureteralysis ;  Surgeon: Kaylynn Ricci DO;  Location: [facility];  Service: Robotics - [facility];  Laterality: N/A;       Family History:  Family History   Problem Relation Age of Onset   • Hypertension Mother    • Leukemia Mother 72   • COPD Mother         smoker   • Diabetes Brother    • Pancreatic cancer Brother    • Glaucoma Brother    • Heart disease Brother    • Hypertension Brother    • Gallbladder disease Brother    •  Pancreatitis Brother          from pancreatic csncer   • Gallbladder disease Father    • Colon polyps Father    • Stroke Other         Grandmother   • Lupus Other         Aunt   • Alcohol abuse Other         Aunt   • Glaucoma Other         Grandmother   • Diabetes type II Brother    • Hypertension Brother    • Hyperlipidemia Brother    • Liver cancer Paternal Uncle    • Stomach cancer Paternal Aunt    • Alcohol abuse Maternal Aunt    • Malig Hyperthermia Neg Hx        Social History:   reports that she quit smoking about 14 years ago. Her smoking use included cigarettes. She started smoking about 49 years ago. She has a 60.00 pack-year smoking history. She has never used smokeless tobacco. She reports that she does not drink alcohol and does not use drugs.    Medications:   Medications Prior to Admission   Medication Sig Dispense Refill Last Dose   • atorvastatin (LIPITOR) 10 MG tablet Take 1 tablet by mouth Every Night. 90 tablet 1 Past Week at Unknown time   • busPIRone (BUSPAR) 10 MG tablet Take 10 mg by mouth 2 (Two) Times a Day.   Past Week at Unknown time   • cholecalciferol (VITAMIN D3) 1.25 MG (12379 UT) capsule Take 5,000 Units by mouth 3 (Three) Times a Week.   Past Week at Unknown time   • eszopiclone (LUNESTA) tablet Take 3 mg by mouth At Night As Needed.   Past Week at Unknown time   • Ibuprofen (MOTRIN PO) Take  by mouth.   Past Month at Unknown time   • LORazepam (ATIVAN) 0.5 MG tablet Take 1 tablet by mouth Every 8 (Eight) Hours As Needed.   Past Week at Unknown time   • metoprolol succinate XL (Toprol XL) 25 MG 24 hr tablet Take 1 tablet by mouth Daily. 30 tablet 1 Past Week at Unknown time   • Omeprazole 20 MG Tablet Delayed Release Dispersible Take 20 mg by mouth As Needed.   Past Week at Unknown time   • ondansetron (ZOFRAN) 8 MG tablet Take 1 tablet by mouth 3 (Three) Times a Day As Needed for Nausea or Vomiting. 30 tablet 5 Past Week at Unknown time   • Palbociclib (Ibrance) 125 MG  "tablet TAKE 1 TABLET BY MOUTH DAILY FOR 21 DAYS, THEN OFF FOR 7 DAYS 21 tablet 1 Past Week at Unknown time   • PARoxetine (PAXIL) 40 MG tablet Take 40 mg by mouth Every Night.   Past Week at Unknown time   • Probiotic Product (PROBIOTIC-10 PO) Take  by mouth.   Past Week at Unknown time   • sennosides-docusate (PERICOLACE) 8.6-50 MG per tablet Take 1 tablet by mouth Daily.   Past Week at Unknown time   • Zoledronic Acid (ZOMETA IV) Infuse  into a venous catheter. EVERY THREE MONTHS   Past Week at Unknown time   • HYDROcodone-acetaminophen (NORCO) 5-325 MG per tablet Take 1 tablet by mouth Every 4 (Four) Hours As Needed for Moderate Pain . 30 tablet 0 More than a month at Unknown time       Allergies:  Nickel and Ciprofloxacin    ROS:    Pertinent items are noted in HPI     Objective     Blood pressure 135/85, pulse 82, resp. rate 16, height 165.1 cm (65\"), weight 75.8 kg (167 lb), SpO2 98 %.    Physical Exam   Constitutional: Pt is oriented to person, place, and time and well-developed, well-nourished, and in no distress.   Mouth/Throat: Oropharynx is clear and moist.   Neck: Normal range of motion.   Cardiovascular: Normal rate, regular rhythm and normal heart sounds.    Pulmonary/Chest: Effort normal and breath sounds normal.   Abdominal: Soft. Nontender  Skin: Skin is warm and dry.   Psychiatric: Mood, memory, affect and judgment normal.     Assessment & Plan     Diagnosis:  Chronic constipation  Abnormal CT of the transverse colon    Anticipated Surgical Procedure:  Colonoscopy    The risks, benefits, and alternatives of this procedure have been discussed with the patient or the responsible party- the patient understands and agrees to proceed.                                                          "

## 2022-06-02 ENCOUNTER — DOCUMENTATION (OUTPATIENT)
Dept: PHARMACY | Facility: HOSPITAL | Age: 67
End: 2022-06-02

## 2022-06-02 ENCOUNTER — SPECIALTY PHARMACY (OUTPATIENT)
Dept: PHARMACY | Facility: HOSPITAL | Age: 67
End: 2022-06-02

## 2022-06-02 LAB
LAB AP CASE REPORT: NORMAL
LAB AP CLINICAL INFORMATION: NORMAL
PATH REPORT.FINAL DX SPEC: NORMAL
PATH REPORT.GROSS SPEC: NORMAL

## 2022-06-02 NOTE — PROGRESS NOTES
[facility]: Toxicity Assessment (Ibrance)    Attempted to contact patient with no answer and referred to [facility] direct line ([phone]). Pharmacy will follow up when patient is reached.    Izaiah Garcia, PharmD Candidate 2023

## 2022-06-02 NOTE — PROGRESS NOTES
Free Imbruvica from [employer] approved through 12/31/2002.     The first shipment of Ibrance has been dispensed. I informed the patient, she v/u.    Sarai Werner - Care Coordinator   6/2/2022  13:43 EDT

## 2022-06-03 ENCOUNTER — OFFICE VISIT (OUTPATIENT)
Dept: INTERNAL MEDICINE | Facility: CLINIC | Age: 67
End: 2022-06-03

## 2022-06-03 ENCOUNTER — INFUSION (OUTPATIENT)
Dept: ONCOLOGY | Facility: HOSPITAL | Age: 67
End: 2022-06-03

## 2022-06-03 ENCOUNTER — LAB (OUTPATIENT)
Dept: OTHER | Facility: HOSPITAL | Age: 67
End: 2022-06-03

## 2022-06-03 VITALS
HEIGHT: 65 IN | OXYGEN SATURATION: 94 % | TEMPERATURE: 97.3 F | BODY MASS INDEX: 27.49 KG/M2 | SYSTOLIC BLOOD PRESSURE: 120 MMHG | WEIGHT: 165 LBS | HEART RATE: 94 BPM | DIASTOLIC BLOOD PRESSURE: 62 MMHG

## 2022-06-03 DIAGNOSIS — C50.912 CARCINOMA OF LEFT BREAST METASTATIC TO LUNG: ICD-10-CM

## 2022-06-03 DIAGNOSIS — C78.02 CARCINOMA OF LEFT BREAST METASTATIC TO LUNG: ICD-10-CM

## 2022-06-03 DIAGNOSIS — C79.51 BONE METASTASES: Primary | ICD-10-CM

## 2022-06-03 DIAGNOSIS — G47.33 OBSTRUCTIVE SLEEP APNEA SYNDROME: ICD-10-CM

## 2022-06-03 DIAGNOSIS — N94.89 ADNEXAL MASS: Primary | ICD-10-CM

## 2022-06-03 DIAGNOSIS — C79.51 BONE METASTASES: ICD-10-CM

## 2022-06-03 DIAGNOSIS — I10 BENIGN ESSENTIAL HTN: Primary | ICD-10-CM

## 2022-06-03 LAB
ALBUMIN SERPL-MCNC: 4.1 G/DL (ref 3.5–5.2)
ALBUMIN/GLOB SERPL: 1.5 G/DL
ALP SERPL-CCNC: 88 U/L (ref 39–117)
ALT SERPL W P-5'-P-CCNC: 10 U/L (ref 1–33)
ANION GAP SERPL CALCULATED.3IONS-SCNC: 10.6 MMOL/L (ref 5–15)
AST SERPL-CCNC: 17 U/L (ref 1–32)
BASOPHILS # BLD AUTO: 0.08 10*3/MM3 (ref 0–0.2)
BASOPHILS NFR BLD AUTO: 2.7 % (ref 0–1.5)
BILIRUB SERPL-MCNC: 0.5 MG/DL (ref 0–1.2)
BUN SERPL-MCNC: 14 MG/DL (ref 8–23)
BUN/CREAT SERPL: 10 (ref 7–25)
CALCIUM SPEC-SCNC: 9.8 MG/DL (ref 8.6–10.5)
CHLORIDE SERPL-SCNC: 107 MMOL/L (ref 98–107)
CO2 SERPL-SCNC: 23.4 MMOL/L (ref 22–29)
CREAT SERPL-MCNC: 1.4 MG/DL (ref 0.57–1)
DEPRECATED RDW RBC AUTO: 48.2 FL (ref 37–54)
EGFRCR SERPLBLD CKD-EPI 2021: 41.3 ML/MIN/1.73
EOSINOPHIL # BLD AUTO: 0.02 10*3/MM3 (ref 0–0.4)
EOSINOPHIL NFR BLD AUTO: 0.7 % (ref 0.3–6.2)
ERYTHROCYTE [DISTWIDTH] IN BLOOD BY AUTOMATED COUNT: 15 % (ref 12.3–15.4)
GLOBULIN UR ELPH-MCNC: 2.8 GM/DL
GLUCOSE SERPL-MCNC: 120 MG/DL (ref 65–99)
HCT VFR BLD AUTO: 34.3 % (ref 34–46.6)
HGB BLD-MCNC: 11.6 G/DL (ref 12–15.9)
IMM GRANULOCYTES # BLD AUTO: 0 10*3/MM3 (ref 0–0.05)
IMM GRANULOCYTES NFR BLD AUTO: 0 % (ref 0–0.5)
LYMPHOCYTES # BLD AUTO: 1.15 10*3/MM3 (ref 0.7–3.1)
LYMPHOCYTES NFR BLD AUTO: 39 % (ref 19.6–45.3)
MCH RBC QN AUTO: 31 PG (ref 26.6–33)
MCHC RBC AUTO-ENTMCNC: 33.8 G/DL (ref 31.5–35.7)
MCV RBC AUTO: 91.7 FL (ref 79–97)
MONOCYTES # BLD AUTO: 0.31 10*3/MM3 (ref 0.1–0.9)
MONOCYTES NFR BLD AUTO: 10.5 % (ref 5–12)
NEUTROPHILS NFR BLD AUTO: 1.39 10*3/MM3 (ref 1.7–7)
NEUTROPHILS NFR BLD AUTO: 47.1 % (ref 42.7–76)
NRBC BLD AUTO-RTO: 0 /100 WBC (ref 0–0.2)
PLATELET # BLD AUTO: 384 10*3/MM3 (ref 140–450)
PMV BLD AUTO: 8.4 FL (ref 6–12)
POTASSIUM SERPL-SCNC: 3.9 MMOL/L (ref 3.5–5.2)
PROT SERPL-MCNC: 6.9 G/DL (ref 6–8.5)
RBC # BLD AUTO: 3.74 10*6/MM3 (ref 3.77–5.28)
SODIUM SERPL-SCNC: 141 MMOL/L (ref 136–145)
WBC NRBC COR # BLD: 2.95 10*3/MM3 (ref 3.4–10.8)

## 2022-06-03 PROCEDURE — 96401 CHEMO ANTI-NEOPL SQ/IM: CPT

## 2022-06-03 PROCEDURE — 36415 COLL VENOUS BLD VENIPUNCTURE: CPT

## 2022-06-03 PROCEDURE — 96402 CHEMO HORMON ANTINEOPL SQ/IM: CPT

## 2022-06-03 PROCEDURE — 25010000002 FULVESTRANT PER 25 MG

## 2022-06-03 PROCEDURE — 80053 COMPREHEN METABOLIC PANEL: CPT | Performed by: INTERNAL MEDICINE

## 2022-06-03 PROCEDURE — 85025 COMPLETE CBC W/AUTO DIFF WBC: CPT | Performed by: INTERNAL MEDICINE

## 2022-06-03 PROCEDURE — 99213 OFFICE O/P EST LOW 20 MIN: CPT | Performed by: FAMILY MEDICINE

## 2022-06-03 RX ADMIN — FULVESTRANT 500 MG: 50 INJECTION INTRAMUSCULAR at 13:51

## 2022-06-03 NOTE — NURSING NOTE
Injection  Faslodex Injection performed in Right and Left gluteal by MATTHIEU ELKINS RN. Patient tolerated the procedure well without complications.  06/03/22   MATTHIEU ELKINS RN

## 2022-06-03 NOTE — PROGRESS NOTES
Subjective   Marialuisa Vivar is a 66 y.o. female.   Chief Complaint   Patient presents with   • Hypertension   • Sleep Apnea       History of Present Illness     1.hypertension- patient is on Toprol 25 mg a day.  She takes it every day.  She has no side effects.  She says that she feels better on Toprol than on losartan.  She monitors blood pressure at home and it stays in 110s to 120s over 60s.  She has no chest pain, no palpitations.    2.  Sleep apnea-she was diagnosed years ago.  She was not able to tolerate CPAP.  She is interested in being tested again.  She wakes up in the mornings and is not rested.    The following portions of the patient's history were reviewed and updated as appropriate: allergies, current medications, past medical history, past social history and problem list.    Review of Systems   Constitutional: Positive for fatigue.   Cardiovascular: Negative for chest pain and palpitations.         Objective   Wt Readings from Last 3 Encounters:   06/03/22 74.8 kg (165 lb)   06/01/22 75.8 kg (167 lb)   05/31/22 77.1 kg (170 lb)      Vitals:    06/03/22 1528   BP: 120/62   Pulse: 94   Temp: 97.3 °F (36.3 °C)   SpO2: 94%     Temp Readings from Last 3 Encounters:   06/03/22 97.3 °F (36.3 °C)   05/31/22 97.1 °F (36.2 °C) (Temporal)   05/20/22 97.6 °F (36.4 °C) (Temporal)     BP Readings from Last 3 Encounters:   06/03/22 120/62   06/01/22 110/52   05/20/22 117/69     Pulse Readings from Last 3 Encounters:   06/03/22 94   06/01/22 68   05/20/22 91     Body mass index is 27.46 kg/m².    Physical Exam  Constitutional:       Appearance: She is well-developed.   HENT:      Head: Normocephalic and atraumatic.   Neck:      Thyroid: No thyromegaly.      Vascular: No carotid bruit.   Cardiovascular:      Rate and Rhythm: Normal rate and regular rhythm.      Heart sounds: Normal heart sounds.   Pulmonary:      Effort: Pulmonary effort is normal.      Breath sounds: Normal breath sounds.   Musculoskeletal:       Cervical back: Neck supple.   Skin:     General: Skin is warm and dry.   Neurological:      Mental Status: She is alert and oriented to person, place, and time.   Psychiatric:         Behavior: Behavior normal.         Assessment & Plan   Diagnoses and all orders for this visit:    1. Benign essential HTN (Primary)    2. Obstructive sleep apnea syndrome  -     Ambulatory Referral to Sleep Medicine        1.  Hypertension-continue current treatment.  Follow-up in 6 months.  2.  Sleep apnea-referral to sleep specialist.

## 2022-06-10 ENCOUNTER — APPOINTMENT (OUTPATIENT)
Dept: ONCOLOGY | Facility: HOSPITAL | Age: 67
End: 2022-06-10

## 2022-06-11 ENCOUNTER — APPOINTMENT (OUTPATIENT)
Dept: MRI IMAGING | Facility: HOSPITAL | Age: 67
End: 2022-06-11

## 2022-06-13 ENCOUNTER — TELEPHONE (OUTPATIENT)
Dept: GASTROENTEROLOGY | Facility: CLINIC | Age: 67
End: 2022-06-13

## 2022-06-13 NOTE — TELEPHONE ENCOUNTER
----- Message from Luis Enrique Galeas MD sent at 6/13/2022  3:19 PM EDT -----  Polyp benign repeat: 5 years as discussed

## 2022-06-14 ENCOUNTER — SPECIALTY PHARMACY (OUTPATIENT)
Dept: PHARMACY | Facility: HOSPITAL | Age: 67
End: 2022-06-14

## 2022-06-14 ENCOUNTER — DOCUMENTATION (OUTPATIENT)
Dept: PHARMACY | Facility: HOSPITAL | Age: 67
End: 2022-06-14

## 2022-06-14 NOTE — PROGRESS NOTES
[facility] telephone follow up- Toxicity & adherence check: Ibrance     Ms. Vivar is doing well today. She missed her office appointment on 6/10. She admits to having some trouble with mood. Her NP has adjusted her dose of Buspar and she continues on Paxil. Ms. Vivar states that she had some difficulty with joint pain with Ibrance + Faslodex; however, this has improved and she is just experiencing some fatigue at this time. She denies any issues with diarrhea or nausea. Ms. Vivar has not missed any doses or forgotten to take her Ibrance since starting. She asks about Ibrance and Faslodex effects on the kidneys and we discussed that these two medications do not typically cause kidney issues nor do they need to be dose adjusted based on kidney function. She v/u. Ms. Vivar will see a nephrologist next week. She has no additional questions or concerns for me today. Pharmacy will continue to follow.     Thanks,   Mariana Mendez, PharmD

## 2022-06-14 NOTE — PROGRESS NOTES
I received a phone call from Ms. Vivar stating that she has not received her first shipment of Ibrance from [employer]. She is down to 6 tablets.     We placed a conference call to [employer] @ [phone] and spoke with Malinda.  Malinda stated that even though the approval letter dated 6/2/22 states that Ibrance has shipped, [employer] won't ship the medication until they speak with either the patient or the provider.     Malinda scheduled the shipment to arrive on 6/16/22 and advised Ms. Vivar to call the above number to schedule refill shipments on day one of her seven days off of Ibrance. Ms. Vivar v/uWesly Werner - Care Coordinator   6/14/2022  13:48 EDT

## 2022-06-15 ENCOUNTER — DOCUMENTATION (OUTPATIENT)
Dept: PHARMACY | Facility: HOSPITAL | Age: 67
End: 2022-06-15

## 2022-06-15 NOTE — PROGRESS NOTES
Received fax notice from [employer] dated 6/14/2022 stating that Ibrance  has shipped to the patient.    This is the shipment that was originally thought to have shipped on 6/2/22.    Sarai Werner - Care Coordinator   6/15/2022  09:45 EDT

## 2022-06-23 RX ORDER — ANASTROZOLE 1 MG/1
TABLET ORAL
Refills: 0 | OUTPATIENT
Start: 2022-06-23

## 2022-06-23 NOTE — PROGRESS NOTES
.     REASONS FOR FOLLOWUP: Metastatic breast cancer, tongue cancer    HISTORY OF PRESENT ILLNESS:  The patient is a 66 y.o. year old female  who is here for follow-up with the above-mentioned history.    States she is been struggling a little more emotionally lately.  She has decided to stop going to her therapist as she states therapy was not helping her as much.  However, she still is interested in therapy and would like to explore [facility] options.  She continues to see the NP psychiatrist    Has been a little more fatigued lately.  States she missed an appointment lately.  However, insists she has been taking her Ibrance as scheduled.  She has received Faslodex as scheduled.  No significant pain.    Past Medical History:   Diagnosis Date   • Allergy    • Anxiety    • Asthma    • Bone metastasis (HCC)    • Breast cancer (HCC)     Left node positive   • Cholelithiasis 2000    Lap Marily   • Colon polyp Past 10-15 yrs?    found at colonoscopies   • Depression    • Esophageal reflux     cough   • History of colon polyps    • Hyperlipidemia    • Hypertension    • Left breast cancer metastasized to lung    • Obstructive sleep apnea     does not wear cpap   • Ovarian cyst    • PONV (postoperative nausea and vomiting)    • Tongue cancer (HCC) 05/2019    by ENT at [facility] dr Quinn     Past Surgical History:   Procedure Laterality Date   • CHOLECYSTECTOMY  2000   • COLONOSCOPY  2014    H/O polyps   • COLONOSCOPY N/A 6/1/2022    Procedure: COLONOSCOPY to cecum and TI with cold / hot snare polypectomies;  Surgeon: Luis Enrique Galeas MD;  Location: [facility] ENDOSCOPY;  Service: Gastroenterology;  Laterality: N/A;  pre-abnormal ct  post-polyps, diverticulosis, hemorrhoids   • KNEE ARTHROPLASTY     • LUNG SURGERY  2010    Lung wedge resection   • MASTECTOMY RADICAL Left 1993   • TONGUE SURGERY  05/21/2019    tongue cancer   • TOTAL LAPAROSCOPIC HYSTERECTOMY N/A 11/04/2021    Procedure: Robotic assisted total laparoscopic  hysterectomy, bilateral salpingoophorecotmy, bilateral ureteralysis ;  Surgeon: Kaylynn Ricci DO;  Location: [facility] MAIN OR;  Service: Robotics - DaVinci;  Laterality: N/A;       MEDICATIONS    Current Outpatient Medications:   •  atorvastatin (LIPITOR) 10 MG tablet, Take 1 tablet by mouth Every Night., Disp: 90 tablet, Rfl: 1  •  atorvastatin (LIPITOR) 10 MG tablet, Take 1 tablet by mouth Every Night., Disp: , Rfl:   •  busPIRone (BUSPAR) 10 MG tablet, Take 5 mg by mouth 2 (Two) Times a Day., Disp: , Rfl:   •  cholecalciferol (VITAMIN D3) 1.25 MG (91133 UT) capsule, Take 5,000 Units by mouth 3 (Three) Times a Week., Disp: , Rfl:   •  eszopiclone (LUNESTA) tablet, Take 3 mg by mouth At Night As Needed., Disp: , Rfl:   •  HYDROcodone-acetaminophen (NORCO) 5-325 MG per tablet, Take 1 tablet by mouth Every 4 (Four) Hours As Needed for Moderate Pain ., Disp: 30 tablet, Rfl: 0  •  ibuprofen (ADVIL,MOTRIN) 800 MG tablet, Take  by mouth Every 6 (Six) Hours As Needed., Disp: , Rfl:   •  Ibuprofen (MOTRIN PO), Take  by mouth., Disp: , Rfl:   •  LORazepam (ATIVAN) 0.5 MG tablet, Take 1 tablet by mouth Every 8 (Eight) Hours As Needed., Disp: , Rfl:   •  metoprolol succinate XL (Toprol XL) 25 MG 24 hr tablet, Take 1 tablet by mouth Daily., Disp: 30 tablet, Rfl: 1  •  Omeprazole 20 MG Tablet Delayed Release Dispersible, Take 20 mg by mouth As Needed., Disp: , Rfl:   •  ondansetron (ZOFRAN) 8 MG tablet, Take 1 tablet by mouth 3 (Three) Times a Day As Needed for Nausea or Vomiting., Disp: 30 tablet, Rfl: 5  •  ondansetron (ZOFRAN) 8 MG tablet, Take 1 tablet by mouth 3 (Three) Times a Day As Needed., Disp: , Rfl:   •  Palbociclib (Ibrance) 125 MG tablet, TAKE 1 TABLET BY MOUTH DAILY FOR 21 DAYS, THEN OFF FOR 7 DAYS, Disp: 21 tablet, Rfl: 1  •  PARoxetine (PAXIL) 40 MG tablet, Take 40 mg by mouth Every Night., Disp: , Rfl:   •  Probiotic Product (PROBIOTIC-10 PO), Take  by mouth., Disp: , Rfl:   •  saccharomyces boulardii  (FLORASTOR) 250 MG capsule, Take 250 mg by mouth., Disp: , Rfl:   •  sennosides-docusate (PERICOLACE) 8.6-50 MG per tablet, Take 1 tablet by mouth Daily., Disp: , Rfl:   •  Zoledronic Acid (ZOMETA IV), Infuse  into a venous catheter. EVERY THREE MONTHS, Disp: , Rfl:   •  K Phos [place]-Sod Phos Di & Mono (K-Phos-Neutral) 155-852-130 MG tablet, Take 2 tablets by mouth 2 (Two) Times a Day., Disp: 130 each, Rfl: 0  No current facility-administered medications for this visit.    Facility-Administered Medications Ordered in Other Visits:   •  chlorhexidine (PERIDEX) 0.12 % solution 15 mL, 15 mL, Mouth/Throat, Q12H, Kaylynn Ricci,   •  mupirocin (BACTROBAN) 2 % nasal ointment, , Nasal, BID, Kaylynn Ricci, DO    ALLERGIES:     Allergies   Allergen Reactions   • Nickel Unknown - Low Severity   • Ciprofloxacin Rash       SOCIAL HISTORY:       Social History     Socioeconomic History   • Marital status: Single   • Years of education: College   Tobacco Use   • Smoking status: Former Smoker     Packs/day: 2.00     Years: 30.00     Pack years: 60.00     Types: Cigarettes     Start date: 1973     Quit date: 2008     Years since quittin.0   • Smokeless tobacco: Never Used   Vaping Use   • Vaping Use: Never used   Substance and Sexual Activity   • Alcohol use: No   • Drug use: No   • Sexual activity: Not Currently     Birth control/protection: None         FAMILY HISTORY:  Family History   Problem Relation Age of Onset   • Hypertension Mother    • Leukemia Mother 72   • COPD Mother         smoker   • Diabetes Brother    • Pancreatic cancer Brother    • Glaucoma Brother    • Heart disease Brother    • Hypertension Brother    • Gallbladder disease Brother    • Pancreatitis Brother          from pancreatic csncer   • Gallbladder disease Father    • Colon polyps Father    • Stroke Other         Grandmother   • Lupus Other         Aunt   • Alcohol abuse Other         Aunt   • Glaucoma Other          "Grandmother   • Diabetes type II Brother    • Hypertension Brother    • Hyperlipidemia Brother    • Liver cancer Paternal Uncle    • Stomach cancer Paternal Aunt    • Alcohol abuse Maternal Aunt    • Malig Hyperthermia Neg Hx        REVIEW OF SYSTEMS:  Review of Systems   Constitutional: Negative for activity change.   HENT: Negative for nosebleeds and trouble swallowing.    Respiratory: Negative for shortness of breath and wheezing.    Cardiovascular: Negative for chest pain and palpitations.   Gastrointestinal: Negative for constipation, diarrhea and nausea.   Genitourinary: Negative for dysuria and hematuria.   Musculoskeletal: Negative for arthralgias and myalgias.   Skin: Negative for rash and wound.   Neurological: Negative for seizures and syncope.   Hematological: Negative for adenopathy. Does not bruise/bleed easily.   Psychiatric/Behavioral: Negative for confusion.            Vitals:    06/24/22 1241   BP: 103/66   Pulse: 97   Resp: 16   Temp: 96.9 °F (36.1 °C)   TempSrc: Temporal   SpO2: 96%   Weight: 75.4 kg (166 lb 4.8 oz)   Height: 165.1 cm (65\")   PainSc: 0-No pain     Current Status 6/24/2022   ECOG score 1        PHYSICAL EXAM:        CONSTITUTIONAL:  Vital signs reviewed.  No distress, looks comfortable.  EYES:  Conjunctiva and lids unremarkable.  PERRLA  EARS,NOSE,MOUTH,THROAT:  Ears and nose appear unremarkable.  Lips, teeth, gums appear unremarkable.  RESPIRATORY:  Normal respiratory effort.  Lungs clear to auscultation bilaterally.  CARDIOVASCULAR:  Normal S1, S2.  No murmurs rubs or gallops.  No significant lower extremity edema.  GASTROINTESTINAL: Abdomen appears unremarkable.  Nontender.  No hepatomegaly.  No splenomegaly.  LYMPHATIC:  No cervical, supraclavicular, axillary lymphadenopathy.  SKIN:  Warm.  No rashes.  PSYCHIATRIC:  Normal judgment and insight.  Normal mood and affect.           RECENT LABS:        WBC   Date/Time Value Ref Range Status   06/24/2022 12:23 PM 2.87 (L) 3.40 - " 10.80 10*3/mm3 Final     Hemoglobin   Date/Time Value Ref Range Status   06/24/2022 12:23 PM 10.8 (L) 12.0 - 15.9 g/dL Final     Platelets   Date/Time Value Ref Range Status   06/24/2022 12:23  140 - 450 10*3/mm3 Final       Assessment & Plan   Carcinoma of left breast metastatic to lung (HCC)  - CT chest wo contrast  - CT abdomen pelvis wo contrast  - NM Bone Scan Whole Body  - Ambulatory Referral to Multi-Disciplinary Clinic    Bone metastases (HCC)  - NM Bone Scan Whole Body  - Ambulatory Referral to Multi-Disciplinary Clinic    Vocal cord dysfunction    Other depression  - Ambulatory Referral to Multi-Disciplinary Clinic    Marialuisa S Ghazala        Assessment/Plan     *Metastatic breast cancer to bilateral lungs and soft tissue in the mediastinum (likely the cause of her hoarseness). (Initial breast cancer diagnosed in 1993 treated with mastectomy, chemotherapy, radiation therapy and tamoxifen). Arimidex day 1 on 02/17/2010. PET scan late August 2011 shows no abnormal hypermetabolic activity. This has improved compared to the prior PET scan January 2010 which showed mild hypermetabolic activity in areas of metastasis. (In the past, her  T6 lesion did not show up on CT scans or bone scan. It was seen by PET scan.) We have been following with CT scans only every 6 months and PET scans on an as needed only basis. Sometimes her CT scanned pulmonary nodules are read as benign since they have been stable through the course of years but we know they are malignant because they have been surgically sampled in the past.    · CT 8/16/16 shows increased sclerosis at T6 compared to 4/28/15.    · PET 10/11/16: Slight uptake at T6, SUV 3.2.  mild thickening of right adrenal gland with an SUV of 11.   · Continued Arimidex is minimal progression and had been on this since 2/17/10.  · Not referred for radiation since no pain at T6  · CT 1/27/17, unchanged.  · CT 8/1/17: Unchanged except subtle suggestion of mild increase in  thickening of the right adrenal gland.    · CT 2/22/18, unchanged.  · CT 9/6/18: Unchanged except nodular thickening right adrenal gland significantly decreased.  · CT 9/5/2019: Unchanged.  · On the 6/12/2020 visit, the following scans were reviewed:  · CT neck and chest 5/11/2020, [facility], from 5/9/2019: Stable pulmonary nodules new lytic C7 lesion and posterior T1 lesion questionable T12 lesion.  No change in the T6 and T10 lesions.  · PET, [facility], 5/19/2020: Mildly enlarged left neck nodes are mildly hypermetabolic.  Diffuse activity throughout the thickened left adrenal gland.  CT appearance unchanged from 5/9/2019.  Progressive T1 lesion seen on CT from 5/11/2020, mild some moderate activity.  T10 low-grade activity with mixed lucent and sclerotic findings.  T6 is a mixed lucent and sclerotic appearance but no significant activity.   · T12 (the biopsy report is labeled as T12 but patient insists this was a C7/T1 biopsy, not to T12) biopsy 6/15/2020, [facility]: Metastatic breast carcinoma.  ER >95%.  RI 0%.  HER-2 negative (1+)  · It seems the main progression at the 6/12/2020 visit was a new C7 and new T1 lesion.  Those were radiated with CyberKnife through [facility] early June 2020.  Because Arimidex has been working well since 2010, continue same Arimidex.  Add Zometa every 3 months.  · C7 and T1 found on CT 5/11/2020, [facility].  Pedro, Dr. Winston, early June 2020.  · CT C and T-spine 9/17/2020: New C7 lesion from 9/6/2018, but no change from 5/11/2020 CT.  T1 lesion stable from 5/11/2020, but new compared to 2/22/2018.  T10 lesion unchanged from 9/5/2019, but new from 2/22/2018.  Subtle 8 mm T12 lesion new from 9/5/2019.  · Therefore, no recent progression.  · Considering her good tolerance and long-term effectiveness of Arimidex, continue same therapy.  · CT CAP 12/10/2020: No progression  · Therefore, continue Arimidex.  · CA 15-3 normal through 12/14/2020  · Because CA 15-3 fabian to 27 on 4/13/2021, then 29.3  on 7/20/2021, then 34.3 on 10/12/2021, CT scans done earlier than planned:  · CT CAP with contrast 10/12/2021: New 5.2 x 4.6 cm mixed cystic and solid right ovarian mass compared to 12/10/2020.  Continued stability of bilateral pulmonary nodules and stable sclerotic bone metastasis.  · 11/4/2021: TLH/BSO (due to new 5 cm right ovarian mass on CT, 11/12/2021).  · Metastatic breast cancer involving bilateral ovaries and posterior uterine serosa.  ER 81-90%.  UT 61-70%.  HER-2 negative  · Caris NGS: ER 98%.  UT 90%.  HER-2/eb negative.  AKT1 pathogenic variant, exon 3.  AR+, 95%.  No other significant mutations including negative PIK3CA  · Pelvic washings: Adenocarcinoma  · Because this was the only area of progression on CT, and has been resected, continue Arimidex which she has been on since 2/17/2010.  · 11/22/2021: Discussed with Dr. Blum, who has a focus on breast cancer.  We both agree: Continue Arimidex for now.  In the future, if significant progression is seen, then plan Faslodex injections with Ibrance  · CT CAP 2/1/2022: A few T-spine sclerotic lesions progressively more sclerotic over multiple prior CTs of indeterminate significance.  No clear signs of progression of disease.  · 3/15/2022: Although tumor marker is not rising and CT showed no clear signs of progression.  CT revealed progressively more sclerotic bone metastasis over multiple prior CTs, she is having increased right hip discomfort.  Sometimes this can be due to healing of metastasis (but she has been on the same treatment since 2010), or this could mean progression of bone metastasis.  It is reassuring that her tumor marker is not worsening.  She would like to see [facility] radiation medicine to see if they feel radiation to the hip would help her discomfort.  We will do a bone scan before that appointment as this might help [facility] radiation.  (Although she has seen [facility] radiation for her neck, she would like to see  [facility] radiation for this hip issue).  · 3/18/2022, bone scan: Metastatic disease at T9 and T10, corresponding to CT lesions.  Uptake also at T6, but to lesser degree.  These areas are new compared to last bone scan, 6/3/2009.  Therefore, overall, appears consistent with progression.  · 3/24/2021: Discussed changing  Arimidex to Faslodex/Ibrance 125 mg D1-21/28 days.  · However, Dr. Llanes will decide on 4/12/2022 if the patient needs any traditional XRT to hip/pelvis.  If so, this may cause cytopenias due to the location of XRT.  Ibrance can also cause cytopenias.  If this is the case, we will wait to begin Ibrance until at least a few weeks after XRT completes, provided blood counts allow.   · 4/22/2022: Dr. Llanes reviewed MRI right hip from 4/19/2022, revealing L3 metastasis, similar size of prior CTs.  Therefore, she plans no XRT.  · 5/6/2022: Began Faslodex Ibrance  · Next Faslodex, 7/1/2022.  Every 4 weeks.    CA 15-3:  · 22.5 on 5/6/2022, from 26.3 on 2/1/2022, from 32.6 on 11/22/2021, from 34.3 on 10/12/2021, from 29.3 on 7/20/2021, from 27 on 4/13/2021, from 23.3 on 12/14/2020.    *Bony metastases  · T6 discovered by PET to August 2011 (did not show up by CT or bone scan)  · C7 and T1 found on CT 5/11/2020, [facility].  CyberKnife, Dr. Winston, early June 2020.  · June 2020 began Zometa every 3 months.  · She was warned of possible osteonecrosis of the jaw and renal insufficiency.  She states she has good dental health and sees her dentist regularly.  Because of prior hypercalcemia requiring stopping calcium supplements and because of high normal current calcium level, began without supplemental calcium.  Add calcium if calcium becomes low.  · 1/7/2022: Tooth extracted.  Plan was to wait 5 weeks to resume Zometa.  Patient delayed return until 3/15/2022 (over 8 weeks)  · May 2022: Changed Zometa to Xgeva every 3 months due to Cr up to 1.6    *Bone health. Last bone density 10/11/16, worsened, but still normal  with worst T score -0.9.  Patient stopped calcium January 2016, but remained on vitamin D.  I recommended she restart her calcium.  Stopped calcium early March 2018 due to hypercalcemia.  · DEXA 9/5/2019: Normal bone density    *Hypercalcemia.  Repeat calcium 3/7/18 normal, but patient self stopped calcium a few days prior.  Recommended she stay off calcium.  · Calcium was 11 on 6/19/2020  · Calcium 11 again on 12/10/2020  · Repeat calcium 10.5 on 12/14/2020  · Calcium 10.6.  Minimally elevated.  (Normal range up to 10.5)  · Calcium 10.8 on 3/15/2022.  Hopefully, Zometa will help with this.  · Calcium 9.9    *Hypophosphatemia  · Phosphorus 2.3 on 6/24/2022.  Begin K-Phos Neutral 2 tablets twice daily    *On the 10/5/16 visit, Tachycardia, dyspnea on exertion, bilateral leg swelling, more sedentary recently.  CT PE protocol and bilateral lower extremity Dopplers 10/5/16, negative for clots.  She still has tachycardia and dyspnea on exertion.  Perhaps at least part of this is due to deconditioning.    *Previously complained of colon Right lower anterior rib discomfort.  CT unremarkable in that area.  No specific pain, just discomfort.  I explained to the patient if disease was found by PET scan or bone scan, I would not  since she has been doing well on Arimidex since 2010.   Currently denies pain.     *Depression/anxiety.  This limits compliance.  She sees a psychiatrist and a counselor regularly.  She states this is mostly due to body image issues instead of cancer.   · She continues with her counselor and psychiatrist.  Viral pandemic is making this a little more difficult.  · 4/27/2022: We will try to get her involved in some cancer support groups.  I also encouraged her to try out some small groups at [employer] (she struggles with loneliness).    *Noncompliance due to depression/anxiety.  Although she misses appointments, she does reschedule and eventually come in.    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 Willie Quinn, [facility], on 5/21/2019.  Positive sentinel node (1 of 3 nodes)..  · Left neck dissection by Dr. Willie Quinn, [facility], on 6/5/2019.  22 nodes negative.  Negative margins.  No lymphovascular invasion or perineural invasion.  2 mm depth of invasion.  Grade 1.  Squamous cell carcinoma.  1.8 cm tumor.  · Because the patient felt what she thought was left neck LAD, CT neck and chest and PET at [facility]. May 2020 performed.  She is being followed at [facility] for this.  · On 6/19/2020, per verbal report from Dr. Senia [facility], [facility] ENT does not feel she has any active head and neck cancer.   · She states she has a follow-up with Dr. Kapadia, ENT at [facility], in June with CT neck 1 week prior  · No symptoms to suggest recurrence.  She has follow-up with [facility] in July    *Previously complained of left hip pain x2 weeks.  This prompted an MRI pelvis 8/20/2019 at [facility] and open MRI which was negative for malignancy.    *Possible intolerance of Zometa  · After first dose, June 2020, 2 days of chills, bone pain  · She would like to try Zometa again.  If this occurs again, we will try to switch her to Xgeva monthly.  (No good data on every 3-month Xgeva)  · She did fine with the second dose of Zometa.    *Thickening of wall of transverse colon on CT 2/1/2022.  Last colonoscopy was 4/18/2019.  She wants the next colonoscopy done at [facility]  · Dr. Galeas will see her on 4/7/2022 to evaluate this    *Depression  · Is seeing a psychiatry NP and a therapist for many years.  · 6/24/2022: Reported she decided to stop seeing her therapist but is still interested in therapy.  She would like to see behavioral oncology at [facility].  This was arranged    Monitoring on Ibrance:  CBC every 2 weeks for the first 2 months then monthly and as indicated.  After 6 months, if neutropenia grade 1 or 2 only, then CBC can be every 3  months    PLAN:   · Faslodex and Xgeva 7/1/2022  · (Xgeva not given today due to low phosphorus.  Begin phosphorus replacement)-3 on that day.  · Begin Faslodex Ibra  · MD, 2 units, Faslodex, 7/29/2022.  A few days prior: CT CAP without contrast, bone scan, CBC, CMP, CA 15-3  · Every 3-month Xgeva, next dose, 7/1/2022  · (Xgeva instead of Zometa due to intermittent high creatinine)  ·   · We will arrange for her to get involved with some cancer support groups  ·  CT scans WITHOUT IV CONTRAST (again, to avoid further kidney damage.)      Usual plan is:  · MD, CA 15-3, CBC, CMP, Zometa 3 months.    · (Zometa every 3 months)  · MD Zometsruthi 6 months.  1 week prior:  · 1 week prior: CT CAP with contrast, CBC, CMP, CA 15-3   · (we were checking annual CT.  However, with the solitary progression that has been resected, check CT more often for now)  · CMP today.  If calcium remains low, begin calcium supplement  · Continue Arimidex  · She sees [facility], Dr. Bedoya annually, next is summer 2022      42 minutes.  Total time.  Same day.    Send a copy of this note to   Dr. [facility], [facility] MD Dr. Steve Winston, radiation medicine, [facility]

## 2022-06-24 ENCOUNTER — LAB (OUTPATIENT)
Dept: OTHER | Facility: HOSPITAL | Age: 67
End: 2022-06-24

## 2022-06-24 ENCOUNTER — OFFICE VISIT (OUTPATIENT)
Dept: ONCOLOGY | Facility: CLINIC | Age: 67
End: 2022-06-24

## 2022-06-24 ENCOUNTER — INFUSION (OUTPATIENT)
Dept: ONCOLOGY | Facility: HOSPITAL | Age: 67
End: 2022-06-24

## 2022-06-24 VITALS
HEART RATE: 97 BPM | BODY MASS INDEX: 27.71 KG/M2 | HEIGHT: 65 IN | WEIGHT: 166.3 LBS | SYSTOLIC BLOOD PRESSURE: 103 MMHG | RESPIRATION RATE: 16 BRPM | DIASTOLIC BLOOD PRESSURE: 66 MMHG | TEMPERATURE: 96.9 F | OXYGEN SATURATION: 96 %

## 2022-06-24 DIAGNOSIS — C79.51 BONE METASTASES: Primary | ICD-10-CM

## 2022-06-24 DIAGNOSIS — C50.912 CARCINOMA OF LEFT BREAST METASTATIC TO LUNG: Primary | ICD-10-CM

## 2022-06-24 DIAGNOSIS — J38.3 VOCAL CORD DYSFUNCTION: ICD-10-CM

## 2022-06-24 DIAGNOSIS — C78.02 CARCINOMA OF LEFT BREAST METASTATIC TO LUNG: Primary | ICD-10-CM

## 2022-06-24 DIAGNOSIS — C79.51 BONE METASTASES: ICD-10-CM

## 2022-06-24 DIAGNOSIS — F32.89 OTHER DEPRESSION: ICD-10-CM

## 2022-06-24 LAB
ALBUMIN SERPL-MCNC: 4 G/DL (ref 3.5–5.2)
ALBUMIN/GLOB SERPL: 1.4 G/DL
ALP SERPL-CCNC: 81 U/L (ref 39–117)
ALT SERPL W P-5'-P-CCNC: 7 U/L (ref 1–33)
ANION GAP SERPL CALCULATED.3IONS-SCNC: 8.5 MMOL/L (ref 5–15)
AST SERPL-CCNC: 14 U/L (ref 1–32)
BASOPHILS # BLD AUTO: 0.04 10*3/MM3 (ref 0–0.2)
BASOPHILS NFR BLD AUTO: 1.4 % (ref 0–1.5)
BILIRUB SERPL-MCNC: 0.6 MG/DL (ref 0–1.2)
BUN SERPL-MCNC: 17 MG/DL (ref 8–23)
BUN/CREAT SERPL: 12.2 (ref 7–25)
CALCIUM SPEC-SCNC: 9.9 MG/DL (ref 8.6–10.5)
CHLORIDE SERPL-SCNC: 104 MMOL/L (ref 98–107)
CO2 SERPL-SCNC: 25.5 MMOL/L (ref 22–29)
CREAT SERPL-MCNC: 1.39 MG/DL (ref 0.57–1)
DEPRECATED RDW RBC AUTO: 52.9 FL (ref 37–54)
EGFRCR SERPLBLD CKD-EPI 2021: 41.7 ML/MIN/1.73
EOSINOPHIL # BLD AUTO: 0.02 10*3/MM3 (ref 0–0.4)
EOSINOPHIL NFR BLD AUTO: 0.7 % (ref 0.3–6.2)
ERYTHROCYTE [DISTWIDTH] IN BLOOD BY AUTOMATED COUNT: 16.7 % (ref 12.3–15.4)
GLOBULIN UR ELPH-MCNC: 2.8 GM/DL
GLUCOSE SERPL-MCNC: 109 MG/DL (ref 65–99)
HCT VFR BLD AUTO: 31.6 % (ref 34–46.6)
HGB BLD-MCNC: 10.8 G/DL (ref 12–15.9)
IMM GRANULOCYTES # BLD AUTO: 0 10*3/MM3 (ref 0–0.05)
IMM GRANULOCYTES NFR BLD AUTO: 0 % (ref 0–0.5)
LYMPHOCYTES # BLD AUTO: 1.56 10*3/MM3 (ref 0.7–3.1)
LYMPHOCYTES NFR BLD AUTO: 54.4 % (ref 19.6–45.3)
MAGNESIUM SERPL-MCNC: 2.1 MG/DL (ref 1.6–2.4)
MCH RBC QN AUTO: 32.4 PG (ref 26.6–33)
MCHC RBC AUTO-ENTMCNC: 34.2 G/DL (ref 31.5–35.7)
MCV RBC AUTO: 94.9 FL (ref 79–97)
MONOCYTES # BLD AUTO: 0.12 10*3/MM3 (ref 0.1–0.9)
MONOCYTES NFR BLD AUTO: 4.2 % (ref 5–12)
NEUTROPHILS NFR BLD AUTO: 1.13 10*3/MM3 (ref 1.7–7)
NEUTROPHILS NFR BLD AUTO: 39.3 % (ref 42.7–76)
NRBC BLD AUTO-RTO: 0 /100 WBC (ref 0–0.2)
PHOSPHATE SERPL-MCNC: 2.3 MG/DL (ref 2.5–4.5)
PLAT MORPH BLD: NORMAL
PLATELET # BLD AUTO: 151 10*3/MM3 (ref 140–450)
PMV BLD AUTO: 9.3 FL (ref 6–12)
POTASSIUM SERPL-SCNC: 3.9 MMOL/L (ref 3.5–5.2)
PROT SERPL-MCNC: 6.8 G/DL (ref 6–8.5)
RBC # BLD AUTO: 3.33 10*6/MM3 (ref 3.77–5.28)
RBC MORPH BLD: NORMAL
SODIUM SERPL-SCNC: 138 MMOL/L (ref 136–145)
WBC MORPH BLD: NORMAL
WBC NRBC COR # BLD: 2.87 10*3/MM3 (ref 3.4–10.8)

## 2022-06-24 PROCEDURE — 84100 ASSAY OF PHOSPHORUS: CPT | Performed by: INTERNAL MEDICINE

## 2022-06-24 PROCEDURE — 85007 BL SMEAR W/DIFF WBC COUNT: CPT | Performed by: INTERNAL MEDICINE

## 2022-06-24 PROCEDURE — 99215 OFFICE O/P EST HI 40 MIN: CPT | Performed by: INTERNAL MEDICINE

## 2022-06-24 PROCEDURE — 83735 ASSAY OF MAGNESIUM: CPT | Performed by: INTERNAL MEDICINE

## 2022-06-24 PROCEDURE — 85025 COMPLETE CBC W/AUTO DIFF WBC: CPT | Performed by: INTERNAL MEDICINE

## 2022-06-24 PROCEDURE — 80053 COMPREHEN METABOLIC PANEL: CPT | Performed by: INTERNAL MEDICINE

## 2022-06-24 PROCEDURE — 36415 COLL VENOUS BLD VENIPUNCTURE: CPT

## 2022-06-24 RX ORDER — IBUPROFEN 800 MG/1
TABLET ORAL EVERY 6 HOURS PRN
COMMUNITY
End: 2022-10-14

## 2022-06-24 RX ORDER — ONDANSETRON HYDROCHLORIDE 8 MG/1
1 TABLET, FILM COATED ORAL 3 TIMES DAILY PRN
COMMUNITY
Start: 2022-04-29 | End: 2022-10-14

## 2022-06-24 RX ORDER — SACCHAROMYCES BOULARDII 250 MG
250 CAPSULE ORAL
COMMUNITY
End: 2022-10-14

## 2022-06-24 RX ORDER — SODIUM PHOSPHATE, DIBASIC, ANHYDROUS, POTASSIUM PHOSPHATE, MONOBASIC, AND SODIUM PHOSPHATE, MONOBASIC, MONOHYDRATE 852; 155; 130 MG/1; MG/1; MG/1
2 TABLET, COATED ORAL 2 TIMES DAILY
Qty: 130 EACH | Refills: 0 | Status: SHIPPED | OUTPATIENT
Start: 2022-06-24 | End: 2022-12-15

## 2022-06-24 RX ORDER — ATORVASTATIN CALCIUM 10 MG/1
1 TABLET, FILM COATED ORAL NIGHTLY
COMMUNITY
Start: 2022-04-25 | End: 2022-12-07 | Stop reason: SDUPTHER

## 2022-06-24 NOTE — NURSING NOTE
Patient phosphorus level was low today at 2.3 mg. Dr Hawkins notified of low level and to hold Xgeva today . Conferred with pharmacy and patient will have K Phos Neutral sent to her pharmacy for two tablets twice daily. Patient is to return on July 1st for lab recheck and possible Xgeva and Faslodex. Instructed patient and she  Verbalized understanding.  ..  Lab Results   Component Value Date    CALCIUM 9.9 06/24/2022    PHOS 2.3 (L) 06/24/2022

## 2022-07-01 ENCOUNTER — APPOINTMENT (OUTPATIENT)
Dept: ONCOLOGY | Facility: HOSPITAL | Age: 67
End: 2022-07-01

## 2022-07-01 ENCOUNTER — LAB (OUTPATIENT)
Dept: OTHER | Facility: HOSPITAL | Age: 67
End: 2022-07-01

## 2022-07-01 ENCOUNTER — INFUSION (OUTPATIENT)
Dept: ONCOLOGY | Facility: HOSPITAL | Age: 67
End: 2022-07-01

## 2022-07-01 DIAGNOSIS — C50.912 CARCINOMA OF LEFT BREAST METASTATIC TO LUNG: ICD-10-CM

## 2022-07-01 DIAGNOSIS — N94.89 ADNEXAL MASS: Primary | ICD-10-CM

## 2022-07-01 DIAGNOSIS — C79.51 BONE METASTASES: ICD-10-CM

## 2022-07-01 DIAGNOSIS — C78.02 CARCINOMA OF LEFT BREAST METASTATIC TO LUNG: ICD-10-CM

## 2022-07-01 DIAGNOSIS — N94.89 ADNEXAL MASS: ICD-10-CM

## 2022-07-01 LAB
ALBUMIN SERPL-MCNC: 4.1 G/DL (ref 3.5–5.2)
ALBUMIN/GLOB SERPL: 1.5 G/DL
ALP SERPL-CCNC: 78 U/L (ref 39–117)
ALT SERPL W P-5'-P-CCNC: 19 U/L (ref 1–33)
ANION GAP SERPL CALCULATED.3IONS-SCNC: 10 MMOL/L (ref 5–15)
ANISOCYTOSIS BLD QL: NORMAL
AST SERPL-CCNC: 23 U/L (ref 1–32)
BASOPHILS # BLD AUTO: 0.06 10*3/MM3 (ref 0–0.2)
BASOPHILS NFR BLD AUTO: 2.4 % (ref 0–1.5)
BILIRUB SERPL-MCNC: 0.6 MG/DL (ref 0–1.2)
BUN SERPL-MCNC: 17 MG/DL (ref 8–23)
BUN/CREAT SERPL: 11.6 (ref 7–25)
CALCIUM SPEC-SCNC: 9.6 MG/DL (ref 8.6–10.5)
CHLORIDE SERPL-SCNC: 108 MMOL/L (ref 98–107)
CO2 SERPL-SCNC: 23 MMOL/L (ref 22–29)
CREAT SERPL-MCNC: 1.46 MG/DL (ref 0.57–1)
DEPRECATED RDW RBC AUTO: 59.7 FL (ref 37–54)
EGFRCR SERPLBLD CKD-EPI 2021: 39.3 ML/MIN/1.73
EOSINOPHIL # BLD AUTO: 0.03 10*3/MM3 (ref 0–0.4)
EOSINOPHIL NFR BLD AUTO: 1.2 % (ref 0.3–6.2)
ERYTHROCYTE [DISTWIDTH] IN BLOOD BY AUTOMATED COUNT: 17.7 % (ref 12.3–15.4)
GLOBULIN UR ELPH-MCNC: 2.8 GM/DL
GLUCOSE SERPL-MCNC: 107 MG/DL (ref 65–99)
HCT VFR BLD AUTO: 31 % (ref 34–46.6)
HGB BLD-MCNC: 10.9 G/DL (ref 12–15.9)
IMM GRANULOCYTES # BLD AUTO: 0 10*3/MM3 (ref 0–0.05)
IMM GRANULOCYTES NFR BLD AUTO: 0 % (ref 0–0.5)
LYMPHOCYTES # BLD AUTO: 1.06 10*3/MM3 (ref 0.7–3.1)
LYMPHOCYTES NFR BLD AUTO: 42.7 % (ref 19.6–45.3)
MAGNESIUM SERPL-MCNC: 2.2 MG/DL (ref 1.6–2.4)
MCH RBC QN AUTO: 32.9 PG (ref 26.6–33)
MCHC RBC AUTO-ENTMCNC: 35.2 G/DL (ref 31.5–35.7)
MCV RBC AUTO: 93.7 FL (ref 79–97)
MONOCYTES # BLD AUTO: 0.32 10*3/MM3 (ref 0.1–0.9)
MONOCYTES NFR BLD AUTO: 12.9 % (ref 5–12)
NEUTROPHILS NFR BLD AUTO: 1.01 10*3/MM3 (ref 1.7–7)
NEUTROPHILS NFR BLD AUTO: 40.8 % (ref 42.7–76)
NRBC BLD AUTO-RTO: 0 /100 WBC (ref 0–0.2)
PHOSPHATE SERPL-MCNC: 4.1 MG/DL (ref 2.5–4.5)
PLAT MORPH BLD: NORMAL
PLATELET # BLD AUTO: 240 10*3/MM3 (ref 140–450)
PMV BLD AUTO: 9.1 FL (ref 6–12)
POTASSIUM SERPL-SCNC: 4.5 MMOL/L (ref 3.5–5.2)
PROT SERPL-MCNC: 6.9 G/DL (ref 6–8.5)
RBC # BLD AUTO: 3.31 10*6/MM3 (ref 3.77–5.28)
SODIUM SERPL-SCNC: 141 MMOL/L (ref 136–145)
WBC MORPH BLD: NORMAL
WBC NRBC COR # BLD: 2.48 10*3/MM3 (ref 3.4–10.8)

## 2022-07-01 PROCEDURE — 85025 COMPLETE CBC W/AUTO DIFF WBC: CPT | Performed by: INTERNAL MEDICINE

## 2022-07-01 PROCEDURE — 96372 THER/PROPH/DIAG INJ SC/IM: CPT

## 2022-07-01 PROCEDURE — 36415 COLL VENOUS BLD VENIPUNCTURE: CPT

## 2022-07-01 PROCEDURE — 25010000002 DENOSUMAB 120 MG/1.7ML SOLUTION

## 2022-07-01 PROCEDURE — 80053 COMPREHEN METABOLIC PANEL: CPT | Performed by: INTERNAL MEDICINE

## 2022-07-01 PROCEDURE — 84100 ASSAY OF PHOSPHORUS: CPT | Performed by: INTERNAL MEDICINE

## 2022-07-01 PROCEDURE — 85007 BL SMEAR W/DIFF WBC COUNT: CPT | Performed by: INTERNAL MEDICINE

## 2022-07-01 PROCEDURE — 25010000002 FULVESTRANT PER 25 MG

## 2022-07-01 PROCEDURE — 83735 ASSAY OF MAGNESIUM: CPT | Performed by: INTERNAL MEDICINE

## 2022-07-01 PROCEDURE — 96402 CHEMO HORMON ANTINEOPL SQ/IM: CPT

## 2022-07-01 RX ORDER — METOPROLOL SUCCINATE 25 MG/1
TABLET, EXTENDED RELEASE ORAL
Qty: 30 TABLET | Refills: 0 | Status: SHIPPED | OUTPATIENT
Start: 2022-07-01 | End: 2022-07-18

## 2022-07-01 RX ADMIN — FULVESTRANT 500 MG: 50 INJECTION INTRAMUSCULAR at 13:52

## 2022-07-01 RX ADMIN — DENOSUMAB 120 MG: 120 INJECTION SUBCUTANEOUS at 14:14

## 2022-07-01 NOTE — NURSING NOTE
Phosphorus level today had improved and lab reviewed with Kelsea SHARP and patient was instructed to cut back to 250 mg of K-Phos- [place] daily. Patient verbalized understanding.   This SmartLink has not been configured with any valid records.     Lab Results   Component Value Date    CALCIUM 9.6 07/01/2022    PHOS 4.1 07/01/2022     .

## 2022-07-05 ENCOUNTER — SPECIALTY PHARMACY (OUTPATIENT)
Dept: PHARMACY | Facility: HOSPITAL | Age: 67
End: 2022-07-05

## 2022-07-05 NOTE — PROGRESS NOTES
Specialty Note ( ibrance+Faslodex)      Labs reviewed        7/1/2022   WBC 3.40 - 10.80 10*3/mm3 2.48 (A)   Neutrophils Absolute 1.70 - 7.00 10*3/mm3 1.01 (A)   Hemoglobin 12.0 - 15.9 g/dL 10.9 (A)   Hematocrit 34.0 - 46.6 % 31.0 (A)   Platelets 140 - 450 10*3/mm3 240   Creatinine 0.57 - 1.00 mg/dL 1.46 (A)   BUN 8 - 23 mg/dL 17   Sodium 136 - 145 mmol/L 141   Potassium 3.5 - 5.2 mmol/L 4.5   Glucose 65 - 99 mg/dL 107 (A)   Magnesium 1.6 - 2.4 mg/dL 2.2   Calcium 8.6 - 10.5 mg/dL 9.6   Albumin 3.50 - 5.20 g/dL 4.10   Total Protein 6.0 - 8.5 g/dL 6.9   AST (SGOT) 1 - 32 U/L 23   ALT (SGPT) 1 - 33 U/L 19   Alkaline Phosphatase 39 - 117 U/L 78   Total Bilirubin 0.0 - 1.2 mg/dL 0.6   Phosphorus 2.5 - 4.5 mg/dL 4.1   eGFR >60.0 mL/min/1.73 39.3 (A)  National Kidney Foundation and American Society of Nephrology (ASN) Task Force recommended calculation based on the Chronic Kidney Disease Epidemiology Collaboration (CKD-EPI) equation refit without adjustment for race.     Continues Ibrance 125 mg po 21/28 days ( as of 5/6/22)  and monthly Faslodex injections.  APRN adjusted K Phos dose to 250 mg po daily

## 2022-07-18 ENCOUNTER — HOSPITAL ENCOUNTER (OUTPATIENT)
Dept: NUCLEAR MEDICINE | Facility: HOSPITAL | Age: 67
Discharge: HOME | End: 2022-07-18

## 2022-07-18 ENCOUNTER — HOSPITAL ENCOUNTER (OUTPATIENT)
Dept: CT IMAGING | Facility: HOSPITAL | Age: 67
Discharge: HOME | End: 2022-07-18
Admitting: INTERNAL MEDICINE

## 2022-07-18 DIAGNOSIS — C78.02 CARCINOMA OF LEFT BREAST METASTATIC TO LUNG: ICD-10-CM

## 2022-07-18 DIAGNOSIS — C50.912 CARCINOMA OF LEFT BREAST METASTATIC TO LUNG: ICD-10-CM

## 2022-07-18 DIAGNOSIS — C79.51 BONE METASTASES: ICD-10-CM

## 2022-07-18 PROCEDURE — 0 TECHNETIUM MEDRONATE KIT: Performed by: INTERNAL MEDICINE

## 2022-07-18 PROCEDURE — 74176 CT ABD & PELVIS W/O CONTRAST: CPT

## 2022-07-18 PROCEDURE — A9503 TC99M MEDRONATE: HCPCS | Performed by: INTERNAL MEDICINE

## 2022-07-18 PROCEDURE — 78306 BONE IMAGING WHOLE BODY: CPT

## 2022-07-18 PROCEDURE — 71250 CT THORAX DX C-: CPT

## 2022-07-18 RX ORDER — METOPROLOL SUCCINATE 25 MG/1
TABLET, EXTENDED RELEASE ORAL
Qty: 30 TABLET | Refills: 0 | Status: SHIPPED | OUTPATIENT
Start: 2022-07-18 | End: 2022-08-17

## 2022-07-18 RX ORDER — TC 99M MEDRONATE 20 MG/10ML
21.7 INJECTION, POWDER, LYOPHILIZED, FOR SOLUTION INTRAVENOUS
Status: COMPLETED | OUTPATIENT
Start: 2022-07-18 | End: 2022-07-18

## 2022-07-18 RX ORDER — ANASTROZOLE 1 MG/1
TABLET ORAL
Refills: 0 | OUTPATIENT
Start: 2022-07-18

## 2022-07-18 RX ADMIN — Medication 21.7 MILLICURIE: at 10:31

## 2022-07-20 ENCOUNTER — DOCUMENTATION (OUTPATIENT)
Dept: PHARMACY | Facility: HOSPITAL | Age: 67
End: 2022-07-20

## 2022-07-20 NOTE — PROGRESS NOTES
I received a fax notice from [employer] dated 7/20/2022 stating that Ibrance has shipped to the patient.    Sarai Werner - Care Coordinator   7/20/2022  12:14 EDT

## 2022-07-22 ENCOUNTER — DOCUMENTATION (OUTPATIENT)
Dept: ONCOLOGY | Facility: CLINIC | Age: 67
End: 2022-07-22

## 2022-07-22 NOTE — PROGRESS NOTES
Contacted by Julissa Centeno (behavioral oncology).  Patient canceled on the day of the appointment during her first scheduled appointment with Julissa.  She then no showed for the rescheduled appointment.  Julissa recommends the patient continue to follow with her therapist and psychiatrist and if she needs additional support, to use [employer]

## 2022-08-01 ENCOUNTER — SPECIALTY PHARMACY (OUTPATIENT)
Dept: PHARMACY | Facility: HOSPITAL | Age: 67
End: 2022-08-01

## 2022-08-03 ENCOUNTER — TELEPHONE (OUTPATIENT)
Dept: ONCOLOGY | Facility: CLINIC | Age: 67
End: 2022-08-03

## 2022-08-03 NOTE — TELEPHONE ENCOUNTER
Caller: Marialuisa Vivar    Relationship: Self    Best call back number: [phone]    What is the best time to reach you: ANYTME    Who are you requesting to speak with (clinical staff, provider,  specific staff member): SCHEDULING    What was the call regarding: PATIENT TO RESCHEDULE MISSED 7/29/2022.    PREFERS Friday AND AT [facility] LOC[facility]     Do you require a callback: YES

## 2022-08-09 ENCOUNTER — SPECIALTY PHARMACY (OUTPATIENT)
Dept: PHARMACY | Facility: HOSPITAL | Age: 67
End: 2022-08-09

## 2022-08-17 RX ORDER — METOPROLOL SUCCINATE 25 MG/1
TABLET, EXTENDED RELEASE ORAL
Qty: 90 TABLET | Refills: 0 | Status: SHIPPED | OUTPATIENT
Start: 2022-08-17 | End: 2022-09-19

## 2022-08-17 NOTE — PROGRESS NOTES
.     REASONS FOR FOLLOWUP: Metastatic breast cancer, tongue cancer    HISTORY OF PRESENT ILLNESS:  The patient is a 66 y.o. year old female  who is here for follow-up with the above-mentioned history.    She missed her last appointment with me.  She thinks that was probably due to emotional reasons but states she is not sure since that was several weeks ago.    No significant issues with pain.  She has had diffuse itching with a rash.  She last saw her dermatologist, Dr. Herman, 1 to 2 years ago.  No significant nausea.  No significant SOA.  Eating well.    Past Medical History:   Diagnosis Date   • Allergy    • Anxiety    • Asthma    • Bone metastasis (HCC)    • Breast cancer (HCC)     Left node positive   • Cholelithiasis 2000    Lap Marily   • Colon polyp Past 10-15 yrs?    found at colonoscopies   • Depression    • Esophageal reflux     cough   • History of colon polyps    • Hyperlipidemia    • Hypertension    • Left breast cancer metastasized to lung    • Obstructive sleep apnea     does not wear cpap   • Ovarian cyst    • PONV (postoperative nausea and vomiting)    • Tongue cancer (HCC) 05/2019    by ENT at [facility] dr Quinn     Past Surgical History:   Procedure Laterality Date   • CHOLECYSTECTOMY  2000   • COLONOSCOPY  2014    H/O polyps   • COLONOSCOPY N/A 6/1/2022    Procedure: COLONOSCOPY to cecum and TI with cold / hot snare polypectomies;  Surgeon: Luis Enrique Galeas MD;  Location: [facility] ENDOSCOPY;  Service: Gastroenterology;  Laterality: N/A;  pre-abnormal ct  post-polyps, diverticulosis, hemorrhoids   • KNEE ARTHROPLASTY     • LUNG SURGERY  2010    Lung wedge resection   • MASTECTOMY RADICAL Left 1993   • TONGUE SURGERY  05/21/2019    tongue cancer   • TOTAL LAPAROSCOPIC HYSTERECTOMY N/A 11/04/2021    Procedure: Robotic assisted total laparoscopic hysterectomy, bilateral salpingoophorecotmy, bilateral ureteralysis ;  Surgeon: Kaylynn Ricci DO;  Location: [facility] OR;  Service: Robotics  Windy Szymanski;  Laterality: N/A;       MEDICATIONS    Current Outpatient Medications:   •  atorvastatin (LIPITOR) 10 MG tablet, Take 1 tablet by mouth Every Night., Disp: , Rfl:   •  cholecalciferol (VITAMIN D3) 1.25 MG (20450 UT) capsule, Take 5,000 Units by mouth 3 (Three) Times a Week., Disp: , Rfl:   •  eszopiclone (LUNESTA) 3 MG tablet, Take 3 mg by mouth., Disp: , Rfl:   •  eszopiclone (LUNESTA) tablet, Take 3 mg by mouth At Night As Needed., Disp: , Rfl:   •  HYDROcodone-acetaminophen (NORCO) 5-325 MG per tablet, Take 1 tablet by mouth Every 4 (Four) Hours As Needed for Moderate Pain ., Disp: 30 tablet, Rfl: 0  •  LORazepam (ATIVAN) 0.5 MG tablet, Take 1 tablet by mouth Every 8 (Eight) Hours As Needed., Disp: , Rfl:   •  metoprolol succinate XL (TOPROL-XL) 25 MG 24 hr tablet, Take 1 tablet by mouth once daily, Disp: 90 tablet, Rfl: 0  •  Omeprazole 20 MG Tablet Delayed Release Dispersible, Take 20 mg by mouth As Needed., Disp: , Rfl:   •  ondansetron (ZOFRAN) 8 MG tablet, Take 1 tablet by mouth 3 (Three) Times a Day As Needed for Nausea or Vomiting., Disp: 30 tablet, Rfl: 5  •  Palbociclib (Ibrance) 125 MG tablet, TAKE 1 TABLET BY MOUTH DAILY FOR 21 DAYS, THEN OFF FOR 7 DAYS, Disp: 21 tablet, Rfl: 1  •  PARoxetine (PAXIL) 40 MG tablet, Take 40 mg by mouth Every Night., Disp: , Rfl:   •  Probiotic Product (PROBIOTIC-10 PO), Take  by mouth., Disp: , Rfl:   •  saccharomyces boulardii (FLORASTOR) 250 MG capsule, Take 250 mg by mouth., Disp: , Rfl:   •  sennosides-docusate (PERICOLACE) 8.6-50 MG per tablet, Take 1 tablet by mouth Daily., Disp: , Rfl:   •  atorvastatin (LIPITOR) 10 MG tablet, Take 1 tablet by mouth Every Night., Disp: 90 tablet, Rfl: 1  •  busPIRone (BUSPAR) 10 MG tablet, Take 5 mg by mouth 2 (Two) Times a Day., Disp: , Rfl:   •  ibuprofen (ADVIL,MOTRIN) 800 MG tablet, Take  by mouth Every 6 (Six) Hours As Needed., Disp: , Rfl:   •  Ibuprofen (MOTRIN PO), Take  by mouth., Disp: , Rfl:   •  K Phos  Mono-Sod Phos Di & Mono (K-Phos-Neutral) 155-852-130 MG tablet, Take 2 tablets by mouth 2 (Two) Times a Day. (Patient taking differently: Take 2 tablets by mouth Daily.), Disp: 130 each, Rfl: 0  •  ondansetron (ZOFRAN) 8 MG tablet, Take 1 tablet by mouth 3 (Three) Times a Day As Needed., Disp: , Rfl:   No current facility-administered medications for this visit.    Facility-Administered Medications Ordered in Other Visits:   •  chlorhexidine (PERIDEX) 0.12 % solution 15 mL, 15 mL, Mouth/Throat, Q12H, Kaylynn Ricci,   •  mupirocin (BACTROBAN) 2 % nasal ointment, , Nasal, BID, Kaylynn Ricci,     ALLERGIES:     Allergies   Allergen Reactions   • Nickel Unknown - Low Severity   • Ciprofloxacin Rash       SOCIAL HISTORY:       Social History     Socioeconomic History   • Marital status: Single   • Years of education: College   Tobacco Use   • Smoking status: Former Smoker     Packs/day: 2.00     Years: 30.00     Pack years: 60.00     Types: Cigarettes     Start date: 1973     Quit date: 2008     Years since quittin.2   • Smokeless tobacco: Never Used   Vaping Use   • Vaping Use: Never used   Substance and Sexual Activity   • Alcohol use: No   • Drug use: No   • Sexual activity: Not Currently     Birth control/protection: None         FAMILY HISTORY:  Family History   Problem Relation Age of Onset   • Hypertension Mother    • Leukemia Mother 72   • COPD Mother         smoker   • Diabetes Brother    • Pancreatic cancer Brother    • Glaucoma Brother    • Heart disease Brother    • Hypertension Brother    • Gallbladder disease Brother    • Pancreatitis Brother          from pancreatic csncer   • Gallbladder disease Father    • Colon polyps Father    • Stroke Other         Grandmother   • Lupus Other         Aunt   • Alcohol abuse Other         Aunt   • Glaucoma Other         Grandmother   • Diabetes type II Brother    • Hypertension Brother    • Hyperlipidemia Brother    • Liver cancer  "Paternal Uncle    • Stomach cancer Paternal Aunt    • Alcohol abuse Maternal Aunt    • Malig Hyperthermia Neg Hx        REVIEW OF SYSTEMS:  Review of Systems   Constitutional: Negative for activity change.   HENT: Negative for nosebleeds and trouble swallowing.    Respiratory: Negative for shortness of breath and wheezing.    Cardiovascular: Negative for chest pain and palpitations.   Gastrointestinal: Negative for constipation, diarrhea and nausea.   Genitourinary: Negative for dysuria and hematuria.   Musculoskeletal: Negative for arthralgias and myalgias.   Skin: Positive for rash. Negative for wound.   Neurological: Negative for seizures and syncope.   Hematological: Negative for adenopathy. Does not bruise/bleed easily.   Psychiatric/Behavioral: Negative for confusion.            Vitals:    08/19/22 1018   BP: 119/72   Pulse: 76   Resp: 16   Temp: 97.3 °F (36.3 °C)   TempSrc: Temporal   SpO2: 97%   Weight: 76.8 kg (169 lb 6.4 oz)   Height: 165 cm (64.96\")   PainSc: 0-No pain     Current Status 8/19/2022   ECOG score 0        PHYSICAL EXAM:        CONSTITUTIONAL:  Vital signs reviewed.  No distress, looks comfortable.  EYES:  Conjunctiva and lids unremarkable.  PERRLA  EARS,NOSE,MOUTH,THROAT:  Ears and nose appear unremarkable.  Lips, teeth, gums appear unremarkable.  RESPIRATORY:  Normal respiratory effort.  Lungs clear to auscultation bilaterally.  CARDIOVASCULAR:  Normal S1, S2.  No murmurs rubs or gallops.  No significant lower extremity edema.  GASTROINTESTINAL: Abdomen appears unremarkable.  Nontender.  No hepatomegaly.  No splenomegaly.  LYMPHATIC:  No cervical, supraclavicular, axillary lymphadenopathy.  SKIN:  Warm.  Rash noted  PSYCHIATRIC:  Normal judgment and insight.  Normal mood and affect.         RECENT LABS:        WBC   Date/Time Value Ref Range Status   08/19/2022 10:03 AM 2.38 (L) 3.40 - 10.80 10*3/mm3 Final     Hemoglobin   Date/Time Value Ref Range Status   08/19/2022 10:03 AM 11.4 (L) 12.0 " - 15.9 g/dL Final     Platelets   Date/Time Value Ref Range Status   08/19/2022 10:03  140 - 450 10*3/mm3 Final       Assessment & Plan   Adnexal mass  - Phosphorus  - CBC and Differential  - Comprehensive metabolic panel    Carcinoma of left breast metastatic to lung (HCC)  - Phosphorus  - CBC and Differential  - Comprehensive metabolic panel    Marialuisa Vivar        Assessment/Plan     *Metastatic breast cancer to bilateral lungs and soft tissue in the mediastinum (likely the cause of her hoarseness). (Initial breast cancer diagnosed in 1993 treated with mastectomy, chemotherapy, radiation therapy and tamoxifen). Arimidex day 1 on 02/17/2010. PET scan late August 2011 shows no abnormal hypermetabolic activity. This has improved compared to the prior PET scan January 2010 which showed mild hypermetabolic activity in areas of metastasis. (In the past, her  T6 lesion did not show up on CT scans or bone scan. It was seen by PET scan.) We have been following with CT scans only every 6 months and PET scans on an as needed only basis. Sometimes her CT scanned pulmonary nodules are read as benign since they have been stable through the course of years but we know they are malignant because they have been surgically sampled in the past.    · CT 8/16/16 shows increased sclerosis at T6 compared to 4/28/15.    · PET 10/11/16: Slight uptake at T6, SUV 3.2.  mild thickening of right adrenal gland with an SUV of 11.   · Continued Arimidex is minimal progression and had been on this since 2/17/10.  · Not referred for radiation since no pain at T6  · CT 1/27/17, unchanged.  · CT 8/1/17: Unchanged except subtle suggestion of mild increase in thickening of the right adrenal gland.    · CT 2/22/18, unchanged.  · CT 9/6/18: Unchanged except nodular thickening right adrenal gland significantly decreased.  · CT 9/5/2019: Unchanged.  · On the 6/12/2020 visit, the following scans were reviewed:  · CT neck and chest 5/11/2020, [facility],  from 5/9/2019: Stable pulmonary nodules new lytic C7 lesion and posterior T1 lesion questionable T12 lesion.  No change in the T6 and T10 lesions.  · PET, [facility], 5/19/2020: Mildly enlarged left neck nodes are mildly hypermetabolic.  Diffuse activity throughout the thickened left adrenal gland.  CT appearance unchanged from 5/9/2019.  Progressive T1 lesion seen on CT from 5/11/2020, mild some moderate activity.  T10 low-grade activity with mixed lucent and sclerotic findings.  T6 is a mixed lucent and sclerotic appearance but no significant activity.   · T12 (the biopsy report is labeled as T12 but patient insists this was a C7/T1 biopsy, not to T12) biopsy 6/15/2020, [facility]: Metastatic breast carcinoma.  ER >95%.  MN 0%.  HER-2 negative (1+)  · It seems the main progression at the 6/12/2020 visit was a new C7 and new T1 lesion.  Those were radiated with [facility] through [facility] early June 2020.  Because Arimidex has been working well since 2010, continue same Arimidex.  Add Zometa every 3 months.  · C7 and T1 found on CT 5/11/2020, [facility].  CyberKnife, Dr. Winston, early June 2020.  · CT C and T-spine 9/17/2020: New C7 lesion from 9/6/2018, but no change from 5/11/2020 CT.  T1 lesion stable from 5/11/2020, but new compared to 2/22/2018.  T10 lesion unchanged from 9/5/2019, but new from 2/22/2018.  Subtle 8 mm T12 lesion new from 9/5/2019.  · Therefore, no recent progression.  · Considering her good tolerance and long-term effectiveness of Arimidex, continue same therapy.  · CT CAP 12/10/2020: No progression  · Therefore, continue Arimidex.  · CA 15-3 normal through 12/14/2020  · Because CA 15-3 fabian to 27 on 4/13/2021, then 29.3 on 7/20/2021, then 34.3 on 10/12/2021, CT scans done earlier than planned:  · CT CAP with contrast 10/12/2021: New 5.2 x 4.6 cm mixed cystic and solid right ovarian mass compared to 12/10/2020.  Continued stability of bilateral pulmonary nodules and stable sclerotic bone  metastasis.  · 11/4/2021: TLH/BSO (due to new 5 cm right ovarian mass on CT, 11/12/2021).  · Metastatic breast cancer involving bilateral ovaries and posterior uterine serosa.  ER 81-90%.  DE 61-70%.  HER-2 negative  · [facility] NGS: ER 98%.  DE 90%.  HER-2/eb negative.  AKT1 pathogenic variant, exon 3.  AR+, 95%.  No other significant mutations including negative PIK3CA  · Pelvic washings: Adenocarcinoma  · Because this was the only area of progression on CT, and has been resected, continue Arimidex which she has been on since 2/17/2010.  · 11/22/2021: Discussed with Dr. Blum, who has a focus on breast cancer.  We both agree: Continue Arimidex for now.  In the future, if significant progression is seen, then plan Faslodex injections with Ibrance  · CT CAP 2/1/2022: A few T-spine sclerotic lesions progressively more sclerotic over multiple prior CTs of indeterminate significance.  No clear signs of progression of disease.  · 3/15/2022: Although tumor marker is not rising and CT showed no clear signs of progression.  CT revealed progressively more sclerotic bone metastasis over multiple prior CTs, she is having increased right hip discomfort.  Sometimes this can be due to healing of metastasis (but she has been on the same treatment since 2010), or this could mean progression of bone metastasis.  It is reassuring that her tumor marker is not worsening.  She would like to see [facility] to see if they feel radiation to the hip would help her discomfort.  We will do a bone scan before that appointment as this might help [facility].  (Although she has seen [facility] radiation for her neck, she would like to see [facility] for this hip issue).  · 3/18/2022, bone scan: Metastatic disease at T9 and T10, corresponding to CT lesions.  Uptake also at T6, but to lesser degree.  These areas are new compared to last bone scan, 6/3/2009.  Therefore, overall, appears consistent with  progression.  · 3/24/2021: Discussed changing  Arimidex to Faslodex/Ibrance 125 mg D1-21/28 days.  · However, Dr. Llanes will decide on 4/12/2022 if the patient needs any traditional XRT to hip/pelvis.  If so, this may cause cytopenias due to the location of XRT.  Ibrance can also cause cytopenias.  If this is the case, we will wait to begin Ibrance until at least a few weeks after XRT completes, provided blood counts allow.   · 4/22/2022: Dr. Llanes reviewed MRI right hip from 4/19/2022, revealing L3 metastasis, similar size of prior CTs.  Therefore, she plans no XRT.  · 5/6/2022: Began Faslodex Ibrance  · CT 7/18/2022: Some bone mets more sclerotic, which could reflect treatment response.  Slightly increased RUL nodule, 1.1 cm, from 0.9 cm on 2/1/2022  · Bone scan 7/18/2022: No change from 3/18/2022  · 8/19/2022 (patient delayed follow-up to review scans until then): Continue same therapy since no significant progression of disease    CA 15-3:  · Pending on 8/19/2022, from 22.5 on 5/6/2022, from 26.3 on 2/1/2022, from 32.6 on 11/22/2021, from 34.3 on 10/12/2021, from 29.3 on 7/20/2021, from 27 on 4/13/2021, from 23.3 on 12/14/2020.    *Bony metastases  · T6 discovered by PET to August 2011 (did not show up by CT or bone scan)  · C7 and T1 found on CT 5/11/2020, [facility].  CyberKnife, Dr. Winston, early June 2020.  · June 2020 began Zometa every 3 months.  · She was warned of possible osteonecrosis of the jaw and renal insufficiency.  She states she has good dental health and sees her dentist regularly.  Because of prior hypercalcemia requiring stopping calcium supplements and because of high normal current calcium level, began without supplemental calcium.  Add calcium if calcium becomes low.  · 1/7/2022: Tooth extracted.  Plan was to wait 5 weeks to resume Zometa.  Patient delayed return until 3/15/2022 (over 8 weeks)  · May 2022: Changed Zometa to Xgeva every 3 months due to Cr up to 1.6    *Bone health. Last  bone density 10/11/16, worsened, but still normal with worst T score -0.9.  Patient stopped calcium January 2016, but remained on vitamin D.  I recommended she restart her calcium.  Stopped calcium early March 2018 due to hypercalcemia.  · DEXA 9/5/2019: Normal bone density    *Hypercalcemia.  Repeat calcium 3/7/18 normal, but patient self stopped calcium a few days prior.  Recommended she stay off calcium.  · Calcium was 11 on 6/19/2020  · Calcium 11 again on 12/10/2020  · Repeat calcium 10.5 on 12/14/2020  · Calcium 10.6.  Minimally elevated.  (Normal range up to 10.5)  · Calcium 10.8 on 3/15/2022.  Hopefully, Zometa will help with this.  · Calcium 10    *Hypophosphatemia  · Phosphorus 2.3 on 6/24/2022.  Begin K-Phos Neutral 2 tablets twice daily  · Patient self stopped this around mid July 2022.  · Phosphorus normal, 3.6    *On the 10/5/16 visit, Tachycardia, dyspnea on exertion, bilateral leg swelling, more sedentary recently.  CT PE protocol and bilateral lower extremity Dopplers 10/5/16, negative for clots.  She still has tachycardia and dyspnea on exertion.  Perhaps at least part of this is due to deconditioning.    *Previously complained of colon Right lower anterior rib discomfort.  CT unremarkable in that area.  No specific pain, just discomfort.  I explained to the patient if disease was found by PET scan or bone scan, I would not  since she has been doing well on Arimidex since 2010.   Currently denies pain.     *Depression/anxiety.  This limits compliance.  She sees a psychiatrist and a counselor regularly.  She states this is mostly due to body image issues instead of cancer.   · She continues with her counselor and psychiatrist.  Viral pandemic is making this a little more difficult.  · 4/27/2022: We will try to get her involved in some cancer support groups.  I also encouraged her to try out some small groups at [facility] (she struggles with loneliness).    *Noncompliance due to  depression/anxiety.  Although she misses appointments, she does reschedule and eventually come in.    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  Positive sentinel node (1 of 3 nodes)..  · Left neck dissection by Dr. Willie Quinn, [facility], on 6/5/2019.  22 nodes negative.  Negative margins.  No lymphovascular invasion or perineural invasion.  2 mm depth of invasion.  Grade 1.  Squamous cell carcinoma.  1.8 cm tumor.  · Because the patient felt what she thought was left neck LAD, CT neck and chest and PET at [facility]. May 2020 performed.  She is being followed at [facility] for this.  · On 6/19/2020, per verbal report from Dr. Senia COL[place]ADO [facility], [facility] ENT does not feel she has any active head and neck cancer.   · She states she has a follow-up with Dr. Kapadia, ENT at [facility], in June with CT neck 1 week prior  · No symptoms to suggest recurrence.  She follows with [facility].    *Previously complained of left hip pain x2 weeks.  This prompted an MRI pelvis 8/20/2019 at [facility] and open MRI which was negative for malignancy.    *Possible intolerance of Zometa  · After first dose, June 2020, 2 days of chills, bone pain  · She would like to try Zometa again.  If this occurs again, we will try to switch her to Xgeva monthly.  (No good data on every 3-month Xgeva)  · She did fine with the second dose of Zometa.    *Thickening of wall of transverse colon on CT 2/1/2022.  Last colonoscopy was 4/18/2019.  She wants the next colonoscopy done at [facility]  · Dr. Galeas will see her on 4/7/2022 to evaluate this    *Depression  · Is seeing a psychiatry NP and a therapist for many years.  · 6/24/2022: Reported she decided to stop seeing her therapist but is still interested in therapy.  She would like to see behavioral oncology at [facility].  This was arranged  · Contacted by Julissa Centeno (behavioral oncology).   Patient canceled on the day of the appointment during her first scheduled appointment with Julissa.  She then no showed for the rescheduled appointment.  Julissa recommends the patient continue to follow with her therapist and psychiatrist and if she needs additional support, to use [employer]    *Pruritic rash  · 8/19/2022: Referred to dermatology    Monitoring on Ibrance:  CBC every 2 weeks for the first 2 months then monthly and as indicated.  After 6 months, if neutropenia grade 1 or 2 only, then CBC can be every 3 months    PLAN:   · Referral to dermatology for pruritic rash, 8/19/2022  · Her scans were on 7/18/2022.  She did not return to see me until today, 8/19/2022.   · Continue Faslodex/Ibrance 125 mg D1-21/28 days  · Every 3-month Xgeva, last dose, 7/1/2022  · (Xgeva instead of Zometa due to intermittent high creatinine)  · MD or NP with Faslodex every 4 weeks  · Every 6 months CT CAP without IV contrast, bone scan (last 7/18/2022), CA 15-3  · Previously arranged for her to get involved with some cancer support groups  ·  CT scans WITHOUT IV CONTRAST (again, to avoid further kidney damage.)  · She sees [facility]NT, Dr. Bedoya annually, next is summer 2022      41 minutes.  Total time.  Same day.    Send a copy of this note to   Isaac Valdes MD, Dr., radiation medicine, [facility]

## 2022-08-19 ENCOUNTER — LAB (OUTPATIENT)
Dept: OTHER | Facility: HOSPITAL | Age: 67
End: 2022-08-19

## 2022-08-19 ENCOUNTER — OFFICE VISIT (OUTPATIENT)
Dept: ONCOLOGY | Facility: CLINIC | Age: 67
End: 2022-08-19

## 2022-08-19 ENCOUNTER — INFUSION (OUTPATIENT)
Dept: ONCOLOGY | Facility: HOSPITAL | Age: 67
End: 2022-08-19

## 2022-08-19 ENCOUNTER — SPECIALTY PHARMACY (OUTPATIENT)
Dept: PHARMACY | Facility: HOSPITAL | Age: 67
End: 2022-08-19

## 2022-08-19 VITALS
RESPIRATION RATE: 16 BRPM | TEMPERATURE: 97.3 F | SYSTOLIC BLOOD PRESSURE: 119 MMHG | HEIGHT: 65 IN | BODY MASS INDEX: 28.22 KG/M2 | HEART RATE: 76 BPM | OXYGEN SATURATION: 97 % | WEIGHT: 169.4 LBS | DIASTOLIC BLOOD PRESSURE: 72 MMHG

## 2022-08-19 DIAGNOSIS — C78.02 CARCINOMA OF LEFT BREAST METASTATIC TO LUNG: ICD-10-CM

## 2022-08-19 DIAGNOSIS — N94.89 ADNEXAL MASS: Primary | ICD-10-CM

## 2022-08-19 DIAGNOSIS — C50.912 CARCINOMA OF LEFT BREAST METASTATIC TO LUNG: ICD-10-CM

## 2022-08-19 DIAGNOSIS — N94.89 ADNEXAL MASS: ICD-10-CM

## 2022-08-19 DIAGNOSIS — J38.3 VOCAL CORD DYSFUNCTION: ICD-10-CM

## 2022-08-19 DIAGNOSIS — C79.51 BONE METASTASES: ICD-10-CM

## 2022-08-19 DIAGNOSIS — L28.2 PRURITIC RASH: ICD-10-CM

## 2022-08-19 DIAGNOSIS — F32.89 OTHER DEPRESSION: ICD-10-CM

## 2022-08-19 LAB
ALBUMIN SERPL-MCNC: 4.3 G/DL (ref 3.5–5.2)
ALBUMIN/GLOB SERPL: 1.6 G/DL
ALP SERPL-CCNC: 93 U/L (ref 39–117)
ALT SERPL W P-5'-P-CCNC: 10 U/L (ref 1–33)
ANION GAP SERPL CALCULATED.3IONS-SCNC: 8.5 MMOL/L (ref 5–15)
ANISOCYTOSIS BLD QL: NORMAL
AST SERPL-CCNC: 15 U/L (ref 1–32)
BASOPHILS # BLD AUTO: 0.04 10*3/MM3 (ref 0–0.2)
BASOPHILS NFR BLD AUTO: 1.7 % (ref 0–1.5)
BILIRUB SERPL-MCNC: 0.4 MG/DL (ref 0–1.2)
BUN SERPL-MCNC: 27 MG/DL (ref 8–23)
BUN/CREAT SERPL: 18.2 (ref 7–25)
CALCIUM SPEC-SCNC: 10 MG/DL (ref 8.6–10.5)
CANCER AG15-3 SERPL-ACNC: 27.3 U/ML
CHLORIDE SERPL-SCNC: 105 MMOL/L (ref 98–107)
CO2 SERPL-SCNC: 23.5 MMOL/L (ref 22–29)
CREAT SERPL-MCNC: 1.48 MG/DL (ref 0.57–1)
DEPRECATED RDW RBC AUTO: 59.7 FL (ref 37–54)
EGFRCR SERPLBLD CKD-EPI 2021: 38.7 ML/MIN/1.73
EOSINOPHIL # BLD AUTO: 0.02 10*3/MM3 (ref 0–0.4)
EOSINOPHIL NFR BLD AUTO: 0.8 % (ref 0.3–6.2)
ERYTHROCYTE [DISTWIDTH] IN BLOOD BY AUTOMATED COUNT: 15.3 % (ref 12.3–15.4)
GLOBULIN UR ELPH-MCNC: 2.7 GM/DL
GLUCOSE SERPL-MCNC: 116 MG/DL (ref 65–99)
HCT VFR BLD AUTO: 31.7 % (ref 34–46.6)
HGB BLD-MCNC: 11.4 G/DL (ref 12–15.9)
IMM GRANULOCYTES # BLD AUTO: 0.01 10*3/MM3 (ref 0–0.05)
IMM GRANULOCYTES NFR BLD AUTO: 0.4 % (ref 0–0.5)
LYMPHOCYTES # BLD AUTO: 1.11 10*3/MM3 (ref 0.7–3.1)
LYMPHOCYTES NFR BLD AUTO: 46.6 % (ref 19.6–45.3)
MACROCYTES BLD QL SMEAR: NORMAL
MCH RBC QN AUTO: 38.1 PG (ref 26.6–33)
MCHC RBC AUTO-ENTMCNC: 36 G/DL (ref 31.5–35.7)
MCV RBC AUTO: 106 FL (ref 79–97)
MONOCYTES # BLD AUTO: 0.12 10*3/MM3 (ref 0.1–0.9)
MONOCYTES NFR BLD AUTO: 5 % (ref 5–12)
NEUTROPHILS NFR BLD AUTO: 1.08 10*3/MM3 (ref 1.7–7)
NEUTROPHILS NFR BLD AUTO: 45.5 % (ref 42.7–76)
NRBC BLD AUTO-RTO: 0 /100 WBC (ref 0–0.2)
PHOSPHATE SERPL-MCNC: 3.6 MG/DL (ref 2.5–4.5)
PLAT MORPH BLD: NORMAL
PLATELET # BLD AUTO: 161 10*3/MM3 (ref 140–450)
PMV BLD AUTO: 9.4 FL (ref 6–12)
POTASSIUM SERPL-SCNC: 4.8 MMOL/L (ref 3.5–5.2)
PROT SERPL-MCNC: 7 G/DL (ref 6–8.5)
RBC # BLD AUTO: 2.99 10*6/MM3 (ref 3.77–5.28)
SODIUM SERPL-SCNC: 137 MMOL/L (ref 136–145)
WBC MORPH BLD: NORMAL
WBC NRBC COR # BLD: 2.38 10*3/MM3 (ref 3.4–10.8)

## 2022-08-19 PROCEDURE — 84100 ASSAY OF PHOSPHORUS: CPT | Performed by: INTERNAL MEDICINE

## 2022-08-19 PROCEDURE — 86300 IMMUNOASSAY TUMOR CA 15-3: CPT | Performed by: INTERNAL MEDICINE

## 2022-08-19 PROCEDURE — 85007 BL SMEAR W/DIFF WBC COUNT: CPT | Performed by: INTERNAL MEDICINE

## 2022-08-19 PROCEDURE — 25010000002 FULVESTRANT PER 25 MG: Performed by: INTERNAL MEDICINE

## 2022-08-19 PROCEDURE — 36415 COLL VENOUS BLD VENIPUNCTURE: CPT

## 2022-08-19 PROCEDURE — 99215 OFFICE O/P EST HI 40 MIN: CPT | Performed by: INTERNAL MEDICINE

## 2022-08-19 PROCEDURE — 96402 CHEMO HORMON ANTINEOPL SQ/IM: CPT

## 2022-08-19 PROCEDURE — 80053 COMPREHEN METABOLIC PANEL: CPT | Performed by: INTERNAL MEDICINE

## 2022-08-19 PROCEDURE — 85025 COMPLETE CBC W/AUTO DIFF WBC: CPT | Performed by: INTERNAL MEDICINE

## 2022-08-19 RX ORDER — ESZOPICLONE 3 MG/1
3 TABLET, FILM COATED ORAL
COMMUNITY
Start: 2022-04-04

## 2022-08-19 RX ADMIN — FULVESTRANT 500 MG: 50 INJECTION INTRAMUSCULAR at 11:43

## 2022-08-19 NOTE — PROGRESS NOTES
Specialty Note ( Faslodex and Ibrance)      Labs reviewed        8/19/2022   WBC 3.40 - 10.80 10*3/mm3 2.38 (A)   Neutrophils Absolute 1.70 - 7.00 10*3/mm3 1.08 (A)   Hemoglobin 12.0 - 15.9 g/dL 11.4 (A)   Hematocrit 34.0 - 46.6 % 31.7 (A)   Platelets 140 - 450 10*3/mm3 161   Creatinine 0.57 - 1.00 mg/dL 1.48 (A)   BUN 8 - 23 mg/dL 27 (A)   Sodium 136 - 145 mmol/L 137   Potassium 3.5 - 5.2 mmol/L 4.8   Glucose 65 - 99 mg/dL 116 (A)   Calcium 8.6 - 10.5 mg/dL 10.0   Albumin 3.50 - 5.20 g/dL 4.30   Total Protein 6.0 - 8.5 g/dL 7.0   AST (SGOT) 1 - 32 U/L 15   ALT (SGPT) 1 - 33 U/L 10   Alkaline Phosphatase 39 - 117 U/L 93   Total Bilirubin 0.0 - 1.2 mg/dL 0.4     Dr Hawkins's dictation is noted    · Continue Faslodex/Ibrance 125 mg D1-21/28 days

## 2022-08-22 ENCOUNTER — APPOINTMENT (OUTPATIENT)
Dept: SLEEP MEDICINE | Facility: HOSPITAL | Age: 67
End: 2022-08-22

## 2022-08-23 DIAGNOSIS — C50.912 CARCINOMA OF LEFT BREAST METASTATIC TO LUNG: ICD-10-CM

## 2022-08-23 DIAGNOSIS — N94.89 ADNEXAL MASS: ICD-10-CM

## 2022-08-23 DIAGNOSIS — C78.02 CARCINOMA OF LEFT BREAST METASTATIC TO LUNG: ICD-10-CM

## 2022-08-25 ENCOUNTER — DOCUMENTATION (OUTPATIENT)
Dept: PHARMACY | Facility: HOSPITAL | Age: 67
End: 2022-08-25

## 2022-08-25 NOTE — PROGRESS NOTES
Received fax notice from [employer] dated 8/25/22 stating that Ibrance has shipped to the patient.    Sarai Werner - Care Coordinator   8/25/2022  10:25 EDT

## 2022-09-06 ENCOUNTER — TELEPHONE (OUTPATIENT)
Dept: ONCOLOGY | Facility: CLINIC | Age: 67
End: 2022-09-06

## 2022-09-06 NOTE — TELEPHONE ENCOUNTER
Caller: Marialuisa Vivar    Relationship to patient: Self    Best call back number:[phone]    Chief complaint: NEEDS LATER APPT    Type of visit: LAB, FOLLOW UP, INJECTION    Requested date:  9-16 NEEDS A LATER TIME, AFTER 12:30    If rescheduling, when is the original appointment: 9-16     Additional notes:PLEASE ADVISE

## 2022-09-16 ENCOUNTER — INFUSION (OUTPATIENT)
Dept: ONCOLOGY | Facility: HOSPITAL | Age: 67
End: 2022-09-16

## 2022-09-16 ENCOUNTER — DOCUMENTATION (OUTPATIENT)
Dept: PHARMACY | Facility: HOSPITAL | Age: 67
End: 2022-09-16

## 2022-09-16 ENCOUNTER — OFFICE VISIT (OUTPATIENT)
Dept: ONCOLOGY | Facility: CLINIC | Age: 67
End: 2022-09-16

## 2022-09-16 ENCOUNTER — LAB (OUTPATIENT)
Dept: OTHER | Facility: HOSPITAL | Age: 67
End: 2022-09-16

## 2022-09-16 VITALS
RESPIRATION RATE: 16 BRPM | BODY MASS INDEX: 27.72 KG/M2 | TEMPERATURE: 96.9 F | OXYGEN SATURATION: 97 % | HEIGHT: 65 IN | WEIGHT: 166.4 LBS | SYSTOLIC BLOOD PRESSURE: 126 MMHG | HEART RATE: 82 BPM | DIASTOLIC BLOOD PRESSURE: 72 MMHG

## 2022-09-16 DIAGNOSIS — C50.912 CARCINOMA OF LEFT BREAST METASTATIC TO LUNG: ICD-10-CM

## 2022-09-16 DIAGNOSIS — C78.02 CARCINOMA OF LEFT BREAST METASTATIC TO LUNG: ICD-10-CM

## 2022-09-16 DIAGNOSIS — C78.02 CARCINOMA OF LEFT BREAST METASTATIC TO LUNG: Primary | ICD-10-CM

## 2022-09-16 DIAGNOSIS — C50.912 CARCINOMA OF LEFT BREAST METASTATIC TO LUNG: Primary | ICD-10-CM

## 2022-09-16 DIAGNOSIS — N94.89 ADNEXAL MASS: ICD-10-CM

## 2022-09-16 DIAGNOSIS — C79.51 BONE METASTASES: ICD-10-CM

## 2022-09-16 DIAGNOSIS — N94.89 ADNEXAL MASS: Primary | ICD-10-CM

## 2022-09-16 DIAGNOSIS — Z79.899 HIGH RISK MEDICATION USE: ICD-10-CM

## 2022-09-16 LAB
ALBUMIN SERPL-MCNC: 4.3 G/DL (ref 3.5–5.2)
ALBUMIN/GLOB SERPL: 1.5 G/DL
ALP SERPL-CCNC: 75 U/L (ref 39–117)
ALT SERPL W P-5'-P-CCNC: 11 U/L (ref 1–33)
ANION GAP SERPL CALCULATED.3IONS-SCNC: 6.4 MMOL/L (ref 5–15)
ANISOCYTOSIS BLD QL: NORMAL
AST SERPL-CCNC: 17 U/L (ref 1–32)
BASOPHILS # BLD AUTO: 0.04 10*3/MM3 (ref 0–0.2)
BASOPHILS NFR BLD AUTO: 1.6 % (ref 0–1.5)
BILIRUB SERPL-MCNC: 0.7 MG/DL (ref 0–1.2)
BUN SERPL-MCNC: 26 MG/DL (ref 8–23)
BUN/CREAT SERPL: 16 (ref 7–25)
CALCIUM SPEC-SCNC: 10.2 MG/DL (ref 8.6–10.5)
CHLORIDE SERPL-SCNC: 101 MMOL/L (ref 98–107)
CO2 SERPL-SCNC: 24.6 MMOL/L (ref 22–29)
CREAT SERPL-MCNC: 1.62 MG/DL (ref 0.57–1)
DEPRECATED RDW RBC AUTO: 52.5 FL (ref 37–54)
EGFRCR SERPLBLD CKD-EPI 2021: 34.7 ML/MIN/1.73
EOSINOPHIL # BLD AUTO: 0.03 10*3/MM3 (ref 0–0.4)
EOSINOPHIL NFR BLD AUTO: 1.2 % (ref 0.3–6.2)
ERYTHROCYTE [DISTWIDTH] IN BLOOD BY AUTOMATED COUNT: 13.7 % (ref 12.3–15.4)
GLOBULIN UR ELPH-MCNC: 2.9 GM/DL
GLUCOSE SERPL-MCNC: 106 MG/DL (ref 65–99)
HCT VFR BLD AUTO: 30.9 % (ref 34–46.6)
HGB BLD-MCNC: 11.2 G/DL (ref 12–15.9)
IMM GRANULOCYTES # BLD AUTO: 0.02 10*3/MM3 (ref 0–0.05)
IMM GRANULOCYTES NFR BLD AUTO: 0.8 % (ref 0–0.5)
LYMPHOCYTES # BLD AUTO: 1.32 10*3/MM3 (ref 0.7–3.1)
LYMPHOCYTES NFR BLD AUTO: 51.8 % (ref 19.6–45.3)
MACROCYTES BLD QL SMEAR: NORMAL
MCH RBC QN AUTO: 38.9 PG (ref 26.6–33)
MCHC RBC AUTO-ENTMCNC: 36.2 G/DL (ref 31.5–35.7)
MCV RBC AUTO: 107.3 FL (ref 79–97)
MONOCYTES # BLD AUTO: 0.14 10*3/MM3 (ref 0.1–0.9)
MONOCYTES NFR BLD AUTO: 5.5 % (ref 5–12)
NEUTROPHILS NFR BLD AUTO: 1 10*3/MM3 (ref 1.7–7)
NEUTROPHILS NFR BLD AUTO: 39.1 % (ref 42.7–76)
NRBC BLD AUTO-RTO: 0 /100 WBC (ref 0–0.2)
PHOSPHATE SERPL-MCNC: 2.9 MG/DL (ref 2.5–4.5)
PLAT MORPH BLD: NORMAL
PLATELET # BLD AUTO: 129 10*3/MM3 (ref 140–450)
PMV BLD AUTO: 9.7 FL (ref 6–12)
POTASSIUM SERPL-SCNC: 4 MMOL/L (ref 3.5–5.2)
PROT SERPL-MCNC: 7.2 G/DL (ref 6–8.5)
RBC # BLD AUTO: 2.88 10*6/MM3 (ref 3.77–5.28)
SODIUM SERPL-SCNC: 132 MMOL/L (ref 136–145)
WBC MORPH BLD: NORMAL
WBC NRBC COR # BLD: 2.55 10*3/MM3 (ref 3.4–10.8)

## 2022-09-16 PROCEDURE — 36415 COLL VENOUS BLD VENIPUNCTURE: CPT

## 2022-09-16 PROCEDURE — 85025 COMPLETE CBC W/AUTO DIFF WBC: CPT | Performed by: INTERNAL MEDICINE

## 2022-09-16 PROCEDURE — 96402 CHEMO HORMON ANTINEOPL SQ/IM: CPT

## 2022-09-16 PROCEDURE — 85007 BL SMEAR W/DIFF WBC COUNT: CPT | Performed by: INTERNAL MEDICINE

## 2022-09-16 PROCEDURE — 80053 COMPREHEN METABOLIC PANEL: CPT | Performed by: INTERNAL MEDICINE

## 2022-09-16 PROCEDURE — 84100 ASSAY OF PHOSPHORUS: CPT | Performed by: INTERNAL MEDICINE

## 2022-09-16 PROCEDURE — 99214 OFFICE O/P EST MOD 30 MIN: CPT | Performed by: NURSE PRACTITIONER

## 2022-09-16 PROCEDURE — 25010000002 FULVESTRANT PER 25 MG: Performed by: NURSE PRACTITIONER

## 2022-09-16 RX ADMIN — FULVESTRANT 500 MG: 50 INJECTION INTRAMUSCULAR at 16:01

## 2022-09-16 NOTE — PROGRESS NOTES
.     REASONS FOR FOLLOWUP: Metastatic breast cancer, tongue cancer    HISTORY OF PRESENT ILLNESS:  The patient is a 66 y.o. year old female  who is here for follow-up with the above-mentioned history.    She returns the office today for monthly follow-up and Faslodex injection.  She continues on Ibrance 21 out of 28 days.  She is currently on her week off.  She reports occasionally having fatigue but is otherwise doing well.  She denies shortness of breath.  She denies any pain.  She is eating and drinking adequately.  She does report intermittent fatigue though attributes this to her inactivity.  She does report she is scheduled to have some dental work done in the next couple weeks.    Past Medical History:   Diagnosis Date   • Allergy    • Anxiety    • Asthma    • Bone metastasis (HCC)    • Breast cancer (HCC)     Left node positive   • Cholelithiasis 2000    Lap Marily   • Colon polyp Past 10-15 yrs?    found at colonoscopies   • Depression    • Esophageal reflux     cough   • History of colon polyps    • Hyperlipidemia    • Hypertension    • Left breast cancer metastasized to lung    • Obstructive sleep apnea     does not wear cpap   • Ovarian cyst    • PONV (postoperative nausea and vomiting)    • Tongue cancer (HCC) 05/2019    by ENT at [facility] dr Quinn     Past Surgical History:   Procedure Laterality Date   • CHOLECYSTECTOMY  2000   • COLONOSCOPY  2014    H/O polyps   • COLONOSCOPY N/A 6/1/2022    Procedure: COLONOSCOPY to cecum and TI with cold / hot snare polypectomies;  Surgeon: Luis Enrique Galeas MD;  Location: [facility] ENDOSCOPY;  Service: Gastroenterology;  Laterality: N/A;  pre-abnormal ct  post-polyps, diverticulosis, hemorrhoids   • KNEE ARTHROPLASTY     • LUNG SURGERY  2010    Lung wedge resection   • MASTECTOMY RADICAL Left 1993   • TONGUE SURGERY  05/21/2019    tongue cancer   • TOTAL LAPAROSCOPIC HYSTERECTOMY N/A 11/04/2021    Procedure: Robotic assisted total laparoscopic hysterectomy,  bilateral salpingoophorecotmy, bilateral ureteralysis ;  Surgeon: Kaylynn Ricci DO;  Location: [facility] MAIN OR;  Service: Robotics - DaVinci;  Laterality: N/A;       MEDICATIONS    Current Outpatient Medications:   •  atorvastatin (LIPITOR) 10 MG tablet, Take 1 tablet by mouth Every Night., Disp: , Rfl:   •  cholecalciferol (VITAMIN D3) 1.25 MG (66307 UT) capsule, Take 5,000 Units by mouth 3 (Three) Times a Week., Disp: , Rfl:   •  eszopiclone (LUNESTA) 3 MG tablet, Take 3 mg by mouth., Disp: , Rfl:   •  HYDROcodone-acetaminophen (NORCO) 5-325 MG per tablet, Take 1 tablet by mouth Every 4 (Four) Hours As Needed for Moderate Pain ., Disp: 30 tablet, Rfl: 0  •  LORazepam (ATIVAN) 0.5 MG tablet, Take 1 tablet by mouth Every 8 (Eight) Hours As Needed., Disp: , Rfl:   •  metoprolol succinate XL (TOPROL-XL) 25 MG 24 hr tablet, Take 1 tablet by mouth once daily, Disp: 90 tablet, Rfl: 0  •  Omeprazole 20 MG Tablet Delayed Release Dispersible, Take 20 mg by mouth As Needed., Disp: , Rfl:   •  ondansetron (ZOFRAN) 8 MG tablet, Take 1 tablet by mouth 3 (Three) Times a Day As Needed for Nausea or Vomiting., Disp: 30 tablet, Rfl: 5  •  Palbociclib 125 MG tablet, Take 1 tablet by mouth Daily. Take for 21 days on, then 7 days off, then repeat., Disp: 21 tablet, Rfl: 5  •  PARoxetine (PAXIL) 40 MG tablet, Take 40 mg by mouth Every Night., Disp: , Rfl:   •  Probiotic Product (PROBIOTIC-10 PO), Take  by mouth. Pearls, Disp: , Rfl:   •  sennosides-docusate (PERICOLACE) 8.6-50 MG per tablet, Take 1 tablet by mouth Daily., Disp: , Rfl:   •  eszopiclone (LUNESTA) tablet, Take 3 mg by mouth At Night As Needed., Disp: , Rfl:   •  ibuprofen (ADVIL,MOTRIN) 800 MG tablet, Take  by mouth Every 6 (Six) Hours As Needed., Disp: , Rfl:   •  Ibuprofen (MOTRIN PO), Take  by mouth., Disp: , Rfl:   •  K Phos Crook-Sod Phos Di & Mono (K-Phos-Neutral) 155-852-130 MG tablet, Take 2 tablets by mouth 2 (Two) Times a Day. (Patient taking differently:  Take 2 tablets by mouth Daily.), Disp: 130 each, Rfl: 0  •  ondansetron (ZOFRAN) 8 MG tablet, Take 1 tablet by mouth 3 (Three) Times a Day As Needed., Disp: , Rfl:   •  saccharomyces boulardii (FLORASTOR) 250 MG capsule, Take 250 mg by mouth., Disp: , Rfl:   No current facility-administered medications for this visit.    Facility-Administered Medications Ordered in Other Visits:   •  chlorhexidine (PERIDEX) 0.12 % solution 15 mL, 15 mL, Mouth/Throat, Q12H, Kaylynn Ricci, DO  •  fulvestrant (FASLODEX) chemo injection 500 mg, 500 mg, Intramuscular, Once, Yuly Sanabria APRN  •  mupirocin (BACTROBAN) 2 % nasal ointment, , Nasal, BID, Kaylynn Ricci, DO    ALLERGIES:     Allergies   Allergen Reactions   • Nickel Unknown - Low Severity   • Ciprofloxacin Rash       SOCIAL HISTORY:       Social History     Socioeconomic History   • Marital status: Single   • Years of education: College   Tobacco Use   • Smoking status: Former Smoker     Packs/day: 2.00     Years: 30.00     Pack years: 60.00     Types: Cigarettes     Start date: 1973     Quit date: 2008     Years since quittin.3   • Smokeless tobacco: Never Used   Vaping Use   • Vaping Use: Never used   Substance and Sexual Activity   • Alcohol use: No   • Drug use: No   • Sexual activity: Not Currently     Birth control/protection: None         FAMILY HISTORY:  Family History   Problem Relation Age of Onset   • Hypertension Mother    • Leukemia Mother 72   • COPD Mother         smoker   • Diabetes Brother    • Pancreatic cancer Brother    • Glaucoma Brother    • Heart disease Brother    • Hypertension Brother    • Gallbladder disease Brother    • Pancreatitis Brother          from pancreatic csncer   • Gallbladder disease Father    • Colon polyps Father    • Stroke Other         Grandmother   • Lupus Other         Aunt   • Alcohol abuse Other         Aunt   • Glaucoma Other         Grandmother   • Diabetes type II Brother    •  "Hypertension Brother    • Hyperlipidemia Brother    • Liver cancer Paternal Uncle    • Stomach cancer Paternal Aunt    • Alcohol abuse Maternal Aunt    • Malig Hyperthermia Neg Hx        REVIEW OF SYSTEMS:  Review of Systems  ROS as per HPI         Vitals:    09/16/22 1507   BP: 126/72   Pulse: 82   Resp: 16   Temp: 96.9 °F (36.1 °C)   TempSrc: Temporal   SpO2: 97%   Weight: 75.5 kg (166 lb 6.4 oz)   Height: 165 cm (64.96\")   PainSc: 0-No pain     Current Status 8/19/2022   ECOG score 0       PHYSICAL EXAM:    CONSTITUTIONAL:  Vital signs reviewed.  No distress, looks comfortable.  EYES:  Conjunctiva and lids unremarkable.  PERRLA  EARS,NOSE,MOUTH,THROAT:  Ears and nose appear unremarkable.  Lips, teeth, gums appear unremarkable.  RESPIRATORY:  Normal respiratory effort.  Lungs clear to auscultation bilaterally.  CARDIOVASCULAR:  Normal S1, S2.  No murmurs rubs or gallops.  No significant lower extremity edema.  GASTROINTESTINAL: Abdomen appears unremarkable.  Nontender.  No hepatomegaly.  No splenomegaly.  LYMPHATIC:  No cervical, supraclavicular, axillary lymphadenopathy.  SKIN:  Warm.  Without rash  PSYCHIATRIC:  Normal judgment and insight.  Normal mood and affect.    I have reexamined the patient and the results are consistent with the previously documented exam. Yuly Sanabria, ARON         RECENT LABS:  Results from last 7 days   Lab Units 09/16/22  1454   WBC 10*3/mm3 2.55*   NEUTROS ABS 10*3/mm3 1.00*   HEMOGLOBIN g/dL 11.2*   HEMATOCRIT % 30.9*   PLATELETS 10*3/mm3 129*     Results from last 7 days   Lab Units 09/16/22  1454   SODIUM mmol/L 132*   POTASSIUM mmol/L 4.0   CHLORIDE mmol/L 101   CO2 mmol/L 24.6   BUN mg/dL 26*   CREATININE mg/dL 1.62*   CALCIUM mg/dL 10.2   ALBUMIN g/dL 4.30   BILIRUBIN mg/dL 0.7   ALK PHOS U/L 75   ALT (SGPT) U/L 11   AST (SGOT) U/L 17   GLUCOSE mg/dL 106*           Assessment & Plan   Carcinoma of left breast metastatic to lung (HCC)    Bone metastases (HCC)    High " risk medication use    Marialuisa Vivar        Assessment/Plan     *Metastatic breast cancer to bilateral lungs and soft tissue in the mediastinum (likely the cause of her hoarseness). (Initial breast cancer diagnosed in 1993 treated with mastectomy, chemotherapy, radiation therapy and tamoxifen). Arimidex day 1 on 02/17/2010. PET scan late August 2011 shows no abnormal hypermetabolic activity. This has improved compared to the prior PET scan January 2010 which showed mild hypermetabolic activity in areas of metastasis. (In the past, her  T6 lesion did not show up on CT scans or bone scan. It was seen by PET scan.) We have been following with CT scans only every 6 months and PET scans on an as needed only basis. Sometimes her CT scanned pulmonary nodules are read as benign since they have been stable through the course of years but we know they are malignant because they have been surgically sampled in the past.    · CT 8/16/16 shows increased sclerosis at T6 compared to 4/28/15.    · PET 10/11/16: Slight uptake at T6, SUV 3.2.  mild thickening of right adrenal gland with an SUV of 11.   · Continued Arimidex is minimal progression and had been on this since 2/17/10.  · Not referred for radiation since no pain at T6  · CT 1/27/17, unchanged.  · CT 8/1/17: Unchanged except subtle suggestion of mild increase in thickening of the right adrenal gland.    · CT 2/22/18, unchanged.  · CT 9/6/18: Unchanged except nodular thickening right adrenal gland significantly decreased.  · CT 9/5/2019: Unchanged.  · On the 6/12/2020 visit, the following scans were reviewed:  · CT neck and chest 5/11/2020, [facility], from 5/9/2019: Stable pulmonary nodules new lytic C7 lesion and posterior T1 lesion questionable T12 lesion.  No change in the T6 and T10 lesions.  · PET, [facility], 5/19/2020: Mildly enlarged left neck nodes are mildly hypermetabolic.  Diffuse activity throughout the thickened left adrenal gland.  CT appearance unchanged from  5/9/2019.  Progressive T1 lesion seen on CT from 5/11/2020, mild some moderate activity.  T10 low-grade activity with mixed lucent and sclerotic findings.  T6 is a mixed lucent and sclerotic appearance but no significant activity.   · T12 (the biopsy report is labeled as T12 but patient insists this was a C7/T1 biopsy, not to T12) biopsy 6/15/2020, [facility]: Metastatic breast carcinoma.  ER >95%.  NV 0%.  HER-2 negative (1+)  · It seems the main progression at the 6/12/2020 visit was a new C7 and new T1 lesion.  Those were radiated with [facility] through [facility] early June 2020.  Because Arimidex has been working well since 2010, continue same Arimidex.  Add Zometa every 3 months.  · C7 and T1 found on CT 5/11/2020, [facility].  CyberKnife, Dr. Winston, early June 2020.  · CT C and T-spine 9/17/2020: New C7 lesion from 9/6/2018, but no change from 5/11/2020 CT.  T1 lesion stable from 5/11/2020, but new compared to 2/22/2018.  T10 lesion unchanged from 9/5/2019, but new from 2/22/2018.  Subtle 8 mm T12 lesion new from 9/5/2019.  · Therefore, no recent progression.  · Considering her good tolerance and long-term effectiveness of Arimidex, continue same therapy.  · CT CAP 12/10/2020: No progression  · Therefore, continue Arimidex.  · CA 15-3 normal through 12/14/2020  · Because CA 15-3 fabian to 27 on 4/13/2021, then 29.3 on 7/20/2021, then 34.3 on 10/12/2021, CT scans done earlier than planned:  · CT CAP with contrast 10/12/2021: New 5.2 x 4.6 cm mixed cystic and solid right ovarian mass compared to 12/10/2020.  Continued stability of bilateral pulmonary nodules and stable sclerotic bone metastasis.  · 11/4/2021: TLH/BSO (due to new 5 cm right ovarian mass on CT, 11/12/2021).  · Metastatic breast cancer involving bilateral ovaries and posterior uterine serosa.  ER 81-90%.  NV 61-70%.  HER-2 negative  · [facility] NGS: ER 98%.  NV 90%.  HER-2/eb negative.  AKT1 pathogenic variant, exon 3.  AR+, 95%.  No other significant mutations  including negative PIK3CA  · Pelvic washings: Adenocarcinoma  · Because this was the only area of progression on CT, and has been resected, continue Arimidex which she has been on since 2/17/2010.  · 11/22/2021: Discussed with Dr. Blum, who has a focus on breast cancer.  We both agree: Continue Arimidex for now.  In the future, if significant progression is seen, then plan Faslodex injections with Ibrance  · CT CAP 2/1/2022: A few T-spine sclerotic lesions progressively more sclerotic over multiple prior CTs of indeterminate significance.  No clear signs of progression of disease.  · 3/15/2022: Although tumor marker is not rising and CT showed no clear signs of progression.  CT revealed progressively more sclerotic bone metastasis over multiple prior CTs, she is having increased right hip discomfort.  Sometimes this can be due to healing of metastasis (but she has been on the same treatment since 2010), or this could mean progression of bone metastasis.  It is reassuring that her tumor marker is not worsening.  She would like to see [facility] radiation medicine to see if they feel radiation to the hip would help her discomfort.  We will do a bone scan before that appointment as this might help [facility] radiation.  (Although she has seen [facility] radiation for her neck, she would like to see [facility] radiation for this hip issue).  · 3/18/2022, bone scan: Metastatic disease at T9 and T10, corresponding to CT lesions.  Uptake also at T6, but to lesser degree.  These areas are new compared to last bone scan, 6/3/2009.  Therefore, overall, appears consistent with progression.  · 3/24/2021: Discussed changing  Arimidex to Faslodex/Ibrance 125 mg D1-21/28 days.  · However, Dr. Llanes will decide on 4/12/2022 if the patient needs any traditional XRT to hip/pelvis.  If so, this may cause cytopenias due to the location of XRT.  Ibrance can also cause cytopenias.  If this is the case, we will wait to begin  Ibrance until at least a few weeks after XRT completes, provided blood counts allow.   · 4/22/2022: Dr. Llanes reviewed MRI right hip from 4/19/2022, revealing L3 metastasis, similar size of prior CTs.  Therefore, she plans no XRT.  · 5/6/2022: Began Faslodex Ibrance  · CT 7/18/2022: Some bone mets more sclerotic, which could reflect treatment response.  Slightly increased RUL nodule, 1.1 cm, from 0.9 cm on 2/1/2022  · Bone scan 7/18/2022: No change from 3/18/2022  · 8/19/2022 (patient delayed follow-up to review scans until then): Continue same therapy since no significant progression of disease  · Reevaluation 9/16/2022, due for monthly Faslodex.  Continue current regimen including Ibrance 21 out of 28 days and Faslodex every 4 weeks.    CA 15-3:  · 27.3  8/19/2022, from 22.5 on 5/6/2022, from 26.3 on 2/1/2022, from 32.6 on 11/22/2021, from 34.3 on 10/12/2021, from 29.3 on 7/20/2021, from 27 on 4/13/2021, from 23.3 on 12/14/2020.    *Bony metastases  · T6 discovered by PET to August 2011 (did not show up by CT or bone scan)  · C7 and T1 found on CT 5/11/2020, [facility].  CyberKnife, Dr. Winston, early June 2020.  · June 2020 began Zometa every 3 months.  · She was warned of possible osteonecrosis of the jaw and renal insufficiency.  She states she has good dental health and sees her dentist regularly.  Because of prior hypercalcemia requiring stopping calcium supplements and because of high normal current calcium level, began without supplemental calcium.  Add calcium if calcium becomes low.  · 1/7/2022: Tooth extracted.  Plan was to wait 5 weeks to resume Zometa.  Patient delayed return until 3/15/2022 (over 8 weeks)  · May 2022: Changed Zometa to Xgeva every 3 months due to Cr up to 1.6  · Xgeva last given 7/1/2022, due again in October.  The patient reports today she is scheduled for some dental work in 2 weeks.  We will discuss potential Xgeva at follow-up in 4 weeks    *Bone health. Last bone density 10/11/16,  worsened, but still normal with worst T score -0.9.  Patient stopped calcium January 2016, but remained on vitamin D.  I recommended she restart her calcium.  Stopped calcium early March 2018 due to hypercalcemia.  · DEXA 9/5/2019: Normal bone density    *Hypercalcemia.  Repeat calcium 3/7/18 normal, but patient self stopped calcium a few days prior.  Recommended she stay off calcium.  · Calcium was 11 on 6/19/2020  · Calcium 11 again on 12/10/2020  · Repeat calcium 10.5 on 12/14/2020  · Calcium 10.6.  Minimally elevated.  (Normal range up to 10.5)  · Calcium 10.8 on 3/15/2022.  Hopefully, Zometa will help with this.  · Calcium 10.2    *Hypophosphatemia  · Phosphorus 2.3 on 6/24/2022.  Begin K-Phos Neutral 2 tablets twice daily  · Patient self stopped this around mid July 2022.  · Phosphorus normal, 3.6    *On the 10/5/16 visit, Tachycardia, dyspnea on exertion, bilateral leg swelling, more sedentary recently.  CT PE protocol and bilateral lower extremity Dopplers 10/5/16, negative for clots.  She still has tachycardia and dyspnea on exertion.  Perhaps at least part of this is due to deconditioning.    *Previously complained of colon Right lower anterior rib discomfort.  CT unremarkable in that area.  No specific pain, just discomfort.  I explained to the patient if disease was found by PET scan or bone scan, I would not  since she has been doing well on Arimidex since 2010.   Currently denies pain.     *Depression/anxiety.  This limits compliance.  She sees a psychiatrist and a counselor regularly.  She states this is mostly due to body image issues instead of cancer.   · She continues with her counselor and psychiatrist.  Viral pandemic is making this a little more difficult.  · 4/27/2022: We will try to get her involved in some cancer support groups.  I also encouraged her to try out some small groups at [facility] (she struggles with loneliness).    *Noncompliance due to depression/anxiety.   Although she misses appointments, she does reschedule and eventually come in.    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 Willie Quinn, [facility], on 5/21/2019.  Positive sentinel node (1 of 3 nodes)..  · Left neck dissection by Dr. Willie Quinn, [facility], on 6/5/2019.  22 nodes negative.  Negative margins.  No lymphovascular invasion or perineural invasion.  2 mm depth of invasion.  Grade 1.  Squamous cell carcinoma.  1.8 cm tumor.  · Because the patient felt what she thought was left neck LAD, CT neck and chest and PET at [facility]. May 2020 performed.  She is being followed at [facility] for this.  · On 6/19/2020, per verbal report from Dr. Senia [place] [facility], [facility] ENT does not feel she has any active head and neck cancer.   · She states she has a follow-up with Dr. Kapadia, ENT at [facility], in June with CT neck 1 week prior  · No symptoms to suggest recurrence.  She follows with [facility].    *Previously complained of left hip pain x2 weeks.  This prompted an MRI pelvis 8/20/2019 at [facility] and open MRI which was negative for malignancy.    *Possible intolerance of Zometa  · After first dose, June 2020, 2 days of chills, bone pain  · She would like to try Zometa again.  If this occurs again, we will try to switch her to Xgeva monthly.  (No good data on every 3-month Xgeva)  · She did fine with the second dose of Zometa.    *Thickening of wall of transverse colon on CT 2/1/2022.  Last colonoscopy was 4/18/2019.  She wants the next colonoscopy done at [facility]  · Dr. Galeas will see her on 4/7/2022 to evaluate this    *Depression  · Is seeing a psychiatry NP and a therapist for many years.  · 6/24/2022: Reported she decided to stop seeing her therapist but is still interested in therapy.  She would like to see behavioral oncology at [facility].  This was arranged  · Contacted by Julissa Centeno (behavioral oncology).  Patient canceled on  the day of the appointment during her first scheduled appointment with Julissa.  She then no showed for the rescheduled appointment.  Julissa recommends the patient continue to follow with her therapist and psychiatrist and if she needs additional support, to use [employer]    *Pruritic rash  · 8/19/2022: Referred to dermatology  · No rash present on exam today, 9/16/2022    Monitoring on Ibrance:  CBC every 2 weeks for the first 2 months then monthly and as indicated.  After 6 months, if neutropenia grade 1 or 2 only, then CBC can be every 3 months    PLAN:     · Proceed today with Faslodex which is continued every 4 weeks  · Continue Ibrance 125 mg D1-21/28 days  · Every 3-month Xgeva, last dose, 7/1/2022  · (Xgeva instead of Zometa due to intermittent high creatinine)  · At follow-up in 4 weeks, we will discuss the possibility of Xgeva, she reports she is scheduled to have some dental work done in the coming weeks.  · MD or NP with Faslodex every 4 weeks  · Every 6 months CT CAP without IV contrast, bone scan (last 7/18/2022), CA 15-3  · Previously arranged for her to get involved with some cancer support groups  ·  CT scans WITHOUT IV CONTRAST (again, to avoid further kidney damage.)  · She sees [facility], Dr. Bedoya annually, next is summer 2022    The patient continues on high risk medication requiring close monitoring for toxicity    Yuly Sanabria, APRN  09/16/2022    Send a copy of this note to   Dr. Willie Quinn, Isaac Winston, radiation medicine, [facility]

## 2022-09-16 NOTE — PROGRESS NOTES
I received fax notice from [employer] dated 9/15/2022 stating that Ibrance has shipped to the patient.    Sarai Werner - Care Coordinator   9/16/2022  08:38 EDT

## 2022-09-19 RX ORDER — METOPROLOL SUCCINATE 25 MG/1
TABLET, EXTENDED RELEASE ORAL
Qty: 90 TABLET | Refills: 0 | Status: SHIPPED | OUTPATIENT
Start: 2022-09-19 | End: 2022-12-07 | Stop reason: SDUPTHER

## 2022-09-20 ENCOUNTER — SPECIALTY PHARMACY (OUTPATIENT)
Dept: PHARMACY | Facility: HOSPITAL | Age: 67
End: 2022-09-20

## 2022-09-20 NOTE — PROGRESS NOTES
Specialty Note ( Ibrance)    Labs reviewed        9/16/2022   WBC 3.40 - 10.80 10*3/mm3 2.55 (A)   Neutrophils Absolute 1.70 - 7.00 10*3/mm3 1.00 (A)   Hemoglobin 12.0 - 15.9 g/dL 11.2 (A)   Hematocrit 34.0 - 46.6 % 30.9 (A)   Platelets 140 - 450 10*3/mm3 129 (A)   Creatinine 0.57 - 1.00 mg/dL 1.62 (A)   BUN 8 - 23 mg/dL 26 (A)   Sodium 136 - 145 mmol/L 132 (A)   Potassium 3.5 - 5.2 mmol/L 4.0   Glucose 65 - 99 mg/dL 106 (A)   Calcium 8.6 - 10.5 mg/dL 10.2   Albumin 3.50 - 5.20 g/dL 4.30   Total Protein 6.0 - 8.5 g/dL 7.2   AST (SGOT) 1 - 32 U/L 17   ALT (SGPT) 1 - 33 U/L 11   Alkaline Phosphatase 39 - 117 U/L 75   Total Bilirubin 0.0 - 1.2 mg/dL 0.7   Phosphorus 2.5 - 4.5 mg/dL 2.9   eGFR >60.0 mL/min/1.73 34.7 (A)  National Kidney Foundation and American Society of Nephrology (ASN) Task Force recommended calculation based on the Chronic Kidney Disease Epidemiology Collaboration (CKD-EPI) equation refit without adjustment for race.       APRN dictation is noted     · Proceed today with Faslodex which is continued every 4 weeks  · Continue Ibrance 125 mg D1-21/28 days

## 2022-10-14 ENCOUNTER — INFUSION (OUTPATIENT)
Dept: ONCOLOGY | Facility: HOSPITAL | Age: 67
End: 2022-10-14

## 2022-10-14 ENCOUNTER — DOCUMENTATION (OUTPATIENT)
Dept: ONCOLOGY | Facility: CLINIC | Age: 67
End: 2022-10-14

## 2022-10-14 ENCOUNTER — TELEPHONE (OUTPATIENT)
Dept: ONCOLOGY | Facility: CLINIC | Age: 67
End: 2022-10-14

## 2022-10-14 ENCOUNTER — LAB (OUTPATIENT)
Dept: OTHER | Facility: HOSPITAL | Age: 67
End: 2022-10-14

## 2022-10-14 ENCOUNTER — OFFICE VISIT (OUTPATIENT)
Dept: ONCOLOGY | Facility: CLINIC | Age: 67
End: 2022-10-14

## 2022-10-14 VITALS
DIASTOLIC BLOOD PRESSURE: 68 MMHG | TEMPERATURE: 98 F | RESPIRATION RATE: 16 BRPM | WEIGHT: 162.7 LBS | HEART RATE: 80 BPM | OXYGEN SATURATION: 98 % | SYSTOLIC BLOOD PRESSURE: 102 MMHG | HEIGHT: 65 IN | BODY MASS INDEX: 27.11 KG/M2

## 2022-10-14 DIAGNOSIS — C50.912 CARCINOMA OF LEFT BREAST METASTATIC TO LUNG: ICD-10-CM

## 2022-10-14 DIAGNOSIS — C78.02 CARCINOMA OF LEFT BREAST METASTATIC TO LUNG: Primary | ICD-10-CM

## 2022-10-14 DIAGNOSIS — N18.32 STAGE 3B CHRONIC KIDNEY DISEASE: ICD-10-CM

## 2022-10-14 DIAGNOSIS — C78.02 CARCINOMA OF LEFT BREAST METASTATIC TO LUNG: ICD-10-CM

## 2022-10-14 DIAGNOSIS — C79.51 BONE METASTASES: ICD-10-CM

## 2022-10-14 DIAGNOSIS — N94.89 ADNEXAL MASS: Primary | ICD-10-CM

## 2022-10-14 DIAGNOSIS — C50.912 CARCINOMA OF LEFT BREAST METASTATIC TO LUNG: Primary | ICD-10-CM

## 2022-10-14 DIAGNOSIS — N94.89 ADNEXAL MASS: ICD-10-CM

## 2022-10-14 LAB
ALBUMIN SERPL-MCNC: 4.6 G/DL (ref 3.5–5.2)
ALBUMIN/GLOB SERPL: 1.6 G/DL
ALP SERPL-CCNC: 74 U/L (ref 39–117)
ALT SERPL W P-5'-P-CCNC: 10 U/L (ref 1–33)
ANION GAP SERPL CALCULATED.3IONS-SCNC: 9.2 MMOL/L (ref 5–15)
ANISOCYTOSIS BLD QL: NORMAL
AST SERPL-CCNC: 18 U/L (ref 1–32)
BASOPHILS # BLD AUTO: 0.04 10*3/MM3 (ref 0–0.2)
BASOPHILS NFR BLD AUTO: 1.1 % (ref 0–1.5)
BILIRUB SERPL-MCNC: 0.5 MG/DL (ref 0–1.2)
BUN SERPL-MCNC: 27 MG/DL (ref 8–23)
BUN/CREAT SERPL: 14.9 (ref 7–25)
CALCIUM SPEC-SCNC: 10.4 MG/DL (ref 8.6–10.5)
CHLORIDE SERPL-SCNC: 104 MMOL/L (ref 98–107)
CO2 SERPL-SCNC: 24.8 MMOL/L (ref 22–29)
CREAT SERPL-MCNC: 1.81 MG/DL (ref 0.57–1)
DEPRECATED RDW RBC AUTO: 55 FL (ref 37–54)
EGFRCR SERPLBLD CKD-EPI 2021: 30.4 ML/MIN/1.73
EOSINOPHIL # BLD AUTO: 0.02 10*3/MM3 (ref 0–0.4)
EOSINOPHIL NFR BLD AUTO: 0.5 % (ref 0.3–6.2)
ERYTHROCYTE [DISTWIDTH] IN BLOOD BY AUTOMATED COUNT: 13.9 % (ref 12.3–15.4)
GLOBULIN UR ELPH-MCNC: 2.9 GM/DL
GLUCOSE SERPL-MCNC: 109 MG/DL (ref 65–99)
HCT VFR BLD AUTO: 32.8 % (ref 34–46.6)
HGB BLD-MCNC: 11.7 G/DL (ref 12–15.9)
IMM GRANULOCYTES # BLD AUTO: 0.01 10*3/MM3 (ref 0–0.05)
IMM GRANULOCYTES NFR BLD AUTO: 0.3 % (ref 0–0.5)
LYMPHOCYTES # BLD AUTO: 1.43 10*3/MM3 (ref 0.7–3.1)
LYMPHOCYTES NFR BLD AUTO: 39.2 % (ref 19.6–45.3)
MACROCYTES BLD QL SMEAR: NORMAL
MCH RBC QN AUTO: 38.9 PG (ref 26.6–33)
MCHC RBC AUTO-ENTMCNC: 35.7 G/DL (ref 31.5–35.7)
MCV RBC AUTO: 109 FL (ref 79–97)
MONOCYTES # BLD AUTO: 0.19 10*3/MM3 (ref 0.1–0.9)
MONOCYTES NFR BLD AUTO: 5.2 % (ref 5–12)
NEUTROPHILS NFR BLD AUTO: 1.96 10*3/MM3 (ref 1.7–7)
NEUTROPHILS NFR BLD AUTO: 53.7 % (ref 42.7–76)
NRBC BLD AUTO-RTO: 0 /100 WBC (ref 0–0.2)
PHOSPHATE SERPL-MCNC: 2.8 MG/DL (ref 2.5–4.5)
PLAT MORPH BLD: NORMAL
PLATELET # BLD AUTO: 177 10*3/MM3 (ref 140–450)
PMV BLD AUTO: 9.8 FL (ref 6–12)
POTASSIUM SERPL-SCNC: 3.7 MMOL/L (ref 3.5–5.2)
PROT SERPL-MCNC: 7.5 G/DL (ref 6–8.5)
RBC # BLD AUTO: 3.01 10*6/MM3 (ref 3.77–5.28)
SODIUM SERPL-SCNC: 138 MMOL/L (ref 136–145)
SPHEROCYTES BLD QL SMEAR: NORMAL
WBC MORPH BLD: NORMAL
WBC NRBC COR # BLD: 3.65 10*3/MM3 (ref 3.4–10.8)

## 2022-10-14 PROCEDURE — 99215 OFFICE O/P EST HI 40 MIN: CPT | Performed by: NURSE PRACTITIONER

## 2022-10-14 PROCEDURE — 85025 COMPLETE CBC W/AUTO DIFF WBC: CPT | Performed by: NURSE PRACTITIONER

## 2022-10-14 PROCEDURE — 96402 CHEMO HORMON ANTINEOPL SQ/IM: CPT

## 2022-10-14 PROCEDURE — 85007 BL SMEAR W/DIFF WBC COUNT: CPT | Performed by: NURSE PRACTITIONER

## 2022-10-14 PROCEDURE — 25010000002 FULVESTRANT PER 25 MG: Performed by: NURSE PRACTITIONER

## 2022-10-14 PROCEDURE — 80053 COMPREHEN METABOLIC PANEL: CPT | Performed by: NURSE PRACTITIONER

## 2022-10-14 PROCEDURE — 84100 ASSAY OF PHOSPHORUS: CPT | Performed by: NURSE PRACTITIONER

## 2022-10-14 PROCEDURE — 36415 COLL VENOUS BLD VENIPUNCTURE: CPT

## 2022-10-14 RX ORDER — DENOSUMAB 120 MG/1.7ML
INJECTION SUBCUTANEOUS
COMMUNITY

## 2022-10-14 RX ADMIN — FULVESTRANT 500 MG: 50 INJECTION, SOLUTION INTRAMUSCULAR at 15:47

## 2022-10-14 NOTE — PROGRESS NOTES
.     REASONS FOR FOLLOWUP: Metastatic breast cancer, tongue cancer    HISTORY OF PRESENT ILLNESS:  The patient is a 66 y.o. year old female with the above-mentioned issue who is here today for lab review and evaluation due for monthly Faslodex injection.  She also continues on Ibrance 25 mg daily for 21 out of 28 days.  She is due to start back on her Ibrance the middle of the week next week.    Patient reports some recent issues with depression.  She states that its not abnormal for her to have times of her depression waxing and waning.  Typically when her depression is worse she tends to stay in bed frequently, which is what she has been doing a lot recently.  She is working to try to get herself out more.  She did walk in the parking lot for a little while just before her appointment.  We did discuss that her kidney function was elevated a little bit more today than usual, and the patient does admit that she is not drinking fluids adequately.  She has been seen by nephrology in the past and they recommend she try to drink at least 64 ounces of water a day.  Patient states that she is not currently doing so, but is going to work on that.  She has been struggling with constipation intermittently as well as some nausea.  We discussed that the Zofran she is taking can contribute to her constipation.  She states that she will try to take Zofran less frequently.  We also discussed activity as well as increase hydration can help with her constipation as well.    Patient is scheduled to see her dentist in about a week and a half.  She has a tooth bothering her in her right lower jaw, and is not sure if she is going to have to have a tooth pulled or not.    Past Medical History:   Diagnosis Date   • Allergy    • Anxiety    • Asthma    • Bone metastasis (HCC)    • Breast cancer (HCC)     Left node positive   • Cholelithiasis 2000    Lap Marily   • Colon polyp Past 10-15 yrs?    found at colonoscopies   • Depression     • Esophageal reflux     cough   • History of colon polyps    • Hyperlipidemia    • Hypertension    • Left breast cancer metastasized to lung    • Obstructive sleep apnea     does not wear cpap   • Ovarian cyst    • PONV (postoperative nausea and vomiting)    • Tongue cancer (HCC) 05/2019    by ENT at [facility] dr Qiunn     Past Surgical History:   Procedure Laterality Date   • CHOLECYSTECTOMY  2000   • COLONOSCOPY  2014    H/O polyps   • COLONOSCOPY N/A 6/1/2022    Procedure: COLONOSCOPY to cecum and TI with cold / hot snare polypectomies;  Surgeon: Luis Enrique Galeas MD;  Location: [facility] ENDOSCOPY;  Service: Gastroenterology;  Laterality: N/A;  pre-abnormal ct  post-polyps, diverticulosis, hemorrhoids   • KNEE ARTHROPLASTY     • LUNG SURGERY  2010    Lung wedge resection   • MASTECTOMY RADICAL Left 1993   • TONGUE SURGERY  05/21/2019    tongue cancer   • TOTAL LAPAROSCOPIC HYSTERECTOMY N/A 11/04/2021    Procedure: Robotic assisted total laparoscopic hysterectomy, bilateral salpingoophorecotmy, bilateral ureteralysis ;  Surgeon: Kaylynn Ricci DO;  Location: [facility] MAIN OR;  Service: Robotics - DaVinci;  Laterality: N/A;       MEDICATIONS    Current Outpatient Medications:   •  atorvastatin (LIPITOR) 10 MG tablet, Take 1 tablet by mouth Every Night., Disp: , Rfl:   •  cholecalciferol (VITAMIN D3) 1.25 MG (13633 UT) capsule, Take 5,000 Units by mouth 3 (Three) Times a Week., Disp: , Rfl:   •  denosumab (Xgeva) 120 MG/1.7ML solution injection, Inject  under the skin into the appropriate area as directed., Disp: , Rfl:   •  eszopiclone (LUNESTA) 3 MG tablet, Take 3 mg by mouth., Disp: , Rfl:   •  fulvestrant (FASLODEX) 250 MG/5ML chemo syringe, Inject  into the appropriate muscle as directed by prescriber., Disp: , Rfl:   •  LORazepam (ATIVAN) 0.5 MG tablet, Take 1 tablet by mouth Every 8 (Eight) Hours As Needed., Disp: , Rfl:   •  metoprolol succinate XL (TOPROL-XL) 25 MG 24 hr tablet, Take 1 tablet by mouth  once daily, Disp: 90 tablet, Rfl: 0  •  Omeprazole 20 MG Tablet Delayed Release Dispersible, Take 20 mg by mouth As Needed., Disp: , Rfl:   •  ondansetron (ZOFRAN) 8 MG tablet, Take 1 tablet by mouth 3 (Three) Times a Day As Needed for Nausea or Vomiting., Disp: 30 tablet, Rfl: 5  •  Palbociclib 125 MG tablet, Take 1 tablet by mouth Daily. Take for 21 days on, then 7 days off, then repeat., Disp: 21 tablet, Rfl: 5  •  PARoxetine (PAXIL) 40 MG tablet, Take 40 mg by mouth Every Night., Disp: , Rfl:   •  Probiotic Product (PROBIOTIC-10 PO), Take  by mouth. Pearls, Disp: , Rfl:   •  sennosides-docusate (PERICOLACE) 8.6-50 MG per tablet, Take 1 tablet by mouth Daily., Disp: , Rfl:   •  K Phos Cotton-Sod Phos Di & Mono (K-Phos-Neutral) 155-852-130 MG tablet, Take 2 tablets by mouth 2 (Two) Times a Day. (Patient taking differently: Take 2 tablets by mouth Daily.), Disp: 130 each, Rfl: 0  No current facility-administered medications for this visit.    Facility-Administered Medications Ordered in Other Visits:   •  chlorhexidine (PERIDEX) 0.12 % solution 15 mL, 15 mL, Mouth/Throat, Q12H, Kaylynn Ricci, DO  •  mupirocin (BACTROBAN) 2 % nasal ointment, , Nasal, BID, Kaylynn Ricci, DO    ALLERGIES:     Allergies   Allergen Reactions   • Nickel Unknown - Low Severity   • Ciprofloxacin Rash       SOCIAL HISTORY:       Social History     Socioeconomic History   • Marital status: Single   • Years of education: College   Tobacco Use   • Smoking status: Former     Packs/day: 2.00     Years: 30.00     Pack years: 60.00     Types: Cigarettes     Start date: 1973     Quit date: 2008     Years since quittin.3   • Smokeless tobacco: Never   Vaping Use   • Vaping Use: Never used   Substance and Sexual Activity   • Alcohol use: No   • Drug use: No   • Sexual activity: Not Currently     Birth control/protection: None         FAMILY HISTORY:  Family History   Problem Relation Age of Onset   • Hypertension Mother    •  "Leukemia Mother 72   • COPD Mother         smoker   • Diabetes Brother    • Pancreatic cancer Brother    • Glaucoma Brother    • Heart disease Brother    • Hypertension Brother    • Gallbladder disease Brother    • Pancreatitis Brother          from pancreatic csncer   • Gallbladder disease Father    • Colon polyps Father    • Stroke Other         Grandmother   • Lupus Other         Aunt   • Alcohol abuse Other         Aunt   • Glaucoma Other         Grandmother   • Diabetes type II Brother    • Hypertension Brother    • Hyperlipidemia Brother    • Liver cancer Paternal Uncle    • Stomach cancer Paternal Aunt    • Alcohol abuse Maternal Aunt    • Malig Hyperthermia Neg Hx        REVIEW OF SYSTEMS:  Review of Systems  ROS as per HPI         Vitals:    10/14/22 1448   BP: 102/68   Pulse: 80   Resp: 16   Temp: 98 °F (36.7 °C)   TempSrc: Temporal   SpO2: 98%   Weight: 73.8 kg (162 lb 11.2 oz)   Height: 165 cm (64.96\")   PainSc: 0-No pain     Current Status 10/14/2022   ECOG score 0       Physical Exam  Vitals reviewed.   Constitutional:       General: She is not in acute distress.     Appearance: Normal appearance. She is well-developed.   HENT:      Head: Normocephalic and atraumatic.   Eyes:      Pupils: Pupils are equal, round, and reactive to light.   Cardiovascular:      Rate and Rhythm: Normal rate and regular rhythm.   Pulmonary:      Effort: Pulmonary effort is normal. No respiratory distress.      Breath sounds: Normal breath sounds.   Musculoskeletal:         General: Normal range of motion.      Cervical back: Normal range of motion.   Skin:     General: Skin is warm and dry.      Findings: No rash.   Neurological:      Mental Status: She is alert and oriented to person, place, and time.            RECENT LABS:  Results from last 7 days   Lab Units 10/14/22  1410   WBC 10*3/mm3 3.65   NEUTROS ABS 10*3/mm3 1.96   HEMOGLOBIN g/dL 11.7*   HEMATOCRIT % 32.8*   PLATELETS 10*3/mm3 177     Results from last " 7 days   Lab Units 10/14/22  1410   SODIUM mmol/L 138   POTASSIUM mmol/L 3.7   CHLORIDE mmol/L 104   CO2 mmol/L 24.8   BUN mg/dL 27*   CREATININE mg/dL 1.81*   CALCIUM mg/dL 10.4   ALBUMIN g/dL 4.60   BILIRUBIN mg/dL 0.5   ALK PHOS U/L 74   ALT (SGPT) U/L 10   AST (SGOT) U/L 18   GLUCOSE mg/dL 109*           Assessment & Plan   Carcinoma of left breast metastatic to lung (HCC)    Bone metastases (HCC)    Stage 3b chronic kidney disease (HCC)    Marialuisa Vivar        Assessment/Plan     *Metastatic breast cancer to bilateral lungs and soft tissue in the mediastinum (likely the cause of her hoarseness). (Initial breast cancer diagnosed in 1993 treated with mastectomy, chemotherapy, radiation therapy and tamoxifen). Arimidex day 1 on 02/17/2010. PET scan late August 2011 shows no abnormal hypermetabolic activity. This has improved compared to the prior PET scan January 2010 which showed mild hypermetabolic activity in areas of metastasis. (In the past, her  T6 lesion did not show up on CT scans or bone scan. It was seen by PET scan.) We have been following with CT scans only every 6 months and PET scans on an as needed only basis. Sometimes her CT scanned pulmonary nodules are read as benign since they have been stable through the course of years but we know they are malignant because they have been surgically sampled in the past.    · CT 8/16/16 shows increased sclerosis at T6 compared to 4/28/15.    · PET 10/11/16: Slight uptake at T6, SUV 3.2.  mild thickening of right adrenal gland with an SUV of 11.   · Continued Arimidex is minimal progression and had been on this since 2/17/10.  · Not referred for radiation since no pain at T6  · CT 1/27/17, unchanged.  · CT 8/1/17: Unchanged except subtle suggestion of mild increase in thickening of the right adrenal gland.    · CT 2/22/18, unchanged.  · CT 9/6/18: Unchanged except nodular thickening right adrenal gland significantly decreased.  · CT 9/5/2019: Unchanged.  · On  the 6/12/2020 visit, the following scans were reviewed:  · CT neck and chest 5/11/2020, [facility], from 5/9/2019: Stable pulmonary nodules new lytic C7 lesion and posterior T1 lesion questionable T12 lesion.  No change in the T6 and T10 lesions.  · PET, [facility], 5/19/2020: Mildly enlarged left neck nodes are mildly hypermetabolic.  Diffuse activity throughout the thickened left adrenal gland.  CT appearance unchanged from 5/9/2019.  Progressive T1 lesion seen on CT from 5/11/2020, mild some moderate activity.  T10 low-grade activity with mixed lucent and sclerotic findings.  T6 is a mixed lucent and sclerotic appearance but no significant activity.   · T12 (the biopsy report is labeled as T12 but patient insists this was a C7/T1 biopsy, not to T12) biopsy 6/15/2020, [facility]: Metastatic breast carcinoma.  ER >95%.  VT 0%.  HER-2 negative (1+)  · It seems the main progression at the 6/12/2020 visit was a new C7 and new T1 lesion.  Those were radiated with [facility]Knife through [facility] early June 2020.  Because Arimidex has been working well since 2010, continue same Arimidex.  Add Zometa every 3 months.  · C7 and T1 found on CT 5/11/2020, [facility].  [place], Dr. Winston, early June 2020.  · CT C and T-spine 9/17/2020: New C7 lesion from 9/6/2018, but no change from 5/11/2020 CT.  T1 lesion stable from 5/11/2020, but new compared to 2/22/2018.  T10 lesion unchanged from 9/5/2019, but new from 2/22/2018.  Subtle 8 mm T12 lesion new from 9/5/2019.  · Therefore, no recent progression.  · Considering her good tolerance and long-term effectiveness of Arimidex, continue same therapy.  · CT CAP 12/10/2020: No progression  · Therefore, continue Arimidex.  · CA 15-3 normal through 12/14/2020  · Because CA 15-3 fabian to 27 on 4/13/2021, then 29.3 on 7/20/2021, then 34.3 on 10/12/2021, CT scans done earlier than planned:  · CT CAP with contrast 10/12/2021: New 5.2 x 4.6 cm mixed cystic and solid right ovarian mass compared to 12/10/2020.   Continued stability of bilateral pulmonary nodules and stable sclerotic bone metastasis.  · 11/4/2021: TLH/BSO (due to new 5 cm right ovarian mass on CT, 11/12/2021).  · Metastatic breast cancer involving bilateral ovaries and posterior uterine serosa.  ER 81-90%.  MN 61-70%.  HER-2 negative  · [facility]is NGS: ER 98%.  MN 90%.  HER-2/eb negative.  AKT1 pathogenic variant, exon 3.  AR+, 95%.  No other significant mutations including negative PIK3CA  · Pelvic washings: Adenocarcinoma  · Because this was the only area of progression on CT, and has been resected, continue Arimidex which she has been on since 2/17/2010.  · 11/22/2021: Discussed with Dr. Blum, who has a focus on breast cancer.  We both agree: Continue Arimidex for now.  In the future, if significant progression is seen, then plan Faslodex injections with Ibrance  · CT CAP 2/1/2022: A few T-spine sclerotic lesions progressively more sclerotic over multiple prior CTs of indeterminate significance.  No clear signs of progression of disease.  · 3/15/2022: Although tumor marker is not rising and CT showed no clear signs of progression.  CT revealed progressively more sclerotic bone metastasis over multiple prior CTs, she is having increased right hip discomfort.  Sometimes this can be due to healing of metastasis (but she has been on the same treatment since 2010), or this could mean progression of bone metastasis.  It is reassuring that her tumor marker is not worsening.  She would like to see [facility] to see if they feel radiation to the hip would help her discomfort.  We will do a bone scan before that appointment as this might help [facility].  (Although she has seen [facility] radiation for her neck, she would like to see [facility] radiation for this hip issue).  · 3/18/2022, bone scan: Metastatic disease at T9 and T10, corresponding to CT lesions.  Uptake also at T6, but to lesser degree.  These areas are new compared  to last bone scan, 6/3/2009.  Therefore, overall, appears consistent with progression.  · 3/24/2021: Discussed changing  Arimidex to Faslodex/Ibrance 125 mg D1-21/28 days.  · However, Dr. Llanes will decide on 4/12/2022 if the patient needs any traditional XRT to hip/pelvis.  If so, this may cause cytopenias due to the location of XRT.  Ibrance can also cause cytopenias.  If this is the case, we will wait to begin Ibrance until at least a few weeks after XRT completes, provided blood counts allow.   · 4/22/2022: Dr. Llanes reviewed MRI right hip from 4/19/2022, revealing L3 metastasis, similar size of prior CTs.  Therefore, she plans no XRT.  · 5/6/2022: Began Faslodex Ibrance  · CT 7/18/2022: Some bone mets more sclerotic, which could reflect treatment response.  Slightly increased RUL nodule, 1.1 cm, from 0.9 cm on 2/1/2022  · Bone scan 7/18/2022: No change from 3/18/2022  · 8/19/2022 (patient delayed follow-up to review scans until then): Continue same therapy since no significant progression of disease  · Reevaluation 9/16/2022, due for monthly Faslodex.  Continue current regimen including Ibrance 21 out of 28 days and Faslodex every 4 weeks.  · 10/14/2022 due for monthly Faslodex.  Continue on Ibrance 125 mg daily for 21 out of 28 days.    CA 15-3:  · 27.3  8/19/2022, from 22.5 on 5/6/2022, from 26.3 on 2/1/2022, from 32.6 on 11/22/2021, from 34.3 on 10/12/2021, from 29.3 on 7/20/2021, from 27 on 4/13/2021, from 23.3 on 12/14/2020.    *Bony metastases  · T6 discovered by PET to August 2011 (did not show up by CT or bone scan)  · C7 and T1 found on CT 5/11/2020, [facility].  CyberKnife, Dr. Winston, early June 2020.  · June 2020 began Zometa every 3 months.  · She was warned of possible osteonecrosis of the jaw and renal insufficiency.  She states she has good dental health and sees her dentist regularly.  Because of prior hypercalcemia requiring stopping calcium supplements and because of high normal current  calcium level, began without supplemental calcium.  Add calcium if calcium becomes low.  · 1/7/2022: Tooth extracted.  Plan was to wait 5 weeks to resume Zometa.  Patient delayed return until 3/15/2022 (over 8 weeks)  · May 2022: Changed Zometa to Xgeva every 3 months due to Cr up to 1.6  · Xgeva last given 7/1/2022, due again in October.  The patient reports today she is scheduled for some dental work in 2 weeks.  We will discuss potential Xgeva at follow-up in 4 weeks  · 10/14/2022 patient is due for Xgeva however exposed to some dental work in about a week and a half.  Patient does not know if this will include an extraction or not.  We will hold Xgeva until she returns in a month and her dental work is complete.    *Bone health. Last bone density 10/11/16, worsened, but still normal with worst T score -0.9.  Patient stopped calcium January 2016, but remained on vitamin D.  I recommended she restart her calcium.  Stopped calcium early March 2018 due to hypercalcemia.  · DEXA 9/5/2019: Normal bone density    *Hypercalcemia.  Repeat calcium 3/7/18 normal, but patient self stopped calcium a few days prior.  Recommended she stay off calcium.  · Calcium was 11 on 6/19/2020  · Calcium 11 again on 12/10/2020  · Repeat calcium 10.5 on 12/14/2020  · Calcium 10.6.  Minimally elevated.  (Normal range up to 10.5)  · Calcium 10.8 on 3/15/2022.  Hopefully, Zometa will help with this.  · Calcium 10.4 10/14/2022    *Hypophosphatemia  · Phosphorus 2.3 on 6/24/2022.  Begin K-Phos Neutral 2 tablets twice daily  · Patient self stopped this around mid July 2022.  · Phosphorus normal, 2.8    *On the 10/5/16 visit, Tachycardia, dyspnea on exertion, bilateral leg swelling, more sedentary recently.  CT PE protocol and bilateral lower extremity Dopplers 10/5/16, negative for clots.  She still has tachycardia and dyspnea on exertion.  Perhaps at least part of this is due to deconditioning.    *Previously complained of colon Right lower  anterior rib discomfort.  CT unremarkable in that area.  No specific pain, just discomfort.  I explained to the patient if disease was found by PET scan or bone scan, I would not  since she has been doing well on Arimidex since 2010.   Currently denies pain.     *Depression/anxiety.  This limits compliance.  She sees a psychiatrist and a counselor regularly.  She states this is mostly due to body image issues instead of cancer.   · She continues with her counselor and psychiatrist.  Viral pandemic is making this a little more difficult.  · 4/27/2022: We will try to get her involved in some cancer support groups.  I also encouraged her to try out some small groups at [employer] (she struggles with loneliness).  · 10/14/2022 patient has had worsening issue with her depression lately.  I did encourage her to get out and walk more like she did today.    *Noncompliance due to depression/anxiety.  Although she misses appointments, she does reschedule and eventually come in.    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 Willie Quinn, [facility], on 5/21/2019.  Positive sentinel node (1 of 3 nodes)..  · Left neck dissection by Dr. Willie Quinn, [facility], on 6/5/2019.  22 nodes negative.  Negative margins.  No lymphovascular invasion or perineural invasion.  2 mm depth of invasion.  Grade 1.  Squamous cell carcinoma.  1.8 cm tumor.  · Because the patient felt what she thought was left neck LAD, CT neck and chest and PET at [facility]. May 2020 performed.  She is being followed at [facility] for this.  · On 6/19/2020, per verbal report from Dr. Senia [facility], [facility] ENT does not feel she has any active head and neck cancer.   · She states she has a follow-up with Dr. Kapadia, ENT at [facility], in June with CT neck 1 week prior  · No symptoms to suggest recurrence.  She follows with [facility].    *Previously complained of left hip pain x2  weeks.  This prompted an MRI pelvis 8/20/2019 at [facility] and open MRI which was negative for malignancy.    *Possible intolerance of Zometa  · After first dose, June 2020, 2 days of chills, bone pain  · She would like to try Zometa again.  If this occurs again, we will try to switch her to Xgeva monthly.  (No good data on every 3-month Xgeva)  · She did fine with the second dose of Zometa.    *Thickening of wall of transverse colon on CT 2/1/2022.  Last colonoscopy was 4/18/2019.  She wants the next colonoscopy done at [facility]  · Dr. Galeas will see her on 4/7/2022 to evaluate this    *Depression  · Is seeing a psychiatry NP and a therapist for many years.  · 6/24/2022: Reported she decided to stop seeing her therapist but is still interested in therapy.  She would like to see behavioral oncology at [facility].  This was arranged  · Contacted by Julissa Centeno (behavioral oncology).  Patient canceled on the day of the appointment during her first scheduled appointment with Julissa.  She then no showed for the rescheduled appointment.  Julissa recommends the patient continue to follow with her therapist and psychiatrist and if she needs additional support, to use [employer]    *Pruritic rash  · 8/19/2022: Referred to dermatology  · No rash present on exam today, 9/16/2022    *Constipation  · Intermittently takes Senokot and suppositories if needed.  · We discussed that this could be related to her decreased fluid intake as well as taking Zofran regularly.  She will try to limit her Zofran use and increase her water intake.  We also discussed activity can help with her constipation.  Patient is also going to try MiraLAX 1 capful daily and titrate if needed    *CKD: 3b   · Has met with nephrology last visit September 23, and we will follow-up again in December.  · 10/14/2022 her creatinine is much more elevated today up to 1.81, BUN 27.  Patient admits she is not drinking nearly as much water as she should be.  She is  going to work on this.  Patient to return on Monday for 1 L of IV normal saline.    Monitoring on Ibrance:  CBC every 2 weeks for the first 2 months then monthly and as indicated.  After 6 months, if neutropenia grade 1 or 2 only, then CBC can be every 3 months    PLAN:     · Proceed with Faslodex today, repeating every 4 weeks.  · Continue Ibrance 125 mg on days 1-21 out of 28 days.  · Hold Xgeva today as the patient is scheduled to have some dental work in about a week and a half.  · Follow-up in 4 weeks with Dr. Hawkins with repeat labs reevaluation due for her next Faslodex and consideration of Xgeva pending patient's dental work.  · Patient to return on Monday, 10/17/2022 for 1 L of IV normal saline.  · Patient is going to increase her water intake.  · MiraLAX for constipation.  · Every 6 months CT CAP without IV contrast, bone scan (last 7/18/2022), CA 15-3  · Previously arranged for her to get involved with some cancer support groups  ·  CT scans WITHOUT IV CONTRAST (again, to avoid further kidney damage.)  · She sees [facility] ENT, Dr. Bedoya annually, next is summer 2022    The patient continues on high risk medication requiring close monitoring for toxicity    Tabby Acevedo, APRN  10/14/2022    Send a copy of this note to   Dr. Willie Quinn, [facility] MD Dr. Steve Winston, radiation medicine, [facility]    I spent 58 minutes caring for Marialuisa on this date of service. This time includes time spent by me in the following activities: preparing for the visit, reviewing tests, obtaining and/or reviewing a separately obtained history, performing a medically appropriate examination and/or evaluation, counseling and educating the patient/family/caregiver, ordering medications, tests, or procedures, referring and communicating with other health care professionals, documenting information in the medical record, independently interpreting results and communicating that information with the  patient/family/caregiver and care coordination

## 2022-10-14 NOTE — TELEPHONE ENCOUNTER
Phoned Ms. Vivar and she stated she is aware of her creatinine 1.8 and she will try to drink more fluids. She stated she just saw her kidney MD at [facility] and has another appt with them in Dec. Message sent to scheduling.

## 2022-10-14 NOTE — PROGRESS NOTES
Tabby Acevedo APRN Code, James N. II, MD  We talked about her kidney function today and how it was much higher than usual for her.   She admits she had been in a slump as far as her depression, has been in bed quite a bit, and admits she is not drinking fluids like she needs to.  She has been seen by Nephrology and has a follow up in December.       We will get her scheduled for fluids on Monday.     Tabby           Previous Messages     ----- Message -----   From: Jarod Hawkins II, MD   Sent: 10/14/2022   3:31 PM EDT   To: ARON Colon, *     Hi     Tabby, I know you saw her today.  Her labs from today show creatinine of 1.8 which is a high for her.     Her kidney function has been worse lately.  Specifically mostly 1.4-1.6, 5/6/2022 - 9/16/2022.  Previously creatinine was 1-1.2     Please ask her to drink plenty of noncaffeinated beverages.  Please have her into the office on Monday for 1 L normal saline and a stat BMP.     Also, please refer her to nephrology for what appears to be slowly worsening renal function.     I checked Ibrance and Faslodex.  Neither drug should cause renal dysfunction and neither drug needs to be adjusted during renal dysfunction.     Thanks!

## 2022-10-14 NOTE — TELEPHONE ENCOUNTER
----- Message from Jarod Hawkins II, MD sent at 10/14/2022  3:29 PM EDT -----  Glenn Mcnair, I know you saw her today.  Her labs from today show creatinine of 1.8 which is a high for her.    Her kidney function has been worse lately.  Specifically mostly 1.4-1.6, 5/6/2022 - 9/16/2022.  Previously creatinine was 1-1.2    Please ask her to drink plenty of noncaffeinated beverages.  Please have her into the office on Monday for 1 L normal saline and a stat BMP.    Also, please refer her to nephrology for what appears to be slowly worsening renal function.    I checked Ibrance and Faslodex.  Neither drug should cause renal dysfunction and neither drug needs to be adjusted during renal dysfunction.    Thanks!

## 2022-10-17 ENCOUNTER — DOCUMENTATION (OUTPATIENT)
Dept: PHARMACY | Facility: HOSPITAL | Age: 67
End: 2022-10-17

## 2022-10-17 ENCOUNTER — APPOINTMENT (OUTPATIENT)
Dept: ONCOLOGY | Facility: HOSPITAL | Age: 67
End: 2022-10-17

## 2022-10-17 DIAGNOSIS — R73.01 IFG (IMPAIRED FASTING GLUCOSE): Primary | ICD-10-CM

## 2022-10-17 DIAGNOSIS — E78.2 MIXED HYPERLIPIDEMIA: ICD-10-CM

## 2022-10-17 NOTE — PROGRESS NOTES
I received a fax notice from [employer] dated 10/14/22 stating that Ibrance  has shipped to the patient on 9/16/22.    Sarai Werner - Care Coordinator   10/17/2022  08:50 EDT

## 2022-10-18 ENCOUNTER — SPECIALTY PHARMACY (OUTPATIENT)
Dept: PHARMACY | Facility: HOSPITAL | Age: 67
End: 2022-10-18

## 2022-10-18 ENCOUNTER — APPOINTMENT (OUTPATIENT)
Dept: ONCOLOGY | Facility: HOSPITAL | Age: 67
End: 2022-10-18

## 2022-10-18 NOTE — PROGRESS NOTES
Specialty Note ( Ibrance and Faslodex)      Labs noted        10/14/2022   WBC 3.40 - 10.80 10*3/mm3 3.65   Neutrophils Absolute 1.70 - 7.00 10*3/mm3 1.96   Hemoglobin 12.0 - 15.9 g/dL 11.7 (A)   Hematocrit 34.0 - 46.6 % 32.8 (A)   Platelets 140 - 450 10*3/mm3 177   Creatinine 0.57 - 1.00 mg/dL 1.81 (A)   BUN 8 - 23 mg/dL 27 (A)   Sodium 136 - 145 mmol/L 138   Potassium 3.5 - 5.2 mmol/L 3.7   Glucose 65 - 99 mg/dL 109 (A)   Calcium 8.6 - 10.5 mg/dL 10.4   Albumin 3.50 - 5.20 g/dL 4.60   Total Protein 6.0 - 8.5 g/dL 7.5   AST (SGOT) 1 - 32 U/L 18   ALT (SGPT) 1 - 33 U/L 10   Alkaline Phosphatase 39 - 117 U/L 74   Total Bilirubin 0.0 - 1.2 mg/dL 0.5     APRN dictation is noted    • Proceed with Faslodex today, repeating every 4 weeks.  • Continue Ibrance 125 mg on days 1-21 out of 28 days.

## 2022-10-20 ENCOUNTER — DOCUMENTATION (OUTPATIENT)
Dept: ONCOLOGY | Facility: CLINIC | Age: 67
End: 2022-10-20

## 2022-10-20 ENCOUNTER — INFUSION (OUTPATIENT)
Dept: ONCOLOGY | Facility: HOSPITAL | Age: 67
End: 2022-10-20

## 2022-10-20 VITALS
HEIGHT: 65 IN | OXYGEN SATURATION: 96 % | BODY MASS INDEX: 27.56 KG/M2 | WEIGHT: 165.4 LBS | DIASTOLIC BLOOD PRESSURE: 75 MMHG | RESPIRATION RATE: 18 BRPM | HEART RATE: 84 BPM | TEMPERATURE: 96.6 F | SYSTOLIC BLOOD PRESSURE: 122 MMHG

## 2022-10-20 DIAGNOSIS — F41.9 ANXIETY: ICD-10-CM

## 2022-10-20 DIAGNOSIS — I10 BENIGN ESSENTIAL HTN: ICD-10-CM

## 2022-10-20 DIAGNOSIS — R93.3 ABNORMAL CT SCAN, COLON: ICD-10-CM

## 2022-10-20 DIAGNOSIS — N94.89 ADNEXAL MASS: ICD-10-CM

## 2022-10-20 LAB
ANION GAP SERPL CALCULATED.3IONS-SCNC: 7.6 MMOL/L (ref 5–15)
BUN SERPL-MCNC: 23 MG/DL (ref 8–23)
BUN/CREAT SERPL: 15 (ref 7–25)
CALCIUM SPEC-SCNC: 10.7 MG/DL (ref 8.6–10.5)
CHLORIDE SERPL-SCNC: 106 MMOL/L (ref 98–107)
CO2 SERPL-SCNC: 26.4 MMOL/L (ref 22–29)
CREAT SERPL-MCNC: 1.53 MG/DL (ref 0.57–1)
EGFRCR SERPLBLD CKD-EPI 2021: 36.9 ML/MIN/1.73
GLUCOSE SERPL-MCNC: 121 MG/DL (ref 65–99)
POTASSIUM SERPL-SCNC: 4.2 MMOL/L (ref 3.5–5.2)
SODIUM SERPL-SCNC: 140 MMOL/L (ref 136–145)

## 2022-10-20 PROCEDURE — 80048 BASIC METABOLIC PNL TOTAL CA: CPT | Performed by: INTERNAL MEDICINE

## 2022-10-20 PROCEDURE — 96360 HYDRATION IV INFUSION INIT: CPT

## 2022-10-20 RX ORDER — SODIUM CHLORIDE 9 MG/ML
1000 INJECTION, SOLUTION INTRAVENOUS ONCE
Status: COMPLETED | OUTPATIENT
Start: 2022-10-20 | End: 2022-10-20

## 2022-10-20 RX ADMIN — SODIUM CHLORIDE 1000 ML: 9 INJECTION, SOLUTION INTRAVENOUS at 15:03

## 2022-10-20 NOTE — PROGRESS NOTES
Hi April,    Please call her and tell her that her calcium is a little high.  Ask her to make sure she stays well-hydrated with noncaffeinated beverages and please make sure she is not taking any calcium supplements.    Please make sure she has a stat CMP the day she sees me (she should be having Xgeva labs the day she sees me).      Thanks!

## 2022-10-21 ENCOUNTER — TELEPHONE (OUTPATIENT)
Dept: ONCOLOGY | Facility: CLINIC | Age: 67
End: 2022-10-21

## 2022-10-21 LAB
CHOLEST SERPL-MCNC: 151 MG/DL (ref 0–200)
HBA1C MFR BLD: 5.4 % (ref 4.8–5.6)
HDLC SERPL-MCNC: 51 MG/DL (ref 40–60)
LDLC SERPL CALC-MCNC: 78 MG/DL (ref 0–100)
LDLC/HDLC SERPL: 1.48 {RATIO}
TRIGL SERPL-MCNC: 123 MG/DL (ref 0–150)
VLDLC SERPL CALC-MCNC: 22 MG/DL (ref 5–40)

## 2022-10-21 NOTE — TELEPHONE ENCOUNTER
----- Message from Jarod Hawkins II, MD sent at 10/20/2022  5:09 PM EDT -----  Hi April,    Please call her and tell her that her calcium is a little high.  Ask her to make sure she stays well-hydrated with noncaffeinated beverages and please make sure she is not taking any calcium supplements.    Please make sure she has a stat CMP the day she sees me (she should be having Xgeva labs the day she sees me).      Thanks!

## 2022-10-21 NOTE — TELEPHONE ENCOUNTER
Called pt re: her lab results. She states she is not on a calcium supplement. Encouraged her to stay well hydrated as instructed by Dr. Hawkins and we will recheck these labs at her next. Pt states xgeva was held at last visit due to dental work and we will re-evaluate if xgeva is appropriate at her next visit with Dr. Hawkins.

## 2022-10-27 ENCOUNTER — DOCUMENTATION (OUTPATIENT)
Dept: PHARMACY | Facility: HOSPITAL | Age: 67
End: 2022-10-27

## 2022-10-27 NOTE — PROGRESS NOTES
David VERAS approved from 10/27/2022 to 4/25/2023.     Sarai Werner - Care Coordinator   10/27/2022  08:46 EDT

## 2022-10-28 ENCOUNTER — SPECIALTY PHARMACY (OUTPATIENT)
Dept: PHARMACY | Facility: HOSPITAL | Age: 67
End: 2022-10-28

## 2022-10-28 NOTE — PROGRESS NOTES
Specialty Note    Call placed to assess adherence and side effects of Ibrance- no answer.   direct line [phone].

## 2022-11-04 ENCOUNTER — SPECIALTY PHARMACY (OUTPATIENT)
Dept: PHARMACY | Facility: HOSPITAL | Age: 67
End: 2022-11-04

## 2022-11-04 NOTE — PROGRESS NOTES
Specialty Note ( ibrance and Falsodex)    Call placed to check adherence and side effects - no answer.   direct line [phone].

## 2022-11-07 ENCOUNTER — SPECIALTY PHARMACY (OUTPATIENT)
Dept: PHARMACY | Facility: HOSPITAL | Age: 67
End: 2022-11-07

## 2022-11-11 ENCOUNTER — APPOINTMENT (OUTPATIENT)
Dept: ONCOLOGY | Facility: HOSPITAL | Age: 67
End: 2022-11-11

## 2022-11-11 ENCOUNTER — APPOINTMENT (OUTPATIENT)
Dept: OTHER | Facility: HOSPITAL | Age: 67
End: 2022-11-11

## 2022-11-14 ENCOUNTER — SPECIALTY PHARMACY (OUTPATIENT)
Dept: PHARMACY | Facility: HOSPITAL | Age: 67
End: 2022-11-14

## 2022-11-16 NOTE — PROGRESS NOTES
Specialty Note ( ibrance and Faslodex)    Call placed for toxicity and adherence.  No answer.   direct line [phone].

## 2022-11-18 ENCOUNTER — APPOINTMENT (OUTPATIENT)
Dept: OTHER | Facility: HOSPITAL | Age: 67
End: 2022-11-18

## 2022-11-18 ENCOUNTER — APPOINTMENT (OUTPATIENT)
Dept: ONCOLOGY | Facility: HOSPITAL | Age: 67
End: 2022-11-18

## 2022-11-18 ENCOUNTER — SPECIALTY PHARMACY (OUTPATIENT)
Dept: ONCOLOGY | Facility: HOSPITAL | Age: 67
End: 2022-11-18

## 2022-11-21 ENCOUNTER — SPECIALTY PHARMACY (OUTPATIENT)
Dept: PHARMACY | Facility: HOSPITAL | Age: 67
End: 2022-11-21

## 2022-11-22 ENCOUNTER — DOCUMENTATION (OUTPATIENT)
Dept: PHARMACY | Facility: HOSPITAL | Age: 67
End: 2022-11-22

## 2022-11-22 NOTE — PROGRESS NOTES
I received a fax notice from [employer] dated 11/22/22 stating that Ibrance has shipped to the patient.    Sarai Werner - Care Coordinator   11/22/2022  12:45 EST

## 2022-12-02 ENCOUNTER — APPOINTMENT (OUTPATIENT)
Dept: ONCOLOGY | Facility: HOSPITAL | Age: 67
End: 2022-12-02

## 2022-12-02 ENCOUNTER — SPECIALTY PHARMACY (OUTPATIENT)
Dept: ONCOLOGY | Facility: HOSPITAL | Age: 67
End: 2022-12-02

## 2022-12-02 ENCOUNTER — APPOINTMENT (OUTPATIENT)
Dept: OTHER | Facility: HOSPITAL | Age: 67
End: 2022-12-02

## 2022-12-05 ENCOUNTER — TELEPHONE (OUTPATIENT)
Dept: ONCOLOGY | Facility: CLINIC | Age: 67
End: 2022-12-05

## 2022-12-05 NOTE — TELEPHONE ENCOUNTER
Caller: Marialuisa Vivar    Relationship to patient: Self    Best call back number: [phone]    Type of visit: LAB, F/U 1, SPECIALTY PHARM & INJECTION     Requested date: CALL TO R/S     If rescheduling, when is the original appointment: 12/2/2022

## 2022-12-06 ENCOUNTER — SPECIALTY PHARMACY (OUTPATIENT)
Dept: PHARMACY | Facility: HOSPITAL | Age: 67
End: 2022-12-06

## 2022-12-07 ENCOUNTER — OFFICE VISIT (OUTPATIENT)
Dept: INTERNAL MEDICINE | Facility: CLINIC | Age: 67
End: 2022-12-07

## 2022-12-07 VITALS
OXYGEN SATURATION: 97 % | BODY MASS INDEX: 26.94 KG/M2 | HEART RATE: 80 BPM | WEIGHT: 161.7 LBS | SYSTOLIC BLOOD PRESSURE: 102 MMHG | TEMPERATURE: 97 F | DIASTOLIC BLOOD PRESSURE: 62 MMHG | HEIGHT: 65 IN

## 2022-12-07 DIAGNOSIS — M25.512 ACUTE PAIN OF LEFT SHOULDER: ICD-10-CM

## 2022-12-07 DIAGNOSIS — I10 BENIGN ESSENTIAL HTN: ICD-10-CM

## 2022-12-07 DIAGNOSIS — Z00.00 MEDICARE ANNUAL WELLNESS VISIT, SUBSEQUENT: Primary | ICD-10-CM

## 2022-12-07 DIAGNOSIS — E78.2 MIXED HYPERLIPIDEMIA: ICD-10-CM

## 2022-12-07 DIAGNOSIS — R73.01 IFG (IMPAIRED FASTING GLUCOSE): ICD-10-CM

## 2022-12-07 PROCEDURE — G0008 ADMIN INFLUENZA VIRUS VAC: HCPCS | Performed by: FAMILY MEDICINE

## 2022-12-07 PROCEDURE — 1170F FXNL STATUS ASSESSED: CPT | Performed by: FAMILY MEDICINE

## 2022-12-07 PROCEDURE — G0439 PPPS, SUBSEQ VISIT: HCPCS | Performed by: FAMILY MEDICINE

## 2022-12-07 PROCEDURE — 99213 OFFICE O/P EST LOW 20 MIN: CPT | Performed by: FAMILY MEDICINE

## 2022-12-07 PROCEDURE — 1159F MED LIST DOCD IN RCRD: CPT | Performed by: FAMILY MEDICINE

## 2022-12-07 PROCEDURE — 90662 IIV NO PRSV INCREASED AG IM: CPT | Performed by: FAMILY MEDICINE

## 2022-12-07 RX ORDER — METOPROLOL SUCCINATE 25 MG/1
25 TABLET, EXTENDED RELEASE ORAL DAILY
Qty: 90 TABLET | Refills: 1 | Status: SHIPPED | OUTPATIENT
Start: 2022-12-07

## 2022-12-07 RX ORDER — ATORVASTATIN CALCIUM 10 MG/1
10 TABLET, FILM COATED ORAL NIGHTLY
Qty: 90 TABLET | Refills: 1 | Status: SHIPPED | OUTPATIENT
Start: 2022-12-07

## 2022-12-07 NOTE — PROGRESS NOTES
The ABCs of the Annual Wellness Visit  Subsequent Medicare Wellness Visit    Subjective      Marialuisa Vivar is a 67 y.o. female who presents for a Subsequent Medicare Wellness Visit.    The following portions of the patient's history were reviewed and   updated as appropriate: allergies, current medications, past family history, past medical history, past social history, past surgical history and problem list.    Compared to one year ago, the patient feels her physical   health is worse. Cancer.    Compared to one year ago, the patient feels her mental   health is better.    Recent Hospitalizations:  She was not admitted to the hospital during the last year.       Current Medical Providers:  Patient Care Team:  Jennifer Mantilla MD as PCP - General (Family Medicine)  Shruthi, Jarod BLANCHARD II, MD as Consulting Physician (Hematology and Oncology)  Yair Robin MD as Referring Physician (Otolaryngology)  Brianna Orozco MD as Consulting Physician (Radiation Oncology)    Outpatient Medications Prior to Visit   Medication Sig Dispense Refill   • cholecalciferol (VITAMIN D3) 1.25 MG (43700 UT) capsule Take 5,000 Units by mouth 3 (Three) Times a Week.     • denosumab (Xgeva) 120 MG/1.7ML solution injection Inject  under the skin into the appropriate area as directed.     • eszopiclone (LUNESTA) 3 MG tablet Take 3 mg by mouth.     • fulvestrant (FASLODEX) 250 MG/5ML chemo syringe Inject  into the appropriate muscle as directed by prescriber.     • LORazepam (ATIVAN) 0.5 MG tablet Take 1 tablet by mouth Every 8 (Eight) Hours As Needed.     • Omeprazole 20 MG Tablet Delayed Release Dispersible Take 20 mg by mouth As Needed.     • ondansetron (ZOFRAN) 8 MG tablet Take 1 tablet by mouth 3 (Three) Times a Day As Needed for Nausea or Vomiting. 30 tablet 5   • Palbociclib 125 MG tablet Take 1 tablet by mouth Daily. Take for 21 days on, then 7 days off, then repeat. 21 tablet 5   • PARoxetine (PAXIL) 40 MG tablet Take 40 mg by  mouth Every Night.     • Probiotic Product (PROBIOTIC-10 PO) Take  by mouth. Pearls     • sennosides-docusate (PERICOLACE) 8.6-50 MG per tablet Take 1 tablet by mouth Daily.     • atorvastatin (LIPITOR) 10 MG tablet Take 1 tablet by mouth Every Night.     • metoprolol succinate XL (TOPROL-XL) 25 MG 24 hr tablet Take 1 tablet by mouth once daily 90 tablet 0   • K Phos Mono-Sod Phos Di & Mono (K-Phos-Neutral) 155-852-130 MG tablet Take 2 tablets by mouth 2 (Two) Times a Day. (Patient taking differently: Take 2 tablets by mouth Daily.) 130 each 0     Facility-Administered Medications Prior to Visit   Medication Dose Route Frequency Provider Last Rate Last Admin   • chlorhexidine (PERIDEX) 0.12 % solution 15 mL  15 mL Mouth/Throat Q12H Kaylynn Ricci DO       • mupirocin (BACTROBAN) 2 % nasal ointment   Nasal BID Kaylynn Ricci DO           No opioid medication identified on active medication list. I have reviewed chart for other potential  high risk medication/s and harmful drug interactions in the elderly.          Aspirin is not on active medication list.  Aspirin use is not indicated based on review of current medical condition/s. Risk of harm outweighs potential benefits.  .    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  ACP discussion was held with the patient during this visit. Patient does not have an advance directive,  "declines further assistance.  Patient has information at home.     Objective    Vitals:    22 1300   BP: 102/62   Pulse: 80   Temp: 97 °F (36.1 °C)   SpO2: 97%   Weight: 73.3 kg (161 lb 11.2 oz)   Height: 165.1 cm (65\")     Estimated body mass index is 26.91 kg/m² as calculated from the following:    Height as of this encounter: 165.1 cm (65\").    Weight as of this encounter: 73.3 kg (161 lb 11.2 oz).    BMI is >= 25 and <30. (Overweight) The following options were offered after discussion;: none (medical contraindication) no contraindication, but patient is in treatment for metastatic cancer.      Does the patient have evidence of cognitive impairment?   No    Lab Results   Component Value Date    CHLPL 151 10/20/2022    TRIG 123 10/20/2022    HDL 51 10/20/2022    LDL 78 10/20/2022    VLDL 22 10/20/2022    HGBA1C 5.40 10/20/2022          HEALTH RISK ASSESSMENT    Smoking Status:  Social History     Tobacco Use   Smoking Status Former   • Packs/day: 2.00   • Years: 30.00   • Pack years: 60.00   • Types: Cigarettes   • Start date: 1973   • Quit date: 2008   • Years since quittin.5   Smokeless Tobacco Never     Alcohol Consumption:  Social History     Substance and Sexual Activity   Alcohol Use No     Fall Risk Screen:    ELSY Fall Risk Assessment was completed, and patient is at LOW risk for falls.Assessment completed on:6/3/2022    Depression Screening:  PHQ-2/PHQ-9 Depression Screening 10/14/2022   Retired PHQ-9 Total Score -   Retired Total Score -   Little Interest or Pleasure in Doing Things 1-->several days   Feeling Down, Depressed or Hopeless 1-->several days   Trouble Falling or Staying Asleep, or Sleeping Too Much 2-->more than half the days   Feeling Tired or Having Little Energy 2-->more than half the days   Poor Appetite or Overeating 2-->more than half the days   Feeling Bad about Yourself - or that You are a Failure or Have Let Yourself or Your Family Down 0-->not at all   Trouble " Concentrating on Things, Such as Reading the Newspaper or Watching Television 1-->several days   Moving or Speaking So Slowly that Other People Could Have Noticed? Or the Opposite - Being So Fidgety 0-->not at all   Thoughts that You Would be Better Off Dead or of Hurting Yourself in Some Way 0-->not at all   PHQ-9: Brief Depression Severity Measure Score 9   If You Checked Off Any Problems, How Difficult Have These Problems Made It For You to Do Your Work, Take Care of Things at Home, or Get Along with Other People? very difficult       Health Habits and Functional and Cognitive Screening:  Functional & Cognitive Status 12/7/2022   Do you have difficulty preparing food and eating? No   Do you have difficulty bathing yourself, getting dressed or grooming yourself? No   Do you have difficulty using the toilet? No   Do you have difficulty moving around from place to place? No   Do you have trouble with steps or getting out of a bed or a chair? No   Current Diet Well Balanced Diet   Dental Exam Up to date   Eye Exam Up to date   Exercise (times per week) 0 times per week   Current Exercises Include No Regular Exercise   Current Exercise Activities Include -   Do you need help using the phone?  No   Are you deaf or do you have serious difficulty hearing?  No   Do you need help with transportation? No   Do you need help shopping? No   Do you need help preparing meals?  No   Do you need help with housework?  No   Do you need help with laundry? No   Do you need help taking your medications? No   Do you need help managing money? No   Do you ever drive or ride in a car without wearing a seat belt? No   Have you felt unusual stress, anger or loneliness in the last month? Yes   Who do you live with? Alone   If you need help, do you have trouble finding someone available to you? No   Have you been bothered in the last four weeks by sexual problems? -   Do you have difficulty concentrating, remembering or making decisions? No        Age-appropriate Screening Schedule:  Refer to the list below for future screening recommendations based on patient's age, sex and/or medical conditions. Orders for these recommended tests are listed in the plan section. The patient has been provided with a written plan.    Health Maintenance   Topic Date Due   • DXA SCAN  09/05/2021   • PAP SMEAR  12/01/2021   • INFLUENZA VACCINE  08/01/2022   • LIPID PANEL  10/20/2023   • TDAP/TD VACCINES (2 - Td or Tdap) 08/21/2030   • ZOSTER VACCINE  Completed                CMS Preventative Services Quick Reference  Risk Factors Identified During Encounter:    Cardiovascular Disease  Immunizations Discussed/Encouraged (specific Immunizations; Influenza and COVID19  Inactivity/Sedentary  Obesity/Overweight      Information on healthy heart diet provided.  Information on exercise regimen notes provided.  She is encouraged to exercise as tolerated.    Patient is getting flu vaccine today.  We discussed COVID-vaccine booster.  Otherwise he is up-to-date with vaccinations.    She is in treatment for metastatic cancer.  She follows up closely with oncology.  She continues regular dental appointments at least every 6 months.  She has information on living will at home. She is intending to complete it.    The above risks/problems have been discussed with the patient.  Pertinent information has been shared with the patient in the After Visit Summary.    Diagnoses and all orders for this visit:    1. Medicare annual wellness visit, subsequent (Primary)    2. Benign essential HTN    3. IFG (impaired fasting glucose)    4. Acute pain of left shoulder  -     Ambulatory Referral to Physical Therapy    Other orders  -     metoprolol succinate XL (TOPROL-XL) 25 MG 24 hr tablet; Take 1 tablet by mouth Daily.  Dispense: 90 tablet; Refill: 1  -     atorvastatin (LIPITOR) 10 MG tablet; Take 1 tablet by mouth Every Night.  Dispense: 90 tablet; Refill: 1        Follow Up:   Next Medicare  Wellness visit to be scheduled in 1 year.      An After Visit Summary and PPPS were made available to the patient.

## 2022-12-07 NOTE — PATIENT INSTRUCTIONS
Medicare Wellness  Personal Prevention Plan of Service     Date of Office Visit:    Encounter Provider:  Jennifer Mantilla MD  Place of Service:  [facility]  Patient Name: Marialuisa Vivar  :  1955    As part of the Medicare Wellness portion of your visit today, we are providing you with this personalized preventive plan of services (PPPS). This plan is based upon recommendations of the [place] Preventive Services Task Force (USPSTF) and the Advisory Committee on Immunization Practices (ACIP).    This lists the preventive care services that should be considered, and provides dates of when you are due. Items listed as completed are up-to-date and do not require any further intervention.    Health Maintenance   Topic Date Due    DXA SCAN  2021    PAP SMEAR  2021    COVID-19 Vaccine (4 - Booster for [employer] series) 2022    ANNUAL WELLNESS VISIT  2022    INFLUENZA VACCINE  2022    Pneumococcal Vaccine 65+ (2 - PCV) 2026 (Originally 2021)    LUNG CANCER SCREENING  2023    LIPID PANEL  10/20/2023    COLORECTAL CANCER SCREENING  2027    TDAP/TD VACCINES (2 - Td or Tdap) 2030    HEPATITIS C SCREENING  Completed    ZOSTER VACCINE  Completed       Orders Placed This Encounter   Procedures    Ambulatory Referral to Physical Therapy     Referral Priority:   Routine     Referral Type:   Physical Therapy     Referral Reason:   Specialty Services Required     Requested Specialty:   Physical Therapy     Number of Visits Requested:   1       Return in about 6 months (around 2023).

## 2022-12-07 NOTE — PROGRESS NOTES
Subjective   Marialuisa Vivar is a 67 y.o. female.   Chief Complaint   Patient presents with   • Medicare Wellness-subsequent     Fu on lab   • Hypertension   • Hyperglycemia   • Hyperlipidemia   • Shoulder Pain       History of Present Illness        1.hypertension- patient is on Toprol 25 mg a day.  She takes it every day.  She has no side effects.  She has no chest pain, no palpitations.  Occasionally she gets lightheaded.  She has chronic kidney disease.  Creatinine at 1.53, GFR 36.9.    2.  Hyperlipidemia-on atorvastatin 10 mg a day.  She takes it every day.  She has no side effects.  LDL 78, HDL 51, triglycerides 123.    3.  IFG-fasting blood sugar 121, A1c 5.4 from 5.7.    4.  Left shoulder pain- started 2 to 3 months ago.  There was no known injury that triggered it.  Pain is on and off, last seconds.  It i is in the ear.  S sharp.  Intensity 6 out of 10.  Worse with putting her arm behind back.  Sometimes it pops.  She did not try anything yet to help it.      The following portions of the patient's history were reviewed and updated as appropriate: allergies, current medications, past family history, past medical history, past social history, past surgical history and problem list.    Review of Systems   Constitutional: Positive for fatigue.   Respiratory: Negative.    Cardiovascular: Negative.          Objective   Wt Readings from Last 3 Encounters:   12/07/22 73.3 kg (161 lb 11.2 oz)   10/20/22 75 kg (165 lb 6.4 oz)   10/14/22 73.8 kg (162 lb 11.2 oz)      Vitals:    12/07/22 1300   BP: 102/62   Pulse: 80   Temp: 97 °F (36.1 °C)   SpO2: 97%     Temp Readings from Last 3 Encounters:   12/07/22 97 °F (36.1 °C)   10/20/22 96.6 °F (35.9 °C) (Infrared)   10/14/22 98 °F (36.7 °C) (Temporal)     BP Readings from Last 3 Encounters:   12/07/22 102/62   10/20/22 122/75   10/14/22 102/68     Pulse Readings from Last 3 Encounters:   12/07/22 80   10/20/22 84   10/14/22 80     Body mass index is 26.91 kg/m².    Physical  Exam  Constitutional:       Appearance: She is well-developed.   Neck:      Thyroid: No thyromegaly.      Vascular: No carotid bruit.   Cardiovascular:      Rate and Rhythm: Normal rate and regular rhythm.      Heart sounds: Normal heart sounds.   Pulmonary:      Effort: Pulmonary effort is normal.      Breath sounds: Normal breath sounds.   Musculoskeletal:      Comments: No TTP over shoulders.  Mildly decreased internal rotation in left shoulder.   Skin:     General: Skin is warm and dry.   Neurological:      Mental Status: She is alert and oriented to person, place, and time.   Psychiatric:         Behavior: Behavior normal.         Assessment & Plan   Diagnoses and all orders for this visit:    1. Medicare annual wellness visit, subsequent (Primary)    2. Benign essential HTN    3. IFG (impaired fasting glucose)    4. Acute pain of left shoulder  -     Ambulatory Referral to Physical Therapy    5. Mixed hyperlipidemia    Other orders  -     metoprolol succinate XL (TOPROL-XL) 25 MG 24 hr tablet; Take 1 tablet by mouth Daily.  Dispense: 90 tablet; Refill: 1  -     atorvastatin (LIPITOR) 10 MG tablet; Take 1 tablet by mouth Every Night.  Dispense: 90 tablet; Refill: 1        1.  Hypertension- she will try half tablet of metoprolol 25 daily.  Follow-up in 6 months, or sooner if it does not help with lightheadedness.  2.  Hyperlipidemia-continue current treatment.  Follow-up in 6 months.  3.  Impaired fasting glucose- we will continue to monitor.  Follow-up in 6 months.  4.  Shoulder pain- new problem.  Patient is advised to start Tylenol Extra Strength 2 tablets every 8 hours if needed.  She cannot take NSAIDs due to chronic kidney disease.  I am referring her to physical therapy.  Follow-up as needed.

## 2022-12-08 ENCOUNTER — APPOINTMENT (OUTPATIENT)
Dept: OTHER | Facility: HOSPITAL | Age: 67
End: 2022-12-08
Payer: MEDICARE

## 2022-12-08 ENCOUNTER — APPOINTMENT (OUTPATIENT)
Dept: ONCOLOGY | Facility: HOSPITAL | Age: 67
End: 2022-12-08
Payer: MEDICARE

## 2022-12-09 ENCOUNTER — SPECIALTY PHARMACY (OUTPATIENT)
Dept: PHARMACY | Facility: HOSPITAL | Age: 67
End: 2022-12-09

## 2022-12-15 ENCOUNTER — INFUSION (OUTPATIENT)
Dept: ONCOLOGY | Facility: HOSPITAL | Age: 67
End: 2022-12-15
Payer: MEDICARE

## 2022-12-15 ENCOUNTER — SPECIALTY PHARMACY (OUTPATIENT)
Dept: ONCOLOGY | Facility: HOSPITAL | Age: 67
End: 2022-12-15
Payer: MEDICARE

## 2022-12-15 ENCOUNTER — LAB (OUTPATIENT)
Dept: OTHER | Facility: HOSPITAL | Age: 67
End: 2022-12-15
Payer: MEDICARE

## 2022-12-15 ENCOUNTER — OFFICE VISIT (OUTPATIENT)
Dept: ONCOLOGY | Facility: CLINIC | Age: 67
End: 2022-12-15

## 2022-12-15 VITALS
BODY MASS INDEX: 27.39 KG/M2 | OXYGEN SATURATION: 96 % | RESPIRATION RATE: 16 BRPM | HEART RATE: 83 BPM | HEIGHT: 65 IN | SYSTOLIC BLOOD PRESSURE: 107 MMHG | WEIGHT: 164.4 LBS | TEMPERATURE: 98.2 F | DIASTOLIC BLOOD PRESSURE: 67 MMHG

## 2022-12-15 DIAGNOSIS — C78.02 CARCINOMA OF LEFT BREAST METASTATIC TO LUNG: Primary | ICD-10-CM

## 2022-12-15 DIAGNOSIS — N94.89 ADNEXAL MASS: Primary | ICD-10-CM

## 2022-12-15 DIAGNOSIS — T45.1X5A CHEMOTHERAPY-INDUCED FATIGUE: ICD-10-CM

## 2022-12-15 DIAGNOSIS — N94.89 ADNEXAL MASS: ICD-10-CM

## 2022-12-15 DIAGNOSIS — C78.02 CARCINOMA OF LEFT BREAST METASTATIC TO LUNG: ICD-10-CM

## 2022-12-15 DIAGNOSIS — C50.912 CARCINOMA OF LEFT BREAST METASTATIC TO LUNG: ICD-10-CM

## 2022-12-15 DIAGNOSIS — Z79.899 HIGH RISK MEDICATION USE: ICD-10-CM

## 2022-12-15 DIAGNOSIS — C79.51 BONE METASTASES: ICD-10-CM

## 2022-12-15 DIAGNOSIS — R53.83 CHEMOTHERAPY-INDUCED FATIGUE: ICD-10-CM

## 2022-12-15 DIAGNOSIS — C50.912 CARCINOMA OF LEFT BREAST METASTATIC TO LUNG: Primary | ICD-10-CM

## 2022-12-15 LAB
ALBUMIN SERPL-MCNC: 4.2 G/DL (ref 3.5–5.2)
ALBUMIN/GLOB SERPL: 1.4 G/DL
ALP SERPL-CCNC: 85 U/L (ref 39–117)
ALT SERPL W P-5'-P-CCNC: 7 U/L (ref 1–33)
ANION GAP SERPL CALCULATED.3IONS-SCNC: 9.4 MMOL/L (ref 5–15)
AST SERPL-CCNC: 15 U/L (ref 1–32)
BASOPHILS # BLD AUTO: 0.05 10*3/MM3 (ref 0–0.2)
BASOPHILS NFR BLD AUTO: 1.4 % (ref 0–1.5)
BILIRUB SERPL-MCNC: 0.4 MG/DL (ref 0–1.2)
BUN SERPL-MCNC: 19 MG/DL (ref 8–23)
BUN/CREAT SERPL: 14.2 (ref 7–25)
CALCIUM SPEC-SCNC: 10.3 MG/DL (ref 8.6–10.5)
CHLORIDE SERPL-SCNC: 106 MMOL/L (ref 98–107)
CO2 SERPL-SCNC: 26.6 MMOL/L (ref 22–29)
CREAT SERPL-MCNC: 1.34 MG/DL (ref 0.57–1)
DEPRECATED RDW RBC AUTO: 52.5 FL (ref 37–54)
EGFRCR SERPLBLD CKD-EPI 2021: 43.3 ML/MIN/1.73
EOSINOPHIL # BLD AUTO: 0.06 10*3/MM3 (ref 0–0.4)
EOSINOPHIL NFR BLD AUTO: 1.7 % (ref 0.3–6.2)
ERYTHROCYTE [DISTWIDTH] IN BLOOD BY AUTOMATED COUNT: 12.9 % (ref 12.3–15.4)
GLOBULIN UR ELPH-MCNC: 3.1 GM/DL
GLUCOSE SERPL-MCNC: 98 MG/DL (ref 65–99)
HCT VFR BLD AUTO: 33.9 % (ref 34–46.6)
HGB BLD-MCNC: 11.6 G/DL (ref 12–15.9)
IMM GRANULOCYTES # BLD AUTO: 0.02 10*3/MM3 (ref 0–0.05)
IMM GRANULOCYTES NFR BLD AUTO: 0.6 % (ref 0–0.5)
LYMPHOCYTES # BLD AUTO: 1.2 10*3/MM3 (ref 0.7–3.1)
LYMPHOCYTES NFR BLD AUTO: 33.9 % (ref 19.6–45.3)
MACROCYTES BLD QL SMEAR: NORMAL
MAGNESIUM SERPL-MCNC: 2 MG/DL (ref 1.6–2.4)
MCH RBC QN AUTO: 38.2 PG (ref 26.6–33)
MCHC RBC AUTO-ENTMCNC: 34.2 G/DL (ref 31.5–35.7)
MCV RBC AUTO: 111.5 FL (ref 79–97)
MONOCYTES # BLD AUTO: 0.44 10*3/MM3 (ref 0.1–0.9)
MONOCYTES NFR BLD AUTO: 12.4 % (ref 5–12)
NEUTROPHILS NFR BLD AUTO: 1.77 10*3/MM3 (ref 1.7–7)
NEUTROPHILS NFR BLD AUTO: 50 % (ref 42.7–76)
NRBC BLD AUTO-RTO: 0 /100 WBC (ref 0–0.2)
PHOSPHATE SERPL-MCNC: 2.6 MG/DL (ref 2.5–4.5)
PLAT MORPH BLD: NORMAL
PLATELET # BLD AUTO: 254 10*3/MM3 (ref 140–450)
PMV BLD AUTO: 8.8 FL (ref 6–12)
POTASSIUM SERPL-SCNC: 4.3 MMOL/L (ref 3.5–5.2)
PROT SERPL-MCNC: 7.3 G/DL (ref 6–8.5)
RBC # BLD AUTO: 3.04 10*6/MM3 (ref 3.77–5.28)
SODIUM SERPL-SCNC: 142 MMOL/L (ref 136–145)
SPHEROCYTES BLD QL SMEAR: NORMAL
WBC MORPH BLD: NORMAL
WBC NRBC COR # BLD: 3.54 10*3/MM3 (ref 3.4–10.8)

## 2022-12-15 PROCEDURE — 83735 ASSAY OF MAGNESIUM: CPT | Performed by: INTERNAL MEDICINE

## 2022-12-15 PROCEDURE — 85025 COMPLETE CBC W/AUTO DIFF WBC: CPT | Performed by: INTERNAL MEDICINE

## 2022-12-15 PROCEDURE — 99214 OFFICE O/P EST MOD 30 MIN: CPT | Performed by: NURSE PRACTITIONER

## 2022-12-15 PROCEDURE — 80053 COMPREHEN METABOLIC PANEL: CPT | Performed by: INTERNAL MEDICINE

## 2022-12-15 PROCEDURE — 84100 ASSAY OF PHOSPHORUS: CPT | Performed by: INTERNAL MEDICINE

## 2022-12-15 PROCEDURE — 85007 BL SMEAR W/DIFF WBC COUNT: CPT | Performed by: INTERNAL MEDICINE

## 2022-12-15 PROCEDURE — 96402 CHEMO HORMON ANTINEOPL SQ/IM: CPT

## 2022-12-15 PROCEDURE — 36415 COLL VENOUS BLD VENIPUNCTURE: CPT

## 2022-12-15 PROCEDURE — 25010000002 FULVESTRANT PER 25 MG: Performed by: NURSE PRACTITIONER

## 2022-12-15 RX ORDER — LAMOTRIGINE 25 MG/1
500 TABLET ORAL ONCE
Status: COMPLETED | OUTPATIENT
Start: 2022-12-15 | End: 2022-12-15

## 2022-12-15 RX ORDER — LAMOTRIGINE 25 MG/1
500 TABLET ORAL ONCE
Status: CANCELLED
Start: 2022-12-15 | End: 2022-12-15

## 2022-12-15 RX ADMIN — FULVESTRANT 500 MG: 50 INJECTION, SOLUTION INTRAMUSCULAR at 14:30

## 2022-12-15 NOTE — PROGRESS NOTES
MTM Encounter-Re: Adherence and side effects (Ibrance)    Today's encounter was conducted via telemedicine.    Medication:  Ibrance 125 mg po 21/28 days  - Reason for outreach: Routine medication check-in .  - Administration: Patient is taking every day at the same time .  - Missed doses: Patient reports missing 2 doses in the last 30 days.  - Self-administration: Patient demonstrates ability to self-administer medication. No barriers to adherence identified.   - Diagnosis/Indication: Breast Cancer. Progress toward achieving therapeutic goals reviewed.   - Patient denies side effects, aside from loss of appetite, fatigue, which is manageable and not bothersome to patient. Advised patient to alert office if this changes.    - Medication availability/affordability: Patient has had no issues obtaining medication from pharmacy.   - Questions/concerns about medications: none       Completed medication reconciliation today to assess for drug interactions.   Reviewed allergies, medical history, labs, quality of life, and medication history with patient.   Patient has had the following changes to medication list: metoprolol dose reduced; patient's chart has been updated to reflect changes. Assessed medication list for interactions, no significant drug interactions noted.   Advised pt to call the clinic if any new medications are started so we can assess for drug-drug interactions.     All questions addressed. Patient had no additional concerns for MTM office.     Mariana Mendez RPH  12/15/2022  13:39 EST

## 2022-12-15 NOTE — PROGRESS NOTES
.     REASONS FOR FOLLOWUP: Metastatic breast cancer, tongue cancer    HISTORY OF PRESENT ILLNESS:  The patient is a 67 y.o. year old female with the above-mentioned issue who is here today for lab review and evaluation due for monthly Faslodex injection.  She also continues on Ibrance 125 mg daily for 21 out of 28 days.  She has not had Faslodex since 10/14/2022.  She just started back on Ibrance today.  She has been feeling fatigued with the Ibrance, otherwise tolerating it well.  Eating and drinking adequately.  Bowels moving regularly.  Denies fever or chills.  Denies nausea or vomiting.  Denies new or worsening pain.    She discusses left shoulder weakness, for which she follows her PCP and will be starting PT in a few weeks.    She saw her dentist on Tuesday with plans for a tooth extraction next week.    No new concerns today.    Past Medical History:   Diagnosis Date   • Allergy    • Anxiety    • Asthma    • Bone metastasis (HCC)    • Breast cancer (HCC)     Left node positive   • Cholelithiasis 2000    Lap Marily   • Colon polyp Past 10-15 yrs?    found at colonoscopies   • Depression    • Esophageal reflux     cough   • History of colon polyps    • Hyperlipidemia    • Hypertension    • Left breast cancer metastasized to lung    • Obstructive sleep apnea     does not wear cpap   • Ovarian cyst    • PONV (postoperative nausea and vomiting)    • Tongue cancer (HCC) 05/2019    by ENT at [facility] dr Quinn     Past Surgical History:   Procedure Laterality Date   • CHOLECYSTECTOMY  2000   • COLONOSCOPY  2014    H/O polyps   • COLONOSCOPY N/A 6/1/2022    Procedure: COLONOSCOPY to cecum and TI with cold / hot snare polypectomies;  Surgeon: Luis Enrique Galeas MD;  Location: [facility] ENDOSCOPY;  Service: Gastroenterology;  Laterality: N/A;  pre-abnormal ct  post-polyps, diverticulosis, hemorrhoids   • KNEE ARTHROPLASTY     • LUNG SURGERY  2010    Lung wedge resection   • MASTECTOMY RADICAL Left 1993   • TONGUE  SURGERY  05/21/2019    tongue cancer   • TOTAL LAPAROSCOPIC HYSTERECTOMY N/A 11/04/2021    Procedure: Robotic assisted total laparoscopic hysterectomy, bilateral salpingoophorecotmy, bilateral ureteralysis ;  Surgeon: Kaylynn Ricci DO;  Location: [facility] OR;  Service: Robotics - [facility];  Laterality: N/A;       MEDICATIONS    Current Outpatient Medications:   •  atorvastatin (LIPITOR) 10 MG tablet, Take 1 tablet by mouth Every Night., Disp: 90 tablet, Rfl: 1  •  cholecalciferol (VITAMIN D3) 1.25 MG (87552 UT) capsule, Take 5,000 Units by mouth 3 (Three) Times a Week., Disp: , Rfl:   •  denosumab (Xgeva) 120 MG/1.7ML solution injection, Inject  under the skin into the appropriate area as directed., Disp: , Rfl:   •  eszopiclone (LUNESTA) 3 MG tablet, Take 3 mg by mouth., Disp: , Rfl:   •  fulvestrant (FASLODEX) 250 MG/5ML chemo syringe, Inject  into the appropriate muscle as directed by prescriber., Disp: , Rfl:   •  LORazepam (ATIVAN) 0.5 MG tablet, Take 1 tablet by mouth Every 8 (Eight) Hours As Needed., Disp: , Rfl:   •  metoprolol succinate XL (TOPROL-XL) 25 MG 24 hr tablet, Take 1 tablet by mouth Daily. (Patient taking differently: Take  by mouth Daily. Pt is now taking a half tablet), Disp: 90 tablet, Rfl: 1  •  Omeprazole 20 MG Tablet Delayed Release Dispersible, Take 20 mg by mouth As Needed., Disp: , Rfl:   •  PARoxetine (PAXIL) 40 MG tablet, Take 40 mg by mouth Every Night., Disp: , Rfl:   •  K Phos [place]-Sod Phos Di & Mono (K-Phos-Neutral) 155-852-130 MG tablet, Take 2 tablets by mouth 2 (Two) Times a Day. (Patient taking differently: Take 2 tablets by mouth Daily.), Disp: 130 each, Rfl: 0  •  Probiotic Product (PROBIOTIC-10 PO), Take  by mouth. Pearls, Disp: , Rfl:   •  sennosides-docusate (PERICOLACE) 8.6-50 MG per tablet, Take 1 tablet by mouth Daily., Disp: , Rfl:   No current facility-administered medications for this visit.    Facility-Administered Medications Ordered in Other Visits:   •   "chlorhexidine (PERIDEX) 0.12 % solution 15 mL, 15 mL, Mouth/Throat, Q12H, Kaylynn Ricci, DO  •  denosumab (XGEVA) injection 120 mg, 120 mg, Subcutaneous, Once, CodeJarod II, MD  •  mupirocin (BACTROBAN) 2 % nasal ointment, , Nasal, BID, Kaylynn Ricci, DO    ALLERGIES:     Allergies   Allergen Reactions   • Nickel Unknown - Low Severity   • Ciprofloxacin Rash       SOCIAL HISTORY:       Social History     Socioeconomic History   • Marital status: Single   • Years of education: College   Tobacco Use   • Smoking status: Former     Packs/day: 2.00     Years: 30.00     Pack years: 60.00     Types: Cigarettes     Start date: 1973     Quit date: 2008     Years since quittin.5   • Smokeless tobacco: Never   Vaping Use   • Vaping Use: Never used   Substance and Sexual Activity   • Alcohol use: No   • Drug use: No   • Sexual activity: Not Currently     Birth control/protection: None         FAMILY HISTORY:  Family History   Problem Relation Age of Onset   • Hypertension Mother    • Leukemia Mother 72   • COPD Mother         smoker   • Diabetes Brother    • Pancreatic cancer Brother    • Glaucoma Brother    • Heart disease Brother    • Hypertension Brother    • Gallbladder disease Brother    • Pancreatitis Brother          from pancreatic csncer   • Gallbladder disease Father    • Colon polyps Father    • Stroke Other         Grandmother   • Lupus Other         Aunt   • Alcohol abuse Other         Aunt   • Glaucoma Other         Grandmother   • Diabetes type II Brother    • Hypertension Brother    • Hyperlipidemia Brother    • Liver cancer Paternal Uncle    • Stomach cancer Paternal Aunt    • Alcohol abuse Maternal Aunt    • Malig Hyperthermia Neg Hx        REVIEW OF SYSTEMS:  Review of Systems  ROS as per HPI         Vitals:    12/15/22 1332   BP: 107/67   Pulse: 83   Resp: 16   Temp: 98.2 °F (36.8 °C)   TempSrc: Temporal   SpO2: 96%   Weight: 74.6 kg (164 lb 6.4 oz)   Height: 165.1 cm (65\") "   PainSc: 0-No pain     Current Status 12/15/2022   ECOG score 0     Physical Exam  Vitals reviewed.   Constitutional:       General: She is not in acute distress.     Appearance: Normal appearance. She is well-developed.   HENT:      Head: Normocephalic and atraumatic.   Eyes:      Pupils: Pupils are equal, round, and reactive to light.   Cardiovascular:      Rate and Rhythm: Normal rate and regular rhythm.   Pulmonary:      Effort: Pulmonary effort is normal. No respiratory distress.      Breath sounds: Normal breath sounds.   Musculoskeletal:         General: Normal range of motion.      Cervical back: Normal range of motion.   Skin:     General: Skin is warm and dry.      Findings: No rash.   Neurological:      Mental Status: She is alert and oriented to person, place, and time.            RECENT LABS:  Results from last 7 days   Lab Units 12/15/22  1319   WBC 10*3/mm3 3.54   NEUTROS ABS 10*3/mm3 1.77   HEMOGLOBIN g/dL 11.6*   HEMATOCRIT % 33.9*   PLATELETS 10*3/mm3 254     Results from last 7 days   Lab Units 12/15/22  1319   SODIUM mmol/L 142   POTASSIUM mmol/L 4.3   CHLORIDE mmol/L 106   CO2 mmol/L 26.6   BUN mg/dL 19   CREATININE mg/dL 1.34*   CALCIUM mg/dL 10.3   ALBUMIN g/dL 4.20   BILIRUBIN mg/dL 0.4   ALK PHOS U/L 85   ALT (SGPT) U/L 7   AST (SGOT) U/L 15   GLUCOSE mg/dL 98   MAGNESIUM mg/dL 2.0           Assessment & Plan   Bone metastases (HCC)  - NM Bone Scan Whole Body  - CT chest wo contrast  - CT abdomen pelvis wo contrast  - CBC and Differential  - Comprehensive metabolic panel    Carcinoma of left breast metastatic to lung (HCC)  - NM Bone Scan Whole Body  - CT chest wo contrast  - CT abdomen pelvis wo contrast  - CBC and Differential  - Comprehensive metabolic panel    Marialuisa Vivar        Assessment/Plan     *Metastatic breast cancer to bilateral lungs and soft tissue in the mediastinum (likely the cause of her hoarseness). (Initial breast cancer diagnosed in 1993 treated with mastectomy,  chemotherapy, radiation therapy and tamoxifen). Arimidex day 1 on 02/17/2010. PET scan late August 2011 shows no abnormal hypermetabolic activity. This has improved compared to the prior PET scan January 2010 which showed mild hypermetabolic activity in areas of metastasis. (In the past, her  T6 lesion did not show up on CT scans or bone scan. It was seen by PET scan.) We have been following with CT scans only every 6 months and PET scans on an as needed only basis. Sometimes her CT scanned pulmonary nodules are read as benign since they have been stable through the course of years but we know they are malignant because they have been surgically sampled in the past.    · CT 8/16/16 shows increased sclerosis at T6 compared to 4/28/15.    · PET 10/11/16: Slight uptake at T6, SUV 3.2.  mild thickening of right adrenal gland with an SUV of 11.   · Continued Arimidex is minimal progression and had been on this since 2/17/10.  · Not referred for radiation since no pain at T6  · CT 1/27/17, unchanged.  · CT 8/1/17: Unchanged except subtle suggestion of mild increase in thickening of the right adrenal gland.    · CT 2/22/18, unchanged.  · CT 9/6/18: Unchanged except nodular thickening right adrenal gland significantly decreased.  · CT 9/5/2019: Unchanged.  · On the 6/12/2020 visit, the following scans were reviewed:  · CT neck and chest 5/11/2020, [facility], from 5/9/2019: Stable pulmonary nodules new lytic C7 lesion and posterior T1 lesion questionable T12 lesion.  No change in the T6 and T10 lesions.  · PET, [facility], 5/19/2020: Mildly enlarged left neck nodes are mildly hypermetabolic.  Diffuse activity throughout the thickened left adrenal gland.  CT appearance unchanged from 5/9/2019.  Progressive T1 lesion seen on CT from 5/11/2020, mild some moderate activity.  T10 low-grade activity with mixed lucent and sclerotic findings.  T6 is a mixed lucent and sclerotic appearance but no significant activity.   · T12 (the biopsy  report is labeled as T12 but patient insists this was a C7/T1 biopsy, not to T12) biopsy 6/15/2020, [facility]: Metastatic breast carcinoma.  ER >95%.  SD 0%.  HER-2 negative (1+)  · It seems the main progression at the 6/12/2020 visit was a new C7 and new T1 lesion.  Those were radiated with [facility] through [facility] early June 2020.  Because Arimidex has been working well since 2010, continue same Arimidex.  Add Zometa every 3 months.  · C7 and T1 found on CT 5/11/2020, [facility].  CyberKnife, Dr. Winston, early June 2020.  · CT C and T-spine 9/17/2020: New C7 lesion from 9/6/2018, but no change from 5/11/2020 CT.  T1 lesion stable from 5/11/2020, but new compared to 2/22/2018.  T10 lesion unchanged from 9/5/2019, but new from 2/22/2018.  Subtle 8 mm T12 lesion new from 9/5/2019.  · Therefore, no recent progression.  · Considering her good tolerance and long-term effectiveness of Arimidex, continue same therapy.  · CT CAP 12/10/2020: No progression  · Therefore, continue Arimidex.  · CA 15-3 normal through 12/14/2020  · Because CA 15-3 fabian to 27 on 4/13/2021, then 29.3 on 7/20/2021, then 34.3 on 10/12/2021, CT scans done earlier than planned:  · CT CAP with contrast 10/12/2021: New 5.2 x 4.6 cm mixed cystic and solid right ovarian mass compared to 12/10/2020.  Continued stability of bilateral pulmonary nodules and stable sclerotic bone metastasis.  · 11/4/2021: TLH/BSO (due to new 5 cm right ovarian mass on CT, 11/12/2021).  · Metastatic breast cancer involving bilateral ovaries and posterior uterine serosa.  ER 81-90%.  SD 61-70%.  HER-2 negative  · Caris NGS: ER 98%.  SD 90%.  HER-2/eb negative.  AKT1 pathogenic variant, exon 3.  AR+, 95%.  No other significant mutations including negative PIK3CA  · Pelvic washings: Adenocarcinoma  · Because this was the only area of progression on CT, and has been resected, continue Arimidex which she has been on since 2/17/2010.  · 11/22/2021: Discussed with Dr. Blum, who has a  focus on breast cancer.  We both agree: Continue Arimidex for now.  In the future, if significant progression is seen, then plan Faslodex injections with Ibrance  · CT CAP 2/1/2022: A few T-spine sclerotic lesions progressively more sclerotic over multiple prior CTs of indeterminate significance.  No clear signs of progression of disease.  · 3/15/2022: Although tumor marker is not rising and CT showed no clear signs of progression.  CT revealed progressively more sclerotic bone metastasis over multiple prior CTs, she is having increased right hip discomfort.  Sometimes this can be due to healing of metastasis (but she has been on the same treatment since 2010), or this could mean progression of bone metastasis.  It is reassuring that her tumor marker is not worsening.  She would like to see [facility] to see if they feel radiation to the hip would help her discomfort.  We will do a bone scan before that appointment as this might help [facility].  (Although she has seen [facility] radiation for her neck, she would like to see [facility] for this hip issue).  · 3/18/2022, bone scan: Metastatic disease at T9 and T10, corresponding to CT lesions.  Uptake also at T6, but to lesser degree.  These areas are new compared to last bone scan, 6/3/2009.  Therefore, overall, appears consistent with progression.  · 3/24/2021: Discussed changing  Arimidex to Faslodex/Ibrance 125 mg D1-21/28 days.  · However, Dr. Llanes will decide on 4/12/2022 if the patient needs any traditional XRT to hip/pelvis.  If so, this may cause cytopenias due to the location of XRT.  Ibrance can also cause cytopenias.  If this is the case, we will wait to begin Ibrance until at least a few weeks after XRT completes, provided blood counts allow.   · 4/22/2022: Dr. Llanes reviewed MRI right hip from 4/19/2022, revealing L3 metastasis, similar size of prior CTs.  Therefore, she plans no XRT.  · 5/6/2022: Began  Faslodex Ibrance  · CT 7/18/2022: Some bone mets more sclerotic, which could reflect treatment response.  Slightly increased RUL nodule, 1.1 cm, from 0.9 cm on 2/1/2022  · Bone scan 7/18/2022: No change from 3/18/2022  · 8/19/2022 (patient delayed follow-up to review scans until then): Continue same therapy since no significant progression of disease  · Reevaluation 9/16/2022, due for monthly Faslodex.  Continue current regimen including Ibrance 21 out of 28 days and Faslodex every 4 weeks.  · 12/8/2022:  due for monthly Faslodex.  Continue on Ibrance 125 mg daily for 21 out of 28 days.  Started back on Ibrance today.  She will be due for CT scans next month, order placed today.    CA 15-3:  · 27.3  8/19/2022, from 22.5 on 5/6/2022, from 26.3 on 2/1/2022, from 32.6 on 11/22/2021, from 34.3 on 10/12/2021, from 29.3 on 7/20/2021, from 27 on 4/13/2021, from 23.3 on 12/14/2020.    *Bony metastases  · T6 discovered by PET to August 2011 (did not show up by CT or bone scan)  · C7 and T1 found on CT 5/11/2020, [facility].  CyberKnife, Dr. Winston, early June 2020.  · June 2020 began Zometa every 3 months.  · She was warned of possible osteonecrosis of the jaw and renal insufficiency.  She states she has good dental health and sees her dentist regularly.  Because of prior hypercalcemia requiring stopping calcium supplements and because of high normal current calcium level, began without supplemental calcium.  Add calcium if calcium becomes low.  · 1/7/2022: Tooth extracted.  Plan was to wait 5 weeks to resume Zometa.  Patient delayed return until 3/15/2022 (over 8 weeks)  · May 2022: Changed Zometa to Xgeva every 3 months due to Cr up to 1.6  · Xgeva last given 7/1/2022, due again in October.  The patient reports today she is scheduled for some dental work in 2 weeks.  We will discuss potential Xgeva at follow-up in 4 weeks  · 10/14/2022 patient is due for Xgeva however exposed to some dental work in about a week and a half.   Patient does not know if this will include an extraction or not.  We will hold Xgeva until she returns in a month and her dental work is complete.  · 12/15/2022: She saw her dentist on Tuesday, and reports they are planning to pull a tooth next week.  Continue to hold Xgeva.    *Bone health. Last bone density 10/11/16, worsened, but still normal with worst T score -0.9.  Patient stopped calcium January 2016, but remained on vitamin D.  I recommended she restart her calcium.  Stopped calcium early March 2018 due to hypercalcemia.  · DEXA 9/5/2019: Normal bone density    *Hypercalcemia.  Repeat calcium 3/7/18 normal, but patient self stopped calcium a few days prior.  Recommended she stay off calcium.  · Calcium was 11 on 6/19/2020  · Calcium 11 again on 12/10/2020  · Repeat calcium 10.5 on 12/14/2020  · Calcium 10.6.  Minimally elevated.  (Normal range up to 10.5)  · Calcium 10.8 on 3/15/2022.  Hopefully, Zometa will help with this.  · Calcium 10.4 10/14/2022  · Calcium today 10.3.    *Hypophosphatemia  · Phosphorus 2.3 on 6/24/2022.  Begin K-Phos Neutral 2 tablets twice daily  · Patient self stopped this around mid July 2022.  · Phosphorus normal, 2.6    *On the 10/5/16 visit, Tachycardia, dyspnea on exertion, bilateral leg swelling, more sedentary recently.  CT PE protocol and bilateral lower extremity Dopplers 10/5/16, negative for clots.  She still has tachycardia and dyspnea on exertion.  Perhaps at least part of this is due to deconditioning.    *Previously complained of colon Right lower anterior rib discomfort.  CT unremarkable in that area.  No specific pain, just discomfort.  I explained to the patient if disease was found by PET scan or bone scan, I would not  since she has been doing well on Arimidex since 2010.   Currently denies pain.     *Depression/anxiety.  This limits compliance.  She sees a psychiatrist and a counselor regularly.  She states this is mostly due to body image issues  instead of cancer.   · She continues with her counselor and psychiatrist.  Viral pandemic is making this a little more difficult.  · 4/27/2022: We will try to get her involved in some cancer support groups.  I also encouraged her to try out some small groups at [employer] (she struggles with loneliness).  · 10/14/2022 patient has had worsening issue with her depression lately.  We did encourage her to get out and walk more like she did today.    *Noncompliance due to depression/anxiety.  Although she misses appointments, she does reschedule and eventually come in.    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 Willie Quinn [facility], on 5/21/2019.  Positive sentinel node (1 of 3 nodes)..  · Left neck dissection by Dr. Willie Quinn, [facility], on 6/5/2019.  22 nodes negative.  Negative margins.  No lymphovascular invasion or perineural invasion.  2 mm depth of invasion.  Grade 1.  Squamous cell carcinoma.  1.8 cm tumor.  · Because the patient felt what she thought was left neck LAD, CT neck and chest and PET at [facility]. May 2020 performed.  She is being followed at [facility] for this.  · On 6/19/2020, per verbal report from Dr. Senia COLORADO [facility], [facility] ENT does not feel she has any active head and neck cancer.   · She states she has a follow-up with Dr. Kapadia ENT at [facility], in June with CT neck 1 week prior  · No symptoms to suggest recurrence.  She follows with [facility].    *Previously complained of left hip pain x2 weeks.  This prompted an MRI pelvis 8/20/2019 at [facility] and open MRI which was negative for malignancy.    *Possible intolerance of Zometa  · After first dose, June 2020, 2 days of chills, bone pain  · She would like to try Zometa again.  If this occurs again, we will try to switch her to Xgeva monthly.  (No good data on every 3-month Xgeva)  · She did fine with the second dose of Zometa.    *Thickening of wall of  transverse colon on CT 2/1/2022.  Last colonoscopy was 4/18/2019.  She wants the next colonoscopy done at [facility], [place], operated by [facility]  · Dr. Galeas will see her on 4/7/2022 to evaluate this    *Depression  · Is seeing a psychiatry NP and a therapist for many years.  · 6/24/2022: Reported she decided to stop seeing her therapist but is still interested in therapy.  She would like to see behavioral oncology at [facility], [place], operated by [facility].  This was arranged  · Contacted by Julissa Centeno (behavioral oncology).  Patient canceled on the day of the appointment during her first scheduled appointment with Julissa.  She then no showed for the rescheduled appointment.  Julissa recommends the patient continue to follow with her therapist and psychiatrist and if she needs additional support, to use [employer]    *Pruritic rash  · 8/19/2022: Referred to dermatology  · No rash present on exam today.    *Constipation  · Intermittently takes Senokot and suppositories if needed.  · We discussed that this could be related to her decreased fluid intake as well as taking Zofran regularly.  She will try to limit her Zofran use and increase her water intake.  We also discussed activity can help with her constipation.  Patient is also going to try MiraLAX 1 capful daily and titrate if needed    *CKD: 3b   · Has met with nephrology last visit September 23, and we will follow-up again in December.  · 10/14/2022 her creatinine is much more elevated today up to 1.81, BUN 27.  Patient admits she is not drinking nearly as much water as she should be.  She is going to work on this.  Patient to return on Monday for 1 L of IV normal saline.  · 12/8/2022: Creatinine 1.34 today.    *Left shoulder weakness-following PCP, will start PT in a few weeks.    Monitoring on Ibrance:  CBC every 2 weeks for the first 2 months then monthly and as indicated.  After 6 months, if neutropenia grade 1 or 2 only, then CBC can be every 3 months    PLAN:      · Proceed with Faslodex today, repeating every 4  weeks.  · Continue Ibrance 125 mg on days 1-21 out of 28 days.  Started back on Ibrance today.  · Hold Xgeva today as the patient is scheduled to have a tooth extraction next week.  (Has not had Xgeva since 7/1/2022).  · Follow-up in 4 weeks with Dr. Hawkins with repeat labs reevaluation due for her next Faslodex.  · MiraLAX for constipation.  · Every 6 months CT CAP without IV contrast, bone scan (last 7/18/2022), CA 15-3.  Ordered today, to be completed prior to next MD follow-up.  · Previously arranged for her to get involved with some cancer support groups  ·  CT scans WITHOUT IV CONTRAST (again, to avoid further kidney damage.)  · She sees [facility] ENT, Dr. Bedoya annually, next is summer 2022    The patient continues on high risk medication requiring close monitoring for toxicity    ARON Ruth  12/15/2022    Send a copy of this note to   Dr. Willie Quinn, Isaac Winston, radiation medicine, [facility]

## 2022-12-19 ENCOUNTER — SPECIALTY PHARMACY (OUTPATIENT)
Dept: PHARMACY | Facility: HOSPITAL | Age: 67
End: 2022-12-19

## 2022-12-19 ENCOUNTER — DOCUMENTATION (OUTPATIENT)
Dept: PHARMACY | Facility: HOSPITAL | Age: 67
End: 2022-12-19

## 2022-12-19 NOTE — PROGRESS NOTES
I have submitted a renewal application for free Ibrance from [employer] and I'm waiting on a determination. A decision may not be decided until February 2023.     Sarai Werner - Care Coordinator   12/19/2022  16:58 EST

## 2023-01-01 ENCOUNTER — APPOINTMENT (OUTPATIENT)
Dept: GENERAL RADIOLOGY | Facility: HOSPITAL | Age: 68
End: 2023-01-01
Payer: MEDICARE

## 2023-01-01 ENCOUNTER — HOSPITAL ENCOUNTER (OUTPATIENT)
Facility: HOSPITAL | Age: 68
Setting detail: OBSERVATION
End: 2023-12-19
Attending: EMERGENCY MEDICINE | Admitting: HOSPITALIST
Payer: MEDICARE

## 2023-01-01 ENCOUNTER — APPOINTMENT (OUTPATIENT)
Dept: CT IMAGING | Facility: HOSPITAL | Age: 68
End: 2023-01-01
Payer: MEDICARE

## 2023-01-01 ENCOUNTER — APPOINTMENT (OUTPATIENT)
Dept: CARDIOLOGY | Facility: HOSPITAL | Age: 68
End: 2023-01-01
Payer: MEDICARE

## 2023-01-01 VITALS
OXYGEN SATURATION: 88 % | WEIGHT: 123.46 LBS | HEART RATE: 123 BPM | TEMPERATURE: 95.4 F | RESPIRATION RATE: 30 BRPM | DIASTOLIC BLOOD PRESSURE: 111 MMHG | HEIGHT: 65 IN | BODY MASS INDEX: 20.57 KG/M2 | SYSTOLIC BLOOD PRESSURE: 178 MMHG

## 2023-01-01 DIAGNOSIS — I95.9 HYPOTENSION, UNSPECIFIED HYPOTENSION TYPE: ICD-10-CM

## 2023-01-01 DIAGNOSIS — R11.2 NAUSEA VOMITING AND DIARRHEA: ICD-10-CM

## 2023-01-01 DIAGNOSIS — E87.20 COMPENSATED METABOLIC ACIDOSIS: ICD-10-CM

## 2023-01-01 DIAGNOSIS — R19.7 NAUSEA VOMITING AND DIARRHEA: ICD-10-CM

## 2023-01-01 DIAGNOSIS — C79.9 METASTATIC MALIGNANT NEOPLASM, UNSPECIFIED SITE: ICD-10-CM

## 2023-01-01 DIAGNOSIS — R53.1 GENERALIZED WEAKNESS: Primary | ICD-10-CM

## 2023-01-01 DIAGNOSIS — N28.9 ACUTE RENAL INSUFFICIENCY: ICD-10-CM

## 2023-01-01 DIAGNOSIS — E86.0 DEHYDRATION: ICD-10-CM

## 2023-01-01 LAB
A-A DO2: 206.4 MMHG
A-A DO2: 351.8 MMHG
ABO GROUP BLD: NORMAL
ADV 40+41 DNA STL QL NAA+NON-PROBE: NOT DETECTED
ALBUMIN SERPL-MCNC: 1.9 G/DL (ref 3.5–5.2)
ALBUMIN SERPL-MCNC: 3.2 G/DL (ref 3.5–5.2)
ALBUMIN SERPL-MCNC: 4.8 G/DL (ref 3.5–5.2)
ALBUMIN/GLOB SERPL: 1.1 G/DL
ALBUMIN/GLOB SERPL: 1.2 G/DL
ALBUMIN/GLOB SERPL: 1.7 G/DL
ALP SERPL-CCNC: 63 U/L (ref 39–117)
ALP SERPL-CCNC: 74 U/L (ref 39–117)
ALP SERPL-CCNC: 94 U/L (ref 39–117)
ALT SERPL W P-5'-P-CCNC: 12 U/L (ref 1–33)
ALT SERPL W P-5'-P-CCNC: 14 U/L (ref 1–33)
ALT SERPL W P-5'-P-CCNC: 353 U/L (ref 1–33)
ANION GAP SERPL CALCULATED.3IONS-SCNC: 14.5 MMOL/L (ref 5–15)
ANION GAP SERPL CALCULATED.3IONS-SCNC: 16.2 MMOL/L (ref 5–15)
ANION GAP SERPL CALCULATED.3IONS-SCNC: 18 MMOL/L (ref 5–15)
ANION GAP SERPL CALCULATED.3IONS-SCNC: 22.1 MMOL/L (ref 5–15)
ANION GAP SERPL CALCULATED.3IONS-SCNC: 26.1 MMOL/L (ref 5–15)
ANION GAP SERPL CALCULATED.3IONS-SCNC: 31 MMOL/L (ref 5–15)
APTT PPP: 32.7 SECONDS (ref 22.7–35.4)
APTT PPP: 64.2 SECONDS (ref 22.7–35.4)
ARTERIAL PATENCY WRIST A: ABNORMAL
ARTERIAL PATENCY WRIST A: ABNORMAL
ARTERIAL PATENCY WRIST A: POSITIVE
ARTERIAL PATENCY WRIST A: POSITIVE
AST SERPL-CCNC: 21 U/L (ref 1–32)
AST SERPL-CCNC: 38 U/L (ref 1–32)
AST SERPL-CCNC: 530 U/L (ref 1–32)
ASTRO TYP 1-8 RNA STL QL NAA+NON-PROBE: NOT DETECTED
ATMOSPHERIC PRESS: 745.8 MMHG
ATMOSPHERIC PRESS: 750.7 MMHG
ATMOSPHERIC PRESS: 752.1 MMHG
ATMOSPHERIC PRESS: 752.3 MMHG
B PARAPERT DNA SPEC QL NAA+PROBE: NOT DETECTED
B PERT DNA SPEC QL NAA+PROBE: NOT DETECTED
B-OH-BUTYR SERPL-SCNC: 2.25 MMOL/L (ref 0.02–0.27)
BACTERIA UR QL AUTO: NORMAL /HPF
BASE EXCESS BLDA CALC-SCNC: -10.4 MMOL/L (ref 0–2)
BASE EXCESS BLDA CALC-SCNC: -17.3 MMOL/L (ref 0–2)
BASE EXCESS BLDA CALC-SCNC: -19.7 MMOL/L (ref 0–2)
BASE EXCESS BLDA CALC-SCNC: -9.5 MMOL/L (ref 0–2)
BASOPHILS # BLD AUTO: 0.02 10*3/MM3 (ref 0–0.2)
BASOPHILS NFR BLD AUTO: 0.4 % (ref 0–1.5)
BDY SITE: ABNORMAL
BH BB BLOOD EXPIRATION DATE: NORMAL
BH BB BLOOD EXPIRATION DATE: NORMAL
BH BB BLOOD TYPE BARCODE: 5100
BH BB BLOOD TYPE BARCODE: 5100
BH BB DISPENSE STATUS: NORMAL
BH BB DISPENSE STATUS: NORMAL
BH BB PRODUCT CODE: NORMAL
BH BB PRODUCT CODE: NORMAL
BH BB UNIT NUMBER: NORMAL
BH BB UNIT NUMBER: NORMAL
BILIRUB SERPL-MCNC: 1 MG/DL (ref 0–1.2)
BILIRUB SERPL-MCNC: 1.1 MG/DL (ref 0–1.2)
BILIRUB SERPL-MCNC: 1.4 MG/DL (ref 0–1.2)
BILIRUB UR QL STRIP: NEGATIVE
BLD GP AB SCN SERPL QL: NEGATIVE
BUN BLDA-MCNC: 25 MG/DL
BUN BLDA-MCNC: 29 MG/DL
BUN SERPL-MCNC: 28 MG/DL (ref 8–23)
BUN SERPL-MCNC: 28 MG/DL (ref 8–23)
BUN SERPL-MCNC: 29 MG/DL (ref 8–23)
BUN SERPL-MCNC: 29 MG/DL (ref 8–23)
BUN SERPL-MCNC: 32 MG/DL (ref 8–23)
BUN SERPL-MCNC: 36 MG/DL (ref 8–23)
BUN/CREAT SERPL: 13.2 (ref 7–25)
BUN/CREAT SERPL: 13.5 (ref 7–25)
BUN/CREAT SERPL: 15 (ref 7–25)
BUN/CREAT SERPL: 19.2 (ref 7–25)
BUN/CREAT SERPL: 22.4 (ref 7–25)
BUN/CREAT SERPL: 22.9 (ref 7–25)
C CAYETANENSIS DNA STL QL NAA+NON-PROBE: NOT DETECTED
C COLI+JEJ+UPSA DNA STL QL NAA+NON-PROBE: NOT DETECTED
C DIFF TOX GENS STL QL NAA+PROBE: NEGATIVE
C PNEUM DNA NPH QL NAA+NON-PROBE: NOT DETECTED
CA-I BLD-MCNC: 4.8 MG/DL (ref 4.6–5.4)
CA-I BLDA-SCNC: 1.2 MMOL/L (ref 2.2–2.55)
CA-I BLDA-SCNC: 1.65 MMOL/L (ref 2.2–2.55)
CA-I SERPL ISE-MCNC: 1.2 MMOL/L (ref 1.15–1.35)
CALCIUM SPEC-SCNC: 10 MG/DL (ref 8.6–10.5)
CALCIUM SPEC-SCNC: 8 MG/DL (ref 8.6–10.5)
CALCIUM SPEC-SCNC: 8.2 MG/DL (ref 8.6–10.5)
CALCIUM SPEC-SCNC: 8.6 MG/DL (ref 8.6–10.5)
CALCIUM SPEC-SCNC: 8.7 MG/DL (ref 8.6–10.5)
CALCIUM SPEC-SCNC: 9.5 MG/DL (ref 8.6–10.5)
CHLORIDE BLDA-SCNC: 106 MMOL/L (ref 98–107)
CHLORIDE BLDA-SCNC: 111 MMOL/L (ref 98–107)
CHLORIDE SERPL-SCNC: 101 MMOL/L (ref 98–107)
CHLORIDE SERPL-SCNC: 102 MMOL/L (ref 98–107)
CHLORIDE SERPL-SCNC: 102 MMOL/L (ref 98–107)
CHLORIDE SERPL-SCNC: 104 MMOL/L (ref 98–107)
CHLORIDE SERPL-SCNC: 105 MMOL/L (ref 98–107)
CHLORIDE SERPL-SCNC: 105 MMOL/L (ref 98–107)
CK SERPL-CCNC: 31 U/L (ref 20–180)
CLARITY UR: CLEAR
CO2 BLDA-SCNC: 10.2 MMOL/L (ref 23–27)
CO2 BLDA-SCNC: 14.3 MMOL/L (ref 23–27)
CO2 BLDA-SCNC: 16.6 MMOL/L (ref 23–27)
CO2 BLDA-SCNC: 7.3 MMOL/L (ref 23–27)
CO2 SERPL-SCNC: 14.5 MMOL/L (ref 22–29)
CO2 SERPL-SCNC: 14.9 MMOL/L (ref 22–29)
CO2 SERPL-SCNC: 15 MMOL/L (ref 22–29)
CO2 SERPL-SCNC: 4.9 MMOL/L (ref 22–29)
CO2 SERPL-SCNC: 9 MMOL/L (ref 22–29)
CO2 SERPL-SCNC: 9.8 MMOL/L (ref 22–29)
COLOR UR: ABNORMAL
CREAT BLDA-MCNC: 1.97 MG/DL (ref 0.6–130)
CREAT BLDA-MCNC: 1.99 MG/DL (ref 0.6–130)
CREAT SERPL-MCNC: 1.4 MG/DL (ref 0.57–1)
CREAT SERPL-MCNC: 1.46 MG/DL (ref 0.57–1)
CREAT SERPL-MCNC: 1.61 MG/DL (ref 0.57–1)
CREAT SERPL-MCNC: 1.87 MG/DL (ref 0.57–1)
CREAT SERPL-MCNC: 2.15 MG/DL (ref 0.57–1)
CREAT SERPL-MCNC: 2.19 MG/DL (ref 0.57–1)
CROSSMATCH INTERPRETATION: NORMAL
CRYPTOSP DNA STL QL NAA+NON-PROBE: NOT DETECTED
D-LACTATE SERPL-SCNC: 1.9 MMOL/L (ref 0.5–2)
D-LACTATE SERPL-SCNC: 17.8 MMOL/L (ref 0.5–2)
D-LACTATE SERPL-SCNC: 18.2 MMOL/L (ref 0.5–2)
D-LACTATE SERPL-SCNC: 2.6 MMOL/L (ref 0.5–2)
D-LACTATE SERPL-SCNC: 2.8 MMOL/L (ref 0.5–2)
D-LACTATE SERPL-SCNC: 20 MMOL/L (ref 0.5–2)
D-LACTATE SERPL-SCNC: >20 MMOL/L (ref 0.5–2)
DEPRECATED RDW RBC AUTO: 35.9 FL (ref 37–54)
DEPRECATED RDW RBC AUTO: 37.5 FL (ref 37–54)
DEPRECATED RDW RBC AUTO: 38.4 FL (ref 37–54)
DEPRECATED RDW RBC AUTO: 38.7 FL (ref 37–54)
DEVICE COMMENT: ABNORMAL
E HISTOLYT DNA STL QL NAA+NON-PROBE: NOT DETECTED
EAEC PAA PLAS AGGR+AATA ST NAA+NON-PRB: NOT DETECTED
EC STX1+STX2 GENES STL QL NAA+NON-PROBE: NOT DETECTED
EGFRCR SERPLBLD CKD-EPI 2021: 23.9 ML/MIN/1.73
EGFRCR SERPLBLD CKD-EPI 2021: 24.4 ML/MIN/1.73
EGFRCR SERPLBLD CKD-EPI 2021: 28.8 ML/MIN/1.73
EGFRCR SERPLBLD CKD-EPI 2021: 34.5 ML/MIN/1.73
EGFRCR SERPLBLD CKD-EPI 2021: 38.8 ML/MIN/1.73
EGFRCR SERPLBLD CKD-EPI 2021: 40.8 ML/MIN/1.73
EOSINOPHIL # BLD AUTO: 0.03 10*3/MM3 (ref 0–0.4)
EOSINOPHIL NFR BLD AUTO: 0.6 % (ref 0.3–6.2)
EPEC EAE GENE STL QL NAA+NON-PROBE: NOT DETECTED
ERYTHROCYTE [DISTWIDTH] IN BLOOD BY AUTOMATED COUNT: 11.1 % (ref 12.3–15.4)
ERYTHROCYTE [DISTWIDTH] IN BLOOD BY AUTOMATED COUNT: 11.2 % (ref 12.3–15.4)
ERYTHROCYTE [DISTWIDTH] IN BLOOD BY AUTOMATED COUNT: 11.4 % (ref 12.3–15.4)
ERYTHROCYTE [DISTWIDTH] IN BLOOD BY AUTOMATED COUNT: 11.5 % (ref 12.3–15.4)
ETEC LTA+ST1A+ST1B TOX ST NAA+NON-PROBE: NOT DETECTED
FLUAV SUBTYP SPEC NAA+PROBE: NOT DETECTED
FLUBV RNA ISLT QL NAA+PROBE: NOT DETECTED
G LAMBLIA DNA STL QL NAA+NON-PROBE: NOT DETECTED
GAS FLOW AIRWAY: 2 LPM
GEN 5 2HR TROPONIN T REFLEX: 26 NG/L
GLOBULIN UR ELPH-MCNC: 1.6 GM/DL
GLOBULIN UR ELPH-MCNC: 2.8 GM/DL
GLOBULIN UR ELPH-MCNC: 2.8 GM/DL
GLUCOSE BLDC GLUCOMTR-MCNC: 107 MG/DL (ref 70–130)
GLUCOSE BLDC GLUCOMTR-MCNC: 133 MG/DL (ref 70–130)
GLUCOSE BLDC GLUCOMTR-MCNC: 135 MG/DL (ref 70–130)
GLUCOSE BLDC GLUCOMTR-MCNC: 135 MG/DL (ref 70–130)
GLUCOSE BLDC GLUCOMTR-MCNC: 153 MG/DL (ref 70–130)
GLUCOSE BLDC GLUCOMTR-MCNC: 33 MG/DL (ref 70–130)
GLUCOSE BLDC GLUCOMTR-MCNC: 80 MG/DL (ref 70–130)
GLUCOSE SERPL-MCNC: 101 MG/DL (ref 65–99)
GLUCOSE SERPL-MCNC: 139 MG/DL (ref 65–99)
GLUCOSE SERPL-MCNC: 158 MG/DL (ref 65–99)
GLUCOSE SERPL-MCNC: 86 MG/DL (ref 65–99)
GLUCOSE SERPL-MCNC: 92 MG/DL (ref 65–99)
GLUCOSE SERPL-MCNC: 95 MG/DL (ref 65–99)
GLUCOSE UR STRIP-MCNC: NEGATIVE MG/DL
HADV DNA SPEC NAA+PROBE: NOT DETECTED
HCO3 BLDA-SCNC: 13.6 MMOL/L (ref 22–28)
HCO3 BLDA-SCNC: 16.4 MMOL/L (ref 22–28)
HCO3 BLDA-SCNC: 6.7 MMOL/L (ref 22–28)
HCO3 BLDA-SCNC: 9.9 MMOL/L (ref 22–28)
HCOV 229E RNA SPEC QL NAA+PROBE: NOT DETECTED
HCOV HKU1 RNA SPEC QL NAA+PROBE: NOT DETECTED
HCOV NL63 RNA SPEC QL NAA+PROBE: NOT DETECTED
HCOV OC43 RNA SPEC QL NAA+PROBE: NOT DETECTED
HCT VFR BLD AUTO: 25.2 % (ref 34–46.6)
HCT VFR BLD AUTO: 25.3 % (ref 34–46.6)
HCT VFR BLD AUTO: 33.9 % (ref 34–46.6)
HCT VFR BLD AUTO: 39.8 % (ref 34–46.6)
HCT VFR BLDA CALC: 21 % (ref 38–51)
HCT VFR BLDA CALC: 26 % (ref 38–51)
HEMODILUTION: NO
HGB BLD-MCNC: 11.6 G/DL (ref 12–15.9)
HGB BLD-MCNC: 13.7 G/DL (ref 12–15.9)
HGB BLD-MCNC: 8.4 G/DL (ref 12–15.9)
HGB BLD-MCNC: 8.6 G/DL (ref 12–15.9)
HGB BLDA-MCNC: 7 G/DL (ref 12–17)
HGB BLDA-MCNC: 8.9 G/DL (ref 12–17)
HGB UR QL STRIP.AUTO: NEGATIVE
HMPV RNA NPH QL NAA+NON-PROBE: NOT DETECTED
HOLD SPECIMEN: NORMAL
HOLD SPECIMEN: NORMAL
HPIV1 RNA ISLT QL NAA+PROBE: NOT DETECTED
HPIV2 RNA SPEC QL NAA+PROBE: NOT DETECTED
HPIV3 RNA NPH QL NAA+PROBE: NOT DETECTED
HPIV4 P GENE NPH QL NAA+PROBE: NOT DETECTED
HYALINE CASTS UR QL AUTO: NORMAL /LPF
IMM GRANULOCYTES # BLD AUTO: 0.04 10*3/MM3 (ref 0–0.05)
IMM GRANULOCYTES NFR BLD AUTO: 0.8 % (ref 0–0.5)
INHALED O2 CONCENTRATION: 100 %
INHALED O2 CONCENTRATION: 100 %
INR PPP: 1.03 (ref 0.9–1.1)
INR PPP: 2.08 (ref 0.9–1.1)
INSPIRATORY TIME: 1
INSPIRATORY TIME: 1
KETONES UR QL STRIP: ABNORMAL
LEUKOCYTE ESTERASE UR QL STRIP.AUTO: NEGATIVE
LIPASE SERPL-CCNC: 47 U/L (ref 13–60)
LYMPHOCYTES # BLD AUTO: 1.59 10*3/MM3 (ref 0.7–3.1)
LYMPHOCYTES NFR BLD AUTO: 31.3 % (ref 19.6–45.3)
Lab: ABNORMAL
M PNEUMO IGG SER IA-ACNC: NOT DETECTED
MAGNESIUM SERPL-MCNC: 1.6 MG/DL (ref 1.6–2.4)
MAGNESIUM SERPL-MCNC: 1.7 MG/DL (ref 1.6–2.4)
MCH RBC QN AUTO: 30.5 PG (ref 26.6–33)
MCH RBC QN AUTO: 31.5 PG (ref 26.6–33)
MCH RBC QN AUTO: 31.9 PG (ref 26.6–33)
MCH RBC QN AUTO: 32 PG (ref 26.6–33)
MCHC RBC AUTO-ENTMCNC: 33.2 G/DL (ref 31.5–35.7)
MCHC RBC AUTO-ENTMCNC: 34.1 G/DL (ref 31.5–35.7)
MCHC RBC AUTO-ENTMCNC: 34.2 G/DL (ref 31.5–35.7)
MCHC RBC AUTO-ENTMCNC: 34.4 G/DL (ref 31.5–35.7)
MCV RBC AUTO: 92 FL (ref 79–97)
MCV RBC AUTO: 92.1 FL (ref 79–97)
MCV RBC AUTO: 93 FL (ref 79–97)
MCV RBC AUTO: 93.3 FL (ref 79–97)
MODALITY: ABNORMAL
MONOCYTES # BLD AUTO: 0.18 10*3/MM3 (ref 0.1–0.9)
MONOCYTES NFR BLD AUTO: 3.5 % (ref 5–12)
NEUTROPHILS NFR BLD AUTO: 3.22 10*3/MM3 (ref 1.7–7)
NEUTROPHILS NFR BLD AUTO: 63.4 % (ref 42.7–76)
NITRITE UR QL STRIP: NEGATIVE
NOROVIRUS GI+II RNA STL QL NAA+NON-PROBE: NOT DETECTED
NOTIFIED WHO: ABNORMAL
NRBC BLD AUTO-RTO: 0 /100 WBC (ref 0–0.2)
O2 A-A PPRESDIFF RESPIRATORY: 0.4 MMHG
O2 A-A PPRESDIFF RESPIRATORY: 0.7 MMHG
OSMOLALITY SERPL: 275 MOSM/KG (ref 280–301)
P SHIGELLOIDES DNA STL QL NAA+NON-PROBE: NOT DETECTED
PCO2 BLDA: 18.3 MM HG (ref 35–45)
PCO2 BLDA: 22.6 MM HG (ref 35–45)
PCO2 BLDA: 27.6 MM HG (ref 35–45)
PCO2 BLDA: 39.7 MM HG (ref 35–45)
PEEP RESPIRATORY: 7 CM[H2O]
PEEP RESPIRATORY: 7 CM[H2O]
PH BLDA: 7.16 PH UNITS (ref 7.35–7.45)
PH BLDA: 7.17 PH UNITS (ref 7.35–7.45)
PH BLDA: 7.23 PH UNITS (ref 7.35–7.45)
PH BLDA: 7.39 PH UNITS (ref 7.35–7.45)
PH UR STRIP.AUTO: 5.5 [PH] (ref 5–8)
PHOSPHATE SERPL-MCNC: 2.6 MG/DL (ref 2.5–4.5)
PHOSPHATE SERPL-MCNC: 4.6 MG/DL (ref 2.5–4.5)
PLATELET # BLD AUTO: 151 10*3/MM3 (ref 140–450)
PLATELET # BLD AUTO: 155 10*3/MM3 (ref 140–450)
PLATELET # BLD AUTO: 176 10*3/MM3 (ref 140–450)
PLATELET # BLD AUTO: 269 10*3/MM3 (ref 140–450)
PMV BLD AUTO: 9.4 FL (ref 6–12)
PMV BLD AUTO: 9.5 FL (ref 6–12)
PMV BLD AUTO: 9.6 FL (ref 6–12)
PMV BLD AUTO: 9.6 FL (ref 6–12)
PO2 BLDA: 100.3 MM HG (ref 80–100)
PO2 BLDA: 313.6 MM HG (ref 80–100)
PO2 BLDA: 471.2 MM HG (ref 80–100)
PO2 BLDA: 98.2 MM HG (ref 80–100)
POTASSIUM BLDA-SCNC: 3.7 MMOL/L (ref 3.5–5.2)
POTASSIUM BLDA-SCNC: 4.3 MMOL/L (ref 3.5–5.2)
POTASSIUM SERPL-SCNC: 3.6 MMOL/L (ref 3.5–5.2)
POTASSIUM SERPL-SCNC: 3.8 MMOL/L (ref 3.5–5.2)
POTASSIUM SERPL-SCNC: 4.7 MMOL/L (ref 3.5–5.2)
POTASSIUM SERPL-SCNC: 4.9 MMOL/L (ref 3.5–5.2)
POTASSIUM SERPL-SCNC: 5 MMOL/L (ref 3.5–5.2)
POTASSIUM SERPL-SCNC: 5.4 MMOL/L (ref 3.5–5.2)
PROCALCITONIN SERPL-MCNC: 0.11 NG/ML (ref 0–0.25)
PROT SERPL-MCNC: 3.5 G/DL (ref 6–8.5)
PROT SERPL-MCNC: 6 G/DL (ref 6–8.5)
PROT SERPL-MCNC: 7.6 G/DL (ref 6–8.5)
PROT UR QL STRIP: ABNORMAL
PROTHROMBIN TIME: 13.6 SECONDS (ref 11.7–14.2)
PROTHROMBIN TIME: 23.8 SECONDS (ref 11.7–14.2)
QT INTERVAL: 314 MS
QT INTERVAL: 326 MS
QT INTERVAL: 345 MS
QT INTERVAL: 363 MS
QT INTERVAL: 382 MS
QT INTERVAL: 384 MS
QTC INTERVAL: 453 MS
QTC INTERVAL: 466 MS
QTC INTERVAL: 471 MS
QTC INTERVAL: 488 MS
QTC INTERVAL: 519 MS
QTC INTERVAL: 525 MS
RBC # BLD AUTO: 2.7 10*6/MM3 (ref 3.77–5.28)
RBC # BLD AUTO: 2.75 10*6/MM3 (ref 3.77–5.28)
RBC # BLD AUTO: 3.68 10*6/MM3 (ref 3.77–5.28)
RBC # BLD AUTO: 4.28 10*6/MM3 (ref 3.77–5.28)
RBC # UR STRIP: NORMAL /HPF
READ BACK: YES
REF LAB TEST METHOD: NORMAL
RH BLD: POSITIVE
RHINOVIRUS RNA SPEC NAA+PROBE: NOT DETECTED
RSV RNA NPH QL NAA+NON-PROBE: NOT DETECTED
RVA RNA STL QL NAA+NON-PROBE: NOT DETECTED
S ENT+BONG DNA STL QL NAA+NON-PROBE: NOT DETECTED
SAO2 % BLDCOA: 100 % (ref 92–98.5)
SAO2 % BLDCOA: 95.8 % (ref 92–98.5)
SAO2 % BLDCOA: 97.9 % (ref 92–98.5)
SAO2 % BLDCOA: 99.9 % (ref 92–98.5)
SAPO I+II+IV+V RNA STL QL NAA+NON-PROBE: NOT DETECTED
SARS-COV-2 RNA NPH QL NAA+NON-PROBE: NOT DETECTED
SET MECH RESP RATE: 24
SET MECH RESP RATE: 30
SHIGELLA SP+EIEC IPAH ST NAA+NON-PROBE: NOT DETECTED
SODIUM BLD-SCNC: 145 MMOL/L (ref 136–145)
SODIUM BLD-SCNC: 145 MMOL/L (ref 136–145)
SODIUM SERPL-SCNC: 128 MMOL/L (ref 136–145)
SODIUM SERPL-SCNC: 129 MMOL/L (ref 136–145)
SODIUM SERPL-SCNC: 133 MMOL/L (ref 136–145)
SODIUM SERPL-SCNC: 134 MMOL/L (ref 136–145)
SODIUM SERPL-SCNC: 145 MMOL/L (ref 136–145)
SODIUM SERPL-SCNC: 146 MMOL/L (ref 136–145)
SP GR UR STRIP: 1.02 (ref 1–1.03)
SQUAMOUS #/AREA URNS HPF: NORMAL /HPF
T&S EXPIRATION DATE: NORMAL
TOTAL RATE: 20 BREATHS/MINUTE
TOTAL RATE: 24 BREATHS/MINUTE
TOTAL RATE: 30 BREATHS/MINUTE
TOTAL RATE: 36 BREATHS/MINUTE
TROPONIN T DELTA: -2 NG/L
TROPONIN T SERPL HS-MCNC: 27 NG/L
TROPONIN T SERPL HS-MCNC: 28 NG/L
TROPONIN T SERPL HS-MCNC: 32 NG/L
TROPONIN T SERPL HS-MCNC: 32 NG/L
TROPONIN T SERPL HS-MCNC: 42 NG/L
TROPONIN T SERPL HS-MCNC: 50 NG/L
TSH SERPL DL<=0.05 MIU/L-ACNC: 4.78 UIU/ML (ref 0.27–4.2)
UNIT  ABO: NORMAL
UNIT  ABO: NORMAL
UNIT  RH: NORMAL
UNIT  RH: NORMAL
URATE SERPL-MCNC: 6.4 MG/DL (ref 2.4–5.7)
UROBILINOGEN UR QL STRIP: ABNORMAL
V CHOL+PARA+VUL DNA STL QL NAA+NON-PROBE: NOT DETECTED
V CHOLERAE DNA STL QL NAA+NON-PROBE: NOT DETECTED
VENTILATOR MODE: ABNORMAL
VENTILATOR MODE: ABNORMAL
VT ON VENT VENT: 464 ML
VT ON VENT VENT: 516 ML
WBC # UR STRIP: NORMAL /HPF
WBC NRBC COR # BLD AUTO: 4.02 10*3/MM3 (ref 3.4–10.8)
WBC NRBC COR # BLD AUTO: 5.08 10*3/MM3 (ref 3.4–10.8)
WBC NRBC COR # BLD AUTO: 5.24 10*3/MM3 (ref 3.4–10.8)
WBC NRBC COR # BLD AUTO: 6.57 10*3/MM3 (ref 3.4–10.8)
WHOLE BLOOD HOLD COAG: NORMAL
WHOLE BLOOD HOLD SPECIMEN: NORMAL
Y ENTEROCOL DNA STL QL NAA+NON-PROBE: NOT DETECTED

## 2023-01-01 PROCEDURE — 86901 BLOOD TYPING SEROLOGIC RH(D): CPT | Performed by: PHYSICIAN ASSISTANT

## 2023-01-01 PROCEDURE — 83605 ASSAY OF LACTIC ACID: CPT | Performed by: PHYSICIAN ASSISTANT

## 2023-01-01 PROCEDURE — 25010000002 ONDANSETRON PER 1 MG: Performed by: PHYSICIAN ASSISTANT

## 2023-01-01 PROCEDURE — 85025 COMPLETE CBC W/AUTO DIFF WBC: CPT | Performed by: PHYSICIAN ASSISTANT

## 2023-01-01 PROCEDURE — 93005 ELECTROCARDIOGRAM TRACING: CPT

## 2023-01-01 PROCEDURE — 84100 ASSAY OF PHOSPHORUS: CPT | Performed by: INTERNAL MEDICINE

## 2023-01-01 PROCEDURE — 96375 TX/PRO/DX INJ NEW DRUG ADDON: CPT

## 2023-01-01 PROCEDURE — 25810000003 LACTATED RINGERS SOLUTION: Performed by: PHYSICIAN ASSISTANT

## 2023-01-01 PROCEDURE — 81001 URINALYSIS AUTO W/SCOPE: CPT | Performed by: PHYSICIAN ASSISTANT

## 2023-01-01 PROCEDURE — 85730 THROMBOPLASTIN TIME PARTIAL: CPT | Performed by: INTERNAL MEDICINE

## 2023-01-01 PROCEDURE — G0378 HOSPITAL OBSERVATION PER HR: HCPCS

## 2023-01-01 PROCEDURE — 94664 DEMO&/EVAL PT USE INHALER: CPT

## 2023-01-01 PROCEDURE — 93010 ELECTROCARDIOGRAM REPORT: CPT | Performed by: INTERNAL MEDICINE

## 2023-01-01 PROCEDURE — 93005 ELECTROCARDIOGRAM TRACING: CPT | Performed by: PHYSICIAN ASSISTANT

## 2023-01-01 PROCEDURE — 99204 OFFICE O/P NEW MOD 45 MIN: CPT | Performed by: INTERNAL MEDICINE

## 2023-01-01 PROCEDURE — 87507 IADNA-DNA/RNA PROBE TQ 12-25: CPT | Performed by: EMERGENCY MEDICINE

## 2023-01-01 PROCEDURE — 25010000002 EPINEPHRINE 1 MG/10ML SOLUTION PREFILLED SYRINGE

## 2023-01-01 PROCEDURE — 83605 ASSAY OF LACTIC ACID: CPT

## 2023-01-01 PROCEDURE — 94761 N-INVAS EAR/PLS OXIMETRY MLT: CPT

## 2023-01-01 PROCEDURE — 92950 HEART/LUNG RESUSCITATION CPR: CPT

## 2023-01-01 PROCEDURE — 82550 ASSAY OF CK (CPK): CPT | Performed by: PHYSICIAN ASSISTANT

## 2023-01-01 PROCEDURE — 87040 BLOOD CULTURE FOR BACTERIA: CPT | Performed by: PHYSICIAN ASSISTANT

## 2023-01-01 PROCEDURE — 71045 X-RAY EXAM CHEST 1 VIEW: CPT

## 2023-01-01 PROCEDURE — 84145 PROCALCITONIN (PCT): CPT | Performed by: PHYSICIAN ASSISTANT

## 2023-01-01 PROCEDURE — P9059 PLASMA, FRZ BETWEEN 8-24HOUR: HCPCS

## 2023-01-01 PROCEDURE — 0202U NFCT DS 22 TRGT SARS-COV-2: CPT | Performed by: PHYSICIAN ASSISTANT

## 2023-01-01 PROCEDURE — 93306 TTE W/DOPPLER COMPLETE: CPT

## 2023-01-01 PROCEDURE — 25810000003 SODIUM CHLORIDE 0.9 % SOLUTION: Performed by: NURSE PRACTITIONER

## 2023-01-01 PROCEDURE — 86900 BLOOD TYPING SEROLOGIC ABO: CPT | Performed by: PHYSICIAN ASSISTANT

## 2023-01-01 PROCEDURE — 84550 ASSAY OF BLOOD/URIC ACID: CPT | Performed by: INTERNAL MEDICINE

## 2023-01-01 PROCEDURE — 36430 TRANSFUSION BLD/BLD COMPNT: CPT

## 2023-01-01 PROCEDURE — 85027 COMPLETE CBC AUTOMATED: CPT | Performed by: INTERNAL MEDICINE

## 2023-01-01 PROCEDURE — 93005 ELECTROCARDIOGRAM TRACING: CPT | Performed by: HOSPITALIST

## 2023-01-01 PROCEDURE — 94003 VENT MGMT INPAT SUBQ DAY: CPT

## 2023-01-01 PROCEDURE — 83930 ASSAY OF BLOOD OSMOLALITY: CPT | Performed by: NURSE PRACTITIONER

## 2023-01-01 PROCEDURE — 25010000002 EPINEPHRINE 1 MG/10ML SOLUTION PREFILLED SYRINGE: Performed by: INTERNAL MEDICINE

## 2023-01-01 PROCEDURE — 36415 COLL VENOUS BLD VENIPUNCTURE: CPT | Performed by: NURSE PRACTITIONER

## 2023-01-01 PROCEDURE — 36600 WITHDRAWAL OF ARTERIAL BLOOD: CPT

## 2023-01-01 PROCEDURE — 85610 PROTHROMBIN TIME: CPT | Performed by: PHYSICIAN ASSISTANT

## 2023-01-01 PROCEDURE — 74176 CT ABD & PELVIS W/O CONTRAST: CPT

## 2023-01-01 PROCEDURE — P9016 RBC LEUKOCYTES REDUCED: HCPCS

## 2023-01-01 PROCEDURE — 25010000002 VASOPRESSIN 20 UNIT/ML SOLUTION

## 2023-01-01 PROCEDURE — 86900 BLOOD TYPING SEROLOGIC ABO: CPT

## 2023-01-01 PROCEDURE — 84484 ASSAY OF TROPONIN QUANT: CPT | Performed by: NURSE PRACTITIONER

## 2023-01-01 PROCEDURE — 36415 COLL VENOUS BLD VENIPUNCTURE: CPT

## 2023-01-01 PROCEDURE — 94799 UNLISTED PULMONARY SVC/PX: CPT

## 2023-01-01 PROCEDURE — 87493 C DIFF AMPLIFIED PROBE: CPT | Performed by: EMERGENCY MEDICINE

## 2023-01-01 PROCEDURE — 85027 COMPLETE CBC AUTOMATED: CPT

## 2023-01-01 PROCEDURE — 80047 BASIC METABLC PNL IONIZED CA: CPT

## 2023-01-01 PROCEDURE — 85018 HEMOGLOBIN: CPT

## 2023-01-01 PROCEDURE — 96361 HYDRATE IV INFUSION ADD-ON: CPT

## 2023-01-01 PROCEDURE — 99284 EMERGENCY DEPT VISIT MOD MDM: CPT

## 2023-01-01 PROCEDURE — P9612 CATHETERIZE FOR URINE SPEC: HCPCS

## 2023-01-01 PROCEDURE — 86850 RBC ANTIBODY SCREEN: CPT | Performed by: PHYSICIAN ASSISTANT

## 2023-01-01 PROCEDURE — 82948 REAGENT STRIP/BLOOD GLUCOSE: CPT

## 2023-01-01 PROCEDURE — 82803 BLOOD GASES ANY COMBINATION: CPT

## 2023-01-01 PROCEDURE — 83735 ASSAY OF MAGNESIUM: CPT

## 2023-01-01 PROCEDURE — 85610 PROTHROMBIN TIME: CPT | Performed by: INTERNAL MEDICINE

## 2023-01-01 PROCEDURE — 82330 ASSAY OF CALCIUM: CPT | Performed by: INTERNAL MEDICINE

## 2023-01-01 PROCEDURE — 25810000003 SODIUM CHLORIDE 0.9 % SOLUTION: Performed by: INTERNAL MEDICINE

## 2023-01-01 PROCEDURE — 80053 COMPREHEN METABOLIC PANEL: CPT | Performed by: PHYSICIAN ASSISTANT

## 2023-01-01 PROCEDURE — 85730 THROMBOPLASTIN TIME PARTIAL: CPT | Performed by: PHYSICIAN ASSISTANT

## 2023-01-01 PROCEDURE — 25810000003 SODIUM CHLORIDE 0.9 % SOLUTION

## 2023-01-01 PROCEDURE — 93306 TTE W/DOPPLER COMPLETE: CPT | Performed by: INTERNAL MEDICINE

## 2023-01-01 PROCEDURE — 87040 BLOOD CULTURE FOR BACTERIA: CPT | Performed by: INTERNAL MEDICINE

## 2023-01-01 PROCEDURE — 96376 TX/PRO/DX INJ SAME DRUG ADON: CPT

## 2023-01-01 PROCEDURE — 99215 OFFICE O/P EST HI 40 MIN: CPT | Performed by: INTERNAL MEDICINE

## 2023-01-01 PROCEDURE — 94002 VENT MGMT INPAT INIT DAY: CPT

## 2023-01-01 PROCEDURE — 84484 ASSAY OF TROPONIN QUANT: CPT | Performed by: PHYSICIAN ASSISTANT

## 2023-01-01 PROCEDURE — 84443 ASSAY THYROID STIM HORMONE: CPT | Performed by: INTERNAL MEDICINE

## 2023-01-01 PROCEDURE — 86923 COMPATIBILITY TEST ELECTRIC: CPT

## 2023-01-01 PROCEDURE — 84484 ASSAY OF TROPONIN QUANT: CPT | Performed by: INTERNAL MEDICINE

## 2023-01-01 PROCEDURE — 80053 COMPREHEN METABOLIC PANEL: CPT | Performed by: NURSE PRACTITIONER

## 2023-01-01 PROCEDURE — 82010 KETONE BODYS QUAN: CPT | Performed by: HOSPITALIST

## 2023-01-01 PROCEDURE — 86927 PLASMA FRESH FROZEN: CPT

## 2023-01-01 PROCEDURE — 25010000002 PHENYLEPHRINE 10 MG/ML SOLUTION

## 2023-01-01 PROCEDURE — 96374 THER/PROPH/DIAG INJ IV PUSH: CPT

## 2023-01-01 PROCEDURE — 83605 ASSAY OF LACTIC ACID: CPT | Performed by: INTERNAL MEDICINE

## 2023-01-01 PROCEDURE — 83690 ASSAY OF LIPASE: CPT | Performed by: PHYSICIAN ASSISTANT

## 2023-01-01 PROCEDURE — 83735 ASSAY OF MAGNESIUM: CPT | Performed by: INTERNAL MEDICINE

## 2023-01-01 PROCEDURE — 0 DEXTROSE 5 % SOLUTION: Performed by: INTERNAL MEDICINE

## 2023-01-01 PROCEDURE — 25010000002 EPINEPHRINE 1 MG/ML SOLUTION

## 2023-01-01 PROCEDURE — 85027 COMPLETE CBC AUTOMATED: CPT | Performed by: NURSE PRACTITIONER

## 2023-01-01 RX ORDER — SODIUM CHLORIDE 0.9 % (FLUSH) 0.9 %
10 SYRINGE (ML) INJECTION AS NEEDED
Status: DISCONTINUED | OUTPATIENT
Start: 2023-01-01 | End: 2023-01-01

## 2023-01-01 RX ORDER — ATORVASTATIN CALCIUM 20 MG/1
10 TABLET, FILM COATED ORAL NIGHTLY
Status: DISCONTINUED | OUTPATIENT
Start: 2023-01-01 | End: 2023-01-01 | Stop reason: HOSPADM

## 2023-01-01 RX ORDER — ACETAMINOPHEN 325 MG/1
650 TABLET ORAL EVERY 4 HOURS PRN
Status: DISCONTINUED | OUTPATIENT
Start: 2023-01-01 | End: 2023-01-01 | Stop reason: HOSPADM

## 2023-01-01 RX ORDER — DEXTROSE MONOHYDRATE 25 G/50ML
INJECTION, SOLUTION INTRAVENOUS
Status: COMPLETED | OUTPATIENT
Start: 2023-01-01 | End: 2023-01-01

## 2023-01-01 RX ORDER — ONDANSETRON 4 MG/1
4 TABLET, FILM COATED ORAL EVERY 6 HOURS PRN
Status: DISCONTINUED | OUTPATIENT
Start: 2023-01-01 | End: 2023-01-01 | Stop reason: HOSPADM

## 2023-01-01 RX ORDER — DEXMEDETOMIDINE HYDROCHLORIDE 4 UG/ML
.2-1.5 INJECTION, SOLUTION INTRAVENOUS
Status: DISCONTINUED | OUTPATIENT
Start: 2023-01-01 | End: 2023-01-01 | Stop reason: HOSPADM

## 2023-01-01 RX ORDER — ONDANSETRON 2 MG/ML
4 INJECTION INTRAMUSCULAR; INTRAVENOUS EVERY 6 HOURS PRN
Status: DISCONTINUED | OUTPATIENT
Start: 2023-01-01 | End: 2023-01-01 | Stop reason: HOSPADM

## 2023-01-01 RX ORDER — ACETAMINOPHEN 650 MG/1
650 SUPPOSITORY RECTAL EVERY 4 HOURS PRN
Status: DISCONTINUED | OUTPATIENT
Start: 2023-01-01 | End: 2023-01-01 | Stop reason: HOSPADM

## 2023-01-01 RX ORDER — SODIUM CHLORIDE 0.9 % (FLUSH) 0.9 %
10 SYRINGE (ML) INJECTION EVERY 12 HOURS SCHEDULED
Status: DISCONTINUED | OUTPATIENT
Start: 2023-01-01 | End: 2023-01-01 | Stop reason: HOSPADM

## 2023-01-01 RX ORDER — PHENYLEPHRINE HCL IN 0.9% NACL 0.5 MG/5ML
.5-3 SYRINGE (ML) INTRAVENOUS
Status: DISCONTINUED | OUTPATIENT
Start: 2023-01-01 | End: 2023-01-01 | Stop reason: HOSPADM

## 2023-01-01 RX ORDER — ACETAMINOPHEN 160 MG/5ML
650 SOLUTION ORAL EVERY 4 HOURS PRN
Status: DISCONTINUED | OUTPATIENT
Start: 2023-01-01 | End: 2023-01-01 | Stop reason: HOSPADM

## 2023-01-01 RX ORDER — ZOLPIDEM TARTRATE 5 MG/1
5 TABLET ORAL NIGHTLY PRN
Status: DISCONTINUED | OUTPATIENT
Start: 2023-01-01 | End: 2023-01-01

## 2023-01-01 RX ORDER — PANTOPRAZOLE SODIUM 40 MG/10ML
80 INJECTION, POWDER, LYOPHILIZED, FOR SOLUTION INTRAVENOUS ONCE
Status: COMPLETED | OUTPATIENT
Start: 2023-01-01 | End: 2023-01-01

## 2023-01-01 RX ORDER — DEXTROSE MONOHYDRATE 25 G/50ML
INJECTION, SOLUTION INTRAVENOUS
Status: COMPLETED
Start: 2023-01-01 | End: 2023-01-01

## 2023-01-01 RX ORDER — CALCIUM CHLORIDE 100 MG/ML
INJECTION INTRAVENOUS; INTRAVENTRICULAR
Status: COMPLETED | OUTPATIENT
Start: 2023-01-01 | End: 2023-01-01

## 2023-01-01 RX ORDER — MECLIZINE HYDROCHLORIDE 25 MG/1
25 TABLET ORAL 3 TIMES DAILY PRN
Status: DISCONTINUED | OUTPATIENT
Start: 2023-01-01 | End: 2023-01-01 | Stop reason: HOSPADM

## 2023-01-01 RX ORDER — LORAZEPAM 0.5 MG/1
0.5 TABLET ORAL EVERY 8 HOURS PRN
Status: DISCONTINUED | OUTPATIENT
Start: 2023-01-01 | End: 2023-01-01

## 2023-01-01 RX ORDER — DEXTROSE AND SODIUM CHLORIDE 5; .45 G/100ML; G/100ML
100 INJECTION, SOLUTION INTRAVENOUS CONTINUOUS
Status: DISCONTINUED | OUTPATIENT
Start: 2023-01-01 | End: 2023-01-01

## 2023-01-01 RX ORDER — CALCIUM CARBONATE 500 MG/1
2 TABLET, CHEWABLE ORAL 2 TIMES DAILY PRN
Status: DISCONTINUED | OUTPATIENT
Start: 2023-01-01 | End: 2023-01-01 | Stop reason: HOSPADM

## 2023-01-01 RX ORDER — DEXTROSE AND SODIUM CHLORIDE 5; .45 G/100ML; G/100ML
125 INJECTION, SOLUTION INTRAVENOUS CONTINUOUS
Status: DISCONTINUED | OUTPATIENT
Start: 2023-01-01 | End: 2023-01-01 | Stop reason: HOSPADM

## 2023-01-01 RX ORDER — ARIPIPRAZOLE 5 MG/1
5 TABLET ORAL EVERY EVENING
Status: DISCONTINUED | OUTPATIENT
Start: 2023-01-01 | End: 2023-01-01 | Stop reason: HOSPADM

## 2023-01-01 RX ORDER — SODIUM CHLORIDE 9 MG/ML
INJECTION, SOLUTION INTRAVENOUS
Status: COMPLETED | OUTPATIENT
Start: 2023-01-01 | End: 2023-01-01

## 2023-01-01 RX ORDER — NOREPINEPHRINE BITARTRATE 0.03 MG/ML
.02-.3 INJECTION, SOLUTION INTRAVENOUS
Status: DISCONTINUED | OUTPATIENT
Start: 2023-01-01 | End: 2023-01-01 | Stop reason: HOSPADM

## 2023-01-01 RX ORDER — FENTANYL CITRATE 50 UG/ML
50 INJECTION, SOLUTION INTRAMUSCULAR; INTRAVENOUS
Status: DISCONTINUED | OUTPATIENT
Start: 2023-01-01 | End: 2023-01-01 | Stop reason: HOSPADM

## 2023-01-01 RX ORDER — ONDANSETRON 2 MG/ML
8 INJECTION INTRAMUSCULAR; INTRAVENOUS ONCE
Status: COMPLETED | OUTPATIENT
Start: 2023-01-01 | End: 2023-01-01

## 2023-01-01 RX ORDER — PANTOPRAZOLE SODIUM 40 MG/10ML
40 INJECTION, POWDER, LYOPHILIZED, FOR SOLUTION INTRAVENOUS
Status: DISCONTINUED | OUTPATIENT
Start: 2023-01-01 | End: 2023-01-01 | Stop reason: HOSPADM

## 2023-01-01 RX ORDER — SODIUM CHLORIDE 9 MG/ML
40 INJECTION, SOLUTION INTRAVENOUS AS NEEDED
Status: DISCONTINUED | OUTPATIENT
Start: 2023-01-01 | End: 2023-01-01 | Stop reason: HOSPADM

## 2023-01-01 RX ORDER — SODIUM CHLORIDE 0.9 % (FLUSH) 0.9 %
10 SYRINGE (ML) INJECTION AS NEEDED
Status: DISCONTINUED | OUTPATIENT
Start: 2023-01-01 | End: 2023-01-01 | Stop reason: HOSPADM

## 2023-01-01 RX ORDER — SODIUM CHLORIDE 9 MG/ML
100 INJECTION, SOLUTION INTRAVENOUS CONTINUOUS
Status: DISCONTINUED | OUTPATIENT
Start: 2023-01-01 | End: 2023-01-01

## 2023-01-01 RX ORDER — CHLORHEXIDINE GLUCONATE ORAL RINSE 1.2 MG/ML
15 SOLUTION DENTAL EVERY 12 HOURS SCHEDULED
Status: DISCONTINUED | OUTPATIENT
Start: 2023-01-01 | End: 2023-01-01 | Stop reason: HOSPADM

## 2023-01-01 RX ADMIN — PANTOPRAZOLE SODIUM 80 MG: 40 INJECTION, POWDER, FOR SOLUTION INTRAVENOUS at 00:51

## 2023-01-01 RX ADMIN — SODIUM BICARBONATE 50 MEQ: 84 INJECTION, SOLUTION INTRAVENOUS at 20:30

## 2023-01-01 RX ADMIN — SODIUM CHLORIDE 100 ML/HR: 9 INJECTION, SOLUTION INTRAVENOUS at 05:03

## 2023-01-01 RX ADMIN — SODIUM CHLORIDE 1000 ML: 9 INJECTION, SOLUTION INTRAVENOUS at 22:31

## 2023-01-01 RX ADMIN — Medication 50 MEQ: at 00:05

## 2023-01-01 RX ADMIN — SODIUM BICARBONATE 50 MEQ: 84 INJECTION, SOLUTION INTRAVENOUS at 01:04

## 2023-01-01 RX ADMIN — DEXTROSE AND SODIUM CHLORIDE 125 ML/HR: 5; 450 INJECTION, SOLUTION INTRAVENOUS at 22:34

## 2023-01-01 RX ADMIN — SODIUM BICARBONATE 150 MEQ: 84 INJECTION, SOLUTION INTRAVENOUS at 19:13

## 2023-01-01 RX ADMIN — CALCIUM CHLORIDE 1 G: 100 INJECTION, SOLUTION INTRAVENOUS at 20:37

## 2023-01-01 RX ADMIN — EPINEPHRINE 1 MG: 0.1 INJECTION INTRACARDIAC; INTRAVENOUS at 20:35

## 2023-01-01 RX ADMIN — Medication 50 MEQ: at 00:06

## 2023-01-01 RX ADMIN — EPINEPHRINE 1 MG: 0.1 INJECTION INTRACARDIAC; INTRAVENOUS at 01:05

## 2023-01-01 RX ADMIN — PANTOPRAZOLE SODIUM 40 MG: 40 INJECTION, POWDER, FOR SOLUTION INTRAVENOUS at 06:01

## 2023-01-01 RX ADMIN — EPINEPHRINE 1 MG: 0.1 INJECTION INTRACARDIAC; INTRAVENOUS at 01:02

## 2023-01-01 RX ADMIN — EPINEPHRINE 1 MG: 0.1 INJECTION INTRACARDIAC; INTRAVENOUS at 20:38

## 2023-01-01 RX ADMIN — EPINEPHRINE 1 MG: 0.1 INJECTION INTRACARDIAC; INTRAVENOUS at 00:59

## 2023-01-01 RX ADMIN — SODIUM BICARBONATE 50 MEQ: 84 INJECTION, SOLUTION INTRAVENOUS at 23:57

## 2023-01-01 RX ADMIN — EPINEPHRINE 1 MG: 0.1 INJECTION INTRACARDIAC; INTRAVENOUS at 23:47

## 2023-01-01 RX ADMIN — SODIUM BICARBONATE 50 MEQ: 84 INJECTION, SOLUTION INTRAVENOUS at 00:59

## 2023-01-01 RX ADMIN — Medication 10 ML: at 00:51

## 2023-01-01 RX ADMIN — DEXTROSE MONOHYDRATE: 25 INJECTION, SOLUTION INTRAVENOUS at 20:53

## 2023-01-01 RX ADMIN — Medication 50 MEQ: at 23:12

## 2023-01-01 RX ADMIN — SODIUM BICARBONATE 50 MEQ: 84 INJECTION INTRAVENOUS at 23:12

## 2023-01-01 RX ADMIN — SODIUM BICARBONATE 50 MEQ: 84 INJECTION, SOLUTION INTRAVENOUS at 21:05

## 2023-01-01 RX ADMIN — SODIUM CHLORIDE 100 ML/HR: 9 INJECTION, SOLUTION INTRAVENOUS at 01:05

## 2023-01-01 RX ADMIN — SODIUM BICARBONATE 50 MEQ: 84 INJECTION, SOLUTION INTRAVENOUS at 21:40

## 2023-01-01 RX ADMIN — SODIUM CHLORIDE 250 ML: 900 INJECTION, SOLUTION INTRAVENOUS at 19:25

## 2023-01-01 RX ADMIN — Medication 10 ML: at 22:28

## 2023-01-01 RX ADMIN — EPINEPHRINE 1 MG: 0.1 INJECTION INTRACARDIAC; INTRAVENOUS at 19:28

## 2023-01-01 RX ADMIN — SODIUM BICARBONATE 50 MEQ: 84 INJECTION, SOLUTION INTRAVENOUS at 19:23

## 2023-01-01 RX ADMIN — SODIUM BICARBONATE 50 MEQ: 84 INJECTION, SOLUTION INTRAVENOUS at 23:48

## 2023-01-01 RX ADMIN — SODIUM BICARBONATE 50 MEQ: 84 INJECTION, SOLUTION INTRAVENOUS at 20:35

## 2023-01-01 RX ADMIN — SODIUM BICARBONATE 50 MEQ: 84 INJECTION, SOLUTION INTRAVENOUS at 19:27

## 2023-01-01 RX ADMIN — SODIUM BICARBONATE 50 MEQ: 84 INJECTION, SOLUTION INTRAVENOUS at 20:31

## 2023-01-01 RX ADMIN — Medication 3 MCG/KG/MIN: at 20:45

## 2023-01-01 RX ADMIN — 0.12% CHLORHEXIDINE GLUCONATE 15 ML: 1.2 RINSE ORAL at 22:59

## 2023-01-01 RX ADMIN — EPINEPHRINE 1 MG: 0.1 INJECTION INTRACARDIAC; INTRAVENOUS at 23:50

## 2023-01-01 RX ADMIN — SODIUM BICARBONATE 50 MEQ: 84 INJECTION INTRAVENOUS at 00:03

## 2023-01-01 RX ADMIN — EPINEPHRINE 1 MG: 0.1 INJECTION INTRACARDIAC; INTRAVENOUS at 23:53

## 2023-01-01 RX ADMIN — CALCIUM CHLORIDE 1 G: 100 INJECTION, SOLUTION INTRAVENOUS at 19:25

## 2023-01-01 RX ADMIN — SODIUM BICARBONATE 50 MEQ: 84 INJECTION, SOLUTION INTRAVENOUS at 23:53

## 2023-01-01 RX ADMIN — Medication 50 MEQ: at 23:40

## 2023-01-01 RX ADMIN — SODIUM ZIRCONIUM CYCLOSILICATE 10 G: 10 POWDER, FOR SUSPENSION ORAL at 11:48

## 2023-01-01 RX ADMIN — Medication 50 MEQ: at 00:03

## 2023-01-01 RX ADMIN — Medication 0.3 MCG/KG/MIN: at 20:30

## 2023-01-01 RX ADMIN — SODIUM BICARBONATE 50 MEQ: 84 INJECTION, SOLUTION INTRAVENOUS at 00:05

## 2023-01-01 RX ADMIN — EPINEPHRINE 1 MG: 0.1 INJECTION INTRACARDIAC; INTRAVENOUS at 23:56

## 2023-01-01 RX ADMIN — SODIUM CHLORIDE, POTASSIUM CHLORIDE, SODIUM LACTATE AND CALCIUM CHLORIDE 1000 ML: 600; 310; 30; 20 INJECTION, SOLUTION INTRAVENOUS at 22:08

## 2023-01-01 RX ADMIN — Medication 0.02 MCG/KG/MIN: at 20:20

## 2023-01-01 RX ADMIN — ONDANSETRON 8 MG: 2 INJECTION INTRAMUSCULAR; INTRAVENOUS at 23:23

## 2023-01-01 RX ADMIN — SODIUM CHLORIDE 1000 ML: 9 INJECTION, SOLUTION INTRAVENOUS at 20:45

## 2023-01-01 RX ADMIN — SODIUM BICARBONATE 50 MEQ: 84 INJECTION INTRAVENOUS at 23:40

## 2023-01-01 RX ADMIN — SODIUM BICARBONATE 50 MEQ: 84 INJECTION, SOLUTION INTRAVENOUS at 19:25

## 2023-01-01 RX ADMIN — SODIUM BICARBONATE 50 MEQ: 84 INJECTION INTRAVENOUS at 00:06

## 2023-01-01 RX ADMIN — EPINEPHRINE 1 MG: 0.1 INJECTION INTRACARDIAC; INTRAVENOUS at 19:25

## 2023-01-01 RX ADMIN — DEXTROSE MONOHYDRATE 25 G: 25 INJECTION, SOLUTION INTRAVENOUS at 01:00

## 2023-01-09 ENCOUNTER — SPECIALTY PHARMACY (OUTPATIENT)
Dept: PHARMACY | Facility: HOSPITAL | Age: 68
End: 2023-01-09
Payer: MEDICARE

## 2023-01-09 NOTE — PROGRESS NOTES
Ms. Vivar has been approved for Free Ibrance through 12/31/2023.    I called the patient to inform her but had to leave a message.    Sarai Werner - Care Coordinator   1/9/2023  16:36 EST

## 2023-01-12 ENCOUNTER — TELEPHONE (OUTPATIENT)
Dept: ONCOLOGY | Facility: CLINIC | Age: 68
End: 2023-01-12

## 2023-01-12 NOTE — TELEPHONE ENCOUNTER
Caller: Marialuisa Vivar    Relationship: Self    Best call back number: [phone]    What is the best time to reach you: ASAP    Who are you requesting to speak with (clinical staff, provider,  specific staff member): SCHEDULING    What was the call regarding: PT NEEDS TO RESCHEDULE HER LAB, FOLLOW UP, AND INJECTION FOR A WEEK OR MORE AFTER HER NEW APPT. FOR SCANS.  SHE HAS RESCHEDULED HER SCAN FOR 1/19/23.    Do you require a callback: YES, PLEASE CALL PT BACK TO RESCHEDULE.

## 2023-01-20 ENCOUNTER — SPECIALTY PHARMACY (OUTPATIENT)
Dept: PHARMACY | Facility: HOSPITAL | Age: 68
End: 2023-01-20
Payer: MEDICARE

## 2023-01-24 ENCOUNTER — TELEPHONE (OUTPATIENT)
Dept: ONCOLOGY | Facility: CLINIC | Age: 68
End: 2023-01-24

## 2023-01-24 NOTE — TELEPHONE ENCOUNTER
Caller: Marialuisa Vivar    Relationship to patient: Self    Best call back number: [phone]    Chief complaint: PT CALLED TO RESCHEDULE      Type of visit: LAB, FOLLOW UP, AND INJECTION         If rescheduling, when is the original appointment: 1-27-23     Additional notes:PLEASE CALL PATIENT TO RESCHEDULE

## 2023-01-26 ENCOUNTER — HOSPITAL ENCOUNTER (OUTPATIENT)
Dept: NUCLEAR MEDICINE | Facility: HOSPITAL | Age: 68
Discharge: HOME | End: 2023-01-26
Payer: MEDICARE

## 2023-01-26 ENCOUNTER — HOSPITAL ENCOUNTER (OUTPATIENT)
Dept: CT IMAGING | Facility: HOSPITAL | Age: 68
Discharge: HOME | End: 2023-01-26
Admitting: NURSE PRACTITIONER
Payer: MEDICARE

## 2023-01-26 DIAGNOSIS — C78.02 CARCINOMA OF LEFT BREAST METASTATIC TO LUNG: ICD-10-CM

## 2023-01-26 DIAGNOSIS — C50.912 CARCINOMA OF LEFT BREAST METASTATIC TO LUNG: ICD-10-CM

## 2023-01-26 DIAGNOSIS — C79.51 BONE METASTASES: ICD-10-CM

## 2023-01-26 PROCEDURE — 78306 BONE IMAGING WHOLE BODY: CPT

## 2023-01-26 PROCEDURE — A9503 TC99M MEDRONATE: HCPCS | Performed by: NURSE PRACTITIONER

## 2023-01-26 PROCEDURE — 0 TECHNETIUM MEDRONATE KIT: Performed by: NURSE PRACTITIONER

## 2023-01-26 PROCEDURE — 71250 CT THORAX DX C-: CPT

## 2023-01-26 PROCEDURE — 74176 CT ABD & PELVIS W/O CONTRAST: CPT

## 2023-01-26 RX ORDER — TC 99M MEDRONATE 20 MG/10ML
19.8 INJECTION, POWDER, LYOPHILIZED, FOR SOLUTION INTRAVENOUS
Status: COMPLETED | OUTPATIENT
Start: 2023-01-26 | End: 2023-01-26

## 2023-01-26 RX ADMIN — Medication 19.8 MILLICURIE: at 11:35

## 2023-01-30 ENCOUNTER — SPECIALTY PHARMACY (OUTPATIENT)
Dept: PHARMACY | Facility: HOSPITAL | Age: 68
End: 2023-01-30
Payer: MEDICARE

## 2023-02-01 ENCOUNTER — TELEPHONE (OUTPATIENT)
Dept: ONCOLOGY | Facility: CLINIC | Age: 68
End: 2023-02-01
Payer: MEDICARE

## 2023-02-01 NOTE — TELEPHONE ENCOUNTER
Caller: Marialuisa Vivar    Relationship to patient: Self    Best call back number: [phone]    Type of visit: LAB, F/U 2 & INJECTION     Requested date: FEB CALL TO R/S     If rescheduling, when is the original appointment: 1/27/2023

## 2023-02-06 ENCOUNTER — SPECIALTY PHARMACY (OUTPATIENT)
Dept: PHARMACY | Facility: HOSPITAL | Age: 68
End: 2023-02-06
Payer: MEDICARE

## 2023-02-07 ENCOUNTER — HOSPITAL ENCOUNTER (EMERGENCY)
Facility: HOSPITAL | Age: 68
Discharge: HOME | End: 2023-02-07
Attending: EMERGENCY MEDICINE | Admitting: EMERGENCY MEDICINE
Payer: MEDICARE

## 2023-02-07 VITALS
TEMPERATURE: 98.7 F | OXYGEN SATURATION: 98 % | HEART RATE: 76 BPM | SYSTOLIC BLOOD PRESSURE: 125 MMHG | RESPIRATION RATE: 16 BRPM | DIASTOLIC BLOOD PRESSURE: 63 MMHG

## 2023-02-07 DIAGNOSIS — D72.819 LEUKOPENIA, UNSPECIFIED TYPE: ICD-10-CM

## 2023-02-07 DIAGNOSIS — R42 EPISODE OF DIZZINESS: Primary | ICD-10-CM

## 2023-02-07 DIAGNOSIS — N18.9 CHRONIC KIDNEY DISEASE, UNSPECIFIED CKD STAGE: ICD-10-CM

## 2023-02-07 LAB
ALBUMIN SERPL-MCNC: 4.1 G/DL (ref 3.5–5.2)
ALBUMIN/GLOB SERPL: 1.7 G/DL
ALP SERPL-CCNC: 62 U/L (ref 39–117)
ALT SERPL W P-5'-P-CCNC: 9 U/L (ref 1–33)
ANION GAP SERPL CALCULATED.3IONS-SCNC: 8.1 MMOL/L (ref 5–15)
AST SERPL-CCNC: 11 U/L (ref 1–32)
BASOPHILS # BLD MANUAL: 0.03 10*3/MM3 (ref 0–0.2)
BASOPHILS NFR BLD MANUAL: 1.3 % (ref 0–1.5)
BILIRUB SERPL-MCNC: 0.5 MG/DL (ref 0–1.2)
BUN SERPL-MCNC: 18 MG/DL (ref 8–23)
BUN/CREAT SERPL: 12.9 (ref 7–25)
CALCIUM SPEC-SCNC: 10.4 MG/DL (ref 8.6–10.5)
CHLORIDE SERPL-SCNC: 109 MMOL/L (ref 98–107)
CO2 SERPL-SCNC: 23.9 MMOL/L (ref 22–29)
CREAT SERPL-MCNC: 1.4 MG/DL (ref 0.57–1)
DEPRECATED RDW RBC AUTO: 56.6 FL (ref 37–54)
EGFRCR SERPLBLD CKD-EPI 2021: 41.1 ML/MIN/1.73
ELLIPTOCYTES BLD QL SMEAR: ABNORMAL
EOSINOPHIL # BLD MANUAL: 0.03 10*3/MM3 (ref 0–0.4)
EOSINOPHIL NFR BLD MANUAL: 1.3 % (ref 0.3–6.2)
ERYTHROCYTE [DISTWIDTH] IN BLOOD BY AUTOMATED COUNT: 14.1 % (ref 12.3–15.4)
GEN 5 2HR TROPONIN T REFLEX: 14 NG/L
GLOBULIN UR ELPH-MCNC: 2.4 GM/DL
GLUCOSE SERPL-MCNC: 114 MG/DL (ref 65–99)
HCT VFR BLD AUTO: 30.1 % (ref 34–46.6)
HGB BLD-MCNC: 10.7 G/DL (ref 12–15.9)
LYMPHOCYTES # BLD MANUAL: 0.95 10*3/MM3 (ref 0.7–3.1)
LYMPHOCYTES NFR BLD MANUAL: 3.8 % (ref 5–12)
MCH RBC QN AUTO: 39.3 PG (ref 26.6–33)
MCHC RBC AUTO-ENTMCNC: 35.5 G/DL (ref 31.5–35.7)
MCV RBC AUTO: 110.7 FL (ref 79–97)
MONOCYTES # BLD: 0.09 10*3/MM3 (ref 0.1–0.9)
NEUTROPHILS # BLD AUTO: 1.29 10*3/MM3 (ref 1.7–7)
NEUTROPHILS NFR BLD MANUAL: 53.8 % (ref 42.7–76)
NT-PROBNP SERPL-MCNC: 281 PG/ML (ref 0–900)
PLAT MORPH BLD: NORMAL
PLATELET # BLD AUTO: 155 10*3/MM3 (ref 140–450)
PMV BLD AUTO: 9.3 FL (ref 6–12)
POTASSIUM SERPL-SCNC: 4.8 MMOL/L (ref 3.5–5.2)
PROT SERPL-MCNC: 6.5 G/DL (ref 6–8.5)
QT INTERVAL: 396 MS
RBC # BLD AUTO: 2.72 10*6/MM3 (ref 3.77–5.28)
SODIUM SERPL-SCNC: 141 MMOL/L (ref 136–145)
TROPONIN T DELTA: -1 NG/L
TROPONIN T SERPL HS-MCNC: 15 NG/L
VARIANT LYMPHS NFR BLD MANUAL: 39.7 % (ref 19.6–45.3)
WBC MORPH BLD: NORMAL
WBC NRBC COR # BLD: 2.39 10*3/MM3 (ref 3.4–10.8)

## 2023-02-07 PROCEDURE — 83880 ASSAY OF NATRIURETIC PEPTIDE: CPT | Performed by: EMERGENCY MEDICINE

## 2023-02-07 PROCEDURE — 84484 ASSAY OF TROPONIN QUANT: CPT | Performed by: EMERGENCY MEDICINE

## 2023-02-07 PROCEDURE — 99284 EMERGENCY DEPT VISIT MOD MDM: CPT

## 2023-02-07 PROCEDURE — 36415 COLL VENOUS BLD VENIPUNCTURE: CPT

## 2023-02-07 PROCEDURE — 80053 COMPREHEN METABOLIC PANEL: CPT | Performed by: EMERGENCY MEDICINE

## 2023-02-07 PROCEDURE — 93005 ELECTROCARDIOGRAM TRACING: CPT | Performed by: EMERGENCY MEDICINE

## 2023-02-07 PROCEDURE — 85025 COMPLETE CBC W/AUTO DIFF WBC: CPT | Performed by: EMERGENCY MEDICINE

## 2023-02-07 PROCEDURE — 85007 BL SMEAR W/DIFF WBC COUNT: CPT | Performed by: EMERGENCY MEDICINE

## 2023-02-07 PROCEDURE — 93010 ELECTROCARDIOGRAM REPORT: CPT | Performed by: STUDENT IN AN ORGANIZED HEALTH CARE EDUCATION/TRAINING PROGRAM

## 2023-02-07 RX ORDER — MECLIZINE HYDROCHLORIDE 25 MG/1
25 TABLET ORAL ONCE
Status: COMPLETED | OUTPATIENT
Start: 2023-02-07 | End: 2023-02-07

## 2023-02-07 RX ORDER — MECLIZINE HYDROCHLORIDE 25 MG/1
25 TABLET ORAL 3 TIMES DAILY PRN
Qty: 20 TABLET | Refills: 0 | Status: SHIPPED | OUTPATIENT
Start: 2023-02-07

## 2023-02-07 RX ORDER — SODIUM CHLORIDE 0.9 % (FLUSH) 0.9 %
10 SYRINGE (ML) INJECTION AS NEEDED
Status: DISCONTINUED | OUTPATIENT
Start: 2023-02-07 | End: 2023-02-07 | Stop reason: HOSPADM

## 2023-02-07 RX ADMIN — MECLIZINE HYDROCHLORIDE 25 MG: 25 TABLET ORAL at 13:50

## 2023-02-07 NOTE — ED NOTES
Pt arrives via EMS from home for weakness, nausea and dizziness that started today. Pt doesn't remember if she passed out when the dizziness started all of a sudden this morning.

## 2023-02-07 NOTE — ED PROVIDER NOTES
EMERGENCY DEPARTMENT ENCOUNTER    Room Number:  09/09  Date seen:  2/7/2023  PCP: Jennifer Mantilla MD  Historian: Patient      HPI:  Chief Complaint: Dizziness  A complete HPI/ROS/PMH/PSH/SH/FH are unobtainable due to: Nothing  Context: Marialuisa Vivar is a 67 y.o. female who presents to the ED c/o episode of dizziness that occurred this morning.  Patient reports that she was reading the paper and she was looking to the left to look at the weather on her phone when she suddenly became very dizzy.  She felt like the room was spinning.  She also had a little bit of shortness of breath which she attributed to anxiety.  She denies definite chest pain.  She reports that she felt weak.  She was able to get up and walk but felt a little bit unsteady.  She had some mild nausea.  Her symptoms have gradually resolved and currently she feels back to normal.  She denies fever or chills.  She denies any double vision.            PAST MEDICAL HISTORY  Active Ambulatory Problems     Diagnosis Date Noted   • Benign essential HTN 01/20/2016   • Depression 01/20/2016   • Vocal cord dysfunction 01/20/2016   • Bone metastases (HCC) 01/20/2016   • Carcinoma of left breast metastatic to lung (HCC) 08/22/2016   • Tachycardia 10/05/2016   • Therapeutic drug monitoring 10/05/2016   • Lung metastasis (HCC) 06/01/2010   • Vitamin D deficiency 05/09/2018   • Class 1 obesity without serious comorbidity with body mass index (BMI) of 31.0 to 31.9 in adult 02/06/2019   • Tongue cancer (HCC) 08/13/2019   • Anxiety 08/25/2020   • Bruxism 08/25/2020   • Cheilosis 08/25/2020   • Squamous cell carcinoma of skin 08/25/2020   • Hyperlipidemia 08/25/2020   • Ovarian mass, right 10/19/2021   • Adnexal mass 10/26/2021   • Abnormal CT scan, colon 04/07/2022     Resolved Ambulatory Problems     Diagnosis Date Noted   • Fatigue 01/20/2016   • Blood glucose elevated 01/20/2016   • Leg swelling 10/05/2016   • Dyspnea on exertion 10/05/2016   • Benign essential  hypertension 2020     Past Medical History:   Diagnosis Date   • Allergy    • Asthma    • Bone metastasis (HCC)    • Breast cancer (HCC)    • Cholelithiasis    • Colon polyp Past 10-15 yrs?   • Esophageal reflux    • History of colon polyps    • Hypertension    • Left breast cancer metastasized to lung    • Obstructive sleep apnea    • Ovarian cyst    • PONV (postoperative nausea and vomiting)          PAST SURGICAL HISTORY  Past Surgical History:   Procedure Laterality Date   • CHOLECYSTECTOMY     • COLONOSCOPY      H/O polyps   • COLONOSCOPY N/A 2022    Procedure: COLONOSCOPY to cecum and TI with cold / hot snare polypectomies;  Surgeon: Luis Enrique Galeas MD;  Location: [facility] ENDOSCOPY;  Service: Gastroenterology;  Laterality: N/A;  pre-abnormal ct  post-polyps, diverticulosis, hemorrhoids   • KNEE ARTHROPLASTY     • LUNG SURGERY      Lung wedge resection   • MASTECTOMY RADICAL Left 1993   • TONGUE SURGERY  2019    tongue cancer   • TOTAL LAPAROSCOPIC HYSTERECTOMY N/A 2021    Procedure: Robotic assisted total laparoscopic hysterectomy, bilateral salpingoophorecotmy, bilateral ureteralysis ;  Surgeon: Kaylynn Ricci DO;  Location: [facility] MAIN OR;  Service: Robotics - DaVinci;  Laterality: N/A;         FAMILY HISTORY  Family History   Problem Relation Age of Onset   • Hypertension Mother    • Leukemia Mother 72   • COPD Mother         smoker   • Diabetes Brother    • Pancreatic cancer Brother    • Glaucoma Brother    • Heart disease Brother    • Hypertension Brother    • Gallbladder disease Brother    • Pancreatitis Brother          from pancreatic csncer   • Gallbladder disease Father    • Colon polyps Father    • Stroke Other         Grandmother   • Lupus Other         Aunt   • Alcohol abuse Other         Aunt   • Glaucoma Other         Grandmother   • Diabetes type II Brother    • Hypertension Brother    • Hyperlipidemia Brother    • Liver cancer Paternal  Uncle    • Stomach cancer Paternal Aunt    • Alcohol abuse Maternal Aunt    • Malig Hyperthermia Neg Hx          SOCIAL HISTORY  Social History     Socioeconomic History   • Marital status: Single   • Years of education: College   Tobacco Use   • Smoking status: Former     Packs/day: 2.00     Years: 30.00     Pack years: 60.00     Types: Cigarettes     Start date: 1973     Quit date: 2008     Years since quittin.6   • Smokeless tobacco: Never   Vaping Use   • Vaping Use: Never used   Substance and Sexual Activity   • Alcohol use: No   • Drug use: No   • Sexual activity: Not Currently     Birth control/protection: None         ALLERGIES  Nickel and Ciprofloxacin        REVIEW OF SYSTEMS  Review of Systems   Review of all 14 systems is negative other than stated in the HPI above.      PHYSICAL EXAM  ED Triage Vitals [23 1130]   Temp Heart Rate Resp BP SpO2   98.7 °F (37.1 °C) 75 16 122/70 96 %      Temp src Heart Rate Source Patient Position BP Location FiO2 (%)   Oral Monitor -- -- --       Physical Exam      GENERAL: Awake and alert, no acute distress  HENT: nares patent  EYES: no scleral icterus, EOMI, no nystagmus, pupils 3 mm reactive bilaterally  CV: regular rhythm, normal rate, no murmur  RESPIRATORY: normal effort, lungs clear to auscultation bilaterally  ABDOMEN: soft, nondistended, nontender throughout  MUSCULOSKELETAL: no deformity  NEURO: alert, moves all extremities, follows commands, cranial nerves II through XII grossly intact, speech fluent and clear, normal steady gait  PSYCH:  calm, cooperative  SKIN: warm, dry    Vital signs and nursing notes reviewed.          LAB RESULTS  Recent Results (from the past 24 hour(s))   Comprehensive Metabolic Panel    Collection Time: 23 12:13 PM    Specimen: Blood   Result Value Ref Range    Glucose 114 (H) 65 - 99 mg/dL    BUN 18 8 - 23 mg/dL    Creatinine 1.40 (H) 0.57 - 1.00 mg/dL    Sodium 141 136 - 145 mmol/L    Potassium 4.8 3.5 - 5.2  mmol/L    Chloride 109 (H) 98 - 107 mmol/L    CO2 23.9 22.0 - 29.0 mmol/L    Calcium 10.4 8.6 - 10.5 mg/dL    Total Protein 6.5 6.0 - 8.5 g/dL    Albumin 4.1 3.5 - 5.2 g/dL    ALT (SGPT) 9 1 - 33 U/L    AST (SGOT) 11 1 - 32 U/L    Alkaline Phosphatase 62 39 - 117 U/L    Total Bilirubin 0.5 0.0 - 1.2 mg/dL    Globulin 2.4 gm/dL    A/G Ratio 1.7 g/dL    BUN/Creatinine Ratio 12.9 7.0 - 25.0    Anion Gap 8.1 5.0 - 15.0 mmol/L    eGFR 41.1 (L) >60.0 mL/min/1.73   BNP    Collection Time: 02/07/23 12:13 PM    Specimen: Blood   Result Value Ref Range    proBNP 281.0 0.0 - 900.0 pg/mL   Troponin    Collection Time: 02/07/23 12:13 PM    Specimen: Blood   Result Value Ref Range    HS Troponin T 15 (H) <10 ng/L   CBC Auto Differential    Collection Time: 02/07/23 12:13 PM    Specimen: Blood   Result Value Ref Range    WBC 2.39 (L) 3.40 - 10.80 10*3/mm3    RBC 2.72 (L) 3.77 - 5.28 10*6/mm3    Hemoglobin 10.7 (L) 12.0 - 15.9 g/dL    Hematocrit 30.1 (L) 34.0 - 46.6 %    .7 (H) 79.0 - 97.0 fL    MCH 39.3 (H) 26.6 - 33.0 pg    MCHC 35.5 31.5 - 35.7 g/dL    RDW 14.1 12.3 - 15.4 %    RDW-SD 56.6 (H) 37.0 - 54.0 fl    MPV 9.3 6.0 - 12.0 fL    Platelets 155 140 - 450 10*3/mm3   Manual Differential    Collection Time: 02/07/23 12:13 PM    Specimen: Blood   Result Value Ref Range    Neutrophil % 53.8 42.7 - 76.0 %    Lymphocyte % 39.7 19.6 - 45.3 %    Monocyte % 3.8 (L) 5.0 - 12.0 %    Eosinophil % 1.3 0.3 - 6.2 %    Basophil % 1.3 0.0 - 1.5 %    Neutrophils Absolute 1.29 (L) 1.70 - 7.00 10*3/mm3    Lymphocytes Absolute 0.95 0.70 - 3.10 10*3/mm3    Monocytes Absolute 0.09 (L) 0.10 - 0.90 10*3/mm3    Eosinophils Absolute 0.03 0.00 - 0.40 10*3/mm3    Basophils Absolute 0.03 0.00 - 0.20 10*3/mm3    Elliptocytes Mod/2+ None Seen    WBC Morphology Normal Normal    Platelet Morphology Normal Normal   ECG 12 Lead Other; dizziness    Collection Time: 02/07/23 12:15 PM   Result Value Ref Range    QT Interval 396 ms   High Sensitivity  Troponin T 2Hr    Collection Time: 02/07/23  2:44 PM    Specimen: Blood   Result Value Ref Range    HS Troponin T 14 (H) <10 ng/L    Troponin T Delta -1 <= -/+ 4 Change ng/L       Ordered the above labs and reviewed the results.        RADIOLOGY  No Radiology Exams Resulted Within Past 24 Hours    Ordered the above noted radiological studies. Reviewed by me in PACS.            PROCEDURES  Procedures              MEDICATIONS GIVEN IN ER  Medications   sodium chloride 0.9 % flush 10 mL (has no administration in time range)   meclizine (ANTIVERT) tablet 25 mg (25 mg Oral Given 2/7/23 1350)                   MEDICAL DECISION MAKING, PROGRESS, and CONSULTS    All labs have been independently reviewed by me.  All radiology studies have been reviewed by me and I have also reviewed the radiology report.   EKG's independently viewed and interpreted by me.  Discussion below represents my analysis of pertinent findings related to patient's condition, differential diagnosis, treatment plan and final disposition.      Additional sources:    - External (non-ED) record review: I reviewed PCP office visit from 12/7/2022 where patient was seen for an annual wellness visit and follow-up of benign essential hypertension, hyperlipidemia.          Orders placed during this visit:  Orders Placed This Encounter   Procedures   • Comprehensive Metabolic Panel   • BNP   • Troponin   • CBC Auto Differential   • Manual Differential   • High Sensitivity Troponin T 2Hr   • Cardiac Monitoring   • Pulse Oximetry, Continuous   • Monitor Blood Pressure   • ECG 12 Lead Other; dizziness   • Insert Peripheral IV   • CBC & Differential           Differential diagnosis:    BPPV  Acute vestibular syndrome  Orthostatic hypotension  Acute coronary syndrome        Independent interpretation of labs, radiology studies, and discussions with consultants:  ED Course as of 02/07/23 1522   Tue Feb 07, 2023   1306 Hemoglobin(!): 10.7 [JR]   1307 WBC(!): 2.39 [JR]    1307 Platelets: 155 [JR]   1307 Creatinine(!): 1.40 [JR]   1307 proBNP: 281.0 [JR]   1307 EKG          EKG time: 1215  Rhythm/Rate: Sinus rhythm, 71  P waves and [place]: Normal  QRS, axis: Normal axis  ST and T waves: T wave inversion V1, nonspecific ST segment changes V2    Interpreted Contemporaneously by me, independently viewed  Similar compared to prior 11/2/2021 however the anterior T wave changes appear new       [JR]   1342 Creatinine(!): 1.40  Patient reports recent diagnosis of CKD 3 [JR]   1444 Patient reports feeling some better.  She ambulated independently in the hallway with steady gait.  I discussed plan to discharge home with meclizine as needed for dizziness, pending repeat troponin being reassuring. [JR]   1445 WBC(!): 2.39  Leukopenia appears chronic [JR]   1522 HS Troponin T(!): 14 [JR]   1522 Troponin T Delta: -1 [JR]      ED Course User Index  [JR] Phi Pressley MD             I wore an N95 mask, face shield, and gloves during this patient encounter.  Patient also wearing a surgical mask.  Hand hygeine performed before and after seeing the patient.    DIAGNOSIS  Final diagnoses:   Episode of dizziness   Chronic kidney disease, unspecified CKD stage   Leukopenia, unspecified type         DISPOSITION  DISCHARGE    Patient discharged in stable condition.    Reviewed implications of results, diagnosis, meds, responsibility to follow up, warning signs and symptoms of possible worsening, potential complications and reasons to return to ER.    Patient/Family voiced understanding of above instructions.    Discussed plan for discharge, as there is no emergent indication for admission. Patient referred to primary care provider for BP management due to today's BP. Pt/family is agreeable and understands need for follow up and repeat testing.  Pt is aware that discharge does not mean that nothing is wrong but it indicates no emergency is present that requires admission and they must continue care  with follow-up as given below or physician of their choice.     FOLLOW-UP  Jennifer Mantilla MD  [address]  [address]  [place] [id number] [phone]      As needed         Medication List      New Prescriptions    meclizine 25 MG tablet  Commonly known as: ANTIVERT  Take 1 tablet by mouth 3 (Three) Times a Day As Needed for Dizziness.        Changed    metoprolol succinate XL 25 MG 24 hr tablet  Commonly known as: TOPROL-XL  Take 1 tablet by mouth Daily.  What changed:   · how much to take  · additional instructions           Where to Get Your Medications      These medications were sent to [id number] - [place], [place] - [address] AT [facility] [place] - [phone]  - [phone]   [address] [facility] [place] [address]    Phone: [phone]   · meclizine 25 MG tablet                   Latest Documented Vital Signs:  As of 15:22 EST  BP- 129/66 HR- 80 Temp- 98.7 °F (37.1 °C) (Oral) O2 sat- 96%              --    Please note that portions of this were completed with a voice recognition program.       Note Disclaimer: At [facility], we believe that sharing information builds trust and better relationships. You are receiving this note because you are receiving care at [facility] or recently visited. It is possible you will see health information before a provider has talked with you about it. This kind of information can be easy to misunderstand. To help you fully understand what it means for your health, we urge you to discuss this note with your provider.           Phi Pressley MD  02/07/23 2162

## 2023-02-09 ENCOUNTER — TELEPHONE (OUTPATIENT)
Dept: PHYSICAL THERAPY | Facility: OTHER | Age: 68
End: 2023-02-09
Payer: MEDICARE

## 2023-02-09 NOTE — TELEPHONE ENCOUNTER
Caller: Marialuisa Vivar    Relationship: Self     What was the call regarding: DOESNT WANT TO COME TO PT DUE TO FEELING BETTER

## 2023-02-14 ENCOUNTER — DOCUMENTATION (OUTPATIENT)
Dept: PHARMACY | Facility: HOSPITAL | Age: 68
End: 2023-02-14
Payer: MEDICARE

## 2023-02-14 NOTE — PROGRESS NOTES
I received a fax notice from [employer] dated 2/13/2023 stating that [employer]  has shipped to the patient.    Sarai Wrener - Care Coordinator   2/14/2023  09:23 EST

## 2023-02-16 ENCOUNTER — SPECIALTY PHARMACY (OUTPATIENT)
Dept: PHARMACY | Facility: HOSPITAL | Age: 68
End: 2023-02-16
Payer: MEDICARE

## 2023-02-16 ENCOUNTER — TELEPHONE (OUTPATIENT)
Dept: ONCOLOGY | Facility: CLINIC | Age: 68
End: 2023-02-16
Payer: MEDICARE

## 2023-02-16 NOTE — TELEPHONE ENCOUNTER
----- Message from Alyssa Curtis sent at 2/16/2023  2:35 PM EST -----  Regarding: appt  Can you please call pt and try to reschedule. If you are unable to get in touch with the patient, please place a note in the chart that you tried to call  the patient to reschedule.   ----- Message -----  From: Angelina Holder [facility]  Sent: 2/16/2023   1:37 PM EST  To: Alyssa Curtis, #    She does not have any follow up appt scheduled. I thought I should mention this.

## 2023-02-23 ENCOUNTER — SPECIALTY PHARMACY (OUTPATIENT)
Dept: PHARMACY | Facility: HOSPITAL | Age: 68
End: 2023-02-23
Payer: MEDICARE

## 2023-02-28 ENCOUNTER — LAB (OUTPATIENT)
Dept: OTHER | Facility: HOSPITAL | Age: 68
End: 2023-02-28
Payer: MEDICARE

## 2023-02-28 ENCOUNTER — OFFICE VISIT (OUTPATIENT)
Dept: ONCOLOGY | Facility: CLINIC | Age: 68
End: 2023-02-28
Payer: MEDICARE

## 2023-02-28 ENCOUNTER — INFUSION (OUTPATIENT)
Dept: ONCOLOGY | Facility: HOSPITAL | Age: 68
End: 2023-02-28
Payer: MEDICARE

## 2023-02-28 VITALS
HEART RATE: 105 BPM | BODY MASS INDEX: 26.81 KG/M2 | WEIGHT: 160.9 LBS | OXYGEN SATURATION: 97 % | HEIGHT: 65 IN | DIASTOLIC BLOOD PRESSURE: 80 MMHG | SYSTOLIC BLOOD PRESSURE: 135 MMHG | TEMPERATURE: 98.4 F | RESPIRATION RATE: 18 BRPM

## 2023-02-28 DIAGNOSIS — C79.51 BONE METASTASES: ICD-10-CM

## 2023-02-28 DIAGNOSIS — C78.02 CARCINOMA OF LEFT BREAST METASTATIC TO LUNG: ICD-10-CM

## 2023-02-28 DIAGNOSIS — C50.912 CARCINOMA OF LEFT BREAST METASTATIC TO LUNG: Primary | ICD-10-CM

## 2023-02-28 DIAGNOSIS — C78.02 CARCINOMA OF LEFT BREAST METASTATIC TO LUNG: Primary | ICD-10-CM

## 2023-02-28 DIAGNOSIS — C50.912 CARCINOMA OF LEFT BREAST METASTATIC TO LUNG: ICD-10-CM

## 2023-02-28 LAB
ALBUMIN SERPL-MCNC: 4.5 G/DL (ref 3.5–5.2)
ALBUMIN/GLOB SERPL: 1.6 G/DL
ALP SERPL-CCNC: 72 U/L (ref 39–117)
ALT SERPL W P-5'-P-CCNC: 10 U/L (ref 1–33)
ANION GAP SERPL CALCULATED.3IONS-SCNC: 8.9 MMOL/L (ref 5–15)
ANISOCYTOSIS BLD QL: ABNORMAL
AST SERPL-CCNC: 19 U/L (ref 1–32)
BILIRUB SERPL-MCNC: 0.5 MG/DL (ref 0–1.2)
BUN SERPL-MCNC: 22 MG/DL (ref 8–23)
BUN/CREAT SERPL: 12.1 (ref 7–25)
CALCIUM SPEC-SCNC: 10.6 MG/DL (ref 8.6–10.5)
CANCER AG15-3 SERPL-ACNC: 31.7 U/ML
CHLORIDE SERPL-SCNC: 107 MMOL/L (ref 98–107)
CO2 SERPL-SCNC: 26.1 MMOL/L (ref 22–29)
CREAT SERPL-MCNC: 1.82 MG/DL (ref 0.57–1)
DEPRECATED RDW RBC AUTO: 50.1 FL (ref 37–54)
EGFRCR SERPLBLD CKD-EPI 2021: 30 ML/MIN/1.73
EOSINOPHIL # BLD MANUAL: 0.08 10*3/MM3 (ref 0–0.4)
EOSINOPHIL NFR BLD MANUAL: 2 % (ref 0.3–6.2)
ERYTHROCYTE [DISTWIDTH] IN BLOOD BY AUTOMATED COUNT: 12.5 % (ref 12.3–15.4)
GLOBULIN UR ELPH-MCNC: 2.9 GM/DL
GLUCOSE SERPL-MCNC: 118 MG/DL (ref 65–99)
HCT VFR BLD AUTO: 37 % (ref 34–46.6)
HGB BLD-MCNC: 12.6 G/DL (ref 12–15.9)
LYMPHOCYTES # BLD MANUAL: 1.44 10*3/MM3 (ref 0.7–3.1)
LYMPHOCYTES NFR BLD MANUAL: 4 % (ref 5–12)
MAGNESIUM SERPL-MCNC: 2 MG/DL (ref 1.6–2.4)
MCH RBC QN AUTO: 37.3 PG (ref 26.6–33)
MCHC RBC AUTO-ENTMCNC: 34.1 G/DL (ref 31.5–35.7)
MCV RBC AUTO: 109.5 FL (ref 79–97)
MONOCYTES # BLD: 0.16 10*3/MM3 (ref 0.1–0.9)
NEUTROPHILS # BLD AUTO: 2.31 10*3/MM3 (ref 1.7–7)
NEUTROPHILS NFR BLD MANUAL: 58 % (ref 42.7–76)
PHOSPHATE SERPL-MCNC: 3.6 MG/DL (ref 2.5–4.5)
PLAT MORPH BLD: NORMAL
PLATELET # BLD AUTO: 313 10*3/MM3 (ref 140–450)
PMV BLD AUTO: 8.7 FL (ref 6–12)
POTASSIUM SERPL-SCNC: 4.2 MMOL/L (ref 3.5–5.2)
PROT SERPL-MCNC: 7.4 G/DL (ref 6–8.5)
RBC # BLD AUTO: 3.38 10*6/MM3 (ref 3.77–5.28)
SODIUM SERPL-SCNC: 142 MMOL/L (ref 136–145)
VARIANT LYMPHS NFR BLD MANUAL: 36 % (ref 19.6–45.3)
WBC MORPH BLD: NORMAL
WBC NRBC COR # BLD: 3.99 10*3/MM3 (ref 3.4–10.8)

## 2023-02-28 PROCEDURE — 99215 OFFICE O/P EST HI 40 MIN: CPT | Performed by: INTERNAL MEDICINE

## 2023-02-28 PROCEDURE — 85025 COMPLETE CBC W/AUTO DIFF WBC: CPT | Performed by: INTERNAL MEDICINE

## 2023-02-28 PROCEDURE — 83735 ASSAY OF MAGNESIUM: CPT | Performed by: INTERNAL MEDICINE

## 2023-02-28 PROCEDURE — 86300 IMMUNOASSAY TUMOR CA 15-3: CPT

## 2023-02-28 PROCEDURE — 96402 CHEMO HORMON ANTINEOPL SQ/IM: CPT

## 2023-02-28 PROCEDURE — 25010000002 FULVESTRANT PER 25 MG: Performed by: NURSE PRACTITIONER

## 2023-02-28 PROCEDURE — 80053 COMPREHEN METABOLIC PANEL: CPT | Performed by: INTERNAL MEDICINE

## 2023-02-28 PROCEDURE — 36415 COLL VENOUS BLD VENIPUNCTURE: CPT

## 2023-02-28 PROCEDURE — 85007 BL SMEAR W/DIFF WBC COUNT: CPT | Performed by: INTERNAL MEDICINE

## 2023-02-28 PROCEDURE — 84100 ASSAY OF PHOSPHORUS: CPT | Performed by: INTERNAL MEDICINE

## 2023-02-28 RX ORDER — LAMOTRIGINE 25 MG/1
500 TABLET ORAL ONCE
Status: CANCELLED
Start: 2023-02-28 | End: 2023-02-28

## 2023-02-28 RX ORDER — LAMOTRIGINE 25 MG/1
500 TABLET ORAL ONCE
Status: COMPLETED | OUTPATIENT
Start: 2023-02-28 | End: 2023-02-28

## 2023-02-28 RX ADMIN — FULVESTRANT 500 MG: 50 INJECTION INTRAMUSCULAR at 09:20

## 2023-02-28 NOTE — NURSING NOTE
Pt to receive Faslodex only today, per Dr. Hawkins as she is to have additional dental procedures we will continue to hold the Xgeva.

## 2023-02-28 NOTE — PROGRESS NOTES
.     REASONS FOR FOLLOWUP: Metastatic breast cancer, tongue cancer    HISTORY OF PRESENT ILLNESS:  The patient is a 67 y.o. year old female  who is here for follow-up with the above-mentioned history.    She has missed a few doses of Faslodex.  She has been having more emotional issues recently.  She is in between counselors.  Her previous counselor left.  She is getting established with a new counselor.  She continues with her psychiatry NP who does medicine adjustments.    Eating okay.  No significant problems with pain or nausea    Past Medical History:   Diagnosis Date   • Allergy    • Anxiety    • Asthma    • Bone metastasis (HCC)    • Breast cancer (HCC)     Left node positive   • Cholelithiasis 2000    Lap Marily   • Colon polyp Past 10-15 yrs?    found at colonoscopies   • Depression    • Esophageal reflux     cough   • History of colon polyps    • Hyperlipidemia    • Hypertension    • Left breast cancer metastasized to lung    • Obstructive sleep apnea     does not wear cpap   • Ovarian cyst    • PONV (postoperative nausea and vomiting)    • Tongue cancer (HCC) 05/2019    by ENT at [facility] dr Quinn     Past Surgical History:   Procedure Laterality Date   • CHOLECYSTECTOMY  2000   • COLONOSCOPY  2014    H/O polyps   • COLONOSCOPY N/A 6/1/2022    Procedure: COLONOSCOPY to cecum and TI with cold / hot snare polypectomies;  Surgeon: Luis Enrique Galeas MD;  Location: [facility] ENDOSCOPY;  Service: Gastroenterology;  Laterality: N/A;  pre-abnormal ct  post-polyps, diverticulosis, hemorrhoids   • KNEE ARTHROPLASTY     • LUNG SURGERY  2010    Lung wedge resection   • MASTECTOMY RADICAL Left 1993   • TONGUE SURGERY  05/21/2019    tongue cancer   • TOTAL LAPAROSCOPIC HYSTERECTOMY N/A 11/04/2021    Procedure: Robotic assisted total laparoscopic hysterectomy, bilateral salpingoophorecotmy, bilateral ureteralysis ;  Surgeon: Kaylynn Ricci DO;  Location: [facility] OR;  Service: Robotics - DaVinci;  Laterality:  N/A;       MEDICATIONS    Current Outpatient Medications:   •  atorvastatin (LIPITOR) 10 MG tablet, Take 1 tablet by mouth Every Night., Disp: 90 tablet, Rfl: 1  •  cholecalciferol (VITAMIN D3) 1.25 MG (72994 UT) capsule, Take 5,000 Units by mouth 3 (Three) Times a Week., Disp: , Rfl:   •  denosumab (Xgeva) 120 MG/1.7ML solution injection, Inject  under the skin into the appropriate area as directed., Disp: , Rfl:   •  eszopiclone (LUNESTA) 3 MG tablet, Take 1 tablet by mouth., Disp: , Rfl:   •  fulvestrant (FASLODEX) 250 MG/5ML chemo syringe, Inject  into the appropriate muscle as directed by prescriber., Disp: , Rfl:   •  LORazepam (ATIVAN) 0.5 MG tablet, Take 1 tablet by mouth Every 8 (Eight) Hours As Needed., Disp: , Rfl:   •  meclizine (ANTIVERT) 25 MG tablet, Take 1 tablet by mouth 3 (Three) Times a Day As Needed for Dizziness., Disp: 20 tablet, Rfl: 0  •  metoprolol succinate XL (TOPROL-XL) 25 MG 24 hr tablet, Take 1 tablet by mouth Daily. (Patient taking differently: Take  by mouth Daily. Pt is now taking a half tablet), Disp: 90 tablet, Rfl: 1  •  Omeprazole 20 MG Tablet Delayed Release Dispersible, Take 1 tablet by mouth As Needed., Disp: , Rfl:   •  Palbociclib 125 MG tablet, , Disp: , Rfl:   •  PARoxetine (PAXIL) 40 MG tablet, Take 1 tablet by mouth Every Night., Disp: , Rfl:   No current facility-administered medications for this visit.    Facility-Administered Medications Ordered in Other Visits:   •  chlorhexidine (PERIDEX) 0.12 % solution 15 mL, 15 mL, Mouth/Throat, Q12H, Kaylynn Ricci, DO  •  mupirocin (BACTROBAN) 2 % nasal ointment, , Nasal, BID, Kaylynn Ricci, DO    ALLERGIES:     Allergies   Allergen Reactions   • Nickel Unknown - Low Severity   • Ciprofloxacin Rash       SOCIAL HISTORY:       Social History     Socioeconomic History   • Marital status: Single   • Years of education: College   Tobacco Use   • Smoking status: Former     Packs/day: 2.00     Years: 30.00     Pack years:  "60.00     Types: Cigarettes     Start date: 1973     Quit date: 2008     Years since quittin.7   • Smokeless tobacco: Never   Vaping Use   • Vaping Use: Never used   Substance and Sexual Activity   • Alcohol use: No   • Drug use: No   • Sexual activity: Not Currently     Birth control/protection: None         FAMILY HISTORY:  Family History   Problem Relation Age of Onset   • Hypertension Mother    • Leukemia Mother 72   • COPD Mother         smoker   • Diabetes Brother    • Pancreatic cancer Brother    • Glaucoma Brother    • Heart disease Brother    • Hypertension Brother    • Gallbladder disease Brother    • Pancreatitis Brother          from pancreatic csncer   • Gallbladder disease Father    • Colon polyps Father    • Stroke Other         Grandmother   • Lupus Other         Aunt   • Alcohol abuse Other         Aunt   • Glaucoma Other         Grandmother   • Diabetes type II Brother    • Hypertension Brother    • Hyperlipidemia Brother    • Liver cancer Paternal Uncle    • Stomach cancer Paternal Aunt    • Alcohol abuse Maternal Aunt    • Malig Hyperthermia Neg Hx        REVIEW OF SYSTEMS:  Review of Systems   Constitutional: Negative for activity change.   HENT: Negative for nosebleeds and trouble swallowing.    Respiratory: Negative for shortness of breath and wheezing.    Cardiovascular: Negative for chest pain and palpitations.   Gastrointestinal: Negative for constipation, diarrhea and nausea.   Genitourinary: Negative for dysuria and hematuria.   Musculoskeletal: Negative for arthralgias and myalgias.   Skin: Negative for wound.   Neurological: Negative for seizures and syncope.   Hematological: Negative for adenopathy. Does not bruise/bleed easily.   Psychiatric/Behavioral: Negative for confusion.            Vitals:    23 0759   BP: 135/80   Pulse: 105   Resp: 18   Temp: 98.4 °F (36.9 °C)   TempSrc: Temporal   SpO2: 97%   Weight: 73 kg (160 lb 14.4 oz)   Height: 165.1 cm (65\") "   PainSc:   3   PainLoc: Shoulder     Current Status 2/28/2023   ECOG score 0        PHYSICAL EXAM:          CONSTITUTIONAL:  Vital signs reviewed.  No distress, looks comfortable.  EYES:  Conjunctiva and lids unremarkable.  PERRLA  EARS,NOSE,MOUTH,THROAT:  Ears and nose appear unremarkable.  Lips, teeth, gums appear unremarkable.  RESPIRATORY:  Normal respiratory effort.  Lungs clear to auscultation bilaterally.  CARDIOVASCULAR:  Normal S1, S2.  No murmurs rubs or gallops.  No significant lower extremity edema.  GASTROINTESTINAL: Abdomen appears unremarkable.  Nontender.  No hepatomegaly.  No splenomegaly.  LYMPHATIC:  No cervical, supraclavicular, axillary lymphadenopathy.  SKIN:  Warm.  No rashes.  PSYCHIATRIC:  Normal judgment and insight.  Normal mood and affect.          RECENT LABS:        WBC   Date/Time Value Ref Range Status   02/28/2023 07:53 AM 3.99 3.40 - 10.80 10*3/mm3 Final     Hemoglobin   Date/Time Value Ref Range Status   02/28/2023 07:53 AM 12.6 12.0 - 15.9 g/dL Final     Platelets   Date/Time Value Ref Range Status   02/28/2023 07:53  140 - 450 10*3/mm3 Final       Assessment & Plan   Carcinoma of left breast metastatic to lung (HCC)  - CBC & Differential  - Comprehensive Metabolic Panel  - Magnesium  - Cancer Antigen 15-3  - CT Chest Without Contrast Diagnostic  - CT Abdomen Pelvis Without Contrast  - NM Bone Scan Whole Body    Marialuisa Vivar        Assessment/Plan     *Metastatic breast cancer to bilateral lungs and soft tissue in the mediastinum (likely the cause of her hoarseness). (Initial breast cancer diagnosed in 1993 treated with mastectomy, chemotherapy, radiation therapy and tamoxifen). Arimidex day 1 on 02/17/2010. PET scan late August 2011 shows no abnormal hypermetabolic activity. This has improved compared to the prior PET scan January 2010 which showed mild hypermetabolic activity in areas of metastasis. (In the past, her  T6 lesion did not show up on CT scans or bone scan.  It was seen by PET scan.) We have been following with CT scans only every 6 months and PET scans on an as needed only basis. Sometimes her CT scanned pulmonary nodules are read as benign since they have been stable through the course of years but we know they are malignant because they have been surgically sampled in the past.    · CT 8/16/16 shows increased sclerosis at T6 compared to 4/28/15.    · PET 10/11/16: Slight uptake at T6, SUV 3.2.  mild thickening of right adrenal gland with an SUV of 11.   · Continued Arimidex is minimal progression and had been on this since 2/17/10.  · Not referred for radiation since no pain at T6  · CT 1/27/17, unchanged.  · CT 8/1/17: Unchanged except subtle suggestion of mild increase in thickening of the right adrenal gland.    · CT 2/22/18, unchanged.  · CT 9/6/18: Unchanged except nodular thickening right adrenal gland significantly decreased.  · CT 9/5/2019: Unchanged.  · On the 6/12/2020 visit, the following scans were reviewed:  · CT neck and chest 5/11/2020, [facility], from 5/9/2019: Stable pulmonary nodules new lytic C7 lesion and posterior T1 lesion questionable T12 lesion.  No change in the T6 and T10 lesions.  · PET, [facility], 5/19/2020: Mildly enlarged left neck nodes are mildly hypermetabolic.  Diffuse activity throughout the thickened left adrenal gland.  CT appearance unchanged from 5/9/2019.  Progressive T1 lesion seen on CT from 5/11/2020, mild some moderate activity.  T10 low-grade activity with mixed lucent and sclerotic findings.  T6 is a mixed lucent and sclerotic appearance but no significant activity.   · T12 (the biopsy report is labeled as T12 but patient insists this was a C7/T1 biopsy, not to T12) biopsy 6/15/2020, [facility]: Metastatic breast carcinoma.  ER >95%.  NE 0%.  HER-2 negative (1+)  · It seems the main progression at the 6/12/2020 visit was a new C7 and new T1 lesion.  Those were radiated with [facility] through [facility] early June 2020.  Because  Arimidex has been working well since 2010, continue same Arimidex.  Add Zometa every 3 months.  · C7 and T1 found on CT 5/11/2020, [facility].  [facility], Dr. Winston, early June 2020.  · CT C and T-spine 9/17/2020: New C7 lesion from 9/6/2018, but no change from 5/11/2020 CT.  T1 lesion stable from 5/11/2020, but new compared to 2/22/2018.  T10 lesion unchanged from 9/5/2019, but new from 2/22/2018.  Subtle 8 mm T12 lesion new from 9/5/2019.  · Therefore, no recent progression.  · Considering her good tolerance and long-term effectiveness of Arimidex, continue same therapy.  · CT CAP 12/10/2020: No progression  · Therefore, continue Arimidex.  · CA 15-3 normal through 12/14/2020  · Because CA 15-3 fabian to 27 on 4/13/2021, then 29.3 on 7/20/2021, then 34.3 on 10/12/2021, CT scans done earlier than planned:  · CT CAP with contrast 10/12/2021: New 5.2 x 4.6 cm mixed cystic and solid right ovarian mass compared to 12/10/2020.  Continued stability of bilateral pulmonary nodules and stable sclerotic bone metastasis.  · 11/4/2021: TLH/BSO (due to new 5 cm right ovarian mass on CT, 11/12/2021).  · Metastatic breast cancer involving bilateral ovaries and posterior uterine serosa.  ER 81-90%.  MA 61-70%.  HER-2 negative  · [facility] NGS: ER 98%.  MA 90%.  HER-2/eb negative.  AKT1 pathogenic variant, exon 3.  AR+, 95%.  No other significant mutations including negative PIK3CA  · Pelvic washings: Adenocarcinoma  · Because this was the only area of progression on CT, and has been resected, continue Arimidex which she has been on since 2/17/2010.  · 11/22/2021: Discussed with Dr. Blum, who has a focus on breast cancer.  We both agree: Continue Arimidex for now.  In the future, if significant progression is seen, then plan Faslodex injections with Ibrance  · CT CAP 2/1/2022: A few T-spine sclerotic lesions progressively more sclerotic over multiple prior CTs of indeterminate significance.  No clear signs of progression of  disease.  · 3/15/2022: Although tumor marker is not rising and CT showed no clear signs of progression.  CT revealed progressively more sclerotic bone metastasis over multiple prior CTs, she is having increased right hip discomfort.  Sometimes this can be due to healing of metastasis (but she has been on the same treatment since 2010), or this could mean progression of bone metastasis.  It is reassuring that her tumor marker is not worsening.  She would like to see [facility] to see if they feel radiation to the hip would help her discomfort.  We will do a bone scan before that appointment as this might help [facility].  (Although she has seen [facility] radiation for her neck, she would like to see [facility] radiation for this hip issue).  · 3/18/2022, bone scan: Metastatic disease at T9 and T10, corresponding to CT lesions.  Uptake also at T6, but to lesser degree.  These areas are new compared to last bone scan, 6/3/2009.  Therefore, overall, appears consistent with progression.  · 3/24/2021: Discussed changing  Arimidex to Faslodex/Ibrance 125 mg D1-21/28 days.  · However, Dr. Llanes will decide on 4/12/2022 if the patient needs any traditional XRT to hip/pelvis.  If so, this may cause cytopenias due to the location of XRT.  Ibrance can also cause cytopenias.  If this is the case, we will wait to begin Ibrance until at least a few weeks after XRT completes, provided blood counts allow.   · 4/22/2022: Dr. Llanes reviewed MRI right hip from 4/19/2022, revealing L3 metastasis, similar size of prior CTs.  Therefore, she plans no XRT.  · 5/6/2022: Began Faslodex Ibrance  · CT 7/18/2022: Some bone mets more sclerotic, which could reflect treatment response.  Slightly increased RUL nodule, 1.1 cm, from 0.9 cm on 2/1/2022  · Bone scan 7/18/2022: No change from 3/18/2022  · 8/19/2022 (patient delayed follow-up to review scans until then): Continue same therapy since no significant  progression of disease  · Bone scan 1/26/2023: Under represents bone metastasis compared to CT scans.  Slightly more intense uptake at T12 which was noted to perhaps represent treatment response.  · CT 1/26/2023: A few new subcentimeter indeterminate RLL pulmonary nodules.  Radiologist recommended follow-up chest imaging in 6 to 8 weeks.  Otherwise no change in the other bilateral pulmonary nodules.  No change in scattered bone metastasis except the T12 lesion continues to increase in sclerosis.  · 2/28/2023 office visit:  · Missed the mid November 2022 Faslodex and the mid January 2023 Faslodex and the mid February 2023 Faslodex.  · Although CT might represent slight progression, she has missed a few doses of Faslodex.  Would like a strong reason to change therapy since she has been doing well on Faslodex for quite some time.  Therefore, patient agrees: Maintain her same imaging interval, 6 months from last    CA 15-3:  · 31.7 on 2/28/2023, from 27.3 on 8/19/2022, from 22.5 on 5/6/2022, from 26.3 on 2/1/2022, from 32.6 on 11/22/2021, from 34.3 on 10/12/2021, from 29.3 on 7/20/2021, from 27 on 4/13/2021, from 23.3 on 12/14/2020.    *Bony metastases  · T6 discovered by PET to August 2011 (did not show up by CT or bone scan)  · C7 and T1 found on CT 5/11/2020, [facility].  [facility], Dr. Winston, early June 2020.  · June 2020 began Zometa every 3 months.  · She was warned of possible osteonecrosis of the jaw and renal insufficiency.  She states she has good dental health and sees her dentist regularly.  Because of prior hypercalcemia requiring stopping calcium supplements and because of high normal current calcium level, began without supplemental calcium.  Add calcium if calcium becomes low.  · 1/7/2022: Tooth extracted.  Plan was to wait 5 weeks to resume Zometa.  Patient delayed return until 3/15/2022 (over 8 weeks)  · May 2022: Changed Zometa to Xgeva every 3 months due to Cr up to 1.6  · 2/28/2023: She request to  hold off on Xgeva because she is in the process of having dental work    *Bone health. Last bone density 10/11/16, worsened, but still normal with worst T score -0.9.  Patient stopped calcium January 2016, but remained on vitamin D.  I recommended she restart her calcium.  Stopped calcium early March 2018 due to hypercalcemia.  · DEXA 9/5/2019: Normal bone density    *Hypercalcemia.  Repeat calcium 3/7/18 normal, but patient self stopped calcium a few days prior.  Recommended she stay off calcium.  · Calcium was 11 on 6/19/2020  · Calcium 11 again on 12/10/2020  · Repeat calcium 10.5 on 12/14/2020  · Calcium 10.6.  Minimally elevated.  (Normal range up to 10.5)  · Calcium 10.8 on 3/15/2022.  Hopefully, Zometa will help with this.  · Calcium 10.6    *Hypophosphatemia  · Phosphorus 2.3 on 6/24/2022.  Begin K-Phos Neutral 2 tablets twice daily  · Patient self stopped this around mid July 2022.  · Phosphorus normal, 3.6    *On the 10/5/16 visit, Tachycardia, dyspnea on exertion, bilateral leg swelling, more sedentary recently.  CT PE protocol and bilateral lower extremity Dopplers 10/5/16, negative for clots.  She still has tachycardia and dyspnea on exertion.  Perhaps at least part of this is due to deconditioning.    *Previously complained of colon Right lower anterior rib discomfort.  CT unremarkable in that area.  No specific pain, just discomfort.  I explained to the patient if disease was found by PET scan or bone scan, I would not  since she has been doing well on Arimidex since 2010.   Currently denies pain.     *Depression/anxiety.  This limits compliance.  She sees a psychiatrist and a counselor regularly.  She states this is mostly due to body image issues instead of cancer.   · She continues with her counselor and psychiatrist.  Viral pandemic is making this a little more difficult.  · 4/27/2022: We will try to get her involved in some cancer support groups.  I also encouraged her to try out  some small groups at [employer] (she struggles with loneliness).  · 2/28/2023: Has missed some visits due to emotional issues.  Offered behavioral oncology help.  She declines.  She is going to continue with her psychiatrist and counselor    *Noncompliance due to depression/anxiety.    · Although she often misses appointments, she does reschedule and eventually come in.    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 Willie Quinn, [facility], on 5/21/2019.  Positive sentinel node (1 of 3 nodes)..  · Left neck dissection by Dr. Willie Quinn, [email or web address] on 6/5/2019.  22 nodes negative.  Negative margins.  No lymphovascular invasion or perineural invasion.  2 mm depth of invasion.  Grade 1.  Squamous cell carcinoma.  1.8 cm tumor.  · Because the patient felt what she thought was left neck LAD, CT neck and chest and PET at [facility]. May 2020 performed.  She is being followed at [facility] for this.  · On 6/19/2020, per verbal report from Dr. Senia [facility], [facility] ENT does not feel she has any active head and neck cancer.   · She states she has a follow-up with Dr. Kapadia, ENT at [facility], in June with CT neck 1 week prior  · No symptoms to suggest recurrence.  She follows with [facility].    *Previously complained of left hip pain x2 weeks.  This prompted an MRI pelvis 8/20/2019 at [facility] and open MRI which was negative for malignancy.    *Possible intolerance of Zometa  · After first dose, June 2020, 2 days of chills, bone pain  · She would like to try Zometa again.  If this occurs again, we will try to switch her to Xgeva monthly.  (No good data on every 3-month Xgeva)  · She did fine with the second dose of Zometa.    *Thickening of wall of transverse colon on CT 2/1/2022.  Last colonoscopy was 4/18/2019.  She wants the next colonoscopy done at [facility]  · Dr. Galeas will see her on 4/7/2022 to evaluate this    *Depression  · Is seeing a  psychiatry NP and a therapist for many years.  · 6/24/2022: Reported she decided to stop seeing her therapist but is still interested in therapy.  She would like to see behavioral oncology at [facility], operated by [facility].  This was arranged  · Contacted by Julissa Centeno (behavioral oncology).  Patient canceled on the day of the appointment during her first scheduled appointment with Julissa.  She then no showed for the rescheduled appointment.  Julissa recommends the patient continue to follow with her therapist and psychiatrist and if she needs additional support, to use [employer]    *Pruritic rash  · 8/19/2022: Referred to dermatology    *Patient went to the ER 2/7/2023 for dizziness  · 2/28/2023: This resolved.  She understands if she has ongoing issues with dizziness she should contact us and we will arrange an MRI of the brain    Monitoring on Ibrance:  CBC every 2 weeks for the first 2 months then monthly and as indicated.  After 6 months, if neutropenia grade 1 or 2 only, then CBC can be every 3 months    PLAN:   · MD/NP monthly  · Faslodex today and monthly.  · MD/Faslodex 5 months.  A few days prior: CT chest abdomen and pelvis without contrast, bone scan, CBC, CMP, CA 15-3  · Resume every 3-month Xgeva 1 to 2 months after completing all dental procedures (she states she will let us know).  Last dose was 7/1/2022    · Continue Faslodex/Ibrance 125 mg D1-21/28 days  ·   · (Xgeva instead of Zometa due to intermittent high creatinine)  · MD or NP with Faslodex every 4 weeks  · Every 6 months CT CAP without IV contrast, bone scan (last 7/18/2022), CA 15-3  · Previously arranged for her to get involved with some cancer support groups  ·  CT scans WITHOUT IV CONTRAST (again, to avoid further kidney damage.)  · She sees [facility] ENT, Dr. Bedoya annually, next is summer 2022    44 total time.  Same day.    Send a copy of this note to   Dr. Willie Quinn, Isaac Winston, radiation medicine, [facility]

## 2023-03-03 ENCOUNTER — SPECIALTY PHARMACY (OUTPATIENT)
Dept: PHARMACY | Facility: HOSPITAL | Age: 68
End: 2023-03-03
Payer: MEDICARE

## 2023-03-29 ENCOUNTER — TELEPHONE (OUTPATIENT)
Dept: ONCOLOGY | Facility: CLINIC | Age: 68
End: 2023-03-29

## 2023-03-29 NOTE — TELEPHONE ENCOUNTER
Caller: Marialuisa Vivar    Relationship: Self    Best call back number: [phone] is the best time to reach you:ANYTIME. LEAVE VM IF NEEDED.    Who are you requesting to speak with (clinical staff, provider,  specific staff member): SCHEDULING/ BIRGIT        What was the call regarding: PATIENT MISSED HER APPT YESTERDAY FOR INFUSION. NEEDS TO R/S

## 2023-03-30 ENCOUNTER — SPECIALTY PHARMACY (OUTPATIENT)
Dept: PHARMACY | Facility: HOSPITAL | Age: 68
End: 2023-03-30
Payer: MEDICARE

## 2023-03-31 NOTE — PROGRESS NOTES
"    .     REASONS FOR FOLLOWUP: Metastatic breast cancer, tongue cancer    HISTORY OF PRESENT ILLNESS:  The patient is a 67 y.o. year old female  who is here for follow-up with the above-mentioned history.    Patient did miss her appointment last week.  She continues to have depression that has been worsened through the winter months and appears to be improving recently.  She is unsure if she is on her second or third week of Ibrance, however has this written down at home.  She does have intermittent nausea that continues to be controlled with Zofran as needed.  She continues to have pain in her upper arm starting in December 2020 that feels like a \"popping \"and is worsened by positional changes.  She saw her PCP who believed it was more muscular in nature.  PT was ordered for the patient, however she did not follow-up with PT.  She has seen her chiropractor who also agrees the pain sounds more muscular in nature.  Chiropractor has requested her most recent scans to review where her metastatic disease is located prior to adjustments.    She does believe the pain is more consistent and plans to follow back up with her PCP.  She is also concerned her right breast and prosthesis on the left side is dragging her shoulders down and making her pain worse.  She is interested in pursuing reconstructive surgery.    She continues to be in the process of having dental work done, recently had work done a few days ago and has another upcoming appointment with dentistry 4/25/2023 for further dental work-up.    Appetite remains good.  Occasional constipation.  Denies fever or chills.  Denies nausea or vomiting.        Past Medical History:   Diagnosis Date   • Allergy    • Anxiety    • Asthma    • Bone metastasis    • Breast cancer     Left node positive   • Cholelithiasis 2000    Lap Marily   • Colon polyp Past 10-15 yrs?    found at colonoscopies   • Depression    • Esophageal reflux     cough   • History of colon polyps    • " Hyperlipidemia    • Hypertension    • Left breast cancer metastasized to lung    • Obstructive sleep apnea     does not wear cpap   • Ovarian cyst    • PONV (postoperative nausea and vomiting)    • Tongue cancer 05/2019    by ENT at [facility] dr Quinn     Past Surgical History:   Procedure Laterality Date   • CHOLECYSTECTOMY  2000   • COLONOSCOPY  2014    H/O polyps   • COLONOSCOPY N/A 6/1/2022    Procedure: COLONOSCOPY to cecum and TI with cold / hot snare polypectomies;  Surgeon: Luis Enrique Galeas MD;  Location: [facility] ENDOSCOPY;  Service: Gastroenterology;  Laterality: N/A;  pre-abnormal ct  post-polyps, diverticulosis, hemorrhoids   • KNEE ARTHROPLASTY     • LUNG SURGERY  2010    Lung wedge resection   • MASTECTOMY RADICAL Left 1993   • TONGUE SURGERY  05/21/2019    tongue cancer   • TOTAL LAPAROSCOPIC HYSTERECTOMY N/A 11/04/2021    Procedure: Robotic assisted total laparoscopic hysterectomy, bilateral salpingoophorecotmy, bilateral ureteralysis ;  Surgeon: Kaylynn Ricci DO;  Location: [facility] MAIN OR;  Service: Robotics - DaVinci;  Laterality: N/A;       MEDICATIONS    Current Outpatient Medications:   •  ARIPiprazole (ABILIFY) 2 MG tablet, Take 1 tablet by mouth Every Night., Disp: , Rfl:   •  atorvastatin (LIPITOR) 10 MG tablet, Take 1 tablet by mouth Every Night., Disp: 90 tablet, Rfl: 1  •  cholecalciferol (VITAMIN D3) 1.25 MG (45682 UT) capsule, Take 5,000 Units by mouth 3 (Three) Times a Week., Disp: , Rfl:   •  eszopiclone (LUNESTA) 3 MG tablet, Take 1 tablet by mouth., Disp: , Rfl:   •  fulvestrant (FASLODEX) 250 MG/5ML chemo syringe, Inject  into the appropriate muscle as directed by prescriber., Disp: , Rfl:   •  LORazepam (ATIVAN) 0.5 MG tablet, Take 1 tablet by mouth Every 8 (Eight) Hours As Needed., Disp: , Rfl:   •  meclizine (ANTIVERT) 25 MG tablet, Take 1 tablet by mouth 3 (Three) Times a Day As Needed for Dizziness., Disp: 20 tablet, Rfl: 0  •  metoprolol succinate XL (TOPROL-XL) 25 MG 24  hr tablet, Take 1 tablet by mouth Daily. (Patient taking differently: Take  by mouth Daily. Pt is now taking a half tablet), Disp: 90 tablet, Rfl: 1  •  ondansetron (ZOFRAN) 8 MG tablet, TAKE 1 TABLET BY MOUTH THREE TIMES DAILY AS NEEDED FORNAUSEA AND VOMITING, Disp: , Rfl:   •  Palbociclib 125 MG tablet, , Disp: , Rfl:   •  PARoxetine (PAXIL) 40 MG tablet, Take 1 tablet by mouth Every Night., Disp: , Rfl:   •  denosumab (Xgeva) 120 MG/1.7ML solution injection, Inject  under the skin into the appropriate area as directed., Disp: , Rfl:   •  Omeprazole 20 MG Tablet Delayed Release Dispersible, Take 1 tablet by mouth As Needed., Disp: , Rfl:   No current facility-administered medications for this visit.    Facility-Administered Medications Ordered in Other Visits:   •  chlorhexidine (PERIDEX) 0.12 % solution 15 mL, 15 mL, Mouth/Throat, Q12H, Kaylynn Ricci, DO  •  mupirocin (BACTROBAN) 2 % nasal ointment, , Nasal, BID, Kaylynn Ricci, DO    ALLERGIES:     Allergies   Allergen Reactions   • Nickel Unknown - Low Severity   • Ciprofloxacin Rash       SOCIAL HISTORY:       Social History     Socioeconomic History   • Marital status: Single   • Years of education: College   Tobacco Use   • Smoking status: Former     Packs/day: 2.00     Years: 30.00     Pack years: 60.00     Types: Cigarettes     Start date: 1973     Quit date: 2008     Years since quittin.8   • Smokeless tobacco: Never   Vaping Use   • Vaping Use: Never used   Substance and Sexual Activity   • Alcohol use: No   • Drug use: No   • Sexual activity: Not Currently     Birth control/protection: None         FAMILY HISTORY:  Family History   Problem Relation Age of Onset   • Hypertension Mother    • Leukemia Mother 72   • COPD Mother         smoker   • Diabetes Brother    • Pancreatic cancer Brother    • Glaucoma Brother    • Heart disease Brother    • Hypertension Brother    • Gallbladder disease Brother    • Pancreatitis Brother          " from pancreatic csncer   • Gallbladder disease Father    • Colon polyps Father    • Stroke Other         Grandmother   • Lupus Other         Aunt   • Alcohol abuse Other         Aunt   • Glaucoma Other         Grandmother   • Diabetes type II Brother    • Hypertension Brother    • Hyperlipidemia Brother    • Liver cancer Paternal Uncle    • Stomach cancer Paternal Aunt    • Alcohol abuse Maternal Aunt    • Malig Hyperthermia Neg Hx        REVIEW OF SYSTEMS:  Review of Systems   Constitutional: Negative for activity change.   HENT: Negative for nosebleeds and trouble swallowing.    Respiratory: Negative for shortness of breath and wheezing.    Cardiovascular: Negative for chest pain and palpitations.   Gastrointestinal: Negative for constipation, diarrhea and nausea.   Genitourinary: Negative for dysuria and hematuria.   Musculoskeletal: Negative for arthralgias and myalgias.   Skin: Negative for wound.   Neurological: Negative for seizures and syncope.   Hematological: Negative for adenopathy. Does not bruise/bleed easily.   Psychiatric/Behavioral: Negative for confusion.            Vitals:    23 1354   BP: 114/74   Pulse: 83   Resp: 16   Temp: 98.4 °F (36.9 °C)   TempSrc: Temporal   SpO2: 97%   Weight: 72.1 kg (159 lb)   Height: 165 cm (64.96\")   PainSc:   2   PainLoc: Shoulder  Comment: BOTH         2023     1:59 PM   Current Status   ECOG score 0        PHYSICAL EXAM:          CONSTITUTIONAL:  Vital signs reviewed.  No distress, looks comfortable.  EYES:  Conjunctiva and lids unremarkable.  PERRLA  EARS,NOSE,MOUTH,THROAT:  Ears and nose appear unremarkable.  Lips, teeth, gums appear unremarkable.  RESPIRATORY:  Normal respiratory effort.  Lungs clear to auscultation bilaterally.  CARDIOVASCULAR:  Normal S1, S2.  No murmurs rubs or gallops.  No significant lower extremity edema.  GASTROINTESTINAL: Abdomen appears unremarkable.  Nontender.  No hepatomegaly.  No splenomegaly.  LYMPHATIC:  No " cervical, supraclavicular, axillary lymphadenopathy.  SKIN:  Warm.  No rashes.  PSYCHIATRIC:  Normal judgment and insight.  Normal mood and affect.        Physical exam performed today, 4/4/2023, and updated as necessary above.    RECENT LABS:        WBC   Date/Time Value Ref Range Status   04/18/2023 10:33 AM 1.92 (C) 3.40 - 10.80 10*3/mm3 Final     Hemoglobin   Date/Time Value Ref Range Status   04/18/2023 10:33 AM 10.2 (L) 12.0 - 15.9 g/dL Final     Platelets   Date/Time Value Ref Range Status   04/18/2023 10:33  140 - 450 10*3/mm3 Final       Assessment & Plan   Carcinoma of left breast metastatic to lung  - Comprehensive metabolic panel    High risk medication use    Malignant neoplasm metastatic to bone  - Comprehensive metabolic panel    Chemotherapy induced neutropenia    Marialuisa DE LA ROSA Ghazala        Assessment/Plan     *Metastatic breast cancer to bilateral lungs and soft tissue in the mediastinum (likely the cause of her hoarseness). (Initial breast cancer diagnosed in 1993 treated with mastectomy, chemotherapy, radiation therapy and tamoxifen). Arimidex day 1 on 02/17/2010. PET scan late August 2011 shows no abnormal hypermetabolic activity. This has improved compared to the prior PET scan January 2010 which showed mild hypermetabolic activity in areas of metastasis. (In the past, her  T6 lesion did not show up on CT scans or bone scan. It was seen by PET scan.) We have been following with CT scans only every 6 months and PET scans on an as needed only basis. Sometimes her CT scanned pulmonary nodules are read as benign since they have been stable through the course of years but we know they are malignant because they have been surgically sampled in the past.    · CT 8/16/16 shows increased sclerosis at T6 compared to 4/28/15.    · PET 10/11/16: Slight uptake at T6, SUV 3.2.  mild thickening of right adrenal gland with an SUV of 11.   · Continued Arimidex is minimal progression and had been on this since  2/17/10.  · Not referred for radiation since no pain at T6  · CT 1/27/17, unchanged.  · CT 8/1/17: Unchanged except subtle suggestion of mild increase in thickening of the right adrenal gland.    · CT 2/22/18, unchanged.  · CT 9/6/18: Unchanged except nodular thickening right adrenal gland significantly decreased.  · CT 9/5/2019: Unchanged.  · On the 6/12/2020 visit, the following scans were reviewed:  · CT neck and chest 5/11/2020, [facility], from 5/9/2019: Stable pulmonary nodules new lytic C7 lesion and posterior T1 lesion questionable T12 lesion.  No change in the T6 and T10 lesions.  · PET, [facility], 5/19/2020: Mildly enlarged left neck nodes are mildly hypermetabolic.  Diffuse activity throughout the thickened left adrenal gland.  CT appearance unchanged from 5/9/2019.  Progressive T1 lesion seen on CT from 5/11/2020, mild some moderate activity.  T10 low-grade activity with mixed lucent and sclerotic findings.  T6 is a mixed lucent and sclerotic appearance but no significant activity.   · T12 (the biopsy report is labeled as T12 but patient insists this was a C7/T1 biopsy, not to T12) biopsy 6/15/2020, [facility]: Metastatic breast carcinoma.  ER >95%.  [place] 0%.  HER-2 negative (1+)  · It seems the main progression at the 6/12/2020 visit was a new C7 and new T1 lesion.  Those were radiated with CyberKnife through [facility] early June 2020.  Because Arimidex has been working well since 2010, continue same Arimidex.  Add Zometa every 3 months.  · C7 and T1 found on CT 5/11/2020, [facility].  Pedro, Dr. Winston, early June 2020.  · CT C and T-spine 9/17/2020: New C7 lesion from 9/6/2018, but no change from 5/11/2020 CT.  T1 lesion stable from 5/11/2020, but new compared to 2/22/2018.  T10 lesion unchanged from 9/5/2019, but new from 2/22/2018.  Subtle 8 mm T12 lesion new from 9/5/2019.  · Therefore, no recent progression.  · Considering her good tolerance and long-term effectiveness of Arimidex, continue same therapy.  · CT  CAP 12/10/2020: No progression  · Therefore, continue Arimidex.  · CA 15-3 normal through 12/14/2020  · Because CA 15-3 fabian to 27 on 4/13/2021, then 29.3 on 7/20/2021, then 34.3 on 10/12/2021, CT scans done earlier than planned:  · CT CAP with contrast 10/12/2021: New 5.2 x 4.6 cm mixed cystic and solid right ovarian mass compared to 12/10/2020.  Continued stability of bilateral pulmonary nodules and stable sclerotic bone metastasis.  · 11/4/2021: TLH/BSO (due to new 5 cm right ovarian mass on CT, 11/12/2021).  · Metastatic breast cancer involving bilateral ovaries and posterior uterine serosa.  ER 81-90%.  VA 61-70%.  HER-2 negative  · [facility] NGS: ER 98%.  VA 90%.  HER-2/eb negative.  AKT1 pathogenic variant, exon 3.  AR+, 95%.  No other significant mutations including negative PIK3CA  · Pelvic washings: Adenocarcinoma  · Because this was the only area of progression on CT, and has been resected, continue Arimidex which she has been on since 2/17/2010.  · 11/22/2021: Discussed with Dr. Blum, who has a focus on breast cancer.  We both agree: Continue Arimidex for now.  In the future, if significant progression is seen, then plan Faslodex injections with Ibrance  · CT CAP 2/1/2022: A few T-spine sclerotic lesions progressively more sclerotic over multiple prior CTs of indeterminate significance.  No clear signs of progression of disease.  · 3/15/2022: Although tumor marker is not rising and CT showed no clear signs of progression.  CT revealed progressively more sclerotic bone metastasis over multiple prior CTs, she is having increased right hip discomfort.  Sometimes this can be due to healing of metastasis (but she has been on the same treatment since 2010), or this could mean progression of bone metastasis.  It is reassuring that her tumor marker is not worsening.  She would like to see [facility], operated by [facility] radiation medicine to see if they feel radiation to the hip would help her discomfort.  We will do a bone scan before  that appointment as this might help [facility] radiation.  (Although she has seen [facility] radiation for her neck, she would like to see [facility] radiation for this hip issue).  · 3/18/2022, bone scan: Metastatic disease at T9 and T10, corresponding to CT lesions.  Uptake also at T6, but to lesser degree.  These areas are new compared to last bone scan, 6/3/2009.  Therefore, overall, appears consistent with progression.  · 3/24/2021: Discussed changing  Arimidex to Faslodex/Ibrance 125 mg D1-21/28 days.  · However, Dr. Llanes will decide on 4/12/2022 if the patient needs any traditional XRT to hip/pelvis.  If so, this may cause cytopenias due to the location of XRT.  Ibrance can also cause cytopenias.  If this is the case, we will wait to begin Ibrance until at least a few weeks after XRT completes, provided blood counts allow.   · 4/22/2022: Dr. Llanes reviewed MRI right hip from 4/19/2022, revealing L3 metastasis, similar size of prior CTs.  Therefore, she plans no XRT.  · 5/6/2022: Began Faslodex Ibrance  · CT 7/18/2022: Some bone mets more sclerotic, which could reflect treatment response.  Slightly increased RUL nodule, 1.1 cm, from 0.9 cm on 2/1/2022  · Bone scan 7/18/2022: No change from 3/18/2022  · 8/19/2022 (patient delayed follow-up to review scans until then): Continue same therapy since no significant progression of disease  · Bone scan 1/26/2023: Under represents bone metastasis compared to CT scans.  Slightly more intense uptake at T12 which was noted to perhaps represent treatment response.  · CT 1/26/2023: A few new subcentimeter indeterminate RLL pulmonary nodules.  Radiologist recommended follow-up chest imaging in 6 to 8 weeks.  Otherwise no change in the other bilateral pulmonary nodules.  No change in scattered bone metastasis except the T12 lesion continues to increase in sclerosis.  · 2/28/2023 office visit:  · Missed the mid November 2022 Faslodex and the mid January 2023  Faslodex and the mid February 2023 Faslodex.  · Although CT might represent slight progression, she has missed a few doses of Faslodex.  Would like a strong reason to change therapy since she has been doing well on Faslodex for quite some time.  Therefore, patient agrees: Maintain her same imaging interval, 6 months from last  · 4/4/2023: Continues on Ibrance.  Unsure if she is on her second or third week on Ibrance, however has this written down at home.  ANC 0.97.  She will contact us to let us know exactly where she is at with her Ibrance we will have patient return for CBC on cycle day 22.  Proceed with Faslodex today.    CA 15-3:  · 31.7 on 2/28/2023, from 27.3 on 8/19/2022, from 22.5 on 5/6/2022, from 26.3 on 2/1/2022, from 32.6 on 11/22/2021, from 34.3 on 10/12/2021, from 29.3 on 7/20/2021, from 27 on 4/13/2021, from 23.3 on 12/14/2020.    *Bony metastases  · T6 discovered by PET to August 2011 (did not show up by CT or bone scan)  · C7 and T1 found on CT 5/11/2020, [facility].  CyberKnife, Dr. Winston, early June 2020.  · June 2020 began Zometa every 3 months.  · She was warned of possible osteonecrosis of the jaw and renal insufficiency.  She states she has good dental health and sees her dentist regularly.  Because of prior hypercalcemia requiring stopping calcium supplements and because of high normal current calcium level, began without supplemental calcium.  Add calcium if calcium becomes low.  · 1/7/2022: Tooth extracted.  Plan was to wait 5 weeks to resume Zometa.  Patient delayed return until 3/15/2022 (over 8 weeks)  · May 2022: Changed Zometa to Xgeva every 3 months due to Cr up to 1.6  · 2/28/2023: She request to hold off on Xgeva because she is in the process of having dental work  · 4/4/2023: Xgeva remains on hold.  In the process of having dental work done.  Next dental appointment 4/25/2023.    *Bone health. Last bone density 10/11/16, worsened, but still normal with worst T score -0.9.  Patient  stopped calcium January 2016, but remained on vitamin D.  I recommended she restart her calcium.  Stopped calcium early March 2018 due to hypercalcemia.  · DEXA 9/5/2019: Normal bone density    *Hypercalcemia.  Repeat calcium 3/7/18 normal, but patient self stopped calcium a few days prior.  Recommended she stay off calcium.  · Calcium was 11 on 6/19/2020  · Calcium 11 again on 12/10/2020  · Repeat calcium 10.5 on 12/14/2020  · Calcium 10.6.  Minimally elevated.  (Normal range up to 10.5)  · Calcium 10.8 on 3/15/2022.  Hopefully, Zometa will help with this.  · Calcium 10.8.    *Hypophosphatemia  · Phosphorus 2.3 on 6/24/2022.  Begin K-Phos Neutral 2 tablets twice daily  · Patient self stopped this around mid July 2022.  · Phosphorus normal, 3.6    *On the 10/5/16 visit, Tachycardia, dyspnea on exertion, bilateral leg swelling, more sedentary recently.  CT PE protocol and bilateral lower extremity Dopplers 10/5/16, negative for clots.  She still has tachycardia and dyspnea on exertion.  Perhaps at least part of this is due to deconditioning.    *Previously complained of colon Right lower anterior rib discomfort.  CT unremarkable in that area.  No specific pain, just discomfort.  I explained to the patient if disease was found by PET scan or bone scan, I would not  since she has been doing well on Arimidex since 2010.   Currently denies pain.     *Depression/anxiety.  This limits compliance.  She sees a psychiatrist and a counselor regularly.  She states this is mostly due to body image issues instead of cancer.   · She continues with her counselor and psychiatrist.  Viral pandemic is making this a little more difficult.  · 4/27/2022: We will try to get her involved in some cancer support groups.  I also encouraged her to try out some small groups at [employer] (she struggles with loneliness).  · 2/28/2023: Has missed some visits due to emotional issues.  Offered behavioral oncology help.  She declines.   She is going to continue with her psychiatrist and counselor  · 4/4/2023: Continues to follow with her psychiatrist and counselor.  Feeling better as the weather improves.  Believes her breast and body image are potentially making her pain in the upper arms worse and she is interested in reconstructive surgery.    *Noncompliance due to depression/anxiety.    · Although she often misses appointments, she does reschedule and eventually come in.    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 Willie Quinn, [facility], on 5/21/2019.  Positive sentinel node (1 of 3 nodes)..  · Left neck dissection by Dr. Willie Quinn, [facility], on 6/5/2019.  22 nodes negative.  Negative margins.  No lymphovascular invasion or perineural invasion.  2 mm depth of invasion.  Grade 1.  Squamous cell carcinoma.  1.8 cm tumor.  · Because the patient felt what she thought was left neck LAD, CT neck and chest and PET at [facility]. May 2020 performed.  She is being followed at [facility] for this.  · On 6/19/2020, per verbal report from Dr. Senia COLORADO [facility], [facility] ENT does not feel she has any active head and neck cancer.   · She states she has a follow-up with AMADEO Whalen at [facility], in June with CT neck 1 week prior  · No symptoms to suggest recurrence.  She follows with [facility].    *Previously complained of left hip pain x2 weeks.  This prompted an MRI pelvis 8/20/2019 at [facility] and open MRI which was negative for malignancy.    *Possible intolerance of Zometa  · After first dose, June 2020, 2 days of chills, bone pain  · She would like to try Zometa again.  If this occurs again, we will try to switch her to Xgeva monthly.  (No good data on every 3-month Xgeva)  · She did fine with the second dose of Zometa.    *Thickening of wall of transverse colon on CT 2/1/2022.  Last colonoscopy was 4/18/2019.  She wants the next colonoscopy done at [facility]  · Dr. Galeas  will see her on 4/7/2022 to evaluate this    *Depression  · Is seeing a psychiatry NP and a therapist for many years.  · 6/24/2022: Reported she decided to stop seeing her therapist but is still interested in therapy.  She would like to see behavioral oncology at [facility].  This was arranged  · Contacted by Julissa Centeno (behavioral oncology).  Patient canceled on the day of the appointment during her first scheduled appointment with Julissa.  She then no showed for the rescheduled appointment.  Julissa recommends the patient continue to follow with her therapist and psychiatrist and if she needs additional support, to use [email or web address]'s club    *Pruritic rash  · 8/19/2022: Referred to dermatology    *Patient went to the ER 2/7/2023 for dizziness  · 2/28/2023: This resolved.  She understands if she has ongoing issues with dizziness she should contact us and we will arrange an MRI of the brain    *Pain in upper arms  · Reports this started December 2022.  Being evaluated by her PCP for this.  They did order PT, however patient did not attend these appointments.  Recommended she continue follow-up with PCP.  She also sees chiropractor who believes this is muscular in nature.  She is requesting to have her images faxed to her chiropractor for review.  [facility]  Patient educated that massage therapy and chiropractic adjustments are not recommended with metastatic disease to the bones.    *Neutropenia  · WBC 2.17, ANC 0.97.  Patient is unsure whether she is on her second or third week of Ibrance.  Asked her to send us a [employer] message when she returns home to notify us where she currently is with her cycle.  We will have her return around day 22 of her current cycle to recheck counts prior to resuming Ibrance.    Monitoring on Ibrance:  CBC every 2 weeks for the first 2 months then monthly and as indicated.  After 6 months, if neutropenia grade 1 or 2 only, then CBC can be every 3 months    PLAN:    · Return for CBC with RN review around cycle day 22, prior to resuming Ibrance.  · Patient would like to proceed with breast reconstruction, she was given a list of recommendations.  ·   · MD/NP monthly  · Faslodex today and monthly.  · MD/Faslodex 5 months.  A few days prior: CT chest abdomen and pelvis without contrast, bone scan, CBC, CMP, CA 15-3  · Resume every 3-month Xgeva 1 to 2 months after completing all dental procedures (she states she will let us know).  Last dose was 7/1/2022    · Continue Faslodex/Ibrance 125 mg D1-21/28 days  ·   · (Xgeva instead of Zometa due to intermittent high creatinine)  · MD or NP with Faslodex every 4 weeks  · Every 6 months CT CAP without IV contrast, bone scan (last 7/18/2022), CA 15-3  · Previously arranged for her to get involved with some cancer support groups  ·  CT scans WITHOUT IV CONTRAST (again, to avoid further kidney damage.)  · She sees [facility], Dr. Bedoya annually, next is summer 2022    Send a copy of this note to   Dr. Willie Quinn, [facility], radiation medicine, [facility]      I spent 45 minutes caring for Marialuisa on this date of service. This time includes time spent by me in the following activities: preparing for the visit, reviewing tests, obtaining and/or reviewing a separately obtained history, performing a medically appropriate examination and/or evaluation, counseling and educating the patient/family/caregiver, ordering medications, tests, or procedures, referring and communicating with other health care professionals, documenting information in the medical record, independently interpreting results and communicating that information with the patient/family/caregiver and care coordination    The patient is on high risk medication that requires close monitoring for toxicity.    Sigrid Cummings, APRN  04/20/23

## 2023-04-04 ENCOUNTER — INFUSION (OUTPATIENT)
Dept: ONCOLOGY | Facility: HOSPITAL | Age: 68
End: 2023-04-04
Payer: MEDICARE

## 2023-04-04 ENCOUNTER — LAB (OUTPATIENT)
Dept: OTHER | Facility: HOSPITAL | Age: 68
End: 2023-04-04
Payer: MEDICARE

## 2023-04-04 ENCOUNTER — OFFICE VISIT (OUTPATIENT)
Dept: ONCOLOGY | Facility: CLINIC | Age: 68
End: 2023-04-04
Payer: MEDICARE

## 2023-04-04 VITALS
HEIGHT: 65 IN | WEIGHT: 159 LBS | RESPIRATION RATE: 16 BRPM | BODY MASS INDEX: 26.49 KG/M2 | OXYGEN SATURATION: 97 % | TEMPERATURE: 98.4 F | SYSTOLIC BLOOD PRESSURE: 114 MMHG | DIASTOLIC BLOOD PRESSURE: 74 MMHG | HEART RATE: 83 BPM

## 2023-04-04 DIAGNOSIS — C79.51 MALIGNANT NEOPLASM METASTATIC TO BONE: ICD-10-CM

## 2023-04-04 DIAGNOSIS — Z79.899 HIGH RISK MEDICATION USE: ICD-10-CM

## 2023-04-04 DIAGNOSIS — C50.912 CARCINOMA OF LEFT BREAST METASTATIC TO LUNG: Primary | ICD-10-CM

## 2023-04-04 DIAGNOSIS — C78.02 CARCINOMA OF LEFT BREAST METASTATIC TO LUNG: ICD-10-CM

## 2023-04-04 DIAGNOSIS — D70.1 CHEMOTHERAPY INDUCED NEUTROPENIA: ICD-10-CM

## 2023-04-04 DIAGNOSIS — C78.02 CARCINOMA OF LEFT BREAST METASTATIC TO LUNG: Primary | ICD-10-CM

## 2023-04-04 DIAGNOSIS — C50.912 CARCINOMA OF LEFT BREAST METASTATIC TO LUNG: ICD-10-CM

## 2023-04-04 DIAGNOSIS — T45.1X5A CHEMOTHERAPY INDUCED NEUTROPENIA: ICD-10-CM

## 2023-04-04 LAB
ALBUMIN SERPL-MCNC: 4.2 G/DL (ref 3.5–5.2)
ALBUMIN/GLOB SERPL: 1.4 G/DL
ALP SERPL-CCNC: 74 U/L (ref 39–117)
ALT SERPL W P-5'-P-CCNC: 9 U/L (ref 1–33)
ANION GAP SERPL CALCULATED.3IONS-SCNC: 8.1 MMOL/L (ref 5–15)
AST SERPL-CCNC: 17 U/L (ref 1–32)
BASOPHILS # BLD AUTO: 0.03 10*3/MM3 (ref 0–0.2)
BASOPHILS NFR BLD AUTO: 1.4 % (ref 0–1.5)
BILIRUB SERPL-MCNC: 0.5 MG/DL (ref 0–1.2)
BUN SERPL-MCNC: 24 MG/DL (ref 8–23)
BUN/CREAT SERPL: 14.1 (ref 7–25)
CALCIUM SPEC-SCNC: 10.8 MG/DL (ref 8.6–10.5)
CHLORIDE SERPL-SCNC: 105 MMOL/L (ref 98–107)
CO2 SERPL-SCNC: 24.9 MMOL/L (ref 22–29)
CREAT SERPL-MCNC: 1.7 MG/DL (ref 0.57–1)
DEPRECATED RDW RBC AUTO: 52.9 FL (ref 37–54)
EGFRCR SERPLBLD CKD-EPI 2021: 32.5 ML/MIN/1.73
EOSINOPHIL # BLD AUTO: 0.03 10*3/MM3 (ref 0–0.4)
EOSINOPHIL NFR BLD AUTO: 1.4 % (ref 0.3–6.2)
ERYTHROCYTE [DISTWIDTH] IN BLOOD BY AUTOMATED COUNT: 13.3 % (ref 12.3–15.4)
GLOBULIN UR ELPH-MCNC: 3 GM/DL
GLUCOSE SERPL-MCNC: 99 MG/DL (ref 65–99)
HCT VFR BLD AUTO: 30 % (ref 34–46.6)
HGB BLD-MCNC: 10.7 G/DL (ref 12–15.9)
IMM GRANULOCYTES # BLD AUTO: 0 10*3/MM3 (ref 0–0.05)
IMM GRANULOCYTES NFR BLD AUTO: 0 % (ref 0–0.5)
LYMPHOCYTES # BLD AUTO: 1 10*3/MM3 (ref 0.7–3.1)
LYMPHOCYTES NFR BLD AUTO: 46.1 % (ref 19.6–45.3)
MCH RBC QN AUTO: 38.6 PG (ref 26.6–33)
MCHC RBC AUTO-ENTMCNC: 35.7 G/DL (ref 31.5–35.7)
MCV RBC AUTO: 108.3 FL (ref 79–97)
MONOCYTES # BLD AUTO: 0.14 10*3/MM3 (ref 0.1–0.9)
MONOCYTES NFR BLD AUTO: 6.5 % (ref 5–12)
NEUTROPHILS NFR BLD AUTO: 0.97 10*3/MM3 (ref 1.7–7)
NEUTROPHILS NFR BLD AUTO: 44.6 % (ref 42.7–76)
NRBC BLD AUTO-RTO: 0 /100 WBC (ref 0–0.2)
PLAT MORPH BLD: NORMAL
PLATELET # BLD AUTO: 200 10*3/MM3 (ref 140–450)
PMV BLD AUTO: 9.2 FL (ref 6–12)
POTASSIUM SERPL-SCNC: 4.4 MMOL/L (ref 3.5–5.2)
PROT SERPL-MCNC: 7.2 G/DL (ref 6–8.5)
RBC # BLD AUTO: 2.77 10*6/MM3 (ref 3.77–5.28)
RBC MORPH BLD: NORMAL
SODIUM SERPL-SCNC: 138 MMOL/L (ref 136–145)
WBC MORPH BLD: NORMAL
WBC NRBC COR # BLD: 2.17 10*3/MM3 (ref 3.4–10.8)

## 2023-04-04 PROCEDURE — 80053 COMPREHEN METABOLIC PANEL: CPT | Performed by: INTERNAL MEDICINE

## 2023-04-04 PROCEDURE — 85025 COMPLETE CBC W/AUTO DIFF WBC: CPT | Performed by: INTERNAL MEDICINE

## 2023-04-04 PROCEDURE — 25010000002 FULVESTRANT PER 25 MG: Performed by: NURSE PRACTITIONER

## 2023-04-04 PROCEDURE — 96402 CHEMO HORMON ANTINEOPL SQ/IM: CPT

## 2023-04-04 PROCEDURE — 85007 BL SMEAR W/DIFF WBC COUNT: CPT | Performed by: INTERNAL MEDICINE

## 2023-04-04 PROCEDURE — 36415 COLL VENOUS BLD VENIPUNCTURE: CPT

## 2023-04-04 RX ORDER — LAMOTRIGINE 25 MG/1
500 TABLET ORAL ONCE
Status: COMPLETED | OUTPATIENT
Start: 2023-04-04 | End: 2023-04-04

## 2023-04-04 RX ORDER — LAMOTRIGINE 25 MG/1
500 TABLET ORAL ONCE
Status: CANCELLED
Start: 2023-04-04 | End: 2023-04-04

## 2023-04-04 RX ORDER — ARIPIPRAZOLE 2 MG/1
2 TABLET ORAL NIGHTLY
COMMUNITY
Start: 2023-03-14

## 2023-04-04 RX ORDER — ONDANSETRON HYDROCHLORIDE 8 MG/1
TABLET, FILM COATED ORAL
COMMUNITY
Start: 2023-03-21

## 2023-04-04 RX ADMIN — FULVESTRANT 500 MG: 50 INJECTION INTRAMUSCULAR at 15:09

## 2023-04-04 NOTE — NURSING NOTE
Injection  Faslodex Injection performed in Left and Right gluteal by Fara Mathis RN. Patient tolerated the procedure well without complications.  04/04/23   Fara Mathis RN

## 2023-04-06 ENCOUNTER — SPECIALTY PHARMACY (OUTPATIENT)
Dept: PHARMACY | Facility: HOSPITAL | Age: 68
End: 2023-04-06
Payer: MEDICARE

## 2023-04-06 ENCOUNTER — TELEPHONE (OUTPATIENT)
Dept: ONCOLOGY | Facility: CLINIC | Age: 68
End: 2023-04-06
Payer: MEDICARE

## 2023-04-09 ENCOUNTER — DOCUMENTATION (OUTPATIENT)
Dept: ONCOLOGY | Facility: CLINIC | Age: 68
End: 2023-04-09
Payer: MEDICARE

## 2023-04-09 NOTE — PROGRESS NOTES
While I usually do not recommend breast reconstruction in the setting of known metastatic disease, her breast cancer has been very responsive to therapy and quite indolent.  Therefore, it is reasonable to consider breast reconstruction if that is what she wants to do.    ===View-only below this line===  ----- Message -----  From: Lorene Cramer APRN  Sent: 4/4/2023   4:10 PM EDT  To: Jarod Hawkins II, MD  Subject: Reconstructive surgery                           Dr. Hawkins,      Mrs. Vivar expressed desire with having reconstructive breast surgery today during her visit. Would you be ok with patient moving forward with this and do you recommend a surgeon? I will be happy to put the referral in.     Thanks,   Lorene SHARP

## 2023-04-13 ENCOUNTER — TELEPHONE (OUTPATIENT)
Dept: ONCOLOGY | Facility: HOSPITAL | Age: 68
End: 2023-04-13
Payer: MEDICARE

## 2023-04-13 ENCOUNTER — TELEPHONE (OUTPATIENT)
Dept: ONCOLOGY | Facility: CLINIC | Age: 68
End: 2023-04-13

## 2023-04-13 NOTE — TELEPHONE ENCOUNTER
----- Message from ARON Hoffmann sent at 4/11/2023 10:51 AM EDT -----  Regarding: RE: Reconstructive surgery  Thank you  ----- Message -----  From: Shefali Sam RN  Sent: 4/11/2023  10:45 AM EDT  To: ARON Hoffmann  Subject: RE: Reconstructive surgery                       Will do  ----- Message -----  From: Lorene Cramer APRN  Sent: 4/11/2023  10:24 AM EDT  To: Shefali Sam RN  Subject: FW: Reconstructive surgery                       April,     Can you reach out to Mrs. Vivar to see were she would like to go for reconstruction? We are ok to order referral we just need to know where she wants to go.    Lorene Helton  ----- Message -----  From: Jarod Hawkins II, MD  Sent: 4/9/2023   5:11 PM EDT  To: ARON Hoffmann  Subject: RE: Reconstructive surgery                       While I usually do not recommend breast reconstruction in the setting of known metastatic disease, her breast cancer has been very responsive to therapy and quite indolent.  Therefore, it is reasonable to consider breast reconstruction if that is what she wants to do.    ----- Message -----  From: Lorene Cramer APRN  Sent: 4/4/2023   4:10 PM EDT  To: Jarod Hawkins II, MD  Subject: Reconstructive surgery                           Dr. Hawkins,      Mrs. Vivar expressed desire with having reconstructive breast surgery today during her visit. Would you be ok with patient moving forward with this and do you recommend a surgeon? I will be happy to put the referral in.     Thanks,   Lorene SHARP

## 2023-04-13 NOTE — TELEPHONE ENCOUNTER
Caller: Marialuisa Vivar    Relationship: Self    Best call back number: [phone]    What is the best time to reach you: ANYTIME    Who are you requesting to speak with (clinical staff, provider,  specific staff member):       What was the call regarding: PT NEEDS LAST LAB RESULTS FAXED TO DR. GRIFFITHS OFFICE- [facility]- FAX #  [phone]      Do you require a callback: IF NEEDED

## 2023-04-17 DIAGNOSIS — C50.912 CARCINOMA OF LEFT BREAST METASTATIC TO LUNG: Primary | ICD-10-CM

## 2023-04-17 DIAGNOSIS — C78.02 CARCINOMA OF LEFT BREAST METASTATIC TO LUNG: Primary | ICD-10-CM

## 2023-04-18 ENCOUNTER — LAB (OUTPATIENT)
Dept: OTHER | Facility: HOSPITAL | Age: 68
End: 2023-04-18
Payer: MEDICARE

## 2023-04-18 ENCOUNTER — OFFICE VISIT (OUTPATIENT)
Dept: INTERNAL MEDICINE | Facility: CLINIC | Age: 68
End: 2023-04-18
Payer: MEDICARE

## 2023-04-18 ENCOUNTER — CLINICAL SUPPORT (OUTPATIENT)
Dept: ONCOLOGY | Facility: HOSPITAL | Age: 68
End: 2023-04-18
Payer: MEDICARE

## 2023-04-18 ENCOUNTER — DOCUMENTATION (OUTPATIENT)
Dept: ONCOLOGY | Facility: CLINIC | Age: 68
End: 2023-04-18
Payer: MEDICARE

## 2023-04-18 VITALS
OXYGEN SATURATION: 98 % | BODY MASS INDEX: 26.49 KG/M2 | TEMPERATURE: 96.9 F | HEIGHT: 65 IN | SYSTOLIC BLOOD PRESSURE: 145 MMHG | WEIGHT: 159 LBS | HEART RATE: 86 BPM | DIASTOLIC BLOOD PRESSURE: 80 MMHG | RESPIRATION RATE: 18 BRPM

## 2023-04-18 VITALS
HEART RATE: 105 BPM | WEIGHT: 156 LBS | TEMPERATURE: 97.3 F | OXYGEN SATURATION: 94 % | BODY MASS INDEX: 25.99 KG/M2 | HEIGHT: 65 IN | SYSTOLIC BLOOD PRESSURE: 122 MMHG | DIASTOLIC BLOOD PRESSURE: 60 MMHG

## 2023-04-18 DIAGNOSIS — C78.02 CARCINOMA OF LEFT BREAST METASTATIC TO LUNG: ICD-10-CM

## 2023-04-18 DIAGNOSIS — M25.512 CHRONIC PAIN OF BOTH SHOULDERS: Primary | ICD-10-CM

## 2023-04-18 DIAGNOSIS — R10.31 RIGHT LOWER QUADRANT ABDOMINAL PAIN: ICD-10-CM

## 2023-04-18 DIAGNOSIS — M25.511 CHRONIC PAIN OF BOTH SHOULDERS: Primary | ICD-10-CM

## 2023-04-18 DIAGNOSIS — C79.51 MALIGNANT NEOPLASM METASTATIC TO BONE: ICD-10-CM

## 2023-04-18 DIAGNOSIS — R22.42 NODULE OF SKIN OF LEFT LOWER LEG: ICD-10-CM

## 2023-04-18 DIAGNOSIS — G89.29 CHRONIC PAIN OF BOTH SHOULDERS: Primary | ICD-10-CM

## 2023-04-18 DIAGNOSIS — C50.912 CARCINOMA OF LEFT BREAST METASTATIC TO LUNG: ICD-10-CM

## 2023-04-18 LAB
ANISOCYTOSIS BLD QL: NORMAL
BASOPHILS # BLD AUTO: 0.03 10*3/MM3 (ref 0–0.2)
BASOPHILS NFR BLD AUTO: 1.6 % (ref 0–1.5)
DEPRECATED RDW RBC AUTO: 53.5 FL (ref 37–54)
EOSINOPHIL # BLD AUTO: 0.01 10*3/MM3 (ref 0–0.4)
EOSINOPHIL NFR BLD AUTO: 0.5 % (ref 0.3–6.2)
ERYTHROCYTE [DISTWIDTH] IN BLOOD BY AUTOMATED COUNT: 14.6 % (ref 12.3–15.4)
HCT VFR BLD AUTO: 28.2 % (ref 34–46.6)
HGB BLD-MCNC: 10.2 G/DL (ref 12–15.9)
IMM GRANULOCYTES # BLD AUTO: 0.01 10*3/MM3 (ref 0–0.05)
IMM GRANULOCYTES NFR BLD AUTO: 0.5 % (ref 0–0.5)
LYMPHOCYTES # BLD AUTO: 0.92 10*3/MM3 (ref 0.7–3.1)
LYMPHOCYTES NFR BLD AUTO: 47.9 % (ref 19.6–45.3)
MCH RBC QN AUTO: 38.8 PG (ref 26.6–33)
MCHC RBC AUTO-ENTMCNC: 36.2 G/DL (ref 31.5–35.7)
MCV RBC AUTO: 107.2 FL (ref 79–97)
MONOCYTES # BLD AUTO: 0.19 10*3/MM3 (ref 0.1–0.9)
MONOCYTES NFR BLD AUTO: 9.9 % (ref 5–12)
NEUTROPHILS NFR BLD AUTO: 0.76 10*3/MM3 (ref 1.7–7)
NEUTROPHILS NFR BLD AUTO: 39.6 % (ref 42.7–76)
NRBC BLD AUTO-RTO: 0 /100 WBC (ref 0–0.2)
PLAT MORPH BLD: NORMAL
PLATELET # BLD AUTO: 146 10*3/MM3 (ref 140–450)
PMV BLD AUTO: 10.5 FL (ref 6–12)
RBC # BLD AUTO: 2.63 10*6/MM3 (ref 3.77–5.28)
WBC MORPH BLD: NORMAL
WBC NRBC COR # BLD: 1.92 10*3/MM3 (ref 3.4–10.8)

## 2023-04-18 PROCEDURE — 85025 COMPLETE CBC W/AUTO DIFF WBC: CPT | Performed by: INTERNAL MEDICINE

## 2023-04-18 PROCEDURE — G0463 HOSPITAL OUTPT CLINIC VISIT: HCPCS

## 2023-04-18 PROCEDURE — 85007 BL SMEAR W/DIFF WBC COUNT: CPT | Performed by: INTERNAL MEDICINE

## 2023-04-18 PROCEDURE — 36415 COLL VENOUS BLD VENIPUNCTURE: CPT

## 2023-04-18 NOTE — PROGRESS NOTES
Having grade 3 neutropenia, ANC <1000.    Sigrid is going to confirm to see if this is truly day 22.  If so, she is not due for Faslodex until she sees Sigrid on May 2, 2 weeks from now.  If that is the case, we will not check a CBC next week.  Instead, she will just return in 2 weeks and hold Ibrance until then    Therefore, per up-to-date guidelines, needs to have a CBC on day 1 of every Ibrance cycle.  Ibrance and Faslodex is out of sync, likely due to multiple missed appointments in the past.    Sigrid is going to call her and get her back in sync with Faslodex every 4-week administrations being day 1 of each 28-day cycle of Ibrance.    Only proceed with Ibrance if ANC >1000 on day 1 of each Ibrance cycle    If patient still plans on breast reconstruction, then will need to hold Ibrance at least 2 weeks before and at least 2 weeks after breast reconstruction.  Theoretically could proceed with Faslodex around breast reconstruction but afterwards will need to sync back up Faslodex and Ibrance.

## 2023-04-18 NOTE — PROGRESS NOTES
Spoke with patient who reports she is currently off her Ibrance.  She is unsure when she is due to resume, believes it is early next week.  Thinks she has only been off her Ibrance a day or two.  Discussed she will continue to hold Ibrance until follow up with me on 5/2/2023 so we can sync Ibrance and Faslodex.  We will cancel appointment for next week for RN review.      Patient also plans to schedule an appointment to discuss breast reconstruction and see what her options are in regards to surgery.  She is now unsure if she would like to proceed with this, but would at least like to discuss with surgeon.  We again reviewed that if she were to proceed with surgery she would need to hold her Ibrance for at least one month (2 weeks prior to surgery and at least 2 weeks after surgery pending recovery and counts).  She is fine with this.  She will let us know when she sees the surgeon and will need to let us know if/when she will proceed with surgery so we can advise her on when to hold Ibrance.

## 2023-04-18 NOTE — PROGRESS NOTES
Subjective   Marialuisa Vivar is a 67 y.o. female.   Chief Complaint   Patient presents with   • Shoulder Pain          • Abdominal Pain   • Cyst       History of Present Illness     1.  Shoulder pain-bilateral.  It is bothering her more on left than right.  Left-sided started few months ago.  Right-sided started 1 to 2 months ago.  There was no known injury.  Pain is on and off, usually dull, intensity 2-3 out of 10, worse with raising arms or putting it behind the back.  She tried Tylenol.  500 mg 1 tablet a day on average and it helped.  She did stretching and it helped some. No numbness, no tingling associated.  She has metastatic cancer, she had bone scan done in January 2023.  No metastatic changes in shoulder area.    2.  Nodule on the left leg-she noticed it 3 to 4 weeks ago.  She thinks that she injured it.  There was a bruise and then underneath she could feel nodule.  It is tender to touch.  Sometimes it is a little red, sometimes not.  It seems to be the same size or a little smaller than initially.    3.  Right lower quadrant abdominal pain- she had one episode about a year ago and then the last week she had another episode.  It lasted 1 or 2 days.  It was dull pain in the right lower quadrant.  It was worse when she was sitting down and leaning forward or backward.  She has bowel movements every 2 to 3 days, no blood in it.  No diarrhea.  Sometimes she is a little constipated.  The pain resolved spontaneously.  She had abdominal/pelvic CT on 1/26/2023.  Normal appendix.      The following portions of the patient's history were reviewed and updated as appropriate: allergies, current medications, past family history, past medical history, past social history, past surgical history and problem list.    Review of Systems   Cardiovascular: Negative.    Musculoskeletal: Positive for arthralgias.         Objective   Wt Readings from Last 3 Encounters:   04/18/23 70.8 kg (156 lb)   04/18/23 72.1 kg (159 lb)    04/04/23 72.1 kg (159 lb)      Vitals:    04/18/23 1417   BP: 122/60   Pulse: 105   Temp: 97.3 °F (36.3 °C)   SpO2: 94%     Temp Readings from Last 3 Encounters:   04/18/23 97.3 °F (36.3 °C)   04/18/23 96.9 °F (36.1 °C) (Infrared)   04/04/23 98.4 °F (36.9 °C) (Temporal)     BP Readings from Last 3 Encounters:   04/18/23 122/60   04/18/23 145/80   04/04/23 114/74     Pulse Readings from Last 3 Encounters:   04/18/23 105   04/18/23 86   04/04/23 83     Body mass index is 25.96 kg/m².    Physical Exam  Pulmonary:      Effort: Pulmonary effort is normal.   Abdominal:      General: There is no distension.      Palpations: Abdomen is soft. There is no mass.      Tenderness: There is no abdominal tenderness. There is no guarding or rebound.      Hernia: No hernia is present.   Musculoskeletal:      Comments: Decreased extension and internal rotation in left shoulder.  Mildly decreased extension in right shoulder.   Skin:            Comments: 3x2 cm nodule palpable, TTP, mildly erythematous.  Normal temperature to touch.   Neurological:      Mental Status: She is alert.         Assessment & Plan   Diagnoses and all orders for this visit:    1. Chronic pain of both shoulders (Primary)  -     Ambulatory Referral to Physical Therapy    2. Nodule of skin of left lower leg  -     Duplex Venous Lower Extremity - Left CAR; Future    3. Right lower quadrant abdominal pain        1.  Shoulders pain- she cannot use NSAIDs.  She will use Tylenol 1 to 2 tablets every 8 hours as needed.  I am referring her to physical therapy.  Follow-up as needed.    2.  Left leg nodule- it can be blood collection after injury, but  it can also be superficial thrombophlebitis.  We are ordering Doppler for further evaluation.    3.  Abdominal pain- currently no pain, no abnormalities on exam.  Normal abdominal/pelvic CT.  Increase fiber in diet.  Increase hydration to make bowel movements were regular.  Follow-up as needed.

## 2023-04-18 NOTE — NURSING NOTE
Patient here for CBC review and ANC .0.76 and patient rescheduled for a CBC and RN review on next Tuesday. Patient states I am on my week off with the Ibrance. Instructed kaitlintent not to resart until labs checked on Tuesday.Verbalized understanding.  Lab Results   Component Value Date    WBC 1.92 (C) 04/18/2023    HGB 10.2 (L) 04/18/2023    HCT 28.2 (L) 04/18/2023    .2 (H) 04/18/2023     04/18/2023

## 2023-04-26 ENCOUNTER — SPECIALTY PHARMACY (OUTPATIENT)
Dept: PHARMACY | Facility: HOSPITAL | Age: 68
End: 2023-04-26
Payer: MEDICARE

## 2023-04-26 NOTE — PROGRESS NOTES
PA initiated on Ibrance.  Waiting on response.    Fatuma Arenas  Specialty Pharmacy Technician

## 2023-04-27 ENCOUNTER — TELEPHONE (OUTPATIENT)
Dept: PHYSICAL THERAPY | Facility: OTHER | Age: 68
End: 2023-04-27

## 2023-04-27 ENCOUNTER — HOSPITAL ENCOUNTER (OUTPATIENT)
Dept: CARDIOLOGY | Facility: HOSPITAL | Age: 68
Discharge: HOME | End: 2023-04-27
Payer: MEDICARE

## 2023-04-27 ENCOUNTER — DOCUMENTATION (OUTPATIENT)
Dept: PHARMACY | Facility: HOSPITAL | Age: 68
End: 2023-04-27
Payer: MEDICARE

## 2023-04-27 DIAGNOSIS — R22.42 NODULE OF SKIN OF LEFT LOWER LEG: ICD-10-CM

## 2023-04-27 LAB
BH CV LOWER VASCULAR LEFT COMMON FEMORAL AUGMENT: NORMAL
BH CV LOWER VASCULAR LEFT COMMON FEMORAL COMPETENT: NORMAL
BH CV LOWER VASCULAR LEFT COMMON FEMORAL COMPRESS: NORMAL
BH CV LOWER VASCULAR LEFT COMMON FEMORAL PHASIC: NORMAL
BH CV LOWER VASCULAR LEFT COMMON FEMORAL SPONT: NORMAL
BH CV LOWER VASCULAR LEFT DISTAL FEMORAL COMPRESS: NORMAL
BH CV LOWER VASCULAR LEFT GASTRONEMIUS COMPRESS: NORMAL
BH CV LOWER VASCULAR LEFT GREATER SAPH AK COMPRESS: NORMAL
BH CV LOWER VASCULAR LEFT GREATER SAPH BK COMPRESS: NORMAL
BH CV LOWER VASCULAR LEFT LESSER SAPH COMPRESS: NORMAL
BH CV LOWER VASCULAR LEFT MID FEMORAL AUGMENT: NORMAL
BH CV LOWER VASCULAR LEFT MID FEMORAL COMPETENT: NORMAL
BH CV LOWER VASCULAR LEFT MID FEMORAL COMPRESS: NORMAL
BH CV LOWER VASCULAR LEFT MID FEMORAL PHASIC: NORMAL
BH CV LOWER VASCULAR LEFT MID FEMORAL SPONT: NORMAL
BH CV LOWER VASCULAR LEFT PERONEAL COMPRESS: NORMAL
BH CV LOWER VASCULAR LEFT POPLITEAL AUGMENT: NORMAL
BH CV LOWER VASCULAR LEFT POPLITEAL COMPETENT: NORMAL
BH CV LOWER VASCULAR LEFT POPLITEAL COMPRESS: NORMAL
BH CV LOWER VASCULAR LEFT POPLITEAL PHASIC: NORMAL
BH CV LOWER VASCULAR LEFT POPLITEAL SPONT: NORMAL
BH CV LOWER VASCULAR LEFT POSTERIOR TIBIAL COMPRESS: NORMAL
BH CV LOWER VASCULAR LEFT PROFUNDA FEMORAL COMPRESS: NORMAL
BH CV LOWER VASCULAR LEFT PROXIMAL FEMORAL COMPRESS: NORMAL
BH CV LOWER VASCULAR LEFT SAPHENOFEMORAL JUNCTION COMPRESS: NORMAL
BH CV LOWER VASCULAR RIGHT COMMON FEMORAL AUGMENT: NORMAL
BH CV LOWER VASCULAR RIGHT COMMON FEMORAL COMPETENT: NORMAL
BH CV LOWER VASCULAR RIGHT COMMON FEMORAL COMPRESS: NORMAL
BH CV LOWER VASCULAR RIGHT COMMON FEMORAL PHASIC: NORMAL
BH CV LOWER VASCULAR RIGHT COMMON FEMORAL SPONT: NORMAL
MAXIMAL PREDICTED HEART RATE: 152 BPM
STRESS TARGET HR: 129 BPM

## 2023-04-27 PROCEDURE — 93971 EXTREMITY STUDY: CPT

## 2023-04-27 NOTE — PROGRESS NOTES
The Prior Authorization for Ibrance is approved from 4/26/23 to 12/31/23.     Sarai Werner - Care Coordinator   4/27/2023  11:32 EDT

## 2023-04-28 ENCOUNTER — TELEPHONE (OUTPATIENT)
Dept: INTERNAL MEDICINE | Facility: CLINIC | Age: 68
End: 2023-04-28

## 2023-04-28 NOTE — TELEPHONE ENCOUNTER
Caller: Marialuisa Vivar    Relationship: Self    Best call back number: [phone]    What medication are you requesting: ANTIBIOTIC    What are your current symptoms: URGENCY AND FREQUENCY URINATING    How long have you been experiencing symptoms: ONE WEEK     Have you had these symptoms before:    [] Yes  [x] No    Have you been treated for these symptoms before:   [] Yes  [x] No    If a prescription is needed, what is your preferred pharmacy and phone number: [id number] [address] AT [facility] [address] - [phone] [facility] [phone]

## 2023-05-01 NOTE — PROGRESS NOTES
.     REASONS FOR FOLLOWUP: Metastatic breast cancer, tongue cancer    HISTORY OF PRESENT ILLNESS:  The patient is a 67 y.o. year old female  who is here for follow-up with the above-mentioned history.    She returns today for follow up and evaluation prior to her monthly Faslodex.  She also continues on 125 mg daily days 1-21 every 28 days.  We had her hold her Ibrance to help sync Ibrance back with Faslodex.  She will resume her Ibrance today.  She has been feeling well with the additional week off Ibrance.  Reports her energy level has been great and she has been very active.  Appetite is doing great and she is eating and drinking well.  Bowels moving regularly.  Denies fever or chills.  Denies nausea vomiting.  Denies new or worsening pain.    Scheduled with consultation with Dr. Arthur to see if she is a candidate for reconstruction surgery.    Continues dental work, did move her appointment back to next week for her next set of dental work.    Scheduled to go on vacation week of May 21.  This will follow during her off week of Ibrance.    No new concerns today.    Past Medical History:   Diagnosis Date   • Allergy    • Anxiety    • Asthma    • Bone metastasis    • Breast cancer     Left node positive   • Cholelithiasis 2000    Lap Marily   • Colon polyp Past 10-15 yrs?    found at colonoscopies   • Depression    • Esophageal reflux     cough   • History of colon polyps    • Hyperlipidemia    • Hypertension    • Left breast cancer metastasized to lung    • Obstructive sleep apnea     does not wear cpap   • Ovarian cyst    • PONV (postoperative nausea and vomiting)    • Tongue cancer 05/2019    by ENT at [facility] dr Quinn     Past Surgical History:   Procedure Laterality Date   • CHOLECYSTECTOMY  2000   • COLONOSCOPY  2014    H/O polyps   • COLONOSCOPY N/A 6/1/2022    Procedure: COLONOSCOPY to cecum and TI with cold / hot snare polypectomies;  Surgeon: Luis Enrique Galeas MD;  Location: [facility] ENDOSCOPY;   Service: Gastroenterology;  Laterality: N/A;  pre-abnormal ct  post-polyps, diverticulosis, hemorrhoids   • KNEE ARTHROPLASTY     • LUNG SURGERY  2010    Lung wedge resection   • MASTECTOMY RADICAL Left 1993   • TONGUE SURGERY  05/21/2019    tongue cancer   • TOTAL LAPAROSCOPIC HYSTERECTOMY N/A 11/04/2021    Procedure: Robotic assisted total laparoscopic hysterectomy, bilateral salpingoophorecotmy, bilateral ureteralysis ;  Surgeon: Kaylynn Ricci DO;  Location: [facility] OR;  Service: Robotics - [facility];  Laterality: N/A;       MEDICATIONS    Current Outpatient Medications:   •  ARIPiprazole (ABILIFY) 2 MG tablet, Take 1 tablet by mouth Every Night., Disp: , Rfl:   •  atorvastatin (LIPITOR) 10 MG tablet, Take 1 tablet by mouth Every Night., Disp: 90 tablet, Rfl: 1  •  cholecalciferol (VITAMIN D3) 1.25 MG (13526 UT) capsule, Take 5,000 Units by mouth 3 (Three) Times a Week., Disp: , Rfl:   •  denosumab (Xgeva) 120 MG/1.7ML solution injection, Inject  under the skin into the appropriate area as directed., Disp: , Rfl:   •  eszopiclone (LUNESTA) 3 MG tablet, Take 1 tablet by mouth., Disp: , Rfl:   •  fulvestrant (FASLODEX) 250 MG/5ML chemo syringe, Inject  into the appropriate muscle as directed by prescriber., Disp: , Rfl:   •  LORazepam (ATIVAN) 0.5 MG tablet, Take 1 tablet by mouth Every 8 (Eight) Hours As Needed., Disp: , Rfl:   •  meclizine (ANTIVERT) 25 MG tablet, Take 1 tablet by mouth 3 (Three) Times a Day As Needed for Dizziness., Disp: 20 tablet, Rfl: 0  •  metoprolol succinate XL (TOPROL-XL) 25 MG 24 hr tablet, Take 1 tablet by mouth Daily. (Patient taking differently: Take  by mouth Daily. Pt is now taking a half tablet), Disp: 90 tablet, Rfl: 1  •  Omeprazole 20 MG Tablet Delayed Release Dispersible, Take 1 tablet by mouth As Needed., Disp: , Rfl:   •  ondansetron (ZOFRAN) 8 MG tablet, TAKE 1 TABLET BY MOUTH THREE TIMES DAILY AS NEEDED FORNAUSEA AND VOMITING, Disp: , Rfl:   •  PARoxetine (PAXIL) 40  MG tablet, Take 1 tablet by mouth Every Night., Disp: , Rfl:   •  Palbociclib 125 MG tablet, , Disp: , Rfl:   No current facility-administered medications for this visit.    Facility-Administered Medications Ordered in Other Visits:   •  chlorhexidine (PERIDEX) 0.12 % solution 15 mL, 15 mL, Mouth/Throat, Q12H, Kaylynn Ricci, DO  •  mupirocin (BACTROBAN) 2 % nasal ointment, , Nasal, BID, Kaylynn Ricci, DO    ALLERGIES:     Allergies   Allergen Reactions   • Nickel Unknown - Low Severity   • Ciprofloxacin Rash       SOCIAL HISTORY:       Social History     Socioeconomic History   • Marital status: Single   • Years of education: College   Tobacco Use   • Smoking status: Former     Packs/day: 2.00     Years: 30.00     Pack years: 60.00     Types: Cigarettes     Start date: 1973     Quit date: 2008     Years since quittin.9   • Smokeless tobacco: Never   Vaping Use   • Vaping Use: Never used   Substance and Sexual Activity   • Alcohol use: No   • Drug use: No   • Sexual activity: Not Currently     Birth control/protection: None         FAMILY HISTORY:  Family History   Problem Relation Age of Onset   • Hypertension Mother    • Leukemia Mother 72   • COPD Mother         smoker   • Diabetes Brother    • Pancreatic cancer Brother    • Glaucoma Brother    • Heart disease Brother    • Hypertension Brother    • Gallbladder disease Brother    • Pancreatitis Brother          from pancreatic csncer   • Gallbladder disease Father    • Colon polyps Father    • Stroke Other         Grandmother   • Lupus Other         Aunt   • Alcohol abuse Other         Aunt   • Glaucoma Other         Grandmother   • Diabetes type II Brother    • Hypertension Brother    • Hyperlipidemia Brother    • Liver cancer Paternal Uncle    • Stomach cancer Paternal Aunt    • Alcohol abuse Maternal Aunt    • Malig Hyperthermia Neg Hx        REVIEW OF SYSTEMS:  Review of Systems   Constitutional: Negative for activity change.  "  HENT: Negative for nosebleeds and trouble swallowing.    Respiratory: Negative for shortness of breath and wheezing.    Cardiovascular: Negative for chest pain and palpitations.   Gastrointestinal: Negative for constipation, diarrhea and nausea.   Genitourinary: Negative for dysuria and hematuria.   Musculoskeletal: Negative for arthralgias and myalgias.   Skin: Negative for wound.   Neurological: Negative for seizures and syncope.   Hematological: Negative for adenopathy. Does not bruise/bleed easily.   Psychiatric/Behavioral: Negative for confusion.            Vitals:    05/02/23 1345   BP: 118/77   Pulse: 91   Resp: 16   Temp: 97.8 °F (36.6 °C)   TempSrc: Temporal   SpO2: 99%   Weight: 70.8 kg (156 lb 1.6 oz)   Height: 165 cm (64.96\")   PainSc: 0-No pain         5/2/2023     1:45 PM   Current Status   ECOG score 0        PHYSICAL EXAM:      CONSTITUTIONAL:  Vital signs reviewed.  No distress, looks comfortable.  EYES:  Conjunctiva and lids unremarkable.  PERRLA  EARS,NOSE,MOUTH,THROAT:  Ears and nose appear unremarkable.  Lips, teeth, gums appear unremarkable.  RESPIRATORY:  Normal respiratory effort.  Lungs clear to auscultation bilaterally.  CARDIOVASCULAR:  Normal S1, S2.  No murmurs rubs or gallops.  No significant lower extremity edema.  GASTROINTESTINAL: Abdomen appears unremarkable.  Nontender.  No hepatomegaly.  No splenomegaly.  LYMPHATIC:  No cervical, supraclavicular, axillary lymphadenopathy.  SKIN:  Warm.  No rashes.  PSYCHIATRIC:  Normal judgment and insight.  Normal mood and affect.    RECENT LABS:        WBC   Date/Time Value Ref Range Status   05/02/2023 01:39 PM 4.75 3.40 - 10.80 10*3/mm3 Final     Hemoglobin   Date/Time Value Ref Range Status   05/02/2023 01:39 PM 10.5 (L) 12.0 - 15.9 g/dL Final     Platelets   Date/Time Value Ref Range Status   05/02/2023 01:39  140 - 450 10*3/mm3 Final       Assessment & Plan   Carcinoma of left breast metastatic to lung  - CBC and Differential  - " Comprehensive metabolic panel    Malignant neoplasm metastatic to bone  - CBC and Differential  - Comprehensive metabolic panel    Marialuisa Vivar        Assessment/Plan     *Metastatic breast cancer to bilateral lungs and soft tissue in the mediastinum (likely the cause of her hoarseness). (Initial breast cancer diagnosed in 1993 treated with mastectomy, chemotherapy, radiation therapy and tamoxifen). Arimidex day 1 on 02/17/2010. PET scan late August 2011 shows no abnormal hypermetabolic activity. This has improved compared to the prior PET scan January 2010 which showed mild hypermetabolic activity in areas of metastasis. (In the past, her  T6 lesion did not show up on CT scans or bone scan. It was seen by PET scan.) We have been following with CT scans only every 6 months and PET scans on an as needed only basis. Sometimes her CT scanned pulmonary nodules are read as benign since they have been stable through the course of years but we know they are malignant because they have been surgically sampled in the past.    · CT 8/16/16 shows increased sclerosis at T6 compared to 4/28/15.    · PET 10/11/16: Slight uptake at T6, SUV 3.2.  mild thickening of right adrenal gland with an SUV of 11.   · Continued Arimidex is minimal progression and had been on this since 2/17/10.  · Not referred for radiation since no pain at T6  · CT 1/27/17, unchanged.  · CT 8/1/17: Unchanged except subtle suggestion of mild increase in thickening of the right adrenal gland.    · CT 2/22/18, unchanged.  · CT 9/6/18: Unchanged except nodular thickening right adrenal gland significantly decreased.  · CT 9/5/2019: Unchanged.  · On the 6/12/2020 visit, the following scans were reviewed:  · CT neck and chest 5/11/2020, [facility], from 5/9/2019: Stable pulmonary nodules new lytic C7 lesion and posterior T1 lesion questionable T12 lesion.  No change in the T6 and T10 lesions.  · PET, [facility], 5/19/2020: Mildly enlarged left neck nodes are mildly  hypermetabolic.  Diffuse activity throughout the thickened left adrenal gland.  CT appearance unchanged from 5/9/2019.  Progressive T1 lesion seen on CT from 5/11/2020, mild some moderate activity.  T10 low-grade activity with mixed lucent and sclerotic findings.  T6 is a mixed lucent and sclerotic appearance but no significant activity.   · T12 (the biopsy report is labeled as T12 but patient insists this was a C7/T1 biopsy, not to T12) biopsy 6/15/2020, [facility]: Metastatic breast carcinoma.  ER >95%.  AK 0%.  HER-2 negative (1+)  · It seems the main progression at the 6/12/2020 visit was a new C7 and new T1 lesion.  Those were radiated with CyberKnife through [facility] early June 2020.  Because Arimidex has been working well since 2010, continue same Arimidex.  Add Zometa every 3 months.  · C7 and T1 found on CT 5/11/2020, [facility].  Pedro, Dr. Winston, early June 2020.  · CT C and T-spine 9/17/2020: New C7 lesion from 9/6/2018, but no change from 5/11/2020 CT.  T1 lesion stable from 5/11/2020, but new compared to 2/22/2018.  T10 lesion unchanged from 9/5/2019, but new from 2/22/2018.  Subtle 8 mm T12 lesion new from 9/5/2019.  · Therefore, no recent progression.  · Considering her good tolerance and long-term effectiveness of Arimidex, continue same therapy.  · CT CAP 12/10/2020: No progression  · Therefore, continue Arimidex.  · CA 15-3 normal through 12/14/2020  · Because CA 15-3 fabian to 27 on 4/13/2021, then 29.3 on 7/20/2021, then 34.3 on 10/12/2021, CT scans done earlier than planned:  · CT CAP with contrast 10/12/2021: New 5.2 x 4.6 cm mixed cystic and solid right ovarian mass compared to 12/10/2020.  Continued stability of bilateral pulmonary nodules and stable sclerotic bone metastasis.  · 11/4/2021: TLH/BSO (due to new 5 cm right ovarian mass on CT, 11/12/2021).  · Metastatic breast cancer involving bilateral ovaries and posterior uterine serosa.  ER 81-90%.  AK 61-70%.  HER-2 negative  · [facility] NGS: ER  98%.  OK 90%.  HER-2/eb negative.  AKT1 pathogenic variant, exon 3.  AR+, 95%.  No other significant mutations including negative PIK3CA  · Pelvic washings: Adenocarcinoma  · Because this was the only area of progression on CT, and has been resected, continue Arimidex which she has been on since 2/17/2010.  · 11/22/2021: Discussed with Dr. Blum, who has a focus on breast cancer.  We both agree: Continue Arimidex for now.  In the future, if significant progression is seen, then plan Faslodex injections with Ibrance  · CT CAP 2/1/2022: A few T-spine sclerotic lesions progressively more sclerotic over multiple prior CTs of indeterminate significance.  No clear signs of progression of disease.  · 3/15/2022: Although tumor marker is not rising and CT showed no clear signs of progression.  CT revealed progressively more sclerotic bone metastasis over multiple prior CTs, she is having increased right hip discomfort.  Sometimes this can be due to healing of metastasis (but she has been on the same treatment since 2010), or this could mean progression of bone metastasis.  It is reassuring that her tumor marker is not worsening.  She would like to see [facility] to see if they feel radiation to the hip would help her discomfort.  We will do a bone scan before that appointment as this might help [facility].  (Although she has seen [facility] radiation for her neck, she would like to see [facility] for this hip issue).  · 3/18/2022, bone scan: Metastatic disease at T9 and T10, corresponding to CT lesions.  Uptake also at T6, but to lesser degree.  These areas are new compared to last bone scan, 6/3/2009.  Therefore, overall, appears consistent with progression.  · 3/24/2021: Discussed changing  Arimidex to Faslodex/Ibrance 125 mg D1-21/28 days.  · However, Dr. Llanes will decide on 4/12/2022 if the patient needs any traditional XRT to hip/pelvis.  If so, this may cause cytopenias  due to the location of XRT.  Ibrance can also cause cytopenias.  If this is the case, we will wait to begin Ibrance until at least a few weeks after XRT completes, provided blood counts allow.   · 4/22/2022: Dr. Llanes reviewed MRI right hip from 4/19/2022, revealing L3 metastasis, similar size of prior CTs.  Therefore, she plans no XRT.  · 5/6/2022: Began Faslodex Ibrance  · CT 7/18/2022: Some bone mets more sclerotic, which could reflect treatment response.  Slightly increased RUL nodule, 1.1 cm, from 0.9 cm on 2/1/2022  · Bone scan 7/18/2022: No change from 3/18/2022  · 8/19/2022 (patient delayed follow-up to review scans until then): Continue same therapy since no significant progression of disease  · Bone scan 1/26/2023: Under represents bone metastasis compared to CT scans.  Slightly more intense uptake at T12 which was noted to perhaps represent treatment response.  · CT 1/26/2023: A few new subcentimeter indeterminate RLL pulmonary nodules.  Radiologist recommended follow-up chest imaging in 6 to 8 weeks.  Otherwise no change in the other bilateral pulmonary nodules.  No change in scattered bone metastasis except the T12 lesion continues to increase in sclerosis.  · 2/28/2023 office visit:  · Missed the mid November 2022 Faslodex and the mid January 2023 Faslodex and the mid February 2023 Faslodex.  · Although CT might represent slight progression, she has missed a few doses of Faslodex.  Would like a strong reason to change therapy since she has been doing well on Faslodex for quite some time.  Therefore, patient agrees: Maintain her same imaging interval, 6 months from last  · 5/2/2023: Ibrance was held an additional week to help sync the start of Ibrance cycles with Faslodex.  She will resume Ibrance today and proceed with her monthly Faslodex today.     CA 15-3:  · 31.7 on 2/28/2023, from 27.3 on 8/19/2022, from 22.5 on 5/6/2022, from 26.3 on 2/1/2022, from 32.6 on 11/22/2021, from 34.3 on 10/12/2021,  from 29.3 on 7/20/2021, from 27 on 4/13/2021, from 23.3 on 12/14/2020.    *Bony metastases  · T6 discovered by PET to August 2011 (did not show up by CT or bone scan)  · C7 and T1 [facility], early June 2020.  · June 2020 began Zometa every 3 months.  · She was warned of possible osteonecrosis of the jaw and renal insufficiency.  She states she has good dental health and sees her dentist regularly.  Because of prior hypercalcemia requiring stopping calcium supplements and because of high normal current calcium level, began without supplemental calcium.  Add calcium if calcium becomes low.  · 1/7/2022: Tooth extracted.  Plan was to wait 5 weeks to resume Zometa.  Patient delayed return until 3/15/2022 (over 8 weeks)  · May 2022: Changed Zometa to Xgeva every 3 months due to Cr up to 1.6  · 2/28/2023: She request to hold off on Xgeva because she is in the process of having dental work  · Xgeva remains on hold.  In the process of having dental work done.  Next dental appointment in 1 week.    *Bone health. Last bone density 10/11/16, worsened, but still normal with worst T score -0.9.  Patient stopped calcium January 2016, but remained on vitamin D.  I recommended she restart her calcium.  Stopped calcium early March 2018 due to hypercalcemia.  · DEXA 9/5/2019: Normal bone density    *Hypercalcemia.  Repeat calcium 3/7/18 normal, but patient self stopped calcium a few days prior.  Recommended she stay off calcium.  · Calcium was 11 on 6/19/2020  · Calcium 11 again on 12/10/2020  · Repeat calcium 10.5 on 12/14/2020  · Calcium 10.6.  Minimally elevated.  (Normal range up to 10.5)  · Calcium 10.8 on 3/15/2022.  Hopefully, Zometa will help with this.  · Calcium 10.7.    *Hypophosphatemia  · Phosphorus 2.3 on 6/24/2022.  Begin K-Phos Neutral 2 tablets twice daily  · Patient self stopped this around mid July 2022.  · Phosphorus normal, 3.6    *On the 10/5/16 visit, Tachycardia,  dyspnea on exertion, bilateral leg swelling, more sedentary recently.  CT PE protocol and bilateral lower extremity Dopplers 10/5/16, negative for clots.  She still has tachycardia and dyspnea on exertion.  Perhaps at least part of this is due to deconditioning.    *Previously complained of colon Right lower anterior rib discomfort.  CT unremarkable in that area.  No specific pain, just discomfort.  I explained to the patient if disease was found by PET scan or bone scan, I would not  since she has been doing well on Arimidex since 2010.   Currently denies pain.     *Depression/anxiety.  This limits compliance.  She sees a psychiatrist and a counselor regularly.  She states this is mostly due to body image issues instead of cancer.   · She continues with her counselor and psychiatrist.  Viral pandemic is making this a little more difficult.  · 4/27/2022: We will try to get her involved in some cancer support groups.  I also encouraged her to try out some small groups at [employer] (she struggles with loneliness).  · 2/28/2023: Has missed some visits due to emotional issues.  Offered behavioral oncology help.  She declines.  She is going to continue with her psychiatrist and counselor  · 4/4/2023: Continues to follow with her psychiatrist and counselor.  Feeling better as the weather improves.  Believes her breast and body image are potentially making her pain in the upper arms worse and she is interested in reconstructive surgery.    *Noncompliance due to depression/anxiety.    · Although she often misses appointments, she does reschedule and eventually come in.    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 Willie Quinn, [facility], on 5/21/2019.  Positive sentinel node (1 of 3 nodes)..  · Left neck dissection by Dr. Willie Quinn, [facility], on 6/5/2019.  22 nodes negative.  Negative margins.  No lymphovascular  invasion or perineural invasion.  2 mm depth of invasion.  Grade 1.  Squamous cell carcinoma.  1.8 cm tumor.  · Because the patient felt what she thought was left neck LAD, CT neck and chest and PET at [facility]. May 2020 performed.  She is being followed at [facility] for this.  · On 6/19/2020, per verbal report from Dr. Conn [facility], [facility] does not feel she has any active head and neck cancer.   · She states she has a follow-up with Dr. Kapadia, ENT at [facility], in June with CT neck 1 week prior  · No symptoms to suggest recurrence.  She follows with [facility].    *Previously complained of left hip pain x2 weeks.  This prompted an MRI pelvis 8/20/2019 at [facility] and open MRI which was negative for malignancy.    *Possible intolerance of Zometa  · After first dose, June 2020, 2 days of chills, bone pain  · She would like to try Zometa again.  If this occurs again, we will try to switch her to Xgeva monthly.  (No good data on every 3-month Xgeva)  · She did fine with the second dose of Zometa.    *Thickening of wall of transverse colon on CT 2/1/2022.  Last colonoscopy was 4/18/2019.  She wants the next colonoscopy done at [facility]  · Dr. Galeas will see her on 4/7/2022 to evaluate this    *Depression  · Is seeing a psychiatry NP and a therapist for many years.  · 6/24/2022: Reported she decided to stop seeing her therapist but is still interested in therapy.  She would like to see behavioral oncology at [facility].  This was arranged  · Contacted by Julissa Centeno (behavioral oncology).  Patient canceled on the day of the appointment during her first scheduled appointment with Julissa.  She then no showed for the rescheduled appointment.  Julissa recommends the patient continue to follow with her therapist and psychiatrist and if she needs additional support, to use [employer]    *Pruritic rash  · 8/19/2022: Referred to dermatology    *Patient went to the ER 2/7/2023 for dizziness  · 2/28/2023: This resolved.  She  understands if she has ongoing issues with dizziness she should contact us and we will arrange an MRI of the brain    *Pain in upper arms  · Reports this started December 2022.  Being evaluated by her PCP for this.  They did order PT, however patient did not attend these appointments.  Recommended she continue follow-up with PCP.  She also sees chiropractor who believes this is muscular in nature.  She is requesting to have her images faxed to her chiropractor for review.  Dr. Trice Lawler at [facility].  Patient educated that massage therapy and chiropractic adjustments are not recommended with metastatic disease to the bones.    *Neutropenia  · Having grade 3 neutropenia with an ANC less than 1000.  Per up-to-date guidelines, needs to have a CBC on day 1 of every Ibrance cycle.  Only proceed with Ibrance if ANC is greater than 1000 on day 1 of each Ibrance cycle.  · WBC 4.75, ANC 2.62    Monitoring on Ibrance:  CBC every 2 weeks for the first 2 months then monthly and as indicated.  After 6 months, if neutropenia grade 1 or 2 only, then CBC can be every 3 months    PLAN:   · Resume Ibrance 125 mg daily days 1-21 every 28 days.  · Per up to date guidelines she will need a CBC on day 1 of every cycle.  Only proceed with Ibrance if ANC is greater than 1000 on day 1 of each Ibrance cycle.  · Proceed with monthly Faslodex today.  · Xgeva remains on hold due to ongoing dental procedures.  · MD/NP monthly  ·   · MD/Faslodex 3 months.  A few days prior: CT chest abdomen and pelvis without contrast, bone scan, CBC, CMP, CA 15-3  · Resume every 3-month Xgeva 1 to 2 months after completing all dental procedures (she states she will let us know).  Last dose was 7/1/2022      · (Xgeva instead of Zometa due to intermittent high creatinine)  · MD or NP with Faslodex every 4 weeks  · Every 6 months CT CAP without IV contrast, bone scan (last 7/18/2022), CA 15-3  · Previously arranged for her to get involved with some  cancer support groups  ·  CT scans WITHOUT IV CONTRAST (again, to avoid further kidney damage.)  · She sees [facility], Dr. Bedoya annually, next is summer 2022    Send a copy of this note to   Isaac Valdes MD, Dr., radiation medicine, [facility]    The patient is on high risk medication that requires close monitoring for toxicity.    [id number], APRN  05/[birth year]/23

## 2023-05-02 ENCOUNTER — OFFICE VISIT (OUTPATIENT)
Dept: ONCOLOGY | Facility: CLINIC | Age: 68
End: 2023-05-02
Payer: MEDICARE

## 2023-05-02 ENCOUNTER — LAB (OUTPATIENT)
Dept: OTHER | Facility: HOSPITAL | Age: 68
End: 2023-05-02
Payer: MEDICARE

## 2023-05-02 ENCOUNTER — INFUSION (OUTPATIENT)
Dept: ONCOLOGY | Facility: HOSPITAL | Age: 68
End: 2023-05-02
Payer: MEDICARE

## 2023-05-02 VITALS
HEIGHT: 65 IN | TEMPERATURE: 97.8 F | DIASTOLIC BLOOD PRESSURE: 77 MMHG | BODY MASS INDEX: 26.01 KG/M2 | HEART RATE: 91 BPM | WEIGHT: 156.1 LBS | OXYGEN SATURATION: 99 % | SYSTOLIC BLOOD PRESSURE: 118 MMHG | RESPIRATION RATE: 16 BRPM

## 2023-05-02 DIAGNOSIS — C79.51 MALIGNANT NEOPLASM METASTATIC TO BONE: ICD-10-CM

## 2023-05-02 DIAGNOSIS — C78.02 CARCINOMA OF LEFT BREAST METASTATIC TO LUNG: ICD-10-CM

## 2023-05-02 DIAGNOSIS — C50.912 CARCINOMA OF LEFT BREAST METASTATIC TO LUNG: ICD-10-CM

## 2023-05-02 DIAGNOSIS — C50.912 CARCINOMA OF LEFT BREAST METASTATIC TO LUNG: Primary | ICD-10-CM

## 2023-05-02 DIAGNOSIS — C78.02 CARCINOMA OF LEFT BREAST METASTATIC TO LUNG: Primary | ICD-10-CM

## 2023-05-02 DIAGNOSIS — Z79.899 HIGH RISK MEDICATION USE: ICD-10-CM

## 2023-05-02 LAB
ALBUMIN SERPL-MCNC: 4.3 G/DL (ref 3.5–5.2)
ALBUMIN/GLOB SERPL: 1.5 G/DL
ALP SERPL-CCNC: 74 U/L (ref 39–117)
ALT SERPL W P-5'-P-CCNC: 12 U/L (ref 1–33)
ANION GAP SERPL CALCULATED.3IONS-SCNC: 9.2 MMOL/L (ref 5–15)
ANISOCYTOSIS BLD QL: NORMAL
AST SERPL-CCNC: 20 U/L (ref 1–32)
BASOPHILS # BLD AUTO: 0.14 10*3/MM3 (ref 0–0.2)
BASOPHILS NFR BLD AUTO: 2.9 % (ref 0–1.5)
BILIRUB SERPL-MCNC: 0.4 MG/DL (ref 0–1.2)
BUN SERPL-MCNC: 23 MG/DL (ref 8–23)
BUN/CREAT SERPL: 16.2 (ref 7–25)
CALCIUM SPEC-SCNC: 10.7 MG/DL (ref 8.6–10.5)
CHLORIDE SERPL-SCNC: 107 MMOL/L (ref 98–107)
CO2 SERPL-SCNC: 23.8 MMOL/L (ref 22–29)
CREAT SERPL-MCNC: 1.42 MG/DL (ref 0.57–1)
DEPRECATED RDW RBC AUTO: 59.7 FL (ref 37–54)
EGFRCR SERPLBLD CKD-EPI 2021: 40.4 ML/MIN/1.73
EOSINOPHIL # BLD AUTO: 0.08 10*3/MM3 (ref 0–0.4)
EOSINOPHIL NFR BLD AUTO: 1.7 % (ref 0.3–6.2)
ERYTHROCYTE [DISTWIDTH] IN BLOOD BY AUTOMATED COUNT: 14.7 % (ref 12.3–15.4)
GLOBULIN UR ELPH-MCNC: 2.8 GM/DL
GLUCOSE SERPL-MCNC: 102 MG/DL (ref 65–99)
HCT VFR BLD AUTO: 30.1 % (ref 34–46.6)
HGB BLD-MCNC: 10.5 G/DL (ref 12–15.9)
IMM GRANULOCYTES # BLD AUTO: 0.02 10*3/MM3 (ref 0–0.05)
IMM GRANULOCYTES NFR BLD AUTO: 0.4 % (ref 0–0.5)
LYMPHOCYTES # BLD AUTO: 1.21 10*3/MM3 (ref 0.7–3.1)
LYMPHOCYTES NFR BLD AUTO: 25.5 % (ref 19.6–45.3)
MCH RBC QN AUTO: 38.6 PG (ref 26.6–33)
MCHC RBC AUTO-ENTMCNC: 34.9 G/DL (ref 31.5–35.7)
MCV RBC AUTO: 110.7 FL (ref 79–97)
MONOCYTES # BLD AUTO: 0.68 10*3/MM3 (ref 0.1–0.9)
MONOCYTES NFR BLD AUTO: 14.3 % (ref 5–12)
NEUTROPHILS NFR BLD AUTO: 2.62 10*3/MM3 (ref 1.7–7)
NEUTROPHILS NFR BLD AUTO: 55.2 % (ref 42.7–76)
NRBC BLD AUTO-RTO: 0 /100 WBC (ref 0–0.2)
PLAT MORPH BLD: NORMAL
PLATELET # BLD AUTO: 376 10*3/MM3 (ref 140–450)
PMV BLD AUTO: 8.7 FL (ref 6–12)
POTASSIUM SERPL-SCNC: 4.8 MMOL/L (ref 3.5–5.2)
PROT SERPL-MCNC: 7.1 G/DL (ref 6–8.5)
RBC # BLD AUTO: 2.72 10*6/MM3 (ref 3.77–5.28)
SODIUM SERPL-SCNC: 140 MMOL/L (ref 136–145)
WBC MORPH BLD: NORMAL
WBC NRBC COR # BLD: 4.75 10*3/MM3 (ref 3.4–10.8)

## 2023-05-02 PROCEDURE — 80053 COMPREHEN METABOLIC PANEL: CPT | Performed by: NURSE PRACTITIONER

## 2023-05-02 PROCEDURE — 25010000002 FULVESTRANT PER 25 MG: Performed by: NURSE PRACTITIONER

## 2023-05-02 PROCEDURE — 85025 COMPLETE CBC W/AUTO DIFF WBC: CPT | Performed by: NURSE PRACTITIONER

## 2023-05-02 PROCEDURE — 96402 CHEMO HORMON ANTINEOPL SQ/IM: CPT

## 2023-05-02 PROCEDURE — 36415 COLL VENOUS BLD VENIPUNCTURE: CPT

## 2023-05-02 PROCEDURE — 85007 BL SMEAR W/DIFF WBC COUNT: CPT | Performed by: NURSE PRACTITIONER

## 2023-05-02 RX ORDER — LAMOTRIGINE 25 MG/1
500 TABLET ORAL ONCE
Status: CANCELLED
Start: 2023-05-02 | End: 2023-05-02

## 2023-05-02 RX ORDER — LAMOTRIGINE 25 MG/1
500 TABLET ORAL ONCE
Status: COMPLETED | OUTPATIENT
Start: 2023-05-02 | End: 2023-05-02

## 2023-05-02 RX ADMIN — FULVESTRANT 500 MG: 50 INJECTION INTRAMUSCULAR at 14:33

## 2023-05-03 ENCOUNTER — SPECIALTY PHARMACY (OUTPATIENT)
Dept: PHARMACY | Facility: HOSPITAL | Age: 68
End: 2023-05-03
Payer: MEDICARE

## 2023-05-25 NOTE — PROGRESS NOTES
.     REASONS FOR FOLLOWUP: Metastatic breast cancer, tongue cancer    HISTORY OF PRESENT ILLNESS:  The patient is a 67 y.o. year old female  who is here for follow-up with the above-mentioned history.    Marialuisa returns today for follow-up and evaluation prior to her monthly Faslodex.  She continues to receive Ibrance 125 mg daily days 1-21 every 28 days.  She is starting her next cycle today.  She was also receiving Xgeva, however this has been on hold due to ongoing dental work.  She went on vacation to the beach and returned yesterday.  She forgot to order her next shipment of Ibrance, so she does not have this available.  She plans to order her Ibrance as soon as she leaves her appointment today.  Reports her appetite comes and goes.  Continues with constipation, relieved with Senokot-S.  Does feel that her hair has started to thin over the last several weeks, does feel this has slowed down though.  Denies fever or chills.  Denies nausea or vomiting.  She denies new or worsening pain.    Evaluated by Dr. Arthur who offered right breast reduction, but did not feel patient was a candidate for left breast reconstruction.  He referred her to Dr. Holloway with [facility] for a second opinion to see if they would be comfortable performing reconstruction on her.  She is scheduled 6/13/2023.    She also continues to undergo dental work, scheduled for her next dental appointment early next week.    Past Medical History:   Diagnosis Date   • Allergy    • Anxiety    • Asthma    • Bone metastasis    • Breast cancer     Left node positive   • Cholelithiasis 2000    Lap Marily   • Colon polyp Past 10-15 yrs?    found at colonoscopies   • Depression    • Esophageal reflux     cough   • History of colon polyps    • Hyperlipidemia    • Hypertension    • Left breast cancer metastasized to lung    • Obstructive sleep apnea     does not wear cpap   • Ovarian cyst    • PONV (postoperative nausea and vomiting)    • Tongue cancer  05/2019    by ENT at [facility] dr Quinn     Past Surgical History:   Procedure Laterality Date   • CHOLECYSTECTOMY  2000   • COLONOSCOPY  2014    H/O polyps   • COLONOSCOPY N/A 6/1/2022    Procedure: COLONOSCOPY to cecum and TI with cold / hot snare polypectomies;  Surgeon: Luis Enrique Galeas MD;  Location: [facility] ENDOSCOPY;  Service: Gastroenterology;  Laterality: N/A;  pre-abnormal ct  post-polyps, diverticulosis, hemorrhoids   • KNEE ARTHROPLASTY     • LUNG SURGERY  2010    Lung wedge resection   • MASTECTOMY RADICAL Left 1993   • TONGUE SURGERY  05/21/2019    tongue cancer   • TOTAL LAPAROSCOPIC HYSTERECTOMY N/A 11/04/2021    Procedure: Robotic assisted total laparoscopic hysterectomy, bilateral salpingoophorecotmy, bilateral ureteralysis ;  Surgeon: Kaylynn Ricci DO;  Location: [facility] MAIN OR;  Service: Robotics - DaVinci;  Laterality: N/A;       MEDICATIONS    Current Outpatient Medications:   •  ARIPiprazole (ABILIFY) 2 MG tablet, Take 1 tablet by mouth Every Night., Disp: , Rfl:   •  atorvastatin (LIPITOR) 10 MG tablet, Take 1 tablet by mouth Every Night., Disp: 90 tablet, Rfl: 1  •  cholecalciferol (VITAMIN D3) 1.25 MG (65254 UT) capsule, Take 5,000 Units by mouth 3 (Three) Times a Week., Disp: , Rfl:   •  eszopiclone (LUNESTA) 3 MG tablet, Take 1 tablet by mouth., Disp: , Rfl:   •  fulvestrant (FASLODEX) 250 MG/5ML chemo syringe, Inject  into the appropriate muscle as directed by prescriber., Disp: , Rfl:   •  LORazepam (ATIVAN) 0.5 MG tablet, Take 1 tablet by mouth Every 8 (Eight) Hours As Needed., Disp: , Rfl:   •  meclizine (ANTIVERT) 25 MG tablet, Take 1 tablet by mouth 3 (Three) Times a Day As Needed for Dizziness., Disp: 20 tablet, Rfl: 0  •  metoprolol succinate XL (TOPROL-XL) 25 MG 24 hr tablet, Take 1 tablet by mouth Daily. (Patient taking differently: Take  by mouth Daily. Pt is now taking a half tablet), Disp: 90 tablet, Rfl: 1  •  Omeprazole 20 MG Tablet Delayed Release Dispersible, Take 1  tablet by mouth As Needed., Disp: , Rfl:   •  ondansetron (ZOFRAN) 8 MG tablet, TAKE 1 TABLET BY MOUTH THREE TIMES DAILY AS NEEDED FORNAUSEA AND VOMITING, Disp: , Rfl:   •  PARoxetine (PAXIL) 40 MG tablet, Take 1 tablet by mouth Every Night., Disp: , Rfl:   •  denosumab (Xgeva) 120 MG/1.7ML solution injection, Inject  under the skin into the appropriate area as directed. (Patient not taking: Reported on 2023), Disp: , Rfl:   •  Palbociclib 125 MG tablet, , Disp: , Rfl:   No current facility-administered medications for this visit.    Facility-Administered Medications Ordered in Other Visits:   •  chlorhexidine (PERIDEX) 0.12 % solution 15 mL, 15 mL, Mouth/Throat, Q12H, Kaylynn Ricci,   •  mupirocin (BACTROBAN) 2 % nasal ointment, , Nasal, BID, Kaylynn Ricci, DO    ALLERGIES:     Allergies   Allergen Reactions   • Nickel Unknown - Low Severity   • Ciprofloxacin Rash       SOCIAL HISTORY:       Social History     Socioeconomic History   • Marital status: Single   • Years of education: College   Tobacco Use   • Smoking status: Former     Packs/day: 2.00     Years: 30.00     Pack years: 60.00     Types: Cigarettes     Start date: 1973     Quit date: 2008     Years since quitting: 15.0   • Smokeless tobacco: Never   Vaping Use   • Vaping Use: Never used   Substance and Sexual Activity   • Alcohol use: No   • Drug use: No   • Sexual activity: Not Currently     Birth control/protection: None         FAMILY HISTORY:  Family History   Problem Relation Age of Onset   • Hypertension Mother    • Leukemia Mother 72   • COPD Mother         smoker   • Diabetes Brother    • Pancreatic cancer Brother    • Glaucoma Brother    • Heart disease Brother    • Hypertension Brother    • Gallbladder disease Brother    • Pancreatitis Brother          from pancreatic csncer   • Gallbladder disease Father    • Colon polyps Father    • Stroke Other         Grandmother   • Lupus Other         Aunt   • Alcohol abuse  "Other         Aunt   • Glaucoma Other         Grandmother   • Diabetes type II Brother    • Hypertension Brother    • Hyperlipidemia Brother    • Liver cancer Paternal Uncle    • Stomach cancer Paternal Aunt    • Alcohol abuse Maternal Aunt    • Malig Hyperthermia Neg Hx        REVIEW OF SYSTEMS:  Review of Systems   Constitutional: Negative for activity change.   HENT: Negative for nosebleeds and trouble swallowing.    Respiratory: Negative for shortness of breath and wheezing.    Cardiovascular: Negative for chest pain and palpitations.   Gastrointestinal: Negative for constipation, diarrhea and nausea.   Genitourinary: Negative for dysuria and hematuria.   Musculoskeletal: Negative for arthralgias and myalgias.   Skin: Negative for wound.   Neurological: Negative for seizures and syncope.   Hematological: Negative for adenopathy. Does not bruise/bleed easily.   Psychiatric/Behavioral: Negative for confusion.            Vitals:    05/30/23 1426   BP: 110/71   Pulse: 93   Resp: 16   Temp: 98.2 °F (36.8 °C)   TempSrc: Temporal   SpO2: 97%   Weight: 69.4 kg (152 lb 14.4 oz)   Height: 165 cm (64.96\")   PainSc: 0-No pain         5/30/2023     2:27 PM   Current Status   ECOG score 0        PHYSICAL EXAM:      CONSTITUTIONAL:  Vital signs reviewed.  No distress, looks comfortable.  EYES:  Conjunctiva and lids unremarkable.  PERRLA  EARS,NOSE,MOUTH,THROAT:  Ears and nose appear unremarkable.  Lips, teeth, gums appear unremarkable.  RESPIRATORY:  Normal respiratory effort.  Lungs clear to auscultation bilaterally.  CARDIOVASCULAR:  Normal S1, S2.  No murmurs rubs or gallops.  No significant lower extremity edema.  GASTROINTESTINAL: Abdomen appears unremarkable.  Nontender.  No hepatomegaly.  No splenomegaly.  LYMPHATIC:  No cervical, supraclavicular, axillary lymphadenopathy.  SKIN:  Warm.  No rashes.  PSYCHIATRIC:  Normal judgment and insight.  Normal mood and affect.    RECENT LABS:        WBC   Date/Time Value Ref Range " Status   05/30/2023 02:17 PM 1.98 (C) 3.40 - 10.80 10*3/mm3 Final     Hemoglobin   Date/Time Value Ref Range Status   05/30/2023 02:17 PM 10.8 (L) 12.0 - 15.9 g/dL Final     Platelets   Date/Time Value Ref Range Status   05/30/2023 02:17  140 - 450 10*3/mm3 Final       Assessment & Plan   Carcinoma of left breast metastatic to lung  - CBC and Differential  - Comprehensive metabolic panel    Malignant neoplasm metastatic to bone  - CBC and Differential  - Comprehensive metabolic panel    Marialuisa Vivar        Assessment/Plan     *Metastatic breast cancer to bilateral lungs and soft tissue in the mediastinum (likely the cause of her hoarseness). (Initial breast cancer diagnosed in 1993 treated with mastectomy, chemotherapy, radiation therapy and tamoxifen). Arimidex day 1 on 02/17/2010. PET scan late August 2011 shows no abnormal hypermetabolic activity. This has improved compared to the prior PET scan January 2010 which showed mild hypermetabolic activity in areas of metastasis. (In the past, her  T6 lesion did not show up on CT scans or bone scan. It was seen by PET scan.) We have been following with CT scans only every 6 months and PET scans on an as needed only basis. Sometimes her CT scanned pulmonary nodules are read as benign since they have been stable through the course of years but we know they are malignant because they have been surgically sampled in the past.    · CT 8/16/16 shows increased sclerosis at T6 compared to 4/28/15.    · PET 10/11/16: Slight uptake at T6, SUV 3.2.  mild thickening of right adrenal gland with an SUV of 11.   · Continued Arimidex is minimal progression and had been on this since 2/17/10.  · Not referred for radiation since no pain at T6  · CT 1/27/17, unchanged.  · CT 8/1/17: Unchanged except subtle suggestion of mild increase in thickening of the right adrenal gland.    · CT 2/22/18, unchanged.  · CT 9/6/18: Unchanged except nodular thickening right adrenal gland  significantly decreased.  · CT 9/5/2019: Unchanged.  · On the 6/12/2020 visit, the following scans were reviewed:  · CT neck and chest 5/11/2020, [facility], from 5/9/2019: Stable pulmonary nodules new lytic C7 lesion and posterior T1 lesion questionable T12 lesion.  No change in the T6 and T10 lesions.  · PET, [facility], 5/19/2020: Mildly enlarged left neck nodes are mildly hypermetabolic.  Diffuse activity throughout the thickened left adrenal gland.  CT appearance unchanged from 5/9/2019.  Progressive T1 lesion seen on CT from 5/11/2020, mild some moderate activity.  T10 low-grade activity with mixed lucent and sclerotic findings.  T6 is a mixed lucent and sclerotic appearance but no significant activity.   · T12 (the biopsy report is labeled as T12 but patient insists this was a C7/T1 biopsy, not to T12) biopsy 6/15/2020, [facility]: Metastatic breast carcinoma.  ER >95%.  LA 0%.  HER-2 negative (1+)  · It seems the main progression at the 6/12/2020 visit was a new C7 and new T1 lesion.  Those were radiated with CyberKnife through [facility] early June 2020.  Because Arimidex has been working well since 2010, continue same Arimidex.  Add Zometa every 3 months.  · C7 and T1 found on CT 5/11/2020, [facility].  Pedro, Dr. Winston, early June 2020.  · CT C and T-spine 9/17/2020: New C7 lesion from 9/6/2018, but no change from 5/11/2020 CT.  T1 lesion stable from 5/11/2020, but new compared to 2/22/2018.  T10 lesion unchanged from 9/5/2019, but new from 2/22/2018.  Subtle 8 mm T12 lesion new from 9/5/2019.  · Therefore, no recent progression.  · Considering her good tolerance and long-term effectiveness of Arimidex, continue same therapy.  · CT CAP 12/10/2020: No progression  · Therefore, continue Arimidex.  · CA 15-3 normal through 12/14/2020  · Because CA 15-3 fabian to 27 on 4/13/2021, then 29.3 on 7/20/2021, then 34.3 on 10/12/2021, CT scans done earlier than planned:  · CT CAP with contrast 10/12/2021: New 5.2 x 4.6 cm mixed  cystic and solid right ovarian mass compared to 12/10/2020.  Continued stability of bilateral pulmonary nodules and stable sclerotic bone metastasis.  · 11/4/2021: TLH/BSO (due to new 5 cm right ovarian mass on CT, 11/12/2021).  · Metastatic breast cancer involving bilateral ovaries and posterior uterine serosa.  ER 81-90%.  SC 61-70%.  HER-2 negative  · [facility] NGS: ER 98%.  SC 90%.  HER-2/eb negative.  AKT1 pathogenic variant, exon 3.  AR+, 95%.  No other significant mutations including negative PIK3CA  · Pelvic washings: Adenocarcinoma  · Because this was the only area of progression on CT, and has been resected, continue Arimidex which she has been on since 2/17/2010.  · 11/22/2021: Discussed with Dr. Blum, who has a focus on breast cancer.  We both agree: Continue Arimidex for now.  In the future, if significant progression is seen, then plan Faslodex injections with Ibrance  · CT CAP 2/1/2022: A few T-spine sclerotic lesions progressively more sclerotic over multiple prior CTs of indeterminate significance.  No clear signs of progression of disease.  · 3/15/2022: Although tumor marker is not rising and CT showed no clear signs of progression.  CT revealed progressively more sclerotic bone metastasis over multiple prior CTs, she is having increased right hip discomfort.  Sometimes this can be due to healing of metastasis (but she has been on the same treatment since 2010), or this could mean progression of bone metastasis.  It is reassuring that her tumor marker is not worsening.  She would like to see [facility] to see if they feel radiation to the hip would help her discomfort.  We will do a bone scan before that appointment as this might help [facility].  (Although she has seen [facility] radiation for her neck, she would like to see [facility] for this hip issue).  · 3/18/2022, bone scan: Metastatic disease at T9 and T10, corresponding to CT lesions.  Uptake  also at T6, but to lesser degree.  These areas are new compared to last bone scan, 6/3/2009.  Therefore, overall, appears consistent with progression.  · 3/24/2021: Discussed changing  Arimidex to Faslodex/Ibrance 125 mg D1-21/28 days.  · However, Dr. Llanes will decide on 4/12/2022 if the patient needs any traditional XRT to hip/pelvis.  If so, this may cause cytopenias due to the location of XRT.  Ibrance can also cause cytopenias.  If this is the case, we will wait to begin Ibrance until at least a few weeks after XRT completes, provided blood counts allow.   · 4/22/2022: Dr. Llanes reviewed MRI right hip from 4/19/2022, revealing L3 metastasis, similar size of prior CTs.  Therefore, she plans no XRT.  · 5/6/2022: Began Faslodex Ibrance  · CT 7/18/2022: Some bone mets more sclerotic, which could reflect treatment response.  Slightly increased RUL nodule, 1.1 cm, from 0.9 cm on 2/1/2022  · Bone scan 7/18/2022: No change from 3/18/2022  · 8/19/2022 (patient delayed follow-up to review scans until then): Continue same therapy since no significant progression of disease  · Bone scan 1/26/2023: Under represents bone metastasis compared to CT scans.  Slightly more intense uptake at T12 which was noted to perhaps represent treatment response.  · CT 1/26/2023: A few new subcentimeter indeterminate RLL pulmonary nodules.  Radiologist recommended follow-up chest imaging in 6 to 8 weeks.  Otherwise no change in the other bilateral pulmonary nodules.  No change in scattered bone metastasis except the T12 lesion continues to increase in sclerosis.  · 2/28/2023 office visit:  · Missed the mid November 2022 Faslodex and the mid January 2023 Faslodex and the mid February 2023 Faslodex.  · Although CT might represent slight progression, she has missed a few doses of Faslodex.  Would like a strong reason to change therapy since she has been doing well on Faslodex for quite some time.  Therefore, patient agrees: Maintain her same  imaging interval, 6 months from last  · 5/2/2023: Ibrance was held an additional week to help sync the start of Ibrance cycles with Faslodex.  She will resume Ibrance today and proceed with her monthly Faslodex today.   · 5/30/2023: Due to resume next cycle of Ibrance/Faslodex today.  .  Reviewed up-to-date with Dr. Hawkins and [facility] pharmacist Mari Holder.  We will have patient return in 1 week and if ANC greater than 1000, plan to proceed with neck cycle of Ibrance and Faslodex.  · If patient requires delay in Ibrance 2 cycles in a row or every other cycle, plan to reduce Ibrance dosing.    CA 15-3:  · 31.7 on 2/28/2023, from 27.3 on 8/19/2022, from 22.5 on 5/6/2022, from 26.3 on 2/1/2022, from 32.6 on 11/22/2021, from 34.3 on 10/12/2021, from 29.3 on 7/20/2021, from 27 on 4/13/2021, from 23.3 on 12/14/2020.    *Bony metastases  · T6 discovered by PET to August 2011 (did not show up by CT or bone scan)  · C7 and T1 found on CT 5/11/2020, [facility].  CyberKnife, Dr. Winston, early June 2020.  · June 2020 began Zometa every 3 months.  · She was warned of possible osteonecrosis of the jaw and renal insufficiency.  She states she has good dental health and sees her dentist regularly.  Because of prior hypercalcemia requiring stopping calcium supplements and because of high normal current calcium level, began without supplemental calcium.  Add calcium if calcium becomes low.  · 1/7/2022: Tooth extracted.  Plan was to wait 5 weeks to resume Zometa.  Patient delayed return until 3/15/2022 (over 8 weeks)  · May 2022: Changed Zometa to Xgeva every 3 months due to Cr up to 1.6  · 2/28/2023: She request to hold off on Xgeva because she is in the process of having dental work  · Xgeva remains on hold.  In the process of having dental work done.  Next dental appointment in 1 week.     *Bone health. Last bone density 10/11/16, worsened, but still normal with worst T score -0.9.  Patient stopped calcium January 2016, but remained on  vitamin D.  I recommended she restart her calcium.  Stopped calcium early March 2018 due to hypercalcemia.  · DEXA 9/5/2019: Normal bone density    *Hypercalcemia.  Repeat calcium 3/7/18 normal, but patient self stopped calcium a few days prior.  Recommended she stay off calcium.  · Calcium was 11 on 6/19/2020  · Calcium 11 again on 12/10/2020  · Repeat calcium 10.5 on 12/14/2020  · Calcium 10.6.  Minimally elevated.  (Normal range up to 10.5)  · Calcium 10.8 on 3/15/2022.  Hopefully, Zometa will help with this.  · Calcium 10.5.    *Hypophosphatemia  · Phosphorus 2.3 on 6/24/2022.  Begin K-Phos Neutral 2 tablets twice daily  · Patient self stopped this around mid July 2022.    *On the 10/5/16 visit, Tachycardia, dyspnea on exertion, bilateral leg swelling, more sedentary recently.  CT PE protocol and bilateral lower extremity Dopplers 10/5/16, negative for clots.  She still has tachycardia and dyspnea on exertion.  Perhaps at least part of this is due to deconditioning.    *Previously complained of colon Right lower anterior rib discomfort.  CT unremarkable in that area.  No specific pain, just discomfort.  I explained to the patient if disease was found by PET scan or bone scan, I would not  since she has been doing well on Arimidex since 2010.   Currently denies pain.     *Depression/anxiety.  This limits compliance.  She sees a psychiatrist and a counselor regularly.  She states this is mostly due to body image issues instead of cancer.   · She continues with her counselor and psychiatrist.  Viral pandemic is making this a little more difficult.  · 4/27/2022: We will try to get her involved in some cancer support groups.  I also encouraged her to try out some small groups at [employer] (she struggles with loneliness).  · 2/28/2023: Has missed some visits due to emotional issues.  Offered behavioral oncology help.  She declines.  She is going to continue with her psychiatrist and  counselor  · 4/4/2023: Continues to follow with her psychiatrist and counselor.  Feeling better as the weather improves.  Believes her breast and body image are potentially making her pain in the upper arms worse and she is interested in reconstructive surgery.    *Noncompliance due to depression/anxiety.    · Although she often misses appointments, she does reschedule and eventually come in.    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 Willie Quinn, [facility], on 5/21/2019.  Positive sentinel node (1 of 3 nodes)..  · Left neck dissection by Dr. Wlilie Quinn, [facility], on 6/5/2019.  22 nodes negative.  Negative margins.  No lymphovascular invasion or perineural invasion.  2 mm depth of invasion.  Grade 1.  Squamous cell carcinoma.  1.8 cm tumor.  · Because the patient felt what she thought was left neck LAD, CT neck and chest and PET at [facility]. May 2020 performed.  She is being followed at [facility] for this.  · On 6/19/2020, per verbal report from Dr. Senia [facility], [facility] ENT does not feel she has any active head and neck cancer.   · She states she has a follow-up with Dr. Kapadia, ENT at [facility], in June with CT neck 1 week prior  · No symptoms to suggest recurrence.  She follows with [facility].    *Previously complained of left hip pain x2 weeks.  This prompted an MRI pelvis 8/20/2019 at [facility] and open MRI which was negative for malignancy.    *Possible intolerance of Zometa  · After first dose, June 2020, 2 days of chills, bone pain  · She would like to try Zometa again.  If this occurs again, we will try to switch her to Xgeva monthly.  (No good data on every 3-month Xgeva)  · She did fine with the second dose of Zometa.    *Thickening of wall of transverse colon on CT 2/1/2022.  Last colonoscopy was 4/18/2019.  She wants the next colonoscopy done at [facility]  · Dr. Galeas will see her on 4/7/2022 to evaluate  this    *Depression  · Is seeing a psychiatry NP and a therapist for many years.  · 6/24/2022: Reported she decided to stop seeing her therapist but is still interested in therapy.  She would like to see behavioral oncology at [facility].  This was arranged  · Contacted by Julissa Centeno (behavioral oncology).  Patient canceled on the day of the appointment during her first scheduled appointment with Julissa.  She then no showed for the rescheduled appointment.  Julissa recommends the patient continue to follow with her therapist and psychiatrist and if she needs additional support, to use [employer]    *Pruritic rash  · 8/19/2022: Referred to dermatology    *Patient went to the ER 2/7/2023 for dizziness  · 2/28/2023: This resolved.  She understands if she has ongoing issues with dizziness she should contact us and we will arrange an MRI of the brain    *Pain in upper arms  · Reports this started December 2022.  Being evaluated by her PCP for this.  They did order PT, however patient did not attend these appointments.  Recommended she continue follow-up with PCP.  She also sees chiropractor who believes this is muscular in nature.  She is requesting to have her images faxed to her chiropractor for review.  Dr. Trice Lawler at [facility].  Patient educated that massage therapy and chiropractic adjustments are not recommended with metastatic disease to the bones.  · Scheduled to start working with PT.    *Neutropenia  · Having grade 3 neutropenia with an ANC less than 1000.  Per up-to-date guidelines, needs to have a CBC on day 1 of every Ibrance cycle.  Only proceed with Ibrance if ANC is greater than 1000 on day 1 of each Ibrance cycle.  · WBC 1.98, ANC 0.70.  Delay Ibrance/Faslodex by 1 week.  Patient will return for CBC with NP and if ANC greater than 1000, may resume neck cycle of Ibrance/Faslodex.  Plan to keep Ibrance and Faslodex in sync.  If patient requires delay in treatment 2 times in a row or every  other cycle plan to reduce dose of Faslodex.    *Evaluated by Dr. Arthur for reconstruction.  He was agreeable to proceeding with right breast reduction, however did not feel patient was a candidate for left breast reconstruction.  He did refer her to Dr. Holloway with [facility] for a second opinion, scheduled 6/13/2023.    Monitoring on Ibrance:  CBC every 2 weeks for the first 2 months then monthly and as indicated.  After 6 months, if neutropenia grade 1 or 2 only, then CBC can be every 3 months    PLAN:    · Delay next cycle of Ibrance/Faslodex by 1 week due to grade 3 neutropenia.    · Return in 1 week for labs and NP, if ANC greater than 1000 may resume Ibrance 125 mg daily days 1-21 every 28 days.  · Per up to date guidelines she will need a CBC on day 1 of every cycle.  Only proceed with Ibrance if ANC is greater than 1000 on day 1 of each Ibrance cycle.  · If patient requires delay 2 cycles in a row or every other cycle, plan to reduce Ibrance dosing.  · Return in 1 week for monthly Faslodex, we will plan to keep Faslodex in sync with Ibrance.  · Xgeva remains on hold due to ongoing dental procedures.  · MD/NP monthly  ·   · MD/Faslodex 3 months.  A few days prior: CT chest abdomen and pelvis without contrast, bone scan, CBC, CMP, CA 15-3  · Resume every 3-month Xgeva 1 to 2 months after completing all dental procedures (she states she will let us know).  Last dose was 7/1/2022      · (Xgeva instead of Zometa due to intermittent high creatinine)  · MD or NP with Faslodex every 4 weeks  · Every 6 months CT CAP without IV contrast, bone scan (last 7/18/2022), CA 15-3  · Previously arranged for her to get involved with some cancer support groups  ·  CT scans WITHOUT IV CONTRAST (again, to avoid further kidney damage.)  · She sees [facility], Dr. Bedoya annually, next is summer 2022    Send a copy of this note to   Dr. Willie Quinn, [facility] MD Dr. Steve Winston, radiation medicine, [facility]    The  patient is on high risk medication that requires close monitoring for toxicity.  The patient was discussed with Dr. Hawkins and Mari Holder, [facility] pharmacist who are both in agreement with today's plan.    I spent 50 minutes caring for Marialuisa on this date of service. This time includes time spent by me in the following activities: preparing for the visit, reviewing tests, obtaining and/or reviewing a separately obtained history, performing a medically appropriate examination and/or evaluation, counseling and educating the patient/family/caregiver, documenting information in the medical record and care coordination.     Sigrid Cummings, APRN  05/30/23     ADDENDUM: Patient recently evaluated by [facility] for history of head and neck cancer.  They are recommending patient proceed with imaging for evaluation of dysphonia.  They feel this is likely chronic issue, however feel patient may benefit from imaging.  Discussed with Dr. Hawkins, we will schedule patient for CT neck without contrast with upcoming CT scans already scheduled on 8/1/2023.  Order placed today and message sent to scheduling to coordinate.

## 2023-05-30 ENCOUNTER — DOCUMENTATION (OUTPATIENT)
Dept: ONCOLOGY | Facility: CLINIC | Age: 68
End: 2023-05-30

## 2023-05-30 ENCOUNTER — LAB (OUTPATIENT)
Dept: OTHER | Facility: HOSPITAL | Age: 68
End: 2023-05-30

## 2023-05-30 ENCOUNTER — INFUSION (OUTPATIENT)
Dept: ONCOLOGY | Facility: HOSPITAL | Age: 68
End: 2023-05-30

## 2023-05-30 ENCOUNTER — OFFICE VISIT (OUTPATIENT)
Dept: ONCOLOGY | Facility: CLINIC | Age: 68
End: 2023-05-30

## 2023-05-30 VITALS
TEMPERATURE: 98.2 F | BODY MASS INDEX: 25.47 KG/M2 | WEIGHT: 152.9 LBS | DIASTOLIC BLOOD PRESSURE: 71 MMHG | HEIGHT: 65 IN | HEART RATE: 93 BPM | OXYGEN SATURATION: 97 % | SYSTOLIC BLOOD PRESSURE: 110 MMHG | RESPIRATION RATE: 16 BRPM

## 2023-05-30 DIAGNOSIS — C79.51 MALIGNANT NEOPLASM METASTATIC TO BONE: ICD-10-CM

## 2023-05-30 DIAGNOSIS — C50.912 CARCINOMA OF LEFT BREAST METASTATIC TO LUNG: Primary | ICD-10-CM

## 2023-05-30 DIAGNOSIS — C78.02 CARCINOMA OF LEFT BREAST METASTATIC TO LUNG: ICD-10-CM

## 2023-05-30 DIAGNOSIS — C79.51 MALIGNANT NEOPLASM METASTATIC TO BONE: Primary | ICD-10-CM

## 2023-05-30 DIAGNOSIS — R49.0 DYSPHONIA: ICD-10-CM

## 2023-05-30 DIAGNOSIS — T45.1X5A CHEMOTHERAPY INDUCED NEUTROPENIA: ICD-10-CM

## 2023-05-30 DIAGNOSIS — Z79.899 HIGH RISK MEDICATION USE: ICD-10-CM

## 2023-05-30 DIAGNOSIS — D70.1 CHEMOTHERAPY INDUCED NEUTROPENIA: ICD-10-CM

## 2023-05-30 DIAGNOSIS — C50.912 CARCINOMA OF LEFT BREAST METASTATIC TO LUNG: ICD-10-CM

## 2023-05-30 DIAGNOSIS — C78.02 CARCINOMA OF LEFT BREAST METASTATIC TO LUNG: Primary | ICD-10-CM

## 2023-05-30 LAB
ALBUMIN SERPL-MCNC: 3.9 G/DL (ref 3.5–5.2)
ALBUMIN/GLOB SERPL: 1.2 G/DL
ALP SERPL-CCNC: 85 U/L (ref 39–117)
ALT SERPL W P-5'-P-CCNC: 9 U/L (ref 1–33)
ANION GAP SERPL CALCULATED.3IONS-SCNC: 9.3 MMOL/L (ref 5–15)
AST SERPL-CCNC: 17 U/L (ref 1–32)
BASOPHILS # BLD AUTO: 0.02 10*3/MM3 (ref 0–0.2)
BASOPHILS NFR BLD AUTO: 1 % (ref 0–1.5)
BILIRUB SERPL-MCNC: 0.6 MG/DL (ref 0–1.2)
BUN SERPL-MCNC: 22 MG/DL (ref 8–23)
BUN/CREAT SERPL: 13 (ref 7–25)
CALCIUM SPEC-SCNC: 10.5 MG/DL (ref 8.6–10.5)
CHLORIDE SERPL-SCNC: 107 MMOL/L (ref 98–107)
CO2 SERPL-SCNC: 23.7 MMOL/L (ref 22–29)
CREAT SERPL-MCNC: 1.69 MG/DL (ref 0.57–1)
DEPRECATED RDW RBC AUTO: 54.8 FL (ref 37–54)
EGFRCR SERPLBLD CKD-EPI 2021: 32.8 ML/MIN/1.73
EOSINOPHIL # BLD AUTO: 0.03 10*3/MM3 (ref 0–0.4)
EOSINOPHIL NFR BLD AUTO: 1.5 % (ref 0.3–6.2)
ERYTHROCYTE [DISTWIDTH] IN BLOOD BY AUTOMATED COUNT: 14.3 % (ref 12.3–15.4)
GLOBULIN UR ELPH-MCNC: 3.3 GM/DL
GLUCOSE SERPL-MCNC: 105 MG/DL (ref 65–99)
HCT VFR BLD AUTO: 30.7 % (ref 34–46.6)
HGB BLD-MCNC: 10.8 G/DL (ref 12–15.9)
IMM GRANULOCYTES # BLD AUTO: 0 10*3/MM3 (ref 0–0.05)
IMM GRANULOCYTES NFR BLD AUTO: 0 % (ref 0–0.5)
LYMPHOCYTES # BLD AUTO: 0.94 10*3/MM3 (ref 0.7–3.1)
LYMPHOCYTES NFR BLD AUTO: 47.5 % (ref 19.6–45.3)
MACROCYTES BLD QL SMEAR: NORMAL
MCH RBC QN AUTO: 38 PG (ref 26.6–33)
MCHC RBC AUTO-ENTMCNC: 35.2 G/DL (ref 31.5–35.7)
MCV RBC AUTO: 108.1 FL (ref 79–97)
MONOCYTES # BLD AUTO: 0.29 10*3/MM3 (ref 0.1–0.9)
MONOCYTES NFR BLD AUTO: 14.6 % (ref 5–12)
NEUTROPHILS NFR BLD AUTO: 0.7 10*3/MM3 (ref 1.7–7)
NEUTROPHILS NFR BLD AUTO: 35.4 % (ref 42.7–76)
NRBC BLD AUTO-RTO: 0 /100 WBC (ref 0–0.2)
PLAT MORPH BLD: NORMAL
PLATELET # BLD AUTO: 160 10*3/MM3 (ref 140–450)
PMV BLD AUTO: 9.7 FL (ref 6–12)
POTASSIUM SERPL-SCNC: 4.6 MMOL/L (ref 3.5–5.2)
PROT SERPL-MCNC: 7.2 G/DL (ref 6–8.5)
RBC # BLD AUTO: 2.84 10*6/MM3 (ref 3.77–5.28)
SODIUM SERPL-SCNC: 140 MMOL/L (ref 136–145)
WBC MORPH BLD: NORMAL
WBC NRBC COR # BLD: 1.98 10*3/MM3 (ref 3.4–10.8)

## 2023-05-30 PROCEDURE — 36415 COLL VENOUS BLD VENIPUNCTURE: CPT

## 2023-05-30 PROCEDURE — 80053 COMPREHEN METABOLIC PANEL: CPT | Performed by: NURSE PRACTITIONER

## 2023-05-30 PROCEDURE — G0463 HOSPITAL OUTPT CLINIC VISIT: HCPCS

## 2023-05-30 PROCEDURE — 85007 BL SMEAR W/DIFF WBC COUNT: CPT | Performed by: NURSE PRACTITIONER

## 2023-05-30 PROCEDURE — 85025 COMPLETE CBC W/AUTO DIFF WBC: CPT | Performed by: NURSE PRACTITIONER

## 2023-05-30 RX ORDER — LAMOTRIGINE 25 MG/1
500 TABLET ORAL ONCE
Start: 2023-05-30 | End: 2023-05-30

## 2023-05-30 NOTE — PROGRESS NOTES
"Sigrid Cummings NP in our office saw the patient today.  Holding Ibrance because   Resume at same dose when ANC improves to grade 2 or better.    If we hold Ibrance 2 weeks in a row or 2 \"day 1\"s in a row, or every other \"day 1\" cycle, then use the next lower dose.    Keep Faslodex and Ibrance synced because we make decisions on Ibrance dosing based on labs and she is here for labs on days of Faslodex.  Also, using Ibrance to make Faslodex more effective  "

## 2023-05-30 NOTE — PROGRESS NOTES
"No one has contacted me.  But yes, if they want us to add imaging regarding the head and neck cancer, please add a CT of the neck with her upcoming scans on 8/1/2023.  The CT of the neck should be without contrast due to chronic kidney disease    ===View-only below this line===  ----- Message -----  From: Sigrid Cummings APRN  Sent: 5/30/2023   4:06 PM EDT  To: Jarod Hawkins II, MD    Patient recently saw Carmela Smith with [facility] for history of squamous cell carcinoma of the tongue.    Marialuisa complained of dysphonia so Carmela had in her plan/note:    \"will refer patient to Dr Abarca and reach out to her med onc to include head imaging on her next set of scans. This is very likely a chronic issue based on her history but its worth considering some new imaging. She denies s/s ofaspiration \"    Wasn't sure if you had heard from their office or not.  Would you like to add any additional imaging with her upcoming CT/bone scans 8/1/2023?    Thanks!      "

## 2023-05-31 ENCOUNTER — SPECIALTY PHARMACY (OUTPATIENT)
Dept: PHARMACY | Facility: HOSPITAL | Age: 68
End: 2023-05-31

## 2023-06-02 ENCOUNTER — DOCUMENTATION (OUTPATIENT)
Dept: PHARMACY | Facility: HOSPITAL | Age: 68
End: 2023-06-02

## 2023-06-02 NOTE — PROGRESS NOTES
I received a fax notice from [employer] dated 6/1/2023 stating that Ibrance has shipped to the patient.      Sarai Werner - Care Coordinator   6/2/2023  11:59 EDT

## 2023-06-06 NOTE — PROGRESS NOTES
.     REASONS FOR FOLLOWUP: Metastatic breast cancer, tongue cancer    HISTORY OF PRESENT ILLNESS:  The patient is a 67 y.o. year old female  who is here for follow-up with the above-mentioned history.    Marialuisa returns today for follow-up and evaluation prior to her monthly Faslodex.  She continues on Ibrance 125 mg daily days 1-21 every 28 days.  She was due to start Ibrance and received Faslodex last Tuesday, however treatment delay due to ANC of 700.  She was scheduled to return this Tuesday, 6/6/2023, however was experiencing heartburn so she canceled her appointment and rescheduled to today.  She is feeling much improved, and back to baseline.  Eating and drinking well.  Bowels moving regularly.  Denies fever or chills.  Denies nausea or vomiting.  Denies new or worsening pain.    Xgeva remains on hold due to ongoing dental work.  Was supposed to have dental work on Tuesday, however she canceled this appointment as well and is rescheduled in 2-3 weeks.    Past Medical History:   Diagnosis Date    Allergy     Anxiety     Asthma     Bone metastasis     Breast cancer     Left node positive    Cholelithiasis 2000    Lap Marily    Colon polyp Past 10-15 yrs?    found at colonoscopies    Depression     Esophageal reflux     cough    History of colon polyps     Hyperlipidemia     Hypertension     Left breast cancer metastasized to lung     Obstructive sleep apnea     does not wear cpap    Ovarian cyst     PONV (postoperative nausea and vomiting)     Tongue cancer 05/2019    by ENT at [facility] dr Quinn     Past Surgical History:   Procedure Laterality Date    CHOLECYSTECTOMY  2000    COLONOSCOPY  2014    H/O polyps    COLONOSCOPY N/A 6/1/2022    Procedure: COLONOSCOPY to cecum and TI with cold / hot snare polypectomies;  Surgeon: Luis Enrique Galeas MD;  Location: [facility] ENDOSCOPY;  Service: Gastroenterology;  Laterality: N/A;  pre-abnormal ct  post-polyps, diverticulosis, hemorrhoids    KNEE ARTHROPLASTY      LUNG  SURGERY  2010    Lung wedge resection    MASTECTOMY RADICAL Left 1993    TONGUE SURGERY  05/21/2019    tongue cancer    TOTAL LAPAROSCOPIC HYSTERECTOMY N/A 11/04/2021    Procedure: Robotic assisted total laparoscopic hysterectomy, bilateral salpingoophorecotmy, bilateral ureteralysis ;  Surgeon: Kaylynn Ricci DO;  Location: [facility];  Service: Robotics - [facility];  Laterality: N/A;       MEDICATIONS    Current Outpatient Medications:     ARIPiprazole (ABILIFY) 2 MG tablet, Take 1 tablet by mouth Every Night., Disp: , Rfl:     atorvastatin (LIPITOR) 10 MG tablet, Take 1 tablet by mouth Every Night., Disp: 90 tablet, Rfl: 1    cholecalciferol (VITAMIN D3) 1.25 MG (34772 UT) capsule, Take 5,000 Units by mouth 3 (Three) Times a Week., Disp: , Rfl:     denosumab (Xgeva) 120 MG/1.7ML solution injection, Inject  under the skin into the appropriate area as directed., Disp: , Rfl:     eszopiclone (LUNESTA) 3 MG tablet, Take 1 tablet by mouth., Disp: , Rfl:     fulvestrant (FASLODEX) 250 MG/5ML chemo syringe, Inject  into the appropriate muscle as directed by prescriber., Disp: , Rfl:     LORazepam (ATIVAN) 0.5 MG tablet, Take 1 tablet by mouth Every 8 (Eight) Hours As Needed., Disp: , Rfl:     meclizine (ANTIVERT) 25 MG tablet, Take 1 tablet by mouth 3 (Three) Times a Day As Needed for Dizziness., Disp: 20 tablet, Rfl: 0    metoprolol succinate XL (TOPROL-XL) 25 MG 24 hr tablet, Take 1 tablet by mouth Daily. (Patient taking differently: Take  by mouth Daily. Pt is now taking a half tablet), Disp: 90 tablet, Rfl: 1    Omeprazole 20 MG Tablet Delayed Release Dispersible, Take 1 tablet by mouth As Needed., Disp: , Rfl:     ondansetron (ZOFRAN) 8 MG tablet, TAKE 1 TABLET BY MOUTH THREE TIMES DAILY AS NEEDED FORNAUSEA AND VOMITING, Disp: , Rfl:     Palbociclib 125 MG tablet, , Disp: , Rfl:     PARoxetine (PAXIL) 40 MG tablet, Take 1 tablet by mouth Every Night., Disp: , Rfl:   No current facility-administered  medications for this visit.    Facility-Administered Medications Ordered in Other Visits:     chlorhexidine (PERIDEX) 0.12 % solution 15 mL, 15 mL, Mouth/Throat, Q12H, Kaylynn Ricci DO    mupirocin (BACTROBAN) 2 % nasal ointment, , Nasal, BID, Kaylynn Ricci DO    ALLERGIES:     Allergies   Allergen Reactions    Nickel Unknown - Low Severity    Ciprofloxacin Rash       SOCIAL HISTORY:       Social History     Socioeconomic History    Marital status: Single    Years of education: College   Tobacco Use    Smoking status: Former     Packs/day: 2.00     Years: 30.00     Pack years: 60.00     Types: Cigarettes     Start date: 1973     Quit date: 2008     Years since quitting: 15.0    Smokeless tobacco: Never   Vaping Use    Vaping Use: Never used   Substance and Sexual Activity    Alcohol use: No    Drug use: No    Sexual activity: Not Currently     Birth control/protection: None         FAMILY HISTORY:  Family History   Problem Relation Age of Onset    Hypertension Mother     Leukemia Mother 72    COPD Mother         smoker    Diabetes Brother     Pancreatic cancer Brother     Glaucoma Brother     Heart disease Brother     Hypertension Brother     Gallbladder disease Brother     Pancreatitis Brother          from pancreatic csncer    Gallbladder disease Father     Colon polyps Father     Stroke Other         Grandmother    Lupus Other         Aunt    Alcohol abuse Other         Aunt    Glaucoma Other         Grandmother    Diabetes type II Brother     Hypertension Brother     Hyperlipidemia Brother     Liver cancer Paternal Uncle     Stomach cancer Paternal Aunt     Alcohol abuse Maternal Aunt     Malig Hyperthermia Neg Hx        REVIEW OF SYSTEMS:  Review of Systems   Constitutional: Negative for activity change.   HENT: Negative for nosebleeds and trouble swallowing.    Respiratory: Negative for shortness of breath and wheezing.    Cardiovascular: Negative for chest pain and palpitations.  "  Gastrointestinal: Negative for constipation, diarrhea and nausea.   Genitourinary: Negative for dysuria and hematuria.   Musculoskeletal: Negative for arthralgias and myalgias.   Skin: Negative for wound.   Neurological: Negative for seizures and syncope.   Hematological: Negative for adenopathy. Does not bruise/bleed easily.   Psychiatric/Behavioral: Negative for confusion.            Vitals:    06/08/23 1000   BP: 103/65   Pulse: 76   Temp: 97.5 °F (36.4 °C)   TempSrc: Temporal   SpO2: 100%   Weight: 71.6 kg (157 lb 12.8 oz)   Height: 165 cm (64.96\")   PainSc: 0-No pain           6/8/2023     9:58 AM   Current Status   ECOG score 0        PHYSICAL EXAM:      CONSTITUTIONAL:  Vital signs reviewed.  No distress, looks comfortable.  EYES:  Conjunctiva and lids unremarkable.  PERRLA  EARS,NOSE,MOUTH,THROAT:  Ears and nose appear unremarkable.  Lips, teeth, gums appear unremarkable.  RESPIRATORY:  Normal respiratory effort.  Lungs clear to auscultation bilaterally.  CARDIOVASCULAR:  Normal S1, S2.  No murmurs rubs or gallops.  No significant lower extremity edema.  GASTROINTESTINAL: Abdomen appears unremarkable.  Nontender.  No hepatomegaly.  No splenomegaly.  LYMPHATIC:  No cervical, supraclavicular, axillary lymphadenopathy.  SKIN:  Warm.  No rashes.  PSYCHIATRIC:  Normal judgment and insight.  Normal mood and affect.    RECENT LABS:        WBC   Date/Time Value Ref Range Status   06/08/2023 09:49 AM 4.08 3.40 - 10.80 10*3/mm3 Final     Hemoglobin   Date/Time Value Ref Range Status   06/08/2023 09:49 AM 10.6 (L) 12.0 - 15.9 g/dL Final     Platelets   Date/Time Value Ref Range Status   06/08/2023 09:49  140 - 450 10*3/mm3 Final       Assessment & Plan   There are no diagnoses linked to this encounter.    Marialuisa ESTRELLA Vivar        Assessment/Plan     *Metastatic breast cancer to bilateral lungs and soft tissue in the mediastinum (likely the cause of her hoarseness). (Initial breast cancer diagnosed in 1993 treated " with mastectomy, chemotherapy, radiation therapy and tamoxifen). Arimidex day 1 on 02/17/2010. PET scan late August 2011 shows no abnormal hypermetabolic activity. This has improved compared to the prior PET scan January 2010 which showed mild hypermetabolic activity in areas of metastasis. (In the past, her  T6 lesion did not show up on CT scans or bone scan. It was seen by PET scan.) We have been following with CT scans only every 6 months and PET scans on an as needed only basis. Sometimes her CT scanned pulmonary nodules are read as benign since they have been stable through the course of years but we know they are malignant because they have been surgically sampled in the past.    CT 8/16/16 shows increased sclerosis at T6 compared to 4/28/15.    PET 10/11/16: Slight uptake at T6, SUV 3.2.  mild thickening of right adrenal gland with an SUV of 11.   Continued Arimidex is minimal progression and had been on this since 2/17/10.  Not referred for radiation since no pain at T6  CT 1/27/17, unchanged.  CT 8/1/17: Unchanged except subtle suggestion of mild increase in thickening of the right adrenal gland.    CT 2/22/18, unchanged.  CT 9/6/18: Unchanged except nodular thickening right adrenal gland significantly decreased.  CT 9/5/2019: Unchanged.  On the 6/12/2020 visit, the following scans were reviewed:  CT neck and chest 5/11/2020, [facility], from 5/9/2019: Stable pulmonary nodules new lytic C7 lesion and posterior T1 lesion questionable T12 lesion.  No change in the T6 and T10 lesions.  PET, [facility], 5/19/2020: Mildly enlarged left neck nodes are mildly hypermetabolic.  Diffuse activity throughout the thickened left adrenal gland.  CT appearance unchanged from 5/9/2019.  Progressive T1 lesion seen on CT from 5/11/2020, mild some moderate activity.  T10 low-grade activity with mixed lucent and sclerotic findings.  T6 is a mixed lucent and sclerotic appearance but no significant activity.   T12 (the biopsy report is  labeled as T12 but patient insists this was a C7/T1 biopsy, not to T12) biopsy 6/15/2020, [facility]: Metastatic breast carcinoma.  ER >95%.  CT 0%.  HER-2 negative (1+)  It seems the main progression at the 6/12/2020 visit was a new C7 and new T1 lesion.  Those were radiated with [facility] through [facility] early June 2020.  Because Arimidex has been working well since 2010, continue same Arimidex.  Add Zometa every 3 months.  C7 and T1 found on CT 5/11/2020, [facility].  CyberKnife, Dr. Winston, early June 2020.  CT C and T-spine 9/17/2020: New C7 lesion from 9/6/2018, but no change from 5/11/2020 CT.  T1 lesion stable from 5/11/2020, but new compared to 2/22/2018.  T10 lesion unchanged from 9/5/2019, but new from 2/22/2018.  Subtle 8 mm T12 lesion new from 9/5/2019.  Therefore, no recent progression.  Considering her good tolerance and long-term effectiveness of Arimidex, continue same therapy.  CT CAP 12/10/2020: No progression  Therefore, continue Arimidex.  CA 15-3 normal through 12/14/2020  Because CA 15-3 fabian to 27 on 4/13/2021, then 29.3 on 7/20/2021, then 34.3 on 10/12/2021, CT scans done earlier than planned:  CT CAP with contrast 10/12/2021: New 5.2 x 4.6 cm mixed cystic and solid right ovarian mass compared to 12/10/2020.  Continued stability of bilateral pulmonary nodules and stable sclerotic bone metastasis.  11/4/2021: TLH/BSO (due to new 5 cm right ovarian mass on CT, 11/12/2021).  Metastatic breast cancer involving bilateral ovaries and posterior uterine serosa.  ER 81-90%.  CT 61-70%.  HER-2 negative  Caris NGS: ER 98%.  CT 90%.  HER-2/eb negative.  AKT1 pathogenic variant, exon 3.  AR+, 95%.  No other significant mutations including negative PIK3CA  Pelvic washings: Adenocarcinoma  Because this was the only area of progression on CT, and has been resected, continue Arimidex which she has been on since 2/17/2010.  11/22/2021: Discussed with Dr. Blum, who has a focus on breast cancer.  We both agree:  Continue Arimidex for now.  In the future, if significant progression is seen, then plan Faslodex injections with Ibrance  CT CAP 2/1/2022: A few T-spine sclerotic lesions progressively more sclerotic over multiple prior CTs of indeterminate significance.  No clear signs of progression of disease.  3/15/2022: Although tumor marker is not rising and CT showed no clear signs of progression.  CT revealed progressively more sclerotic bone metastasis over multiple prior CTs, she is having increased right hip discomfort.  Sometimes this can be due to healing of metastasis (but she has been on the same treatment since 2010), or this could mean progression of bone metastasis.  It is reassuring that her tumor marker is not worsening.  She would like to see [facility] to see if they feel radiation to the hip would help her discomfort.  We will do a bone scan before that appointment as this might help [facility].  (Although she has seen [facility] radiation for her neck, she would like to see [facility] for this hip issue).  3/18/2022, bone scan: Metastatic disease at T9 and T10, corresponding to CT lesions.  Uptake also at T6, but to lesser degree.  These areas are new compared to last bone scan, 6/3/2009.  Therefore, overall, appears consistent with progression.  3/24/2021: Discussed changing  Arimidex to Faslodex/Ibrance 125 mg D1-21/28 days.  However, Dr. Llanes will decide on 4/12/2022 if the patient needs any traditional XRT to hip/pelvis.  If so, this may cause cytopenias due to the location of XRT.  Ibrance can also cause cytopenias.  If this is the case, we will wait to begin Ibrance until at least a few weeks after XRT completes, provided blood counts allow.   4/22/2022: Dr. Llanes reviewed MRI right hip from 4/19/2022, revealing L3 metastasis, similar size of prior CTs.  Therefore, she plans no XRT.  5/6/2022: Began Faslodex Ibrance  CT 7/18/2022: Some bone mets more  "sclerotic, which could reflect treatment response.  Slightly increased RUL nodule, 1.1 cm, from 0.9 cm on 2/1/2022  Bone scan 7/18/2022: No change from 3/18/2022  8/19/2022 (patient delayed follow-up to review scans until then): Continue same therapy since no significant progression of disease  Bone scan 1/26/2023: Under represents bone metastasis compared to CT scans.  Slightly more intense uptake at T12 which was noted to perhaps represent treatment response.  CT 1/26/2023: A few new subcentimeter indeterminate RLL pulmonary nodules.  Radiologist recommended follow-up chest imaging in 6 to 8 weeks.  Otherwise no change in the other bilateral pulmonary nodules.  No change in scattered bone metastasis except the T12 lesion continues to increase in sclerosis.  2/28/2023 office visit:  Missed the mid November 2022 Faslodex and the mid January 2023 Faslodex and the mid February 2023 Faslodex.  Although CT might represent slight progression, she has missed a few doses of Faslodex.  Would like a strong reason to change therapy since she has been doing well on Faslodex for quite some time.  Therefore, patient agrees: Maintain her same imaging interval, 6 months from last  5/2/2023: Ibrance was held an additional week to help sync the start of Ibrance cycles with Faslodex.  She will resume Ibrance today and proceed with her monthly Faslodex today.   5/30/2023: Due to resume next cycle of Ibrance/Faslodex today.  .  Reviewed up-to-date with Dr. Hawkins and [facility] pharmacist Mari Holder.  We will have patient return in 1 week and if ANC greater than 1000, plan to proceed with next cycle of Ibrance and Faslodex.  If we hold Ibrance 2 weeks in a row or 2 \"day 1\"s in a row, or every other \"day 1\" cycle, then use the next lower dose.   6/8/2023: WBC 4.08, ANC 1.77    CA 15-3:  31.7 on 2/28/2023, from 27.3 on 8/19/2022, from 22.5 on 5/6/2022, from 26.3 on 2/1/2022, from 32.6 on 11/22/2021, from 34.3 on 10/12/2021, from 29.3 on " 7/20/2021, from 27 on 4/13/2021, from 23.3 on 12/14/2020.    *Bony metastases  T6 discovered by PET to August 2011 (did not show up by CT or bone scan)  C7 and T1 found on CT 5/11/2020, [facility].  [place], Dr. Winston, early June 2020.  June 2020 began Zometa every 3 months.  She was warned of possible osteonecrosis of the jaw and renal insufficiency.  She states she has good dental health and sees her dentist regularly.  Because of prior hypercalcemia requiring stopping calcium supplements and because of high normal current calcium level, began without supplemental calcium.  Add calcium if calcium becomes low.  1/7/2022: Tooth extracted.  Plan was to wait 5 weeks to resume Zometa.  Patient delayed return until 3/15/2022 (over 8 weeks)  May 2022: Changed Zometa to Xgeva every 3 months due to Cr up to 1.6  2/28/2023: She request to hold off on Xgeva because she is in the process of having dental work  Xgeva remains on hold.  In the process of having dental work done.  Next dental appointment in 1 week.     *Bone health. Last bone density 10/11/16, worsened, but still normal with worst T score -0.9.  Patient stopped calcium January 2016, but remained on vitamin D.  I recommended she restart her calcium.  Stopped calcium early March 2018 due to hypercalcemia.  DEXA 9/5/2019: Normal bone density    *Hypercalcemia.  Repeat calcium 3/7/18 normal, but patient self stopped calcium a few days prior.  Recommended she stay off calcium.  Calcium was 11 on 6/19/2020  Calcium 11 again on 12/10/2020  Repeat calcium 10.5 on 12/14/2020  Calcium 10.6.  Minimally elevated.  (Normal range up to 10.5)  Calcium 10.8 on 3/15/2022.  Hopefully, Zometa will help with this.  Calcium 10.6.  Xgeva currently on hold due to dental work.    *Hypophosphatemia  Phosphorus 2.3 on 6/24/2022.  Begin K-Phos Neutral 2 tablets twice daily  Patient self stopped this around mid July 2022.    *On the 10/5/16 visit, Tachycardia, dyspnea on exertion, bilateral  leg swelling, more sedentary recently.  CT PE protocol and bilateral lower extremity Dopplers 10/5/16, negative for clots.  She still has tachycardia and dyspnea on exertion.  Perhaps at least part of this is due to deconditioning.    *Previously complained of colon Right lower anterior rib discomfort.  CT unremarkable in that area.  No specific pain, just discomfort.  I explained to the patient if disease was found by PET scan or bone scan, I would not  since she has been doing well on Arimidex since 2010.   Currently denies pain.     *Depression/anxiety.  This limits compliance.  She sees a psychiatrist and a counselor regularly.  She states this is mostly due to body image issues instead of cancer.   She continues with her counselor and psychiatrist.  Viral pandemic is making this a little more difficult.  4/27/2022: We will try to get her involved in some cancer support groups.  I also encouraged her to try out some small groups at [facility] (she struggles with loneliness).  2/28/2023: Has missed some visits due to emotional issues.  Offered behavioral oncology help.  She declines.  She is going to continue with her psychiatrist and counselor  4/4/2023: Continues to follow with her psychiatrist and counselor.  Feeling better as the weather improves.  Believes her breast and body image are potentially making her pain in the upper arms worse and she is interested in reconstructive surgery.    *Noncompliance due to depression/anxiety.    Although she often misses appointments, she does reschedule and eventually come in.    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 Willie Quinn, [facility], on 5/21/2019.  Positive sentinel node (1 of 3 nodes)..  Left neck dissection by Dr. Willie Quinn, [facility], on 6/5/2019.  22 nodes negative.  Negative margins.  No lymphovascular invasion or perineural invasion.  2 mm depth of  invasion.  Grade 1.  Squamous cell carcinoma.  1.8 cm tumor.  Because the patient felt what she thought was left neck LAD, CT neck and chest and PET at [facility]. May 2020 performed.  She is being followed at [facility] for this.  On 6/19/2020, per verbal report from Dr. Senia [facility], [facility] ENT does not feel she has any active head and neck cancer.   She states she has a follow-up with Dr. Kapadia, ENT at [facility], in June with CT neck 1 week prior  No symptoms to suggest recurrence.  She follows with [facility].  Patient recently evaluated by [facility] ENT for history of head and neck cancer.  They are recommending patient proceed with imaging for evaluation of dysphonia.  They feel this is likely chronic issue, however feel patient may benefit from imaging.  Discussed with Dr. Hawkins, we will schedule patient for CT neck without contrast with upcoming CT scans already scheduled on 8/1/2023.      *Previously complained of left hip pain x2 weeks.  This prompted an MRI pelvis 8/20/2019 at [facility] and open MRI which was negative for malignancy.    *Possible intolerance of Zometa  After first dose, June 2020, 2 days of chills, bone pain  She would like to try Zometa again.  If this occurs again, we will try to switch her to Xgeva monthly.  (No good data on every 3-month Xgeva)  She did fine with the second dose of Zometa.    *Thickening of wall of transverse colon on CT 2/1/2022.  Last colonoscopy was 4/18/2019.  She wants the next colonoscopy done at [facility]  Dr. Galeas will see her on 4/7/2022 to evaluate this    *Depression  Is seeing a psychiatry NP and a therapist for many years.  6/24/2022: Reported she decided to stop seeing her therapist but is still interested in therapy.  She would like to see behavioral oncology at [facility].  This was arranged  Contacted by Julissa Centeno (behavioral oncology).  Patient canceled on the day of the appointment during her first scheduled appointment with Julissa.  She then no showed for the  "rescheduled appointment.  Julissa recommends the patient continue to follow with her therapist and psychiatrist and if she needs additional support, to use [employer]s club    *Pruritic rash  8/19/2022: Referred to dermatology    *Patient went to the ER 2/7/2023 for dizziness  2/28/2023: This resolved.  She understands if she has ongoing issues with dizziness she should contact us and we will arrange an MRI of the brain    *Pain in upper arms  Reports this started December 2022.  Being evaluated by her PCP for this.  They did order PT, however patient did not attend these appointments.  Recommended she continue follow-up with PCP.  She also sees chiropractor who believes this is muscular in nature.  She is requesting to have her images faxed to her chiropractor for review.  Dr. Trice Lawler at [facility].  Patient educated that massage therapy and chiropractic adjustments are not recommended with metastatic disease to the bones.  Scheduled to start working with PT.    *Neutropenia  Having grade 3 neutropenia with an ANC less than 1000.  Per up-to-date guidelines, needs to have a CBC on day 1 of every Ibrance cycle.  Only proceed with Ibrance if ANC is greater than 1000 on day 1 of each Ibrance cycle.  WBC 1.98, ANC 0.70.  Delay Ibrance/Faslodex by 1 week.  Patient will return for CBC with NP and if ANC greater than 1000, may resume neck cycle of Ibrance/Faslodex.  Plan to keep Ibrance and Faslodex in sync.    If we hold Ibrance 2 weeks in a row or 2 \"day 1\"s in a row, or every other \"day 1\" cycle, then use the next lower dose.   6/8/2023: WBC 4.08, ANC 1.77.  She will resume her neck cycle of Ibrance and Faslodex.    *Evaluated by Dr. Arthur for reconstruction.  He was agreeable to proceeding with right breast reduction, however did not feel patient was a candidate for left breast reconstruction.  He did refer her to Dr. Holloway with [facility] for a second opinion, scheduled 6/13/2023.    Monitoring on " "Ibrance:  CBC every 2 weeks for the first 2 months then monthly and as indicated.  After 6 months, if neutropenia grade 1 or 2 only, then CBC can be every 3 months    PLAN:    Proceed with cycle 5 Ibrance/Faslodex today.    Per up to date guidelines she will need a CBC on day 1 of every cycle.  Only proceed with Ibrance if ANC is greater than 1000 on day 1 of each Ibrance cycle.  If we hold Ibrance 2 weeks in a row or to \"day 1\" in a row or every other day \"day 1 \"cycle, then use the next lower dose.  Xgeva remains on hold due to ongoing dental procedures.  MD/NP monthly.  Patient canceled appointment on Tuesday and rescheduled for today (Thursday), therefore treatment day will be changed to Thursdays.  All future appointments will need to be updated.    MD/Faslodex 3 months.  A few days prior: CT chest abdomen and pelvis without contrast, bone scan, CBC, CMP, CA 15-3.  CT neck added to scans due to recommendation by [facility].  Resume every 3-month Xgeva 1 to 2 months after completing all dental procedures (she states she will let us know).  Last dose was 7/1/2022      (Xgeva instead of Zometa due to intermittent high creatinine)  MD or NP with Faslodex every 4 weeks  Every 6 months CT CAP without IV contrast, bone scan (last 7/18/2022), CA 15-3  Previously arranged for her to get involved with some cancer support groups   CT scans WITHOUT IV CONTRAST (again, to avoid further kidney damage.)  She sees [facility], Dr. Bedoya annually, next is summer 2022    Send a copy of this note to   Dr. Willie Quinn, STANISLAVofKELLEN Winston, radiation medicine, [facility]    The patient is on high risk medication that requires close monitoring for toxicity.      Sigrid Cummings, APRN  06/08/23       "

## 2023-06-08 ENCOUNTER — LAB (OUTPATIENT)
Dept: OTHER | Facility: HOSPITAL | Age: 68
End: 2023-06-08
Payer: MEDICARE

## 2023-06-08 ENCOUNTER — OFFICE VISIT (OUTPATIENT)
Dept: ONCOLOGY | Facility: CLINIC | Age: 68
End: 2023-06-08
Payer: MEDICARE

## 2023-06-08 ENCOUNTER — INFUSION (OUTPATIENT)
Dept: ONCOLOGY | Facility: HOSPITAL | Age: 68
End: 2023-06-08
Payer: MEDICARE

## 2023-06-08 VITALS
TEMPERATURE: 97.5 F | SYSTOLIC BLOOD PRESSURE: 103 MMHG | WEIGHT: 157.8 LBS | HEART RATE: 76 BPM | OXYGEN SATURATION: 100 % | BODY MASS INDEX: 26.29 KG/M2 | HEIGHT: 65 IN | DIASTOLIC BLOOD PRESSURE: 65 MMHG

## 2023-06-08 DIAGNOSIS — N18.32 STAGE 3B CHRONIC KIDNEY DISEASE: ICD-10-CM

## 2023-06-08 DIAGNOSIS — C78.02 CARCINOMA OF LEFT BREAST METASTATIC TO LUNG: ICD-10-CM

## 2023-06-08 DIAGNOSIS — C50.912 CARCINOMA OF LEFT BREAST METASTATIC TO LUNG: Primary | ICD-10-CM

## 2023-06-08 DIAGNOSIS — C79.51 MALIGNANT NEOPLASM METASTATIC TO BONE: Primary | ICD-10-CM

## 2023-06-08 DIAGNOSIS — C78.02 CARCINOMA OF LEFT BREAST METASTATIC TO LUNG: Primary | ICD-10-CM

## 2023-06-08 DIAGNOSIS — C50.912 CARCINOMA OF LEFT BREAST METASTATIC TO LUNG: ICD-10-CM

## 2023-06-08 DIAGNOSIS — C79.51 MALIGNANT NEOPLASM METASTATIC TO BONE: ICD-10-CM

## 2023-06-08 DIAGNOSIS — Z79.899 HIGH RISK MEDICATION USE: ICD-10-CM

## 2023-06-08 LAB
ALBUMIN SERPL-MCNC: 3.9 G/DL (ref 3.5–5.2)
ALBUMIN/GLOB SERPL: 1.3 G/DL
ALP SERPL-CCNC: 89 U/L (ref 39–117)
ALT SERPL W P-5'-P-CCNC: 9 U/L (ref 1–33)
ANION GAP SERPL CALCULATED.3IONS-SCNC: 9.5 MMOL/L (ref 5–15)
AST SERPL-CCNC: 17 U/L (ref 1–32)
BASOPHILS # BLD AUTO: 0.09 10*3/MM3 (ref 0–0.2)
BASOPHILS NFR BLD AUTO: 2.2 % (ref 0–1.5)
BILIRUB SERPL-MCNC: 0.3 MG/DL (ref 0–1.2)
BUN SERPL-MCNC: 31 MG/DL (ref 8–23)
BUN/CREAT SERPL: 19 (ref 7–25)
CALCIUM SPEC-SCNC: 10.6 MG/DL (ref 8.6–10.5)
CHLORIDE SERPL-SCNC: 103 MMOL/L (ref 98–107)
CO2 SERPL-SCNC: 25.5 MMOL/L (ref 22–29)
CREAT SERPL-MCNC: 1.63 MG/DL (ref 0.57–1)
DEPRECATED RDW RBC AUTO: 55.1 FL (ref 37–54)
EGFRCR SERPLBLD CKD-EPI 2021: 34.2 ML/MIN/1.73
EOSINOPHIL # BLD AUTO: 0.05 10*3/MM3 (ref 0–0.4)
EOSINOPHIL NFR BLD AUTO: 1.2 % (ref 0.3–6.2)
ERYTHROCYTE [DISTWIDTH] IN BLOOD BY AUTOMATED COUNT: 13.7 % (ref 12.3–15.4)
GLOBULIN UR ELPH-MCNC: 3.1 GM/DL
GLUCOSE SERPL-MCNC: 105 MG/DL (ref 65–99)
HCT VFR BLD AUTO: 31 % (ref 34–46.6)
HGB BLD-MCNC: 10.6 G/DL (ref 12–15.9)
IMM GRANULOCYTES # BLD AUTO: 0.02 10*3/MM3 (ref 0–0.05)
IMM GRANULOCYTES NFR BLD AUTO: 0.5 % (ref 0–0.5)
LYMPHOCYTES # BLD AUTO: 1.43 10*3/MM3 (ref 0.7–3.1)
LYMPHOCYTES NFR BLD AUTO: 35 % (ref 19.6–45.3)
MACROCYTES BLD QL SMEAR: NORMAL
MCH RBC QN AUTO: 37.2 PG (ref 26.6–33)
MCHC RBC AUTO-ENTMCNC: 34.2 G/DL (ref 31.5–35.7)
MCV RBC AUTO: 108.8 FL (ref 79–97)
MONOCYTES # BLD AUTO: 0.72 10*3/MM3 (ref 0.1–0.9)
MONOCYTES NFR BLD AUTO: 17.6 % (ref 5–12)
NEUTROPHILS NFR BLD AUTO: 1.77 10*3/MM3 (ref 1.7–7)
NEUTROPHILS NFR BLD AUTO: 43.5 % (ref 42.7–76)
NRBC BLD AUTO-RTO: 0 /100 WBC (ref 0–0.2)
PLAT MORPH BLD: NORMAL
PLATELET # BLD AUTO: 387 10*3/MM3 (ref 140–450)
PMV BLD AUTO: 8.7 FL (ref 6–12)
POTASSIUM SERPL-SCNC: 5 MMOL/L (ref 3.5–5.2)
PROT SERPL-MCNC: 7 G/DL (ref 6–8.5)
RBC # BLD AUTO: 2.85 10*6/MM3 (ref 3.77–5.28)
SODIUM SERPL-SCNC: 138 MMOL/L (ref 136–145)
WBC MORPH BLD: NORMAL
WBC NRBC COR # BLD: 4.08 10*3/MM3 (ref 3.4–10.8)

## 2023-06-08 PROCEDURE — 85007 BL SMEAR W/DIFF WBC COUNT: CPT | Performed by: NURSE PRACTITIONER

## 2023-06-08 PROCEDURE — 85025 COMPLETE CBC W/AUTO DIFF WBC: CPT | Performed by: NURSE PRACTITIONER

## 2023-06-08 PROCEDURE — 80053 COMPREHEN METABOLIC PANEL: CPT | Performed by: NURSE PRACTITIONER

## 2023-06-08 PROCEDURE — 36415 COLL VENOUS BLD VENIPUNCTURE: CPT

## 2023-06-08 PROCEDURE — 96402 CHEMO HORMON ANTINEOPL SQ/IM: CPT

## 2023-06-08 PROCEDURE — 25010000002 FULVESTRANT PER 25 MG: Performed by: NURSE PRACTITIONER

## 2023-06-08 RX ORDER — LAMOTRIGINE 25 MG/1
500 TABLET ORAL ONCE
Status: COMPLETED | OUTPATIENT
Start: 2023-06-08 | End: 2023-06-08

## 2023-06-08 RX ADMIN — FULVESTRANT 500 MG: 50 INJECTION, SOLUTION INTRAMUSCULAR at 10:50

## 2023-06-12 ENCOUNTER — SPECIALTY PHARMACY (OUTPATIENT)
Dept: PHARMACY | Facility: HOSPITAL | Age: 68
End: 2023-06-12
Payer: MEDICARE

## 2023-06-12 NOTE — PROGRESS NOTES
MTM Encounter: re: adherence and side effect check in    Called patient for routine adherence and side effect check-in but did not get an answer. I have left a voicemail requesting she call the MTM office at her convenience to discuss her medication.    Cecily Arce PharmD Candidate

## 2023-06-12 NOTE — PROGRESS NOTES
Specialty Pharmacy Patient Management Program  Oncology 6-Month Clinical Assessment       Marialuisa Vivar is a 67 y.o. female with metastatic breast cancer called today to assess adherence and side effects.    Regimen: Ibrance (palbociclib) 125 mg 21 days on and 7 days off and Faslodex (fulvestrant) 500 mg monthly    Reason for Outreach: Routine medication check-in .             Goals        Specialty Pharmacy General Goal      Progression free survival            Medication Assessment:  Medication Assessment     Administration: Patient is taking every day at the same time  and as prescribed .   Patient can self administer medications: Yes  Medication Follow-Up Plan: Next clinical assessment  Lab Review: The labs listed below have been reviewed. No dose adjustments are needed for the oral specialty medication(s) based on the labs.    Lab Results   Component Value Date    GLUCOSE 105 (H) 06/08/2023    CALCIUM 10.6 (H) 06/08/2023     06/08/2023    K 5.0 06/08/2023    CO2 25.5 06/08/2023     06/08/2023    BUN 31 (H) 06/08/2023    CREATININE 1.63 (H) 06/08/2023    EGFRIFAFRI 69 09/03/2021    EGFRIFNONA 46 (L) 02/01/2022    BCR 19.0 06/08/2023    ANIONGAP 9.5 06/08/2023     Lab Results   Component Value Date    WBC 4.08 06/08/2023    RBC 2.85 (L) 06/08/2023    HGB 10.6 (L) 06/08/2023    HCT 31.0 (L) 06/08/2023    .8 (H) 06/08/2023    MCH 37.2 (H) 06/08/2023    MCHC 34.2 06/08/2023    RDW 13.7 06/08/2023    RDWSD 55.1 (H) 06/08/2023    MPV 8.7 06/08/2023     06/08/2023    NEUTRORELPCT 43.5 06/08/2023    LYMPHORELPCT 35.0 06/08/2023    MONORELPCT 17.6 (H) 06/08/2023    EOSRELPCT 1.2 06/08/2023    BASORELPCT 2.2 (H) 06/08/2023    AUTOIGPER 0.5 06/08/2023    NEUTROABS 1.77 06/08/2023    LYMPHSABS 1.43 06/08/2023    MONOSABS 0.72 06/08/2023    EOSABS 0.05 06/08/2023    BASOSABS 0.09 06/08/2023    AUTOIGNUM 0.02 06/08/2023    NRBC 0.0 06/08/2023     Drug-drug interactions  Completed medication  reconciliation today to assess for drug interactions. Patient denies starting or stopping any medications.    Assessed medication list for interactions, no significant drug interactions noted.   Advised patient to call the clinic if any new medications are started so we can assess for drug-drug interactions.  Drug-food interactions discussed: eating grapefruit and drinking grapefruit juice  Vaccines are coordinated by the patient's oncologist and primary care provider.    Allergies  Known allergies and reactions were discussed with the patient. The patient's chart has been reviewed for allergy information and updated as necessary.   Allergies   Allergen Reactions    Nickel Unknown - Low Severity    Ciprofloxacin Rash        Hospitalizations and Urgent Care Visits Since Last Assessment:  Unplanned hospitalizations or inpatient admissions: None  ED Visits: None  Urgent Office Visits: None    Adherence Assessment:   Patient has not missed a dose since starting her cycles back on Saturday Kirstin 10, 2023. She was holding the medication for two weeks.     Quality of Life Assessment:     -- Quality of Life: 7/10    Financial Assessment:  Medication availability/affordability: Patient has had no issues obtaining medication from pharmacy.      All questions addressed and patient had no additional concerns. Patient has pharmacy contact information.    Name/Credentials Cecily Arce PharmD Candidate    6/12/2023  16:24 EDT

## 2023-06-19 DIAGNOSIS — E78.2 MIXED HYPERLIPIDEMIA: Primary | ICD-10-CM

## 2023-06-21 LAB
ALBUMIN SERPL-MCNC: 4.8 G/DL (ref 3.8–4.8)
ALBUMIN/GLOB SERPL: 1.9 {RATIO} (ref 1.2–2.2)
ALP SERPL-CCNC: 90 IU/L (ref 44–121)
ALT SERPL-CCNC: 13 IU/L (ref 0–32)
AST SERPL-CCNC: 20 IU/L (ref 0–40)
BILIRUB SERPL-MCNC: 0.8 MG/DL (ref 0–1.2)
BUN SERPL-MCNC: 34 MG/DL (ref 8–27)
BUN/CREAT SERPL: 20 (ref 12–28)
CALCIUM SERPL-MCNC: 11.2 MG/DL (ref 8.7–10.3)
CHLORIDE SERPL-SCNC: 100 MMOL/L (ref 96–106)
CHOLEST SERPL-MCNC: 155 MG/DL (ref 100–199)
CO2 SERPL-SCNC: 20 MMOL/L (ref 20–29)
CREAT SERPL-MCNC: 1.69 MG/DL (ref 0.57–1)
EGFRCR SERPLBLD CKD-EPI 2021: 33 ML/MIN/1.73
GLOBULIN SER CALC-MCNC: 2.5 G/DL (ref 1.5–4.5)
GLUCOSE SERPL-MCNC: 117 MG/DL (ref 70–99)
HDLC SERPL-MCNC: 52 MG/DL
LDLC SERPL CALC-MCNC: 77 MG/DL (ref 0–99)
LDLC/HDLC SERPL: 1.5 RATIO (ref 0–3.2)
POTASSIUM SERPL-SCNC: 5.6 MMOL/L (ref 3.5–5.2)
PROT SERPL-MCNC: 7.3 G/DL (ref 6–8.5)
SODIUM SERPL-SCNC: 136 MMOL/L (ref 134–144)
TRIGL SERPL-MCNC: 148 MG/DL (ref 0–149)
VLDLC SERPL CALC-MCNC: 26 MG/DL (ref 5–40)

## 2023-07-14 ENCOUNTER — TELEPHONE (OUTPATIENT)
Dept: ONCOLOGY | Facility: CLINIC | Age: 68
End: 2023-07-14

## 2023-07-14 NOTE — TELEPHONE ENCOUNTER
Caller: Marialuisa Vivar    Relationship: Self    Best call back number: [phone]    What is the best time to reach you: ANYTIME     Who are you requesting to speak with (clinical staff, provider,  specific staff member): DR CORTES AND NURSE      What was the call regarding: HAD MISSED APPT 07/06    WANTED TO KNOW IF NEEDING TO RESCHEDULE TO COME BACK IN SOON     OR IF IS OK TO WAIT UNTIL 08/03 APPT?     Is it okay if the provider responds through MyChart: YES

## 2023-08-04 ENCOUNTER — SPECIALTY PHARMACY (OUTPATIENT)
Dept: PHARMACY | Facility: HOSPITAL | Age: 68
End: 2023-08-04
Payer: MEDICARE

## 2023-08-04 ENCOUNTER — TELEPHONE (OUTPATIENT)
Dept: ONCOLOGY | Facility: CLINIC | Age: 68
End: 2023-08-04

## 2023-08-04 NOTE — TELEPHONE ENCOUNTER
em     Caller: Marialuisa Vivar    Relationship to patient: Self    Best call back number: [phone]    Chief complaint: PATIENT CALLED TO RESCHEDULE     Type of visit: LAB AND FOLLOW UP AND INJECTION      If rescheduling, when is the original appointment: 8-3-23

## 2023-08-07 ENCOUNTER — TELEPHONE (OUTPATIENT)
Dept: INTERNAL MEDICINE | Facility: CLINIC | Age: 68
End: 2023-08-07

## 2023-08-07 ENCOUNTER — APPOINTMENT (OUTPATIENT)
Dept: GENERAL RADIOLOGY | Facility: HOSPITAL | Age: 68
End: 2023-08-07
Payer: MEDICARE

## 2023-08-07 ENCOUNTER — HOSPITAL ENCOUNTER (EMERGENCY)
Facility: HOSPITAL | Age: 68
Discharge: HOME | End: 2023-08-07
Attending: EMERGENCY MEDICINE | Admitting: EMERGENCY MEDICINE
Payer: MEDICARE

## 2023-08-07 ENCOUNTER — APPOINTMENT (OUTPATIENT)
Dept: CT IMAGING | Facility: HOSPITAL | Age: 68
End: 2023-08-07
Payer: MEDICARE

## 2023-08-07 VITALS
HEIGHT: 65 IN | SYSTOLIC BLOOD PRESSURE: 109 MMHG | WEIGHT: 158 LBS | RESPIRATION RATE: 18 BRPM | BODY MASS INDEX: 26.33 KG/M2 | OXYGEN SATURATION: 98 % | DIASTOLIC BLOOD PRESSURE: 66 MMHG | HEART RATE: 80 BPM | TEMPERATURE: 98.5 F

## 2023-08-07 DIAGNOSIS — I95.1 ORTHOSTATIC SYNCOPE: Primary | ICD-10-CM

## 2023-08-07 LAB
ANION GAP SERPL CALCULATED.3IONS-SCNC: 11.1 MMOL/L (ref 5–15)
ANISOCYTOSIS BLD QL: NORMAL
BILIRUB UR QL STRIP: NEGATIVE
BUN SERPL-MCNC: 33 MG/DL (ref 8–23)
BUN/CREAT SERPL: 21.2 (ref 7–25)
CALCIUM SPEC-SCNC: 9.8 MG/DL (ref 8.6–10.5)
CHLORIDE SERPL-SCNC: 105 MMOL/L (ref 98–107)
CLARITY UR: CLEAR
CO2 SERPL-SCNC: 18.9 MMOL/L (ref 22–29)
COLOR UR: YELLOW
CREAT SERPL-MCNC: 1.56 MG/DL (ref 0.57–1)
DEPRECATED RDW RBC AUTO: 59.7 FL (ref 37–54)
EGFRCR SERPLBLD CKD-EPI 2021: 36.1 ML/MIN/1.73
ELLIPTOCYTES BLD QL SMEAR: NORMAL
ERYTHROCYTE [DISTWIDTH] IN BLOOD BY AUTOMATED COUNT: 15.4 % (ref 12.3–15.4)
GLUCOSE SERPL-MCNC: 103 MG/DL (ref 65–99)
GLUCOSE UR STRIP-MCNC: NEGATIVE MG/DL
HCT VFR BLD AUTO: 29.7 % (ref 34–46.6)
HGB BLD-MCNC: 10.3 G/DL (ref 12–15.9)
HGB UR QL STRIP.AUTO: NEGATIVE
KETONES UR QL STRIP: NEGATIVE
LEUKOCYTE ESTERASE UR QL STRIP.AUTO: NEGATIVE
MAGNESIUM SERPL-MCNC: 2 MG/DL (ref 1.6–2.4)
MCH RBC QN AUTO: 36.8 PG (ref 26.6–33)
MCHC RBC AUTO-ENTMCNC: 34.7 G/DL (ref 31.5–35.7)
MCV RBC AUTO: 106.1 FL (ref 79–97)
NITRITE UR QL STRIP: NEGATIVE
NRBC BLD AUTO-RTO: 0 /100 WBC (ref 0–0.2)
PH UR STRIP.AUTO: 6 [PH] (ref 5–8)
PLAT MORPH BLD: NORMAL
PLATELET # BLD AUTO: 276 10*3/MM3 (ref 140–450)
PMV BLD AUTO: 9.1 FL (ref 6–12)
POTASSIUM SERPL-SCNC: 5.5 MMOL/L (ref 3.5–5.2)
PROT UR QL STRIP: NEGATIVE
QT INTERVAL: 373 MS
RBC # BLD AUTO: 2.8 10*6/MM3 (ref 3.77–5.28)
SODIUM SERPL-SCNC: 135 MMOL/L (ref 136–145)
SP GR UR STRIP: 1.01 (ref 1–1.03)
UROBILINOGEN UR QL STRIP: NORMAL
WBC MORPH BLD: NORMAL
WBC NRBC COR # BLD: 2.98 10*3/MM3 (ref 3.4–10.8)

## 2023-08-07 PROCEDURE — 81003 URINALYSIS AUTO W/O SCOPE: CPT | Performed by: EMERGENCY MEDICINE

## 2023-08-07 PROCEDURE — 85007 BL SMEAR W/DIFF WBC COUNT: CPT | Performed by: EMERGENCY MEDICINE

## 2023-08-07 PROCEDURE — 93010 ELECTROCARDIOGRAM REPORT: CPT | Performed by: INTERNAL MEDICINE

## 2023-08-07 PROCEDURE — 80048 BASIC METABOLIC PNL TOTAL CA: CPT | Performed by: EMERGENCY MEDICINE

## 2023-08-07 PROCEDURE — 93005 ELECTROCARDIOGRAM TRACING: CPT | Performed by: EMERGENCY MEDICINE

## 2023-08-07 PROCEDURE — 99284 EMERGENCY DEPT VISIT MOD MDM: CPT

## 2023-08-07 PROCEDURE — 70450 CT HEAD/BRAIN W/O DYE: CPT

## 2023-08-07 PROCEDURE — 71045 X-RAY EXAM CHEST 1 VIEW: CPT

## 2023-08-07 PROCEDURE — 36415 COLL VENOUS BLD VENIPUNCTURE: CPT

## 2023-08-07 PROCEDURE — 83735 ASSAY OF MAGNESIUM: CPT | Performed by: EMERGENCY MEDICINE

## 2023-08-07 PROCEDURE — 85025 COMPLETE CBC W/AUTO DIFF WBC: CPT | Performed by: EMERGENCY MEDICINE

## 2023-08-07 RX ADMIN — SODIUM CHLORIDE 1000 ML: 9 INJECTION, SOLUTION INTRAVENOUS at 10:32

## 2023-08-07 NOTE — ED PROVIDER NOTES
EMERGENCY DEPARTMENT ENCOUNTER    Room Number:  14/14  PCP: Jennifer Mantilla MD  Historian: Patient      HPI:  Chief Complaint: Syncope  A complete HPI/ROS/PMH/PSH/SH/FH are unobtainable due to: None    Context: Marialuisa Vivar is a 67 y.o. female who presents to the ED via EMS from home for a syncopal episode this morning when she admits that she stood up too quickly and started feeling she was going to pass out, did end up passing out and hitting her head.  States that she had hit her head she probably would not be here as this is a not uncommon event for her.  Blood pressure typically runs low, is on 12.5mg metoprolol daily.  Had a GI bug a couple weeks ago that she has since recovered from and is eating and drinking well with normal urinary output.  No fevers, chills.  No chest pain or shortness of breath.  Minor headache but no significant dizziness.  Not on any anticoagulation.      MEDICAL RECORD REVIEW    External (non-ED) record review: No anticoagulants noted on chart review in epic    PAST MEDICAL HISTORY  Active Ambulatory Problems     Diagnosis Date Noted    Benign essential HTN 01/20/2016    Depression 01/20/2016    Vocal cord dysfunction 01/20/2016    Malignant neoplasm metastatic to bone 01/20/2016    Carcinoma of left breast metastatic to lung 08/22/2016    Tachycardia 10/05/2016    Therapeutic drug monitoring 10/05/2016    Lung metastasis 06/01/2010    Vitamin D deficiency 05/09/2018    Class 1 obesity without serious comorbidity with body mass index (BMI) of 31.0 to 31.9 in adult 02/06/2019    Tongue cancer 08/13/2019    Anxiety 08/25/2020    Bruxism 08/25/2020    Cheilosis 08/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    Esophageal reflux     History of colon polyps     Hypertension     Left breast cancer metastasized to lung     Obstructive sleep apnea     Ovarian cyst     PONV (postoperative nausea and vomiting)          PAST SURGICAL HISTORY  Past Surgical History:   Procedure Laterality Date    CHOLECYSTECTOMY      COLONOSCOPY      H/O polyps    COLONOSCOPY N/A 2022    Procedure: COLONOSCOPY to cecum and TI with cold / hot snare polypectomies;  Surgeon: Luis Enrique Galeas MD;  Location: [facility] ENDOSCOPY;  Service: Gastroenterology;  Laterality: N/A;  pre-abnormal ct  post-polyps, diverticulosis, hemorrhoids    KNEE ARTHROPLASTY      LUNG SURGERY      Lung wedge resection    MASTECTOMY RADICAL Left 1993    TONGUE SURGERY  2019    tongue cancer    TOTAL LAPAROSCOPIC HYSTERECTOMY N/A 2021    Procedure: Robotic assisted total laparoscopic hysterectomy, bilateral salpingoophorecotmy, bilateral ureteralysis ;  Surgeon: Kaylynn Ricci DO;  Location: [facility] MAIN OR;  Service: Robotics - DaVinci;  Laterality: N/A;         FAMILY HISTORY  Family History   Problem Relation Age of Onset    Hypertension Mother     Leukemia Mother 72    COPD Mother         smoker    Diabetes Brother     Pancreatic cancer Brother     Glaucoma Brother     Heart disease Brother     Hypertension Brother     Gallbladder disease Brother     Pancreatitis Brother          from pancreatic csncer    Gallbladder disease Father     Colon polyps Father     Stroke Other         Grandmother    Lupus Other         Aunt    Alcohol abuse Other         Aunt    Glaucoma Other         Grandmother    Diabetes type II Brother     Hypertension Brother     Hyperlipidemia Brother     Liver cancer Paternal  Uncle     Stomach cancer Paternal Aunt     Alcohol abuse Maternal Aunt     Malig Hyperthermia Neg Hx          SOCIAL HISTORY  Social History     Socioeconomic History    Marital status: Single    Years of education: College   Tobacco Use    Smoking status: Former     Packs/day: 2.00     Years: 30.00     Pack years: 60.00     Types: Cigarettes     Start date: 1/1/1973     Quit date: 6/1/2008     Years since quitting: 15.1    Smokeless tobacco: Never   Vaping Use    Vaping Use: Never used   Substance and Sexual Activity    Alcohol use: No    Drug use: No    Sexual activity: Not Currently     Birth control/protection: None         ALLERGIES  Nickel and Ciprofloxacin        REVIEW OF SYSTEMS  Review of Systems     All systems reviewed and negative except for those discussed in HPI.       PHYSICAL EXAM    I have reviewed the triage vital signs and nursing notes.    ED Triage Vitals [08/07/23 0944]   Temp Heart Rate Resp BP SpO2   98.5 øF (36.9 øC) 81 26 103/61 99 %      Temp src Heart Rate Source Patient Position BP Location FiO2 (%)   Tympanic Monitor -- -- --       Physical Exam  General: No acute distress, nontoxic  HEENT: Mucous membranes moist, atraumatic, EOMI  Neck: Full ROM  Pulm: Symmetric chest rise, nonlabored, lungs CTAB  Cardiovascular: Regular rate and rhythm, intact distal pulses  GI: Soft, nontender, nondistended, no rebound, no guarding, bowel sounds present  MSK: Full ROM, no deformity  Skin: Warm, dry  Neuro: Awake, alert, oriented x 4, GCS 15, moving all extremities, no focal deficits  Psych: Calm, cooperative        LAB RESULTS  Recent Results (from the past 24 hour(s))   Basic Metabolic Panel    Collection Time: 08/07/23  9:55 AM    Specimen: Blood   Result Value Ref Range    Glucose 103 (H) 65 - 99 mg/dL    BUN 33 (H) 8 - 23 mg/dL    Creatinine 1.56 (H) 0.57 - 1.00 mg/dL    Sodium 135 (L) 136 - 145 mmol/L    Potassium 5.5 (H) 3.5 - 5.2 mmol/L    Chloride 105 98 - 107 mmol/L    CO2 18.9 (L) 22.0 -  29.0 mmol/L    Calcium 9.8 8.6 - 10.5 mg/dL    BUN/Creatinine Ratio 21.2 7.0 - 25.0    Anion Gap 11.1 5.0 - 15.0 mmol/L    eGFR 36.1 (L) >60.0 mL/min/1.73   Magnesium    Collection Time: 08/07/23  9:55 AM    Specimen: Blood   Result Value Ref Range    Magnesium 2.0 1.6 - 2.4 mg/dL   CBC Auto Differential    Collection Time: 08/07/23  9:55 AM    Specimen: Blood   Result Value Ref Range    WBC 2.98 (L) 3.40 - 10.80 10*3/mm3    RBC 2.80 (L) 3.77 - 5.28 10*6/mm3    Hemoglobin 10.3 (L) 12.0 - 15.9 g/dL    Hematocrit 29.7 (L) 34.0 - 46.6 %    .1 (H) 79.0 - 97.0 fL    MCH 36.8 (H) 26.6 - 33.0 pg    MCHC 34.7 31.5 - 35.7 g/dL    RDW 15.4 12.3 - 15.4 %    RDW-SD 59.7 (H) 37.0 - 54.0 fl    MPV 9.1 6.0 - 12.0 fL    Platelets 276 140 - 450 10*3/mm3    nRBC 0.0 0.0 - 0.2 /100 WBC   Scan Slide    Collection Time: 08/07/23  9:55 AM    Specimen: Blood   Result Value Ref Range    Anisocytosis Mod/2+ None Seen    Elliptocytes Slight/1+ None Seen    WBC Morphology Normal Normal    Platelet Morphology Normal Normal   ECG 12 Lead Syncope    Collection Time: 08/07/23 10:12 AM   Result Value Ref Range    QT Interval 373 ms   Urinalysis With Microscopic If Indicated (No Culture) - Urine, Clean Catch    Collection Time: 08/07/23 12:22 PM    Specimen: Urine, Clean Catch   Result Value Ref Range    Color, UA Yellow Yellow, Straw    Appearance, UA Clear Clear    pH, UA 6.0 5.0 - 8.0    Specific Gravity, UA 1.015 1.005 - 1.030    Glucose, UA Negative Negative    Ketones, UA Negative Negative    Bilirubin, UA Negative Negative    Blood, UA Negative Negative    Protein, UA Negative Negative    Leuk Esterase, UA Negative Negative    Nitrite, UA Negative Negative    Urobilinogen, UA 0.2 E.U./dL 0.2 - 1.0 E.U./dL       Ordered the above labs and independently interpreted results. My findings will be discussed in the medical decision making section below        RADIOLOGY  CT Head Without Contrast    Result Date: 8/7/2023  CT HEAD WITHOUT  CONTRAST  HISTORY: Syncope, head injury. Breast cancer.  COMPARISON: None.  FINDINGS: The brain and ventricles are symmetrical. There is no evidence of hemorrhage, hydrocephalus or of a focal area of decreased attenuation to suggest acute infarction or contusion. Mild-to-moderate vascular calcification is noted. Bone windows showed no evidence of a calvarial fracture. There is an area of sclerosis involving the frontal bone laterally to the right measuring approximately 4.4 cm in the AP dimension and extending from the inner to outer table. It measures approximately 5 cm in craniocaudal dimension.      There is no evidence of fracture, of intracranial hemorrhage or of acute infarction/contusion. A 4 x 5 cm area of sclerosis is appreciated involving the frontal bone laterally to the right suspicious for a metastatic lesion or treated metastatic lesion.    Radiation dose reduction techniques were utilized, including automated exposure control and exposure modulation based on body size.       XR Chest 1 View    Result Date: 8/7/2023  XR CHEST 1 VW-  08/07/2023  HISTORY: Syncope.  There is relatively severe elevation of the left hemidiaphragm similar to the 11/02/2021 study. Heart size appears within normal limits. Lungs appear clear. Surgical clips overlie the left axilla.      1. Elevated left hemidiaphragm. 2. No acute process. 3. Lungs appear clear.  This report was finalized on 8/7/2023 10:37 AM by Dr. Domenico Briones M.D.       Ordered the above noted radiological studies.  Independently interpreted by me and my independent review of findings can be found in the ED Course.  See dictation for official radiology interpretation.      PROCEDURES    Procedures  EKG - Per my independent interpretation:    EKG Time: 1012  Rhythm/Rate: Sinus rhythm with a rate of 79  Normal axis  Normal intervals  No acute ischemic changes  No STEMI       No emergent changes compared to February 7, 2023      MEDICATIONS GIVEN IN  ER    Medications   sodium chloride 0.9 % bolus 1,000 mL (1,000 mL Intravenous New Bag 8/7/23 1032)         PROGRESS, DATA ANALYSIS, CONSULTS, AND MEDICAL DECISION MAKING    Please note that this section constitutes my independent interpretation of clinical data including lab results, radiology, EKG's.  This constitutes my independent professional opinion regarding differential diagnosis and management of this patient.  It may include any factors such as history from outside sources, review of external records, social determinants of health, management of medications, response to those treatments, and discussions with other providers.    Differential Diagnosis and Plan: Initial concern for orthostatic event, dehydration, renal failure, electrolyte abnormalities, intracranial hemorrhage, arrhythmia, anemia, among others.  Plan for labs, EKG, chest x-ray, CT head, orthostatics, IV fluids, and reevaluation with results.    Additional sources:  - Discussed/ obtained information from independent historians:       - Chronic or social conditions impacting care:      - Shared decision making:  Patient fully updated on and in agreement with the course and plan moving forward    ED Course as of 08/07/23 1236   Mon Aug 07, 2023   1012 CT Head Without Contrast  Per my independent interpretation of the CT head, no evidence intracranial hemorrhage [DC]   1012 XR Chest 1 View  Per my independent interpretation of the chest x-ray, no overt pneumothorax [DC]   1040 BP: 104/68 [DC]   1040 BP(!): 89/68 [DC]   1040 BP: 110/73 [DC]   1040 Heart Rate: 77 [DC]   1040 Heart Rate: 93 [DC]   1040 Heart Rate: 97 [DC]   1102 WBC(!): 2.98 [DC]   1102 Hemoglobin(!): 10.3 [DC]   1102 Platelets: 276 [DC]   1102 Glucose(!): 103 [DC]   1102 BUN(!): 33 [DC]   1102 Creatinine(!): 1.56  Stable, chronic [DC]   1102 Sodium(!): 135 [DC]   1102 Potassium(!): 5.5  Slight hemolysis [DC]   1102 Magnesium: 2.0 [DC]   1235 Nitrite, UA: Negative [DC]   1235  Leukocytes, UA: Negative [DC]   1235 Ketones, UA: Negative [DC]   1235 Patient safe for discharge, orthostatic event is the most likely source of syncope at this time.  History of the same.  Recommend close outpatient PCP follow-up for blood pressure medication optimization, ED return for worsening symptoms as needed.  All questions and concerns addressed. [DC]      ED Course User Index  [DC] Nilo Robles MD       Hospitalization Considered?:     Orders Placed During This Visit:  Orders Placed This Encounter   Procedures    CT Head Without Contrast    XR Chest 1 View    Basic Metabolic Panel    Magnesium    CBC Auto Differential    Scan Slide    Urinalysis With Microscopic If Indicated (No Culture) - Urine, Clean Catch    Orthostatic Vitals    ECG 12 Lead Syncope    CBC & Differential       Additional orders considered but not placed:      Independent interpretation of labs, radiology studies, and discussions with consultants: See ED Course        AS OF 12:36 EDT VITALS:    BP - 109/66  HR - 80  TEMP - 98.5 øF (36.9 øC) (Tympanic)  02 SATS - 98%        DIAGNOSIS  Final diagnoses:   Orthostatic syncope         DISPOSITION  DISCHARGE    Patient discharged in stable condition.    Reviewed implications of results, diagnosis, meds, responsibility to follow up, warning signs and symptoms of possible worsening, potential complications and reasons to return to ER. If your blood pressure > 120/80 please follow up with your primary care provider for further management.    Patient/Family voiced understanding of above instructions.    Discussed plan for discharge, as there is no emergent indication for admission. Pt/family is agreeable and understands need for follow up and repeat testing.  Pt is aware that discharge does not mean that nothing is wrong but it indicates no emergency is present that requires admission and they must continue care with follow-up as given below or physician of their choice.     FOLLOW-UP  [facility]  [address] [facility] [address] [phone]    As needed, If symptoms worsen    Jennifer Mantilla MD  [address]  [address] [place], [place] [id number] [phone]    Schedule an appointment as soon as possible for a visit   within 1-2 weeks for recheck         Medication List        Changed      metoprolol succinate XL 25 MG 24 hr tablet  Commonly known as: TOPROL-XL  Take 1 tablet by mouth Daily.  What changed:   how much to take  additional instructions                            --    Please note that portions of this were completed with a voice recognition program.       Note Disclaimer: At [facility], we believe that sharing information builds trust and better relationships. You are receiving this note because you are receiving care at [facility] or recently visited. It is possible you will see health information before a provider has talked with you about it. This kind of information can be easy to misunderstand. To help you fully understand what it means for your health, we urge you to discuss this note with your provider.           Nilo Robles MD  08/07/23 9787

## 2023-08-07 NOTE — DISCHARGE INSTRUCTIONS
Stay well-hydrated, recommend close outpatient PCP follow-up and discussion about blood pressure medications moving forward, ED return for worsening symptoms as needed.

## 2023-08-07 NOTE — TELEPHONE ENCOUNTER
Caller: Marialuisa Vivar    Relationship to patient: Self    Best call back number: [phone]     Chief complaint: FALL AND HIT HEAD ON KITCHEN FLOOR    Patient directed to call 911 or go to their nearest emergency room. YES    Patient verbalized understanding: [x] Yes  [] No  If no, why?    Additional notes:  PATIENT STATES THAT SHE WILL GO TO [facility] EMERGENCY DEPARTMENT.    PLEASE ADVISE.

## 2023-08-07 NOTE — ED NOTES
Syncope episode this am.  She hit head when she fell.  No blood thinners.  She is orthostatic per ems

## 2023-08-08 ENCOUNTER — TELEPHONE (OUTPATIENT)
Dept: ONCOLOGY | Facility: CLINIC | Age: 68
End: 2023-08-08
Payer: MEDICARE

## 2023-08-08 NOTE — TELEPHONE ENCOUNTER
Called patient back. She states that her breast surgery was called due to hypotension issues. She fell yesterday due to her bp bottoming out and home and she hit her head. She was calling in to see what Dr. Hawkins thought about restarting the Ibrance. I let her know I would reach out to him and let her know. Patient v/u.

## 2023-08-09 NOTE — TELEPHONE ENCOUNTER
Called patient to let her know that Dr. Hawkins spoke with our oral chemo pharmacy and they stated they wanted her to continue the Ibrance. Patient v/u.

## 2023-08-10 ENCOUNTER — DOCUMENTATION (OUTPATIENT)
Dept: ONCOLOGY | Facility: CLINIC | Age: 68
End: 2023-08-10
Payer: MEDICARE

## 2023-08-10 ENCOUNTER — SPECIALTY PHARMACY (OUTPATIENT)
Dept: PHARMACY | Facility: HOSPITAL | Age: 68
End: 2023-08-10
Payer: MEDICARE

## 2023-08-10 NOTE — PROGRESS NOTES
Lyla Mo, RN20 hours ago (1:40 PM)     SS  Called patient to let her know that Dr. Hawkins spoke with our oral chemo pharmacy and they stated they wanted her to continue the Ibrance. Patient v/u.          Note        Mariana Mendez, [facility]  You; Lyla Mo, RN; Angelina Holder, RPH20 hours ago (1:33 PM)     JK  I agree. Mari is off today.     You  Lyla Mo, SAMMY; Angelina Holder, RPH2 days ago       I do not see hypotension associated with Ibrance.  Therefore, I would favor continuing the same breast cancer treatment of Ibrance and Faslodex.    Mari, copying to you to make sure you agree.  Margie, please do not call the patient with an answer until we see if Mari agrees.     Lyla Mo, RN2 days ago     SS  Called patient back. She states that her breast surgery was called due to hypotension issues. She fell yesterday due to her bp bottoming out and home and she hit her head. She was calling in to see what Dr. Hawkins thought about restarting the Ibrance. I let her know I would reach out to him and let her know. Patient v/u.

## 2023-08-15 DIAGNOSIS — C78.02 CARCINOMA OF LEFT BREAST METASTATIC TO LUNG: Primary | ICD-10-CM

## 2023-08-15 DIAGNOSIS — C50.912 CARCINOMA OF LEFT BREAST METASTATIC TO LUNG: Primary | ICD-10-CM

## 2023-08-22 ENCOUNTER — LAB (OUTPATIENT)
Dept: LAB | Facility: HOSPITAL | Age: 68
End: 2023-08-22
Payer: MEDICARE

## 2023-08-22 ENCOUNTER — OFFICE VISIT (OUTPATIENT)
Dept: INTERNAL MEDICINE | Facility: CLINIC | Age: 68
End: 2023-08-22
Payer: MEDICARE

## 2023-08-22 VITALS
DIASTOLIC BLOOD PRESSURE: 60 MMHG | WEIGHT: 143 LBS | BODY MASS INDEX: 23.82 KG/M2 | HEART RATE: 93 BPM | HEIGHT: 65 IN | TEMPERATURE: 98.2 F | OXYGEN SATURATION: 98 % | SYSTOLIC BLOOD PRESSURE: 94 MMHG

## 2023-08-22 DIAGNOSIS — R42 LIGHTHEADEDNESS: ICD-10-CM

## 2023-08-22 DIAGNOSIS — R53.83 FATIGUE, UNSPECIFIED TYPE: Primary | ICD-10-CM

## 2023-08-22 PROCEDURE — 84439 ASSAY OF FREE THYROXINE: CPT | Performed by: FAMILY MEDICINE

## 2023-08-22 PROCEDURE — 3078F DIAST BP <80 MM HG: CPT | Performed by: FAMILY MEDICINE

## 2023-08-22 PROCEDURE — 86376 MICROSOMAL ANTIBODY EACH: CPT | Performed by: FAMILY MEDICINE

## 2023-08-22 PROCEDURE — 3074F SYST BP LT 130 MM HG: CPT | Performed by: FAMILY MEDICINE

## 2023-08-22 PROCEDURE — 99213 OFFICE O/P EST LOW 20 MIN: CPT | Performed by: FAMILY MEDICINE

## 2023-08-22 PROCEDURE — 84443 ASSAY THYROID STIM HORMONE: CPT | Performed by: FAMILY MEDICINE

## 2023-08-22 PROCEDURE — 36415 COLL VENOUS BLD VENIPUNCTURE: CPT | Performed by: FAMILY MEDICINE

## 2023-08-22 NOTE — PROGRESS NOTES
Subjective   Marialuisa Vivar is a 67 y.o. female.   Chief Complaint   Patient presents with    ER follow up    Fatigue    Dizziness         Lightheadedness/fatigue-patient was evaluated in ER on 8/7/2023.  She passed out after standing up quickly and getting lightheaded.  She hit her head.  She was hypotensive.  EKG was negative.  Hemoglobin was stable at 10.3, creatinine was stable at 1.56.  Head CT was negative for acute changes.  She was on metoprolol XR 25 mg a day for hypertension.  It was discontinued in ER.  She monitored blood pressure after discharge from hospital and it stayed in 100 -118/70 to 80s.  She gets lightheaded if she stands up, but it is better.  She admits to not drinking much water.  She feels tired.  Lost 9 pounds from May.  She was recently started on Abilify by her psychiatrist.    Review of Systems   Constitutional:  Positive for fatigue. Negative for fever.   HENT:  Positive for rhinorrhea. Negative for ear pain and sore throat.    Respiratory:  Positive for shortness of breath. Negative for wheezing.    Cardiovascular:  Negative for chest pain and leg swelling.   Gastrointestinal:  Negative for abdominal pain and vomiting.   Musculoskeletal:  Negative for neck pain.   Skin:  Negative for rash.   Neurological:  Positive for dizziness. Negative for headaches.       Objective   Wt Readings from Last 3 Encounters:   08/22/23 64.9 kg (143 lb)   08/07/23 71.7 kg (158 lb)   06/08/23 71.6 kg (157 lb 12.8 oz)      Vitals:    08/22/23 1513   BP: 94/60   Pulse: 93   Temp: 98.2 øF (36.8 øC)   SpO2: 98%     Temp Readings from Last 3 Encounters:   08/22/23 98.2 øF (36.8 øC)   08/07/23 98.5 øF (36.9 øC) (Tympanic)   06/08/23 97.5 øF (36.4 øC) (Temporal)     BP Readings from Last 3 Encounters:   08/22/23 94/60   08/07/23 109/66   06/08/23 103/65     Pulse Readings from Last 3 Encounters:   08/22/23 93   08/07/23 80   06/08/23 76     Body mass index is 23.8 kg/mý.    Physical Exam  Constitutional:        Appearance: She is well-developed.   Neck:      Thyroid: No thyromegaly.      Vascular: No carotid bruit.   Cardiovascular:      Rate and Rhythm: Normal rate and regular rhythm.      Heart sounds: Normal heart sounds.   Pulmonary:      Effort: Pulmonary effort is normal.      Breath sounds: Normal breath sounds.   Skin:     General: Skin is warm and dry.   Neurological:      Mental Status: She is alert.       Assessment & Plan   Diagnoses and all orders for this visit:    1. Fatigue, unspecified type (Primary)  -     Thyroid Cascade Profile    2. Lightheadedness        Lightheadedness/fatigue-ER records were reviewed.  Blood work results were reviewed and imaging test results were reviewed.  We are not going to restart metoprolol.  She is currently on no blood pressure medications and she does not need them.  She is advised to increase fluids.  She should get at least 6 glasses of water.  Regarding fatigue - it is likely multifactorial.  She has anemia, chronic kidney disease, she is on medications that can contribute to it including Ibrance and Abilify.  She is scheduled with her psychiatrist tomorrow and is going to discuss Abilify with them.  It was the latest addition in her medications.  We are checking thyroid test today.          BMI is within normal parameters. No other follow-up for BMI required.

## 2023-08-24 LAB
INTERPRETATION: NORMAL
T4 FREE SERPL-MCNC: 1.5 NG/DL (ref 0.82–1.77)
THYROPEROXIDASE AB SERPL-ACNC: <9 IU/ML (ref 0–34)
TSH SERPL DL<=0.005 MIU/L-ACNC: 5.25 UIU/ML (ref 0.45–4.5)

## 2023-08-25 ENCOUNTER — SPECIALTY PHARMACY (OUTPATIENT)
Dept: PHARMACY | Facility: HOSPITAL | Age: 68
End: 2023-08-25
Payer: MEDICARE

## 2023-08-27 ENCOUNTER — APPOINTMENT (OUTPATIENT)
Dept: GENERAL RADIOLOGY | Facility: HOSPITAL | Age: 68
DRG: 641 | End: 2023-08-27
Payer: MEDICARE

## 2023-08-27 ENCOUNTER — APPOINTMENT (OUTPATIENT)
Dept: CT IMAGING | Facility: HOSPITAL | Age: 68
DRG: 641 | End: 2023-08-27
Payer: MEDICARE

## 2023-08-27 ENCOUNTER — HOSPITAL ENCOUNTER (INPATIENT)
Facility: HOSPITAL | Age: 68
LOS: 4 days | Discharge: HOME | DRG: 641 | End: 2023-08-31
Attending: EMERGENCY MEDICINE | Admitting: INTERNAL MEDICINE
Payer: MEDICARE

## 2023-08-27 DIAGNOSIS — R53.1 GENERALIZED WEAKNESS: Primary | ICD-10-CM

## 2023-08-27 DIAGNOSIS — E87.5 HYPERKALEMIA: ICD-10-CM

## 2023-08-27 DIAGNOSIS — N18.9 ACUTE RENAL FAILURE SUPERIMPOSED ON CHRONIC KIDNEY DISEASE, UNSPECIFIED CKD STAGE, UNSPECIFIED ACUTE RENAL FAILURE TYPE: ICD-10-CM

## 2023-08-27 DIAGNOSIS — E86.9 VOLUME DEPLETION: ICD-10-CM

## 2023-08-27 DIAGNOSIS — E87.20 LACTIC ACIDOSIS: ICD-10-CM

## 2023-08-27 DIAGNOSIS — N17.9 AKI (ACUTE KIDNEY INJURY): ICD-10-CM

## 2023-08-27 DIAGNOSIS — N17.9 ACUTE RENAL FAILURE SUPERIMPOSED ON CHRONIC KIDNEY DISEASE, UNSPECIFIED CKD STAGE, UNSPECIFIED ACUTE RENAL FAILURE TYPE: ICD-10-CM

## 2023-08-27 DIAGNOSIS — Z85.3 HISTORY OF BREAST CANCER: ICD-10-CM

## 2023-08-27 DIAGNOSIS — N17.0 ACUTE KIDNEY INJURY (AKI) WITH ACUTE TUBULAR NECROSIS (ATN): ICD-10-CM

## 2023-08-27 DIAGNOSIS — R19.7 DIARRHEA, UNSPECIFIED TYPE: ICD-10-CM

## 2023-08-27 LAB
ALBUMIN SERPL-MCNC: 3.7 G/DL (ref 3.5–5.2)
ALBUMIN/GLOB SERPL: 1.3 G/DL
ALP SERPL-CCNC: 83 U/L (ref 39–117)
ALT SERPL W P-5'-P-CCNC: 16 U/L (ref 1–33)
ANION GAP SERPL CALCULATED.3IONS-SCNC: 16.9 MMOL/L (ref 5–15)
APTT PPP: 30.5 SECONDS (ref 22.7–35.4)
AST SERPL-CCNC: 19 U/L (ref 1–32)
BILIRUB SERPL-MCNC: 0.7 MG/DL (ref 0–1.2)
BILIRUB UR QL STRIP: NEGATIVE
BUN SERPL-MCNC: 68 MG/DL (ref 8–23)
BUN/CREAT SERPL: 19 (ref 7–25)
CALCIUM SPEC-SCNC: 10.1 MG/DL (ref 8.6–10.5)
CHLORIDE SERPL-SCNC: 100 MMOL/L (ref 98–107)
CLARITY UR: CLEAR
CO2 SERPL-SCNC: 14.1 MMOL/L (ref 22–29)
COLOR UR: YELLOW
CREAT SERPL-MCNC: 3.57 MG/DL (ref 0.57–1)
D-LACTATE SERPL-SCNC: 2.3 MMOL/L (ref 0.5–2)
D-LACTATE SERPL-SCNC: 3.3 MMOL/L (ref 0.5–2)
DEPRECATED RDW RBC AUTO: 53.7 FL (ref 37–54)
EGFRCR SERPLBLD CKD-EPI 2021: 13.4 ML/MIN/1.73
EOSINOPHIL # BLD MANUAL: 0.11 10*3/MM3 (ref 0–0.4)
EOSINOPHIL NFR BLD MANUAL: 3 % (ref 0.3–6.2)
ERYTHROCYTE [DISTWIDTH] IN BLOOD BY AUTOMATED COUNT: 14.2 % (ref 12.3–15.4)
FLUAV RNA RESP QL NAA+PROBE: NOT DETECTED
FLUBV RNA RESP QL NAA+PROBE: NOT DETECTED
GEN 5 2HR TROPONIN T REFLEX: 29 NG/L
GLOBULIN UR ELPH-MCNC: 2.8 GM/DL
GLUCOSE SERPL-MCNC: 114 MG/DL (ref 65–99)
GLUCOSE UR STRIP-MCNC: NEGATIVE MG/DL
HCT VFR BLD AUTO: 33 % (ref 34–46.6)
HGB BLD-MCNC: 11.6 G/DL (ref 12–15.9)
HGB UR QL STRIP.AUTO: NEGATIVE
INR PPP: 1.15 (ref 0.9–1.1)
KETONES UR QL STRIP: ABNORMAL
LEUKOCYTE ESTERASE UR QL STRIP.AUTO: NEGATIVE
LIPASE SERPL-CCNC: 43 U/L (ref 13–60)
LYMPHOCYTES # BLD MANUAL: 1.84 10*3/MM3 (ref 0.7–3.1)
LYMPHOCYTES NFR BLD MANUAL: 8 % (ref 5–12)
MAGNESIUM SERPL-MCNC: 2 MG/DL (ref 1.6–2.4)
MCH RBC QN AUTO: 36.3 PG (ref 26.6–33)
MCHC RBC AUTO-ENTMCNC: 35.2 G/DL (ref 31.5–35.7)
MCV RBC AUTO: 103.1 FL (ref 79–97)
MONOCYTES # BLD: 0.29 10*3/MM3 (ref 0.1–0.9)
NEUTROPHILS # BLD AUTO: 1.43 10*3/MM3 (ref 1.7–7)
NEUTROPHILS NFR BLD MANUAL: 39 % (ref 42.7–76)
NITRITE UR QL STRIP: NEGATIVE
NRBC BLD AUTO-RTO: 0 /100 WBC (ref 0–0.2)
NT-PROBNP SERPL-MCNC: 217 PG/ML (ref 0–900)
PH UR STRIP.AUTO: 5.5 [PH] (ref 5–8)
PLAT MORPH BLD: NORMAL
PLATELET # BLD AUTO: 145 10*3/MM3 (ref 140–450)
PMV BLD AUTO: 9.8 FL (ref 6–12)
POTASSIUM SERPL-SCNC: 6 MMOL/L (ref 3.5–5.2)
PROCALCITONIN SERPL-MCNC: 0.13 NG/ML (ref 0–0.25)
PROT SERPL-MCNC: 6.5 G/DL (ref 6–8.5)
PROT UR QL STRIP: ABNORMAL
PROTHROMBIN TIME: 14.9 SECONDS (ref 11.7–14.2)
RBC # BLD AUTO: 3.2 10*6/MM3 (ref 3.77–5.28)
RBC MORPH BLD: NORMAL
RSV RNA NPH QL NAA+NON-PROBE: NOT DETECTED
SARS-COV-2 RNA RESP QL NAA+PROBE: NOT DETECTED
SODIUM SERPL-SCNC: 131 MMOL/L (ref 136–145)
SP GR UR STRIP: 1.02 (ref 1–1.03)
TROPONIN T DELTA: -5 NG/L
TROPONIN T SERPL HS-MCNC: 34 NG/L
UROBILINOGEN UR QL STRIP: ABNORMAL
VARIANT LYMPHS NFR BLD MANUAL: 2 % (ref 0–5)
VARIANT LYMPHS NFR BLD MANUAL: 48 % (ref 19.6–45.3)
WBC MORPH BLD: NORMAL
WBC NRBC COR # BLD: 3.67 10*3/MM3 (ref 3.4–10.8)

## 2023-08-27 PROCEDURE — 63710000001 INSULIN REGULAR HUMAN PER 5 UNITS: Performed by: EMERGENCY MEDICINE

## 2023-08-27 PROCEDURE — 93010 ELECTROCARDIOGRAM REPORT: CPT | Performed by: INTERNAL MEDICINE

## 2023-08-27 PROCEDURE — 83880 ASSAY OF NATRIURETIC PEPTIDE: CPT | Performed by: EMERGENCY MEDICINE

## 2023-08-27 PROCEDURE — 85025 COMPLETE CBC W/AUTO DIFF WBC: CPT | Performed by: EMERGENCY MEDICINE

## 2023-08-27 PROCEDURE — 80053 COMPREHEN METABOLIC PANEL: CPT | Performed by: EMERGENCY MEDICINE

## 2023-08-27 PROCEDURE — 99285 EMERGENCY DEPT VISIT HI MDM: CPT

## 2023-08-27 PROCEDURE — 71045 X-RAY EXAM CHEST 1 VIEW: CPT

## 2023-08-27 PROCEDURE — 81003 URINALYSIS AUTO W/O SCOPE: CPT | Performed by: EMERGENCY MEDICINE

## 2023-08-27 PROCEDURE — 85007 BL SMEAR W/DIFF WBC COUNT: CPT | Performed by: EMERGENCY MEDICINE

## 2023-08-27 PROCEDURE — 84145 PROCALCITONIN (PCT): CPT | Performed by: EMERGENCY MEDICINE

## 2023-08-27 PROCEDURE — 87637 SARSCOV2&INF A&B&RSV AMP PRB: CPT | Performed by: EMERGENCY MEDICINE

## 2023-08-27 PROCEDURE — 93005 ELECTROCARDIOGRAM TRACING: CPT | Performed by: EMERGENCY MEDICINE

## 2023-08-27 PROCEDURE — 83605 ASSAY OF LACTIC ACID: CPT | Performed by: EMERGENCY MEDICINE

## 2023-08-27 PROCEDURE — 85610 PROTHROMBIN TIME: CPT | Performed by: EMERGENCY MEDICINE

## 2023-08-27 PROCEDURE — 87040 BLOOD CULTURE FOR BACTERIA: CPT | Performed by: EMERGENCY MEDICINE

## 2023-08-27 PROCEDURE — 83735 ASSAY OF MAGNESIUM: CPT | Performed by: EMERGENCY MEDICINE

## 2023-08-27 PROCEDURE — 74176 CT ABD & PELVIS W/O CONTRAST: CPT

## 2023-08-27 PROCEDURE — 85730 THROMBOPLASTIN TIME PARTIAL: CPT | Performed by: EMERGENCY MEDICINE

## 2023-08-27 PROCEDURE — 84484 ASSAY OF TROPONIN QUANT: CPT | Performed by: EMERGENCY MEDICINE

## 2023-08-27 PROCEDURE — 83690 ASSAY OF LIPASE: CPT | Performed by: EMERGENCY MEDICINE

## 2023-08-27 PROCEDURE — P9612 CATHETERIZE FOR URINE SPEC: HCPCS

## 2023-08-27 RX ORDER — DEXTROSE MONOHYDRATE 25 G/50ML
25 INJECTION, SOLUTION INTRAVENOUS ONCE
Status: COMPLETED | OUTPATIENT
Start: 2023-08-27 | End: 2023-08-27

## 2023-08-27 RX ORDER — SODIUM CHLORIDE 0.9 % (FLUSH) 0.9 %
10 SYRINGE (ML) INJECTION AS NEEDED
Status: DISCONTINUED | OUTPATIENT
Start: 2023-08-27 | End: 2023-08-31 | Stop reason: HOSPADM

## 2023-08-27 RX ADMIN — SODIUM CHLORIDE 1000 ML: 9 INJECTION, SOLUTION INTRAVENOUS at 23:16

## 2023-08-27 RX ADMIN — DEXTROSE MONOHYDRATE 25 G: 25 INJECTION, SOLUTION INTRAVENOUS at 23:20

## 2023-08-27 RX ADMIN — INSULIN HUMAN 10 UNITS: 100 INJECTION, SOLUTION PARENTERAL at 23:18

## 2023-08-28 ENCOUNTER — APPOINTMENT (OUTPATIENT)
Dept: ULTRASOUND IMAGING | Facility: HOSPITAL | Age: 68
DRG: 641 | End: 2023-08-28
Payer: MEDICARE

## 2023-08-28 PROBLEM — E87.20 METABOLIC ACIDOSIS: Status: ACTIVE | Noted: 2023-08-28

## 2023-08-28 PROBLEM — E87.5 HYPERKALEMIA: Status: ACTIVE | Noted: 2023-08-28

## 2023-08-28 PROBLEM — E86.0 DEHYDRATION: Status: ACTIVE | Noted: 2023-08-28

## 2023-08-28 PROBLEM — R19.7 DIARRHEA: Status: ACTIVE | Noted: 2023-08-28

## 2023-08-28 LAB
ALBUMIN SERPL-MCNC: 2.6 G/DL (ref 3.5–5.2)
ALBUMIN/GLOB SERPL: 1.2 G/DL
ALP SERPL-CCNC: 60 U/L (ref 39–117)
ALT SERPL W P-5'-P-CCNC: 13 U/L (ref 1–33)
ANION GAP SERPL CALCULATED.3IONS-SCNC: 13.5 MMOL/L (ref 5–15)
ANION GAP SERPL CALCULATED.3IONS-SCNC: 8.3 MMOL/L (ref 5–15)
AST SERPL-CCNC: 15 U/L (ref 1–32)
BILIRUB SERPL-MCNC: 0.5 MG/DL (ref 0–1.2)
BUN SERPL-MCNC: 49 MG/DL (ref 8–23)
BUN SERPL-MCNC: 63 MG/DL (ref 8–23)
BUN/CREAT SERPL: 19 (ref 7–25)
BUN/CREAT SERPL: 22.3 (ref 7–25)
CALCIUM SPEC-SCNC: 7.6 MG/DL (ref 8.6–10.5)
CALCIUM SPEC-SCNC: 9.6 MG/DL (ref 8.6–10.5)
CHLORIDE SERPL-SCNC: 103 MMOL/L (ref 98–107)
CHLORIDE SERPL-SCNC: 112 MMOL/L (ref 98–107)
CO2 SERPL-SCNC: 13.7 MMOL/L (ref 22–29)
CO2 SERPL-SCNC: 14.5 MMOL/L (ref 22–29)
CREAT SERPL-MCNC: 2.2 MG/DL (ref 0.57–1)
CREAT SERPL-MCNC: 3.31 MG/DL (ref 0.57–1)
CREAT UR-MCNC: 110.3 MG/DL
D-LACTATE SERPL-SCNC: 2 MMOL/L (ref 0.5–2)
DEPRECATED RDW RBC AUTO: 55.1 FL (ref 37–54)
EGFRCR SERPLBLD CKD-EPI 2021: 14.6 ML/MIN/1.73
EGFRCR SERPLBLD CKD-EPI 2021: 23.9 ML/MIN/1.73
EOSINOPHIL SPEC QL MICRO: 0 % EOS/100 CELLS (ref 0–0)
ERYTHROCYTE [DISTWIDTH] IN BLOOD BY AUTOMATED COUNT: 14.4 % (ref 12.3–15.4)
GLOBULIN UR ELPH-MCNC: 2.1 GM/DL
GLUCOSE BLDC GLUCOMTR-MCNC: 105 MG/DL (ref 70–130)
GLUCOSE BLDC GLUCOMTR-MCNC: 112 MG/DL (ref 70–130)
GLUCOSE BLDC GLUCOMTR-MCNC: 132 MG/DL (ref 70–130)
GLUCOSE BLDC GLUCOMTR-MCNC: 97 MG/DL (ref 70–130)
GLUCOSE SERPL-MCNC: 115 MG/DL (ref 65–99)
GLUCOSE SERPL-MCNC: 74 MG/DL (ref 65–99)
HCT VFR BLD AUTO: 30.8 % (ref 34–46.6)
HGB BLD-MCNC: 10.5 G/DL (ref 12–15.9)
MCH RBC QN AUTO: 35.7 PG (ref 26.6–33)
MCHC RBC AUTO-ENTMCNC: 34.1 G/DL (ref 31.5–35.7)
MCV RBC AUTO: 104.8 FL (ref 79–97)
PLATELET # BLD AUTO: 126 10*3/MM3 (ref 140–450)
PMV BLD AUTO: 10.1 FL (ref 6–12)
POTASSIUM SERPL-SCNC: 4.6 MMOL/L (ref 3.5–5.2)
POTASSIUM SERPL-SCNC: 5.2 MMOL/L (ref 3.5–5.2)
PROT SERPL-MCNC: 4.7 G/DL (ref 6–8.5)
QT INTERVAL: 340 MS
QTC INTERVAL: 445 MS
RBC # BLD AUTO: 2.94 10*6/MM3 (ref 3.77–5.28)
SODIUM SERPL-SCNC: 131 MMOL/L (ref 136–145)
SODIUM SERPL-SCNC: 134 MMOL/L (ref 136–145)
SODIUM UR-SCNC: 88 MMOL/L
WBC NRBC COR # BLD: 2.55 10*3/MM3 (ref 3.4–10.8)

## 2023-08-28 PROCEDURE — 99222 1ST HOSP IP/OBS MODERATE 55: CPT | Performed by: PHYSICIAN ASSISTANT

## 2023-08-28 PROCEDURE — 76775 US EXAM ABDO BACK WALL LIM: CPT

## 2023-08-28 PROCEDURE — 83605 ASSAY OF LACTIC ACID: CPT | Performed by: EMERGENCY MEDICINE

## 2023-08-28 PROCEDURE — 85027 COMPLETE CBC AUTOMATED: CPT | Performed by: INTERNAL MEDICINE

## 2023-08-28 PROCEDURE — 84300 ASSAY OF URINE SODIUM: CPT | Performed by: INTERNAL MEDICINE

## 2023-08-28 PROCEDURE — 82948 REAGENT STRIP/BLOOD GLUCOSE: CPT

## 2023-08-28 PROCEDURE — 87205 SMEAR GRAM STAIN: CPT | Performed by: INTERNAL MEDICINE

## 2023-08-28 PROCEDURE — 82570 ASSAY OF URINE CREATININE: CPT | Performed by: INTERNAL MEDICINE

## 2023-08-28 PROCEDURE — 80053 COMPREHEN METABOLIC PANEL: CPT | Performed by: INTERNAL MEDICINE

## 2023-08-28 PROCEDURE — 99222 1ST HOSP IP/OBS MODERATE 55: CPT | Performed by: INTERNAL MEDICINE

## 2023-08-28 PROCEDURE — 25010000002 HEPARIN (PORCINE) PER 1000 UNITS: Performed by: INTERNAL MEDICINE

## 2023-08-28 PROCEDURE — 36415 COLL VENOUS BLD VENIPUNCTURE: CPT | Performed by: EMERGENCY MEDICINE

## 2023-08-28 RX ORDER — PAROXETINE HYDROCHLORIDE 20 MG/1
40 TABLET, FILM COATED ORAL NIGHTLY
Status: DISCONTINUED | OUTPATIENT
Start: 2023-08-28 | End: 2023-08-31 | Stop reason: HOSPADM

## 2023-08-28 RX ORDER — SODIUM CHLORIDE 9 MG/ML
40 INJECTION, SOLUTION INTRAVENOUS AS NEEDED
Status: DISCONTINUED | OUTPATIENT
Start: 2023-08-28 | End: 2023-08-31 | Stop reason: HOSPADM

## 2023-08-28 RX ORDER — SODIUM CHLORIDE 0.9 % (FLUSH) 0.9 %
3 SYRINGE (ML) INJECTION EVERY 12 HOURS SCHEDULED
Status: DISCONTINUED | OUTPATIENT
Start: 2023-08-28 | End: 2023-08-31 | Stop reason: HOSPADM

## 2023-08-28 RX ORDER — ONDANSETRON 2 MG/ML
4 INJECTION INTRAMUSCULAR; INTRAVENOUS EVERY 6 HOURS PRN
Status: DISCONTINUED | OUTPATIENT
Start: 2023-08-28 | End: 2023-08-31 | Stop reason: HOSPADM

## 2023-08-28 RX ORDER — ONDANSETRON 4 MG/1
4 TABLET, FILM COATED ORAL EVERY 6 HOURS PRN
Status: DISCONTINUED | OUTPATIENT
Start: 2023-08-28 | End: 2023-08-31 | Stop reason: HOSPADM

## 2023-08-28 RX ORDER — FAMOTIDINE 20 MG/1
10 TABLET, FILM COATED ORAL 2 TIMES DAILY PRN
Status: DISCONTINUED | OUTPATIENT
Start: 2023-08-28 | End: 2023-08-31 | Stop reason: HOSPADM

## 2023-08-28 RX ORDER — ATORVASTATIN CALCIUM 20 MG/1
10 TABLET, FILM COATED ORAL NIGHTLY
Status: DISCONTINUED | OUTPATIENT
Start: 2023-08-28 | End: 2023-08-31 | Stop reason: HOSPADM

## 2023-08-28 RX ORDER — ACETAMINOPHEN 650 MG/1
650 SUPPOSITORY RECTAL EVERY 4 HOURS PRN
Status: DISCONTINUED | OUTPATIENT
Start: 2023-08-28 | End: 2023-08-31 | Stop reason: HOSPADM

## 2023-08-28 RX ORDER — SODIUM BICARBONATE IN D5W 150/1000ML
150 PLASTIC BAG, INJECTION (ML) INTRAVENOUS CONTINUOUS
Status: DISCONTINUED | OUTPATIENT
Start: 2023-08-28 | End: 2023-08-28

## 2023-08-28 RX ORDER — PANTOPRAZOLE SODIUM 40 MG/1
40 TABLET, DELAYED RELEASE ORAL
Status: DISCONTINUED | OUTPATIENT
Start: 2023-08-28 | End: 2023-08-31 | Stop reason: HOSPADM

## 2023-08-28 RX ORDER — SODIUM CHLORIDE 9 MG/ML
100 INJECTION, SOLUTION INTRAVENOUS CONTINUOUS
Status: DISCONTINUED | OUTPATIENT
Start: 2023-08-28 | End: 2023-08-28

## 2023-08-28 RX ORDER — LORAZEPAM 0.5 MG/1
0.5 TABLET ORAL EVERY 8 HOURS PRN
Status: DISCONTINUED | OUTPATIENT
Start: 2023-08-28 | End: 2023-08-31 | Stop reason: HOSPADM

## 2023-08-28 RX ORDER — UREA 10 %
3 LOTION (ML) TOPICAL NIGHTLY PRN
Status: DISCONTINUED | OUTPATIENT
Start: 2023-08-28 | End: 2023-08-31 | Stop reason: HOSPADM

## 2023-08-28 RX ORDER — ACETAMINOPHEN 325 MG/1
650 TABLET ORAL EVERY 4 HOURS PRN
Status: DISCONTINUED | OUTPATIENT
Start: 2023-08-28 | End: 2023-08-31 | Stop reason: HOSPADM

## 2023-08-28 RX ORDER — HEPARIN SODIUM 5000 [USP'U]/ML
5000 INJECTION, SOLUTION INTRAVENOUS; SUBCUTANEOUS EVERY 12 HOURS SCHEDULED
Status: DISCONTINUED | OUTPATIENT
Start: 2023-08-28 | End: 2023-08-31 | Stop reason: HOSPADM

## 2023-08-28 RX ORDER — ARIPIPRAZOLE 2 MG/1
2 TABLET ORAL NIGHTLY
Status: DISCONTINUED | OUTPATIENT
Start: 2023-08-28 | End: 2023-08-31 | Stop reason: HOSPADM

## 2023-08-28 RX ORDER — SODIUM CHLORIDE 0.9 % (FLUSH) 0.9 %
3-10 SYRINGE (ML) INJECTION AS NEEDED
Status: DISCONTINUED | OUTPATIENT
Start: 2023-08-28 | End: 2023-08-31 | Stop reason: HOSPADM

## 2023-08-28 RX ORDER — ACETAMINOPHEN 160 MG/5ML
650 SOLUTION ORAL EVERY 4 HOURS PRN
Status: DISCONTINUED | OUTPATIENT
Start: 2023-08-28 | End: 2023-08-31 | Stop reason: HOSPADM

## 2023-08-28 RX ADMIN — ATORVASTATIN CALCIUM 10 MG: 20 TABLET, FILM COATED ORAL at 20:31

## 2023-08-28 RX ADMIN — Medication 3 ML: at 09:31

## 2023-08-28 RX ADMIN — PANTOPRAZOLE SODIUM 40 MG: 40 TABLET, DELAYED RELEASE ORAL at 05:02

## 2023-08-28 RX ADMIN — LORAZEPAM 0.5 MG: 0.5 TABLET ORAL at 03:40

## 2023-08-28 RX ADMIN — Medication 3 ML: at 20:32

## 2023-08-28 RX ADMIN — ARIPIPRAZOLE 2 MG: 2 TABLET ORAL at 20:32

## 2023-08-28 RX ADMIN — SODIUM CHLORIDE 500 ML: 9 INJECTION, SOLUTION INTRAVENOUS at 18:11

## 2023-08-28 RX ADMIN — HEPARIN SODIUM 5000 UNITS: 5000 INJECTION INTRAVENOUS; SUBCUTANEOUS at 20:32

## 2023-08-28 RX ADMIN — SODIUM BICARBONATE 125 ML/HR: 84 INJECTION, SOLUTION INTRAVENOUS at 13:57

## 2023-08-28 RX ADMIN — SODIUM CHLORIDE 100 ML/HR: 9 INJECTION, SOLUTION INTRAVENOUS at 02:00

## 2023-08-28 RX ADMIN — PAROXETINE HYDROCHLORIDE HEMIHYDRATE 40 MG: 20 TABLET, FILM COATED ORAL at 20:31

## 2023-08-28 RX ADMIN — HEPARIN SODIUM 5000 UNITS: 5000 INJECTION INTRAVENOUS; SUBCUTANEOUS at 09:31

## 2023-08-28 RX ADMIN — HEPARIN SODIUM 5000 UNITS: 5000 INJECTION INTRAVENOUS; SUBCUTANEOUS at 03:41

## 2023-08-28 RX ADMIN — ARIPIPRAZOLE 2 MG: 2 TABLET ORAL at 03:41

## 2023-08-28 RX ADMIN — SODIUM ZIRCONIUM CYCLOSILICATE 10 G: 10 POWDER, FOR SUSPENSION ORAL at 03:42

## 2023-08-28 NOTE — ED PROVIDER NOTES
EMERGENCY DEPARTMENT ENCOUNTER    Room Number:  39/39  Date of encounter:  8/27/2023  PCP: Jennifer Mantilla MD  Historian: Pt    Patient was placed in face mask during triage process. Patient was wearing facemask when I entered the room and throughout our encounter. I wore full protective equipment throughout this patient encounter including a face mask, eye protection, and gloves. Hand hygiene was performed before donning protective equipment and again following doffing of PPE after leaving the room.    HPI:  Chief Complaint: Diarrhea and generalized weakness with some dyspnea and tachycardia  A complete HPI/ROS/PMH/PSH/SH/FH are unobtainable due to: N/A   Context: Marialuisa Vivar is a 67 y.o. female who presents to the ED c/o increasing generalized weakness with diarrhea and lightheadedness when she stands.  She has been very fatigued.  Diarrhea started about a week ago.  She denies any bright red blood per rectum or dark stools.  No ongoing abdominal pain though she sometimes has a bit of cramping.  She also has felt short of breath and very weak when she stands but denies any ongoing chest pain.  She does not report any cough or fevers.  No headache or new trauma.      MEDICAL HISTORY REVIEW  EMR reviewed:    PCP office note 8/22/2023 by Dr. Antunez reviewed: Patient presented with complaint of fatigue and lightheadedness.  She had been seen 8/7/2023 in the emergency department was noted to have orthostatic hypotension causing a syncopal episode.  At that time her metoprolol was discontinued.  She was encouraged to drink extra water.  ====================================  Patient has canceled or missed multiple oncology appointments.    PAST MEDICAL HISTORY  Active Ambulatory Problems     Diagnosis Date Noted    Benign essential HTN 01/20/2016    Depression 01/20/2016    Vocal cord dysfunction 01/20/2016    Malignant neoplasm metastatic to bone 01/20/2016    Carcinoma of left breast metastatic to lung  08/22/2016    Tachycardia 10/05/2016    Therapeutic drug monitoring 10/05/2016    Lung metastasis 06/01/2010    Vitamin D deficiency 05/09/2018    Class 1 obesity without serious comorbidity with body mass index (BMI) of 31.0 to 31.9 in adult 02/06/2019    Tongue cancer 08/13/2019    Anxiety 08/25/2020    Bruxism 08/25/2020    Cheilosis 08/25/2020    Squamous cell carcinoma of skin 08/25/2020    Hyperlipidemia 08/25/2020    Ovarian mass, right 10/19/2021    Adnexal mass 10/26/2021    Abnormal CT scan, colon 04/07/2022     Resolved Ambulatory Problems     Diagnosis Date Noted    Fatigue 01/20/2016    Blood glucose elevated 01/20/2016    Leg swelling 10/05/2016    Dyspnea on exertion 10/05/2016    Benign essential hypertension 08/25/2020     Past Medical History:   Diagnosis Date    SARAH (acute kidney injury) 8/27/2023    Allergy     Asthma     Bone metastasis     Breast cancer     Cholelithiasis 2000    Colon polyp Past 10-15 yrs?    Esophageal reflux     History of colon polyps     Hypertension     Left breast cancer metastasized to lung     Obstructive sleep apnea     Ovarian cyst     PONV (postoperative nausea and vomiting)          PAST SURGICAL HISTORY  Past Surgical History:   Procedure Laterality Date    CHOLECYSTECTOMY  2000    COLONOSCOPY  2014    H/O polyps    COLONOSCOPY N/A 6/1/2022    Procedure: COLONOSCOPY to cecum and TI with cold / hot snare polypectomies;  Surgeon: Luis Enrique Galeas MD;  Location: [facility] ENDOSCOPY;  Service: Gastroenterology;  Laterality: N/A;  pre-abnormal ct  post-polyps, diverticulosis, hemorrhoids    KNEE ARTHROPLASTY      LUNG SURGERY  2010    Lung wedge resection    MASTECTOMY RADICAL Left 1993    TONGUE SURGERY  05/21/2019    tongue cancer    TOTAL LAPAROSCOPIC HYSTERECTOMY N/A 11/04/2021    Procedure: Robotic assisted total laparoscopic hysterectomy, bilateral salpingoophorecotmy, bilateral ureteralysis ;  Surgeon: Kaylynn Ricci DO;  Location: [facility] MAIN OR;  Service:  Jeannes - Nessa;  Laterality: N/A;         FAMILY HISTORY  Family History   Problem Relation Age of Onset    Hypertension Mother     Leukemia Mother 72    COPD Mother         smoker    Diabetes Brother     Pancreatic cancer Brother     Glaucoma Brother     Heart disease Brother     Hypertension Brother     Gallbladder disease Brother     Pancreatitis Brother          from pancreatic csncer    Gallbladder disease Father     Colon polyps Father     Stroke Other         Grandmother    Lupus Other         Aunt    Alcohol abuse Other         Aunt    Glaucoma Other         Grandmother    Diabetes type II Brother     Hypertension Brother     Hyperlipidemia Brother     Liver cancer Paternal Uncle     Stomach cancer Paternal Aunt     Alcohol abuse Maternal Aunt     Malig Hyperthermia Neg Hx          SOCIAL HISTORY  Social History     Socioeconomic History    Marital status: Single    Years of education: College   Tobacco Use    Smoking status: Former     Packs/day: 2.00     Years: 30.00     Pack years: 60.00     Types: Cigarettes     Start date: 1973     Quit date: 2008     Years since quitting: 15.2    Smokeless tobacco: Never   Vaping Use    Vaping Use: Never used   Substance and Sexual Activity    Alcohol use: No    Drug use: No    Sexual activity: Not Currently     Birth control/protection: None         ALLERGIES  Nickel and Ciprofloxacin        REVIEW OF SYSTEMS  Review of Systems     All systems reviewed and negative except for those discussed in HPI.       PHYSICAL EXAM    I have reviewed the triage vital signs and nursing notes.    ED Triage Vitals [23]   Temp Heart Rate Resp BP SpO2   97.8 °F (36.6 °C) 119 16 100/72 99 %      Temp src Heart Rate Source Patient Position BP Location FiO2 (%)   Oral Monitor -- -- --       Physical Exam    Physical Exam   Constitutional: No distress.   HENT:  Head: Normocephalic and atraumatic.   Oropharynx: Mucous membranes are moist.   Eyes: No scleral  icterus.  Mild conjunctival pallor.  Neck: Painless range of motion noted. Neck supple.   Cardiovascular: Tachycardic  Pulmonary/Chest: Tachypnea, examination breathing 24-30 times a minute.  No accessory muscle use.  Mildly diminished bilateral bases without crackles or rhonchi.  Abdominal: Soft. There is minimal diffuse discomfort without focal tenderness. There is no rebound and no guarding.   Musculoskeletal: Moves all extremities equally. There is no pedal edema or calf tenderness.   Neurological: Alert and oriented.  Speech fluent and easily intelligible  Skin: Skin is pink, warm, and dry. No pallor.   Psychiatric: Mood and affect normal.   Nursing note and vitals reviewed.    LAB RESULTS  Recent Results (from the past 24 hour(s))   ECG 12 Lead Tachycardia    Collection Time: 08/27/23  9:39 PM   Result Value Ref Range    QT Interval 340 ms    QTC Interval 445 ms   COVID-19, FLU A/B, RSV PCR - Swab, Nasopharynx    Collection Time: 08/27/23  9:50 PM    Specimen: Nasopharynx; Swab   Result Value Ref Range    COVID19 Not Detected Not Detected - Ref. Range    Influenza A PCR Not Detected Not Detected    Influenza B PCR Not Detected Not Detected    RSV, PCR Not Detected Not Detected   Comprehensive Metabolic Panel    Collection Time: 08/27/23  9:50 PM    Specimen: Blood   Result Value Ref Range    Glucose 114 (H) 65 - 99 mg/dL    BUN 68 (H) 8 - 23 mg/dL    Creatinine 3.57 (H) 0.57 - 1.00 mg/dL    Sodium 131 (L) 136 - 145 mmol/L    Potassium 6.0 (H) 3.5 - 5.2 mmol/L    Chloride 100 98 - 107 mmol/L    CO2 14.1 (L) 22.0 - 29.0 mmol/L    Calcium 10.1 8.6 - 10.5 mg/dL    Total Protein 6.5 6.0 - 8.5 g/dL    Albumin 3.7 3.5 - 5.2 g/dL    ALT (SGPT) 16 1 - 33 U/L    AST (SGOT) 19 1 - 32 U/L    Alkaline Phosphatase 83 39 - 117 U/L    Total Bilirubin 0.7 0.0 - 1.2 mg/dL    Globulin 2.8 gm/dL    A/G Ratio 1.3 g/dL    BUN/Creatinine Ratio 19.0 7.0 - 25.0    Anion Gap 16.9 (H) 5.0 - 15.0 mmol/L    eGFR 13.4 (L) >60.0 mL/min/1.73    Protime-INR    Collection Time: 08/27/23  9:50 PM    Specimen: Blood   Result Value Ref Range    Protime 14.9 (H) 11.7 - 14.2 Seconds    INR 1.15 (H) 0.90 - 1.10   aPTT    Collection Time: 08/27/23  9:50 PM    Specimen: Blood   Result Value Ref Range    PTT 30.5 22.7 - 35.4 seconds   Lipase    Collection Time: 08/27/23  9:50 PM    Specimen: Blood   Result Value Ref Range    Lipase 43 13 - 60 U/L   High Sensitivity Troponin T    Collection Time: 08/27/23  9:50 PM    Specimen: Blood   Result Value Ref Range    HS Troponin T 34 (H) <10 ng/L   BNP    Collection Time: 08/27/23  9:50 PM    Specimen: Blood   Result Value Ref Range    proBNP 217.0 0.0 - 900.0 pg/mL   Lactic Acid, Plasma    Collection Time: 08/27/23  9:50 PM    Specimen: Blood   Result Value Ref Range    Lactate 3.3 (C) 0.5 - 2.0 mmol/L   Procalcitonin    Collection Time: 08/27/23  9:50 PM    Specimen: Blood   Result Value Ref Range    Procalcitonin 0.13 0.00 - 0.25 ng/mL   CBC Auto Differential    Collection Time: 08/27/23  9:50 PM    Specimen: Blood   Result Value Ref Range    WBC 3.67 3.40 - 10.80 10*3/mm3    RBC 3.20 (L) 3.77 - 5.28 10*6/mm3    Hemoglobin 11.6 (L) 12.0 - 15.9 g/dL    Hematocrit 33.0 (L) 34.0 - 46.6 %    .1 (H) 79.0 - 97.0 fL    MCH 36.3 (H) 26.6 - 33.0 pg    MCHC 35.2 31.5 - 35.7 g/dL    RDW 14.2 12.3 - 15.4 %    RDW-SD 53.7 37.0 - 54.0 fl    MPV 9.8 6.0 - 12.0 fL    Platelets 145 140 - 450 10*3/mm3    nRBC 0.0 0.0 - 0.2 /100 WBC   Magnesium    Collection Time: 08/27/23  9:50 PM    Specimen: Blood   Result Value Ref Range    Magnesium 2.0 1.6 - 2.4 mg/dL   Manual Differential    Collection Time: 08/27/23  9:50 PM    Specimen: Blood   Result Value Ref Range    Neutrophil % 39.0 (L) 42.7 - 76.0 %    Lymphocyte % 48.0 (H) 19.6 - 45.3 %    Monocyte % 8.0 5.0 - 12.0 %    Eosinophil % 3.0 0.3 - 6.2 %    Atypical Lymphocyte % 2.0 0.0 - 5.0 %    Neutrophils Absolute 1.43 (L) 1.70 - 7.00 10*3/mm3    Lymphocytes Absolute 1.84 0.70 - 3.10  10*3/mm3    Monocytes Absolute 0.29 0.10 - 0.90 10*3/mm3    Eosinophils Absolute 0.11 0.00 - 0.40 10*3/mm3    RBC Morphology Normal Normal    WBC Morphology Normal Normal    Platelet Morphology Normal Normal   Urinalysis With Culture If Indicated - Straight Cath    Collection Time: 08/27/23 11:01 PM    Specimen: Straight Cath; Urine   Result Value Ref Range    Color, UA Yellow Yellow, Straw    Appearance, UA Clear Clear    pH, UA 5.5 5.0 - 8.0    Specific Gravity, UA 1.018 1.005 - 1.030    Glucose, UA Negative Negative    Ketones, UA 15 mg/dL (1+) (A) Negative    Bilirubin, UA Negative Negative    Blood, UA Negative Negative    Protein, UA Trace (A) Negative    Leuk Esterase, UA Negative Negative    Nitrite, UA Negative Negative    Urobilinogen, UA 1.0 E.U./dL 0.2 - 1.0 E.U./dL       Ordered the above labs and independently reviewed the results.        RADIOLOGY  CT Abdomen Pelvis Without Contrast    Result Date: 8/27/2023  CT ABDOMEN AND PELVIS WITHOUT IV CONTRAST  HISTORY: Abdominal pain. Diarrhea.  TECHNIQUE:  CT includes axial imaging from the lung bases to the trochanters without intravenous contrast and without use of oral contrast. Data reconstructed in coronal and sagittal planes. Radiation dose reduction techniques were utilized, including automated exposure control and exposure modulation based on body size.  COMPARISON: CT abdomen and pelvis 07/17/2023.  FINDINGS: There is elevation of the left hemidiaphragm with left basilar scarring and atelectasis. Calcified nodule is present in the anteromedial left lung base. Right lung base appears clear. There are multiple hepatic and splenic calcifications. No focal hepatic abnormality is demonstrated. Cholecystectomy clips are present. Chronic bilateral adrenal calcifications are present. There is chronic thickening of the noncalcified segments of the left adrenal gland particularly involving the lateral limb and this is without change.  There is no bowel  dilatation or evidence for bowel obstruction. Atherosclerotic calcifications are present involving the abdominal aorta and iliac vasculature. No leslie enlargement is demonstrated within the abdomen or pelvis. There is multifocal osteosclerotic metastatic disease. Osseous metastases are present throughout the thoracic spine, lumbar spine, sacrum, and about the pelvis without evidence for change.      1. No evidence for acute abnormality in the abdomen or pelvis. 2. Multifocal osseous metastatic disease without interval change. 3. Chronic elevation of the left hemidiaphragm with left basilar scarring/atelectasis. 4. Previous cholecystectomy. 5. Chronic bilateral adrenal calcifications. There is thickening of the left adrenal gland involving the noncalcified segment and this is without change.  Radiation dose reduction techniques were utilized, including automated exposure control and exposure modulation based on body size.    This report was finalized on 8/27/2023 11:15 PM by Dr. Maicol Mohan M.D.      XR Chest 1 View    Result Date: 8/27/2023  CHEST SINGLE VIEW  HISTORY: Dyspnea.  COMPARISON: AP chest 08/07/2023, CT chest 07/17/2023.  FINDINGS: There is elevation of the left hemidiaphragm that appears similar to the previous exam. Within the right upper lobe there is a 1 cm nodule that was also evident on previous chest CT 07/17/2023. Lungs otherwise appear clear of focal airspace disease. There is chronic left basilar scarring/atelectasis and there is mild chronic blunting of the right costophrenic angle. Osteosclerotic metastatic disease is present. There has been left mastectomy with left axillary leslie dissection.      No evidence for change compared to previous chest CT 07/17/2023. A 1 cm pulmonary nodule right upper lobe is similar to the previous CT 7/17/2023. There is osseous metastatic disease.   This report was finalized on 8/27/2023 11:01 PM by Dr. Maicol Mohan M.D.       I ordered the above noted  radiological studies. Reviewed by me and discussed with radiologist.  See dictation for official radiology interpretation.      PROCEDURES    Procedures        MEDICATIONS GIVEN IN ER    Medications   sodium chloride 0.9 % flush 10 mL (has no administration in time range)   sodium chloride 0.9 % bolus 1,000 mL (1,000 mL Intravenous New Bag 8/27/23 1416)   dextrose (D50W) (25 g/50 mL) IV injection 25 g (25 g Intravenous Given 8/27/23 9770)   insulin regular (humuLIN R,novoLIN R) injection 10 Units (10 Units Intravenous Given 8/27/23 0078)         PROGRESS, DATA ANALYSIS, CONSULTS, AND MEDICAL DECISION MAKING    My differential diagnosis for abdominal pain includes but is not limited to:  Gastritis, gastroenteritis, peptic ulcer disease, GERD, esophageal perforation, acute appendicitis, mesenteric adenitis, Meckel’s diverticulum, epiploic appendagitis, diverticulitis, colon cancer, ulcerative colitis, Crohn’s disease, intussusception, small bowel obstruction, adhesions, ischemic bowel, perforated viscus, ileus, obstipation, biliary colic, cholecystitis, cholelithiasis, Db-Jacinto Gregory, hepatitis, pancreatitis, common bile duct obstruction, cholangitis, bile leak, splenic trauma, splenic rupture, splenic infarction, splenic abscess, abdominal abscess, ascites, spontaneous bacterial peritonitis, hernia, UTI, cystitis,ureterolithiasis, urinary obstruction, ovarian cyst, torsion, pregnancy, ectopic pregnancy, PID, pelvic abscess, mittelschmerz, endometriosis, AAA, myocardial infarction, pneumonia, cancer, porphyria, DKA, medications, sickle cell, viral syndrome, zoster    Total critical care time: Approximately 45 minutes    Due to a high probability of clinically significant, life threatening deterioration, the patient required my highest level of preparedness to intervene emergently and I personally spent this critical care time directly and personally managing the patient. This critical care time included obtaining a  history; examining the patient; vital sign monitoring; ordering and review of studies; arranging urgent treatment with development of a management plan; evaluation of patient's response to treatment; frequent reassessment; and, discussions with other providers.    This critical care time was performed to assess and manage the high probability of imminent, life-threatening deterioration that could result in multi-organ failure. It was exclusive of separately billable procedures and treating other patients and teaching time.    Please see MDM section and the rest of the note for further information on patient assessment and treatment.        All labs have been independently reviewed by me.  All radiology studies have been reviewed by me and discussed with radiologist dictating the report.   EKG's independently viewed and interpreted by me.  Discussion below represents my analysis of pertinent findings related to patient's condition, differential diagnosis, treatment plan and final disposition.      ED Course as of 08/27/23 2333   Sun Aug 27, 2023   2150 EKG           EKG time: 2139  Rhythm/Rate: Sinus, 105  P waves and PA: JOCELYN within normal limits  QRS, axis: Narrow complex  ST and T waves: No STEMI; QTc within normal limits    Interpreted Contemporaneously by me, independently viewed  Comparison: 8/7/2023-increased rate today   [RS]   2227 Glucose(!): 114 [RS]   2227 BUN(!): 68 [RS]   2227 Creatinine(!): 3.57 [RS]   2227 Sodium(!): 131 [RS]   2227 Potassium(!): 6.0  IVFs ordered [RS]   2228 Lipase: 43 [RS]   2229 WBC: 3.67 [RS]   2229 Hemoglobin(!): 11.6 [RS]   2229 Platelets: 145 [RS]   2230 Procalcitonin: 0.13 [RS]   2230 HS Troponin T(!): 34 [RS]   2230 Patient is tachycardic she is not hypoxemic or complaining of chest pain.  Her kidney function will not allow for CT angiography of the chest at this time.  We will aggressively rehydrate her and see what the abdomen CT shows with plan for admission.  Patient  agreeable. [RS]   2245 Lactate(!!): 3.3  IV fluids infusing.  Likely elevated secondary to severity of patient's acute renal insufficiency.  Bacterial infectious process not yet identified. [RS]   2245 COVID19: Not Detected [RS]   2245 Influenza A PCR: Not Detected [RS]   2245 Influenza B PCR: Not Detected [RS]   2245 RADIOLOGY      Study: Single view chest  Findings: Left hemidiaphragm elevated.  No focal pneumothorax appreciated.  I independently viewed and interpreted these images contemporaneously with treatment.    [RS]   2305 CTA of the chest has been canceled due to the patient's renal function and low suspicion for PE at this time.  I have spoken with radiology department about this however neither of us has been able to cleared out of the computer. [RS]   2315 Patient updated with abnormal findings and need for admission.  Patient is agreeable. [RS]   2315 HS Troponin T(!): 34  Likely secondary to renal failure. [RS]   2317 Leukocytes, UA: Negative [RS]   2317 Nitrite, UA: Negative [RS]   2333 CONSULT        Provider: Dr. Gil - [facility]    Discussion: Agreeable c admit.    Agreeable c treatment and planned disposition.         [RS]      ED Course User Index  [RS] Uli Baca MD       AS OF 23:33 EDT VITALS:    BP - 109/81  HR - 103  TEMP - 97.8 °F (36.6 °C) (Oral)  O2 SATS - 100%        DIAGNOSIS  Final diagnoses:   Generalized weakness   Acute renal failure superimposed on chronic kidney disease, unspecified CKD stage, unspecified acute renal failure type   Acute kidney injury (SARAH) with acute tubular necrosis (ATN)   Volume depletion   Hyperkalemia   Diarrhea, unspecified type   Lactic acidosis   History of breast cancer         DISPOSITION  ADMISSION    Discussed treatment plan and reason for admission with pt/family and admitting physician.  Pt/family voiced understanding of the plan for admission for further testing/treatment as needed.          Uli Baca MD  08/27/23 0833

## 2023-08-28 NOTE — SIGNIFICANT NOTE
08/28/23 1302   OTHER   Discipline occupational therapist   Rehab Time/Intention   Session Not Performed other (see comments)  (Attempted OT both this morning & afternoon. RN asking to hold 2/2 low blodd pressure both times. OT will f/u.)   Recommendation   OT - Next Appointment 08/29/23

## 2023-08-28 NOTE — ED NOTES
Nursing report ED to floor  Marialuisa Vivar  67 y.o.  female    HPI :   Chief Complaint   Patient presents with    Abdominal Pain    Vomiting    Diarrhea       Admitting doctor:   Dino Gil MD    Admitting diagnosis:   The primary encounter diagnosis was Generalized weakness. Diagnoses of Acute renal failure superimposed on chronic kidney disease, unspecified CKD stage, unspecified acute renal failure type, Acute kidney injury (SARAH) with acute tubular necrosis (ATN), Volume depletion, Hyperkalemia, Diarrhea, unspecified type, Lactic acidosis, and History of breast cancer were also pertinent to this visit.    Code status:   Current Code Status       Date Active Code Status Order ID Comments User Context       Prior            Allergies:   Nickel and Ciprofloxacin    Isolation:   Enhanced Droplet/Contact     Intake and Output    Intake/Output Summary (Last 24 hours) at 8/27/2023 2340  Last data filed at 8/27/2023 2301  Gross per 24 hour   Intake 200 ml   Output 100 ml   Net 100 ml       Weight:   There were no vitals filed for this visit.    Most recent vitals:   Vitals:    08/27/23 2232 08/27/23 2301 08/27/23 2305 08/27/23 2307   BP:  114/85     Pulse: 107 107 113 109   Resp:       Temp:       TempSrc:       SpO2: 98% 99% 97% 99%   Height:           Active LDAs/IV Access:   Lines, Drains & Airways       Active LDAs       Name Placement date Placement time Site Days    Peripheral IV 08/27/23 Posterior;Right Hand 08/27/23  --  Hand  less than 1    Peripheral IV 08/27/23 2201 Right Antecubital 08/27/23 2201  Antecubital  less than 1    External Urinary Catheter 08/27/23 2302  --  less than 1                    Labs (abnormal labs have a star):   Labs Reviewed   COMPREHENSIVE METABOLIC PANEL - Abnormal; Notable for the following components:       Result Value    Glucose 114 (*)     BUN 68 (*)     Creatinine 3.57 (*)     Sodium 131 (*)     Potassium 6.0 (*)     CO2 14.1 (*)     Anion Gap 16.9 (*)     eGFR  13.4 (*)     All other components within normal limits    Narrative:     GFR Normal >60  Chronic Kidney Disease <60  Kidney Failure <15     PROTIME-INR - Abnormal; Notable for the following components:    Protime 14.9 (*)     INR 1.15 (*)     All other components within normal limits   URINALYSIS W/ CULTURE IF INDICATED - Abnormal; Notable for the following components:    Ketones, UA 15 mg/dL (1+) (*)     Protein, UA Trace (*)     All other components within normal limits    Narrative:     In absence of clinical symptoms, the presence of pyuria, bacteria, and/or nitrites on the urinalysis result does not correlate with infection.  Urine microscopic not indicated.   TROPONIN - Abnormal; Notable for the following components:    HS Troponin T 34 (*)     All other components within normal limits    Narrative:     High Sensitive Troponin T Reference Range:  <10.0 ng/L- Negative Female for AMI  <15.0 ng/L- Negative Male for AMI  >=10 - Abnormal Female indicating possible myocardial injury.  >=15 - Abnormal Male indicating possible myocardial injury.   Clinicians would have to utilize clinical acumen, EKG, Troponin, and serial changes to determine if it is an Acute Myocardial Infarction or myocardial injury due to an underlying chronic condition.        LACTIC ACID, PLASMA - Abnormal; Notable for the following components:    Lactate 3.3 (*)     All other components within normal limits   CBC WITH AUTO DIFFERENTIAL - Abnormal; Notable for the following components:    RBC 3.20 (*)     Hemoglobin 11.6 (*)     Hematocrit 33.0 (*)     .1 (*)     MCH 36.3 (*)     All other components within normal limits   MANUAL DIFFERENTIAL - Abnormal; Notable for the following components:    Neutrophil % 39.0 (*)     Lymphocyte % 48.0 (*)     Neutrophils Absolute 1.43 (*)     All other components within normal limits   COVID-19/FLUA&B/RSV, NP SWAB IN TRANSPORT MEDIA 8-12 HR TAT - Normal    Narrative:     Fact sheet for providers:  "https://www.fda.gov/media/129168/download    Fact sheet for patients: https://www.fda.gov/media/815850/download    Test performed by PCR.   APTT - Normal   LIPASE - Normal   BNP (IN-HOUSE) - Normal    Narrative:     Among patients with dyspnea, NT-proBNP is highly sensitive for the detection of acute congestive heart failure. In addition NT-proBNP of <300 pg/ml effectively rules out acute congestive heart failure with 99% negative predictive value.     PROCALCITONIN - Normal    Narrative:     As a Marker for Sepsis (Non-Neonates):    1. <0.5 ng/mL represents a low risk of severe sepsis and/or septic shock.  2. >2 ng/mL represents a high risk of severe sepsis and/or septic shock.    As a Marker for Lower Respiratory Tract Infections that require antibiotic therapy:    PCT on Admission    Antibiotic Therapy       6-12 Hrs later    >0.5                Strongly Recommended  >0.25 - <0.5        Recommended   0.1 - 0.25          Discouraged              Remeasure/reassess PCT  <0.1                Strongly Discouraged     Remeasure/reassess PCT    As 28 day mortality risk marker: \"Change in Procalcitonin Result\" (>80% or <=80%) if Day 0 (or Day 1) and Day 4 values are available. Refer to http://www.[employer]-pct-calculator.com    Change in PCT <=80%  A decrease of PCT levels below or equal to 80% defines a positive change in PCT test result representing a higher risk for 28-day all-cause mortality of patients diagnosed with severe sepsis for septic shock.    Change in PCT >80%  A decrease of PCT levels of more than 80% defines a negative change in PCT result representing a lower risk for 28-day all-cause mortality of patients diagnosed with severe sepsis or septic shock.      MAGNESIUM - Normal   BLOOD CULTURE   BLOOD CULTURE   HIGH SENSITIVITIY TROPONIN T 2HR   LACTIC ACID, REFLEX   CBC AND DIFFERENTIAL    Narrative:     The following orders were created for panel order CBC & Differential.  Procedure                          "      Abnormality         Status                     ---------                               -----------         ------                     CBC Auto Differential[[id number]]        Abnormal            Edited Result - FINAL        Please view results for these tests on the individual orders.       EKG:   ECG 12 Lead Tachycardia   Preliminary Result   HEART RATE= 103  bpm   RR Interval= 583  ms   KS Interval= 152  ms   P Horizontal Axis= 11  deg   P Front Axis= 83  deg   QRSD Interval= 95  ms   QT Interval= 340  ms   QTcB= 445  ms   QRS Axis= 75  deg   T Wave Axis= 68  deg   - OTHERWISE NORMAL ECG -   Sinus tachycardia   Electronically Signed By:    Date and Time of Study: 2023-08-27 21:39:45          Meds given in ED:   Medications   sodium chloride 0.9 % flush 10 mL (has no administration in time range)   sodium chloride 0.9 % bolus 1,000 mL (1,000 mL Intravenous New Bag 8/27/23 2316)   dextrose (D50W) (25 g/50 mL) IV injection 25 g (25 g Intravenous Given 8/27/23 7300)   insulin regular (humuLIN R,novoLIN R) injection 10 Units (10 Units Intravenous Given 8/27/23 2311)       Imaging results:  CT Abdomen Pelvis Without Contrast    Result Date: 8/27/2023  1. No evidence for acute abnormality in the abdomen or pelvis. 2. Multifocal osseous metastatic disease without interval change. 3. Chronic elevation of the left hemidiaphragm with left basilar scarring/atelectasis. 4. Previous cholecystectomy. 5. Chronic bilateral adrenal calcifications. There is thickening of the left adrenal gland involving the noncalcified segment and this is without change.  Radiation dose reduction techniques were utilized, including automated exposure control and exposure modulation based on body size.    This report was finalized on 8/27/2023 11:15 PM by Dr. Maicol Mohan M.D.      XR Chest 1 View    Result Date: 8/27/2023  No evidence for change compared to previous chest CT 07/17/2023. A 1 cm pulmonary nodule right upper lobe is similar to  the previous CT 7/17/2023. There is osseous metastatic disease.   This report was finalized on 8/27/2023 11:01 PM by Dr. Maicol Mohan M.D.       Ambulatory status:   ax2    Social issues:   Social History     Socioeconomic History    Marital status: Single    Years of education: College   Tobacco Use    Smoking status: Former     Packs/day: 2.00     Years: 30.00     Pack years: 60.00     Types: Cigarettes     Start date: 1/1/1973     Quit date: 6/1/2008     Years since quitting: 15.2    Smokeless tobacco: Never   Vaping Use    Vaping Use: Never used   Substance and Sexual Activity    Alcohol use: No    Drug use: No    Sexual activity: Not Currently     Birth control/protection: None       NIH Stroke Scale:       Registered Nurse, RN  08/27/23 23:40 EDT

## 2023-08-28 NOTE — PROGRESS NOTES
Name: Marialuisa Vivar ADMIT: 2023   : 1955  PCP: Jennifer Mantilla MD    MRN: [id number] LOS: 1 days   AGE/SEX: 67 y.o. female  ROOM: [facility]     Subjective   Subjective   No complaints this a.m. Ate 1/3 of breakfast. Denies nausea, diarrhea, pain. Feels need to urinate but reluctant due to purwick       Objective   Objective   Vital Signs  Temp:  [97.3 °F (36.3 °C)-98.8 °F (37.1 °C)] 98.8 °F (37.1 °C)  Heart Rate:  [103-119] 103  Resp:  [16-20] 20  BP: ()/(44-85) 89/49  SpO2:  [95 %-100 %] 97 %  on   ;   Device (Oxygen Therapy): room air  Body mass index is 23.8 kg/m².  Physical Exam  Vitals and nursing note reviewed.   Constitutional:       General: She is not in acute distress.     Appearance: She is ill-appearing. She is not toxic-appearing.   HENT:      Head: Normocephalic.      Mouth/Throat:      Mouth: Mucous membranes are moist.   Eyes:      Conjunctiva/sclera: Conjunctivae normal.   Cardiovascular:      Rate and Rhythm: Normal rate and regular rhythm.   Pulmonary:      Effort: Pulmonary effort is normal. No respiratory distress.      Breath sounds: No wheezing or rales.   Abdominal:      General: Bowel sounds are normal.      Palpations: Abdomen is soft.   Musculoskeletal:      Cervical back: Neck supple.      Right lower leg: No edema.      Left lower leg: No edema.   Skin:     General: Skin is warm and dry.   Neurological:      Mental Status: She is alert and oriented to person, place, and time.   Psychiatric:         Mood and Affect: Mood normal.         Behavior: Behavior normal.     Results Review     I reviewed the patient's new clinical results.  Results from last 7 days   Lab Units 23  0209 23  2150   WBC 10*3/mm3 2.55* 3.67   HEMOGLOBIN g/dL 10.5* 11.6*   PLATELETS 10*3/mm3 126* 145     Results from last 7 days   Lab Units 23  0928 23  0209 23  2150   SODIUM mmol/L 134* 131* 131*   POTASSIUM mmol/L 4.6 5.2 6.0*   CHLORIDE mmol/L 112* 103 100   CO2  mmol/L 13.7* 14.5* 14.1*   BUN mg/dL 49* 63* 68*   CREATININE mg/dL 2.20* 3.31* 3.57*   GLUCOSE mg/dL 115* 74 114*   EGFR mL/min/1.73 23.9* 14.6* 13.4*     Results from last 7 days   Lab Units 08/28/23  0928 08/27/23  2150   ALBUMIN g/dL 2.6* 3.7   BILIRUBIN mg/dL 0.5 0.7   ALK PHOS U/L 60 83   AST (SGOT) U/L 15 19   ALT (SGPT) U/L 13 16     Results from last 7 days   Lab Units 08/28/23  0928 08/28/23  0209 08/27/23  2150   CALCIUM mg/dL 7.6* 9.6 10.1   ALBUMIN g/dL 2.6*  --  3.7   MAGNESIUM mg/dL  --   --  2.0     Results from last 7 days   Lab Units 08/28/23  0209 08/27/23  2326 08/27/23  2150   PROCALCITONIN ng/mL  --   --  0.13   LACTATE mmol/L 2.0 2.3* 3.3*     Glucose   Date/Time Value Ref Range Status   08/28/2023 1134 112 70 - 130 mg/dL Final   08/28/2023 0806 97 70 - 130 mg/dL Final   08/28/2023 0603 105 70 - 130 mg/dL Final       CT Abdomen Pelvis Without Contrast    Result Date: 8/27/2023  1. No evidence for acute abnormality in the abdomen or pelvis. 2. Multifocal osseous metastatic disease without interval change. 3. Chronic elevation of the left hemidiaphragm with left basilar scarring/atelectasis. 4. Previous cholecystectomy. 5. Chronic bilateral adrenal calcifications. There is thickening of the left adrenal gland involving the noncalcified segment and this is without change.  Radiation dose reduction techniques were utilized, including automated exposure control and exposure modulation based on body size.    This report was finalized on 8/27/2023 11:15 PM by Dr. Maicol Mohan M.D.      XR Chest 1 View    Result Date: 8/27/2023  No evidence for change compared to previous chest CT 07/17/2023. A 1 cm pulmonary nodule right upper lobe is similar to the previous CT 7/17/2023. There is osseous metastatic disease.   This report was finalized on 8/27/2023 11:01 PM by Dr. Maicol Mohan M.D.       I have personally reviewed all medications:  Scheduled Medications  ARIPiprazole, 2 mg, Oral,  Nightly  atorvastatin, 10 mg, Oral, Nightly  heparin (porcine), 5,000 Units, Subcutaneous, Q12H  pantoprazole, 40 mg, Oral, Q AM  PARoxetine, 40 mg, Oral, Nightly  sodium chloride, 3 mL, Intravenous, Q12H    Infusions  sodium bicarbonate 150 mEq in D5W, 125 mL/hr  sodium chloride, 100 mL/hr, Last Rate: 100 mL/hr (08/28/23 0200)    Diet  Diet: Liquid Diets; Clear Liquid; Texture: Regular Texture (IDDSI 7); Fluid Consistency: Thin (IDDSI 0)    I have personally reviewed:  [x]  Laboratory   [x]  Microbiology   [x]  Radiology   [x]  EKG/Telemetry  [x]  Cardiology/Vascular   []  Pathology    []  Records       Assessment/Plan     Active Hospital Problems    Diagnosis  POA    **SARAH (acute kidney injury) [N17.9]  Yes    Hyperkalemia [E87.5]  Yes    Metabolic acidosis [E87.20]  Yes    Dehydration [E86.0]  Yes    Diarrhea [R19.7]  Yes    Anxiety [F41.9]  Yes    Hyperlipidemia [E78.5]  Yes    Carcinoma of left breast metastatic to lung [C50.912, C78.02]  Yes    Benign essential HTN [I10]  Yes    Malignant neoplasm metastatic to bone [C79.51]  Yes      Resolved Hospital Problems   No resolved problems to display.       67 y.o. female admitted with SARAH (acute kidney injury).    67-year-old female presents the hospital with diarrhea and weakness and is found to have SARAH and hyperkalemia.     SARAH with metabolic acidosis/CKD:  Appreciate Nephrology assistance. IVF's and bicarb.  Baseline Cr 1.5-1.7. Monitor labs. No further diarrhea thus far. Monitor BP, 80's systolic. Renal US pending. CT A/P no acute findings     Hyperkalemia:  Secondary to renal dysfunction.  Patient received dextrose, insulin and fluid per emergency room team.  Also received 1 dose lokelma. K normal on today's labs. Nephrology managing     Diarrhea and dehydration:  Diarrhea has been going on for greater than 1 week.  Unclear if it is medication side effect or infection.  Check stool PCR and C. difficile studies if diarrhea returns. GI consult pending.      Breast cancer:  Patient receives biologic therapy and follows closely in oncology clinic.  Consult oncology to weigh in to see if they feel this is related to current cancer medications; pending    Anxiety: Continue home Paxil and Abilify.  Monitor carefully.     GERD: PPI.  Stable.     Hyperlipidemia: Statin.  Stable.      Heparin SC for DVT prophylaxis.  Full code.  Discussed with patient.  Anticipate discharge  TBD  .      ARON Tijerina  [facility]  08/28/23  11:38 EDT

## 2023-08-28 NOTE — CASE MANAGEMENT/SOCIAL WORK
Discharge Planning Assessment  [facility]     Patient Name: Marialuisa Vivar  MRN: [id number]  Today's Date: 8/28/2023    Admit Date: 8/27/2023    Plan: Home, no needs   Discharge Needs Assessment       Row Name 08/28/23 1517       Living Environment    People in Home alone    Current Living Arrangements apartment    Potentially Unsafe Housing Conditions none    Primary Care Provided by self    Provides Primary Care For no one    Family Caregiver if Needed none    Quality of Family Relationships unable to assess    Able to Return to Prior Arrangements yes       Resource/Environmental Concerns    Resource/Environmental Concerns none       Food Insecurity    Within the past 12 months, you worried that your food would run out before you got the money to buy more. Never true    Within the past 12 months, the food you bought just didn't last and you didn't have money to get more. Never true       Transition Planning    Patient/Family Anticipates Transition to home    Patient/Family Anticipated Services at Transition none    Transportation Anticipated family or friend will provide       Discharge Needs Assessment    Equipment Currently Used at Home none    Concerns to be Addressed no discharge needs identified    Anticipated Changes Related to Illness none    Equipment Needed After Discharge cane, quad    Provided Post Acute Provider List? N/A    Provided Post Acute Provider Quality & Resource List? N/A                   Discharge Plan       Row Name 08/28/23 1519       Plan    Plan Home, no needs    Patient/Family in Agreement with Plan yes    Provided Post Acute Provider List? N/A    Provided Post Acute Provider Quality & Resource List? N/A    Plan Comments Met with pt at bedside. Introduced self and explained role of . Face sheet verified, PCP is Jennifer Mantilla. Pt denies any difficulty paying for medications, and she obtains her medications from [facility]. Pt lives alone, states that she  does not have any family members that can assist with any care needs. She has a friend, Carmela, that will assist if anything arises. Pt is independent in ADL's, and does not currently use any asssitive devices. Pt is requesting a cane for stability. Order received and noted for cane.Pt has never used home health, or been to rehab and denies the need for these services at discharge. Upon discharge, pt stated that her friend will transport home. Explained that CCP would follow to assess for discharge needs.                  Continued Care and Services - Admitted Since 8/27/2023       Durable Medical Equipment       Service Provider Request Status Selected Services Address Phone Fax Patient Preferred    [facility] Pending - Request Sent N/A [id number] [id number] [phone] --                  Selected Continued Care - Episodes Includes continued care and service providers with selected services from the active episodes listed below      Oncology Episode start date: 4/26/2022   There are no active outsourced providers for this episode.                 Expected Discharge Date and Time       Expected Discharge Date Expected Discharge Time    Sep 1, 2023            Demographic Summary    No documentation.                  Functional Status    No documentation.                  Psychosocial    No documentation.                  Abuse/Neglect    No documentation.                  Legal    No documentation.                  Substance Abuse    No documentation.                  Patient Forms    No documentation.                     Aline Purcell RN

## 2023-08-28 NOTE — CONSULTS
Nutrition Services    Patient Name:  Mairaluisa Vivar  YOB: 1955  MRN: [id number]  Admit Date:  8/27/2023  Assessment Date:  08/28/23    Comment: Consult per nurse admission screen     Received consult for nutrition assessment per nurse admission screen and MST score 2. Pt on clear liquid diet per MD. Weight history reviewed with 14 lb (10.8%) weight loss past 2.5 months. She reported diarrhea on admission x >1 week. Last BM reported on 8/26 per RN documentation.    Recommendations:   Advance PO diet as medically appropriate/tolerated.     RD will continue to follow course for PO intake and tolerance.    CLINICAL NUTRITION ASSESSMENT      Reason for Assessment Nurse Admission Screen     Diagnosis/Problem   Acute kidney injury   Medical/Surgical History Past Medical History:   Diagnosis Date    SARAH (acute kidney injury) 8/27/2023    Allergy     Anxiety     Asthma     Bone metastasis     Breast cancer     Left node positive    Cholelithiasis 2000    Lap Marily    Colon polyp Past 10-15 yrs?    found at colonoscopies    Depression     Esophageal reflux     cough    History of colon polyps     Hyperlipidemia     Hypertension     Left breast cancer metastasized to lung     Obstructive sleep apnea     does not wear cpap    Ovarian cyst     PONV (postoperative nausea and vomiting)     Tongue cancer 05/2019    by ENT at [facility] dr Quinn       Past Surgical History:   Procedure Laterality Date    CHOLECYSTECTOMY  2000    COLONOSCOPY  2014    H/O polyps    COLONOSCOPY N/A 6/1/2022    Procedure: COLONOSCOPY to cecum and TI with cold / hot snare polypectomies;  Surgeon: Luis Enrique Galeas MD;  Location: [facility] ENDOSCOPY;  Service: Gastroenterology;  Laterality: N/A;  pre-abnormal ct  post-polyps, diverticulosis, hemorrhoids    KNEE ARTHROPLASTY      LUNG SURGERY  2010    Lung wedge resection    MASTECTOMY RADICAL Left 1993    TONGUE SURGERY  05/21/2019    tongue cancer    TOTAL LAPAROSCOPIC HYSTERECTOMY N/A  "11/04/2021    Procedure: Robotic assisted total laparoscopic hysterectomy, bilateral salpingoophorecotmy, bilateral ureteralysis ;  Surgeon: Kaylynn Ricci DO;  Location: [facility];  Service: Robotics - DaVinci;  Laterality: N/A;        Encounter Information        Nutrition History:     Food Preferences:    Supplements:    Factors Affecting Intake: decreased appetite, diarrhea     Anthropometrics        Current Height  Current Weight  BMI kg/m2 Height: 165.1 cm (65\")     Body mass index is 23.8 kg/m².   Adjusted BMI (if applicable)    BMI Category Normal/Healthy (18.4 - 24.9)       Admission Weight 143 lb (65 kg)       Ideal Body Weight (IBW) 125 lb (57 kg)   Adjusted IBW (if applicable)        Usual Body Weight (UBW) 169 lb (8/2022)   Weight Trend Loss, Amount/Timeframe: 26 lb (15.4%) weight loss past year, 14 lb (10.8%) weight loss past 2.5 months       Weight History Wt Readings from Last 30 Encounters:   08/22/23 1513 64.9 kg (143 lb)   08/07/23 0954 71.7 kg (158 lb)   06/08/23 1000 71.6 kg (157 lb 12.8 oz)   05/30/23 1426 69.4 kg (152 lb 14.4 oz)   05/02/23 1345 70.8 kg (156 lb 1.6 oz)   04/18/23 1417 70.8 kg (156 lb)   04/18/23 1056 72.1 kg (159 lb)   04/04/23 1354 72.1 kg (159 lb)   02/28/23 0759 73 kg (160 lb 14.4 oz)   12/15/22 1332 74.6 kg (164 lb 6.4 oz)   12/07/22 1300 73.3 kg (161 lb 11.2 oz)   10/20/22 1349 75 kg (165 lb 6.4 oz)   10/14/22 1448 73.8 kg (162 lb 11.2 oz)   09/16/22 1507 75.5 kg (166 lb 6.4 oz)   08/19/22 1018 76.8 kg (169 lb 6.4 oz)   06/24/22 1241 75.4 kg (166 lb 4.8 oz)   06/03/22 1528 74.8 kg (165 lb)   06/01/22 1140 75.8 kg (167 lb)   05/31/22 1127 77.1 kg (170 lb)   05/20/22 1257 76.5 kg (168 lb 9.6 oz)   05/06/22 1400 77.1 kg (170 lb)   04/27/22 0807 76.1 kg (167 lb 12.8 oz)   04/25/22 1347 76.2 kg (168 lb)   04/22/22 1302 78 kg (172 lb)   04/07/22 1318 76.2 kg (168 lb)   03/24/22 1516 76.1 kg (167 lb 12.8 oz)   03/22/22 1307 76.7 kg (169 lb)   03/15/22 0857 76.2 kg (168 " lb)   02/16/22 1530 75.6 kg (166 lb 9.6 oz)   11/22/21 0902 79.2 kg (174 lb 11.2 oz)      --  Tests/Procedures        Tests/Procedures Ultrasound     Labs       Pertinent Labs    Results from last 7 days   Lab Units 08/28/23  0928 08/28/23  0209 08/27/23  2150   SODIUM mmol/L 134* 131* 131*   POTASSIUM mmol/L 4.6 5.2 6.0*   CHLORIDE mmol/L 112* 103 100   CO2 mmol/L 13.7* 14.5* 14.1*   BUN mg/dL 49* 63* 68*   CREATININE mg/dL 2.20* 3.31* 3.57*   CALCIUM mg/dL 7.6* 9.6 10.1   BILIRUBIN mg/dL 0.5  --  0.7   ALK PHOS U/L 60  --  83   ALT (SGPT) U/L 13  --  16   AST (SGOT) U/L 15  --  19   GLUCOSE mg/dL 115* 74 114*     Results from last 7 days   Lab Units 08/28/23  0928 08/28/23  0209 08/27/23  2150   MAGNESIUM mg/dL  --   --  2.0   HEMOGLOBIN g/dL  --  10.5* 11.6*   HEMATOCRIT %  --  30.8* 33.0*   WBC 10*3/mm3  --  2.55* 3.67   ALBUMIN g/dL 2.6*  --  3.7     Results from last 7 days   Lab Units 08/28/23  0209 08/27/23  2150   INR   --  1.15*   APTT seconds  --  30.5   PLATELETS 10*3/mm3 126* 145     COVID19   Date Value Ref Range Status   08/27/2023 Not Detected Not Detected - Ref. Range Final     Lab Results   Component Value Date    HGBA1C 5.40 10/20/2022          Medications           Scheduled Medications ARIPiprazole, 2 mg, Oral, Nightly  atorvastatin, 10 mg, Oral, Nightly  heparin (porcine), 5,000 Units, Subcutaneous, Q12H  pantoprazole, 40 mg, Oral, Q AM  PARoxetine, 40 mg, Oral, Nightly  sodium chloride, 3 mL, Intravenous, Q12H       Infusions sodium bicarbonate 150 mEq in D5W, 125 mL/hr, Last Rate: 125 mL/hr (08/28/23 1357)       PRN Medications   acetaminophen **OR** acetaminophen **OR** acetaminophen    famotidine    LORazepam    melatonin    ondansetron **OR** ondansetron    Insert Peripheral IV **AND** sodium chloride    sodium chloride    sodium chloride     Physical Findings          Physical Appearance alert, oriented, room air   Oral/Mouth Cavity WNL   Edema  no edema   Gastrointestinal diarrhea,  hyperactive bowel sounds, last bowel movement: 8/26   Skin  skin intact   Tubes/Drains/Lines none   NFPE Pending, due to: pt availability    --  Current Nutrition Orders & Evaluation of Intake       Oral Nutrition     Food Allergies NKFA   Current PO Diet Diet: Liquid Diets; Clear Liquid; Texture: Regular Texture (IDDSI 7); Fluid Consistency: Thin (IDDSI 0)   Supplement n/a   PO Evaluation     % PO Intake/# of Days Limited documentation    --  PES STATEMENT / NUTRITION DIAGNOSIS      Nutrition Dx Problem  Problem: Predicted Suboptimal Intake  Etiology: Factors Affecting Nutrition - decreased appetite    Signs/Symptoms: Report of Minimal PO Intake, Unintended Weight Change, and Report/Observation    Comment:    --  NUTRITION INTERVENTION / PLAN OF CARE      Intervention Goal(s) Nutrition support treatment, Improved nutrition related labs, Reduce/improve symptoms, Meet estimated needs, Disease management/therapy, Establish PO intake, Tolerate PO , Maintain weight, and No significant weight loss         RD Intervention/Action Encourage intake, Follow Tx Progress, Care plan reviewed, and Recommend/ordered:          Prescription/Orders:       PO Diet       Supplements       Snacks       Enteral Nutrition       Parenteral Nutrition    New Prescription Ordered? No changes at this time   --      Monitor/Evaluation Per protocol, I&O, PO intake, Pertinent labs, Weight, Skin status, GI status, Symptoms   Discharge Plan/Needs Pending clinical course   Education Will instruct as appropriate   --    RD to follow per protocol.      Electronically signed by:  Roro Rod RD  08/28/23 16:02 EDT

## 2023-08-28 NOTE — DISCHARGE PLACEMENT REQUEST
"Marialuisa Beebe (67 y.o. Female)       Date of Birth   1955    Social Security Number       Address   [address] [place] [address]    Home Phone   [phone]    MRN   [id number]       [facility]    Marital Status   Single                            Admission Date   8/27/23    Admission Type   Emergency    Admitting Provider   Dino Gil MD    Attending Provider   Papa Singh MD    Department, Room/Bed   [address], [address]       Discharge Date       Discharge Disposition       Discharge Destination                                 Attending Provider: Papa Singh MD    Allergies: Nickel, Ciprofloxacin    Isolation: Spore   Infection: C.difficile (rule out) (08/28/23)   Code Status: CPR    Ht: 165.1 cm (65\")   Wt: 64.9 kg (143 lb)    Admission Cmt: None   Principal Problem: SARAH (acute kidney injury) [N17.9]                   Active Insurance as of 8/27/2023       Primary Coverage       Payor Plan Insurance Group Employer/Plan Group    MEDICARE MEDICARE A & B        Payor Plan Address Payor Plan Phone Number Payor Plan Fax Number Effective Dates    [address] [phone]  10/1/2019 - None Entered    [facility] [id number]         Subscriber Name Subscriber Birth Date Member ID       MARIALUISA BEEBE 1955 [id number]               Secondary Coverage       Payor Plan Insurance Group Employer/Plan Group    MISC MC SUP   MISC MC SUP              G       Coverage Address Coverage Phone Number Coverage Fax Number Effective Dates    [address] [address] [phone]  1/1/2020 - None Entered    [facility] [id number]         Subscriber Name Subscriber Birth Date Member ID       MARIALUISA BEEBE 1955 [id number]                     Emergency Contacts        (Rel.) Home Phone Work Phone Mobile Phone    LINDAPATRIAWEN (Friend) [phone] -- [phone]                "

## 2023-08-28 NOTE — CONSULTS
[facility]  Initial Inpatient Consult Note    Referring Provider: A    Reason for Consultation: Diarrhea    Subjective     History of present illness:    67 y.o. female with a past medical history of breast cancer on Faslodex.  She presented to the emergency room on 8/27 with generalized weakness, lightheadedness, watery diarrhea. She reports that her diarrhea has been going on for approximately 2 weeks.  She denies any melena or bright red blood per rectum.  She denies abdominal pain.  She reports some nausea.  No episodes of vomiting.  She denies any ill contacts or recent travel.  Diarrhea overall seems improved since her admission.    Labs with evidence of SARAH with creatinine at 2.2, hyponatremia but overall improved.  Noted to be leukopenic with a white blood cell count of 2.55. Non-contrasted CT abdomen pelvis without any obvious intra-abdominal abnormalities.  History of prior CCY.    I reviewed her last colonoscopy which was performed by Dr. Galeas on 6/1/2022.  She had a normal-appearing TI, colon polyps in the transverse and ascending colon.  Nonbleeding internal hemorrhoids.  Colon biopsy pathology consistent with tubular adenomatous changes.    Past Medical History:  Past Medical History:   Diagnosis Date    SARAH (acute kidney injury) 8/27/2023    Allergy     Anxiety     Asthma     Bone metastasis     Breast cancer     Left node positive    Cholelithiasis 2000    Lap Marily    Colon polyp Past 10-15 yrs?    found at colonoscopies    Depression     Esophageal reflux     cough    History of colon polyps     Hyperlipidemia     Hypertension     Left breast cancer metastasized to lung     Obstructive sleep apnea     does not wear cpap    Ovarian cyst     PONV (postoperative nausea and vomiting)     Tongue cancer 05/2019    by ENT at [facility] dr Quinn     Past Surgical History:  Past Surgical History:   Procedure Laterality Date    CHOLECYSTECTOMY  2000    COLONOSCOPY  2014    H/O  polyps    COLONOSCOPY N/A 2022    Procedure: COLONOSCOPY to cecum and TI with cold / hot snare polypectomies;  Surgeon: Luis Enrique Galeas MD;  Location: [facility] ENDOSCOPY;  Service: Gastroenterology;  Laterality: N/A;  pre-abnormal ct  post-polyps, diverticulosis, hemorrhoids    KNEE ARTHROPLASTY      LUNG SURGERY      Lung wedge resection    MASTECTOMY RADICAL Left 1993    TONGUE SURGERY  2019    tongue cancer    TOTAL LAPAROSCOPIC HYSTERECTOMY N/A 2021    Procedure: Robotic assisted total laparoscopic hysterectomy, bilateral salpingoophorecotmy, bilateral ureteralysis ;  Surgeon: Kaylynn Ricci DO;  Location: [facility] MAIN OR;  Service: Robotics - DaVinci;  Laterality: N/A;      Social History:   Social History     Tobacco Use    Smoking status: Former     Packs/day: 2.00     Years: 30.00     Pack years: 60.00     Types: Cigarettes     Start date: 1973     Quit date: 2008     Years since quitting: 15.2    Smokeless tobacco: Never   Substance Use Topics    Alcohol use: No      Family History:  Family History   Problem Relation Age of Onset    Hypertension Mother     Leukemia Mother 72    COPD Mother         smoker    Diabetes Brother     Pancreatic cancer Brother     Glaucoma Brother     Heart disease Brother     Hypertension Brother     Gallbladder disease Brother     Pancreatitis Brother          from pancreatic csncer    Gallbladder disease Father     Colon polyps Father     Stroke Other         Grandmother    Lupus Other         Aunt    Alcohol abuse Other         Aunt    Glaucoma Other         Grandmother    Diabetes type II Brother     Hypertension Brother     Hyperlipidemia Brother     Liver cancer Paternal Uncle     Stomach cancer Paternal Aunt     Alcohol abuse Maternal Aunt     Malig Hyperthermia Neg Hx        Home Meds:  Medications Prior to Admission   Medication Sig Dispense Refill Last Dose    eszopiclone (LUNESTA) 3 MG tablet Take 1 tablet by mouth.   2023     ARIPiprazole (ABILIFY) 2 MG tablet Take 1 tablet by mouth Every Night.       atorvastatin (LIPITOR) 10 MG tablet Take 1 tablet by mouth Every Night. 90 tablet 1     cholecalciferol (VITAMIN D3) 1.25 MG (29777 UT) capsule Take 5,000 Units by mouth 3 (Three) Times a Week.       denosumab (Xgeva) 120 MG/1.7ML solution injection Inject  under the skin into the appropriate area as directed.       fulvestrant (FASLODEX) 250 MG/5ML chemo syringe Inject  into the appropriate muscle as directed by prescriber.       LORazepam (ATIVAN) 0.5 MG tablet Take 1 tablet by mouth Every 8 (Eight) Hours As Needed.       meclizine (ANTIVERT) 25 MG tablet Take 1 tablet by mouth 3 (Three) Times a Day As Needed for Dizziness. 20 tablet 0     Omeprazole 20 MG Tablet Delayed Release Dispersible Take 1 tablet by mouth As Needed.       ondansetron (ZOFRAN) 8 MG tablet TAKE 1 TABLET BY MOUTH THREE TIMES DAILY AS NEEDED FORNAUSEA AND VOMITING       Palbociclib 125 MG tablet        PARoxetine (PAXIL) 40 MG tablet Take 1 tablet by mouth Every Night.        Current Meds:   ARIPiprazole, 2 mg, Oral, Nightly  atorvastatin, 10 mg, Oral, Nightly  heparin (porcine), 5,000 Units, Subcutaneous, Q12H  pantoprazole, 40 mg, Oral, Q AM  PARoxetine, 40 mg, Oral, Nightly  sodium chloride, 3 mL, Intravenous, Q12H      Allergies:  Allergies   Allergen Reactions    Nickel Unknown - Low Severity    Ciprofloxacin Rash       Objective     Vital Signs  Temp:  [97.3 °F (36.3 °C)-98.8 °F (37.1 °C)] 98.6 °F (37 °C)  Heart Rate:  [100-119] 100  Resp:  [16-20] 20  BP: ()/(44-85) 82/49  Physical Exam:  General Appearance:     Alert, cooperative, in no acute distress   Abdomen:     Normal bowel sounds, no masses, no organomegaly, soft     nontender, nondistended, no guarding, no rebound                 tenderness   Rectal:     Deferred       Results Review:   I reviewed the patient's new clinical results.  I reviewed the patient's new imaging results and agree with  the interpretation.    Results from last 7 days   Lab Units 08/28/23  0209 08/27/23 2150   WBC 10*3/mm3 2.55* 3.67   HEMOGLOBIN g/dL 10.5* 11.6*   HEMATOCRIT % 30.8* 33.0*   PLATELETS 10*3/mm3 126* 145     Results from last 7 days   Lab Units 08/28/23  0928 08/28/23  0209 08/27/23 2150   SODIUM mmol/L 134* 131* 131*   POTASSIUM mmol/L 4.6 5.2 6.0*   CHLORIDE mmol/L 112* 103 100   CO2 mmol/L 13.7* 14.5* 14.1*   BUN mg/dL 49* 63* 68*   CREATININE mg/dL 2.20* 3.31* 3.57*   CALCIUM mg/dL 7.6* 9.6 10.1   BILIRUBIN mg/dL 0.5  --  0.7   ALK PHOS U/L 60  --  83   ALT (SGPT) U/L 13  --  16   AST (SGOT) U/L 15  --  19   GLUCOSE mg/dL 115* 74 114*     Results from last 7 days   Lab Units 08/27/23 2150   INR  1.15*     Lab Results   Lab Value Date/Time    LIPASE 43 08/27/2023 2150       Radiology:  CT Abdomen Pelvis Without Contrast   Final Result   1. No evidence for acute abnormality in the abdomen or pelvis.   2. Multifocal osseous metastatic disease without interval change.   3. Chronic elevation of the left hemidiaphragm with left basilar   scarring/atelectasis.   4. Previous cholecystectomy.   5. Chronic bilateral adrenal calcifications. There is thickening of the   left adrenal gland involving the noncalcified segment and this is   without change.       Radiation dose reduction techniques were utilized, including automated   exposure control and exposure modulation based on body size.               This report was finalized on 8/27/2023 11:15 PM by Dr. Maicol Mohan M.D.          XR Chest 1 View   Final Result   No evidence for change compared to previous chest CT   07/17/2023. A 1 cm pulmonary nodule right upper lobe is similar to the   previous CT 7/17/2023. There is osseous metastatic disease.           This report was finalized on 8/27/2023 11:01 PM by Dr. Maicol Mohan M.D.          CT Angiogram Chest Pulmonary Embolism    (Results Pending)   US Renal Bilateral    (Results Pending)       Assessment &  Plan   Assessment:   Diarrhea  SARAH  History of breast cancer    Plan:   GI PCR, C. difficile pending  CT imaging of the abdomen unremarkable but was without contrast secondary to SARAH.  Patient with recent colonoscopy with Dr. Galeas with adenomatous polyps otherwise unremarkable.  Oncology does not feel that her Faslodex is the cause of her diarrhea    I discussed the patients findings and my recommendations with patient.    Dictated utilizing Dragon dictation.         Gloria Bright PA-C   [facility]  [address] [place] [address]  Office: [phone]

## 2023-08-28 NOTE — H&P
[facility] Medicine Services  HISTORY AND PHYSICAL    Patient Name: Marialuisa Vivar  : 1955  MRN: [id number]  Primary Care Physician: Jennifer Mantilla MD  Date of admission: 2023    Subjective   Subjective   Chief Complaint:  Diarrhea    HPI:  Marialuisa Vivar is a 67 y.o. female with past medical history of breast cancer and otherwise as noted below who presents to the emergency room with severe generalized weakness that is associated with lightheadedness and is worse with standing and improved with sitting.  It is associated with severe watery diarrhea that started about a week ago.  Patient denies any abdominal pain or vomiting.  She denies any cough or sore throat.  She denies any other infectious symptoms.  Her main complaint is extreme weakness.  She was found to have SARAH and hyperkalemia and is being hospitalized for further medical management.      Review of Systems   Constitutional:  Positive for fatigue. Negative for fever.   HENT: Negative.     Eyes: Negative.    Respiratory: Negative.     Cardiovascular: Negative.    Gastrointestinal:  Positive for diarrhea. Negative for nausea and vomiting.   Endocrine: Negative.    Genitourinary: Negative.    Musculoskeletal: Negative.    Skin: Negative.    Allergic/Immunologic: Negative.    Neurological: Negative.    Hematological: Negative.    Psychiatric/Behavioral: Negative.        All other systems reviewed and are negative.     Personal History     Past Medical History:   Diagnosis Date    SARAH (acute kidney injury) 2023    Allergy     Anxiety     Asthma     Bone metastasis     Breast cancer     Left node positive    Cholelithiasis     Lap Marily    Colon polyp Past 10-15 yrs?    found at colonoscopies    Depression     Esophageal reflux     cough    History of colon polyps     Hyperlipidemia     Hypertension     Left breast cancer metastasized to lung     Obstructive sleep apnea     does not wear cpap    Ovarian cyst     PONV  (postoperative nausea and vomiting)     Tongue cancer 05/2019    [place] [facility] [facility] [place]       Past Surgical History:   Procedure Laterality Date    CHOLECYSTECTOMY  2000    COLONOSCOPY  2014    H/O polyps    COLONOSCOPY N/A 6/1/2022    Procedure: COLONOSCOPY to cecum and TI with cold / hot snare polypectomies;  Surgeon: Luis Enrique Galeas MD;  Location: [facility] ENDOSCOPY;  Service: Gastroenterology;  Laterality: N/A;  pre-abnormal ct  post-polyps, diverticulosis, hemorrhoids    KNEE ARTHROPLASTY      LUNG SURGERY  2010    Lung wedge resection    MASTECTOMY RADICAL Left 1993    TONGUE SURGERY  05/21/2019    tongue cancer    TOTAL LAPAROSCOPIC HYSTERECTOMY N/A 11/04/2021    Procedure: Robotic assisted total laparoscopic hysterectomy, bilateral salpingoophorecotmy, bilateral ureteralysis ;  Surgeon: Kaylynn Ricci DO;  Location: [facility] MAIN OR;  Service: Robotics - DaVinci;  Laterality: N/A;       Family History: family history includes Alcohol abuse in her maternal aunt and another family member; COPD in her mother; Colon polyps in her father; Diabetes in her brother; Diabetes type II in her brother; Gallbladder disease in her brother and father; Glaucoma in her brother and another family member; Heart disease in her brother; Hyperlipidemia in her brother; Hypertension in her brother, brother, and mother; Leukemia (age of onset: 72) in her mother; Liver cancer in her paternal uncle; Lupus in an other family member; Pancreatic cancer in her brother; Pancreatitis in her brother; Stomach cancer in her paternal aunt; Stroke in an other family member. Other pertinent FHx was reviewed and unremarkable.     Social History:  reports that she quit smoking about 15 years ago. Her smoking use included cigarettes. She started smoking about 50 years ago. She has a 60.00 pack-year smoking history. She has never used smokeless tobacco. She reports that she does not drink alcohol and does not use  drugs.      Medications:  Available home medication information reviewed.    Allergies   Allergen Reactions    Nickel Unknown - Low Severity    Ciprofloxacin Rash       Objective   Objective   Vital Signs:   Temp:  [97.3 °F (36.3 °C)-97.8 °F (36.6 °C)] 97.3 °F (36.3 °C)  Heart Rate:  [103-119] 103  Resp:  [16-20] 20  BP: ()/(49-85) 118/66        Physical Exam   Constitutional: Awake, alert, ill-appearing but nontoxic  Eyes: PERRLA, sclerae anicteric, no conjunctival injection  HENT: NCAT, mucous membranes moist  Neck: Supple, no thyromegaly, no lymphadenopathy, trachea midline  Respiratory: No cough or wheeze, nonlabored respirations   Cardiovascular: Pulse rate is tachycardic, palpable radial pulses bilaterally  Gastrointestinal: soft, nontender, nondistended  Musculoskeletal: Frail and chronically debilitated in appearance, BMI is 23, no bilateral ankle edema, no clubbing or cyanosis to extremities  Psychiatric: Anxious affect, cooperative  Neurologic: Oriented, strength symmetric in all extremities, Cranial Nerves grossly intact to confrontation, speech clear  Skin: Pale, no rashes or jaundice      Results from last 7 days   Lab Units 08/27/23  2150   WBC 10*3/mm3 3.67   HEMOGLOBIN g/dL 11.6*   HEMATOCRIT % 33.0*   PLATELETS 10*3/mm3 145   INR  1.15*     Results from last 7 days   Lab Units 08/27/23  2326 08/27/23  2150   SODIUM mmol/L  --  131*   POTASSIUM mmol/L  --  6.0*   CHLORIDE mmol/L  --  100   CO2 mmol/L  --  14.1*   BUN mg/dL  --  68*   CREATININE mg/dL  --  3.57*   GLUCOSE mg/dL  --  114*   CALCIUM mg/dL  --  10.1   ALT (SGPT) U/L  --  16   AST (SGOT) U/L  --  19   HSTROP T ng/L 29* 34*   PROBNP pg/mL  --  217.0   LACTATE mmol/L 2.3* 3.3*   PROCALCITONIN ng/mL  --  0.13     Estimated Creatinine Clearance: 15.5 mL/min (A) (by C-G formula based on SCr of 3.57 mg/dL (H)).  Brief Urine Lab Results  (Last result in the past 365 days)        Color   Clarity   Blood   Leuk Est   Nitrite   Protein    CREAT   Urine HCG        08/27/23 2301 Yellow   Clear   Negative   Negative   Negative   Trace                 Imaging Results (Last 24 Hours)       Procedure Component Value Units Date/Time    CT Abdomen Pelvis Without Contrast [[id number]] Collected: 08/27/23 2311     Updated: 08/27/23 2318    Narrative:      CT ABDOMEN AND PELVIS WITHOUT IV CONTRAST     HISTORY: Abdominal pain. Diarrhea.     TECHNIQUE:  CT includes axial imaging from the lung bases to the  trochanters without intravenous contrast and without use of oral  contrast. Data reconstructed in coronal and sagittal planes. Radiation  dose reduction techniques were utilized, including automated exposure  control and exposure modulation based on body size.     COMPARISON: CT abdomen and pelvis 07/17/2023.     FINDINGS: There is elevation of the left hemidiaphragm with left basilar  scarring and atelectasis. Calcified nodule is present in the  anteromedial left lung base. Right lung base appears clear. There are  multiple hepatic and splenic calcifications. No focal hepatic  abnormality is demonstrated. Cholecystectomy clips are present. Chronic  bilateral adrenal calcifications are present. There is chronic  thickening of the noncalcified segments of the left adrenal gland  particularly involving the lateral limb and this is without change.     There is no bowel dilatation or evidence for bowel obstruction.  Atherosclerotic calcifications are present involving the abdominal aorta  and iliac vasculature. No leslie enlargement is demonstrated within the  abdomen or pelvis. There is multifocal osteosclerotic metastatic  disease. Osseous metastases are present throughout the thoracic spine,  lumbar spine, sacrum, and about the pelvis without evidence for change.       Impression:      1. No evidence for acute abnormality in the abdomen or pelvis.  2. Multifocal osseous metastatic disease without interval change.  3. Chronic elevation of the left hemidiaphragm  with left basilar  scarring/atelectasis.  4. Previous cholecystectomy.  5. Chronic bilateral adrenal calcifications. There is thickening of the  left adrenal gland involving the noncalcified segment and this is  without change.     Radiation dose reduction techniques were utilized, including automated  exposure control and exposure modulation based on body size.           This report was finalized on 8/27/2023 11:15 PM by Dr. Maicol Mohan M.D.       XR Chest 1 View [[id number]] Collected: 08/27/23 2238     Updated: 08/27/23 2304    Narrative:      CHEST SINGLE VIEW     HISTORY: Dyspnea.     COMPARISON: AP chest 08/07/2023, CT chest 07/17/2023.     FINDINGS: There is elevation of the left hemidiaphragm that appears  similar to the previous exam. Within the right upper lobe there is a 1  cm nodule that was also evident on previous chest CT 07/17/2023. Lungs  otherwise appear clear of focal airspace disease. There is chronic left  basilar scarring/atelectasis and there is mild chronic blunting of the  right costophrenic angle. Osteosclerotic metastatic disease is present.  There has been left mastectomy with left axillary leslie dissection.       Impression:      No evidence for change compared to previous chest CT  07/17/2023. A 1 cm pulmonary nodule right upper lobe is similar to the  previous CT 7/17/2023. There is osseous metastatic disease.        This report was finalized on 8/27/2023 11:01 PM by Dr. Maicol Mohan M.D.                 Assessment & Plan   Assessment & Plan     Active Hospital Problems    Diagnosis  POA    **SARAH (acute kidney injury) [N17.9]  Yes    Hyperkalemia [E87.5]  Yes    Metabolic acidosis [E87.20]  Yes    Dehydration [E86.0]  Yes    Diarrhea [R19.7]  Yes    Anxiety [F41.9]  Yes    Hyperlipidemia [E78.5]  Yes    Carcinoma of left breast metastatic to lung [C50.912, C78.02]  Yes    Benign essential HTN [I10]  Yes    Malignant neoplasm metastatic to bone [C79.51]  Yes     67-year-old  female presents the hospital with diarrhea and weakness and is found to have SARAH and hyperkalemia.    SARAH with metabolic acidosis.:  Patient received bolus.  Plan for continuous IV fluid.  Check urine electrolytes, consult nephrology, monitor urine output and postvoid residual, check renal ultrasound.    Hyperkalemia:  Secondary to renal dysfunction.  Patient has received dextrose, insulin and fluid per emergency room team.  Plan to order one-time dose of Lokelma.  Nephrology to assist with management.  Telemetry monitoring.  EKG reviewed.    Diarrhea and dehydration:  Diarrhea has been going on for greater than 1 week.  Unclear if it is medication side effect or infection.  Check stool PCR and C. difficile studies.  Consult gastroenterology to weigh in.  Monitor stool output.  IV fluid.    Breast cancer:  Patient receives biologic therapy and follows closely and oncology clinic.  Consult oncology to weigh in to see if they feel this is related to current cancer medications.    Anxiety: Continue home Paxil and Abilify.  Monitor carefully.    GERD: PPI.  Stable.    Hyperlipidemia: Statin.  Stable.    DVT prophylaxis: Subcutaneous heparin    CODE STATUS:    Code Status and Medical Interventions:   Ordered at: 08/28/23 0053     Level Of Support Discussed With:    Patient     Code Status (Patient has no pulse and is not breathing):    CPR (Attempt to Resuscitate)     Medical Interventions (Patient has pulse or is breathing):    Full Support         Dino Gil MD  Patient was seen and evaluated on 8/27/2023 at 2350

## 2023-08-28 NOTE — CONSULTS
[facility] Consult Note      Patient Name: Marialuisa Vivar  : 1955  MRN: [id number]  Primary Care Physician:  Jennifer Mantilla MD  Referring Physician: Dino Gil,*  Date of admission: 2023    Subjective     Reason for Consult:      HPI:   Marialuisa Vviar is a 67 y.o. female with a past medical history of metastatic breast cancer, hypertension, dyslipidemia, depression and chronic kidney disease stage with baseline creatinine appears to be around 1.5-1.7 mg/dL.  Patient came to the hospital with increasing generalized weakness lightheadedness and decreased oral intake as well as watery diarrhea for at least a week.  She was found to be in acute kidney injury with creatinine 3.3 mg/dL and BUN 63 and potassium 5.2.  We were consulted to help with management of acute kidney injury.  Her diarrhea appears to be improved since admission.    Review of Systems:   14 point review of systems is otherwise negative except for mentioned above on HPI    Personal History     Past Medical History:   Diagnosis Date    SARAH (acute kidney injury) 2023    Allergy     Anxiety     Asthma     Bone metastasis     Breast cancer     Left node positive    Cholelithiasis     Lap Marily    Colon polyp Past 10-15 yrs?    found at colonoscopies    Depression     Esophageal reflux     cough    History of colon polyps     Hyperlipidemia     Hypertension     Left breast cancer metastasized to lung     Obstructive sleep apnea     does not wear cpap    Ovarian cyst     PONV (postoperative nausea and vomiting)     Tongue cancer 2019    by ENT at [facility] dr Quinn       Past Surgical History:   Procedure Laterality Date    CHOLECYSTECTOMY      COLONOSCOPY      H/O polyps    COLONOSCOPY N/A 2022    Procedure: COLONOSCOPY to cecum and TI with cold / hot snare polypectomies;  Surgeon: Luis Enrique Galeas MD;  Location: [facility] ENDOSCOPY;  Service: Gastroenterology;  Laterality: N/A;   pre-abnormal ct  post-polyps, diverticulosis, hemorrhoids    KNEE ARTHROPLASTY      LUNG SURGERY  2010    Lung wedge resection    MASTECTOMY RADICAL Left 1993    TONGUE SURGERY  05/21/2019    tongue cancer    TOTAL LAPAROSCOPIC HYSTERECTOMY N/A 11/04/2021    Procedure: Robotic assisted total laparoscopic hysterectomy, bilateral salpingoophorecotmy, bilateral ureteralysis ;  Surgeon: Kaylynn Ricci DO;  Location: [facility] OR;  Service: Robotics - DaVinci;  Laterality: N/A;       Family History: family history includes Alcohol abuse in her maternal aunt and another family member; COPD in her mother; Colon polyps in her father; Diabetes in her brother; Diabetes type II in her brother; Gallbladder disease in her brother and father; Glaucoma in her brother and another family member; Heart disease in her brother; Hyperlipidemia in her brother; Hypertension in her brother, brother, and mother; Leukemia (age of onset: 72) in her mother; Liver cancer in her paternal uncle; Lupus in an other family member; Pancreatic cancer in her brother; Pancreatitis in her brother; Stomach cancer in her paternal aunt; Stroke in an other family member.    Social History:  reports that she quit smoking about 15 years ago. Her smoking use included cigarettes. She started smoking about 50 years ago. She has a 60.00 pack-year smoking history. She has never used smokeless tobacco. She reports that she does not drink alcohol and does not use drugs.    Home Medications:  Prior to Admission medications    Medication Sig Start Date End Date Taking? Authorizing Provider   eszopiclone (LUNESTA) 3 MG tablet Take 1 tablet by mouth. 4/4/22  Yes Rolando Ambrosio MD   ARIPiprazole (ABILIFY) 2 MG tablet Take 1 tablet by mouth Every Night. 3/14/23   Rolando Ambrosio MD   atorvastatin (LIPITOR) 10 MG tablet Take 1 tablet by mouth Every Night. 12/7/22   Jennifer Mantilla MD   cholecalciferol (VITAMIN D3) 1.25 MG (15619 UT) capsule Take 5,000  Units by mouth 3 (Three) Times a Week. 1/21/13   Rolando Ambrosio MD   denosumab (Xgeva) 120 MG/1.7ML solution injection Inject  under the skin into the appropriate area as directed.    Rolando Ambrosio MD   fulvestrant (FASLODEX) 250 MG/5ML chemo syringe Inject  into the appropriate muscle as directed by prescriber.    Rolando Ambrosio MD   LORazepam (ATIVAN) 0.5 MG tablet Take 1 tablet by mouth Every 8 (Eight) Hours As Needed. 3/28/14   Rolando Ambrosio MD   meclizine (ANTIVERT) 25 MG tablet Take 1 tablet by mouth 3 (Three) Times a Day As Needed for Dizziness. 2/7/23   Phi Pressley MD   Omeprazole 20 MG Tablet Delayed Release Dispersible Take 1 tablet by mouth As Needed. 7/2/21   Rolando Ambrosio MD   ondansetron (ZOFRAN) 8 MG tablet TAKE 1 TABLET BY MOUTH THREE TIMES DAILY AS NEEDED FORNAUSEA AND VOMITING 3/21/23   Rolando Ambrosio MD   Palbociclib 125 MG tablet  5/2/22   Rolando Ambrosio MD   PARoxetine (PAXIL) 40 MG tablet Take 1 tablet by mouth Every Night.    Rolando Ambrosio MD       Allergies:  Allergies   Allergen Reactions    Nickel Unknown - Low Severity    Ciprofloxacin Rash       Objective     Vitals:   Temp:  [97.3 °F (36.3 °C)-98.8 °F (37.1 °C)] 98.8 °F (37.1 °C)  Heart Rate:  [103-119] 105  Resp:  [16-20] 20  BP: ()/(44-85) 86/57    Intake/Output Summary (Last 24 hours) at 8/28/2023 0908  Last data filed at 8/28/2023 0550  Gross per 24 hour   Intake 320 ml   Output 100 ml   Net 220 ml       Physical Exam:    General Appearance: alert, oriented x 3, no acute distress   Skin: warm and dry  HEENT: oral mucosa normal, nonicteric sclera  Neck: supple, no JVD  Lungs: CTA  Heart: RRR, normal S1 and S2  Abdomen: soft, nontender, nondistended  : no palpable bladder  Extremities: no edema, cyanosis or clubbing  Neuro: normal speech and mental status     Scheduled Meds:     ARIPiprazole, 2 mg, Oral, Nightly  atorvastatin, 10 mg, Oral, Nightly  heparin  (porcine), 5,000 Units, Subcutaneous, Q12H  pantoprazole, 40 mg, Oral, Q AM  PARoxetine, 40 mg, Oral, Nightly  sodium chloride, 3 mL, Intravenous, Q12H      IV Meds:   custom IV infusion builder,   sodium chloride, 100 mL/hr, Last Rate: 100 mL/hr (08/28/23 0200)        Results Reviewed:   I have personally reviewed the results from the time of this admission to 8/28/2023 09:08 EDT     Lab Results   Component Value Date    GLUCOSE 74 08/28/2023    CALCIUM 9.6 08/28/2023     (L) 08/28/2023    K 5.2 08/28/2023    CO2 14.5 (L) 08/28/2023     08/28/2023    BUN 63 (H) 08/28/2023    CREATININE 3.31 (H) 08/28/2023    EGFRIFAFRI 69 09/03/2021    EGFRIFNONA 46 (L) 02/01/2022    BCR 19.0 08/28/2023    ANIONGAP 13.5 08/28/2023      Lab Results   Component Value Date    MG 2.0 08/27/2023    PHOS 3.6 02/28/2023    ALBUMIN 3.7 08/27/2023           Assessment / Plan     ASSESSMENT:    1.  Acute kidney injury associated with metabolic acidosis and hyperkalemia that is likely due to severe intravascular volume depletion due to severe diarrhea.  Blood pressure appears to be marginal so we will give her 1 L bolus normal saline and switch her to IV fluids IV bicarb drip.  Repeat labs ensure improvement of potassium.  Adjust medications for creatinine clearance less than 10 mm/min/m².  Her urinalysis essentially bland  2.  Chronic kidney disease stage III with baseline creatinine 1.5-1.7 mg/dL  3.  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  Diarrhea and dehydration that might be side effect of her chemotherapy  5.  History of dyslipidemia  6.  Hyperkalemia and metabolic acidosis       PLAN:    We will give patient 1 L normal saline bolus and switch to IV bicarb drip  Adjust medications for creatinine clearance less than 10 mm/min/m²  Surveillance labs    Thank you for involving us in the care of Marialuisa Vivar.  Please feel free to call with any  questions.    Carlos Farfan MD  08/28/23  09:08 EDT    [facility]  [phone]

## 2023-08-28 NOTE — CONSULTS
.     REASON FOR CONSULTATION:       Cancer patient with severe diarrhea.  Unclear if this could be related to current medicines     Provide an opinion on any further workup or treatment                             REQUESTING PHYSICIAN: Dino Gil,*  RECORDS OBTAINED:  Records of the patient's history including those from the electronic medical record were reviewed and summarized in detail.    HISTORY OF PRESENT ILLNESS:  The patient is a 67 y.o. year old female  who is here for follow-up with the above-mentioned history.    Has missed some appointments.  Last saw me in the office on 2/28/2023  Has been on Faslodex/Ibrance.  CT and bone scan 7/17/23, from 1/26/2023: Mostly stable.  New focus of uptake anterior left second rib and in hindsight radiologist saw subtle area of sclerosis and osteolysis on CT images from 7/17/2023, new from 1/26/2023.  Radiologist noted does not have the appearance for posttraumatic change or osteonecrosis and the most likely etiology is a new metastatic focus.  Interval decrease in activity large right frontal lesion.  Spine lesion stable.  She did not come to the office to review these scans    Came to the ER 8/27/2023 for severe diarrhea.  CT AP 8/27/2023, from 7/17/2023: No significant change.    No more diarrhea since admission.  Still feels weak.    Past Medical History:   Diagnosis Date    SARAH (acute kidney injury) 8/27/2023    Allergy     Anxiety     Asthma     Bone metastasis     Breast cancer     Left node positive    Cholelithiasis 2000    Lap Marily    Colon polyp Past 10-15 yrs?    found at colonoscopies    Depression     Esophageal reflux     cough    History of colon polyps     Hyperlipidemia     Hypertension     Left breast cancer metastasized to lung     Obstructive sleep apnea     does not wear cpap    Ovarian cyst     PONV (postoperative nausea and vomiting)     Tongue cancer 05/2019    by ENT at [facility] dr Quinn     Past Surgical History:   Procedure  Laterality Date    CHOLECYSTECTOMY  2000    COLONOSCOPY  2014    H/O polyps    COLONOSCOPY N/A 6/1/2022    Procedure: COLONOSCOPY to cecum and TI with cold / hot snare polypectomies;  Surgeon: Luis Enrique Galeas MD;  Location: [facility] ENDOSCOPY;  Service: Gastroenterology;  Laterality: N/A;  pre-abnormal ct  post-polyps, diverticulosis, hemorrhoids    KNEE ARTHROPLASTY      LUNG SURGERY  2010    Lung wedge resection    MASTECTOMY RADICAL Left 1993    TONGUE SURGERY  05/21/2019    tongue cancer    TOTAL LAPAROSCOPIC HYSTERECTOMY N/A 11/04/2021    Procedure: Robotic assisted total laparoscopic hysterectomy, bilateral salpingoophorecotmy, bilateral ureteralysis ;  Surgeon: Kaylynn Ricci DO;  Location: [facility] MAIN OR;  Service: Robotics - DaVinci;  Laterality: N/A;       MEDICATIONS    Current Facility-Administered Medications:     acetaminophen (TYLENOL) tablet 650 mg, 650 mg, Oral, Q4H PRN **OR** acetaminophen (TYLENOL) 160 MG/5ML solution 650 mg, 650 mg, Oral, Q4H PRN **OR** acetaminophen (TYLENOL) suppository 650 mg, 650 mg, Rectal, Q4H PRN, Dino Gil MD    ARIPiprazole (ABILIFY) tablet 2 mg, 2 mg, Oral, Nightly, Dino Gil MD, 2 mg at 08/28/23 0341    atorvastatin (LIPITOR) tablet 10 mg, 10 mg, Oral, Nightly, Dino Gil MD    famotidine (PEPCID) tablet 10 mg, 10 mg, Oral, BID PRN, Dino Gil MD    heparin (porcine) 5000 UNIT/ML injection 5,000 Units, 5,000 Units, Subcutaneous, Q12H, Dino Gli MD, 5,000 Units at 08/28/23 0931    LORazepam (ATIVAN) tablet 0.5 mg, 0.5 mg, Oral, Q8H PRN, Dino Gil MD, 0.5 mg at 08/28/23 0340    melatonin tablet 3 mg, 3 mg, Oral, Nightly PRN, Dino Gil MD    ondansetron (ZOFRAN) tablet 4 mg, 4 mg, Oral, Q6H PRN **OR** ondansetron (ZOFRAN) injection 4 mg, 4 mg, Intravenous, Q6H PRN, Jeffery, Dino Hsu MD    pantoprazole (PROTONIX) EC tablet 40 mg, 40 mg, Oral, Q AM, Jeffery,  Dino Hsu MD, 40 mg at 08/28/23 0502    PARoxetine (PAXIL) tablet 40 mg, 40 mg, Oral, Nightly, Dino Gil MD    sodium bicarbonate 150 mEq/1000 mL D5W infusion, 125 mL/hr, Intravenous, Continuous, Carlos Farfan MD    Insert Peripheral IV, , , Once **AND** sodium chloride 0.9 % flush 10 mL, 10 mL, Intravenous, PRN, Uli Baca MD    sodium chloride 0.9 % flush 3 mL, 3 mL, Intravenous, Q12H, Dino Gil MD, 3 mL at 08/28/23 0931    sodium chloride 0.9 % flush 3-10 mL, 3-10 mL, Intravenous, PRN, Dino Gil MD    sodium chloride 0.9 % infusion 40 mL, 40 mL, Intravenous, PRN, Dino Gil MD    sodium chloride 0.9 % infusion, 100 mL/hr, Intravenous, Continuous, Dino Gil MD, Last Rate: 100 mL/hr at 08/28/23 0200, 100 mL/hr at 08/28/23 0200    Facility-Administered Medications Ordered in Other Encounters:     chlorhexidine (PERIDEX) 0.12 % solution 15 mL, 15 mL, Mouth/Throat, Q12H, Kaylynn Ricci DO    mupirocin (BACTROBAN) 2 % nasal ointment, , Nasal, BID, Kaylynn Ricci, DO    ALLERGIES:     Allergies   Allergen Reactions    Nickel Unknown - Low Severity    Ciprofloxacin Rash       SOCIAL HISTORY:       Social History     Socioeconomic History    Marital status: Single    Years of education: College   Tobacco Use    Smoking status: Former     Packs/day: 2.00     Years: 30.00     Pack years: 60.00     Types: Cigarettes     Start date: 1/1/1973     Quit date: 6/1/2008     Years since quitting: 15.2    Smokeless tobacco: Never   Vaping Use    Vaping Use: Never used   Substance and Sexual Activity    Alcohol use: No    Drug use: No    Sexual activity: Not Currently     Birth control/protection: None         FAMILY HISTORY:  Family History   Problem Relation Age of Onset    Hypertension Mother     Leukemia Mother 72    COPD Mother         smoker    Diabetes Brother     Pancreatic cancer Brother     Glaucoma Brother     Heart disease  Brother     Hypertension Brother     Gallbladder disease Brother     Pancreatitis Brother          from pancreatic csncer    Gallbladder disease Father     Colon polyps Father     Stroke Other         Grandmother    Lupus Other         Aunt    Alcohol abuse Other         Aunt    Glaucoma Other         Grandmother    Diabetes type II Brother     Hypertension Brother     Hyperlipidemia Brother     Liver cancer Paternal Uncle     Stomach cancer Paternal Aunt     Alcohol abuse Maternal Aunt     Malig Hyperthermia Neg Hx        REVIEW OF SYSTEMS:  Review of Systems   Constitutional:  Negative for activity change.   HENT:  Negative for nosebleeds and trouble swallowing.    Respiratory:  Negative for shortness of breath and wheezing.    Cardiovascular:  Negative for chest pain and palpitations.   Gastrointestinal:  Negative for constipation, diarrhea and nausea.   Genitourinary:  Negative for dysuria and hematuria.   Musculoskeletal:  Negative for arthralgias and myalgias.   Skin:  Negative for rash and wound.   Neurological:  Negative for seizures and syncope.   Hematological:  Negative for adenopathy. Does not bruise/bleed easily.   Psychiatric/Behavioral:  Negative for confusion.             Vitals:    23 0721 23 0728 23 1018 23 1212   BP: (!) 79/44 (!) 86/57 (!) 89/49 92/54   BP Location: Right arm  Right arm Right arm   Patient Position: Lying  Lying Lying   Pulse: 105  103 100   Resp: 20      Temp: 98.8 °F (37.1 °C)      TempSrc: Oral      SpO2: 98%  97% 100%   Height:             2023     9:58 AM   Current Status   ECOG score 0      PHYSICAL EXAM:    CONSTITUTIONAL:  Vital signs reviewed.  No distress, looks comfortable.  EYES:  Conjunctivae and lids unremarkable.  PERRLA  EARS, NOSE, MOUTH, THROAT:  Ears and nose appear unremarkable.  Lips, teeth, gums appear unremarkable.  RESPIRATORY:  Normal respiratory effort.  Lungs clear to auscultation bilaterally.  CARDIOVASCULAR:  Normal  S1, S2.  No murmurs, rubs or gallops.  No significant lower extremity edema.  GASTROINTESTINAL: Abdomen appears unremarkable.  Nontender.  No hepatomegaly.  No splenomegaly.  LYMPHATIC:  No cervical, supraclavicular, axillary lymphadenopathy.  NEURO: Cranial nerves 2-12 grossly intact.  No focal deficits.  Appears to have equal strength all 4 extremities.  MUSCULOSKELETAL:  Unremarkable digits/nails.  No cyanosis or clubbing.  No apparent joint deformities.  SKIN:  Warm.  No rashes.  PSYCHIATRIC:  Normal judgment and insight.  Normal mood and affect.     RECENT LABS:        WBC   Date Value Ref Range Status   08/28/2023 2.55 (L) 3.40 - 10.80 10*3/mm3 Final   08/27/2023 3.67 3.40 - 10.80 10*3/mm3 Final     Hemoglobin   Date Value Ref Range Status   08/28/2023 10.5 (L) 12.0 - 15.9 g/dL Final   08/27/2023 11.6 (L) 12.0 - 15.9 g/dL Final     Platelets   Date Value Ref Range Status   08/28/2023 126 (L) 140 - 450 10*3/mm3 Final   08/27/2023 145 140 - 450 10*3/mm3 Final       Assessment & Plan   Marialuisa Vivar [unfilled]     *Metastatic breast cancer to bilateral lungs and soft tissue in the mediastinum (likely the cause of her hoarseness). (Initial breast cancer diagnosed in 1993 treated with mastectomy, chemotherapy, radiation therapy and tamoxifen). Arimidex day 1 on 02/17/2010. PET scan late August 2011 shows no abnormal hypermetabolic activity. This has improved compared to the prior PET scan January 2010 which showed mild hypermetabolic activity in areas of metastasis. (In the past, her  T6 lesion did not show up on CT scans or bone scan. It was seen by PET scan.) We have been following with CT scans only every 6 months and PET scans on an as needed only basis. Sometimes her CT scanned pulmonary nodules are read as benign since they have been stable through the course of years but we know they are malignant because they have been surgically sampled in the past.    CT 8/16/16 shows increased sclerosis at T6 compared to  4/28/15.    PET 10/11/16: Slight uptake at T6, SUV 3.2.  mild thickening of right adrenal gland with an SUV of 11.   Continued Arimidex is minimal progression and had been on this since 2/17/10.  Not referred for radiation since no pain at T6  CT 1/27/17, unchanged.  CT 8/1/17: Unchanged except subtle suggestion of mild increase in thickening of the right adrenal gland.    CT 2/22/18, unchanged.  CT 9/6/18: Unchanged except nodular thickening right adrenal gland significantly decreased.  CT 9/5/2019: Unchanged.  On the 6/12/2020 visit, the following scans were reviewed:  CT neck and chest 5/11/2020, [facility], from 5/9/2019: Stable pulmonary nodules new lytic C7 lesion and posterior T1 lesion questionable T12 lesion.  No change in the T6 and T10 lesions.  PET, [facility], 5/19/2020: Mildly enlarged left neck nodes are mildly hypermetabolic.  Diffuse activity throughout the thickened left adrenal gland.  CT appearance unchanged from 5/9/2019.  Progressive T1 lesion seen on CT from 5/11/2020, mild some moderate activity.  T10 low-grade activity with mixed lucent and sclerotic findings.  T6 is a mixed lucent and sclerotic appearance but no significant activity.   T12 (the biopsy report is labeled as T12 but patient insists this was a C7/T1 biopsy, not to T12) biopsy 6/15/2020, [facility]: Metastatic breast carcinoma.  ER >95%.  ME 0%.  HER-2 negative (1+)  It seems the main progression at the 6/12/2020 visit was a new C7 and new T1 lesion.  Those were radiated with [facility] through [facility] early June 2020.  Because Arimidex has been working well since 2010, continue same Arimidex.  Add Zometa every 3 months.  C7 and T1 found on CT 5/11/2020, [facility].  [place], Dr. Winston, early June 2020.  CT C and T-spine 9/17/2020: New C7 lesion from 9/6/2018, but no change from 5/11/2020 CT.  T1 lesion stable from 5/11/2020, but new compared to 2/22/2018.  T10 lesion unchanged from 9/5/2019, but new from 2/22/2018.  Subtle 8 mm T12 lesion  new from 9/5/2019.  Therefore, no recent progression.  Considering her good tolerance and long-term effectiveness of Arimidex, continue same therapy.  CT CAP 12/10/2020: No progression  Therefore, continue Arimidex.  CA 15-3 normal through 12/14/2020  Because CA 15-3 fabian to 27 on 4/13/2021, then 29.3 on 7/20/2021, then 34.3 on 10/12/2021, CT scans done earlier than planned:  CT CAP with contrast 10/12/2021: New 5.2 x 4.6 cm mixed cystic and solid right ovarian mass compared to 12/10/2020.  Continued stability of bilateral pulmonary nodules and stable sclerotic bone metastasis.  11/4/2021: TLH/BSO (due to new 5 cm right ovarian mass on CT, 11/12/2021).  Metastatic breast cancer involving bilateral ovaries and posterior uterine serosa.  ER 81-90%.  CA 61-70%.  HER-2 negative  Caris NGS: ER 98%.  CA 90%.  HER-2/eb negative.  AKT1 pathogenic variant, exon 3.  AR+, 95%.  No other significant mutations including negative PIK3CA  Pelvic washings: Adenocarcinoma  Because this was the only area of progression on CT, and has been resected, continue Arimidex which she has been on since 2/17/2010.  11/22/2021: Discussed with Dr. Blum, who has a focus on breast cancer.  We both agree: Continue Arimidex for now.  In the future, if significant progression is seen, then plan Faslodex injections with Ibrance  CT CAP 2/1/2022: A few T-spine sclerotic lesions progressively more sclerotic over multiple prior CTs of indeterminate significance.  No clear signs of progression of disease.  3/15/2022: Although tumor marker is not rising and CT showed no clear signs of progression.  CT revealed progressively more sclerotic bone metastasis over multiple prior CTs, she is having increased right hip discomfort.  Sometimes this can be due to healing of metastasis (but she has been on the same treatment since 2010), or this could mean progression of bone metastasis.  It is reassuring that her tumor marker is not worsening.  She would like to  see [facility] to see if they feel radiation to the hip would help her discomfort.  We will do a bone scan before that appointment as this might help [facility].  (Although she has seen [facility] radiation for her neck, she would like to see [facility] radiation for this hip issue).  3/18/2022, bone scan: Metastatic disease at T9 and T10, corresponding to CT lesions.  Uptake also at T6, but to lesser degree.  These areas are new compared to last bone scan, 6/3/2009.  Therefore, overall, appears consistent with progression.  3/24/2021: Discussed changing  Arimidex to Faslodex/Ibrance 125 mg D1-21/28 days.  However, Dr. Llanes will decide on 4/12/2022 if the patient needs any traditional XRT to hip/pelvis.  If so, this may cause cytopenias due to the location of XRT.  Ibrance can also cause cytopenias.  If this is the case, we will wait to begin Ibrance until at least a few weeks after XRT completes, provided blood counts allow.   4/22/2022: Dr. Llanes reviewed MRI right hip from 4/19/2022, revealing L3 metastasis, similar size of prior CTs.  Therefore, she plans no XRT.  5/6/2022: Began Faslodex Ibrance  CT 7/18/2022: Some bone mets more sclerotic, which could reflect treatment response.  Slightly increased RUL nodule, 1.1 cm, from 0.9 cm on 2/1/2022  Bone scan 7/18/2022: No change from 3/18/2022  8/19/2022 (patient delayed follow-up to review scans until then): Continue same therapy since no significant progression of disease  Bone scan 1/26/2023: Under represents bone metastasis compared to CT scans.  Slightly more intense uptake at T12 which was noted to perhaps represent treatment response.  CT 1/26/2023: A few new subcentimeter indeterminate RLL pulmonary nodules.  Radiologist recommended follow-up chest imaging in 6 to 8 weeks.  Otherwise no change in the other bilateral pulmonary nodules.  No change in scattered bone metastasis except the T12 lesion continues to increase in  sclerosis.  2/28/2023 office visit:  Missed the mid November 2022 Faslodex and the mid January 2023 Faslodex and the mid February 2023 Faslodex.  Although CT might represent slight progression, she has missed a few doses of Faslodex.  Would like a strong reason to change therapy since she has been doing well on Faslodex for quite some time.  Therefore, patient agrees: Maintain her same imaging interval, 6 months from last  As of 8/20/2023, last Faslodex was 6/8/2023 (typically is given every 4 weeks)  CT and bone scan 7/17/23, from 1/26/2023: Mostly stable.  New focus of uptake anterior left second rib and in hindsight radiologist saw subtle area of sclerosis and osteolysis on CT images from 7/17/2023, new from 1/26/2023.  Radiologist noted does not have the appearance for posttraumatic change or osteonecrosis and the most likely etiology is a new metastatic focus.  Interval decrease in activity large right frontal lesion.  Spine lesion stable.  She did not come to the office to review these scans  Came to the ER 8/27/2023 for severe diarrhea.  CT AP 8/27/2023, from 7/17/2023: No significant change.   8/28/2023: She self stopped Ibrance for 5 days prior to admission due to feeling so weak with diarrhea.  When she recovers from her current issues and is able to return to the office, resume Faslodex every 4-week injections with Ibrance oral D1-21/28 days.    CA 15-3:  31.7 on 2/28/2023, from 27.3 on 8/19/2022, from 22.5 on 5/6/2022, from 26.3 on 2/1/2022, from 32.6 on 11/22/2021, from 34.3 on 10/12/2021, from 29.3 on 7/20/2021, from 27 on 4/13/2021, from 23.3 on 12/14/2020.     *Bony metastases  T6 discovered by PET to August 2011 (did not show up by CT or bone scan)  C7 and T1 found on CT 5/11/2020, [facility].  [place], Dr. Winston, early June 2020.  June 2020 began Zometa every 3 months.  She was warned of possible osteonecrosis of the jaw and renal insufficiency.  She states she has good dental health and sees her  dentist regularly.  Because of prior hypercalcemia requiring stopping calcium supplements and because of high normal current calcium level, began without supplemental calcium.  Add calcium if calcium becomes low.  1/7/2022: Tooth extracted.  Plan was to wait 5 weeks to resume Zometa.  Patient delayed return until 3/15/2022 (over 8 weeks)  May 2022: Changed Zometa to Xgeva every 3 months due to Cr up to 1.6  2/28/2023: She request to hold off on Xgeva because she is in the process of having dental work     *Bone health. Last bone density 10/11/16, worsened, but still normal with worst T score -0.9.  Patient stopped calcium January 2016, but remained on vitamin D.  I recommended she restart her calcium.  Stopped calcium early March 2018 due to hypercalcemia.  DEXA 9/5/2019: Normal bone density     *Hypercalcemia.  Repeat calcium 3/7/18 normal, but patient self stopped calcium a few days prior.  Recommended she stay off calcium.  Calcium was 11 on 6/19/2020  Calcium 11 again on 12/10/2020  Repeat calcium 10.5 on 12/14/2020  Calcium 10.6.  Minimally elevated.  (Normal range up to 10.5)  Calcium 10.8 on 3/15/2022.  Hopefully, Zometa will help with this.  Calcium 7.6.  Defer to hospitalist/renal     *Hypophosphatemia  Phosphorus 2.3 on 6/24/2022.  Begin K-Phos Neutral 2 tablets twice daily  Patient self stopped this around mid July 2022.  Phosphorus normal, 3.6 on 2/28/2023     *On the 10/5/16 visit, Tachycardia, dyspnea on exertion, bilateral leg swelling, more sedentary recently.  CT PE protocol and bilateral lower extremity Dopplers 10/5/16, negative for clots.  She still has tachycardia and dyspnea on exertion.  Perhaps at least part of this is due to deconditioning.     *Previously complained of colon Right lower anterior rib discomfort.  CT unremarkable in that area.  No specific pain, just discomfort.  I explained to the patient if disease was found by PET scan or bone scan, I would not  since she  has been doing well on Arimidex since 2010.   Currently denies pain.      *Depression/anxiety.  This limits compliance.  She sees a psychiatrist and a counselor regularly.  She states this is mostly due to body image issues instead of cancer.   She continues with her counselor and psychiatrist.  Viral pandemic is making this a little more difficult.  4/27/2022: We will try to get her involved in some cancer support groups.  I also encouraged her to try out some small groups at [facility] (she struggles with loneliness).  2/28/2023: Has missed some visits due to emotional issues.  Offered behavioral oncology help.  She declines.  She is going to continue with her psychiatrist and counselor     *Noncompliance due to depression/anxiety.    Although she often misses appointments, she does reschedule and eventually come in.     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 Willie Quinn [facility], on 5/21/2019.  Positive sentinel node (1 of 3 nodes)..  Left neck dissection by Dr. Willie Quinn, [facility], on 6/5/2019.  22 nodes negative.  Negative margins.  No lymphovascular invasion or perineural invasion.  2 mm depth of invasion.  Grade 1.  Squamous cell carcinoma.  1.8 cm tumor.  Because the patient felt what she thought was left neck LAD, CT neck and chest and PET at [facility]. May 2020 performed.  She is being followed at [facility] for this.  On 6/19/2020, per verbal report from Dr. Senia [place] [facility], [facility] ENT does not feel she has any active head and neck cancer.   She states she has a follow-up with Dr. Kapadia, ENT at [facility], in June with CT neck 1 week prior  No symptoms to suggest recurrence.  She follows with [facility].     *Previously complained of left hip pain x2 weeks.  This prompted an MRI pelvis 8/20/2019 at [facility] and open MRI which was negative for malignancy.     *Possible intolerance of Zometa  After first dose, June 2020, 2 days  of chills, bone pain  She would like to try Zometa again.  If this occurs again, we will try to switch her to Xgeva monthly.  (No good data on every 3-month Xgeva)  She did fine with the second dose of Zometa.     *Thickening of wall of transverse colon on CT 2/1/2022.  Last colonoscopy was 4/18/2019.  She wants the next colonoscopy done at [facility]  Dr. Galeas will see her on 4/7/2022 to evaluate this     *Depression  Is seeing a psychiatry NP and a therapist for many years.  6/24/2022: Reported she decided to stop seeing her therapist but is still interested in therapy.  She would like to see behavioral oncology at [facility].  This was arranged  Contacted by Julissa Centeno (behavioral oncology).  Patient canceled on the day of the appointment during her first scheduled appointment with Julissa.  She then no showed for the rescheduled appointment.  Julissa recommends the patient continue to follow with her therapist and psychiatrist and if she needs additional support, to use [employer]     *Pruritic rash  8/19/2022: Referred to dermatology     *Patient went to the ER 2/7/2023 for dizziness  2/28/2023: This resolved.  She understands if she has ongoing issues with dizziness she should contact us and we will arrange an MRI of the brain     *Admitted for significant diarrhea.  We were asked give her breast cancer treatment could be the cause of this.  Ibrance is associated with diarrhea about 25% of the time , but grade 3 diarrhea only occurs 1% of the time.  Faslodex has a listed diarrhea association of 6 to 25% of the time.  I would doubt these drugs are causing her diarrhea, especially considering she has been on them since May 2022    *Pancytopenia due to Ibrance    *Leukocytopenia  WBC 2.6    *Anemia  Hb 10.5    *Thrombocytopenia      *Acute kidney injury.  Nephrology following.  Baseline creatinine 1.5-1.7, CKD stage III.    Monitoring on Ibrance:  CBC every 2 weeks for the first 2 months then monthly and as  indicated.  After 6 months, if neutropenia grade 1 or 2 only, then CBC can be every 3 months     PLAN:   When she is ready for discharge, will arrange outpatient MD or NP with Faslodex every 4 weeks.  Resume Ibrance when she resumes Faslodex    Typical plan is:  MD/NP monthly  Faslodex today and monthly.  MD/Faslodex 5 months.  A few days prior: CT chest abdomen and pelvis without contrast, bone scan, CBC, CMP, CA 15-3  Resume every 3-month Xgeva 1 to 2 months after completing all dental procedures (she states she will let us know).  Last dose was 7/1/2022     Continue Faslodex/Ibrance 125 mg D1-21/28 days     (Xgeva instead of Zometa due to intermittent high creatinine)  MD or NP with Faslodex every 4 weeks  Every 6 months CT CAP without IV contrast, bone scan (last 7/18/2022), CA 15-3  Previously arranged for her to get involved with some cancer support groups   CT scans WITHOUT IV CONTRAST (again, to avoid further kidney damage.)  She sees [facility] ENT, Dr. Bedoya annually, next is summer 2022     Send a copy of this note to   Dr. Willie Quinn, [facility], radiation medicine, [facility]

## 2023-08-28 NOTE — SIGNIFICANT NOTE
08/28/23 1400   OTHER   Discipline physical therapist   Rehab Time/Intention   Session Not Performed other (see comments)  (Hold per nsg due to low BP. PT will f/u.)   Recommendation   PT - Next Appointment 08/29/23

## 2023-08-28 NOTE — PLAN OF CARE
Goal Outcome Evaluation:  Plan of Care Reviewed With: patient        Progress: no change  Outcome Evaluation: VSS; no complaints of pain or discomfort; pt's blood pressure has been low; boluses given; activity minimized d/t blood pressure; nephrology aware and will reassess tomorrow; safety maintained; continue to monitor

## 2023-08-29 DIAGNOSIS — C79.51 MALIGNANT NEOPLASM METASTATIC TO BONE: Primary | ICD-10-CM

## 2023-08-29 PROBLEM — D61.818 PANCYTOPENIA: Status: ACTIVE | Noted: 2023-08-29

## 2023-08-29 LAB
ANION GAP SERPL CALCULATED.3IONS-SCNC: 7 MMOL/L (ref 5–15)
BUN SERPL-MCNC: 34 MG/DL (ref 8–23)
BUN/CREAT SERPL: 19.5 (ref 7–25)
CALCIUM SPEC-SCNC: 8.9 MG/DL (ref 8.6–10.5)
CHLORIDE SERPL-SCNC: 104 MMOL/L (ref 98–107)
CO2 SERPL-SCNC: 24 MMOL/L (ref 22–29)
CREAT SERPL-MCNC: 1.74 MG/DL (ref 0.57–1)
DEPRECATED RDW RBC AUTO: 52.1 FL (ref 37–54)
DEPRECATED RDW RBC AUTO: 53.2 FL (ref 37–54)
EGFRCR SERPLBLD CKD-EPI 2021: 31.6 ML/MIN/1.73
EOSINOPHIL # BLD MANUAL: 0.08 10*3/MM3 (ref 0–0.4)
EOSINOPHIL NFR BLD MANUAL: 5 % (ref 0.3–6.2)
ERYTHROCYTE [DISTWIDTH] IN BLOOD BY AUTOMATED COUNT: 14 % (ref 12.3–15.4)
ERYTHROCYTE [DISTWIDTH] IN BLOOD BY AUTOMATED COUNT: 14.2 % (ref 12.3–15.4)
FERRITIN SERPL-MCNC: 376 NG/ML (ref 13–150)
FOLATE SERPL-MCNC: 12.5 NG/ML (ref 4.78–24.2)
GLUCOSE SERPL-MCNC: 113 MG/DL (ref 65–99)
HCT VFR BLD AUTO: 22.5 % (ref 34–46.6)
HCT VFR BLD AUTO: 24.3 % (ref 34–46.6)
HGB BLD-MCNC: 7.8 G/DL (ref 12–15.9)
HGB BLD-MCNC: 8.6 G/DL (ref 12–15.9)
IRON 24H UR-MRATE: 84 MCG/DL (ref 37–145)
IRON SATN MFR SERPL: 33 % (ref 20–50)
LYMPHOCYTES # BLD MANUAL: 0.81 10*3/MM3 (ref 0.7–3.1)
LYMPHOCYTES NFR BLD MANUAL: 1 % (ref 5–12)
MCH RBC QN AUTO: 35.5 PG (ref 26.6–33)
MCH RBC QN AUTO: 36.4 PG (ref 26.6–33)
MCHC RBC AUTO-ENTMCNC: 34.7 G/DL (ref 31.5–35.7)
MCHC RBC AUTO-ENTMCNC: 35.4 G/DL (ref 31.5–35.7)
MCV RBC AUTO: 102.3 FL (ref 79–97)
MCV RBC AUTO: 103 FL (ref 79–97)
MONOCYTES # BLD: 0.02 10*3/MM3 (ref 0.1–0.9)
NEUTROPHILS # BLD AUTO: 0.71 10*3/MM3 (ref 1.7–7)
NEUTROPHILS NFR BLD MANUAL: 44 % (ref 42.7–76)
PLAT MORPH BLD: NORMAL
PLATELET # BLD AUTO: 102 10*3/MM3 (ref 140–450)
PLATELET # BLD AUTO: 97 10*3/MM3 (ref 140–450)
PMV BLD AUTO: 10 FL (ref 6–12)
PMV BLD AUTO: 10.2 FL (ref 6–12)
POTASSIUM SERPL-SCNC: 4.2 MMOL/L (ref 3.5–5.2)
RBC # BLD AUTO: 2.2 10*6/MM3 (ref 3.77–5.28)
RBC # BLD AUTO: 2.36 10*6/MM3 (ref 3.77–5.28)
RBC MORPH BLD: NORMAL
RETICS # AUTO: 0.01 10*6/MM3 (ref 0.02–0.13)
RETICS/RBC NFR AUTO: 0.62 % (ref 0.7–1.9)
SODIUM SERPL-SCNC: 135 MMOL/L (ref 136–145)
TIBC SERPL-MCNC: 258 MCG/DL (ref 298–536)
TRANSFERRIN SERPL-MCNC: 173 MG/DL (ref 200–360)
VARIANT LYMPHS NFR BLD MANUAL: 50 % (ref 19.6–45.3)
VIT B12 BLD-MCNC: 316 PG/ML (ref 211–946)
WBC MORPH BLD: NORMAL
WBC NRBC COR # BLD: 1.62 10*3/MM3 (ref 3.4–10.8)
WBC NRBC COR # BLD: 1.86 10*3/MM3 (ref 3.4–10.8)

## 2023-08-29 PROCEDURE — 83540 ASSAY OF IRON: CPT | Performed by: NURSE PRACTITIONER

## 2023-08-29 PROCEDURE — 80048 BASIC METABOLIC PNL TOTAL CA: CPT | Performed by: INTERNAL MEDICINE

## 2023-08-29 PROCEDURE — 99233 SBSQ HOSP IP/OBS HIGH 50: CPT | Performed by: INTERNAL MEDICINE

## 2023-08-29 PROCEDURE — 82728 ASSAY OF FERRITIN: CPT | Performed by: NURSE PRACTITIONER

## 2023-08-29 PROCEDURE — 97161 PT EVAL LOW COMPLEX 20 MIN: CPT

## 2023-08-29 PROCEDURE — 99232 SBSQ HOSP IP/OBS MODERATE 35: CPT | Performed by: INTERNAL MEDICINE

## 2023-08-29 PROCEDURE — 25010000002 HEPARIN (PORCINE) PER 1000 UNITS: Performed by: INTERNAL MEDICINE

## 2023-08-29 PROCEDURE — 85025 COMPLETE CBC W/AUTO DIFF WBC: CPT | Performed by: NURSE PRACTITIONER

## 2023-08-29 PROCEDURE — 97530 THERAPEUTIC ACTIVITIES: CPT

## 2023-08-29 PROCEDURE — 85027 COMPLETE CBC AUTOMATED: CPT | Performed by: INTERNAL MEDICINE

## 2023-08-29 PROCEDURE — 85007 BL SMEAR W/DIFF WBC COUNT: CPT | Performed by: NURSE PRACTITIONER

## 2023-08-29 PROCEDURE — 85045 AUTOMATED RETICULOCYTE COUNT: CPT | Performed by: NURSE PRACTITIONER

## 2023-08-29 PROCEDURE — 97165 OT EVAL LOW COMPLEX 30 MIN: CPT

## 2023-08-29 PROCEDURE — 82607 VITAMIN B-12: CPT | Performed by: NURSE PRACTITIONER

## 2023-08-29 PROCEDURE — 84466 ASSAY OF TRANSFERRIN: CPT | Performed by: NURSE PRACTITIONER

## 2023-08-29 PROCEDURE — 82746 ASSAY OF FOLIC ACID SERUM: CPT | Performed by: NURSE PRACTITIONER

## 2023-08-29 RX ORDER — ATORVASTATIN CALCIUM 10 MG/1
TABLET, FILM COATED ORAL
Qty: 90 TABLET | Refills: 1 | Status: SHIPPED | OUTPATIENT
Start: 2023-08-29

## 2023-08-29 RX ORDER — DEXTROSE AND SODIUM CHLORIDE 5; .9 G/100ML; G/100ML
100 INJECTION, SOLUTION INTRAVENOUS CONTINUOUS
Status: DISCONTINUED | OUTPATIENT
Start: 2023-08-29 | End: 2023-08-30

## 2023-08-29 RX ORDER — ATORVASTATIN CALCIUM 10 MG/1
TABLET, FILM COATED ORAL
Qty: 90 TABLET | Refills: 1 | OUTPATIENT
Start: 2023-08-29

## 2023-08-29 RX ADMIN — DEXTROSE AND SODIUM CHLORIDE 100 ML/HR: 5; 900 INJECTION, SOLUTION INTRAVENOUS at 11:30

## 2023-08-29 RX ADMIN — Medication 3 MG: at 22:57

## 2023-08-29 RX ADMIN — DEXTROSE AND SODIUM CHLORIDE 100 ML/HR: 5; 900 INJECTION, SOLUTION INTRAVENOUS at 22:03

## 2023-08-29 RX ADMIN — HEPARIN SODIUM 5000 UNITS: 5000 INJECTION INTRAVENOUS; SUBCUTANEOUS at 22:00

## 2023-08-29 RX ADMIN — PANTOPRAZOLE SODIUM 40 MG: 40 TABLET, DELAYED RELEASE ORAL at 06:50

## 2023-08-29 RX ADMIN — ATORVASTATIN CALCIUM 10 MG: 20 TABLET, FILM COATED ORAL at 21:59

## 2023-08-29 RX ADMIN — Medication 3 ML: at 22:00

## 2023-08-29 RX ADMIN — Medication 3 ML: at 08:45

## 2023-08-29 RX ADMIN — HEPARIN SODIUM 5000 UNITS: 5000 INJECTION INTRAVENOUS; SUBCUTANEOUS at 11:30

## 2023-08-29 RX ADMIN — PAROXETINE HYDROCHLORIDE HEMIHYDRATE 40 MG: 20 TABLET, FILM COATED ORAL at 22:00

## 2023-08-29 RX ADMIN — ARIPIPRAZOLE 2 MG: 2 TABLET ORAL at 21:59

## 2023-08-29 RX ADMIN — SODIUM BICARBONATE 125 ML/HR: 84 INJECTION, SOLUTION INTRAVENOUS at 00:58

## 2023-08-29 NOTE — PROGRESS NOTES
[facility] Progress Note      Patient Name: Marialuisa Vivar  : 1955  MRN: [id number]  Primary Care Physician:  Jennifer Mantilla MD  Date of admission: 2023    Subjective     Interval History:       Seen and examined.  No shortness of air or chest pain.  Had BM earlier today      Review of Systems:   As noted above    Objective     Vitals:   Temp:  [98.1 °F (36.7 °C)-98.6 °F (37 °C)] 98.1 °F (36.7 °C)  Heart Rate:  [] 88  Resp:  [18-20] 18  BP: (76-96)/(47-63) 92/49    Intake/Output Summary (Last 24 hours) at 2023 0908  Last data filed at 2023 0600  Gross per 24 hour   Intake 2006.25 ml   Output 1100 ml   Net 906.25 ml       Physical Exam:    General Appearance: alert, oriented x 3, no acute distress   Skin: warm and dry  HEENT: oral mucosa normal, nonicteric sclera  Neck: supple, no JVD  Lungs: CTA  Heart: RRR, normal S1 and S2  Abdomen: soft, nontender, nondistended  : no palpable bladder  Extremities: no edema, cyanosis or clubbing  Neuro: normal speech and mental status     Scheduled Meds:     ARIPiprazole, 2 mg, Oral, Nightly  atorvastatin, 10 mg, Oral, Nightly  heparin (porcine), 5,000 Units, Subcutaneous, Q12H  pantoprazole, 40 mg, Oral, Q AM  PARoxetine, 40 mg, Oral, Nightly  sodium chloride, 3 mL, Intravenous, Q12H      IV Meds:   sodium bicarbonate 150 mEq in D5W, 125 mL/hr, Last Rate: 125 mL/hr (23 0854)        Results Reviewed:   I have personally reviewed the results from the time of this admission to 2023 09:08 EDT     Results from last 7 days   Lab Units 23  0410 23  0928 23  0209 23  2150   SODIUM mmol/L 135* 134* 131* 131*   POTASSIUM mmol/L 4.2 4.6 5.2 6.0*   CHLORIDE mmol/L 104 112* 103 100   CO2 mmol/L 24.0 13.7* 14.5* 14.1*   BUN mg/dL 34* 49* 63* 68*   CREATININE mg/dL 1.74* 2.20* 3.31* 3.57*   CALCIUM mg/dL 8.9 7.6* 9.6 10.1   BILIRUBIN mg/dL  --  0.5  --  0.7   ALK PHOS U/L  --  60  --  83   ALT  (SGPT) U/L  --  13  --  16   AST (SGOT) U/L  --  15  --  19   GLUCOSE mg/dL 113* 115* 74 114*     Estimated Creatinine Clearance: 31.7 mL/min (A) (by C-G formula based on SCr of 1.74 mg/dL (H)).  Results from last 7 days   Lab Units 08/27/23  2150   MAGNESIUM mg/dL 2.0         Results from last 7 days   Lab Units 08/29/23  0410 08/28/23  0209 08/27/23  2150   WBC 10*3/mm3 1.86* 2.55* 3.67   HEMOGLOBIN g/dL 7.8* 10.5* 11.6*   PLATELETS 10*3/mm3 97* 126* 145     Results from last 7 days   Lab Units 08/27/23  2150   INR  1.15*       Assessment / Plan     ASSESSMENT:  1.  Acute kidney injury associated with metabolic acidosis and hyperkalemia that is likely due to severe intravascular volume depletion due to severe diarrhea.   Her urinalysis essentially bland  2.  Chronic kidney disease stage III with baseline creatinine 1.5-1.7 mg/dL  3.  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  Diarrhea and dehydration that might be side effect of her chemotherapy  5.  History of dyslipidemia  6.  Hyperkalemia and metabolic acidosis        PLAN:     We will discontinue IV bicarb drip and switch to D5 normal saline  Surveillance labs    Thank you for involving us in the care of Marialuisa Vivar.  Please feel free to call with any questions.    Carlos Farfan MD  08/29/23  09:08 EDT    [facility]  [phone]

## 2023-08-29 NOTE — PLAN OF CARE
Goal Outcome Evaluation:  Plan of Care Reviewed With: patient           Outcome Evaluation: Patient is a 67 y.o female who presents to [facility] with generalized weakness. Patient AOx4 supine in bed upon arrival. Patient lives at home alone with a full flight of steps to enter. Patient is independent at baseline and does not use an AD. Patient completed all bed mobility SV. Patient BP while sitting EOB was 84/49. Patient performed a STS from EOB with CGA. BP rececked to be 70/51. Patient did not endorse any feeling of dizziness or lightheadness. Further mobility deferred this date due to drop in BP. Patient would continue to benefit from skilled PT intervention to address deficits in functional mobility. PT will continue to monitor.      Anticipated Discharge Disposition (PT): home with assist

## 2023-08-29 NOTE — THERAPY EVALUATION
Patient Name: Marialuisa Vivar  : 1955    MRN: [id number]                              Today's Date: 2023       Admit Date: 2023    Visit Dx:     ICD-10-CM ICD-9-CM   1. Generalized weakness  R53.1 780.79   2. Acute renal failure superimposed on chronic kidney disease, unspecified CKD stage, unspecified acute renal failure type  N17.9 584.9    N18.9 585.9   3. Acute kidney injury (SARAH) with acute tubular necrosis (ATN)  N17.0 584.5   4. Volume depletion  E86.9 276.50   5. Hyperkalemia  E87.5 276.7   6. Diarrhea, unspecified type  R19.7 787.91   7. Lactic acidosis  E87.20 276.2   8. History of breast cancer  Z85.3 V10.3   9. SARAH (acute kidney injury)  N17.9 584.9     Patient Active Problem List   Diagnosis    Benign essential HTN    Depression    Vocal cord dysfunction    Malignant neoplasm metastatic to bone    Carcinoma of left breast metastatic to lung    Tachycardia    Therapeutic drug monitoring    Lung metastasis    Vitamin D deficiency    Class 1 obesity without serious comorbidity with body mass index (BMI) of 31.0 to 31.9 in adult    Tongue cancer    Anxiety    Bruxism    Cheilosis    Squamous cell carcinoma of skin    Hyperlipidemia    Ovarian mass, right    Adnexal mass    Abnormal CT scan, colon    SARAH (acute kidney injury)    Hyperkalemia    Metabolic acidosis    Dehydration    Diarrhea    Pancytopenia     Past Medical History:   Diagnosis Date    SARAH (acute kidney injury) 2023    Allergy     Anxiety     Asthma     Bone metastasis     Breast cancer     Left node positive    Cholelithiasis     Lap Marily    Colon polyp Past 10-15 yrs?    found at colonoscopies    Depression     Esophageal reflux     cough    History of colon polyps     Hyperlipidemia     Hypertension     Left breast cancer metastasized to lung     Obstructive sleep apnea     does not wear cpap    Ovarian cyst     Pancytopenia 2023    PONV (postoperative nausea and vomiting)     Tongue cancer 2019    by ENT  at [facility] dr Quinn     Past Surgical History:   Procedure Laterality Date    CHOLECYSTECTOMY  2000    COLONOSCOPY  2014    H/O polyps    COLONOSCOPY N/A 6/1/2022    Procedure: COLONOSCOPY to cecum and TI with cold / hot snare polypectomies;  Surgeon: Luis Enrique Galeas MD;  Location: [facility] ENDOSCOPY;  Service: Gastroenterology;  Laterality: N/A;  pre-abnormal ct  post-polyps, diverticulosis, hemorrhoids    KNEE ARTHROPLASTY      LUNG SURGERY  2010    Lung wedge resection    MASTECTOMY RADICAL Left 1993    TONGUE SURGERY  05/21/2019    tongue cancer    TOTAL LAPAROSCOPIC HYSTERECTOMY N/A 11/04/2021    Procedure: Robotic assisted total laparoscopic hysterectomy, bilateral salpingoophorecotmy, bilateral ureteralysis ;  Surgeon: Kaylynn Ricci DO;  Location: [facility] MAIN OR;  Service: Robotics - DaVinci;  Laterality: N/A;      General Information       Row Name 08/29/23 1107          OT Time and Intention    Document Type evaluation  -     Mode of Treatment occupational therapy  -       Row Name 08/29/23 1107          General Information    Patient Profile Reviewed yes  -JW     Prior Level of Function independent:;ADL's;all household mobility  w/o Ad  -JW     Existing Precautions/Restrictions fall  -     Barriers to Rehab none identified  -       Row Name 08/29/23 1107          Living Environment    People in Home alone  -       Row Name 08/29/23 1107          Home Main Entrance    Number of Stairs, Main Entrance twelve  full flight of steps to second level apt. No elevator available  -       Row Name 08/29/23 1107          Cognition    Orientation Status (Cognition) oriented x 3  -       Row Name 08/29/23 1107          Safety Issues, Functional Mobility    Impairments Affecting Function (Mobility) endurance/activity tolerance  -     Comment, Safety Issues/Impairments (Mobility) orthostatic hypotension  -               User Key  (r) = Recorded By, (t) = Taken By, (c) = Cosigned By      Initials  Name Provider Type     Verito Levy OT Occupational Therapist                     Mobility/ADL's       Row Name 08/29/23 1108          Bed Mobility    Bed Mobility supine-sit  -     Supine-Sit [place] (Bed Mobility) supervision  -       Row Name 08/29/23 1108          Transfers    Transfers sit-stand transfer  -       Row Name 08/29/23 1108          Sit-Stand Transfer    Sit-Stand [place] (Transfers) contact guard  -     Comment, (Sit-Stand Transfer) STS from bedside to take BP in standing. Further mobility deferred d/t drop in BP  -       Row Name 08/29/23 1108          Activities of Daily Living    BADL Assessment/Intervention lower body dressing  -       Row Name 08/29/23 1108          Lower Body Dressing Assessment/Training    [place] Level (Lower Body Dressing) don;socks;independent  -     Position (Lower Body Dressing) edge of bed sitting  -               User Key  (r) = Recorded By, (t) = Taken By, (c) = Cosigned By      Initials Name Provider Type     Verito Levy OT Occupational Therapist                   Obj/Interventions       Row Name 08/29/23 1109          Sensory Assessment (Somatosensory)    Sensory Assessment (Somatosensory) sensation intact  -       Row Name 08/29/23 1109          Vision Assessment/Intervention    Visual Impairment/Limitations WNL  -       Row Name 08/29/23 1109          Range of Motion Comprehensive    General Range of Motion bilateral upper extremity ROM WNL  -       Row Name 08/29/23 1109          Strength Comprehensive (MMT)    General Manual Muscle Testing (MMT) Assessment no strength deficits identified  -     Comment, General Manual Muscle Testing (MMT) Assessment UEs WFL  -       Row Name 08/29/23 1109          Balance    Balance Assessment sitting static balance;standing static balance  -     Static Sitting Balance supervision  -     Position, Sitting Balance sitting edge of bed  -     Static Standing Balance contact guard   -               User Key  (r) = Recorded By, (t) = Taken By, (c) = Cosigned By      Initials Name Provider Type    JW Verito Levy, OT Occupational Therapist                   Goals/Plan       Row Name 08/29/23 1252          Transfer Goal 1 (OT)    Activity/Assistive Device (Transfer Goal 1, OT) transfers, all  -JW     [place] Level/Cues Needed (Transfer Goal 1, OT) modified independence  -JW     Time Frame (Transfer Goal 1, OT) short term goal (STG);2 weeks  -JW     Progress/Outcome (Transfer Goal 1, OT) goal ongoing  -       Row Name 08/29/23 1252          Bathing Goal 1 (OT)    Activity/Device (Bathing Goal 1, OT) bathing skills, all;shower chair  -JW     [place] Level/Cues Needed (Bathing Goal 1, OT) supervision required  -JW     Time Frame (Bathing Goal 1, OT) short term goal (STG);2 weeks  -     Strategies/Barriers (Bathing Goal 1, OT) spv for safety  -     Progress/Outcomes (Bathing Goal 1, OT) goal ongoing  -       Row Name 08/29/23 1252          Toileting Goal 1 (OT)    Activity/Device (Toileting Goal 1, OT) toileting skills, all  -JW     [place] Level/Cues Needed (Toileting Goal 1, OT) independent  -JW     Time Frame (Toileting Goal 1, OT) short term goal (STG);2 weeks  -     Progress/Outcome (Toileting Goal 1, OT) goal ongoing  -       Row Name 08/29/23 1252          Grooming Goal 1 (OT)    Activity/Device (Grooming Goal 1, OT) grooming skills, all  -JW     [place] (Grooming Goal 1, OT) independent  -JW     Time Frame (Grooming Goal 1, OT) short term goal (STG);2 weeks  -JW     Progress/Outcome (Grooming Goal 1, OT) goal ongoing  -       Row Name 08/29/23 1252          Therapy Assessment/Plan (OT)    Planned Therapy Interventions (OT) activity tolerance training;BADL retraining;functional balance retraining;occupation/activity based interventions;transfer/mobility retraining  -               User Key  (r) = Recorded By, (t) = Taken By, (c) = Cosigned By      Initials  Name Provider Type     Verito Levy, MIL Occupational Therapist                   Clinical Impression       Row Name 08/29/23 1109          Pain Assessment    Pretreatment Pain Rating 0/10 - no pain  -     Posttreatment Pain Rating 0/10 - no pain  -       Row Name 08/29/23 1109          Plan of Care Review    Plan of Care Reviewed With patient  -     Outcome Evaluation Pt is a 66 y/o F admitted with SARAH, increasing generalized weakness with diarrhea and lightheadedness when she stands. Pt lives alone and is indep with ADLs and fxl mobility w/o AD. She presents today limited by hypotension. Sits EOB with spv, BP= 85/49 w/o symptoms. Stands with CGA, BP=70/51 and further mobility deferred. Pt is indep with LB dressing tasks, strength WFL. Will cont to follow for OT needs as full ADL and mobility assessment limited. Pt plans to return home at d/c but will follow-up for possible d/c needs  -       Row Name 08/29/23 1109          Therapy Assessment/Plan (OT)    Rehab Potential (OT) fair, will monitor progress closely  -     Criteria for Skilled Therapeutic Interventions Met (OT) yes;skilled treatment is necessary  -     Therapy Frequency (OT) 3 times/wk  -       Row Name 08/29/23 1109          Therapy Plan Review/Discharge Plan (OT)    Anticipated Discharge Disposition (OT) --  anticipate home but will CTA  -       Row Name 08/29/23 1109          Vital Signs    Intra Systolic BP Rehab 85  -JW     Intra Treatment Diastolic BP 49  -JW     Post Systolic BP Rehab 70  -JW     Post Treatment Diastolic BP 51  -JW     Intra Patient Position Sitting  -     Post Patient Position Standing  -       Row Name 08/29/23 1109          Positioning and Restraints    Pre-Treatment Position in bed  -     Post Treatment Position bed  -JW     In Bed notified nsg;fowlers;call light within reach;encouraged to call for assist;exit alarm on  -               User Key  (r) = Recorded By, (t) = Taken By, (c) = Cosigned By       Initials Name Provider Type    Verito Zhang OT Occupational Therapist                   Outcome Measures       Row Name 08/29/23 1253          How much help from another is currently needed...    Putting on and taking off regular lower body clothing? 3  -JW     Bathing (including washing, rinsing, and drying) 3  -JW     Toileting (which includes using toilet bed pan or urinal) 3  -JW     Putting on and taking off regular upper body clothing 3  -JW     Taking care of personal grooming (such as brushing teeth) 4  -JW     Eating meals 4  -JW     AM-PAC 6 Clicks Score (OT) 20  -JW       Row Name 08/29/23 1253          Functional Assessment    Outcome Measure Options AM-PAC 6 Clicks Daily Activity (OT)  -               User Key  (r) = Recorded By, (t) = Taken By, (c) = Cosigned By      Initials Name Provider Type    Verito Zhang OT Occupational Therapist                    Occupational Therapy Education       Title: PT OT SLP Therapies (In Progress)       Topic: Occupational Therapy (In Progress)       Point: ADL training (Done)       Description:   Instruct learner(s) on proper safety adaptation and remediation techniques during self care or transfers.   Instruct in proper use of assistive devices.                  Learning Progress Summary             Patient Acceptance, E, VU by CHACHO at 8/29/2023 1254    Comment: role of OT, plan of care, safety                         Point: Home exercise program (Not Started)       Description:   Instruct learner(s) on appropriate technique for monitoring, assisting and/or progressing therapeutic exercises/activities.                  Learner Progress:  Not documented in this visit.              Point: Precautions (Done)       Description:   Instruct learner(s) on prescribed precautions during self-care and functional transfers.                  Learning Progress Summary             Patient Acceptance, E, VU by CHACHO at 8/29/2023 1254    Comment: role of OT, plan of care, safety                          Point: Body mechanics (Done)       Description:   Instruct learner(s) on proper positioning and spine alignment during self-care, functional mobility activities and/or exercises.                  Learning Progress Summary             Patient Acceptance, E, VU by  at 8/29/2023 2784    Comment: role of OT, plan of care, safety                                         User Key       Initials Effective Dates Name Provider Type Discipline     06/10/21 -  Verito Levy, MIL Occupational Therapist OT                  OT Recommendation and Plan  Planned Therapy Interventions (OT): activity tolerance training, BADL retraining, functional balance retraining, occupation/activity based interventions, transfer/mobility retraining  Therapy Frequency (OT): 3 times/wk  Plan of Care Review  Plan of Care Reviewed With: patient  Outcome Evaluation: Pt is a 68 y/o F admitted with SARAH, increasing generalized weakness with diarrhea and lightheadedness when she stands. Pt lives alone and is indep with ADLs and fxl mobility w/o AD. She presents today limited by hypotension. Sits EOB with spv, BP= 85/49 w/o symptoms. Stands with CGA, BP=70/51 and further mobility deferred. Pt is indep with LB dressing tasks, strength WFL. Will cont to follow for OT needs as full ADL and mobility assessment limited. Pt plans to return home at d/c but will follow-up for possible d/c needs     Time Calculation:   Evaluation Complexity (OT)  Review Occupational Profile/Medical/Therapy History Complexity: brief/low complexity  Assessment, Occupational Performance/Identification of Deficit Complexity: 1-3 performance deficits  Clinical Decision Making Complexity (OT): problem focused assessment/low complexity  Overall Complexity of Evaluation (OT): low complexity     Time Calculation- OT       Row Name 08/29/23 1259             Time Calculation-     OT Start Time 1047  -      OT Stop Time 1103  -      OT Time Calculation (min) 16 min   -JW      Total Timed Code Minutes- OT 8 minute(s)  -JW      OT Received On 08/29/23  -JW      OT - Next Appointment 08/30/23  -JW      OT Goal Re-Cert Due Date 09/12/23  -JW         Timed Charges    72334 - OT Therapeutic Activity Minutes 8  -JW         Untimed Charges    OT Eval/Re-eval Minutes 8  -JW         Total Minutes    Timed Charges Total Minutes 8  -JW      Untimed Charges Total Minutes 8  -JW       Total Minutes 16  -JW                User Key  (r) = Recorded By, (t) = Taken By, (c) = Cosigned By      Initials Name Provider Type    Verito Zhang OT Occupational Therapist                  Therapy Charges for Today       Code Description Service Date Service Provider Modifiers Qty    65229725935 HC OT THERAPEUTIC ACT EA 15 MIN 8/29/2023 Verito Levy OT GO 1    34834473075 HC OT EVAL LOW COMPLEXITY 3 8/29/2023 Verito Levy OT GO 1                 Verito Levy OT  8/29/2023

## 2023-08-29 NOTE — THERAPY EVALUATION
Patient Name: Marialuisa Vivar  : 1955    MRN: [id number]                              Today's Date: 2023       Admit Date: 2023    Visit Dx:     ICD-10-CM ICD-9-CM   1. Generalized weakness  R53.1 780.79   2. Acute renal failure superimposed on chronic kidney disease, unspecified CKD stage, unspecified acute renal failure type  N17.9 584.9    N18.9 585.9   3. Acute kidney injury (SARAH) with acute tubular necrosis (ATN)  N17.0 584.5   4. Volume depletion  E86.9 276.50   5. Hyperkalemia  E87.5 276.7   6. Diarrhea, unspecified type  R19.7 787.91   7. Lactic acidosis  E87.20 276.2   8. History of breast cancer  Z85.3 V10.3   9. SARAH (acute kidney injury)  N17.9 584.9     Patient Active Problem List   Diagnosis    Benign essential HTN    Depression    Vocal cord dysfunction    Malignant neoplasm metastatic to bone    Carcinoma of left breast metastatic to lung    Tachycardia    Therapeutic drug monitoring    Lung metastasis    Vitamin D deficiency    Class 1 obesity without serious comorbidity with body mass index (BMI) of 31.0 to 31.9 in adult    Tongue cancer    Anxiety    Bruxism    Cheilosis    Squamous cell carcinoma of skin    Hyperlipidemia    Ovarian mass, right    Adnexal mass    Abnormal CT scan, colon    SARAH (acute kidney injury)    Hyperkalemia    Metabolic acidosis    Dehydration    Diarrhea    Pancytopenia     Past Medical History:   Diagnosis Date    SARAH (acute kidney injury) 2023    Allergy     Anxiety     Asthma     Bone metastasis     Breast cancer     Left node positive    Cholelithiasis     Lap Marily    Colon polyp Past 10-15 yrs?    found at colonoscopies    Depression     Esophageal reflux     cough    History of colon polyps     Hyperlipidemia     Hypertension     Left breast cancer metastasized to lung     Obstructive sleep apnea     does not wear cpap    Ovarian cyst     Pancytopenia 2023    PONV (postoperative nausea and vomiting)     Tongue cancer 2019    by ENT  at [facility]     Past Surgical History:   Procedure Laterality Date    CHOLECYSTECTOMY  2000    COLONOSCOPY  2014    H/O polyps    COLONOSCOPY N/A 6/1/2022    Procedure: COLONOSCOPY to cecum and TI with cold / hot snare polypectomies;  Surgeon: Luis Enrique Galeas MD;  Location: [facility] ENDOSCOPY;  Service: Gastroenterology;  Laterality: N/A;  pre-abnormal ct  post-polyps, diverticulosis, hemorrhoids    KNEE ARTHROPLASTY      LUNG SURGERY  2010    Lung wedge resection    MASTECTOMY RADICAL Left 1993    TONGUE SURGERY  05/21/2019    tongue cancer    TOTAL LAPAROSCOPIC HYSTERECTOMY N/A 11/04/2021    Procedure: Robotic assisted total laparoscopic hysterectomy, bilateral salpingoophorecotmy, bilateral ureteralysis ;  Surgeon: Kaylynn Ricci DO;  Location: [facility] MAIN OR;  Service: Robotics - DaVinci;  Laterality: N/A;      General Information       Row Name 08/29/23 1255          Physical Therapy Time and Intention    Document Type evaluation  -     Mode of Treatment physical therapy  -       Row Name 08/29/23 1255          General Information    Patient Profile Reviewed yes  -     Prior Level of Function independent:  -     Existing Precautions/Restrictions fall;orthostatic hypotension  -       Row Name 08/29/23 1255          Living Environment    People in Home alone  -       Row Name 08/29/23 1255          Home Main Entrance    Number of Stairs, Main Entrance twelve  -       Row Name 08/29/23 1255          Cognition    Orientation Status (Cognition) oriented x 3  -       Row Name 08/29/23 1255          Safety Issues, Functional Mobility    Impairments Affecting Function (Mobility) endurance/activity tolerance  -               User Key  (r) = Recorded By, (t) = Taken By, (c) = Cosigned By      Initials Name Provider Type     Margie Sanchez PT Physical Therapist                   Mobility       Row Name 08/29/23 1256          Bed Mobility    Bed Mobility supine-sit;sit-supine  -      Supine-Sit [place] (Bed Mobility) supervision  -     Sit-Supine [place] (Bed Mobility) supervision  -SM       Row Name 08/29/23 1256          Sit-Stand Transfer    Sit-Stand [place] (Transfers) contact guard  -SM       Row Name 08/29/23 1256          Gait/Stairs (Locomotion)    Distance in Feet (Gait) unable to perform due to positive orthostatics  -               User Key  (r) = Recorded By, (t) = Taken By, (c) = Cosigned By      Initials Name Provider Type     Margie Sanchez PT Physical Therapist                   Obj/Interventions       [facility] Name 08/29/23 1257          Range of Motion Comprehensive    General Range of Motion bilateral lower extremity ROM WFL  -[facility] Name 08/29/23 1257          Strength Comprehensive (MMT)    Comment, General Manual Muscle Testing (MMT) Assessment B LEs WFL  -SM       Row Name 08/29/23 1257          Balance    Balance Assessment sitting static balance;standing static balance  -     Static Sitting Balance supervision  -     Position, Sitting Balance sitting edge of bed  -     Static Standing Balance contact guard  -     Position/Device Used, Standing Balance unsupported  -     Balance Interventions sitting;static;sit to stand;standing  -               User Key  (r) = Recorded By, (t) = Taken By, (c) = Cosigned By      Initials Name Provider Type     Margie Sanchez PT Physical Therapist                   Goals/Plan       Row Name 08/29/23 1306          Bed Mobility Goal 1 (PT)    Activity/Assistive Device (Bed Mobility Goal 1, PT) bed mobility activities, all  -SM     [place] Level/Cues Needed (Bed Mobility Goal 1, PT) independent  -     Time Frame (Bed Mobility Goal 1, PT) 1 week  -SM       Row Name 08/29/23 1306          Transfer Goal 1 (PT)    Activity/Assistive Device (Transfer Goal 1, PT) sit-to-stand/stand-to-sit;bed-to-chair/chair-to-bed  -     [place]ot Level/Cues Needed (Transfer Goal 1, PT) supervision required   -SM     Time Frame (Transfer Goal 1, PT) 1 week  -       Row Name 08/29/23 1306          Gait Training Goal 1 (PT)    Activity/Assistive Device (Gait Training Goal 1, PT) gait (walking locomotion)  -SM     [place] Level (Gait Training Goal 1, PT) supervision required  -SM     Distance (Gait Training Goal 1, PT) 150ft  -SM     Time Frame (Gait Training Goal 1, PT) 1 week  -SM               User Key  (r) = Recorded By, (t) = Taken By, (c) = Cosigned By      Initials Name Provider Type     Margie Sanchez, PT Physical Therapist                   Clinical Impression       Row Name 08/29/23 3363          Pain    Pretreatment Pain Rating 0/10 - no pain  -SM     Posttreatment Pain Rating 0/10 - no pain  -SM       Row Name 08/29/23 9275          Plan of Care Review    Plan of Care Reviewed With patient  -     Outcome Evaluation Patient is a 67 y.o female who [facility] with generalized weakness. Patient AOx4 supine in bed upon arrival. Patient lives at home alone with a full flight of steps to enter. Patient is independent at baseline and does not use an AD. Patient completed all bed mobility SV. Patient BP while sitting EOB was 84/49. Patient performed a STS from EOB with CGA. BP rececked to be 70/51. Patient did not endorse any feeling of dizziness or lightheadness. Further mobility deferred this date due to drop in BP. Patient would continue to benefit from skilled PT intervention to address deficits in functional mobility. PT will continue to monitor.  -       Row Name 08/29/23 5719          Therapy Assessment/Plan (PT)    Rehab Potential (PT) good, to achieve stated therapy goals  -     Criteria for Skilled Interventions Met (PT) yes  -SM     Therapy Frequency (PT) 5 times/wk  -SM       Row Name 08/29/23 7168          Vital Signs    Pre Patient Position Supine  -SM     Intra Patient Position Standing  -SM     Post Patient Position Supine  -SM       Row Name 08/29/23 2374          Positioning and  Restraints    Pre-Treatment Position in bed  -SM     Post Treatment Position bed  -SM     In Bed notified nsg;encouraged to call for assist;exit alarm on;call light within reach  -               User Key  (r) = Recorded By, (t) = Taken By, (c) = Cosigned By      Initials Name Provider Type    Margie Dodson PT Physical Therapist                   Outcome Measures       Row Name 08/29/23 1308          How much help from another person do you currently need...    Turning from your back to your side while in flat bed without using bedrails? 4  -SM     Moving from lying on back to sitting on the side of a flat bed without bedrails? 4  -SM     Moving to and from a bed to a chair (including a wheelchair)? 3  -SM     Standing up from a chair using your arms (e.g., wheelchair, bedside chair)? 3  -SM     Climbing 3-5 steps with a railing? 2  -SM     To walk in hospital room? 3  -SM     AM-PAC 6 Clicks Score (PT) 19  -SM     Highest level of mobility 6 --> Walked 10 steps or more  -       Row Name 08/29/23 1308 08/29/23 1253       Functional Assessment    Outcome Measure Options AM-PAC 6 Clicks Basic Mobility (PT)  - AM-PAC 6 Clicks Daily Activity (OT)  -              User Key  (r) = Recorded By, (t) = Taken By, (c) = Cosigned By      Initials Name Provider Type    Verito Zhang OT Occupational Therapist    Margie Dodson PT Physical Therapist                                 Physical Therapy Education       Title: PT OT SLP Therapies (In Progress)       Topic: Physical Therapy (Done)       Point: Mobility training (Done)       Learning Progress Summary             Patient Acceptance, E, VU by  at 8/29/2023 1309                         Point: Home exercise program (Done)       Learning Progress Summary             Patient Acceptance, E, VU by  at 8/29/2023 1309                         Point: Body mechanics (Done)       Learning Progress Summary             Patient Acceptance, E, VU by  at  8/29/2023 1309                         Point: Precautions (Done)       Learning Progress Summary             Patient Acceptance, E, VU by  at 8/29/2023 1309                                         User Key       Initials Effective Dates Name Provider Type Discipline     05/02/22 -  Margie Sanchez PT Physical Therapist PT                  PT Recommendation and Plan     Plan of Care Reviewed With: patient  Outcome Evaluation: Patient is a 67 y.o female who presents to [facility] with generalized weakness. Patient AOx4 supine in bed upon arrival. Patient lives at home alone with a full flight of steps to enter. Patient is independent at baseline and does not use an AD. Patient completed all bed mobility SV. Patient BP while sitting EOB was 84/49. Patient performed a STS from EOB with CGA. BP rececked to be 70/51. Patient did not endorse any feeling of dizziness or lightheadness. Further mobility deferred this date due to drop in BP. Patient would continue to benefit from skilled PT intervention to address deficits in functional mobility. PT will continue to monitor.     Time Calculation:         PT Charges       Row Name 08/29/23 1309             Time Calculation    Start Time 1048  -      Stop Time 1102  -      Time Calculation (min) 14 min  -      PT Received On 08/29/23  -      PT - Next Appointment 08/30/23  -      PT Goal Re-Cert Due Date 09/05/23  -                User Key  (r) = Recorded By, (t) = Taken By, (c) = Cosigned By      Initials Name Provider Type     Margie Sanchez PT Physical Therapist                  Therapy Charges for Today       Code Description Service Date Service Provider Modifiers Qty    [id number] HC PT EVAL LOW COMPLEXITY 3 8/29/2023 Margie Sanchez PT GP 1            PT G-Codes  Outcome Measure Options: AM-PAC 6 Clicks Basic Mobility (PT)  AM-PAC 6 Clicks Score (PT): 19  AM-PAC 6 Clicks Score (OT): 20  PT Discharge Summary  Anticipated Discharge Disposition (PT):  home with assist    Margie Sanchez, PT  8/29/2023

## 2023-08-29 NOTE — PLAN OF CARE
Goal Outcome Evaluation:  Plan of Care Reviewed With: patient           Outcome Evaluation: Pt is a 66 y/o F admitted with SARAH, increasing generalized weakness with diarrhea and lightheadedness when she stands. Pt lives alone and is indep with ADLs and fxl mobility w/o AD. She presents today limited by hypotension. Sits EOB with spv, BP= 85/49 w/o symptoms. Stands with CGA, BP=70/51 and further mobility deferred. Pt is indep with LB dressing tasks, strength WFL. Will cont to follow for OT needs as full ADL and mobility assessment limited. Pt plans to return home at d/c but will follow-up for possible d/c needs

## 2023-08-29 NOTE — PROGRESS NOTES
[facility]  Inpatient Progress Note    Reason for Follow Up:  Diarrhea     Subjective     Interval History:   No bowel movement since admission.  She is tolerating clears.  No nausea or vomiting.  No abdominal pain    Current Facility-Administered Medications:     acetaminophen (TYLENOL) tablet 650 mg, 650 mg, Oral, Q4H PRN **OR** acetaminophen (TYLENOL) 160 MG/5ML solution 650 mg, 650 mg, Oral, Q4H PRN **OR** acetaminophen (TYLENOL) suppository 650 mg, 650 mg, Rectal, Q4H PRN, Dino Gil MD    ARIPiprazole (ABILIFY) tablet 2 mg, 2 mg, Oral, Nightly, Dino Gil MD, 2 mg at 08/28/23 2032    atorvastatin (LIPITOR) tablet 10 mg, 10 mg, Oral, Nightly, Dino Gil MD, 10 mg at 08/28/23 2031    famotidine (PEPCID) tablet 10 mg, 10 mg, Oral, BID PRN, Dino Gil MD    heparin (porcine) 5000 UNIT/ML injection 5,000 Units, 5,000 Units, Subcutaneous, Q12H, Dino Gil MD, 5,000 Units at 08/28/23 2032    LORazepam (ATIVAN) tablet 0.5 mg, 0.5 mg, Oral, Q8H PRN, Dino Gil MD, 0.5 mg at 08/28/23 0340    melatonin tablet 3 mg, 3 mg, Oral, Nightly PRN, Dino Gil MD    ondansetron (ZOFRAN) tablet 4 mg, 4 mg, Oral, Q6H PRN **OR** ondansetron (ZOFRAN) injection 4 mg, 4 mg, Intravenous, Q6H PRN, Dino Gil MD    pantoprazole (PROTONIX) EC tablet 40 mg, 40 mg, Oral, Q AM, Dino Gil MD, 40 mg at 08/28/23 0502    PARoxetine (PAXIL) tablet 40 mg, 40 mg, Oral, Nightly, Dino Gil MD, 40 mg at 08/28/23 2031    sodium bicarbonate 150 mEq/1000 mL D5W infusion, 125 mL/hr, Intravenous, Continuous, Carlos Farfan MD, Last Rate: 125 mL/hr at 08/29/23 0058, 125 mL/hr at 08/29/23 0058    [COMPLETED] Insert Peripheral IV, , , Once **AND** sodium chloride 0.9 % flush 10 mL, 10 mL, Intravenous, PRN, Uli Baca MD    sodium chloride 0.9 % flush 3 mL, 3 mL, Intravenous, Q12H, Jeffery,  Dino Hsu MD, 3 mL at 08/28/23 2032    sodium chloride 0.9 % flush 3-10 mL, 3-10 mL, Intravenous, PRN, Dino Gil MD    sodium chloride 0.9 % infusion 40 mL, 40 mL, Intravenous, PRN, Dino Gil MD    Facility-Administered Medications Ordered in Other Encounters:     chlorhexidine (PERIDEX) 0.12 % solution 15 mL, 15 mL, Mouth/Throat, Q12H, Kaylynn Ricci,     mupirocin (BACTROBAN) 2 % nasal ointment, , Nasal, BID, Kaylynn Ricci, DO  Review of Systems:    The following systems were reviewed and negative;  constitution and gastrointestinal    Objective     Vital Signs  Temp:  [98.1 °F (36.7 °C)-98.8 °F (37.1 °C)] 98.1 °F (36.7 °C)  Heart Rate:  [] 95  Resp:  [18-20] 18  BP: (76-96)/(44-63) 96/63  Body mass index is 23.8 kg/m².    Intake/Output Summary (Last 24 hours) at 8/29/2023 0637  Last data filed at 8/29/2023 0600  Gross per 24 hour   Intake 2006.25 ml   Output 1100 ml   Net 906.25 ml     I/O this shift:  In: 1733.3 [I.V.:1733.3]  Out: 900 [Urine:900]     Physical Exam:   General: patient awake, alert and cooperative   Eyes: Normal lids and lashes, no scleral icterus   Neck: supple, normal ROM   Skin: warm and dry, not jaundiced   Pulm:  regular and unlabored   Abdomen: soft, nontender, nondistended    Psychiatric: Normal mood and behavior; memory intact     Results Review:     I reviewed the patient's new clinical results.    Results from last 7 days   Lab Units 08/29/23  0410 08/28/23  0209 08/27/23  2150   WBC 10*3/mm3 1.86* 2.55* 3.67   HEMOGLOBIN g/dL 7.8* 10.5* 11.6*   HEMATOCRIT % 22.5* 30.8* 33.0*   PLATELETS 10*3/mm3 97* 126* 145     Results from last 7 days   Lab Units 08/29/23  0410 08/28/23  0928 08/28/23  0209 08/27/23  2150   SODIUM mmol/L 135* 134* 131* 131*   POTASSIUM mmol/L 4.2 4.6 5.2 6.0*   CHLORIDE mmol/L 104 112* 103 100   CO2 mmol/L 24.0 13.7* 14.5* 14.1*   BUN mg/dL 34* 49* 63* 68*   CREATININE mg/dL 1.74* 2.20* 3.31* 3.57*   CALCIUM mg/dL  8.9 7.6* 9.6 10.1   BILIRUBIN mg/dL  --  0.5  --  0.7   ALK PHOS U/L  --  60  --  83   ALT (SGPT) U/L  --  13  --  16   AST (SGOT) U/L  --  15  --  19   GLUCOSE mg/dL 113* 115* 74 114*     Results from last 7 days   Lab Units 08/27/23 2150   INR  1.15*     Lab Results   Lab Value Date/Time    LIPASE 43 08/27/2023 2150       Radiology:  US Renal Bilateral   Final Result   1.  Negative renal sonogram.               This report was finalized on 8/28/2023 5:09 PM by Dr. Deep Moran M.D.          CT Abdomen Pelvis Without Contrast   Final Result   1. No evidence for acute abnormality in the abdomen or pelvis.   2. Multifocal osseous metastatic disease without interval change.   3. Chronic elevation of the left hemidiaphragm with left basilar   scarring/atelectasis.   4. Previous cholecystectomy.   5. Chronic bilateral adrenal calcifications. There is thickening of the   left adrenal gland involving the noncalcified segment and this is   without change.       Radiation dose reduction techniques were utilized, including automated   exposure control and exposure modulation based on body size.               This report was finalized on 8/27/2023 11:15 PM by Dr. Maicol Mohan M.D.          XR Chest 1 View   Final Result   No evidence for change compared to previous chest CT   07/17/2023. A 1 cm pulmonary nodule right upper lobe is similar to the   previous CT 7/17/2023. There is osseous metastatic disease.           This report was finalized on 8/27/2023 11:01 PM by Dr. Maicol Mohan M.D.          CT Angiogram Chest Pulmonary Embolism    (Results Pending)       Assessment & Plan     Active Hospital Problems    Diagnosis     **SARAH (acute kidney injury)     Hyperkalemia     Metabolic acidosis     Dehydration     Diarrhea     Anxiety     Hyperlipidemia     Carcinoma of left breast metastatic to lung     Benign essential HTN     Malignant neoplasm metastatic to bone      All problems are new to me  today    Assessment:  Diarrhea  SARAH-improving  History of breast cancer  Pancytopenia - worsening due to ibrance      Plan:  GI PCR, C. difficile pending- awaiting collection  Advance diet  CT imaging of the abdomen unremarkable but was without contrast secondary to SARAH.  Patient with recent colonoscopy with Dr. Galeas with adenomatous polyps otherwise unremarkable.  Faslodex or ibrance possible causes of diarrhea however she has been on them since 5/22 w/o previous issues    I discussed the patients findings and my recommendations with patient and nursing staff.    Cyndi Fowler MD

## 2023-08-29 NOTE — PROGRESS NOTES
REASON FOR FOLLOWUP/CHIEF COMPLAINT:  Breast cancer    HISTORY OF PRESENT ILLNESS:   Diarrhea has significantly improved.  States she is only had 1 bowel movement in the last 24 hours which was a loose bowel movement while urinating.  Therefore, not sent for studies.  She think she will probably go home in a day or 2.  No new events overnight.    Past Medical History, Past Surgical History, Social History, Family History have been reviewed and are without significant changes except as mentioned.    Review of Systems   Review of Systems   Constitutional:  Negative for activity change.   HENT:  Negative for nosebleeds and trouble swallowing.    Respiratory:  Negative for shortness of breath and wheezing.    Cardiovascular:  Negative for chest pain and palpitations.   Gastrointestinal:  Negative for constipation and nausea.   Genitourinary:  Negative for dysuria and hematuria.   Musculoskeletal:  Negative for arthralgias and myalgias.   Skin:  Negative for rash and wound.   Neurological:  Negative for seizures and syncope.   Hematological:  Negative for adenopathy. Does not bruise/bleed easily.   Psychiatric/Behavioral:  Negative for confusion.      Medications:  The current medication list was reviewed in the EMR    ALLERGIES:    Allergies   Allergen Reactions    Nickel Unknown - Low Severity    Ciprofloxacin Rash              Vitals:    08/28/23 2342 08/29/23 0400 08/29/23 0734 08/29/23 1351   BP: 96/63  92/49 (!) 85/54   BP Location: Right arm      Patient Position: Lying      Pulse: 94 95 88 91   Resp: 18      Temp: 98.1 °F (36.7 °C)  98.1 °F (36.7 °C) 98.1 °F (36.7 °C)   TempSrc: Oral      SpO2: 99% 98% 97% 99%   Height:         Physical Exam    CONSTITUTIONAL:  Vital signs reviewed.  No distress, looks comfortable.  EYES:  Conjunctivae and lids unremarkable.  PERRLA  EARS, NOSE, MOUTH, THROAT:  Ears and nose appear unremarkable.  Lips, teeth, gums appear unremarkable.  RESPIRATORY:  Normal respiratory  effort.  Lungs clear to auscultation bilaterally.  CARDIOVASCULAR:  Normal S1, S2.  No murmurs, rubs or gallops.  No significant lower extremity edema.  GASTROINTESTINAL: Abdomen appears unremarkable.  Nontender.  No hepatomegaly.  No splenomegaly.  NEURO: Cranial nerves 2-12 grossly intact.  No focal deficits.  Appears to have equal strength all 4 extremities.  MUSCULOSKELETAL:  Unremarkable digits/nails.  No cyanosis or clubbing.  SKIN:  Warm.  No rashes.  PSYCHIATRIC:  Normal judgment and insight.  Normal mood and affect.      RECENT LABS:  WBC   Date Value Ref Range Status   08/29/2023 1.62 (C) 3.40 - 10.80 10*3/mm3 Final   08/29/2023 1.86 (C) 3.40 - 10.80 10*3/mm3 Final   08/28/2023 2.55 (L) 3.40 - 10.80 10*3/mm3 Final   08/27/2023 3.67 3.40 - 10.80 10*3/mm3 Final     Hemoglobin   Date Value Ref Range Status   08/29/2023 8.6 (L) 12.0 - 15.9 g/dL Final   08/29/2023 7.8 (L) 12.0 - 15.9 g/dL Final   08/28/2023 10.5 (L) 12.0 - 15.9 g/dL Final   08/27/2023 11.6 (L) 12.0 - 15.9 g/dL Final     Platelets   Date Value Ref Range Status   08/29/2023 102 (L) 140 - 450 10*3/mm3 Final   08/29/2023 97 (L) 140 - 450 10*3/mm3 Final   08/28/2023 126 (L) 140 - 450 10*3/mm3 Final   08/27/2023 145 140 - 450 10*3/mm3 Final       ASSESSMENT/PLAN:  Marialuisa Vivar [id number]       *Metastatic breast cancer to bilateral lungs and soft tissue in the mediastinum (likely the cause of her hoarseness). (Initial breast cancer diagnosed in 1993 treated with mastectomy, chemotherapy, radiation therapy and tamoxifen). Arimidex day 1 on 02/17/2010. PET scan late August 2011 shows no abnormal hypermetabolic activity. This has improved compared to the prior PET scan January 2010 which showed mild hypermetabolic activity in areas of metastasis. (In the past, her  T6 lesion did not show up on CT scans or bone scan. It was seen by PET scan.) We have been following with CT scans only every 6 months and PET scans on an as needed only basis. Sometimes her CT  scanned pulmonary nodules are read as benign since they have been stable through the course of years but we know they are malignant because they have been surgically sampled in the past.    CT 8/16/16 shows increased sclerosis at T6 compared to 4/28/15.    PET 10/11/16: Slight uptake at T6, SUV 3.2.  mild thickening of right adrenal gland with an SUV of 11.   Continued Arimidex is minimal progression and had been on this since 2/17/10.  Not referred for radiation since no pain at T6  CT 1/27/17, unchanged.  CT 8/1/17: Unchanged except subtle suggestion of mild increase in thickening of the right adrenal gland.    CT 2/22/18, unchanged.  CT 9/6/18: Unchanged except nodular thickening right adrenal gland significantly decreased.  CT 9/5/2019: Unchanged.  On the 6/12/2020 visit, the following scans were reviewed:  CT neck and chest 5/11/2020, [facility], from 5/9/2019: Stable pulmonary nodules new lytic C7 lesion and posterior T1 lesion questionable T12 lesion.  No change in the T6 and T10 lesions.  PET, [facility], 5/19/2020: Mildly enlarged left neck nodes are mildly hypermetabolic.  Diffuse activity throughout the thickened left adrenal gland.  CT appearance unchanged from 5/9/2019.  Progressive T1 lesion seen on CT from 5/11/2020, mild some moderate activity.  T10 low-grade activity with mixed lucent and sclerotic findings.  T6 is a mixed lucent and sclerotic appearance but no significant activity.   T12 (the biopsy report is labeled as T12 but patient insists this was a C7/T1 biopsy, not to T12) biopsy 6/15/2020, [facility]: Metastatic breast carcinoma.  ER >95%.  WA 0%.  HER-2 negative (1+)  It seems the main progression at the 6/12/2020 visit was a new C7 and new T1 lesion.  Those were radiated with CyberKnife through [facility] early June 2020.  Because Arimidex has been working well since 2010, continue same Arimidex.  Add Zometa every 3 months.  C7 and T1 found on CT 5/11/2020, [facility].  [place], Dr. Winston, early June  2020.  CT C and T-spine 9/17/2020: New C7 lesion from 9/6/2018, but no change from 5/11/2020 CT.  T1 lesion stable from 5/11/2020, but new compared to 2/22/2018.  T10 lesion unchanged from 9/5/2019, but new from 2/22/2018.  Subtle 8 mm T12 lesion new from 9/5/2019.  Therefore, no recent progression.  Considering her good tolerance and long-term effectiveness of Arimidex, continue same therapy.  CT CAP 12/10/2020: No progression  Therefore, continue Arimidex.  CA 15-3 normal through 12/14/2020  Because CA 15-3 fabian to 27 on 4/13/2021, then 29.3 on 7/20/2021, then 34.3 on 10/12/2021, CT scans done earlier than planned:  CT CAP with contrast 10/12/2021: New 5.2 x 4.6 cm mixed cystic and solid right ovarian mass compared to 12/10/2020.  Continued stability of bilateral pulmonary nodules and stable sclerotic bone metastasis.  11/4/2021: TLH/BSO (due to new 5 cm right ovarian mass on CT, 11/12/2021).  Metastatic breast cancer involving bilateral ovaries and posterior uterine serosa.  ER 81-90%.  MI 61-70%.  HER-2 negative  [facility] NGS: ER 98%.  MI 90%.  HER-2/eb negative.  AKT1 pathogenic variant, exon 3.  AR+, 95%.  No other significant mutations including negative PIK3CA  Pelvic washings: Adenocarcinoma  Because this was the only area of progression on CT, and has been resected, continue Arimidex which she has been on since 2/17/2010.  11/22/2021: Discussed with Dr. Blum, who has a focus on breast cancer.  We both agree: Continue Arimidex for now.  In the future, if significant progression is seen, then plan Faslodex injections with Ibrance  CT CAP 2/1/2022: A few T-spine sclerotic lesions progressively more sclerotic over multiple prior CTs of indeterminate significance.  No clear signs of progression of disease.  3/15/2022: Although tumor marker is not rising and CT showed no clear signs of progression.  CT revealed progressively more sclerotic bone metastasis over multiple prior CTs, she is having increased right  hip discomfort.  Sometimes this can be due to healing of metastasis (but she has been on the same treatment since 2010), or this could mean progression of bone metastasis.  It is reassuring that her tumor marker is not worsening.  She would like to see [facility] to see if they feel radiation to the hip would help her discomfort.  We will do a bone scan before that appointment as this might help [facility] radiation.  (Although she has seen Psychiatric radiation for her neck, she would like to see [facility] radiation for this hip issue).  3/18/2022, bone scan: Metastatic disease at T9 and T10, corresponding to CT lesions.  Uptake also at T6, but to lesser degree.  These areas are new compared to last bone scan, 6/3/2009.  Therefore, overall, appears consistent with progression.  3/24/2021: Discussed changing  Arimidex to Faslodex/Ibrance 125 mg D1-21/28 days.  However, Dr. Llanes will decide on 4/12/2022 if the patient needs any traditional XRT to hip/pelvis.  If so, this may cause cytopenias due to the location of XRT.  Ibrance can also cause cytopenias.  If this is the case, we will wait to begin Ibrance until at least a few weeks after XRT completes, provided blood counts allow.   4/22/2022: Dr. Llanes reviewed MRI right hip from 4/19/2022, revealing L3 metastasis, similar size of prior CTs.  Therefore, she plans no XRT.  5/6/2022: Began Faslodex Ibrance  CT 7/18/2022: Some bone mets more sclerotic, which could reflect treatment response.  Slightly increased RUL nodule, 1.1 cm, from 0.9 cm on 2/1/2022  Bone scan 7/18/2022: No change from 3/18/2022  8/19/2022 (patient delayed follow-up to review scans until then): Continue same therapy since no significant progression of disease  Bone scan 1/26/2023: Under represents bone metastasis compared to CT scans.  Slightly more intense uptake at T12 which was noted to perhaps represent treatment response.  CT 1/26/2023: A few new subcentimeter  indeterminate RLL pulmonary nodules.  Radiologist recommended follow-up chest imaging in 6 to 8 weeks.  Otherwise no change in the other bilateral pulmonary nodules.  No change in scattered bone metastasis except the T12 lesion continues to increase in sclerosis.  2/28/2023 office visit:  Missed the mid November 2022 Faslodex and the mid January 2023 Faslodex and the mid February 2023 Faslodex.  Although CT might represent slight progression, she has missed a few doses of Faslodex.  Would like a strong reason to change therapy since she has been doing well on Faslodex for quite some time.  Therefore, patient agrees: Maintain her same imaging interval, 6 months from last  As of 8/20/2023, last Faslodex was 6/8/2023 (typically is given every 4 weeks)  CT and bone scan 7/17/23, from 1/26/2023: Mostly stable.  New focus of uptake anterior left second rib and in hindsight radiologist saw subtle area of sclerosis and osteolysis on CT images from 7/17/2023, new from 1/26/2023.  Radiologist noted does not have the appearance for posttraumatic change or osteonecrosis and the most likely etiology is a new metastatic focus.  Interval decrease in activity large right frontal lesion.  Spine lesion stable.  She did not come to the office to review these scans  Came to the ER 8/27/2023 for severe diarrhea.  CT AP 8/27/2023, from 7/17/2023: No significant change.   8/28/2023: She self stopped Ibrance for 5 days prior to admission due to feeling so weak with diarrhea.  When she recovers from her current issues and is able to return to the office, resume Faslodex every 4-week injections with Ibrance oral D1-21/28 days.  8/29/2023: Only 1 loose bowel movement in the last 24 hours.  She agrees: I asked our office to arrange MD or NP with Faslodex in 2 to 3 weeks.     CA 15-3:  31.7 on 2/28/2023, from 27.3 on 8/19/2022, from 22.5 on 5/6/2022, from 26.3 on 2/1/2022, from 32.6 on 11/22/2021, from 34.3 on 10/12/2021, from 29.3 on  7/20/2021, from 27 on 4/13/2021, from 23.3 on 12/14/2020.     *Bony metastases  T6 discovered by PET to August 2011 (did not show up by CT or bone scan)  C7 and T1 found on CT 5/11/2020, [facility].  [place], Dr. Winston, early June 2020.  June 2020 began Zometa every 3 months.  She was warned of possible osteonecrosis of the jaw and renal insufficiency.  She states she has good dental health and sees her dentist regularly.  Because of prior hypercalcemia requiring stopping calcium supplements and because of high normal current calcium level, began without supplemental calcium.  Add calcium if calcium becomes low.  1/7/2022: Tooth extracted.  Plan was to wait 5 weeks to resume Zometa.  Patient delayed return until 3/15/2022 (over 8 weeks)  May 2022: Changed Zometa to Xgeva every 3 months due to Cr up to 1.6  2/28/2023: She request to hold off on Xgeva because she is in the process of having dental work     *Bone health. Last bone density 10/11/16, worsened, but still normal with worst T score -0.9.  Patient stopped calcium January 2016, but remained on vitamin D.  I recommended she restart her calcium.  Stopped calcium early March 2018 due to hypercalcemia.  DEXA 9/5/2019: Normal bone density     *Hypercalcemia.  Repeat calcium 3/7/18 normal, but patient self stopped calcium a few days prior.  Recommended she stay off calcium.  Calcium was 11 on 6/19/2020  Calcium 11 again on 12/10/2020  Repeat calcium 10.5 on 12/14/2020  Calcium 10.6.  Minimally elevated.  (Normal range up to 10.5)  Calcium 10.8 on 3/15/2022.  Hopefully, Zometa will help with this.  Calcium 0.9 l     *Hypophosphatemia  Phosphorus 2.3 on 6/24/2022.  Begin K-Phos Neutral 2 tablets twice daily  Patient self stopped this around mid July 2022.  Phosphorus normal, 3.6 on 2/28/2023     *On the 10/5/16 visit, Tachycardia, dyspnea on exertion, bilateral leg swelling, more sedentary recently.  CT PE protocol and bilateral lower extremity Dopplers 10/5/16,  negative for clots.  She still has tachycardia and dyspnea on exertion.  Perhaps at least part of this is due to deconditioning.     *Previously complained of colon Right lower anterior rib discomfort.  CT unremarkable in that area.  No specific pain, just discomfort.  I explained to the patient if disease was found by PET scan or bone scan, I would not  since she has been doing well on Arimidex since 2010.   Currently denies pain.      *Depression/anxiety.  This limits compliance.  She sees a psychiatrist and a counselor regularly.  She states this is mostly due to body image issues instead of cancer.   She continues with her counselor and psychiatrist.  Viral pandemic is making this a little more difficult.  4/27/2022: We will try to get her involved in some cancer support groups.  I also encouraged her to try out some small groups at [employer] (she struggles with loneliness).  2/28/2023: Has missed some visits due to emotional issues.  Offered behavioral oncology help.  She declines.  She is going to continue with her psychiatrist and counselor     *Noncompliance due to depression/anxiety.    Although she often misses appointments, she does reschedule and eventually come in.     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 Willie Quinn, [facility], on 5/21/2019.  Positive sentinel node (1 of 3 nodes)..  Left neck dissection by Dr. Willie Quinn, [facility], on 6/5/2019.  22 nodes negative.  Negative margins.  No lymphovascular invasion or perineural invasion.  2 mm depth of invasion.  Grade 1.  Squamous cell carcinoma.  1.8 cm tumor.  Because the patient felt what she thought was left neck LAD, CT neck and chest and PET at [facility]. May 2020 performed.  She is being followed at [facility] for this.  On 6/19/2020, per verbal report from Dr. Senia [place] [facility], [facility] ENT does not feel she has any active head and neck cancer.   She  states she has a follow-up with Dr. Kapadia, ENT at [facility], in June with CT neck 1 week prior  No symptoms to suggest recurrence.  She follows with [facility].     *Previously complained of left hip pain x2 weeks.  This prompted an MRI pelvis 8/20/2019 at [facility] and open MRI which was negative for malignancy.     *Possible intolerance of Zometa  After first dose, June 2020, 2 days of chills, bone pain  She would like to try Zometa again.  If this occurs again, we will try to switch her to Xgeva monthly.  (No good data on every 3-month Xgeva)  She did fine with the second dose of Zometa.     *Thickening of wall of transverse colon on CT 2/1/2022.  Last colonoscopy was 4/18/2019.  She wants the next colonoscopy done at [facility]  Dr. Galeas will see her on 4/7/2022 to evaluate this     *Depression  Is seeing a psychiatry NP and a therapist for many years.  6/24/2022: Reported she decided to stop seeing her therapist but is still interested in therapy.  She would like to see behavioral oncology at [facility].  This was arranged  Contacted by Julissa Centeno (behavioral oncology).  Patient canceled on the day of the appointment during her first scheduled appointment with Julissa.  She then no showed for the rescheduled appointment.  Julissa recommends the patient continue to follow with her therapist and psychiatrist and if she needs additional support, to use [employer]     *Pruritic rash  8/19/2022: Referred to dermatology     *Patient went to the ER 2/7/2023 for dizziness  2/28/2023: This resolved.  She understands if she has ongoing issues with dizziness she should contact us and we will arrange an MRI of the brain     *Admitted for significant diarrhea.  We were asked give her breast cancer treatment could be the cause of this.  Ibrance is associated with diarrhea about 25% of the time , but grade 3 diarrhea only occurs 1% of the time.  Faslodex has a listed diarrhea association of 6 to 25% of the time.  I would doubt these  drugs are causing her diarrhea, especially considering she has been on them since May 2022     *Pancytopenia due to Ibrance     *Leukocytopenia  WBC 1.6, from 2.6     *Anemia  Hb 8.6, from 10.5     *Thrombocytopenia  , from 126     *Acute kidney injury.  Nephrology following.  Baseline creatinine 1.5-1.7, CKD stage III.     Monitoring on Ibrance:  CBC every 2 weeks for the first 2 months then monthly and as indicated.  After 6 months, if neutropenia grade 1 or 2 only, then CBC can be every 3 months     PLAN:   Copy of the note I sent to our office: Beginning in 2 to 3 weeks, arrange MD or NP with Faslodex every 4 weeks.  In 4 to 5 months or so, she should see me.  1 week prior: CT neck, chest abdomen and pelvis without contrast, bone scan, CBC, CMP, CA 15-3   (Dr. Carmela Smith, [facility]NT, requests neck CT when we do either CT imaging).  Resume Ibrance when Faslodex resumes (not before)       Typical plan is:  MD/NP monthly  Faslodex today and monthly.  MD/Faslodex 5 months.  A few days prior: CT chest abdomen and pelvis without contrast, bone scan, CBC, CMP, CA 15-3  Resume every 3-month Xgeva 1 to 2 months after completing all dental procedures (she states she will let us know).  Last dose was 7/1/2022     Continue Faslodex/Ibrance 125 mg D1-21/28 days     (Xgeva instead of Zometa due to intermittent high creatinine)  MD or NP with Faslodex every 4 weeks  Every 6 months CT CAP without IV contrast, bone scan (last 7/18/2022), CA 15-3  Previously arranged for her to get involved with some cancer support groups   CT scans WITHOUT IV CONTRAST (again, to avoid further kidney damage.)  She sees [facility]NT, Dr. Bedoya annually, next is summer 2022     Send a copy of this note to   Isaac Valdes MD, Dr., radiation medicine, [facility]     51 minutes.  Total time.  Same day.  More time was spent than what is reflected in chart time.

## 2023-08-29 NOTE — PROGRESS NOTES
Name: Marialuisa Vivar ADMIT: 2023   : 1955  PCP: Jennifer Mantilla MD    MRN: [id number] LOS: 2 days   AGE/SEX: 67 y.o. female  ROOM: [facility]     Subjective   Subjective   Watery stool earlier this a.m., mixed w/urine. Denies abd pain, nausea. Denies soa, chest pain       Objective   Objective   Vital Signs  Temp:  [98.1 °F (36.7 °C)-98.6 °F (37 °C)] 98.1 °F (36.7 °C)  Heart Rate:  [] 88  Resp:  [18-20] 18  BP: (76-96)/(47-63) 92/49  SpO2:  [95 %-100 %] 97 %  on   ;   Device (Oxygen Therapy): room air  Body mass index is 23.8 kg/m².  Physical Exam  Vitals and nursing note reviewed.   Constitutional:       General: She is not in acute distress.     Appearance: She is ill-appearing. She is not toxic-appearing.   HENT:      Head: Normocephalic.      Mouth/Throat:      Mouth: Mucous membranes are moist.   Eyes:      Conjunctiva/sclera: Conjunctivae normal.   Cardiovascular:      Rate and Rhythm: Normal rate and regular rhythm.   Pulmonary:      Effort: Pulmonary effort is normal. No respiratory distress.      Breath sounds: Examination of the right-lower field reveals decreased breath sounds. Examination of the left-lower field reveals decreased breath sounds.      Comments: Decreased bases lt > rt  Abdominal:      General: Bowel sounds are normal. There is no distension.      Palpations: Abdomen is soft.   Musculoskeletal:      Cervical back: Neck supple.      Right lower leg: No edema.      Left lower leg: No edema.   Skin:     General: Skin is warm and dry.   Neurological:      Mental Status: She is alert and oriented to person, place, and time.   Psychiatric:         Mood and Affect: Mood normal.         Behavior: Behavior normal.     Results Review     I reviewed the patient's new clinical results.  Results from last 7 days   Lab Units 23  0410 23  0209 23  2150   WBC 10*3/mm3 1.86* 2.55* 3.67   HEMOGLOBIN g/dL 7.8* 10.5* 11.6*   PLATELETS 10*3/mm3 97* 126* 145     Results  from last 7 days   Lab Units 08/29/23  0410 08/28/23  0928 08/28/23  0209 08/27/23  2150   SODIUM mmol/L 135* 134* 131* 131*   POTASSIUM mmol/L 4.2 4.6 5.2 6.0*   CHLORIDE mmol/L 104 112* 103 100   CO2 mmol/L 24.0 13.7* 14.5* 14.1*   BUN mg/dL 34* 49* 63* 68*   CREATININE mg/dL 1.74* 2.20* 3.31* 3.57*   GLUCOSE mg/dL 113* 115* 74 114*   EGFR mL/min/1.73 31.6* 23.9* 14.6* 13.4*     Results from last 7 days   Lab Units 08/28/23  0928 08/27/23  2150   ALBUMIN g/dL 2.6* 3.7   BILIRUBIN mg/dL 0.5 0.7   ALK PHOS U/L 60 83   AST (SGOT) U/L 15 19   ALT (SGPT) U/L 13 16     Results from last 7 days   Lab Units 08/29/23  0410 08/28/23  0928 08/28/23  0209 08/27/23  2150   CALCIUM mg/dL 8.9 7.6* 9.6 10.1   ALBUMIN g/dL  --  2.6*  --  3.7   MAGNESIUM mg/dL  --   --   --  2.0     Results from last 7 days   Lab Units 08/28/23  0209 08/27/23  2326 08/27/23  2150   PROCALCITONIN ng/mL  --   --  0.13   LACTATE mmol/L 2.0 2.3* 3.3*     Glucose   Date/Time Value Ref Range Status   08/28/2023 1643 132 (H) 70 - 130 mg/dL Final   08/28/2023 1134 112 70 - 130 mg/dL Final   08/28/2023 0806 97 70 - 130 mg/dL Final   08/28/2023 0603 105 70 - 130 mg/dL Final       CT Abdomen Pelvis Without Contrast    Result Date: 8/27/2023  1. No evidence for acute abnormality in the abdomen or pelvis. 2. Multifocal osseous metastatic disease without interval change. 3. Chronic elevation of the left hemidiaphragm with left basilar scarring/atelectasis. 4. Previous cholecystectomy. 5. Chronic bilateral adrenal calcifications. There is thickening of the left adrenal gland involving the noncalcified segment and this is without change.  Radiation dose reduction techniques were utilized, including automated exposure control and exposure modulation based on body size.    This report was finalized on 8/27/2023 11:15 PM by Dr. Maicol Mohan M.D.      XR Chest 1 View    Result Date: 8/27/2023  No evidence for change compared to previous chest CT 07/17/2023. A 1 cm  pulmonary nodule right upper lobe is similar to the previous CT 7/17/2023. There is osseous metastatic disease.   This report was finalized on 8/27/2023 11:01 PM by Dr. Maicol Mohan M.D.      US Renal Bilateral    Result Date: 8/28/2023  1.  Negative renal sonogram.    This report was finalized on 8/28/2023 5:09 PM by Dr. Deep Moran M.D.       I have personally reviewed all medications:  Scheduled Medications  ARIPiprazole, 2 mg, Oral, Nightly  atorvastatin, 10 mg, Oral, Nightly  heparin (porcine), 5,000 Units, Subcutaneous, Q12H  pantoprazole, 40 mg, Oral, Q AM  PARoxetine, 40 mg, Oral, Nightly  sodium chloride, 3 mL, Intravenous, Q12H    Infusions  dextrose 5 % and sodium chloride 0.9 %, 100 mL/hr, Last Rate: 100 mL/hr (08/29/23 1130)    Diet  Diet: Gastrointestinal Diets; Fiber-Restricted, Low Irritant; Texture: Regular Texture (IDDSI 7); Fluid Consistency: Thin (IDDSI 0)    I have personally reviewed:  [x]  Laboratory   [x]  Microbiology   [x]  Radiology   [x]  EKG/Telemetry  [x]  Cardiology/Vascular   []  Pathology    []  Records       Assessment/Plan     Active Hospital Problems    Diagnosis  POA    **SARAH (acute kidney injury) [N17.9]  Yes    Pancytopenia [D61.818]  Yes    Hyperkalemia [E87.5]  Yes    Metabolic acidosis [E87.20]  Yes    Dehydration [E86.0]  Yes    Diarrhea [R19.7]  Yes    Anxiety [F41.9]  Yes    Hyperlipidemia [E78.5]  Yes    Carcinoma of left breast metastatic to lung [C50.912, C78.02]  Yes    Benign essential HTN [I10]  Yes    Malignant neoplasm metastatic to bone [C79.51]  Yes      Resolved Hospital Problems   No resolved problems to display.       67 y.o. female admitted with SARAH (acute kidney injury).    67-year-old female presents the hospital with diarrhea and weakness and is found to have SARAH and hyperkalemia.     SARAH with metabolic acidosis/CKD:  Appreciate Nephrology assistance. IVF's w/D5 NS, bicarb stopped.  Baseline Cr 1.5-1.7. Monitor labs. 1 watery stool this a.m. .  Monitor BP, somewhat improved.  Renal US unremarkable. CT A/P no acute findings     Hyperkalemia:  Secondary to renal dysfunction.  Patient received dextrose, insulin and fluid per emergency room team.  Also received 1 dose lokelma. K now normalized.  Nephrology managing     Diarrhea and dehydration:  Diarrhea has been going on for greater than 1 week prior to admission; 1 stool so far today but not collected. Pending GI PCR/c diff.  GI following. Likely not medication related per GI/Oncology     Breast cancer/SCC lt lateral tongue:  Patient receives biologic therapy and follows closely in oncology clinic. Oncology following. Followed by Dr. Quinn at  for tongue cancer. Plan to continue faslodex every 4 weeks and resume ibrance at discharge     Anxiety: Continue home Paxil and Abilify.  Monitor carefully.     GERD: PPI.  Stable.     Hyperlipidemia: Statin.  Stable.    Pancytopenia:  Hgb, WBC, plts lower today; will repeat      Heparin SC for DVT prophylaxis.  Full code.  Discussed with patient.  Anticipate discharge  when cleared by consultants.. OT/PT eval pending      ARON Tijerina  [facility]  08/29/23  11:44 EDT

## 2023-08-30 DIAGNOSIS — R94.6 NONSPECIFIC ABNORMAL RESULTS OF THYROID FUNCTION STUDY: ICD-10-CM

## 2023-08-30 DIAGNOSIS — R79.89 ABNORMAL THYROID BLOOD TEST: Primary | ICD-10-CM

## 2023-08-30 LAB
ABO GROUP BLD: NORMAL
ANION GAP SERPL CALCULATED.3IONS-SCNC: 7.2 MMOL/L (ref 5–15)
BLD GP AB SCN SERPL QL: NEGATIVE
BUN SERPL-MCNC: 16 MG/DL (ref 8–23)
BUN/CREAT SERPL: 12 (ref 7–25)
CALCIUM SPEC-SCNC: 8.6 MG/DL (ref 8.6–10.5)
CHLORIDE SERPL-SCNC: 105 MMOL/L (ref 98–107)
CO2 SERPL-SCNC: 23.8 MMOL/L (ref 22–29)
CREAT SERPL-MCNC: 1.33 MG/DL (ref 0.57–1)
DEPRECATED RDW RBC AUTO: 53.2 FL (ref 37–54)
EGFRCR SERPLBLD CKD-EPI 2021: 43.7 ML/MIN/1.73
ERYTHROCYTE [DISTWIDTH] IN BLOOD BY AUTOMATED COUNT: 14.3 % (ref 12.3–15.4)
GLUCOSE BLDC GLUCOMTR-MCNC: 109 MG/DL (ref 70–130)
GLUCOSE SERPL-MCNC: 114 MG/DL (ref 65–99)
HCT VFR BLD AUTO: 21.8 % (ref 34–46.6)
HGB BLD-MCNC: 7.6 G/DL (ref 12–15.9)
MCH RBC QN AUTO: 36.2 PG (ref 26.6–33)
MCHC RBC AUTO-ENTMCNC: 34.9 G/DL (ref 31.5–35.7)
MCV RBC AUTO: 103.8 FL (ref 79–97)
PLATELET # BLD AUTO: 86 10*3/MM3 (ref 140–450)
PMV BLD AUTO: 10.1 FL (ref 6–12)
POTASSIUM SERPL-SCNC: 3.8 MMOL/L (ref 3.5–5.2)
RBC # BLD AUTO: 2.1 10*6/MM3 (ref 3.77–5.28)
RH BLD: POSITIVE
SODIUM SERPL-SCNC: 136 MMOL/L (ref 136–145)
T&S EXPIRATION DATE: NORMAL
WBC NRBC COR # BLD: 1.36 10*3/MM3 (ref 3.4–10.8)

## 2023-08-30 PROCEDURE — P9016 RBC LEUKOCYTES REDUCED: HCPCS

## 2023-08-30 PROCEDURE — 82948 REAGENT STRIP/BLOOD GLUCOSE: CPT

## 2023-08-30 PROCEDURE — 86923 COMPATIBILITY TEST ELECTRIC: CPT

## 2023-08-30 PROCEDURE — 97535 SELF CARE MNGMENT TRAINING: CPT

## 2023-08-30 PROCEDURE — 63710000001 DIPHENHYDRAMINE PER 50 MG: Performed by: INTERNAL MEDICINE

## 2023-08-30 PROCEDURE — 80048 BASIC METABOLIC PNL TOTAL CA: CPT | Performed by: INTERNAL MEDICINE

## 2023-08-30 PROCEDURE — 36430 TRANSFUSION BLD/BLD COMPNT: CPT

## 2023-08-30 PROCEDURE — 25010000002 FUROSEMIDE PER 20 MG: Performed by: INTERNAL MEDICINE

## 2023-08-30 PROCEDURE — 99231 SBSQ HOSP IP/OBS SF/LOW 25: CPT | Performed by: INTERNAL MEDICINE

## 2023-08-30 PROCEDURE — 86900 BLOOD TYPING SEROLOGIC ABO: CPT

## 2023-08-30 PROCEDURE — 85027 COMPLETE CBC AUTOMATED: CPT | Performed by: INTERNAL MEDICINE

## 2023-08-30 PROCEDURE — 99232 SBSQ HOSP IP/OBS MODERATE 35: CPT | Performed by: INTERNAL MEDICINE

## 2023-08-30 PROCEDURE — 86900 BLOOD TYPING SEROLOGIC ABO: CPT | Performed by: INTERNAL MEDICINE

## 2023-08-30 PROCEDURE — 25010000002 HEPARIN (PORCINE) PER 1000 UNITS: Performed by: INTERNAL MEDICINE

## 2023-08-30 PROCEDURE — 86901 BLOOD TYPING SEROLOGIC RH(D): CPT | Performed by: INTERNAL MEDICINE

## 2023-08-30 PROCEDURE — 86850 RBC ANTIBODY SCREEN: CPT | Performed by: INTERNAL MEDICINE

## 2023-08-30 PROCEDURE — 97530 THERAPEUTIC ACTIVITIES: CPT

## 2023-08-30 RX ORDER — ACETAMINOPHEN 160 MG/5ML
650 SOLUTION ORAL ONCE
Status: COMPLETED | OUTPATIENT
Start: 2023-08-30 | End: 2023-08-30

## 2023-08-30 RX ORDER — ACETAMINOPHEN 325 MG/1
650 TABLET ORAL ONCE
Status: COMPLETED | OUTPATIENT
Start: 2023-08-30 | End: 2023-08-30

## 2023-08-30 RX ORDER — DIPHENHYDRAMINE HYDROCHLORIDE 50 MG/ML
25 INJECTION INTRAMUSCULAR; INTRAVENOUS ONCE
Status: COMPLETED | OUTPATIENT
Start: 2023-08-30 | End: 2023-08-30

## 2023-08-30 RX ORDER — FUROSEMIDE 10 MG/ML
20 INJECTION INTRAMUSCULAR; INTRAVENOUS ONCE
Status: COMPLETED | OUTPATIENT
Start: 2023-08-30 | End: 2023-08-30

## 2023-08-30 RX ORDER — DIPHENHYDRAMINE HCL 25 MG
25 CAPSULE ORAL ONCE
Status: COMPLETED | OUTPATIENT
Start: 2023-08-30 | End: 2023-08-30

## 2023-08-30 RX ADMIN — FUROSEMIDE 20 MG: 10 INJECTION, SOLUTION INTRAMUSCULAR; INTRAVENOUS at 21:45

## 2023-08-30 RX ADMIN — ARIPIPRAZOLE 2 MG: 2 TABLET ORAL at 20:25

## 2023-08-30 RX ADMIN — Medication 3 ML: at 20:26

## 2023-08-30 RX ADMIN — HEPARIN SODIUM 5000 UNITS: 5000 INJECTION INTRAVENOUS; SUBCUTANEOUS at 11:34

## 2023-08-30 RX ADMIN — PAROXETINE HYDROCHLORIDE HEMIHYDRATE 40 MG: 20 TABLET, FILM COATED ORAL at 20:26

## 2023-08-30 RX ADMIN — Medication 3 ML: at 11:34

## 2023-08-30 RX ADMIN — DIPHENHYDRAMINE HYDROCHLORIDE 25 MG: 25 CAPSULE ORAL at 17:25

## 2023-08-30 RX ADMIN — HEPARIN SODIUM 5000 UNITS: 5000 INJECTION INTRAVENOUS; SUBCUTANEOUS at 20:26

## 2023-08-30 RX ADMIN — LORAZEPAM 0.5 MG: 0.5 TABLET ORAL at 17:26

## 2023-08-30 RX ADMIN — ATORVASTATIN CALCIUM 10 MG: 20 TABLET, FILM COATED ORAL at 20:26

## 2023-08-30 RX ADMIN — PANTOPRAZOLE SODIUM 40 MG: 40 TABLET, DELAYED RELEASE ORAL at 04:47

## 2023-08-30 RX ADMIN — ACETAMINOPHEN 650 MG: 325 TABLET, FILM COATED ORAL at 17:25

## 2023-08-30 NOTE — PLAN OF CARE
Goal Outcome Evaluation:  Plan of Care Reviewed With: patient           Outcome Evaluation: VSS, no c/o pain. pt up with SB assist. BP soft overnight, reminded pt to call out when getting up and turned bed alarm on. IVF continued. IV replaced by IV RN. Pt appeared to sleep some between care. Will CTM, safety maintained.  No BM overnight, stool sample still needed.

## 2023-08-30 NOTE — PROGRESS NOTES
[facility] Progress Note      Patient Name: Marialuisa Vivar  : 1955  MRN: [id number]  Primary Care Physician:  Jennifer Mantilla MD  Date of admission: 2023    Subjective     Interval History:       Seen and examined.  No shortness of air or chest pain.  Had BM earlier today      Review of Systems:   As noted above    Objective     Vitals:   Temp:  [98.1 °F (36.7 °C)-98.2 °F (36.8 °C)] 98.1 °F (36.7 °C)  Heart Rate:  [] 89  Resp:  [18] 18  BP: ()/(54-60) 90/58    Intake/Output Summary (Last 24 hours) at 2023 0736  Last data filed at 2023 2201  Gross per 24 hour   Intake 1070 ml   Output --   Net 1070 ml       Physical Exam:    General Appearance: alert, oriented x 3, no acute distress   Skin: warm and dry  HEENT: oral mucosa normal, nonicteric sclera  Neck: supple, no JVD  Lungs: CTA  Heart: RRR, normal S1 and S2  Abdomen: soft, nontender, nondistended  : no palpable bladder  Extremities: no edema, cyanosis or clubbing  Neuro: normal speech and mental status     Scheduled Meds:     ARIPiprazole, 2 mg, Oral, Nightly  atorvastatin, 10 mg, Oral, Nightly  heparin (porcine), 5,000 Units, Subcutaneous, Q12H  pantoprazole, 40 mg, Oral, Q AM  PARoxetine, 40 mg, Oral, Nightly  sodium chloride, 3 mL, Intravenous, Q12H      IV Meds:   dextrose 5 % and sodium chloride 0.9 %, 100 mL/hr, Last Rate: 100 mL/hr (23)        Results Reviewed:   I have personally reviewed the results from the time of this admission to 2023 07:36 EDT     Results from last 7 days   Lab Units 23  0434 23  0410 23  0928 23  0209 23  2150   SODIUM mmol/L 136 135* 134*   < > 131*   POTASSIUM mmol/L 3.8 4.2 4.6   < > 6.0*   CHLORIDE mmol/L 105 104 112*   < > 100   CO2 mmol/L 23.8 24.0 13.7*   < > 14.1*   BUN mg/dL 16 34* 49*   < > 68*   CREATININE mg/dL 1.33* 1.74* 2.20*   < > 3.57*   CALCIUM mg/dL 8.6 8.9 7.6*   < > 10.1   BILIRUBIN mg/dL  --   --   0.5  --  0.7   ALK PHOS U/L  --   --  60  --  83   ALT (SGPT) U/L  --   --  13  --  16   AST (SGOT) U/L  --   --  15  --  19   GLUCOSE mg/dL 114* 113* 115*   < > 114*    < > = values in this interval not displayed.     Estimated Creatinine Clearance: 41.5 mL/min (A) (by C-G formula based on SCr of 1.33 mg/dL (H)).  Results from last 7 days   Lab Units 08/27/23  2150   MAGNESIUM mg/dL 2.0         Results from last 7 days   Lab Units 08/30/23  0434 08/29/23  1035 08/29/23  0410 08/28/23  0209 08/27/23  2150   WBC 10*3/mm3 1.36* 1.62* 1.86* 2.55* 3.67   HEMOGLOBIN g/dL 7.6* 8.6* 7.8* 10.5* 11.6*   PLATELETS 10*3/mm3 86* 102* 97* 126* 145     Results from last 7 days   Lab Units 08/27/23  2150   INR  1.15*       Assessment / Plan     ASSESSMENT:  1.  Acute kidney injury associated with metabolic acidosis and hyperkalemia that is likely due to severe intravascular volume depletion due to severe diarrhea.   Her urinalysis essentially bland  2.  Chronic kidney disease stage III with baseline creatinine 1.5-1.7 mg/dL  3.  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  Diarrhea and dehydration that might be side effect of her chemotherapy  5.  History of dyslipidemia  6.  Hyperkalemia and metabolic acidosis  7.  Pancytopenia due to Ibrance        PLAN:     Her kidney function is improving with creatinine 1.3 mg/dL.  We will discontinue all IV fluid.  Surveillance labs    Thank you for involving us in the care of Marialuisa Vivar.  Please feel free to call with any questions.    Carlos Farfan MD  08/30/23  07:36 EDT    [facility]  [phone]

## 2023-08-30 NOTE — PROGRESS NOTES
[facility]  Inpatient Progress Note    Reason for Follow Up:  diarrhea    Subjective     Interval History:   Still awairting stool sample - 1 bm while urinating so could not be sent.  No nausea vomiting or abdominal pain.  She is eating well injection he feels pretty good today    Current Facility-Administered Medications:     acetaminophen (TYLENOL) tablet 650 mg, 650 mg, Oral, Q4H PRN **OR** acetaminophen (TYLENOL) 160 MG/5ML solution 650 mg, 650 mg, Oral, Q4H PRN **OR** acetaminophen (TYLENOL) suppository 650 mg, 650 mg, Rectal, Q4H PRN, Dino Gil MD    ARIPiprazole (ABILIFY) tablet 2 mg, 2 mg, Oral, Nightly, Dino Gil MD, 2 mg at 08/29/23 2159    atorvastatin (LIPITOR) tablet 10 mg, 10 mg, Oral, Nightly, Dino Gil MD, 10 mg at 08/29/23 2159    famotidine (PEPCID) tablet 10 mg, 10 mg, Oral, BID PRN, Dino Gil MD    heparin (porcine) 5000 UNIT/ML injection 5,000 Units, 5,000 Units, Subcutaneous, Q12H, Dino Gil MD, 5,000 Units at 08/30/23 1134    LORazepam (ATIVAN) tablet 0.5 mg, 0.5 mg, Oral, Q8H PRN, Dino Gil MD, 0.5 mg at 08/28/23 0340    melatonin tablet 3 mg, 3 mg, Oral, Nightly PRN, Dino Gil MD, 3 mg at 08/29/23 2257    ondansetron (ZOFRAN) tablet 4 mg, 4 mg, Oral, Q6H PRN **OR** ondansetron (ZOFRAN) injection 4 mg, 4 mg, Intravenous, Q6H PRN, Dino Gil MD    pantoprazole (PROTONIX) EC tablet 40 mg, 40 mg, Oral, Q AM, Dino Gil MD, 40 mg at 08/30/23 0447    PARoxetine (PAXIL) tablet 40 mg, 40 mg, Oral, Nightly, Dino Gil MD, 40 mg at 08/29/23 2200    [COMPLETED] Insert Peripheral IV, , , Once **AND** sodium chloride 0.9 % flush 10 mL, 10 mL, Intravenous, PRN, Uli Baca MD    sodium chloride 0.9 % flush 3 mL, 3 mL, Intravenous, Q12H, Dino Gil MD, 3 mL at 08/30/23 1134    sodium chloride 0.9 % flush 3-10 mL, 3-10  mL, Intravenous, PRN, Dino Gil MD    sodium chloride 0.9 % infusion 40 mL, 40 mL, Intravenous, PRN, Dino Gil MD    Facility-Administered Medications Ordered in Other Encounters:     chlorhexidine (PERIDEX) 0.12 % solution 15 mL, 15 mL, Mouth/Throat, Q12H, Kaylynn Ricci,     mupirocin (BACTROBAN) 2 % nasal ointment, , Nasal, BID, Kaylynn Ricci DO  Review of Systems:    The following systems were reviewed and negative;  constitution and gastrointestinal    Objective     Vital Signs  Temp:  [97.5 °F (36.4 °C)-98.2 °F (36.8 °C)] 97.5 °F (36.4 °C)  Heart Rate:  [] 83  Resp:  [18] 18  BP: ()/(51-60) 91/51  Body mass index is 23.8 kg/m².    Intake/Output Summary (Last 24 hours) at 8/30/2023 1201  Last data filed at 8/30/2023 0925  Gross per 24 hour   Intake 1430 ml   Output --   Net 1430 ml     I/O this shift:  In: 360 [P.O.:360]  Out: -      Physical Exam:   General: patient awake, alert and cooperative   Eyes: Normal lids and lashes, no scleral icterus   Neck: supple, normal ROM   Skin: warm and dry, not jaundiced   Pulm: egular and unlabored   Abdomen: soft, nontender, nondistended    Psychiatric: Normal mood and behavior; memory intact     Results Review:     I reviewed the patient's new clinical results.    Results from last 7 days   Lab Units 08/30/23  0434 08/29/23  1035 08/29/23  0410   WBC 10*3/mm3 1.36* 1.62* 1.86*   HEMOGLOBIN g/dL 7.6* 8.6* 7.8*   HEMATOCRIT % 21.8* 24.3* 22.5*   PLATELETS 10*3/mm3 86* 102* 97*     Results from last 7 days   Lab Units 08/30/23  0434 08/29/23  0410 08/28/23  0928 08/28/23  0209 08/27/23  2150   SODIUM mmol/L 136 135* 134*   < > 131*   POTASSIUM mmol/L 3.8 4.2 4.6   < > 6.0*   CHLORIDE mmol/L 105 104 112*   < > 100   CO2 mmol/L 23.8 24.0 13.7*   < > 14.1*   BUN mg/dL 16 34* 49*   < > 68*   CREATININE mg/dL 1.33* 1.74* 2.20*   < > 3.57*   CALCIUM mg/dL 8.6 8.9 7.6*   < > 10.1   BILIRUBIN mg/dL  --   --  0.5  --  0.7   ALK PHOS  U/L  --   --  60  --  83   ALT (SGPT) U/L  --   --  13  --  16   AST (SGOT) U/L  --   --  15  --  19   GLUCOSE mg/dL 114* 113* 115*   < > 114*    < > = values in this interval not displayed.     Results from last 7 days   Lab Units 08/27/23 2150   INR  1.15*     Lab Results   Lab Value Date/Time    LIPASE 43 08/27/2023 2150       Radiology:  US Renal Bilateral   Final Result   1.  Negative renal sonogram.               This report was finalized on 8/28/2023 5:09 PM by Dr. Deep Moran M.D.          CT Abdomen Pelvis Without Contrast   Final Result   1. No evidence for acute abnormality in the abdomen or pelvis.   2. Multifocal osseous metastatic disease without interval change.   3. Chronic elevation of the left hemidiaphragm with left basilar   scarring/atelectasis.   4. Previous cholecystectomy.   5. Chronic bilateral adrenal calcifications. There is thickening of the   left adrenal gland involving the noncalcified segment and this is   without change.       Radiation dose reduction techniques were utilized, including automated   exposure control and exposure modulation based on body size.               This report was finalized on 8/27/2023 11:15 PM by Dr. Maicol Mohan M.D.          XR Chest 1 View   Final Result   No evidence for change compared to previous chest CT   07/17/2023. A 1 cm pulmonary nodule right upper lobe is similar to the   previous CT 7/17/2023. There is osseous metastatic disease.           This report was finalized on 8/27/2023 11:01 PM by Dr. Maicol Mohan M.D.          CT Angiogram Chest Pulmonary Embolism    (Results Pending)       Assessment & Plan     Active Hospital Problems    Diagnosis     **SARAH (acute kidney injury)     Pancytopenia     Hyperkalemia     Metabolic acidosis     Dehydration     Diarrhea     Anxiety     Hyperlipidemia     Carcinoma of left breast metastatic to lung     Benign essential HTN     Malignant neoplasm metastatic to bone         Assessment:  Diarrhea  SARAH-improving  History of breast cancer  Pancytopenia - due to ibrance      Plan:  GI PCR, C. difficile pending- awaiting collection.  She is only had 1 small bowel movement since admission.  Diarrhea has improved.  Tolerating diet  CT imaging of the abdomen unremarkable but was without contrast secondary to SARAH.  Patient with recent colonoscopy with Dr. Galeas with adenomatous polyps otherwise unremarkable.  Faslodex or ibrance possible causes of diarrhea however she has been on them since 5/22 w/o previous issues  Okay to DC from GI standpoint as her symptoms seem to have improved.  She can use Imodium or Pepto-Bismol at home for recurrent symptoms.  Have also given her our office number - should she have persistent symptoms, I have asked her to contact our office for further instructions and follow-up    I discussed the patients findings and my recommendations with patient.    Cyndi Fowler MD

## 2023-08-30 NOTE — CONSULTS
"Nutrition Services    Patient Name:  Marialuisa Vivar  YOB: 1955  MRN: [id number]  Admit Date:  8/27/2023  Assessment Date:  08/30/23    FOLLOW UP - CLINICAL NUTRITION    Encounter Information         Reason for Encounter Consult for diet education and RD f/u      Current Issues Pt requested diet education for general healthy nutrition therapy. Discussed importance of balanced diet with healthy fat, lean protein and a variety of fruits and vegetables. Encouraged pt to f/u with outpatient nutrition therapy services. Provided pt with RD contact information. RD will continue to follow course.      Current Nutrition Orders & Evaluation of Intake       Oral Nutrition     Current PO Diet Diet: Gastrointestinal Diets; Fiber-Restricted, Low Irritant; Texture: Regular Texture (IDDSI 7); Fluid Consistency: Thin (IDDSI 0)   Supplement n/a   PO Evaluation     % PO Intake 50-75% x 2 meals    # of Days Evaluated 1    Factors Affecting Intake  decreased appetite, diarrhea   --  Anthropometrics          Height    Weight Height: 165.1 cm (65\")       BMI kg/m2 Body mass index is 23.8 kg/m².  Normal/Healthy (18.4 - 24.9)    Weight trend No new weight available     Labs        Pertinent Labs Reviewed, listed below     Results from last 7 days   Lab Units 08/30/23  0434 08/29/23  0410 08/28/23  0928 08/28/23  0209 08/27/23  2150   SODIUM mmol/L 136 135* 134*   < > 131*   POTASSIUM mmol/L 3.8 4.2 4.6   < > 6.0*   CHLORIDE mmol/L 105 104 112*   < > 100   CO2 mmol/L 23.8 24.0 13.7*   < > 14.1*   BUN mg/dL 16 34* 49*   < > 68*   CREATININE mg/dL 1.33* 1.74* 2.20*   < > 3.57*   CALCIUM mg/dL 8.6 8.9 7.6*   < > 10.1   BILIRUBIN mg/dL  --   --  0.5  --  0.7   ALK PHOS U/L  --   --  60  --  83   ALT (SGPT) U/L  --   --  13  --  16   AST (SGOT) U/L  --   --  15  --  19   GLUCOSE mg/dL 114* 113* 115*   < > 114*    < > = values in this interval not displayed.     Results from last 7 days   Lab Units 08/30/23  0434 08/29/23  0410 " 08/28/23  0928 08/28/23  0209 08/27/23  2150   MAGNESIUM mg/dL  --   --   --   --  2.0   HEMOGLOBIN g/dL 7.6*   < >  --    < > 11.6*   HEMATOCRIT % 21.8*   < >  --    < > 33.0*   WBC 10*3/mm3 1.36*   < >  --    < > 3.67   ALBUMIN g/dL  --   --  2.6*  --  3.7    < > = values in this interval not displayed.     Results from last 7 days   Lab Units 08/30/23  0434 08/29/23  1035 08/29/23  0410 08/28/23  0209 08/27/23  2150   INR   --   --   --   --  1.15*   APTT seconds  --   --   --   --  30.5   PLATELETS 10*3/mm3 86* 102* 97* 126* 145     COVID19   Date Value Ref Range Status   08/27/2023 Not Detected Not Detected - Ref. Range Final     Lab Results   Component Value Date    HGBA1C 5.40 10/20/2022          Medications            Scheduled Medications acetaminophen, 650 mg, Oral, Once   Or  acetaminophen, 650 mg, Oral, Once  ARIPiprazole, 2 mg, Oral, Nightly  atorvastatin, 10 mg, Oral, Nightly  diphenhydrAMINE, 25 mg, Oral, Once   Or  diphenhydrAMINE, 25 mg, Intravenous, Once  furosemide, 20 mg, Intravenous, Once  heparin (porcine), 5,000 Units, Subcutaneous, Q12H  pantoprazole, 40 mg, Oral, Q AM  PARoxetine, 40 mg, Oral, Nightly  sodium chloride, 3 mL, Intravenous, Q12H        Infusions      PRN Medications   acetaminophen **OR** acetaminophen **OR** acetaminophen    famotidine    LORazepam    melatonin    ondansetron **OR** ondansetron    [COMPLETED] Insert Peripheral IV **AND** sodium chloride    sodium chloride    sodium chloride     Physical Findings          Physical Appearance alert, oriented, room air   Oral/Mouth Cavity WNL   Edema  no edema   Gastrointestinal last bowel movement: 8/29   Skin  pale   Tubes/Drains/Lines none   NFPE Not indicated at this time   --  NUTRITION INTERVENTION / PLAN OF CARE  Intervention Goal         Intervention Goal(s) Maintain nutrition status, Improved nutrition related labs, Provide information, Reduce/improve symptoms, Meet estimated needs, Disease management/therapy, Tolerate  PO , Continue positive trend, Maintain weight, and No significant weight loss     Nutrition Intervention         RD Action Encourage intake, Follow Tx Progress, Care plan reviewed, and Recommend/ordered:      Nutrition Prescription         Diet Prescription     Supplement Prescription n/a   EN/PN Prescription    New Prescription Ordered? Continue same per protocol   --  Monitor/Evaluation        Monitor Per protocol, I&O, PO intake, Pertinent labs, Weight, Skin status, GI status, Symptoms   Discharge Needs Pending clinical course   Education Will instruct as appropriate           Education topic: Healthy Heart      Resources given:  Printed materials, Sample menus     Advised regarding: Appropriate portions, Beverage choices, Eating pattern, Food choices, Food preparation, Food shopping, Label reading, Seasoning foods, Snacks     Learner:  Patient     Readiness to learn: Accepting and Eager     RD contact info provided: Yes     Outpatient referral: Yes       RD to follow up per protocol.    Electronically signed by:  Roro Rod RD  08/30/23 15:45 EDT   Statement Selected

## 2023-08-30 NOTE — PROGRESS NOTES
Name: Marialuisa Vivar ADMIT: 2023   : 1955  PCP: Jennifer Mantilla MD    MRN: [id number] LOS: 3 days   AGE/SEX: 67 y.o. female  ROOM: [facility]     Subjective   Subjective   No further diarrhea. Walking around unit. Still fatigued but overall better       Objective   Objective   Vital Signs  Temp:  [97.5 °F (36.4 °C)-98.2 °F (36.8 °C)] 97.7 °F (36.5 °C)  Heart Rate:  [] 94  Resp:  [18-20] 20  BP: ()/(51-62) 96/62  SpO2:  [98 %-100 %] 100 %  on   ;   Device (Oxygen Therapy): room air  Body mass index is 23.8 kg/m².  Physical Exam  Vitals and nursing note reviewed.   Constitutional:       General: She is not in acute distress.     Appearance: She is ill-appearing. She is not toxic-appearing.   HENT:      Head: Normocephalic.      Mouth/Throat:      Mouth: Mucous membranes are moist.   Eyes:      Conjunctiva/sclera: Conjunctivae normal.   Cardiovascular:      Rate and Rhythm: Regular rhythm. Tachycardia present.   Pulmonary:      Effort: Pulmonary effort is normal. No respiratory distress.      Breath sounds: No wheezing or rales.   Abdominal:      General: Bowel sounds are normal.      Palpations: Abdomen is soft.   Musculoskeletal:      Cervical back: Neck supple.      Right lower leg: No edema.      Left lower leg: No edema.   Skin:     General: Skin is warm and dry.   Neurological:      Mental Status: She is alert and oriented to person, place, and time.   Psychiatric:         Mood and Affect: Mood normal.         Behavior: Behavior normal.     Results Review     I reviewed the patient's new clinical results.  Results from last 7 days   Lab Units 23  0434 23  1035 23  0410 23  0209   WBC 10*3/mm3 1.36* 1.62* 1.86* 2.55*   HEMOGLOBIN g/dL 7.6* 8.6* 7.8* 10.5*   PLATELETS 10*3/mm3 86* 102* 97* 126*     Results from last 7 days   Lab Units 23  0434 23  0410 23  0928 23  0209   SODIUM mmol/L 136 135* 134* 131*   POTASSIUM mmol/L 3.8 4.2 4.6 5.2    CHLORIDE mmol/L 105 104 112* 103   CO2 mmol/L 23.8 24.0 13.7* 14.5*   BUN mg/dL 16 34* 49* 63*   CREATININE mg/dL 1.33* 1.74* 2.20* 3.31*   GLUCOSE mg/dL 114* 113* 115* 74   EGFR mL/min/1.73 43.7* 31.6* 23.9* 14.6*     Results from last 7 days   Lab Units 08/28/23  0928 08/27/23  2150   ALBUMIN g/dL 2.6* 3.7   BILIRUBIN mg/dL 0.5 0.7   ALK PHOS U/L 60 83   AST (SGOT) U/L 15 19   ALT (SGPT) U/L 13 16     Results from last 7 days   Lab Units 08/30/23  0434 08/29/23  0410 08/28/23  0928 08/28/23  0209 08/27/23  2150   CALCIUM mg/dL 8.6 8.9 7.6* 9.6 10.1   ALBUMIN g/dL  --   --  2.6*  --  3.7   MAGNESIUM mg/dL  --   --   --   --  2.0     Results from last 7 days   Lab Units 08/28/23  0209 08/27/23  2326 08/27/23  2150   PROCALCITONIN ng/mL  --   --  0.13   LACTATE mmol/L 2.0 2.3* 3.3*     Glucose   Date/Time Value Ref Range Status   08/30/2023 0806 109 70 - 130 mg/dL Final   08/28/2023 1643 132 (H) 70 - 130 mg/dL Final   08/28/2023 1134 112 70 - 130 mg/dL Final   08/28/2023 0806 97 70 - 130 mg/dL Final   08/28/2023 0603 105 70 - 130 mg/dL Final       No radiology results for the last day    I have personally reviewed all medications:  Scheduled Medications  acetaminophen, 650 mg, Oral, Once   Or  acetaminophen, 650 mg, Oral, Once  ARIPiprazole, 2 mg, Oral, Nightly  atorvastatin, 10 mg, Oral, Nightly  diphenhydrAMINE, 25 mg, Oral, Once   Or  diphenhydrAMINE, 25 mg, Intravenous, Once  furosemide, 20 mg, Intravenous, Once  heparin (porcine), 5,000 Units, Subcutaneous, Q12H  pantoprazole, 40 mg, Oral, Q AM  PARoxetine, 40 mg, Oral, Nightly  sodium chloride, 3 mL, Intravenous, Q12H    Infusions   Diet  Diet: Gastrointestinal Diets; Fiber-Restricted, Low Irritant; Texture: Regular Texture (IDDSI 7); Fluid Consistency: Thin (IDDSI 0)    I have personally reviewed:  [x]  Laboratory   [x]  Microbiology   [x]  Radiology   [x]  EKG/Telemetry  [x]  Cardiology/Vascular   []  Pathology    []  Records       Assessment/Plan      Active Hospital Problems    Diagnosis  POA    **SARAH (acute kidney injury) [N17.9]  Yes    Pancytopenia [D61.818]  Yes    Hyperkalemia [E87.5]  Yes    Metabolic acidosis [E87.20]  Yes    Dehydration [E86.0]  Yes    Diarrhea [R19.7]  Yes    Anxiety [F41.9]  Yes    Hyperlipidemia [E78.5]  Yes    Carcinoma of left breast metastatic to lung [C50.912, C78.02]  Yes    Benign essential HTN [I10]  Yes    Malignant neoplasm metastatic to bone [C79.51]  Yes      Resolved Hospital Problems   No resolved problems to display.       67 y.o. female admitted with SARAH (acute kidney injury).       67-year-old female presents the hospital with diarrhea and weakness and is found to have SARAH and hyperkalemia.     SARAH with metabolic acidosis/CKD:  Appreciate Nephrology assistance. IVF's w/D5 NS, bicarb stopped.  Baseline Cr 1.5-1.7. Monitor labs. No further diarrhea . Monitor BP, 90s systolic today. Denies dizziness/lightheadedness when up.  Renal US unremarkable. CT A/P no acute findings     Hyperkalemia:  Secondary to renal dysfunction.  Patient received dextrose, insulin and fluid per emergency room team.  Also received 1 dose lokelma. K now normalized.  Nephrology managing     Diarrhea and dehydration:  Diarrhea had been going on for greater than 1 week prior to admission; 1 stool so far this admission. Pending GI PCR/c diff.  GI following. Likely not medication related per GI/Oncology     Breast cancer/SCC lt lateral tongue:  Patient receives biologic therapy and follows closely in oncology clinic. Oncology following. Followed by Dr. Quinn at  for tongue cancer. Plan to continue faslodex every 4 weeks and resume ibrance at discharge     Anxiety: Continue home Paxil and Abilify.  Monitor carefully.     GERD: PPI.  Stable.     Hyperlipidemia: Statin.  Stable.     Pancytopenia:  Due to Ibrance. Receiving 2 units PRBC today per Oncology      Heparin SC for DVT prophylaxis.  Full code.  Discussed with patient.  Anticipate  discharge home tomorrow..      ARON Tijerina  [facility]  08/30/23  13:43 EDT

## 2023-08-30 NOTE — THERAPY TREATMENT NOTE
Patient Name: Marialuisa Vivar  : 1955    MRN: [id number]                              Today's Date: 2023       Admit Date: 2023    Visit Dx:     ICD-10-CM ICD-9-CM   1. Generalized weakness  R53.1 780.79   2. Acute renal failure superimposed on chronic kidney disease, unspecified CKD stage, unspecified acute renal failure type  N17.9 584.9    N18.9 585.9   3. Acute kidney injury (SARAH) with acute tubular necrosis (ATN)  N17.0 584.5   4. Volume depletion  E86.9 276.50   5. Hyperkalemia  E87.5 276.7   6. Diarrhea, unspecified type  R19.7 787.91   7. Lactic acidosis  E87.20 276.2   8. History of breast cancer  Z85.3 V10.3   9. SARAH (acute kidney injury)  N17.9 584.9     Patient Active Problem List   Diagnosis    Benign essential HTN    Depression    Vocal cord dysfunction    Malignant neoplasm metastatic to bone    Carcinoma of left breast metastatic to lung    Tachycardia    Therapeutic drug monitoring    Lung metastasis    Vitamin D deficiency    Class 1 obesity without serious comorbidity with body mass index (BMI) of 31.0 to 31.9 in adult    Tongue cancer    Anxiety    Bruxism    Cheilosis    Squamous cell carcinoma of skin    Hyperlipidemia    Ovarian mass, right    Adnexal mass    Abnormal CT scan, colon    SARAH (acute kidney injury)    Hyperkalemia    Metabolic acidosis    Dehydration    Diarrhea    Pancytopenia     Past Medical History:   Diagnosis Date    SARAH (acute kidney injury) 2023    Allergy     Anxiety     Asthma     Bone metastasis     Breast cancer     Left node positive    Cholelithiasis     Lap Marily    Colon polyp Past 10-15 yrs?    found at colonoscopies    Depression     Esophageal reflux     cough    History of colon polyps     Hyperlipidemia     Hypertension     Left breast cancer metastasized to lung     Obstructive sleep apnea     does not wear cpap    Ovarian cyst     Pancytopenia 2023    PONV (postoperative nausea and vomiting)     Tongue cancer 2019    by ENT  at [facility] dr Quinn     Past Surgical History:   Procedure Laterality Date    CHOLECYSTECTOMY  2000    COLONOSCOPY  2014    H/O polyps    COLONOSCOPY N/A 6/1/2022    Procedure: COLONOSCOPY to cecum and TI with cold / hot snare polypectomies;  Surgeon: Luis Enrique Galeas MD;  Location: [facility] ENDOSCOPY;  Service: Gastroenterology;  Laterality: N/A;  pre-abnormal ct  post-polyps, diverticulosis, hemorrhoids    KNEE ARTHROPLASTY      LUNG SURGERY  2010    Lung wedge resection    MASTECTOMY RADICAL Left 1993    TONGUE SURGERY  05/21/2019    tongue cancer    TOTAL LAPAROSCOPIC HYSTERECTOMY N/A 11/04/2021    Procedure: Robotic assisted total laparoscopic hysterectomy, bilateral salpingoophorecotmy, bilateral ureteralysis ;  Surgeon: Kaylynn Ricci DO;  Location: [facility] MAIN OR;  Service: Robotics - DaVinci;  Laterality: N/A;      General Information       Row Name 08/30/23 1532          Safety Issues, Functional Mobility    Impairments Affecting Function (Mobility) endurance/activity tolerance  -               User Key  (r) = Recorded By, (t) = Taken By, (c) = Cosigned By      Initials Name Provider Type    Verito Zhang OT Occupational Therapist                     Mobility/ADL's       Row Name 08/30/23 1533          Bed Mobility    Bed Mobility supine-sit  Simultaneous filing. User may be unaware of other data.  -     Supine-Sit [place] (Bed Mobility) modified independence  Simultaneous filing. User may be unaware of other data.  -     Comment, (Bed Mobility) UIC at end of session  Simultaneous filing. User may be unaware of other data.  -       Row Name 08/30/23 1533          Transfers    Transfers sit-stand transfer  -       Row Name 08/30/23 1533          Sit-Stand Transfer    Sit-Stand [place] (Transfers) contact guard  Simultaneous filing. User may be unaware of other data.  -       Row Name 08/30/23 1533          Functional Mobility    Functional Mobility- Ind. Level standby  assist;contact guard assist  -     Functional Mobility- Device cane, quad  -     Functional Mobility- Comment fxl ambulation around room and in hallway using quad cane with SBA-CGA. Slow pace but no LOB  -       Row Name 08/30/23 1533          Activities of Daily Living    BADL Assessment/Intervention grooming  -       Row Name 08/30/23 1533          Lower Body Dressing Assessment/Training    [place] Level (Lower Body Dressing) don;socks;independent  -     Position (Lower Body Dressing) edge of bed sitting  -       Row Name 08/30/23 1533          Grooming Assessment/Training    [place] Level (Grooming) grooming skills;independent  -     Position (Grooming) sink side;unsupported standing  -               User Key  (r) = Recorded By, (t) = Taken By, (c) = Cosigned By      Initials Name Provider Type    Verito Zhang OT Occupational Therapist                   Obj/Interventions       [facility] Name 08/30/23 1534          Balance    Balance Assessment standing dynamic balance  -     Static Standing Balance standby assist  -     Dynamic Standing Balance contact guard  -     Position/Device Used, Standing Balance cane, quad  -     Balance Interventions occupation based/functional task  -               User Key  (r) = Recorded By, (t) = Taken By, (c) = Cosigned By      Initials Name Provider Type    Verito Zhang OT Occupational Therapist                   Goals/Plan    No documentation.                  Clinical Impression       Row Name 08/30/23 1534          Pain Assessment    Pretreatment Pain Rating 0/10 - no pain  -     Posttreatment Pain Rating 0/10 - no pain  -[facility] Name 08/30/23 1534          Plan of Care Review    Plan of Care Reviewed With patient  -     Progress improving  -     Outcome Evaluation Pt was found supine in bed, pleasant and agreeable to OOB ax today. She sits EOB (I) and dons socks w/o assist. BP taken in supine and sitting and remains stable- pt  asymptomatic. She performs fxl ambulation around room and in hallway with SBA-CGA using quad cane, slow pace but no LOB. She completes grooming tasks standing at the sink (I). Reports going to and from bathroom indep today. No further OT needs identified at this time. Will sign off  -       Row Name 08/30/23 1534          Therapy Plan Review/Discharge Plan (OT)    Anticipated Discharge Disposition (OT) home with assist  -       Row Name 08/30/23 1534          Vital Signs    Intra Systolic BP Rehab 80  -JW     Intra Treatment Diastolic BP 57  -JW     Post Systolic BP Rehab 79  -JW     Post Treatment Diastolic BP 57  -JW       Row Name 08/30/23 1534          Positioning and Restraints    Pre-Treatment Position in bed  -     Post Treatment Position chair  -JW     In Chair sitting;call light within reach;encouraged to call for assist;legs elevated  -               User Key  (r) = Recorded By, (t) = Taken By, (c) = Cosigned By      Initials Name Provider Type     Verito Levy, MIL Occupational Therapist                   Outcome Measures       Row Name 08/30/23 1539 08/30/23 1022       How much help from another person do you currently need...    Turning from your back to your side while in flat bed without using bedrails? 4  -SM 4  -GP    Moving from lying on back to sitting on the side of a flat bed without bedrails? 4  -SM 4  -GP    Moving to and from a bed to a chair (including a wheelchair)? 4  -SM 4  -GP    Standing up from a chair using your arms (e.g., wheelchair, bedside chair)? 4  -SM 4  -GP    Climbing 3-5 steps with a railing? 3  -SM 2  -GP    To walk in hospital room? 4  -SM 4  -GP    AM-PAC 6 Clicks Score (PT) 23  -SM 22  -GP    Highest level of mobility 7 --> Walked 25 feet or more  -SM 7 --> Walked 25 feet or more  -GP              User Key  (r) = Recorded By, (t) = Taken By, (c) = Cosigned By      Initials Name Provider Type    GP Christi Reese, RN Registered Nurse    Margie Dodson, PT  Physical Therapist                    Occupational Therapy Education       Title: PT OT SLP Therapies (In Progress)       Topic: Occupational Therapy (In Progress)       Point: ADL training (Done)       Description:   Instruct learner(s) on proper safety adaptation and remediation techniques during self care or transfers.   Instruct in proper use of assistive devices.                  Learning Progress Summary             Patient Acceptance, E, VU by  at 8/29/2023 1254    Comment: role of OT, plan of care, safety                         Point: Home exercise program (Not Started)       Description:   Instruct learner(s) on appropriate technique for monitoring, assisting and/or progressing therapeutic exercises/activities.                  Learner Progress:  Not documented in this visit.              Point: Precautions (Done)       Description:   Instruct learner(s) on prescribed precautions during self-care and functional transfers.                  Learning Progress Summary             Patient Acceptance, E, VU by CHACHO at 8/29/2023 1254    Comment: role of OT, plan of care, safety                         Point: Body mechanics (Done)       Description:   Instruct learner(s) on proper positioning and spine alignment during self-care, functional mobility activities and/or exercises.                  Learning Progress Summary             Patient Acceptance, E, VU by  at 8/29/2023 1254    Comment: role of OT, plan of care, safety                                         User Key       Initials Effective Dates Name Provider Type Discipline     06/10/21 -  Verito Levy OT Occupational Therapist OT                  OT Recommendation and Plan  Planned Therapy Interventions (OT): activity tolerance training, BADL retraining, functional balance retraining, occupation/activity based interventions, transfer/mobility retraining  Therapy Frequency (OT): 3 times/wk  Plan of Care Review  Plan of Care Reviewed With:  patient  Progress: improving  Outcome Evaluation: Pt was found supine in bed, pleasant and agreeable to OOB ax today. She sits EOB (I) and dons socks w/o assist. BP taken in supine and sitting and remains stable- pt asymptomatic. She performs fxl ambulation around room and in hallway with SBA-CGA using quad cane, slow pace but no LOB. She completes grooming tasks standing at the sink (I). Reports going to and from bathroom indep today. No further OT needs identified at this time. Will sign off     Time Calculation:   Evaluation Complexity (OT)  Review Occupational Profile/Medical/Therapy History Complexity: brief/low complexity  Assessment, Occupational Performance/Identification of Deficit Complexity: 1-3 performance deficits  Clinical Decision Making Complexity (OT): problem focused assessment/low complexity  Overall Complexity of Evaluation (OT): low complexity     Time Calculation- OT       Row Name 08/30/23 1539             Time Calculation- OT    OT Start Time 1427  -JW      OT Stop Time 1449  -JW      OT Time Calculation (min) 22 min  -JW      Total Timed Code Minutes- OT 22 minute(s)  -JW         Timed Charges    30444 - OT Self Care/Mgmt Minutes 15  -JW         Total Minutes    Timed Charges Total Minutes 15  -JW       Total Minutes 15  -JW                User Key  (r) = Recorded By, (t) = Taken By, (c) = Cosigned By      Initials Name Provider Type    Verito Zhang OT Occupational Therapist                  Therapy Charges for Today       Code Description Service Date Service Provider Modifiers Qty    [id number]  OT THERAPEUTIC ACT EA 15 MIN 8/29/2023 Verito Levy OT GO 1    [id number]  OT EVAL LOW COMPLEXITY 3 8/29/2023 Verito Levy OT GO 1    [id number]  OT SELF CARE/MGMT/TRAIN EA 15 MIN 8/30/2023 Verito Levy OT GO 1                 Verito Levy OT  8/30/2023

## 2023-08-30 NOTE — PROGRESS NOTES
REASON FOR FOLLOWUP/CHIEF COMPLAINT:  Breast cancer    HISTORY OF PRESENT ILLNESS:   Feels weak.  Concerned about her anemia.  No more bowel movements.    Past Medical History, Past Surgical History, Social History, Family History have been reviewed and are without significant changes except as mentioned.    Review of Systems   Review of Systems   Constitutional:  Negative for activity change.   HENT:  Negative for nosebleeds and trouble swallowing.    Respiratory:  Negative for shortness of breath and wheezing.    Cardiovascular:  Negative for chest pain and palpitations.   Gastrointestinal:  Negative for constipation and nausea.   Genitourinary:  Negative for dysuria and hematuria.   Musculoskeletal:  Negative for arthralgias and myalgias.   Skin:  Negative for rash and wound.   Neurological:  Negative for seizures and syncope.   Hematological:  Negative for adenopathy. Does not bruise/bleed easily.   Psychiatric/Behavioral:  Negative for confusion.      Medications:  The current medication list was reviewed in the EMR    ALLERGIES:    Allergies   Allergen Reactions    Nickel Unknown - Low Severity    Ciprofloxacin Rash              Vitals:    08/29/23 2000 08/29/23 2347 08/30/23 0448 08/30/23 0805   BP: 104/60 (!) 86/58 90/58 91/51   BP Location: Right arm Left arm Right arm Right arm   Patient Position: Sitting Sitting Lying Lying   Pulse: 103 86 89 83   Resp: 18 18  18   Temp: 98.2 °F (36.8 °C) 98.1 °F (36.7 °C)  97.5 °F (36.4 °C)   TempSrc: Oral Oral  Oral   SpO2: 98% 99%  100%   Height:         Physical Exam    CONSTITUTIONAL:  Vital signs reviewed.  No distress, looks comfortable.  EYES:  Conjunctivae and lids unremarkable.  PERRLA  EARS, NOSE, MOUTH, THROAT:  Ears and nose appear unremarkable.  Lips, teeth, gums appear unremarkable.  RESPIRATORY:  Normal respiratory effort.  Lungs clear to auscultation bilaterally.  CARDIOVASCULAR:  Normal S1, S2.  No murmurs, rubs or gallops.  No significant lower  extremity edema.  GASTROINTESTINAL: Abdomen appears unremarkable.  Nontender.  No hepatomegaly.  No splenomegaly.  NEURO: Cranial nerves 2-12 grossly intact.  No focal deficits.  Appears to have equal strength all 4 extremities.  MUSCULOSKELETAL:  Unremarkable digits/nails.  No cyanosis or clubbing.  SKIN:  Warm.  No rashes.  PSYCHIATRIC:  Normal judgment and insight.  Normal mood and affect.      RECENT LABS:  WBC   Date Value Ref Range Status   08/30/2023 1.36 (C) 3.40 - 10.80 10*3/mm3 Final   08/29/2023 1.62 (C) 3.40 - 10.80 10*3/mm3 Final   08/29/2023 1.86 (C) 3.40 - 10.80 10*3/mm3 Final   08/28/2023 2.55 (L) 3.40 - 10.80 10*3/mm3 Final   08/27/2023 3.67 3.40 - 10.80 10*3/mm3 Final     Hemoglobin   Date Value Ref Range Status   08/30/2023 7.6 (L) 12.0 - 15.9 g/dL Final   08/29/2023 8.6 (L) 12.0 - 15.9 g/dL Final   08/29/2023 7.8 (L) 12.0 - 15.9 g/dL Final   08/28/2023 10.5 (L) 12.0 - 15.9 g/dL Final   08/27/2023 11.6 (L) 12.0 - 15.9 g/dL Final     Platelets   Date Value Ref Range Status   08/30/2023 86 (L) 140 - 450 10*3/mm3 Final   08/29/2023 102 (L) 140 - 450 10*3/mm3 Final   08/29/2023 97 (L) 140 - 450 10*3/mm3 Final   08/28/2023 126 (L) 140 - 450 10*3/mm3 Final   08/27/2023 145 140 - 450 10*3/mm3 Final       ASSESSMENT/PLAN:  Marialuisa S Ghazala [id number]       *Metastatic breast cancer to bilateral lungs and soft tissue in the mediastinum (likely the cause of her hoarseness). (Initial breast cancer diagnosed in 1993 treated with mastectomy, chemotherapy, radiation therapy and tamoxifen). Arimidex day 1 on 02/17/2010. PET scan late August 2011 shows no abnormal hypermetabolic activity. This has improved compared to the prior PET scan January 2010 which showed mild hypermetabolic activity in areas of metastasis. (In the past, her  T6 lesion did not show up on CT scans or bone scan. It was seen by PET scan.) We have been following with CT scans only every 6 months and PET scans on an as needed only basis. Sometimes her  CT scanned pulmonary nodules are read as benign since they have been stable through the course of years but we know they are malignant because they have been surgically sampled in the past.    CT 8/16/16 shows increased sclerosis at T6 compared to 4/28/15.    PET 10/11/16: Slight uptake at T6, SUV 3.2.  mild thickening of right adrenal gland with an SUV of 11.   Continued Arimidex is minimal progression and had been on this since 2/17/10.  Not referred for radiation since no pain at T6  CT 1/27/17, unchanged.  CT 8/1/17: Unchanged except subtle suggestion of mild increase in thickening of the right adrenal gland.    CT 2/22/18, unchanged.  CT 9/6/18: Unchanged except nodular thickening right adrenal gland significantly decreased.  CT 9/5/2019: Unchanged.  On the 6/12/2020 visit, the following scans were reviewed:  CT neck and chest 5/11/2020, [facility], from 5/9/2019: Stable pulmonary nodules new lytic C7 lesion and posterior T1 lesion questionable T12 lesion.  No change in the T6 and T10 lesions.  PET, [facility], 5/19/2020: Mildly enlarged left neck nodes are mildly hypermetabolic.  Diffuse activity throughout the thickened left adrenal gland.  CT appearance unchanged from 5/9/2019.  Progressive T1 lesion seen on CT from 5/11/2020, mild some moderate activity.  T10 low-grade activity with mixed lucent and sclerotic findings.  T6 is a mixed lucent and sclerotic appearance but no significant activity.   T12 (the biopsy report is labeled as T12 but patient insists this was a C7/T1 biopsy, not to T12) biopsy 6/15/2020, [facility]: Metastatic breast carcinoma.  ER >95%.  DE 0%.  HER-2 negative (1+)  It seems the main progression at the 6/12/2020 visit was a new C7 and new T1 lesion.  Those were radiated with CyberKnife through [facility] early June 2020.  Because Arimidex has been working well since 2010, continue same Arimidex.  Add Zometa every 3 months.  C7 and T1 found on CT 5/11/2020, [facility].  [place], Dr. Winston, early June  2020.  CT C and T-spine 9/17/2020: New C7 lesion from 9/6/2018, but no change from 5/11/2020 CT.  T1 lesion stable from 5/11/2020, but new compared to 2/22/2018.  T10 lesion unchanged from 9/5/2019, but new from 2/22/2018.  Subtle 8 mm T12 lesion new from 9/5/2019.  Therefore, no recent progression.  Considering her good tolerance and long-term effectiveness of Arimidex, continue same therapy.  CT CAP 12/10/2020: No progression  Therefore, continue Arimidex.  CA 15-3 normal through 12/14/2020  Because CA 15-3 fabian to 27 on 4/13/2021, then 29.3 on 7/20/2021, then 34.3 on 10/12/2021, CT scans done earlier than planned:  CT CAP with contrast 10/12/2021: New 5.2 x 4.6 cm mixed cystic and solid right ovarian mass compared to 12/10/2020.  Continued stability of bilateral pulmonary nodules and stable sclerotic bone metastasis.  11/4/2021: TLH/BSO (due to new 5 cm right ovarian mass on CT, 11/12/2021).  Metastatic breast cancer involving bilateral ovaries and posterior uterine serosa.  ER 81-90%.  SC 61-70%.  HER-2 negative  [facility] NGS: ER 98%.  SC 90%.  HER-2/eb negative.  AKT1 pathogenic variant, exon 3.  AR+, 95%.  No other significant mutations including negative PIK3CA  Pelvic washings: Adenocarcinoma  Because this was the only area of progression on CT, and has been resected, continue Arimidex which she has been on since 2/17/2010.  11/22/2021: Discussed with Dr. Blum, who has a focus on breast cancer.  We both agree: Continue Arimidex for now.  In the future, if significant progression is seen, then plan Faslodex injections with Ibrance  CT CAP 2/1/2022: A few T-spine sclerotic lesions progressively more sclerotic over multiple prior CTs of indeterminate significance.  No clear signs of progression of disease.  3/15/2022: Although tumor marker is not rising and CT showed no clear signs of progression.  CT revealed progressively more sclerotic bone metastasis over multiple prior CTs, she is having increased right  hip discomfort.  Sometimes this can be due to healing of metastasis (but she has been on the same treatment since 2010), or this could mean progression of bone metastasis.  It is reassuring that her tumor marker is not worsening.  She would like to see [facility] to see if they feel radiation to the hip would help her discomfort.  We will do a bone scan before that appointment as this might help [facility].  (Although she has seen [facility] radiation for her neck, she would like to see [facility] radiation for this hip issue).  3/18/2022, bone scan: Metastatic disease at T9 and T10, corresponding to CT lesions.  Uptake also at T6, but to lesser degree.  These areas are new compared to last bone scan, 6/3/2009.  Therefore, overall, appears consistent with progression.  3/24/2021: Discussed changing  Arimidex to Faslodex/Ibrance 125 mg D1-21/28 days.  However, Dr. Llanes will decide on 4/12/2022 if the patient needs any traditional XRT to hip/pelvis.  If so, this may cause cytopenias due to the location of XRT.  Ibrance can also cause cytopenias.  If this is the case, we will wait to begin Ibrance until at least a few weeks after XRT completes, provided blood counts allow.   4/22/2022: Dr. Llanes reviewed MRI right hip from 4/19/2022, revealing L3 metastasis, similar size of prior CTs.  Therefore, she plans no XRT.  5/6/2022: Began Faslodex Ibrance  CT 7/18/2022: Some bone mets more sclerotic, which could reflect treatment response.  Slightly increased RUL nodule, 1.1 cm, from 0.9 cm on 2/1/2022  Bone scan 7/18/2022: No change from 3/18/2022  8/19/2022 (patient delayed follow-up to review scans until then): Continue same therapy since no significant progression of disease  Bone scan 1/26/2023: Under represents bone metastasis compared to CT scans.  Slightly more intense uptake at T12 which was noted to perhaps represent treatment response.  CT 1/26/2023: A few new subcentimeter  indeterminate RLL pulmonary nodules.  Radiologist recommended follow-up chest imaging in 6 to 8 weeks.  Otherwise no change in the other bilateral pulmonary nodules.  No change in scattered bone metastasis except the T12 lesion continues to increase in sclerosis.  2/28/2023 office visit:  Missed the mid November 2022 Faslodex and the mid January 2023 Faslodex and the mid February 2023 Faslodex.  Although CT might represent slight progression, she has missed a few doses of Faslodex.  Would like a strong reason to change therapy since she has been doing well on Faslodex for quite some time.  Therefore, patient agrees: Maintain her same imaging interval, 6 months from last  As of 8/20/2023, last Faslodex was 6/8/2023 (typically is given every 4 weeks)  CT and bone scan 7/17/23, from 1/26/2023: Mostly stable.  New focus of uptake anterior left second rib and in hindsight radiologist saw subtle area of sclerosis and osteolysis on CT images from 7/17/2023, new from 1/26/2023.  Radiologist noted does not have the appearance for posttraumatic change or osteonecrosis and the most likely etiology is a new metastatic focus.  Interval decrease in activity large right frontal lesion.  Spine lesion stable.  She did not come to the office to review these scans  Came to the ER 8/27/2023 for severe diarrhea.  CT AP 8/27/2023, from 7/17/2023: No significant change.   8/28/2023: She self stopped Ibrance for 5 days prior to admission due to feeling so weak with diarrhea.  When she recovers from her current issues and is able to return to the office, resume Faslodex every 4-week injections with Ibrance oral D1-21/28 days.  8/29/2023: Only 1 loose bowel movement in the last 24 hours.  She agrees: I asked our office to arrange MD or NP with Faslodex in 2 to 3 weeks.  8/30/2023: She agrees with the above plan.     CA 15-3:  31.7 on 2/28/2023, from 27.3 on 8/19/2022, from 22.5 on 5/6/2022, from 26.3 on 2/1/2022, from 32.6 on 11/22/2021,  from 34.3 on 10/12/2021, from 29.3 on 7/20/2021, from 27 on 4/13/2021, from 23.3 on 12/14/2020.     *Bony metastases  T6 discovered by PET to August 2011 (did not show up by CT or bone scan)  C7 and T1 found on CT 5/11/2020, [facility].  [place], Dr. Winston, early June 2020.  June 2020 began Zometa every 3 months.  She was warned of possible osteonecrosis of the jaw and renal insufficiency.  She states she has good dental health and sees her dentist regularly.  Because of prior hypercalcemia requiring stopping calcium supplements and because of high normal current calcium level, began without supplemental calcium.  Add calcium if calcium becomes low.  1/7/2022: Tooth extracted.  Plan was to wait 5 weeks to resume Zometa.  Patient delayed return until 3/15/2022 (over 8 weeks)  May 2022: Changed Zometa to Xgeva every 3 months due to Cr up to 1.6  2/28/2023: She request to hold off on Xgeva because she is in the process of having dental work     *Bone health. Last bone density 10/11/16, worsened, but still normal with worst T score -0.9.  Patient stopped calcium January 2016, but remained on vitamin D.  I recommended she restart her calcium.  Stopped calcium early March 2018 due to hypercalcemia.  DEXA 9/5/2019: Normal bone density     *Hypercalcemia.  Repeat calcium 3/7/18 normal, but patient self stopped calcium a few days prior.  Recommended she stay off calcium.  Calcium was 11 on 6/19/2020  Calcium 11 again on 12/10/2020  Repeat calcium 10.5 on 12/14/2020  Calcium 10.6.  Minimally elevated.  (Normal range up to 10.5)  Calcium 10.8 on 3/15/2022.  Hopefully, Zometa will help with this.  Calcium 0.9 l     *Hypophosphatemia  Phosphorus 2.3 on 6/24/2022.  Begin K-Phos Neutral 2 tablets twice daily  Patient self stopped this around mid July 2022.  Phosphorus normal, 3.6 on 2/28/2023     *On the 10/5/16 visit, Tachycardia, dyspnea on exertion, bilateral leg swelling, more sedentary recently.  CT PE protocol and bilateral  lower extremity Dopplers 10/5/16, negative for clots.  She still has tachycardia and dyspnea on exertion.  Perhaps at least part of this is due to deconditioning.     *Previously complained of colon Right lower anterior rib discomfort.  CT unremarkable in that area.  No specific pain, just discomfort.  I explained to the patient if disease was found by PET scan or bone scan, I would not  since she has been doing well on Arimidex since 2010.   Currently denies pain.      *Depression/anxiety.  This limits compliance.  She sees a psychiatrist and a counselor regularly.  She states this is mostly due to body image issues instead of cancer.   She continues with her counselor and psychiatrist.  Viral pandemic is making this a little more difficult.  4/27/2022: We will try to get her involved in some cancer support groups.  I also encouraged her to try out some small groups at [facility] (she struggles with loneliness).  2/28/2023: Has missed some visits due to emotional issues.  Offered behavioral oncology help.  She declines.  She is going to continue with her psychiatrist and counselor     *Noncompliance due to depression/anxiety.    Although she often misses appointments, she does reschedule and eventually come in.     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 Willie Quinn, [facility], on 5/21/2019.  Positive sentinel node (1 of 3 nodes)..  Left neck dissection by Dr. Willie Quinn, [facility], on 6/5/2019.  22 nodes negative.  Negative margins.  No lymphovascular invasion or perineural invasion.  2 mm depth of invasion.  Grade 1.  Squamous cell carcinoma.  1.8 cm tumor.  Because the patient felt what she thought was left neck LAD, CT neck and chest and PET at [facility]. May 2020 performed.  She is being followed at [facility] for this.  On 6/19/2020, per verbal report from Dr. Senia [place] of , [facility] does not feel she has any  active head and neck cancer.   She states she has a follow-up with Dr. Kapadia, ENT at [facility], in June with CT neck 1 week prior  No symptoms to suggest recurrence.  She follows with [facility].     *Previously complained of left hip pain x2 weeks.  This prompted an MRI pelvis 8/20/2019 at [facility] and open MRI which was negative for malignancy.     *Possible intolerance of Zometa  After first dose, June 2020, 2 days of chills, bone pain  She would like to try Zometa again.  If this occurs again, we will try to switch her to Xgeva monthly.  (No good data on every 3-month Xgeva)  She did fine with the second dose of Zometa.     *Thickening of wall of transverse colon on CT 2/1/2022.  Last colonoscopy was 4/18/2019.  She wants the next colonoscopy done at [facility]  Dr. Galeas will see her on 4/7/2022 to evaluate this     *Depression  Is seeing a psychiatry NP and a therapist for many years.  6/24/2022: Reported she decided to stop seeing her therapist but is still interested in therapy.  She would like to see behavioral oncology at [facility].  This was arranged  Contacted by Julissa Centeno (behavioral oncology).  Patient canceled on the day of the appointment during her first scheduled appointment with Julissa.  She then no showed for the rescheduled appointment.  Julissa recommends the patient continue to follow with her therapist and psychiatrist and if she needs additional support, to use [employer]     *Pruritic rash  8/19/2022: Referred to dermatology     *Patient went to the ER 2/7/2023 for dizziness  2/28/2023: This resolved.  She understands if she has ongoing issues with dizziness she should contact us and we will arrange an MRI of the brain     *Admitted for significant diarrhea.  We were asked give her breast cancer treatment could be the cause of this.  Ibrance is associated with diarrhea about 25% of the time , but grade 3 diarrhea only occurs 1% of the time.  Faslodex has a listed diarrhea association of 6 to  25% of the time.  I would doubt these drugs are causing her diarrhea, especially considering she has been on them since May 2022     *Pancytopenia due to Ibrance     *Leukocytopenia  WBC 1.4, from 1.6, from 2.6     *Anemia  Hb 7.6, from 8.6, from 10.5  8/30/2023: Transfused 2 units PRBC in preparation for discharge.  She is weak and fatigued     *Thrombocytopenia  PLT 86, from 102, from 126     *Acute kidney injury.  Nephrology following.  Baseline creatinine 1.5-1.7, CKD stage III.     Monitoring on Ibrance:  CBC every 2 weeks for the first 2 months then monthly and as indicated.  After 6 months, if neutropenia grade 1 or 2 only, then CBC can be every 3 months     PLAN:   Copy of the note I sent to our office: Beginning in 2 to 3 weeks, arrange MD or NP with Faslodex every 4 weeks.  In 4 to 5 months or so, she should see me.  1 week prior: CT neck, chest abdomen and pelvis without contrast, bone scan, CBC, CMP, CA 15-3   (Dr. Carmela Smith, [facility], requests neck CT when we do either CT imaging).  Resume Ibrance when Faslodex resumes (not before)  Transfuse 2 units PRBC  No problems with discharge from my standpoint after she receives the blood transfusion     Typical plan is:  MD/NP monthly  Faslodex today and monthly.  MD/Faslodex 5 months.  A few days prior: CT chest abdomen and pelvis without contrast, bone scan, CBC, CMP, CA 15-3  Resume every 3-month Xgeva 1 to 2 months after completing all dental procedures (she states she will let us know).  Last dose was 7/1/2022     Continue Faslodex/Ibrance 125 mg D1-21/28 days     (Xgeva instead of Zometa due to intermittent high creatinine)  MD or NP with Faslodex every 4 weeks  Every 6 months CT CAP without IV contrast, bone scan (last 7/18/2022), CA 15-3  Previously arranged for her to get involved with some cancer support groups   CT scans WITHOUT IV CONTRAST (again, to avoid further kidney damage.)  She sees [facility], Dr. Bedoya annually,  next is summer 2022     Send a copy of this note to   Isaac Valdes MD, Dr., radiation medicine, [facility]

## 2023-08-30 NOTE — THERAPY TREATMENT NOTE
Patient Name: Marialuisa Vivar  : 1955    MRN: [id number]                              Today's Date: 2023       Admit Date: 2023    Visit Dx:     ICD-10-CM ICD-9-CM   1. Generalized weakness  R53.1 780.79   2. Acute renal failure superimposed on chronic kidney disease, unspecified CKD stage, unspecified acute renal failure type  N17.9 584.9    N18.9 585.9   3. Acute kidney injury (SARAH) with acute tubular necrosis (ATN)  N17.0 584.5   4. Volume depletion  E86.9 276.50   5. Hyperkalemia  E87.5 276.7   6. Diarrhea, unspecified type  R19.7 787.91   7. Lactic acidosis  E87.20 276.2   8. History of breast cancer  Z85.3 V10.3   9. SARAH (acute kidney injury)  N17.9 584.9     Patient Active Problem List   Diagnosis    Benign essential HTN    Depression    Vocal cord dysfunction    Malignant neoplasm metastatic to bone    Carcinoma of left breast metastatic to lung    Tachycardia    Therapeutic drug monitoring    Lung metastasis    Vitamin D deficiency    Class 1 obesity without serious comorbidity with body mass index (BMI) of 31.0 to 31.9 in adult    Tongue cancer    Anxiety    Bruxism    Cheilosis    Squamous cell carcinoma of skin    Hyperlipidemia    Ovarian mass, right    Adnexal mass    Abnormal CT scan, colon    SARAH (acute kidney injury)    Hyperkalemia    Metabolic acidosis    Dehydration    Diarrhea    Pancytopenia     Past Medical History:   Diagnosis Date    SARAH (acute kidney injury) 2023    Allergy     Anxiety     Asthma     Bone metastasis     Breast cancer     Left node positive    Cholelithiasis     Lap Marily    Colon polyp Past 10-15 yrs?    found at colonoscopies    Depression     Esophageal reflux     cough    History of colon polyps     Hyperlipidemia     Hypertension     Left breast cancer metastasized to lung     Obstructive sleep apnea     does not wear cpap    Ovarian cyst     Pancytopenia 2023    PONV (postoperative nausea and vomiting)     Tongue cancer 2019    by ENT  at Replaced by [facility]     Past Surgical History:   Procedure Laterality Date    CHOLECYSTECTOMY  2000    COLONOSCOPY  2014    H/O polyps    COLONOSCOPY N/A 6/1/2022    Procedure: COLONOSCOPY to cecum and TI with cold / hot snare polypectomies;  Surgeon: Luis Enrique Galeas MD;  Location: [facility] ENDOSCOPY;  Service: Gastroenterology;  Laterality: N/A;  pre-abnormal ct  post-polyps, diverticulosis, hemorrhoids    KNEE ARTHROPLASTY      LUNG SURGERY  2010    Lung wedge resection    MASTECTOMY RADICAL Left 1993    TONGUE SURGERY  05/21/2019    tongue cancer    TOTAL LAPAROSCOPIC HYSTERECTOMY N/A 11/04/2021    Procedure: Robotic assisted total laparoscopic hysterectomy, bilateral salpingoophorecotmy, bilateral ureteralysis ;  Surgeon: Kaylynn Ricci DO;  Location: [facility] MAIN OR;  Service: Robotics - DaVinci;  Laterality: N/A;      General Information       Row Name 08/30/23 1532          Physical Therapy Time and Intention    Document Type evaluation  Simultaneous filing. User may be unaware of other data.  -     Mode of Treatment physical therapy  Simultaneous filing. User may be unaware of other data.  -       Row Name 08/30/23 1538          General Information    Patient Profile Reviewed yes  Simultaneous filing. User may be unaware of other data.  -     Existing Precautions/Restrictions fall;orthostatic hypotension  Simultaneous filing. User may be unaware of other data.  -       Row Name 08/30/23 1537          Cognition    Orientation Status (Cognition) oriented x 4  Simultaneous filing. User may be unaware of other data.  -               User Key  (r) = Recorded By, (t) = Taken By, (c) = Cosigned By      Initials Name Provider Type     Margie Sanchez, PT Physical Therapist                   Mobility       Row Name 08/30/23 1535          Bed Mobility    Bed Mobility supine-sit  -SM     Supine-Sit [place] (Bed Mobility) modified independence  -     Comment, (Bed Mobility) Patient UIC at end  of session  -       Row Name 08/30/23 1535 08/30/23 1533       Sit-Stand Transfer    Sit-Stand [place] (Transfers) supervision  - --    Assistive Device (Sit-Stand Transfers) cane, quad tip  -SM cane, quad tip  -SM      Row Name 08/30/23 1535 08/30/23 1533       Gait/Stairs (Locomotion)    [place] Level (Gait) standby assist;supervision;verbal cues  - standby assist;supervision;verbal cues  -    Assistive Device (Gait) cane, quad tip  -SM cane, quad tip  -SM    Distance in Feet (Gait) 100ft  -SM 100ft  -SM    Deviations/Abnormal Patterns (Gait) eunice decreased;gait speed decreased  - eunice decreased;gait speed decreased  -    Comment, (Gait/Stairs) VCs for sequencing with cane  - VC for sequencing with cane  -SM              User Key  (r) = Recorded By, (t) = Taken By, (c) = Cosigned By      Initials Name Provider Type     Margie Sanchez PT Physical Therapist                   Obj/Interventions       Row Name 08/30/23 1536          Balance    Balance Assessment sitting static balance;sitting dynamic balance;sit to stand dynamic balance;standing static balance;standing dynamic balance  -     Static Sitting Balance supervision  -     Dynamic Sitting Balance supervision  -     Position, Sitting Balance sitting edge of bed  -     Sit to Stand Dynamic Balance supervision  -     Static Standing Balance supervision  -     Dynamic Standing Balance supervision  -     Position/Device Used, Standing Balance cane, quad  -SM     Balance Interventions sitting;standing;sit to stand;supported;static;dynamic  -               User Key  (r) = Recorded By, (t) = Taken By, (c) = Cosigned By      Initials Name Provider Type     Margie Sanchez PT Physical Therapist                   Goals/Plan    No documentation.                  Clinical Impression       [facility] Name 08/30/23 1536          Pain    Pretreatment Pain Rating 0/10 - no pain  -     Posttreatment Pain Rating 0/10 - no pain   -       Row Name 08/30/23 1536          Plan of Care Review    Plan of Care Reviewed With patient  -SM     Outcome Evaluation Patient seen for PT session this PM. Patient supine in bed upon arrival. Patient with low BP this date though minimal change from supine to upright. 80/57 when supine to 79/57 when sitting. Patient ambulated 100ft with a quad cane and SV. VCs given for sequencing with cane. Patient reports she has been getting up to bathroom in room on her own. No further acute PT needs noted at this time. Encouraged patient to continue ambulating with nsg several times per day. No further acute PT needs noted at this time. PT will sign off.  -       Row Name 08/30/23 1536          Therapy Assessment/Plan (PT)    Criteria for Skilled Interventions Met (PT) no problems identified which require skilled intervention  -       Row Name 08/30/23 1536          Vital Signs    Pre Patient Position Supine  -SM     Intra Patient Position Standing  -SM     Post Patient Position Sitting  -       Row Name 08/30/23 1536          Positioning and Restraints    Pre-Treatment Position in bed  -SM     Post Treatment Position chair  -SM     In Chair notified nsg;reclined;call light within reach;encouraged to call for assist  -               User Key  (r) = Recorded By, (t) = Taken By, (c) = Cosigned By      Initials Name Provider Type     Margie Sanchez, PT Physical Therapist                   Outcome Measures       Row Name 08/30/23 1539 08/30/23 1022       How much help from another person do you currently need...    Turning from your back to your side while in flat bed without using bedrails? 4  -SM 4  -GP    Moving from lying on back to sitting on the side of a flat bed without bedrails? 4  -SM 4  -GP    Moving to and from a bed to a chair (including a wheelchair)? 4  -SM 4  -GP    Standing up from a chair using your arms (e.g., wheelchair, bedside chair)? 4  -SM 4  -GP    Climbing 3-5 steps with a railing? 3   - 2  -GP    To walk in hospital room? 4  - 4  -GP    AM-PAC 6 Clicks Score (PT) 23  - 22  -GP    Highest level of mobility 7 --> Walked 25 feet or more  - 7 --> Walked 25 feet or more  -GP              User Key  (r) = Recorded By, (t) = Taken By, (c) = Cosigned By      Initials Name Provider Type    GP Christi Reese, RN Registered Nurse     Margie Sanchez PT Physical Therapist                                 Physical Therapy Education       Title: PT OT SLP Therapies (In Progress)       Topic: Physical Therapy (Done)       Point: Mobility training (Done)       Learning Progress Summary             Patient Acceptance, E, VU by  at 8/30/2023 1539    Acceptance, E, VU by  at 8/29/2023 1309                         Point: Home exercise program (Done)       Learning Progress Summary             Patient Acceptance, E, VU by  at 8/30/2023 1539    Acceptance, E, VU by  at 8/29/2023 1309                         Point: Body mechanics (Done)       Learning Progress Summary             Patient Acceptance, E, VU by  at 8/30/2023 1539    Acceptance, E, VU by  at 8/29/2023 1309                         Point: Precautions (Done)       Learning Progress Summary             Patient Acceptance, E, VU by  at 8/30/2023 1539    Acceptance, E, VU by  at 8/29/2023 1309                                         User Key       Initials Effective Dates Name Provider Type Discipline     05/02/22 -  Margie Sanchez PT Physical Therapist PT                  PT Recommendation and Plan     Plan of Care Reviewed With: patient  Outcome Evaluation: Patient seen for PT session this PM. Patient supine in bed upon arrival. Patient with low BP this date though minimal change from supine to upright. 80/57 when supine to 79/57 when sitting. Patient ambulated 100ft with a quad cane and SV. VCs given for sequencing with cane. Patient reports she has been getting up to bathroom in room on her own. No further acute PT needs  noted at this time. Encouraged patient to continue ambulating with nsg several times per day. No further acute PT needs noted at this time. PT will sign off.     Time Calculation:         PT Charges       Row Name 08/30/23 1540             Time Calculation    Start Time 1426  -SM      Stop Time 1450  -SM      Time Calculation (min) 24 min  -SM      PT Received On 08/30/23  -         Time Calculation- PT    Total Timed Code Minutes- PT 24 minute(s)  -SM         Timed Charges    60970 - PT Therapeutic Activity Minutes 24  -SM         Total Minutes    Timed Charges Total Minutes 24  -SM       Total Minutes 24  -SM                User Key  (r) = Recorded By, (t) = Taken By, (c) = Cosigned By      Initials Name Provider Type     Margie Sanchez, MIGUEL Physical Therapist                  Therapy Charges for Today       Code Description Service Date Service Provider Modifiers Qty    48975274683 HC PT EVAL LOW COMPLEXITY 3 8/29/2023 Margie Sanchez, PT GP 1    39489877170 HC PT THERAPEUTIC ACT EA 15 MIN 8/30/2023 Margie Sanchez, PT GP 2            PT G-Codes  Outcome Measure Options: AM-PAC 6 Clicks Basic Mobility (PT)  AM-PAC 6 Clicks Score (PT): 23  AM-PAC 6 Clicks Score (OT): 20  PT Discharge Summary  Anticipated Discharge Disposition (PT): home with assist    Margie Sanchez PT  8/30/2023

## 2023-08-30 NOTE — NURSING NOTE
Spoke with ARON Mott re: right arm swelling. Since IV in hand is functional and there is no redness or warmth in arm or around IV, go ahead and start PRBC, monitoring frequently. Notify MD if worsens.

## 2023-08-30 NOTE — PLAN OF CARE
Goal Outcome Evaluation:  Plan of Care Reviewed With: patient        Progress: improving  Outcome Evaluation: Pt was found supine in bed, pleasant and agreeable to OOB ax today. She sits EOB (I) and dons socks w/o assist. BP taken in supine and sitting and remains stable- pt asymptomatic. She performs fxl ambulation around room and in hallway with SBA-CGA using quad cane, slow pace but no LOB. She completes grooming tasks standing at the sink (I). Reports going to and from bathroom indep today. No further OT needs identified at this time. Will sign off

## 2023-08-30 NOTE — PLAN OF CARE
Goal Outcome Evaluation:  Plan of Care Reviewed With: patient           Outcome Evaluation: Patient seen for PT session this PM. Patient supine in bed upon arrival. Patient with low BP this date though minimal change from supine to upright. 80/57 when supine to 79/57 when sitting. Patient ambulated 100ft with a quad cane and SV. VCs given for sequencing with cane. Patient reports she has been getting up to bathroom in room on her own. No further acute PT needs noted at this time. Encouraged patient to continue ambulating with nsg several times per day. No further acute PT needs noted at this time. PT will sign off.      Anticipated Discharge Disposition (PT): home with assist

## 2023-08-30 NOTE — PLAN OF CARE
Goal Outcome Evaluation:  Plan of Care Reviewed With: patient        Progress: improving  Outcome Evaluation: No diarrhea today. No complaints and tolerated solid food. Up ambulating in room and in chair without symptoms.

## 2023-08-31 ENCOUNTER — READMISSION MANAGEMENT (OUTPATIENT)
Dept: CALL CENTER | Facility: HOSPITAL | Age: 68
End: 2023-08-31
Payer: MEDICARE

## 2023-08-31 VITALS
RESPIRATION RATE: 18 BRPM | DIASTOLIC BLOOD PRESSURE: 70 MMHG | BODY MASS INDEX: 23.8 KG/M2 | HEIGHT: 65 IN | OXYGEN SATURATION: 99 % | SYSTOLIC BLOOD PRESSURE: 117 MMHG | TEMPERATURE: 98.1 F | HEART RATE: 82 BPM

## 2023-08-31 DIAGNOSIS — C79.51 MALIGNANT NEOPLASM METASTATIC TO BONE: Primary | ICD-10-CM

## 2023-08-31 LAB
BH BB BLOOD EXPIRATION DATE: NORMAL
BH BB BLOOD EXPIRATION DATE: NORMAL
BH BB BLOOD TYPE BARCODE: 5100
BH BB BLOOD TYPE BARCODE: 5100
BH BB DISPENSE STATUS: NORMAL
BH BB DISPENSE STATUS: NORMAL
BH BB PRODUCT CODE: NORMAL
BH BB PRODUCT CODE: NORMAL
BH BB UNIT NUMBER: NORMAL
BH BB UNIT NUMBER: NORMAL
CROSSMATCH INTERPRETATION: NORMAL
CROSSMATCH INTERPRETATION: NORMAL
DEPRECATED RDW RBC AUTO: 58.9 FL (ref 37–54)
EOSINOPHIL # BLD MANUAL: 0.1 10*3/MM3 (ref 0–0.4)
EOSINOPHIL NFR BLD MANUAL: 6 % (ref 0.3–6.2)
ERYTHROCYTE [DISTWIDTH] IN BLOOD BY AUTOMATED COUNT: 16.4 % (ref 12.3–15.4)
HCT VFR BLD AUTO: 33.2 % (ref 34–46.6)
HGB BLD-MCNC: 11.6 G/DL (ref 12–15.9)
LYMPHOCYTES # BLD MANUAL: 0.73 10*3/MM3 (ref 0.7–3.1)
LYMPHOCYTES NFR BLD MANUAL: 4 % (ref 5–12)
MCH RBC QN AUTO: 34.1 PG (ref 26.6–33)
MCHC RBC AUTO-ENTMCNC: 34.9 G/DL (ref 31.5–35.7)
MCV RBC AUTO: 97.6 FL (ref 79–97)
MONOCYTES # BLD: 0.06 10*3/MM3 (ref 0.1–0.9)
NEUTROPHILS # BLD AUTO: 0.7 10*3/MM3 (ref 1.7–7)
NEUTROPHILS NFR BLD MANUAL: 44 % (ref 42.7–76)
NRBC BLD AUTO-RTO: 0 /100 WBC (ref 0–0.2)
PLAT MORPH BLD: NORMAL
PLATELET # BLD AUTO: 106 10*3/MM3 (ref 140–450)
PMV BLD AUTO: 9.9 FL (ref 6–12)
POIKILOCYTOSIS BLD QL SMEAR: ABNORMAL
RBC # BLD AUTO: 3.4 10*6/MM3 (ref 3.77–5.28)
UNIT  ABO: NORMAL
UNIT  ABO: NORMAL
UNIT  RH: NORMAL
UNIT  RH: NORMAL
VARIANT LYMPHS NFR BLD MANUAL: 46 % (ref 19.6–45.3)
WBC MORPH BLD: NORMAL
WBC NRBC COR # BLD: 1.59 10*3/MM3 (ref 3.4–10.8)

## 2023-08-31 PROCEDURE — 99232 SBSQ HOSP IP/OBS MODERATE 35: CPT

## 2023-08-31 PROCEDURE — 85007 BL SMEAR W/DIFF WBC COUNT: CPT | Performed by: NURSE PRACTITIONER

## 2023-08-31 PROCEDURE — 85025 COMPLETE CBC W/AUTO DIFF WBC: CPT | Performed by: NURSE PRACTITIONER

## 2023-08-31 RX ADMIN — Medication 3 MG: at 00:35

## 2023-08-31 RX ADMIN — PANTOPRAZOLE SODIUM 40 MG: 40 TABLET, DELAYED RELEASE ORAL at 06:23

## 2023-08-31 NOTE — OUTREACH NOTE
Prep Survey      Flowsheet Row Responses   [facility] facility patient discharged from? [place]   Is LACE score < 7 ? No   Eligibility [facility]   Date of Admission 08/27/23   Date of Discharge 08/31/23   Discharge Disposition Home or Self Care   Discharge diagnosis acute kidney injuryMalignant neoplasm metastatic to bone   Does the patient have one of the following disease processes/diagnoses(primary or secondary)? Other   Does the patient have Home health ordered? No   Is there a DME ordered? Yes   What DME was ordered? [employer]   Prep survey completed? Yes            CHARLI MOHAN - Registered Nurse

## 2023-08-31 NOTE — DISCHARGE SUMMARY
Patient Name: Marialuisa Vivar  : 1955  MRN: [id number]    Date of Admission: 2023  Date of Discharge:  2023  Primary Care Physician: Jennifer Mantilla MD      Chief Complaint:   Abdominal Pain, Vomiting, and Diarrhea      Discharge Diagnoses     Active Hospital Problems    Diagnosis  POA    **SARAH (acute kidney injury) [N17.9]  Yes    Pancytopenia [D61.818]  Yes    Hyperkalemia [E87.5]  Yes    Metabolic acidosis [E87.20]  Yes    Dehydration [E86.0]  Yes    Diarrhea [R19.7]  Yes    Anxiety [F41.9]  Yes    Hyperlipidemia [E78.5]  Yes    Carcinoma of left breast metastatic to lung [C50.912, C78.02]  Yes    Benign essential HTN [I10]  Yes    Malignant neoplasm metastatic to bone [C79.51]  Yes      Resolved Hospital Problems   No resolved problems to display.        Hospital Course     Ms. Vivar is a 67 y.o. female with a history of breast cancer, colon polyps, tongue cancer, depression/anxiety, HTN  who presented to [facility] initially complaining of weakness, lightheadedness, diarrhea.  Please see the admitting history and physical for further details.  She was found to have SARAH w/metabolic acidosis, hyperkalemia, dehydration and was admitted to the hospital for further evaluation and treatment.  GI, Nephrology, Oncology were consulted. CT A/P unremarkable but without contrast due to SARAH. Diarrhea improved, specimens were not able to be collected. Kidney function & acidosis improved with IVF's/bicarb. Developed pancytopenia due to Ibrance. Received 2 units PRBC 23. Oncology has cleared pt for discharge w/plan to follow up outpatient with Faslodex every 4 weeks; resume Ibrance when Faslodex resumes (not before). Will need CT neck, chest, abdomen, pelvis w/o contrast, bone scan, CBC, CMP, CA 15-3 1 week prior to seeing Oncologist in 4-5 months. Stable from medical standpoint to discharge home.   Day of Discharge     Subjective:  Feels well. Tolerated PRBC's without issues. Has had  lower rt arm swelling that is improving, likely from prev IV site. Agrees with discharge     Physical Exam:  Temp:  [97.7 °F (36.5 °C)-98.2 °F (36.8 °C)] 98.1 °F (36.7 °C)  Heart Rate:  [79-94] 82  Resp:  [18] 18  BP: ()/(52-71) 117/70  Body mass index is 23.8 kg/m².  Physical Exam  Vitals and nursing note reviewed.   Constitutional:       General: She is not in acute distress.  HENT:      Head: Normocephalic.      Mouth/Throat:      Mouth: Mucous membranes are moist.   Eyes:      Conjunctiva/sclera: Conjunctivae normal.   Cardiovascular:      Rate and Rhythm: Normal rate and regular rhythm.   Pulmonary:      Effort: Pulmonary effort is normal. No respiratory distress.      Breath sounds: No wheezing or rales.   Abdominal:      General: Bowel sounds are normal.      Palpations: Abdomen is soft.   Musculoskeletal:      Cervical back: Neck supple.      Right lower leg: No edema.      Left lower leg: No edema.      Comments: Lower rt arm swelling, nontender, no erythema, cool   Skin:     General: Skin is warm and dry.   Neurological:      Mental Status: She is alert and oriented to person, place, and time.   Psychiatric:         Mood and Affect: Mood normal.         Behavior: Behavior normal.       Consultants     Consult Orders (all) (From admission, onward)       Start     Ordered    08/30/23 0840  Inpatient Nutrition Consult  Once        Provider:  (Not yet assigned)    08/30/23 0839    08/28/23 0404  Inpatient Nutrition Consult  Once        Provider:  (Not yet assigned)    08/28/23 0403    08/28/23 0057  Inpatient Nephrology Consult  Once        Specialty:  Nephrology  Provider:  Pacnhito Hair MD    08/28/23 0056    08/28/23 0056  Hematology & Oncology Inpatient Consult  Once        Specialty:  Hematology and Oncology  Provider:  Jarod Hawkins II, MD    08/28/23 0055    08/28/23 0053  Inpatient Gastroenterology Consult  Once        Specialty:  Gastroenterology  Provider:  Néstor Dodson MD     08/28/23 0053    08/27/23 2249  [facility] (on-call MD unless specified) Details  Once        Specialty:  Hospitalist  Provider:  (Not yet assigned)    08/27/23 2248                  Procedures     * Surgery not found *    Imaging Results (All)       Procedure Component Value Units Date/Time    US Renal Bilateral [[id number]] Collected: 08/28/23 0802     Updated: 08/28/23 1712    Narrative:      RENAL ULTRASOUND     HISTORY: Acute kidney injury     COMPARISON: CT abdomen pelvis 8/27/2023     TECHNIQUE: Grayscale, color Doppler images of the kidneys and bladder  were obtained.     FINDINGS:  Grayscale and color Doppler images of the kidneys and bladder were  obtained.     The right kidney measures 8.5 cm in length.     The left kidney measures 9.3 cm in length.     The kidneys demonstrate normal echogenicity and cortical thickness.  There is no hydronephrosis. There are no sonographically visualized  solid contour deforming renal masses or renal calculi.     The bladder is unremarkable.     Visualized portions of the aorta and IVC are normal in caliber and  appearance.       Impression:      1.  Negative renal sonogram.           This report was finalized on 8/28/2023 5:09 PM by Dr. Deep Moran M.D.       CT Abdomen Pelvis Without Contrast [[id number]] Collected: 08/27/23 2311     Updated: 08/27/23 2318    Narrative:      CT ABDOMEN AND PELVIS WITHOUT IV CONTRAST     HISTORY: Abdominal pain. Diarrhea.     TECHNIQUE:  CT includes axial imaging from the lung bases to the  trochanters without intravenous contrast and without use of oral  contrast. Data reconstructed in coronal and sagittal planes. Radiation  dose reduction techniques were utilized, including automated exposure  control and exposure modulation based on body size.     COMPARISON: CT abdomen and pelvis 07/17/2023.     FINDINGS: There is elevation of the left hemidiaphragm with left basilar  scarring and atelectasis. Calcified nodule is present in  the  anteromedial left lung base. Right lung base appears clear. There are  multiple hepatic and splenic calcifications. No focal hepatic  abnormality is demonstrated. Cholecystectomy clips are present. Chronic  bilateral adrenal calcifications are present. There is chronic  thickening of the noncalcified segments of the left adrenal gland  particularly involving the lateral limb and this is without change.     There is no bowel dilatation or evidence for bowel obstruction.  Atherosclerotic calcifications are present involving the abdominal aorta  and iliac vasculature. No leslie enlargement is demonstrated within the  abdomen or pelvis. There is multifocal osteosclerotic metastatic  disease. Osseous metastases are present throughout the thoracic spine,  lumbar spine, sacrum, and about the pelvis without evidence for change.       Impression:      1. No evidence for acute abnormality in the abdomen or pelvis.  2. Multifocal osseous metastatic disease without interval change.  3. Chronic elevation of the left hemidiaphragm with left basilar  scarring/atelectasis.  4. Previous cholecystectomy.  5. Chronic bilateral adrenal calcifications. There is thickening of the  left adrenal gland involving the noncalcified segment and this is  without change.     Radiation dose reduction techniques were utilized, including automated  exposure control and exposure modulation based on body size.           This report was finalized on 8/27/2023 11:15 PM by Dr. Maicol Mohan M.D.       XR Chest 1 View [[id number]] Collected: 08/27/23 2238     Updated: 08/27/23 2304    Narrative:      CHEST SINGLE VIEW     HISTORY: Dyspnea.     COMPARISON: AP chest 08/07/2023, CT chest 07/17/2023.     FINDINGS: There is elevation of the left hemidiaphragm that appears  similar to the previous exam. Within the right upper lobe there is a 1  cm nodule that was also evident on previous chest CT 07/17/2023. Lungs  otherwise appear clear of focal  airspace disease. There is chronic left  basilar scarring/atelectasis and there is mild chronic blunting of the  right costophrenic angle. Osteosclerotic metastatic disease is present.  There has been left mastectomy with left axillary leslie dissection.       Impression:      No evidence for change compared to previous chest CT  07/17/2023. A 1 cm pulmonary nodule right upper lobe is similar to the  previous CT 7/17/2023. There is osseous metastatic disease.        This report was finalized on 8/27/2023 11:01 PM by Dr. Maicol Mohan M.D.             Results for orders placed during the hospital encounter of 04/27/23    Duplex Venous Lower Extremity - Left CAR    Interpretation Summary    Normal left lower extremity venous duplex scan.    2 x 2 mm hypoechoic, apparently nonvascular lesion in the proximal calf, in an area of concern.      Pertinent Labs     Results from last 7 days   Lab Units 08/31/23  0953 08/30/23  0434 08/29/23  1035 08/29/23  0410   WBC 10*3/mm3 1.59* 1.36* 1.62* 1.86*   HEMOGLOBIN g/dL 11.6* 7.6* 8.6* 7.8*   PLATELETS 10*3/mm3 106* 86* 102* 97*     Results from last 7 days   Lab Units 08/30/23  0434 08/29/23  0410 08/28/23  0928 08/28/23  0209   SODIUM mmol/L 136 135* 134* 131*   POTASSIUM mmol/L 3.8 4.2 4.6 5.2   CHLORIDE mmol/L 105 104 112* 103   CO2 mmol/L 23.8 24.0 13.7* 14.5*   BUN mg/dL 16 34* 49* 63*   CREATININE mg/dL 1.33* 1.74* 2.20* 3.31*   GLUCOSE mg/dL 114* 113* 115* 74   EGFR mL/min/1.73 43.7* 31.6* 23.9* 14.6*     Results from last 7 days   Lab Units 08/28/23  0928 08/27/23  2150   ALBUMIN g/dL 2.6* 3.7   BILIRUBIN mg/dL 0.5 0.7   ALK PHOS U/L 60 83   AST (SGOT) U/L 15 19   ALT (SGPT) U/L 13 16     Results from last 7 days   Lab Units 08/30/23  0434 08/29/23  0410 08/28/23  0928 08/28/23  0209 08/27/23  2150   CALCIUM mg/dL 8.6 8.9 7.6* 9.6 10.1   ALBUMIN g/dL  --   --  2.6*  --  3.7   MAGNESIUM mg/dL  --   --   --   --  2.0     Results from last 7 days   Lab Units  08/27/23 2150   LIPASE U/L 43     Results from last 7 days   Lab Units 08/27/23 2326 08/27/23 2150   HSTROP T ng/L 29* 34*   PROBNP pg/mL  --  217.0     Results from last 7 days   Lab Units 08/28/23  0203   SODIUM UR mmol/L 88   CREATININE UR mg/dL 110.3   EOSINOPHIL SMEAR % EOS/100 Cells 0         Invalid input(s): LDLCALC  Results from last 7 days   Lab Units 08/27/23 2326   BLOODCX  No growth at 3 days  No growth at 3 days     Results from last 7 days   Lab Units 08/27/23 2150   COVID19  Not Detected       Test Results Pending at Discharge     Pending Labs       Order Current Status    Blood Culture - Blood, Arm, Right Preliminary result    Blood Culture - Blood, Arm, Right Preliminary result            Discharge Details        Discharge Medications        Continue These Medications        Instructions Start Date   ARIPiprazole 2 MG tablet  Commonly known as: ABILIFY   2 mg, Oral, Nightly      atorvastatin 10 MG tablet  Commonly known as: LIPITOR   TAKE 1 TABLET BY MOUTH ONCE DAILY AT NIGHT      cholecalciferol 1.25 MG (39719 UT) capsule  Commonly known as: VITAMIN D3   5,000 Units, Oral, 3 Times Weekly      eszopiclone 3 MG tablet  Commonly known as: LUNESTA   3 mg, Oral      fulvestrant 250 MG/5ML chemo syringe  Commonly known as: FASLODEX   Intramuscular      LORazepam 0.5 MG tablet  Commonly known as: ATIVAN   1 tablet, Oral, Every 8 Hours PRN      meclizine 25 MG tablet  Commonly known as: ANTIVERT   25 mg, Oral, 3 Times Daily PRN      Omeprazole 20 MG Tablet Delayed Release Dispersible   20 mg, Oral, As Needed      ondansetron 8 MG tablet  Commonly known as: ZOFRAN   TAKE 1 TABLET BY MOUTH THREE TIMES DAILY AS NEEDED FORNAUSEA AND VOMITING      Palbociclib 125 MG tablet   No dose, route, or frequency recorded.      PARoxetine 40 MG tablet  Commonly known as: PAXIL   40 mg, Oral, Nightly      Xgeva 120 MG/1.7ML solution injection  Generic drug: denosumab   Subcutaneous               Allergies    Allergen Reactions    Nickel Unknown - Low Severity    Ciprofloxacin Rash       Discharge Disposition:  Home or Self Care      Discharge Diet:  Diet Order   Procedures    Diet: Gastrointestinal Diets; Fiber-Restricted, Low Irritant; Texture: Regular Texture (IDDSI 7); Fluid Consistency: Thin (IDDSI 0)       Discharge Activity:   Activity Instructions       Activity as Tolerated              CODE STATUS:    Code Status and Medical Interventions:   Ordered at: 08/28/23 0053     Level Of Support Discussed With:    Patient     Code Status (Patient has no pulse and is not breathing):    CPR (Attempt to Resuscitate)     Medical Interventions (Patient has pulse or is breathing):    Full Support       Future Appointments   Date Time Provider Department Center   9/12/2023  1:15 PM Jennifer Mantilla MD [facility] IM EAPT2 [facility]   9/18/2023  1:10 PM LAB CHAIR 5 [facility]  LAB [facility] [place]   9/18/2023  1:40 PM Yuly Sanabria APRN [facility] [place]   9/18/2023  2:00 PM INJECTION CHAIR [facility] INFUS [facility]   10/16/2023  1:10 PM LAB CHAIR 5 [facility]  LAB [facility] [place]   10/16/2023  1:40 PM Yuly Sanabria APRN [facility] CBC [facility] [place]   10/16/2023  2:00 PM INJECTION CHAIR [facility] INFUS [facility]   11/13/2023 12:00 PM [facility] NM ADMIN RM 1 [facility] NM [facility]   11/13/2023  1:00 PM NICOLAS CT 3 BH [facility] CT [facility]   11/13/2023  3:00 PM [facility] NM 3 BH [facility] NM [facility]   11/20/2023  3:30 PM LAB CHAIR 1 [facility]  LAB [facility] [place]   11/20/2023  4:00 PM Jarod Hawkins II, MD [facility] CBC [facility] [place]   11/20/2023  4:45 PM CHAIR 11 [facility] - [facility] INFUS [facility]      Contact information for follow-up providers       Jennifer Mantilla MD. Go on 9/12/2023.    Specialty: Family Medicine  Why: Appointment on Tuesday September 12, 2023 at 1:15 PM  Contact information:  [address] [facility]  [address] [place], [place] [address] [phone]               Yuly Sanabria, APRN Follow up on 9/18/2023.    Specialties: Oncology, Hematology and Oncology,  Hematology  Contact information:  [address]  [place] [address]  [phone]               Ana Rosa Moon MD Follow up on 11/3/2023.    Specialty: Nephrology  Why: You need to call the office and see if Dr. Moon wants to see you any sooner than November, since you were in the hospital.  Contact information:  [address] [place] [id number]  [phone]               Cyndi Fowler MD. Call.    Specialty: Gastroenterology  Why: Call for further instructions and follow up.  Contact information:  [address]  [place] [address]  [phone]                       Contact information for after-discharge care       Durable Medical Equipment       [facility] OF [facility] .    Service: Durable Medical Equipment  Contact information:  [address]  [facility] [address]  [phone]                                   Time Spent on Discharge:  Greater than 30 minutes      ARON Tijerina  [facility]  08/31/23  14:00 EDT

## 2023-08-31 NOTE — PROGRESS NOTES
Gastroenterology   Inpatient Progress Note    Reason for Follow Up: Diarrhea    Subjective  Interval History:   Denies bowel movement overnight.  No abdominal pain.  Hemoglobin 11.6.    Current Facility-Administered Medications:     acetaminophen (TYLENOL) tablet 650 mg, 650 mg, Oral, Q4H PRN **OR** acetaminophen (TYLENOL) 160 MG/5ML solution 650 mg, 650 mg, Oral, Q4H PRN **OR** acetaminophen (TYLENOL) suppository 650 mg, 650 mg, Rectal, Q4H PRN, Dino Gil MD    ARIPiprazole (ABILIFY) tablet 2 mg, 2 mg, Oral, Nightly, Dino Gil MD, 2 mg at 08/30/23 2025    atorvastatin (LIPITOR) tablet 10 mg, 10 mg, Oral, Nightly, Dino Gil MD, 10 mg at 08/30/23 2026    famotidine (PEPCID) tablet 10 mg, 10 mg, Oral, BID PRN, Dino Gil MD    heparin (porcine) 5000 UNIT/ML injection 5,000 Units, 5,000 Units, Subcutaneous, Q12H, Dino Gil MD, 5,000 Units at 08/30/23 2026    LORazepam (ATIVAN) tablet 0.5 mg, 0.5 mg, Oral, Q8H PRN, Dino Gil MD, 0.5 mg at 08/30/23 1726    melatonin tablet 3 mg, 3 mg, Oral, Nightly PRN, Dino Gil MD, 3 mg at 08/31/23 0035    ondansetron (ZOFRAN) tablet 4 mg, 4 mg, Oral, Q6H PRN **OR** ondansetron (ZOFRAN) injection 4 mg, 4 mg, Intravenous, Q6H PRN, Dino Gil MD    pantoprazole (PROTONIX) EC tablet 40 mg, 40 mg, Oral, Q AM, Dino Gil MD, 40 mg at 08/31/23 0623    PARoxetine (PAXIL) tablet 40 mg, 40 mg, Oral, Nightly, Dino Gil MD, 40 mg at 08/30/23 2026    [COMPLETED] Insert Peripheral IV, , , Once **AND** sodium chloride 0.9 % flush 10 mL, 10 mL, Intravenous, PRN, Uli Baca MD    sodium chloride 0.9 % flush 3 mL, 3 mL, Intravenous, Q12H, Dino Gil MD, 3 mL at 08/30/23 2026    sodium chloride 0.9 % flush 3-10 mL, 3-10 mL, Intravenous, PRN, Dino Gil MD    sodium chloride 0.9 % infusion 40 mL, 40 mL, Intravenous, PRN, Jeffery,  Dino Hsu MD    Facility-Administered Medications Ordered in Other Encounters:     chlorhexidine (PERIDEX) 0.12 % solution 15 mL, 15 mL, Mouth/Throat, Q12H, Kaylynn Ricci DO    mupirocin (BACTROBAN) 2 % nasal ointment, , Nasal, BID, Kaylynn Ricci,   Review of Systems:               The following systems were reviewed and negative;  constitution and gastrointestinal    Objective     Vital Signs  Temp:  [97.7 °F (36.5 °C)-98.2 °F (36.8 °C)] 97.9 °F (36.6 °C)  Heart Rate:  [79-94] 80  Resp:  [18-20] 18  BP: ()/(52-71) 114/71  Body mass index is 23.8 kg/m².                  General Appearance:  awake, alert, oriented, in no acute distress  Abdomen:  Soft, non-tender, normal bowel sounds; no bruits, organomegaly or masses.                Results Review:                I reviewed the patient's new clinical results.    Results from last 7 days   Lab Units 08/31/23  0953 08/30/23  0434 08/29/23  1035   WBC 10*3/mm3 1.59* 1.36* 1.62*   HEMOGLOBIN g/dL 11.6* 7.6* 8.6*   HEMATOCRIT % 33.2* 21.8* 24.3*   PLATELETS 10*3/mm3 106* 86* 102*     Results from last 7 days   Lab Units 08/30/23  0434 08/29/23  0410 08/28/23  0928 08/28/23  0209 08/27/23  2150   SODIUM mmol/L 136 135* 134*   < > 131*   POTASSIUM mmol/L 3.8 4.2 4.6   < > 6.0*   CHLORIDE mmol/L 105 104 112*   < > 100   CO2 mmol/L 23.8 24.0 13.7*   < > 14.1*   BUN mg/dL 16 34* 49*   < > 68*   CREATININE mg/dL 1.33* 1.74* 2.20*   < > 3.57*   CALCIUM mg/dL 8.6 8.9 7.6*   < > 10.1   BILIRUBIN mg/dL  --   --  0.5  --  0.7   ALK PHOS U/L  --   --  60  --  83   ALT (SGPT) U/L  --   --  13  --  16   AST (SGOT) U/L  --   --  15  --  19   GLUCOSE mg/dL 114* 113* 115*   < > 114*    < > = values in this interval not displayed.     Results from last 7 days   Lab Units 08/27/23 2150   INR  1.15*     Lab Results   Lab Value Date/Time    LIPASE 43 08/27/2023 2150       Radiology:  US Renal Bilateral   Final Result   1.  Negative renal sonogram.               This  report was finalized on 8/28/2023 5:09 PM by Dr. Deep Moran M.D.          CT Abdomen Pelvis Without Contrast   Final Result   1. No evidence for acute abnormality in the abdomen or pelvis.   2. Multifocal osseous metastatic disease without interval change.   3. Chronic elevation of the left hemidiaphragm with left basilar   scarring/atelectasis.   4. Previous cholecystectomy.   5. Chronic bilateral adrenal calcifications. There is thickening of the   left adrenal gland involving the noncalcified segment and this is   without change.       Radiation dose reduction techniques were utilized, including automated   exposure control and exposure modulation based on body size.               This report was finalized on 8/27/2023 11:15 PM by Dr. Maicol Mohan M.D.          XR Chest 1 View   Final Result   No evidence for change compared to previous chest CT   07/17/2023. A 1 cm pulmonary nodule right upper lobe is similar to the   previous CT 7/17/2023. There is osseous metastatic disease.           This report was finalized on 8/27/2023 11:01 PM by Dr. Maicol Mohan M.D.          CT Angiogram Chest Pulmonary Embolism    (Results Pending)       Assessment & Plan     Active Hospital Problems    Diagnosis     **SARAH (acute kidney injury)     Pancytopenia     Hyperkalemia     Metabolic acidosis     Dehydration     Diarrhea     Anxiety     Hyperlipidemia     Carcinoma of left breast metastatic to lung     Benign essential HTN     Malignant neoplasm metastatic to bone        Assessment:  Diarrhea  SARAH-improving  History of breast cancer  Pancytopenia-due to Ibrance    These problems are new to me    Plan:  Nursing staff to notify GI service on call if any change in clinical status.  GI PCR not sent secondary to inability to obtain sample within 72 hours of admission.  Tolerating diet  CT imaging of the abdomen unremarkable but was without contrast secondary to SARAH  Patient with recent colonoscopy with Dr. Galeas  with adenomatous polyps otherwise unremarkable.  Faslodex or ibrance possible causes of diarrhea however she has been on them since 5/22 w/o previous issues  Okay to DC from GI standpoint as her symptoms seem to have improved.  She can use Imodium or Pepto-Bismol at home for recurrent symptoms.  Have also given her our office number - should she have persistent symptoms, I have asked her to contact our office for further instructions and follow-up.      GI will sign off for now.  As always, thank you for allowing us to participate in the care of your patient.  If we can be of further assistance please not hesitate to reach out to us.     I discussed the patients findings and my recommendations with patient.          ARON Kimball  [facility], [facility] [place]  [address] [place] [address]

## 2023-08-31 NOTE — PLAN OF CARE
Goal Outcome Evaluation:  Plan of Care Reviewed With: patient        Progress: no change  Outcome Evaluation: Maintained on R/A. 2 units of PRBC's completed, tolerated well with Lasix 20 mg IV given between units. B/P soft at times. Pt does report some intermittent dizziness. Instructed pt to call for assist OOB. Creatine trending down. Safety maintained.

## 2023-08-31 NOTE — PROGRESS NOTES
[facility] Progress Note      Patient Name: Marialuisa Vivar  : 1955  MRN: [id number]  Primary Care Physician:  Jennifer Mantilla MD  Date of admission: 2023    Subjective     Interval History:       Seen and examined.  No shortness of air or chest pain.  Received 2 unit of packed red blood cells.      Review of Systems:   As noted above    Objective     Vitals:   Temp:  [97.5 °F (36.4 °C)-98.2 °F (36.8 °C)] 98.2 °F (36.8 °C)  Heart Rate:  [79-94] 80  Resp:  [18-20] 18  BP: ()/(51-66) 106/66    Intake/Output Summary (Last 24 hours) at 2023 0535  Last data filed at 2023 0127  Gross per 24 hour   Intake 1800.08 ml   Output 1200 ml   Net 600.08 ml       Physical Exam:    General Appearance: alert, oriented x 3, no acute distress   Skin: warm and dry  HEENT: oral mucosa normal, nonicteric sclera  Neck: supple, no JVD  Lungs: CTA  Heart: RRR, normal S1 and S2  Abdomen: soft, nontender, nondistended  : no palpable bladder  Extremities: no edema, cyanosis or clubbing  Neuro: normal speech and mental status     Scheduled Meds:     ARIPiprazole, 2 mg, Oral, Nightly  atorvastatin, 10 mg, Oral, Nightly  heparin (porcine), 5,000 Units, Subcutaneous, Q12H  pantoprazole, 40 mg, Oral, Q AM  PARoxetine, 40 mg, Oral, Nightly  sodium chloride, 3 mL, Intravenous, Q12H      IV Meds:          Results Reviewed:   I have personally reviewed the results from the time of this admission to 2023 05:35 EDT     Results from last 7 days   Lab Units 23  0434 23  0410 23  0928 23  0209 23  2150   SODIUM mmol/L 136 135* 134*   < > 131*   POTASSIUM mmol/L 3.8 4.2 4.6   < > 6.0*   CHLORIDE mmol/L 105 104 112*   < > 100   CO2 mmol/L 23.8 24.0 13.7*   < > 14.1*   BUN mg/dL 16 34* 49*   < > 68*   CREATININE mg/dL 1.33* 1.74* 2.20*   < > 3.57*   CALCIUM mg/dL 8.6 8.9 7.6*   < > 10.1   BILIRUBIN mg/dL  --   --  0.5  --  0.7   ALK PHOS U/L  --   --  60  --  83    ALT (SGPT) U/L  --   --  13  --  16   AST (SGOT) U/L  --   --  15  --  19   GLUCOSE mg/dL 114* 113* 115*   < > 114*    < > = values in this interval not displayed.     Estimated Creatinine Clearance: 41.5 mL/min (A) (by C-G formula based on SCr of 1.33 mg/dL (H)).  Results from last 7 days   Lab Units 08/27/23  2150   MAGNESIUM mg/dL 2.0         Results from last 7 days   Lab Units 08/30/23  0434 08/29/23  1035 08/29/23  0410 08/28/23  0209 08/27/23  2150   WBC 10*3/mm3 1.36* 1.62* 1.86* 2.55* 3.67   HEMOGLOBIN g/dL 7.6* 8.6* 7.8* 10.5* 11.6*   PLATELETS 10*3/mm3 86* 102* 97* 126* 145     Results from last 7 days   Lab Units 08/27/23  2150   INR  1.15*       Assessment / Plan     ASSESSMENT:  1.  Acute kidney injury associated with metabolic acidosis and hyperkalemia that is likely due to severe intravascular volume depletion due to severe diarrhea.   Her urinalysis essentially bland  2.  Chronic kidney disease stage III with baseline creatinine 1.5-1.7 mg/dL  3.  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  Diarrhea and dehydration that might be side effect of her chemotherapy  5.  History of dyslipidemia  6.  Hyperkalemia and metabolic acidosis  7.  Pancytopenia due to Ibrance        PLAN:     Her kidney function continues to improve.  Patient will be okay to be discharged from kidney standpoint to follow-up with nephrology as an outpatient however she remains neutropenic  Surveillance labs    Thank you for involving us in the care of Marialuisa Vivar.  Please feel free to call with any questions.    Carlos Farfan MD  08/31/23  05:35 EDT    [facility]  [phone]

## 2023-09-01 ENCOUNTER — TRANSITIONAL CARE MANAGEMENT TELEPHONE ENCOUNTER (OUTPATIENT)
Dept: CALL CENTER | Facility: HOSPITAL | Age: 68
End: 2023-09-01
Payer: MEDICARE

## 2023-09-01 ENCOUNTER — SPECIALTY PHARMACY (OUTPATIENT)
Dept: PHARMACY | Facility: HOSPITAL | Age: 68
End: 2023-09-01
Payer: MEDICARE

## 2023-09-01 LAB
BACTERIA SPEC AEROBE CULT: NORMAL
BACTERIA SPEC AEROBE CULT: NORMAL

## 2023-09-01 NOTE — CASE MANAGEMENT/SOCIAL WORK
Case Management Discharge Note      Final Note: DC home, family to transport    Provided Post Acute Provider List?: N/A  Provided Post Acute Provider Quality & Resource List?: N/A    Selected Continued Care - Discharged on 8/31/2023 Admission date: 8/27/2023 - Discharge disposition: Home or Self Care      Destination    No services have been selected for the patient.                Durable Medical Equipment Coordination complete.      Service Provider Selected Services Address Phone Fax Patient Preferred    [facility] [id number] [place] [id number] [phone] --              Dialysis/Infusion    No services have been selected for the patient.                Home Medical Care    No services have been selected for the patient.                Therapy    No services have been selected for the patient.                Community Resources    No services have been selected for the patient.                Community & DME    No services have been selected for the patient.                    Selected Continued Care - Episodes Includes continued care and service providers with selected services from the active episodes listed below      Oncology Episode start date: 4/26/2022   There are no active outsourced providers for this episode.                 Transportation Services  Private: Car    Final Discharge Disposition Code: 01 - home or self-care

## 2023-09-01 NOTE — OUTREACH NOTE
Call Center TCM Note      Flowsheet Row Responses   [facility] patient discharged from? [place]   Does the patient have one of the following disease processes/diagnoses(primary or secondary)? Other   TCM attempt successful? No   Unsuccessful attempts Attempt 1            Christianne Stack LPN    9/1/2023, 12:51 EDT

## 2023-09-01 NOTE — OUTREACH NOTE
Call Center TCM Note      Flowsheet Row Responses   [facility] patient discharged from? [place]   Does the patient have one of the following disease processes/diagnoses(primary or secondary)? Other   TCM attempt successful? No   Unsuccessful attempts Attempt 2            Christianne Stack LPN    9/1/2023, 14:37 EDT

## 2023-09-02 ENCOUNTER — TRANSITIONAL CARE MANAGEMENT TELEPHONE ENCOUNTER (OUTPATIENT)
Dept: CALL CENTER | Facility: HOSPITAL | Age: 68
End: 2023-09-02
Payer: MEDICARE

## 2023-09-02 NOTE — OUTREACH NOTE
Call Center TCM Note      Flowsheet Row Responses   [facility] patient discharged from? [place]   Does the patient have one of the following disease processes/diagnoses(primary or secondary)? Other   TCM attempt successful? No   Unsuccessful attempts Attempt 3            Fatuma Diez RN    9/2/2023, 10:54 EDT

## 2023-09-12 ENCOUNTER — OFFICE VISIT (OUTPATIENT)
Dept: INTERNAL MEDICINE | Facility: CLINIC | Age: 68
End: 2023-09-12
Payer: MEDICARE

## 2023-09-12 VITALS
BODY MASS INDEX: 22.99 KG/M2 | OXYGEN SATURATION: 96 % | HEIGHT: 65 IN | HEART RATE: 63 BPM | WEIGHT: 138 LBS | TEMPERATURE: 98.2 F | SYSTOLIC BLOOD PRESSURE: 105 MMHG | DIASTOLIC BLOOD PRESSURE: 68 MMHG

## 2023-09-12 DIAGNOSIS — E87.5 HYPERKALEMIA: ICD-10-CM

## 2023-09-12 DIAGNOSIS — N17.9 ACUTE RENAL FAILURE, UNSPECIFIED ACUTE RENAL FAILURE TYPE: Primary | ICD-10-CM

## 2023-09-12 DIAGNOSIS — D64.9 ANEMIA, UNSPECIFIED TYPE: ICD-10-CM

## 2023-09-12 NOTE — PROGRESS NOTES
Subjective   Marialuisa Vivar is a 67 y.o. female.   Chief Complaint   Patient presents with    Hospital Follow Up Visit     SARAH, pancytopenia, lightheadedness.       History of Present Illness     SARAH, dehydration, anemia-patient was admitted to hospital from 8/27 to 8/31.  She went to hospital because of the weakness, lightheadedness and diarrhea.  She was diagnosed with dehydration, hyperkalemia and SARAH with metabolic acidosis.  Nephrology, oncology and GI were consulted.  She had abdominal/pelvic CT done which was negative for acute changes.  Renal ultrasound which was negative.  Chest x-ray was negative for acute changes.  She was treated with IV fluids.  She responded to treatment.  Diarrhea resolved before stool sample could be collected.  She had pancytopenia secondary to treatment with Ibrance.  She required blood transfusion.  Hemoglobin at discharge was 11.6 from 7.8.  Creatinine at discharge was 1.33 from 3.57.  Potassium 3.8 from 6.0.  She is here today for follow-up.  She feels a little stronger.  Stool is back to baseline.  She has no abdominal pain, no blood seen in stool.  She still gets lightheaded when she stands up quickly.  She is scheduled with oncology next week.  She takes no NSAIDs.  Final blood culture results came negative x2.    Review of Systems   Constitutional:  Positive for fatigue.   Gastrointestinal:  Negative for abdominal pain, blood in stool and vomiting.   Genitourinary:  Negative for dysuria and hematuria.       Objective   Wt Readings from Last 3 Encounters:   09/12/23 62.6 kg (138 lb)   08/22/23 64.9 kg (143 lb)   08/07/23 71.7 kg (158 lb)      Vitals:    09/12/23 1320   BP: 105/68   Pulse: 63   Temp: 98.2 °F (36.8 °C)   SpO2: 96%     Temp Readings from Last 3 Encounters:   09/12/23 98.2 °F (36.8 °C)   08/31/23 98.1 °F (36.7 °C) (Oral)   08/22/23 98.2 °F (36.8 °C)     BP Readings from Last 3 Encounters:   09/12/23 105/68   08/31/23 117/70   08/22/23 94/60     Pulse Readings from  Last 3 Encounters:   09/12/23 63   08/31/23 82   08/22/23 93     Body mass index is 22.96 kg/m².    Physical Exam  Constitutional:       Appearance: She is well-developed.   Neck:      Vascular: No carotid bruit.   Cardiovascular:      Rate and Rhythm: Normal rate and regular rhythm.      Heart sounds: Normal heart sounds.   Pulmonary:      Effort: Pulmonary effort is normal.      Breath sounds: Normal breath sounds.   Skin:     General: Skin is warm and dry.   Neurological:      Mental Status: She is alert.       Assessment & Plan   Diagnoses and all orders for this visit:    1. Acute renal failure, unspecified acute renal failure type (Primary)  -     Basic Metabolic Panel    2. Anemia, unspecified type    3. Hyperkalemia        SARAH/lightheadedness/anemia/hyperkalemia - Hospital records were reviewed.  Medications are updated.  Blood work results, imaging results were reviewed.  She is advised to increase hydration and to stand up slowly.  She is advised to walk with a cane.  We are checking BMP today.  Follow-up with specialist as recommended by them.

## 2023-09-13 DIAGNOSIS — N17.9 ACUTE RENAL FAILURE, UNSPECIFIED ACUTE RENAL FAILURE TYPE: Primary | ICD-10-CM

## 2023-09-13 LAB
BUN SERPL-MCNC: 28 MG/DL (ref 8–23)
BUN/CREAT SERPL: 13.5 (ref 7–25)
CALCIUM SERPL-MCNC: 11.6 MG/DL (ref 8.6–10.5)
CHLORIDE SERPL-SCNC: 100 MMOL/L (ref 98–107)
CO2 SERPL-SCNC: 21.6 MMOL/L (ref 22–29)
CREAT SERPL-MCNC: 2.07 MG/DL (ref 0.57–1)
EGFRCR SERPLBLD CKD-EPI 2021: 25.7 ML/MIN/1.73
GLUCOSE SERPL-MCNC: 104 MG/DL (ref 65–99)
POTASSIUM SERPL-SCNC: 4.9 MMOL/L (ref 3.5–5.2)
SODIUM SERPL-SCNC: 136 MMOL/L (ref 136–145)

## 2023-09-18 ENCOUNTER — LAB (OUTPATIENT)
Dept: LAB | Facility: HOSPITAL | Age: 68
End: 2023-09-18
Payer: MEDICARE

## 2023-09-18 ENCOUNTER — OFFICE VISIT (OUTPATIENT)
Dept: ONCOLOGY | Facility: CLINIC | Age: 68
End: 2023-09-18
Payer: MEDICARE

## 2023-09-18 ENCOUNTER — INFUSION (OUTPATIENT)
Dept: ONCOLOGY | Facility: HOSPITAL | Age: 68
End: 2023-09-18
Payer: MEDICARE

## 2023-09-18 ENCOUNTER — DOCUMENTATION (OUTPATIENT)
Dept: ONCOLOGY | Facility: CLINIC | Age: 68
End: 2023-09-18
Payer: MEDICARE

## 2023-09-18 VITALS
DIASTOLIC BLOOD PRESSURE: 62 MMHG | OXYGEN SATURATION: 95 % | WEIGHT: 135.2 LBS | BODY MASS INDEX: 22.53 KG/M2 | SYSTOLIC BLOOD PRESSURE: 86 MMHG | HEIGHT: 65 IN | HEART RATE: 140 BPM | TEMPERATURE: 97.7 F

## 2023-09-18 VITALS — HEART RATE: 99 BPM | DIASTOLIC BLOOD PRESSURE: 66 MMHG | SYSTOLIC BLOOD PRESSURE: 102 MMHG

## 2023-09-18 DIAGNOSIS — C79.51 MALIGNANT NEOPLASM METASTATIC TO BONE: ICD-10-CM

## 2023-09-18 DIAGNOSIS — C79.51 MALIGNANT NEOPLASM METASTATIC TO BONE: Primary | ICD-10-CM

## 2023-09-18 DIAGNOSIS — E86.0 DEHYDRATION: ICD-10-CM

## 2023-09-18 DIAGNOSIS — I95.89 HYPOTENSION DUE TO HYPOVOLEMIA: ICD-10-CM

## 2023-09-18 DIAGNOSIS — C50.912 CARCINOMA OF LEFT BREAST METASTATIC TO LUNG: Primary | ICD-10-CM

## 2023-09-18 DIAGNOSIS — C50.912 CARCINOMA OF LEFT BREAST METASTATIC TO LUNG: ICD-10-CM

## 2023-09-18 DIAGNOSIS — C78.02 CARCINOMA OF LEFT BREAST METASTATIC TO LUNG: ICD-10-CM

## 2023-09-18 DIAGNOSIS — C78.02 CARCINOMA OF LEFT BREAST METASTATIC TO LUNG: Primary | ICD-10-CM

## 2023-09-18 DIAGNOSIS — R00.0 TACHYCARDIA: ICD-10-CM

## 2023-09-18 DIAGNOSIS — E86.1 HYPOTENSION DUE TO HYPOVOLEMIA: ICD-10-CM

## 2023-09-18 LAB
ALBUMIN SERPL-MCNC: 4.2 G/DL (ref 3.5–5.2)
ALBUMIN/GLOB SERPL: 1.4 G/DL
ALP SERPL-CCNC: 84 U/L (ref 39–117)
ALT SERPL W P-5'-P-CCNC: 10 U/L (ref 1–33)
ANION GAP SERPL CALCULATED.3IONS-SCNC: 20.7 MMOL/L (ref 5–15)
AST SERPL-CCNC: 26 U/L (ref 1–32)
BASOPHILS # BLD AUTO: 0.03 10*3/MM3 (ref 0–0.2)
BASOPHILS NFR BLD AUTO: 0.9 % (ref 0–1.5)
BILIRUB SERPL-MCNC: 1 MG/DL (ref 0–1.2)
BUN SERPL-MCNC: 33 MG/DL (ref 8–23)
BUN/CREAT SERPL: 17 (ref 7–25)
CALCIUM SPEC-SCNC: 10.6 MG/DL (ref 8.6–10.5)
CHLORIDE SERPL-SCNC: 100 MMOL/L (ref 98–107)
CO2 SERPL-SCNC: 15.3 MMOL/L (ref 22–29)
CREAT SERPL-MCNC: 1.94 MG/DL (ref 0.6–1.1)
DEPRECATED RDW RBC AUTO: 49.3 FL (ref 37–54)
EGFRCR SERPLBLD CKD-EPI 2021: 27.8 ML/MIN/1.73
EOSINOPHIL # BLD AUTO: 0.04 10*3/MM3 (ref 0–0.4)
EOSINOPHIL NFR BLD AUTO: 1.2 % (ref 0.3–6.2)
ERYTHROCYTE [DISTWIDTH] IN BLOOD BY AUTOMATED COUNT: 14.1 % (ref 12.3–15.4)
GLOBULIN UR ELPH-MCNC: 2.9 GM/DL
GLUCOSE SERPL-MCNC: 115 MG/DL (ref 65–99)
HCT VFR BLD AUTO: 39.9 % (ref 34–46.6)
HGB BLD-MCNC: 14.2 G/DL (ref 12–15.9)
IMM GRANULOCYTES # BLD AUTO: 0.01 10*3/MM3 (ref 0–0.05)
IMM GRANULOCYTES NFR BLD AUTO: 0.3 % (ref 0–0.5)
LYMPHOCYTES # BLD AUTO: 1.52 10*3/MM3 (ref 0.7–3.1)
LYMPHOCYTES NFR BLD AUTO: 46.6 % (ref 19.6–45.3)
MCH RBC QN AUTO: 34 PG (ref 26.6–33)
MCHC RBC AUTO-ENTMCNC: 35.6 G/DL (ref 31.5–35.7)
MCV RBC AUTO: 95.5 FL (ref 79–97)
MONOCYTES # BLD AUTO: 0.25 10*3/MM3 (ref 0.1–0.9)
MONOCYTES NFR BLD AUTO: 7.7 % (ref 5–12)
NEUTROPHILS NFR BLD AUTO: 1.41 10*3/MM3 (ref 1.7–7)
NEUTROPHILS NFR BLD AUTO: 43.3 % (ref 42.7–76)
NRBC BLD AUTO-RTO: 0 /100 WBC (ref 0–0.2)
PLATELET # BLD AUTO: 282 10*3/MM3 (ref 140–450)
PMV BLD AUTO: 9.4 FL (ref 6–12)
POTASSIUM SERPL-SCNC: 4.6 MMOL/L (ref 3.5–5.2)
PROT SERPL-MCNC: 7.1 G/DL (ref 6–8.5)
RBC # BLD AUTO: 4.18 10*6/MM3 (ref 3.77–5.28)
SODIUM SERPL-SCNC: 136 MMOL/L (ref 136–145)
WBC NRBC COR # BLD: 3.26 10*3/MM3 (ref 3.4–10.8)

## 2023-09-18 PROCEDURE — 96402 CHEMO HORMON ANTINEOPL SQ/IM: CPT

## 2023-09-18 PROCEDURE — 80053 COMPREHEN METABOLIC PANEL: CPT

## 2023-09-18 PROCEDURE — 36415 COLL VENOUS BLD VENIPUNCTURE: CPT

## 2023-09-18 PROCEDURE — 96360 HYDRATION IV INFUSION INIT: CPT

## 2023-09-18 PROCEDURE — 85025 COMPLETE CBC W/AUTO DIFF WBC: CPT

## 2023-09-18 RX ORDER — LAMOTRIGINE 25 MG/1
500 TABLET ORAL ONCE
Status: DISCONTINUED | OUTPATIENT
Start: 2023-09-18 | End: 2023-09-18 | Stop reason: HOSPADM

## 2023-09-18 RX ORDER — SODIUM CHLORIDE 9 MG/ML
1000 INJECTION, SOLUTION INTRAVENOUS CONTINUOUS
Status: DISCONTINUED | OUTPATIENT
Start: 2023-09-18 | End: 2023-09-18 | Stop reason: HOSPADM

## 2023-09-18 RX ORDER — LAMOTRIGINE 25 MG/1
500 TABLET ORAL ONCE
Status: CANCELLED
Start: 2023-09-18 | End: 2023-09-18

## 2023-09-18 RX ADMIN — SODIUM CHLORIDE 1000 ML: 9 INJECTION, SOLUTION INTRAVENOUS at 15:47

## 2023-09-18 NOTE — PROGRESS NOTES
"        REASON FOR FOLLOWUP/CHIEF COMPLAINT:  Breast cancer    HISTORY OF PRESENT ILLNESS:   Patient is a 67-year-old female with the above-mentioned history who is here today for hospital follow-up due for Faslodex.  She has not had her Faslodex injection since June.  Patient comes in today reporting that she is feeling very weak, and overall does not feel well.  She states that she did fall yesterday.  She is been trying to get up really slowly, because at times she gets weak after walking for a while, and will have a fall.  She does report \"a little bit of vomiting\", but denies diarrhea.  Patient states that she gets nauseated when she thinks about eating, so she admits she has not been eating very well.  For example, today she has only had half of a .  She is trying to drink fluids, but knows that she is not drinking adequately.  When questioned if she had taken her Ibrance, patient states that she has not done well taking her meds the past several days, and does not remember if she is taking her medication or not.      Past Medical History, Past Surgical History, Social History, Family History have been reviewed and are without significant changes except as mentioned.    Review of Systems   Review of Systems  ROS as per HPI    Medications:  The current medication list was reviewed in the EMR    ALLERGIES:    Allergies   Allergen Reactions    Nickel Unknown - Low Severity    Ciprofloxacin Rash              Vitals:    09/18/23 1502   BP: (!) 86/62   Pulse: (!) 140   Temp: 97.7 °F (36.5 °C)   TempSrc: Infrared   SpO2: 95%   Weight: 61.3 kg (135 lb 3.2 oz)   Height: 165.1 cm (65\")   PainSc: 0-No pain     Physical Exam  Vitals reviewed.   Constitutional:       General: She is not in acute distress.     Appearance: Normal appearance. She is well-developed.      Comments: Seated in wheelchair   HENT:      Head: Normocephalic and atraumatic.   Eyes:      Pupils: Pupils are equal, round, and reactive to " light.   Cardiovascular:      Rate and Rhythm: Normal rate and regular rhythm.      Heart sounds: Normal heart sounds. No murmur heard.  Pulmonary:      Effort: Pulmonary effort is normal. No respiratory distress.      Breath sounds: Normal breath sounds. No wheezing, rhonchi or rales.   Abdominal:      General: Bowel sounds are normal. There is no distension.      Palpations: Abdomen is soft.   Musculoskeletal:         General: Normal range of motion.      Cervical back: Normal range of motion.   Skin:     General: Skin is warm and dry.      Coloration: Skin is pale.      Findings: No rash.   Neurological:      Mental Status: She is alert and oriented to person, place, and time.           RECENT LABS:  WBC   Date Value Ref Range Status   09/18/2023 3.26 (L) 3.40 - 10.80 10*3/mm3 Final     Hemoglobin   Date Value Ref Range Status   09/18/2023 14.2 12.0 - 15.9 g/dL Final     Platelets   Date Value Ref Range Status   09/18/2023 282 140 - 450 10*3/mm3 Final     Lab Results   Component Value Date    GLUCOSE 115 (H) 09/18/2023    BUN 33 (H) 09/18/2023    CREATININE 1.94 (C) 09/18/2023    EGFRRESULT 25.7 (L) 09/12/2023    EGFR 27.8 (L) 09/18/2023    BCR 17.0 09/18/2023    K 4.6 09/18/2023    CO2 15.3 (L) 09/18/2023    CALCIUM 10.6 (C) 09/18/2023    PROTENTOTREF 7.3 06/20/2023    ALBUMIN 4.2 09/18/2023    BILITOT 1.0 09/18/2023    AST 26 09/18/2023    ALT 10 09/18/2023         ASSESSMENT/PLAN:  Marialuisa Vivar Room/bed info not found       *Metastatic breast cancer to bilateral lungs and soft tissue in the mediastinum (likely the cause of her hoarseness). (Initial breast cancer diagnosed in 1993 treated with mastectomy, chemotherapy, radiation therapy and tamoxifen). Arimidex day 1 on 02/17/2010. PET scan late August 2011 shows no abnormal hypermetabolic activity. This has improved compared to the prior PET scan January 2010 which showed mild hypermetabolic activity in areas of metastasis. (In the past, her  T6 lesion did  not show up on CT scans or bone scan. It was seen by PET scan.) We have been following with CT scans only every 6 months and PET scans on an as needed only basis. Sometimes her CT scanned pulmonary nodules are read as benign since they have been stable through the course of years but we know they are malignant because they have been surgically sampled in the past.    CT 8/16/16 shows increased sclerosis at T6 compared to 4/28/15.    PET 10/11/16: Slight uptake at T6, SUV 3.2.  mild thickening of right adrenal gland with an SUV of 11.   Continued Arimidex is minimal progression and had been on this since 2/17/10.  Not referred for radiation since no pain at T6  CT 1/27/17, unchanged.  CT 8/1/17: Unchanged except subtle suggestion of mild increase in thickening of the right adrenal gland.    CT 2/22/18, unchanged.  CT 9/6/18: Unchanged except nodular thickening right adrenal gland significantly decreased.  CT 9/5/2019: Unchanged.  On the 6/12/2020 visit, the following scans were reviewed:  CT neck and chest 5/11/2020, [facility], from 5/9/2019: Stable pulmonary nodules new lytic C7 lesion and posterior T1 lesion questionable T12 lesion.  No change in the T6 and T10 lesions.  PET, [facility], 5/19/2020: Mildly enlarged left neck nodes are mildly hypermetabolic.  Diffuse activity throughout the thickened left adrenal gland.  CT appearance unchanged from 5/9/2019.  Progressive T1 lesion seen on CT from 5/11/2020, mild some moderate activity.  T10 low-grade activity with mixed lucent and sclerotic findings.  T6 is a mixed lucent and sclerotic appearance but no significant activity.   T12 (the biopsy report is labeled as T12 but patient insists this was a C7/T1 biopsy, not to T12) biopsy 6/15/2020, [facility]: Metastatic breast carcinoma.  ER >95%.  CO 0%.  HER-2 negative (1+)  It seems the main progression at the 6/12/2020 visit was a new C7 and new T1 lesion.  Those were radiated with CyberKnife through [facility] early June 2020.   Because Arimidex has been working well since 2010, continue same Arimidex.  Add Zometa every 3 months.  C7 and T1 found on CT 5/11/2020, [facility].  CyberKnife, Dr. Winston, early June 2020.  CT C and T-spine 9/17/2020: New C7 lesion from 9/6/2018, but no change from 5/11/2020 CT.  T1 lesion stable from 5/11/2020, but new compared to 2/22/2018.  T10 lesion unchanged from 9/5/2019, but new from 2/22/2018.  Subtle 8 mm T12 lesion new from 9/5/2019.  Therefore, no recent progression.  Considering her good tolerance and long-term effectiveness of Arimidex, continue same therapy.  CT CAP 12/10/2020: No progression  Therefore, continue Arimidex.  CA 15-3 normal through 12/14/2020  Because CA 15-3 fabian to 27 on 4/13/2021, then 29.3 on 7/20/2021, then 34.3 on 10/12/2021, CT scans done earlier than planned:  CT CAP with contrast 10/12/2021: New 5.2 x 4.6 cm mixed cystic and solid right ovarian mass compared to 12/10/2020.  Continued stability of bilateral pulmonary nodules and stable sclerotic bone metastasis.  11/4/2021: TLH/BSO (due to new 5 cm right ovarian mass on CT, 11/12/2021).  Metastatic breast cancer involving bilateral ovaries and posterior uterine serosa.  ER 81-90%.  VA 61-70%.  HER-2 negative  [facility] NGS: ER 98%.  VA 90%.  HER-2/eb negative.  AKT1 pathogenic variant, exon 3.  AR+, 95%.  No other significant mutations including negative PIK3CA  Pelvic washings: Adenocarcinoma  Because this was the only area of progression on CT, and has been resected, continue Arimidex which she has been on since 2/17/2010.  11/22/2021: Discussed with Dr. Blum, who has a focus on breast cancer.  We both agree: Continue Arimidex for now.  In the future, if significant progression is seen, then plan Faslodex injections with Ibrance  CT CAP 2/1/2022: A few T-spine sclerotic lesions progressively more sclerotic over multiple prior CTs of indeterminate significance.  No clear signs of progression of disease.  3/15/2022: Although tumor  marker is not rising and CT showed no clear signs of progression.  CT revealed progressively more sclerotic bone metastasis over multiple prior CTs, she is having increased right hip discomfort.  Sometimes this can be due to healing of metastasis (but she has been on the same treatment since 2010), or this could mean progression of bone metastasis.  It is reassuring that her tumor marker is not worsening.  She would like to see [facility] to see if they feel radiation to the hip would help her discomfort.  We will do a bone scan before that appointment as this might help [facility].  (Although she has seen [facility] radiation for her neck, she would like to see [facility] for this hip issue).  3/18/2022, bone scan: Metastatic disease at T9 and T10, corresponding to CT lesions.  Uptake also at T6, but to lesser degree.  These areas are new compared to last bone scan, 6/3/2009.  Therefore, overall, appears consistent with progression.  3/24/2021: Discussed changing  Arimidex to Faslodex/Ibrance 125 mg D1-21/28 days.  However, Dr. Llanes will decide on 4/12/2022 if the patient needs any traditional XRT to hip/pelvis.  If so, this may cause cytopenias due to the location of XRT.  Ibrance can also cause cytopenias.  If this is the case, we will wait to begin Ibrance until at least a few weeks after XRT completes, provided blood counts allow.   4/22/2022: Dr. Llanes reviewed MRI right hip from 4/19/2022, revealing L3 metastasis, similar size of prior CTs.  Therefore, she plans no XRT.  5/6/2022: Began Faslodex Ibrance  CT 7/18/2022: Some bone mets more sclerotic, which could reflect treatment response.  Slightly increased RUL nodule, 1.1 cm, from 0.9 cm on 2/1/2022  Bone scan 7/18/2022: No change from 3/18/2022  8/19/2022 (patient delayed follow-up to review scans until then): Continue same therapy since no significant progression of disease  Bone scan 1/26/2023: Under  represents bone metastasis compared to CT scans.  Slightly more intense uptake at T12 which was noted to perhaps represent treatment response.  CT 1/26/2023: A few new subcentimeter indeterminate RLL pulmonary nodules.  Radiologist recommended follow-up chest imaging in 6 to 8 weeks.  Otherwise no change in the other bilateral pulmonary nodules.  No change in scattered bone metastasis except the T12 lesion continues to increase in sclerosis.  2/28/2023 office visit:  Missed the mid November 2022 Faslodex and the mid January 2023 Faslodex and the mid February 2023 Faslodex.  Although CT might represent slight progression, she has missed a few doses of Faslodex.  Would like a strong reason to change therapy since she has been doing well on Faslodex for quite some time.  Therefore, patient agrees: Maintain her same imaging interval, 6 months from last  As of 8/20/2023, last Faslodex was 6/8/2023 (typically is given every 4 weeks)  CT and bone scan 7/17/23, from 1/26/2023: Mostly stable.  New focus of uptake anterior left second rib and in hindsight radiologist saw subtle area of sclerosis and osteolysis on CT images from 7/17/2023, new from 1/26/2023.  Radiologist noted does not have the appearance for posttraumatic change or osteonecrosis and the most likely etiology is a new metastatic focus.  Interval decrease in activity large right frontal lesion.  Spine lesion stable.  She did not come to the office to review these scans  Came to the ER 8/27/2023 for severe diarrhea.  CT AP 8/27/2023, from 7/17/2023: No significant change.   8/28/2023: She self stopped Ibrance for 5 days prior to admission due to feeling so weak with diarrhea.  When she recovers from her current issues and is able to return to the office, resume Faslodex every 4-week injections with Ibrance oral D1-21/28 days.  8/29/2023: Only 1 loose bowel movement in the last 24 hours.  She agrees: I asked our office to arrange MD or NP with Faslodex in 2 to 3  weeks.  8/30/2023: She agrees with the above plan.  Patient seen in the office 9/18/2023.  When she initially presented to the office she was complaining of weakness, was found to be hypotensive follow-up initial blood pressure 86/62, heart rate 140.  Patient has been afebrile, but admits that she is not eating and drinking well.  Patient will be given 1 L of IV normal saline.  She was advised to hold off on starting her Ibrance.  She will return on Thursday for repeat stat CMP as well as IV fluids.  Patient to return in 1 week for follow-up visit with nurse practitioner with repeat labs reevaluation and possible Faslodex.     CA 15-3:  31.7 on 2/28/2023, from 27.3 on 8/19/2022, from 22.5 on 5/6/2022, from 26.3 on 2/1/2022, from 32.6 on 11/22/2021, from 34.3 on 10/12/2021, from 29.3 on 7/20/2021, from 27 on 4/13/2021, from 23.3 on 12/14/2020.     *Bony metastases  T6 discovered by PET to August 2011 (did not show up by CT or bone scan)  C7 and T1 found on CT 5/11/2020, [facility].  CyberKnife, Dr. Winston, early June 2020.  June 2020 began Zometa every 3 months.  She was warned of possible osteonecrosis of the jaw and renal insufficiency.  She states she has good dental health and sees her dentist regularly.  Because of prior hypercalcemia requiring stopping calcium supplements and because of high normal current calcium level, began without supplemental calcium.  Add calcium if calcium becomes low.  1/7/2022: Tooth extracted.  Plan was to wait 5 weeks to resume Zometa.  Patient delayed return until 3/15/2022 (over 8 weeks)  May 2022: Changed Zometa to Xgeva every 3 months due to Cr up to 1.6  2/28/2023: She request to hold off on Xgeva because she is in the process of having dental work     *Bone health. Last bone density 10/11/16, worsened, but still normal with worst T score -0.9.  Patient stopped calcium January 2016, but remained on vitamin D.  I recommended she restart her calcium.  Stopped calcium early March 2018  due to hypercalcemia.  DEXA 9/5/2019: Normal bone density     *Hypercalcemia.  Repeat calcium 3/7/18 normal, but patient self stopped calcium a few days prior.  Recommended she stay off calcium.  Calcium was 11 on 6/19/2020  Calcium 11 again on 12/10/2020  Repeat calcium 10.5 on 12/14/2020  Calcium 10.6.  Minimally elevated.  (Normal range up to 10.5)  Calcium 10.8 on 3/15/2022.  Hopefully, Zometa will help with this.  9/19/2023 calcium 10.6.     *Hypophosphatemia  Phosphorus 2.3 on 6/24/2022.  Begin K-Phos Neutral 2 tablets twice daily  Patient self stopped this around mid July 2022.  Phosphorus normal, 3.6 on 2/28/2023     *On the 10/5/16 visit, Tachycardia, dyspnea on exertion, bilateral leg swelling, more sedentary recently.  CT PE protocol and bilateral lower extremity Dopplers 10/5/16, negative for clots.  She still has tachycardia and dyspnea on exertion.  Perhaps at least part of this is due to deconditioning.     *Previously complained of colon Right lower anterior rib discomfort.  CT unremarkable in that area.  No specific pain, just discomfort.  I explained to the patient if disease was found by PET scan or bone scan, I would not  since she has been doing well on Arimidex since 2010.   Currently denies pain.      *Depression/anxiety.  This limits compliance.  She sees a psychiatrist and a counselor regularly.  She states this is mostly due to body image issues instead of cancer.   She continues with her counselor and psychiatrist.  Viral pandemic is making this a little more difficult.  4/27/2022: We will try to get her involved in some cancer support groups.  I also encouraged her to try out some small groups at [employer] (she struggles with loneliness).  2/28/2023: Has missed some visits due to emotional issues.  Offered behavioral oncology help.  She declines.  She is going to continue with her psychiatrist and counselor     *Noncompliance due to depression/anxiety.    Although she often  misses appointments, she does reschedule and eventually come in.     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  Positive sentinel node (1 of 3 nodes)..  Left neck dissection by Dr. Willie Quinn, [facility], on 6/5/2019.  22 nodes negative.  Negative margins.  No lymphovascular invasion or perineural invasion.  2 mm depth of invasion.  Grade 1.  Squamous cell carcinoma.  1.8 cm tumor.  Because the patient felt what she thought was left neck LAD, CT neck and chest and PET at [facility]. May 2020 performed.  She is being followed at [facility] for this.  On 6/19/2020, per verbal report from Dr. Seina [place] [facility], [facility] ENT does not feel she has any active head and neck cancer.   She states she has a follow-up with Dr. Kapadia, ENT at [facility], in June with CT neck 1 week prior  No symptoms to suggest recurrence.  She follows with [facility].     *Previously complained of left hip pain x2 weeks.  This prompted an MRI pelvis 8/20/2019 at [facility] and open MRI which was negative for malignancy.     *Possible intolerance of Zometa  After first dose, June 2020, 2 days of chills, bone pain  She would like to try Zometa again.  If this occurs again, we will try to switch her to Xgeva monthly.  (No good data on every 3-month Xgeva)  She did fine with the second dose of Zometa.     *Thickening of wall of transverse colon on CT 2/1/2022.  Last colonoscopy was 4/18/2019.  She wants the next colonoscopy done at [facility]  Dr. Galeas will see her on 4/7/2022 to evaluate this     *Depression  Is seeing a psychiatry NP and a therapist for many years.  6/24/2022: Reported she decided to stop seeing her therapist but is still interested in therapy.  She would like to see behavioral oncology at [facility].  This was arranged  Contacted by Julissa Centeno (behavioral oncology).  Patient canceled on the day of the appointment during her  first scheduled appointment with Julissa.  She then no showed for the rescheduled appointment.  Julissa recommends the patient continue to follow with her therapist and psychiatrist and if she needs additional support, to use [employer]     *Pruritic rash  8/19/2022: Referred to dermatology     *Patient went to the ER 2/7/2023 for dizziness  2/28/2023: This resolved.  She understands if she has ongoing issues with dizziness she should contact us and we will arrange an MRI of the brain     *Admitted for significant diarrhea.  We were asked give her breast cancer treatment could be the cause of this.  Ibrance is associated with diarrhea about 25% of the time , but grade 3 diarrhea only occurs 1% of the time.  Faslodex has a listed diarrhea association of 6 to 25% of the time.  I would doubt these drugs are causing her diarrhea, especially considering she has been on them since May 2022     *Pancytopenia due to Ibrance     *Leukocytopenia  WBC 3.26, from 1.4, from 1.6, from 2.6     *Anemia  Hb 14.2, from 7.6, from 8.6, from 10.5  8/30/2023: Transfused 2 units PRBC in preparation for discharge.  She is weak and fatigued     *Thrombocytopenia  ,000, from 86, from 102, from 126     *Acute kidney injury.  Nephrology following.  Baseline creatinine 1.5-1.7, CKD stage III.  9/19/2023 creatinine 1.94, BUN 33.  Patient admits she is not eating and drinking well.  She will be given 1 L of IV normal saline.     Monitoring on Ibrance:  CBC every 2 weeks for the first 2 months then monthly and as indicated.  After 6 months, if neutropenia grade 1 or 2 only, then CBC can be every 3 months     PLAN:   Patient received 1 L of IV normal saline today, and return again on Thursday for stat CMP as well as a repeat 1 L of IV normal saline.  Patient will hold off on starting her Ibrance.  Ideally we would start her Ibrance and Faslodex back at the same time, we will therefore hold off on Faslodex injection today as well.  Patient will  return in 1 week for follow-up visit with nurse practitioner with repeat labs reassessment and consideration of Faslodex and possible IV fluids.  May need to schedule patient for twice weekly labs and IV fluids.    Patient is currently scheduled for MD or NP with Faslodex every 4 weeks, however given that we are holding Faslodex today these appointments will likely need to be adjusted.  In 4 to 5 months or so, she should see me.  1 week prior: CT neck, chest abdomen and pelvis without contrast, bone scan, CBC, CMP, CA 15-3   (Dr. Carmela Smith, [facility] ENT, requests neck CT when we do either CT imaging).  Resume Ibrance when Faslodex resumes (not before)       Typical plan is:  MD/NP monthly  Faslodex today and monthly.  MD/Faslodex 5 months.  A few days prior: CT chest abdomen and pelvis without contrast, bone scan, CBC, CMP, CA 15-3  Resume every 3-month Xgeva 1 to 2 months after completing all dental procedures (she states she will let us know).  Last dose was 7/1/2022     Continue Faslodex/Ibrance 125 mg D1-21/28 days     (Xgeva instead of Zometa due to intermittent high creatinine)  MD or NP with Faslodex every 4 weeks  Every 6 months CT CAP without IV contrast, bone scan (last 7/18/2022), CA 15-3  Previously arranged for her to get involved with some cancer support groups   CT scans WITHOUT IV CONTRAST (again, to avoid further kidney damage.)  She sees [facility], Dr. Bedoya annually, next is summer 2022     Send a copy of this note to   Isaac Valdes MD, Dr., radiation medicine, [facility]     I spent 50 minutes caring for Marialuisa on this date of service. This time includes time spent by me in the following activities: preparing for the visit, reviewing tests, obtaining and/or reviewing a separately obtained history, performing a medically appropriate examination and/or evaluation, counseling and educating the patient/family/caregiver, ordering medications, tests, or  procedures, referring and communicating with other health care professionals, documenting information in the medical record, independently interpreting results and communicating that information with the patient/family/caregiver, and care coordination

## 2023-09-18 NOTE — NURSING NOTE
Pt added on for 1L NS. Per Tabby SEXTON, no Faslodex today. Pt to return later this week for IV fluids and in a week for f/u. Updated scheduled given to patient. Pt is also to hold Ibrance until f/u in 1 week and try to increase oral intake. Pt provided w/ Boost shakes and coupons to take home and encouraged to drink these when able. Pt v/u.

## 2023-09-18 NOTE — PROGRESS NOTES
Tabby Acevedo, NP in our office, called me about this patient.  She saw her today in the office.  She stated the patient initially had a systolic blood pressure in the lower 80s with a heart rate of around 140.  Patient told Tabby she did this when she was recently in the hospital and fluids helped tremendously.  Therefore, Tabby started her on some fluids with a plan to see if her vital signs improved after the fluids.  If so, Tabby was going to arrange fluids twice per week and MD or NP weekly to see if we can resume Faslodex/Ibrance.  Patient has been off Faslodex for quite some time as she has not been into the office either because being hospitalized or missing appointments.  Tabby states patient unsure if she has been taking her Ibrance or not.  For now, hold both until she improves.  If she is not significantly better after fluids she may need to be admitted and/or go to the ER

## 2023-09-19 ENCOUNTER — SPECIALTY PHARMACY (OUTPATIENT)
Dept: PHARMACY | Facility: HOSPITAL | Age: 68
End: 2023-09-19
Payer: MEDICARE

## 2023-09-21 ENCOUNTER — TELEPHONE (OUTPATIENT)
Dept: GENERAL RADIOLOGY | Facility: HOSPITAL | Age: 68
End: 2023-09-21
Payer: MEDICARE

## 2023-09-21 ENCOUNTER — INFUSION (OUTPATIENT)
Dept: ONCOLOGY | Facility: HOSPITAL | Age: 68
End: 2023-09-21
Payer: MEDICARE

## 2023-09-21 VITALS
BODY MASS INDEX: 22.27 KG/M2 | DIASTOLIC BLOOD PRESSURE: 71 MMHG | TEMPERATURE: 97.8 F | WEIGHT: 133.8 LBS | HEART RATE: 113 BPM | RESPIRATION RATE: 16 BRPM | SYSTOLIC BLOOD PRESSURE: 108 MMHG | OXYGEN SATURATION: 98 %

## 2023-09-21 DIAGNOSIS — C79.51 MALIGNANT NEOPLASM METASTATIC TO BONE: Primary | ICD-10-CM

## 2023-09-21 LAB
ALBUMIN SERPL-MCNC: 4.3 G/DL (ref 3.5–5.2)
ALBUMIN/GLOB SERPL: 1.5 G/DL
ALP SERPL-CCNC: 84 U/L (ref 39–117)
ALT SERPL W P-5'-P-CCNC: 11 U/L (ref 1–33)
ANION GAP SERPL CALCULATED.3IONS-SCNC: 13.3 MMOL/L (ref 5–15)
AST SERPL-CCNC: 21 U/L (ref 1–32)
BILIRUB SERPL-MCNC: 0.7 MG/DL (ref 0–1.2)
BUN SERPL-MCNC: 21 MG/DL (ref 8–23)
BUN/CREAT SERPL: 12.4 (ref 7–25)
CALCIUM SPEC-SCNC: 10.9 MG/DL (ref 8.6–10.5)
CHLORIDE SERPL-SCNC: 98 MMOL/L (ref 98–107)
CO2 SERPL-SCNC: 18.7 MMOL/L (ref 22–29)
CREAT SERPL-MCNC: 1.7 MG/DL (ref 0.6–1.1)
EGFRCR SERPLBLD CKD-EPI 2021: 32.5 ML/MIN/1.73
GLOBULIN UR ELPH-MCNC: 2.8 GM/DL
GLUCOSE SERPL-MCNC: 108 MG/DL (ref 65–99)
POTASSIUM SERPL-SCNC: 4.3 MMOL/L (ref 3.5–5.2)
PROT SERPL-MCNC: 7.1 G/DL (ref 6–8.5)
SODIUM SERPL-SCNC: 130 MMOL/L (ref 136–145)

## 2023-09-21 PROCEDURE — 96360 HYDRATION IV INFUSION INIT: CPT

## 2023-09-21 PROCEDURE — 80053 COMPREHEN METABOLIC PANEL: CPT

## 2023-09-21 RX ORDER — SODIUM CHLORIDE 9 MG/ML
1000 INJECTION, SOLUTION INTRAVENOUS ONCE
Status: COMPLETED | OUTPATIENT
Start: 2023-09-21 | End: 2023-09-21

## 2023-09-21 RX ADMIN — SODIUM CHLORIDE 1000 ML: 9 INJECTION, SOLUTION INTRAVENOUS at 15:50

## 2023-09-21 NOTE — TELEPHONE ENCOUNTER
----- Message from Alyssa Curtis sent at 9/21/2023  1:14 PM EDT -----  Regarding: FW: [phone]    ----- Message -----  From: Bettie Lugo  Sent: 9/21/2023  12:27 PM EDT  To: [facility] [place]  Subject: [phone]                                     Pt asking to change her appt to tomorrow if possible? Appt is for today currently. Thank you, Umang

## 2023-09-21 NOTE — NURSING NOTE
Lab Results   Component Value Date    GLUCOSE 108 (H) 09/21/2023    BUN 21 09/21/2023    CREATININE 1.70 (H) 09/21/2023    EGFRRESULT 25.7 (L) 09/12/2023    EGFR 32.5 (L) 09/21/2023    BCR 12.4 09/21/2023    K 4.3 09/21/2023    CO2 18.7 (L) 09/21/2023    CALCIUM 10.9 (C) 09/21/2023    PROTENTOTREF 7.3 06/20/2023    ALBUMIN 4.3 09/21/2023    BILITOT 0.7 09/21/2023    AST 21 09/21/2023    ALT 11 09/21/2023     CMP reviewed with DARSHAN Paulson. Pt was given one liter NS IV. Pt given a copy of foods high in calcium and instructed to avoid those foods. Encouraged to drink plenty of fluids. Pt will return on Monday for repeat labs, an NP visit and IVFs.

## 2023-09-26 ENCOUNTER — TELEPHONE (OUTPATIENT)
Dept: ONCOLOGY | Facility: CLINIC | Age: 68
End: 2023-09-26
Payer: MEDICARE

## 2023-09-26 NOTE — TELEPHONE ENCOUNTER
Caller: Marialuisa Vivar    Relationship to patient: Self    Best call back number: [phone]    Chief complaint:     Type of visit: PORT FLUSH, F/U 1 & INFUSION     Requested date: CALL TO R/S     If rescheduling, when is the original appointment: 9/25/2023    Additional notes:

## 2023-09-29 ENCOUNTER — TELEPHONE (OUTPATIENT)
Dept: ONCOLOGY | Facility: CLINIC | Age: 68
End: 2023-09-29
Payer: MEDICARE

## 2023-09-29 NOTE — TELEPHONE ENCOUNTER
Caller: Marialuisa Vivar    Relationship: Self    Best call back number: [phone]    What is the best time to reach you: ANY.LEAVE VM    Who are you requesting to speak with (clinical staff, provider,  specific staff member): SCHEDULING        What was the call regarding: PATIENT CALLED TO R/S MISSED APPTS FROM THIS WEEK.    SHE SAID SHE CAN COME IN NEXT WEEK ANY DAY BUT TUESDAY.  SHE SAYS SHE CAN'T DO EARLY MORNING    Is it okay if the provider responds through MyChart: NO

## 2023-10-02 ENCOUNTER — SPECIALTY PHARMACY (OUTPATIENT)
Dept: PHARMACY | Facility: HOSPITAL | Age: 68
End: 2023-10-02
Payer: MEDICARE

## 2023-10-06 ENCOUNTER — SPECIALTY PHARMACY (OUTPATIENT)
Dept: PHARMACY | Facility: HOSPITAL | Age: 68
End: 2023-10-06
Payer: MEDICARE

## 2023-10-10 ENCOUNTER — SPECIALTY PHARMACY (OUTPATIENT)
Dept: PHARMACY | Facility: HOSPITAL | Age: 68
End: 2023-10-10
Payer: MEDICARE

## 2023-10-17 ENCOUNTER — SPECIALTY PHARMACY (OUTPATIENT)
Dept: PHARMACY | Facility: HOSPITAL | Age: 68
End: 2023-10-17
Payer: MEDICARE

## 2023-10-18 ENCOUNTER — SPECIALTY PHARMACY (OUTPATIENT)
Dept: PHARMACY | Facility: HOSPITAL | Age: 68
End: 2023-10-18
Payer: MEDICARE

## 2023-10-18 ENCOUNTER — DOCUMENTATION (OUTPATIENT)
Dept: ONCOLOGY | Facility: CLINIC | Age: 68
End: 2023-10-18
Payer: MEDICARE

## 2023-10-18 NOTE — PROGRESS NOTES
I cannot answer this because I do not know when she last received Faslodex which is typically given every 4 weeks.  My understanding is she has been off Faslodex and Ibrance for quite some time.  She is scheduled to see Tabby today.  I am going to add her to this conversation.  That way if Tabby starts her back on treatment today (if she is strong enough) we can arrange the subsequent appointment around then.  If Tabby does not start her back on treatment because she is not strong enough the dates may be a little bit more flexible.    Also adding Kuldeep so she will know what is going on.  Especially if the patient does not show for her 4 PM appointment with Tabyb.  Perhaps Kuldeep can find out if the patient has been on Ibrance and Faslodex and when she received her last doses    ===View-only below this line===  ----- Message -----  From: Iesha Graham RegSched Rep  Sent: 10/18/2023   2:51 PM EDT  To: Jarod Hawkins II, MD Dr Hawkins, there have been some cancellations here and there. You were going to see her and then she cancelled. Is now down for NP visit on 11/22 (you are off that day). Scans wk prior.  Shall I leave as is? Maybe see you for a video visit on Monday to review scans? Please advise your preference - thank you, Keyonna

## 2023-10-20 ENCOUNTER — DOCUMENTATION (OUTPATIENT)
Dept: ONCOLOGY | Facility: CLINIC | Age: 68
End: 2023-10-20
Payer: MEDICARE

## 2023-10-20 NOTE — PROGRESS NOTES
Since she canceled her appointment for Faslodex the schedule is more flexible.  Please change her November appointment to sometime when I am in the office with a 2 unit opening.  .  Please make sure she has the scans before she sees me.  In other words schedule with me and Faslodex instead of the nurse practitioner appointment on 11/22/2023.  Make sure the scans happen before she sees me.  Thanks very much  ===View-only below this line===  ----- Message -----  From: Iesha Graham RegSched Rep  Sent: 10/18/2023   4:18 PM EDT  To: Jarod Hawkins II, MD    She cancelled again - let me know - thanksKeyonna    ----- Message -----  From: Jarod Hawkins II, MD  Sent: 10/18/2023   3:16 PM EDT  To: ARON Colon; Kuldeep Adamson, RN; *    I cannot answer this because I do not know when she last received Faslodex which is typically given every 4 weeks.  My understanding is she has been off Faslodex and Ibrance for quite some time.  She is scheduled to see Tabby today.  I am going to add her to this conversation.  That way if Tabby starts her back on treatment today (if she is strong enough) we can arrange the subsequent appointment around then.  If Tabby does not start her back on treatment because she is not strong enough the dates may be a little bit more flexible.    Also adding Kuldeep so she will know what is going on.  Especially if the patient does not show for her 4 PM appointment with Tabby.  Perhaps Kuldeep can find out if the patient has been on Ibrance and Faslodex and when she received her last doses    ----- Message -----  From: Iesha Graham RegSched Rep  Sent: 10/18/2023   2:51 PM EDT  To: MD Dr Shruthi Woodard II, there have been some cancellations here and there. You were going to see her and then she cancelled. Is now down for NP visit on 11/22 (you are off that day). Scans wk prior.  Shall I leave as is? Maybe see you for a video visit on Monday to review scans? Please  advise your preference - thank you, Keyonna

## 2023-10-23 ENCOUNTER — TELEPHONE (OUTPATIENT)
Dept: ONCOLOGY | Facility: CLINIC | Age: 68
End: 2023-10-23
Payer: MEDICARE

## 2023-10-23 NOTE — TELEPHONE ENCOUNTER
----- Message from Cheyenne Woo sent at 10/20/2023  3:25 PM EDT -----    ----- Message -----  From: Jarod Hawkins II, MD  Sent: 10/20/2023   3:23 PM EDT  To: Kuldeep Adamson, RN; #    Since she canceled her appointment for Faslodex the schedule is more flexible.  Please change her November appointment to sometime when I am in the office with a 2 unit opening.  .  Please make sure she has the scans before she sees me.  In other words schedule with me and Faslodex instead of the nurse practitioner appointment on 11/22/2023.  Make sure the scans happen before she sees me.  Thanks very much  ----- Message -----  From: Iesha Graham RegSched Rep  Sent: 10/18/2023   4:18 PM EDT  To: Jarod Hawkins II, MD    She cancelled again - let me know - thanks, Keyonna    ----- Message -----  From: Jarod Hawkins II, MD  Sent: 10/18/2023   3:16 PM EDT  To: ARON Colon; Kuldeep Adamson, RN; #    I cannot answer this because I do not know when she last received Faslodex which is typically given every 4 weeks.  My understanding is she has been off Faslodex and Ibrance for quite some time.  She is scheduled to see Tabby today.  I am going to add her to this conversation.  That way if Tabby starts her back on treatment today (if she is strong enough) we can arrange the subsequent appointment around then.  If Tabby does not start her back on treatment because she is not strong enough the dates may be a little bit more flexible.    Also adding Kuldeep so she will know what is going on.  Especially if the patient does not show for her 4 PM appointment with Tabby.  Perhaps Kuldeep can find out if the patient has been on Ibrance and Faslodex and when she received her last doses    ----- Message -----  From: Iesha Graham RegSched Rep  Sent: 10/18/2023   2:51 PM EDT  To: MD Dr Shruthi Woodard II, there have been some cancellations here and there. You were going to see her and then she cancelled. Is now down  for NP visit on 11/22 (you are off that day). Scans wk prior.  Shall I leave as is? Maybe see you for a video visit on Monday to review scans? Please advise your preference - thank you, Keyonna

## 2023-11-04 ENCOUNTER — HOSPITAL ENCOUNTER (INPATIENT)
Facility: HOSPITAL | Age: 68
LOS: 2 days | Discharge: HOME HEALTH | End: 2023-11-08
Attending: EMERGENCY MEDICINE | Admitting: INTERNAL MEDICINE
Payer: MEDICARE

## 2023-11-04 ENCOUNTER — APPOINTMENT (OUTPATIENT)
Dept: GENERAL RADIOLOGY | Facility: HOSPITAL | Age: 68
End: 2023-11-04
Payer: MEDICARE

## 2023-11-04 DIAGNOSIS — E16.2 HYPOGLYCEMIA: ICD-10-CM

## 2023-11-04 DIAGNOSIS — E87.1 HYPONATREMIA: ICD-10-CM

## 2023-11-04 DIAGNOSIS — N17.9 AKI (ACUTE KIDNEY INJURY): ICD-10-CM

## 2023-11-04 DIAGNOSIS — R79.89 ELEVATED TROPONIN: ICD-10-CM

## 2023-11-04 DIAGNOSIS — R53.1 GENERALIZED WEAKNESS: Primary | ICD-10-CM

## 2023-11-04 DIAGNOSIS — I95.1 ORTHOSTASIS: ICD-10-CM

## 2023-11-04 DIAGNOSIS — E87.29 INCREASED ANION GAP METABOLIC ACIDOSIS: ICD-10-CM

## 2023-11-04 PROBLEM — R11.2 NAUSEA & VOMITING: Status: ACTIVE | Noted: 2023-11-04

## 2023-11-04 LAB
ALBUMIN SERPL-MCNC: 4.1 G/DL (ref 3.5–5.2)
ALBUMIN/GLOB SERPL: 1.3 G/DL
ALP SERPL-CCNC: 90 U/L (ref 39–117)
ALT SERPL W P-5'-P-CCNC: 11 U/L (ref 1–33)
ANION GAP SERPL CALCULATED.3IONS-SCNC: 20.2 MMOL/L (ref 5–15)
AST SERPL-CCNC: 24 U/L (ref 1–32)
B PARAPERT DNA SPEC QL NAA+PROBE: NOT DETECTED
B PERT DNA SPEC QL NAA+PROBE: NOT DETECTED
BASOPHILS # BLD AUTO: 0.04 10*3/MM3 (ref 0–0.2)
BASOPHILS NFR BLD AUTO: 0.6 % (ref 0–1.5)
BILIRUB SERPL-MCNC: 0.9 MG/DL (ref 0–1.2)
BUN SERPL-MCNC: 28 MG/DL (ref 8–23)
BUN/CREAT SERPL: 18.7 (ref 7–25)
C PNEUM DNA NPH QL NAA+NON-PROBE: NOT DETECTED
CALCIUM SPEC-SCNC: 11.2 MG/DL (ref 8.6–10.5)
CHLORIDE SERPL-SCNC: 95 MMOL/L (ref 98–107)
CO2 SERPL-SCNC: 14.8 MMOL/L (ref 22–29)
CREAT SERPL-MCNC: 1.5 MG/DL (ref 0.57–1)
DEPRECATED RDW RBC AUTO: 42.1 FL (ref 37–54)
EGFRCR SERPLBLD CKD-EPI 2021: 37.6 ML/MIN/1.73
EOSINOPHIL # BLD AUTO: 0.35 10*3/MM3 (ref 0–0.4)
EOSINOPHIL NFR BLD AUTO: 5.6 % (ref 0.3–6.2)
ERYTHROCYTE [DISTWIDTH] IN BLOOD BY AUTOMATED COUNT: 12.3 % (ref 12.3–15.4)
FLUAV SUBTYP SPEC NAA+PROBE: NOT DETECTED
FLUBV RNA ISLT QL NAA+PROBE: NOT DETECTED
GEN 5 2HR TROPONIN T REFLEX: 23 NG/L
GLOBULIN UR ELPH-MCNC: 3.1 GM/DL
GLUCOSE BLDC GLUCOMTR-MCNC: 64 MG/DL (ref 70–130)
GLUCOSE SERPL-MCNC: 63 MG/DL (ref 65–99)
HADV DNA SPEC NAA+PROBE: NOT DETECTED
HCOV 229E RNA SPEC QL NAA+PROBE: NOT DETECTED
HCOV HKU1 RNA SPEC QL NAA+PROBE: NOT DETECTED
HCOV NL63 RNA SPEC QL NAA+PROBE: NOT DETECTED
HCOV OC43 RNA SPEC QL NAA+PROBE: NOT DETECTED
HCT VFR BLD AUTO: 39.5 % (ref 34–46.6)
HGB BLD-MCNC: 13.4 G/DL (ref 12–15.9)
HMPV RNA NPH QL NAA+NON-PROBE: NOT DETECTED
HPIV1 RNA ISLT QL NAA+PROBE: NOT DETECTED
HPIV2 RNA SPEC QL NAA+PROBE: NOT DETECTED
HPIV3 RNA NPH QL NAA+PROBE: NOT DETECTED
HPIV4 P GENE NPH QL NAA+PROBE: NOT DETECTED
IMM GRANULOCYTES # BLD AUTO: 0.02 10*3/MM3 (ref 0–0.05)
IMM GRANULOCYTES NFR BLD AUTO: 0.3 % (ref 0–0.5)
LYMPHOCYTES # BLD AUTO: 1.57 10*3/MM3 (ref 0.7–3.1)
LYMPHOCYTES NFR BLD AUTO: 25 % (ref 19.6–45.3)
M PNEUMO IGG SER IA-ACNC: NOT DETECTED
MAGNESIUM SERPL-MCNC: 1.7 MG/DL (ref 1.6–2.4)
MCH RBC QN AUTO: 32.1 PG (ref 26.6–33)
MCHC RBC AUTO-ENTMCNC: 33.9 G/DL (ref 31.5–35.7)
MCV RBC AUTO: 94.5 FL (ref 79–97)
MONOCYTES # BLD AUTO: 0.74 10*3/MM3 (ref 0.1–0.9)
MONOCYTES NFR BLD AUTO: 11.8 % (ref 5–12)
NEUTROPHILS NFR BLD AUTO: 3.56 10*3/MM3 (ref 1.7–7)
NEUTROPHILS NFR BLD AUTO: 56.7 % (ref 42.7–76)
NRBC BLD AUTO-RTO: 0 /100 WBC (ref 0–0.2)
NT-PROBNP SERPL-MCNC: 109 PG/ML (ref 0–900)
PLATELET # BLD AUTO: 312 10*3/MM3 (ref 140–450)
PMV BLD AUTO: 9.1 FL (ref 6–12)
POTASSIUM SERPL-SCNC: 5.1 MMOL/L (ref 3.5–5.2)
PROT SERPL-MCNC: 7.2 G/DL (ref 6–8.5)
RBC # BLD AUTO: 4.18 10*6/MM3 (ref 3.77–5.28)
RHINOVIRUS RNA SPEC NAA+PROBE: NOT DETECTED
RSV RNA NPH QL NAA+NON-PROBE: NOT DETECTED
SARS-COV-2 RNA NPH QL NAA+NON-PROBE: NOT DETECTED
SODIUM SERPL-SCNC: 130 MMOL/L (ref 136–145)
TROPONIN T DELTA: -4 NG/L
TROPONIN T SERPL HS-MCNC: 27 NG/L
WBC NRBC COR # BLD: 6.28 10*3/MM3 (ref 3.4–10.8)

## 2023-11-04 PROCEDURE — 84484 ASSAY OF TROPONIN QUANT: CPT | Performed by: EMERGENCY MEDICINE

## 2023-11-04 PROCEDURE — 83880 ASSAY OF NATRIURETIC PEPTIDE: CPT | Performed by: EMERGENCY MEDICINE

## 2023-11-04 PROCEDURE — 71045 X-RAY EXAM CHEST 1 VIEW: CPT

## 2023-11-04 PROCEDURE — 93010 ELECTROCARDIOGRAM REPORT: CPT | Performed by: INTERNAL MEDICINE

## 2023-11-04 PROCEDURE — 25010000002 ONDANSETRON PER 1 MG: Performed by: EMERGENCY MEDICINE

## 2023-11-04 PROCEDURE — 83735 ASSAY OF MAGNESIUM: CPT | Performed by: EMERGENCY MEDICINE

## 2023-11-04 PROCEDURE — G0378 HOSPITAL OBSERVATION PER HR: HCPCS

## 2023-11-04 PROCEDURE — 93005 ELECTROCARDIOGRAM TRACING: CPT | Performed by: EMERGENCY MEDICINE

## 2023-11-04 PROCEDURE — 82948 REAGENT STRIP/BLOOD GLUCOSE: CPT

## 2023-11-04 PROCEDURE — 0202U NFCT DS 22 TRGT SARS-COV-2: CPT | Performed by: EMERGENCY MEDICINE

## 2023-11-04 PROCEDURE — 36415 COLL VENOUS BLD VENIPUNCTURE: CPT | Performed by: EMERGENCY MEDICINE

## 2023-11-04 PROCEDURE — 80053 COMPREHEN METABOLIC PANEL: CPT | Performed by: EMERGENCY MEDICINE

## 2023-11-04 PROCEDURE — 25810000003 SODIUM CHLORIDE 0.9 % SOLUTION: Performed by: EMERGENCY MEDICINE

## 2023-11-04 PROCEDURE — 25810000003 LACTATED RINGERS SOLUTION: Performed by: EMERGENCY MEDICINE

## 2023-11-04 PROCEDURE — 85025 COMPLETE CBC W/AUTO DIFF WBC: CPT | Performed by: EMERGENCY MEDICINE

## 2023-11-04 PROCEDURE — 99285 EMERGENCY DEPT VISIT HI MDM: CPT

## 2023-11-04 RX ORDER — ACETAMINOPHEN 325 MG/1
650 TABLET ORAL EVERY 4 HOURS PRN
Status: DISCONTINUED | OUTPATIENT
Start: 2023-11-04 | End: 2023-11-08 | Stop reason: HOSPADM

## 2023-11-04 RX ORDER — BISACODYL 10 MG
10 SUPPOSITORY, RECTAL RECTAL DAILY PRN
Status: DISCONTINUED | OUTPATIENT
Start: 2023-11-04 | End: 2023-11-05

## 2023-11-04 RX ORDER — SODIUM CHLORIDE, SODIUM LACTATE, POTASSIUM CHLORIDE, CALCIUM CHLORIDE 600; 310; 30; 20 MG/100ML; MG/100ML; MG/100ML; MG/100ML
100 INJECTION, SOLUTION INTRAVENOUS CONTINUOUS
Status: DISCONTINUED | OUTPATIENT
Start: 2023-11-04 | End: 2023-11-06

## 2023-11-04 RX ORDER — POLYETHYLENE GLYCOL 3350 17 G/17G
17 POWDER, FOR SOLUTION ORAL DAILY PRN
Status: DISCONTINUED | OUTPATIENT
Start: 2023-11-04 | End: 2023-11-05

## 2023-11-04 RX ORDER — ONDANSETRON 4 MG/1
4 TABLET, FILM COATED ORAL EVERY 6 HOURS PRN
Status: DISCONTINUED | OUTPATIENT
Start: 2023-11-04 | End: 2023-11-08 | Stop reason: HOSPADM

## 2023-11-04 RX ORDER — BISACODYL 5 MG/1
5 TABLET, DELAYED RELEASE ORAL DAILY PRN
Status: DISCONTINUED | OUTPATIENT
Start: 2023-11-04 | End: 2023-11-05

## 2023-11-04 RX ORDER — SODIUM CHLORIDE 9 MG/ML
40 INJECTION, SOLUTION INTRAVENOUS AS NEEDED
Status: DISCONTINUED | OUTPATIENT
Start: 2023-11-04 | End: 2023-11-08 | Stop reason: HOSPADM

## 2023-11-04 RX ORDER — AMOXICILLIN 250 MG
2 CAPSULE ORAL 2 TIMES DAILY
Status: DISCONTINUED | OUTPATIENT
Start: 2023-11-04 | End: 2023-11-05

## 2023-11-04 RX ORDER — ONDANSETRON 2 MG/ML
4 INJECTION INTRAMUSCULAR; INTRAVENOUS EVERY 6 HOURS PRN
Status: DISCONTINUED | OUTPATIENT
Start: 2023-11-04 | End: 2023-11-08 | Stop reason: HOSPADM

## 2023-11-04 RX ORDER — SODIUM CHLORIDE 0.9 % (FLUSH) 0.9 %
10 SYRINGE (ML) INJECTION EVERY 12 HOURS SCHEDULED
Status: DISCONTINUED | OUTPATIENT
Start: 2023-11-04 | End: 2023-11-08 | Stop reason: HOSPADM

## 2023-11-04 RX ORDER — SODIUM CHLORIDE 0.9 % (FLUSH) 0.9 %
10 SYRINGE (ML) INJECTION AS NEEDED
Status: DISCONTINUED | OUTPATIENT
Start: 2023-11-04 | End: 2023-11-08 | Stop reason: HOSPADM

## 2023-11-04 RX ORDER — ONDANSETRON 2 MG/ML
8 INJECTION INTRAMUSCULAR; INTRAVENOUS ONCE
Status: COMPLETED | OUTPATIENT
Start: 2023-11-04 | End: 2023-11-04

## 2023-11-04 RX ORDER — NICOTINE POLACRILEX 4 MG
15 LOZENGE BUCCAL
Status: DISCONTINUED | OUTPATIENT
Start: 2023-11-04 | End: 2023-11-08 | Stop reason: HOSPADM

## 2023-11-04 RX ORDER — NITROGLYCERIN 0.4 MG/1
0.4 TABLET SUBLINGUAL
Status: DISCONTINUED | OUTPATIENT
Start: 2023-11-04 | End: 2023-11-08 | Stop reason: HOSPADM

## 2023-11-04 RX ORDER — DEXTROSE AND SODIUM CHLORIDE 5; .45 G/100ML; G/100ML
75 INJECTION, SOLUTION INTRAVENOUS CONTINUOUS
Status: DISCONTINUED | OUTPATIENT
Start: 2023-11-04 | End: 2023-11-05

## 2023-11-04 RX ADMIN — SODIUM CHLORIDE 1000 ML: 9 INJECTION, SOLUTION INTRAVENOUS at 20:07

## 2023-11-04 RX ADMIN — SODIUM CHLORIDE, POTASSIUM CHLORIDE, SODIUM LACTATE AND CALCIUM CHLORIDE 1000 ML: 600; 310; 30; 20 INJECTION, SOLUTION INTRAVENOUS at 23:11

## 2023-11-04 RX ADMIN — DEXTROSE 15 G: 15 GEL ORAL at 22:54

## 2023-11-04 RX ADMIN — ONDANSETRON 8 MG: 2 INJECTION INTRAMUSCULAR; INTRAVENOUS at 20:07

## 2023-11-04 NOTE — ED NOTES
Pt arrived via [facility] ems from home w/ c/o dizziness, nausea & diarrhea x3 days. Pt denies any pain @ this time, pt states she just feels dehydrated due to not eating or drinking much. Pt a&o x4 during triage exam.

## 2023-11-04 NOTE — ED PROVIDER NOTES
EMERGENCY DEPARTMENT ENCOUNTER  Room Number:  23/23  Date of encounter:  11/4/2023  PCP: Jennifer Mantilla MD  Patient Care Team:  Jennifer Mantilla MD as PCP - General (Family Medicine)  Jarod Hawkins II, MD as Consulting Physician (Hematology and Oncology)  Yair Robin MD as Referring Physician (Otolaryngology)  Brianna Orozco MD as Consulting Physician (Radiation Oncology)     HPI:  Context: Marialuisa Vivar is a 68 y.o. female who presents to the ED c/o chief complaint of nausea diarrhea and dizziness.  Patient reports that she has been feeling nauseous since Wednesday, did have 1 episode of vomiting the other morning, no other vomiting.  Patient reports that she has been having diarrhea, 4 episodes today, no blood or mucus.  Patient denies any abdominal pain.  Patient reports that she has had a runny nose as well as cough occasionally productive of mucus.  Patient reports subjective fever at home today, no shakes chills or night sweats.  Patient reports that she has been feeling lightheaded, occurs when she stands or ambulates, denies any syncope, no fall or trauma.  Patient denies any chest pain or palpitations.  Patient reports that she had 1 episode where she became short of breath this afternoon but it was brief and resolved.  Patient reports that she is not currently receiving any treatment for her cancer but is followed by oncology here.    MEDICAL HISTORY REVIEW  Reviewed in EPIC    PAST MEDICAL HISTORY  Active Ambulatory Problems     Diagnosis Date Noted    Benign essential HTN 01/20/2016    Depression 01/20/2016    Vocal cord dysfunction 01/20/2016    Malignant neoplasm metastatic to bone 01/20/2016    Carcinoma of left breast metastatic to lung 08/22/2016    Tachycardia 10/05/2016    Therapeutic drug monitoring 10/05/2016    Lung metastasis 06/01/2010    Vitamin D deficiency 05/09/2018    Class 1 obesity without serious comorbidity with body mass index (BMI) of 31.0 to 31.9 in adult  02/06/2019    Tongue cancer 08/13/2019    Anxiety 08/25/2020    Bruxism 08/25/2020    Cheilosis 08/25/2020    Squamous cell carcinoma of skin 08/25/2020    Hyperlipidemia 08/25/2020    Ovarian mass, right 10/19/2021    Adnexal mass 10/26/2021    Abnormal CT scan, colon 04/07/2022    SARAH (acute kidney injury) 08/27/2023    Hyperkalemia 08/28/2023    Metabolic acidosis 08/28/2023    Dehydration 08/28/2023    Diarrhea 08/28/2023    Pancytopenia 08/29/2023     Resolved Ambulatory Problems     Diagnosis Date Noted    Fatigue 01/20/2016    Blood glucose elevated 01/20/2016    Leg swelling 10/05/2016    Dyspnea on exertion 10/05/2016    Benign essential hypertension 08/25/2020     Past Medical History:   Diagnosis Date    Allergy     Asthma     Bone metastasis     Breast cancer     Cholelithiasis 2000    Colon polyp Past 10-15 yrs?    Esophageal reflux     History of colon polyps     Hypertension     Left breast cancer metastasized to lung     Obstructive sleep apnea     Ovarian cyst     PONV (postoperative nausea and vomiting)        PAST SURGICAL HISTORY  Past Surgical History:   Procedure Laterality Date    CHOLECYSTECTOMY  2000    COLONOSCOPY  2014    H/O polyps    COLONOSCOPY N/A 6/1/2022    Procedure: COLONOSCOPY to cecum and TI with cold / hot snare polypectomies;  Surgeon: Luis Enrique Galeas MD;  Location: [facility] ENDOSCOPY;  Service: Gastroenterology;  Laterality: N/A;  pre-abnormal ct  post-polyps, diverticulosis, hemorrhoids    KNEE ARTHROPLASTY      LUNG SURGERY  2010    Lung wedge resection    MASTECTOMY RADICAL Left 1993    TONGUE SURGERY  05/21/2019    tongue cancer    TOTAL LAPAROSCOPIC HYSTERECTOMY N/A 11/04/2021    Procedure: Robotic assisted total laparoscopic hysterectomy, bilateral salpingoophorecotmy, bilateral ureteralysis ;  Surgeon: Kaylynn Ricci DO;  Location: [facility] MAIN OR;  Service: Robotics - DaVinci;  Laterality: N/A;       FAMILY HISTORY  Family History   Problem Relation Age of Onset     Hypertension Mother     Leukemia Mother 72    COPD Mother         smoker    Diabetes Brother     Pancreatic cancer Brother     Glaucoma Brother     Heart disease Brother     Hypertension Brother     Gallbladder disease Brother     Pancreatitis Brother          from pancreatic csncer    Gallbladder disease Father     Colon polyps Father     Stroke Other         Grandmother    Lupus Other         Aunt    Alcohol abuse Other         Aunt    Glaucoma Other         Grandmother    Diabetes type II Brother     Hypertension Brother     Hyperlipidemia Brother     Liver cancer Paternal Uncle     Stomach cancer Paternal Aunt     Alcohol abuse Maternal Aunt     Malig Hyperthermia Neg Hx        SOCIAL HISTORY  Social History     Socioeconomic History    Marital status: Single    Years of education: College   Tobacco Use    Smoking status: Former     Packs/day: 2.00     Years: 30.00     Additional pack years: 0.00     Total pack years: 60.00     Types: Cigarettes     Start date: 1973     Quit date: 2008     Years since quitting: 15.4    Smokeless tobacco: Never   Vaping Use    Vaping Use: Never used   Substance and Sexual Activity    Alcohol use: No    Drug use: No    Sexual activity: Not Currently     Birth control/protection: None       ALLERGIES  Nickel and Ciprofloxacin    The patient's allergies have been reviewed    REVIEW OF SYSTEMS  All systems reviewed and negative except for those discussed in HPI.     PHYSICAL EXAM  I have reviewed the triage vital signs and nursing notes.  ED Triage Vitals [23]   Temp Heart Rate Resp BP SpO2   97.4 °F (36.3 °C) 111 16 90/54 98 %      Temp src Heart Rate Source Patient Position BP Location FiO2 (%)   Oral Monitor Lying Right arm --       General: No acute distress.  HENT: NCAT, PERRL, Nares patent.  Eyes: no scleral icterus.  Neck: trachea midline, no ROM limitations.  CV: regular rhythm, tachycardic rate.  Respiratory: normal effort, CTAB.  Abdomen: soft,  nondistended, NTTP, no rebound tenderness, no guarding or rigidity.  Musculoskeletal: no deformity.  Neuro: alert, moves all extremities, follows commands.  Skin: warm, dry.  Dry mucous membranes, poor skin turgor, delayed capillary refill.    LAB RESULTS  Recent Results (from the past 24 hour(s))   Comprehensive Metabolic Panel    Collection Time: 11/04/23  7:59 PM    Specimen: Blood   Result Value Ref Range    Glucose 63 (L) 65 - 99 mg/dL    BUN 28 (H) 8 - 23 mg/dL    Creatinine 1.50 (H) 0.57 - 1.00 mg/dL    Sodium 130 (L) 136 - 145 mmol/L    Potassium 5.1 3.5 - 5.2 mmol/L    Chloride 95 (L) 98 - 107 mmol/L    CO2 14.8 (L) 22.0 - 29.0 mmol/L    Calcium 11.2 (H) 8.6 - 10.5 mg/dL    Total Protein 7.2 6.0 - 8.5 g/dL    Albumin 4.1 3.5 - 5.2 g/dL    ALT (SGPT) 11 1 - 33 U/L    AST (SGOT) 24 1 - 32 U/L    Alkaline Phosphatase 90 39 - 117 U/L    Total Bilirubin 0.9 0.0 - 1.2 mg/dL    Globulin 3.1 gm/dL    A/G Ratio 1.3 g/dL    BUN/Creatinine Ratio 18.7 7.0 - 25.0    Anion Gap 20.2 (H) 5.0 - 15.0 mmol/L    eGFR 37.6 (L) >60.0 mL/min/1.73   High Sensitivity Troponin T    Collection Time: 11/04/23  7:59 PM    Specimen: Blood   Result Value Ref Range    HS Troponin T 27 (H) <10 ng/L   BNP    Collection Time: 11/04/23  7:59 PM    Specimen: Blood   Result Value Ref Range    proBNP 109.0 0.0 - 900.0 pg/mL   Magnesium    Collection Time: 11/04/23  7:59 PM    Specimen: Blood   Result Value Ref Range    Magnesium 1.7 1.6 - 2.4 mg/dL   CBC Auto Differential    Collection Time: 11/04/23  7:59 PM    Specimen: Blood   Result Value Ref Range    WBC 6.28 3.40 - 10.80 10*3/mm3    RBC 4.18 3.77 - 5.28 10*6/mm3    Hemoglobin 13.4 12.0 - 15.9 g/dL    Hematocrit 39.5 34.0 - 46.6 %    MCV 94.5 79.0 - 97.0 fL    MCH 32.1 26.6 - 33.0 pg    MCHC 33.9 31.5 - 35.7 g/dL    RDW 12.3 12.3 - 15.4 %    RDW-SD 42.1 37.0 - 54.0 fl    MPV 9.1 6.0 - 12.0 fL    Platelets 312 140 - 450 10*3/mm3    Neutrophil % 56.7 42.7 - 76.0 %    Lymphocyte % 25.0 19.6 -  45.3 %    Monocyte % 11.8 5.0 - 12.0 %    Eosinophil % 5.6 0.3 - 6.2 %    Basophil % 0.6 0.0 - 1.5 %    Immature Grans % 0.3 0.0 - 0.5 %    Neutrophils, Absolute 3.56 1.70 - 7.00 10*3/mm3    Lymphocytes, Absolute 1.57 0.70 - 3.10 10*3/mm3    Monocytes, Absolute 0.74 0.10 - 0.90 10*3/mm3    Eosinophils, Absolute 0.35 0.00 - 0.40 10*3/mm3    Basophils, Absolute 0.04 0.00 - 0.20 10*3/mm3    Immature Grans, Absolute 0.02 0.00 - 0.05 10*3/mm3    nRBC 0.0 0.0 - 0.2 /100 WBC   ECG 12 Lead Syncope    Collection Time: 11/04/23  8:12 PM   Result Value Ref Range    QT Interval 353 ms    QTC Interval 469 ms   Respiratory Panel PCR w/COVID-19(SARS-CoV-2) NICOLAS/ARPITA/EDNA/PAD/COR/MAD/PAMELA In-House, NP Swab in [facility], 3-4 HR TAT - Swab, Nasopharynx    Collection Time: 11/04/23  8:51 PM    Specimen: Nasopharynx; Swab   Result Value Ref Range    ADENOVIRUS, PCR Not Detected Not Detected    Coronavirus 229E Not Detected Not Detected    Coronavirus HKU1 Not Detected Not Detected    Coronavirus NL63 Not Detected Not Detected    Coronavirus OC43 Not Detected Not Detected    COVID19 Not Detected Not Detected - Ref. Range    Human Metapneumovirus Not Detected Not Detected    Human Rhinovirus/Enterovirus Not Detected Not Detected    Influenza A PCR Not Detected Not Detected    Influenza B PCR Not Detected Not Detected    Parainfluenza Virus 1 Not Detected Not Detected    Parainfluenza Virus 2 Not Detected Not Detected    Parainfluenza Virus 3 Not Detected Not Detected    Parainfluenza Virus 4 Not Detected Not Detected    RSV, PCR Not Detected Not Detected    Bordetella pertussis pcr Not Detected Not Detected    Bordetella parapertussis PCR Not Detected Not Detected    Chlamydophila pneumoniae PCR Not Detected Not Detected    Mycoplasma pneumo by PCR Not Detected Not Detected       I ordered the above labs and reviewed the results.    RADIOLOGY  XR Chest 1 View    Result Date: 11/4/2023  Emergency portable view of the chest on November 4, 2023   CLINICAL HISTORY: Dizziness  This is correlated to a prior portable chest x-ray on August 27, 2023 and a chest CT on July 17, 2023.  FINDINGS: There are multiple surgical clips in the left lower neck. There is evidence of previous left mastectomy and there are clips in the left axilla from a previous left axillary dissection. The cardiomediastinal silhouette and the pulmonary vasculature are within normal limits. There is moderate elevation of the left hemidiaphragm with chronic atelectasis or scarring at the left lung base. There are chain sutures in the suprahilar right lung from previous right lung surgery. The remainder lungs are clear. The costophrenic angles are sharp. There are blastic metastatic lesions involving the T6, T9 and T10 thoracic vertebrae.      1. No active disease is seen in chest with no change when compared to prior chest x-ray on August 27, 2023.  2. Previous left mastectomy and left axillary dissection. There are stable blastic metastatic lesions to the T6, T9 and T10 thoracic vertebrae. There is chronic moderate elevation left hemidiaphragm with atelectasis or scarring at the left base and there are chain sutures in the right suprahilar region from previous right lung surgery.   This report was finalized on 11/4/2023 8:58 PM by Dr. Papa Casas M.D on Workstation: [id number]       I ordered the above noted radiological studies. I reviewed the images and results. I agree with the radiologist interpretation.    PROCEDURES  Procedures    MEDICATIONS GIVEN IN ER  Medications   dextrose (GLUTOSE) oral gel 15 g (15 g Oral Given 11/4/23 2254)   sodium chloride 0.9 % bolus 1,000 mL (0 mL Intravenous Stopped 11/4/23 2219)   ondansetron (ZOFRAN) injection 8 mg (8 mg Intravenous Given 11/4/23 2007)       PROGRESS, DATA ANALYSIS, CONSULTS, AND MEDICAL DECISION MAKING  A complete history and physical exam have been performed.  All available laboratory and imaging results have been reviewed by myself prior  to disposition.    MDM    After the initial H&P, I discussed pertinent information from history and physical exam with patient/family.  Discussed differential diagnosis.  Discussed plan for ED evaluation/workup/treatment.  All questions answered.  Patient/family is agreeable with plan.  ED Course as of 11/04/23 2255   Sat Nov 04, 2023 1956 My differential diagnosis for syncope includes but is not limited to:  Vasovagal reflex - situational stimulus, micturition, defecation, cough, sneezing, swallowing, postprandial state, react sinus hypersensitivity  Vascular-prolonged recumbency, sudden postural change, prolonged standing, hypovolemia, vasodilator drugs, autonomic neuropathy, adrenal insufficiency, subclavian steal, pulmonary embolism  Cardiac -arrhythmia, heart block, myocardial infarction, aortic stenosis, cardiac myxoma, cardiac, LV Dysfunction, Aortic Dissection, Pulmonary Hypertension, Pulmonary Stenosis, Pacemaker Failure  CNS-seizure, hypoxia, hypoglycemia, TIA,(basal vertebral), hydrocephalus     [JG]   1958 I reviewed patient's office visit note from oncology 9/18/2023, patient followed for carcinoma of the left breast with mets to the lung.  Patient was complaining of nausea at that time.  Patient's last CT abdomen pelvis is from 8/27/2023, negative for acute abnormality, multifocal osseous metastatic disease without interval change. [JG]   2016 EKG independently viewed and contemporaneously interpreted by ED physician. Time: 2012.  Rate 106.  Interpretation: Sinus tachycardia, normal axis, normal QRS, minimal ST elevation in inferior leads, no ST depression or T wave inversion. [JG]   2017 When compared with prior EKG on 8/27/2023, no acute changes are present.  Patient has minimal ST elevation in inferior leads on prior EKG.  Patient endorses episodic lightheadedness which sounds like orthostasis, denies any chest pain or anginal equivalents.  Troponin currently pending. [JG]   2056 Troponin mildly  elevated 27.  Appears to be patient's baseline is 2 months ago patient has values of 29 and 34.  Obtaining repeat troponin to evaluate trend. [JG]   2144 I viewed chest x-ray on PACS, no pulmonary infiltrates per my read. [JG]   2226 Orthostatic positive, receiving IV fluids. [JG]   2227 68-year-old frail woman with cancer presents with nausea vomiting and diarrhea, dehydrated, orthostatic positive.  Plan for admission to the hospital. [JG]   2252 Phone call with [facility]ORRE[facility] [employer].  Discussed the patient, relevant history, exam, diagnostics, ED findings/progress, and concerns. They agree to admit the patient to telemetry observation under Dr. Bach. Care assumed by the admitting physician at this time.     [JG]   2253 Patient reassessed.  Discussed ED findings, differential diagnosis, and the need for admission for evaluation/treatment.  They are agreeable to admission and all questions were answered.     [JG]      ED Course User Index  [JG] Warren Chong MD       AS OF 22:55 EDT VITALS:    BP - 98/61  HR - 120  TEMP - 97.4 °F (36.3 °C) (Oral)  O2 SATS - 93%    DIAGNOSIS  Final diagnoses:   SARAH (acute kidney injury)   Orthostasis   Hyponatremia   Increased anion gap metabolic acidosis   Elevated troponin   Hypoglycemia         DISPOSITION  ADMISSION    Discussed treatment plan and reason for admission with pt/family and admitting physician.  Pt/family voiced understanding of the plan for admission for further testing/treatment as needed.        Warren Chong MD  11/04/23 8246

## 2023-11-05 LAB
ANION GAP SERPL CALCULATED.3IONS-SCNC: 11 MMOL/L (ref 5–15)
BACTERIA UR QL AUTO: NORMAL /HPF
BILIRUB UR QL STRIP: NEGATIVE
BUN SERPL-MCNC: 26 MG/DL (ref 8–23)
BUN/CREAT SERPL: 20 (ref 7–25)
CALCIUM SPEC-SCNC: 9.9 MG/DL (ref 8.6–10.5)
CHLORIDE SERPL-SCNC: 102 MMOL/L (ref 98–107)
CLARITY UR: CLEAR
CO2 SERPL-SCNC: 17 MMOL/L (ref 22–29)
COLOR UR: YELLOW
CREAT SERPL-MCNC: 1.3 MG/DL (ref 0.57–1)
DEPRECATED RDW RBC AUTO: 41.9 FL (ref 37–54)
EGFRCR SERPLBLD CKD-EPI 2021: 44.6 ML/MIN/1.73
ERYTHROCYTE [DISTWIDTH] IN BLOOD BY AUTOMATED COUNT: 12.1 % (ref 12.3–15.4)
GLUCOSE BLDC GLUCOMTR-MCNC: 112 MG/DL (ref 70–130)
GLUCOSE BLDC GLUCOMTR-MCNC: 129 MG/DL (ref 70–130)
GLUCOSE BLDC GLUCOMTR-MCNC: 134 MG/DL (ref 70–130)
GLUCOSE BLDC GLUCOMTR-MCNC: 306 MG/DL (ref 70–130)
GLUCOSE SERPL-MCNC: 138 MG/DL (ref 65–99)
GLUCOSE UR STRIP-MCNC: NEGATIVE MG/DL
HCT VFR BLD AUTO: 32.5 % (ref 34–46.6)
HGB BLD-MCNC: 11 G/DL (ref 12–15.9)
HGB UR QL STRIP.AUTO: NEGATIVE
HYALINE CASTS UR QL AUTO: NORMAL /LPF
KETONES UR QL STRIP: ABNORMAL
LEUKOCYTE ESTERASE UR QL STRIP.AUTO: ABNORMAL
MCH RBC QN AUTO: 32.4 PG (ref 26.6–33)
MCHC RBC AUTO-ENTMCNC: 33.8 G/DL (ref 31.5–35.7)
MCV RBC AUTO: 95.9 FL (ref 79–97)
NITRITE UR QL STRIP: NEGATIVE
PH UR STRIP.AUTO: <=5 [PH] (ref 5–8)
PLATELET # BLD AUTO: 267 10*3/MM3 (ref 140–450)
PMV BLD AUTO: 9.1 FL (ref 6–12)
POTASSIUM SERPL-SCNC: 4.8 MMOL/L (ref 3.5–5.2)
PROT UR QL STRIP: NEGATIVE
QT INTERVAL: 348 MS
QTC INTERVAL: 462 MS
RBC # BLD AUTO: 3.39 10*6/MM3 (ref 3.77–5.28)
RBC # UR STRIP: NORMAL /HPF
REF LAB TEST METHOD: NORMAL
SODIUM SERPL-SCNC: 130 MMOL/L (ref 136–145)
SP GR UR STRIP: 1.01 (ref 1–1.03)
SQUAMOUS #/AREA URNS HPF: NORMAL /HPF
UROBILINOGEN UR QL STRIP: ABNORMAL
WBC # UR STRIP: NORMAL /HPF
WBC NRBC COR # BLD: 5.04 10*3/MM3 (ref 3.4–10.8)

## 2023-11-05 PROCEDURE — 97530 THERAPEUTIC ACTIVITIES: CPT

## 2023-11-05 PROCEDURE — 85027 COMPLETE CBC AUTOMATED: CPT

## 2023-11-05 PROCEDURE — 25810000003 SODIUM CHLORIDE 0.9 % SOLUTION 250 ML FLEX CONT: Performed by: INTERNAL MEDICINE

## 2023-11-05 PROCEDURE — G0378 HOSPITAL OBSERVATION PER HR: HCPCS

## 2023-11-05 PROCEDURE — 25810000003 LACTATED RINGERS PER 1000 ML

## 2023-11-05 PROCEDURE — 97161 PT EVAL LOW COMPLEX 20 MIN: CPT

## 2023-11-05 PROCEDURE — 82948 REAGENT STRIP/BLOOD GLUCOSE: CPT

## 2023-11-05 PROCEDURE — 80048 BASIC METABOLIC PNL TOTAL CA: CPT

## 2023-11-05 PROCEDURE — 25010000002 PAMIDRONATE DISODIUM PER 30 MG: Performed by: INTERNAL MEDICINE

## 2023-11-05 PROCEDURE — 99214 OFFICE O/P EST MOD 30 MIN: CPT | Performed by: INTERNAL MEDICINE

## 2023-11-05 PROCEDURE — 81001 URINALYSIS AUTO W/SCOPE: CPT | Performed by: EMERGENCY MEDICINE

## 2023-11-05 RX ORDER — CHOLECALCIFEROL (VITAMIN D3) 125 MCG
5 CAPSULE ORAL NIGHTLY PRN
Status: DISCONTINUED | OUTPATIENT
Start: 2023-11-05 | End: 2023-11-08 | Stop reason: HOSPADM

## 2023-11-05 RX ORDER — ZOLPIDEM TARTRATE 5 MG/1
5 TABLET ORAL NIGHTLY PRN
Status: DISCONTINUED | OUTPATIENT
Start: 2023-11-05 | End: 2023-11-08 | Stop reason: HOSPADM

## 2023-11-05 RX ORDER — PANTOPRAZOLE SODIUM 40 MG/1
40 TABLET, DELAYED RELEASE ORAL
Status: DISCONTINUED | OUTPATIENT
Start: 2023-11-05 | End: 2023-11-08 | Stop reason: HOSPADM

## 2023-11-05 RX ORDER — LORAZEPAM 0.5 MG/1
0.5 TABLET ORAL EVERY 8 HOURS PRN
Status: DISCONTINUED | OUTPATIENT
Start: 2023-11-05 | End: 2023-11-08 | Stop reason: HOSPADM

## 2023-11-05 RX ORDER — PAROXETINE HYDROCHLORIDE 20 MG/1
40 TABLET, FILM COATED ORAL NIGHTLY
Status: DISCONTINUED | OUTPATIENT
Start: 2023-11-05 | End: 2023-11-08 | Stop reason: HOSPADM

## 2023-11-05 RX ORDER — ATORVASTATIN CALCIUM 20 MG/1
10 TABLET, FILM COATED ORAL NIGHTLY
Status: DISCONTINUED | OUTPATIENT
Start: 2023-11-05 | End: 2023-11-08 | Stop reason: HOSPADM

## 2023-11-05 RX ADMIN — Medication 10 ML: at 08:11

## 2023-11-05 RX ADMIN — ZOLPIDEM TARTRATE 5 MG: 5 TABLET ORAL at 21:08

## 2023-11-05 RX ADMIN — PAROXETINE HYDROCHLORIDE HEMIHYDRATE 40 MG: 20 TABLET, FILM COATED ORAL at 21:08

## 2023-11-05 RX ADMIN — PAMIDRONATE DISODIUM 60 MG: 9 INJECTION, SOLUTION INTRAVENOUS at 13:34

## 2023-11-05 RX ADMIN — ZOLPIDEM TARTRATE 5 MG: 5 TABLET ORAL at 01:26

## 2023-11-05 RX ADMIN — PAROXETINE HYDROCHLORIDE HEMIHYDRATE 40 MG: 20 TABLET, FILM COATED ORAL at 01:20

## 2023-11-05 RX ADMIN — DOCUSATE SODIUM 50MG AND SENNOSIDES 8.6MG 2 TABLET: 8.6; 5 TABLET, FILM COATED ORAL at 08:10

## 2023-11-05 RX ADMIN — ATORVASTATIN CALCIUM 10 MG: 20 TABLET, FILM COATED ORAL at 21:08

## 2023-11-05 RX ADMIN — ATORVASTATIN CALCIUM 10 MG: 20 TABLET, FILM COATED ORAL at 01:20

## 2023-11-05 RX ADMIN — Medication 10 ML: at 21:10

## 2023-11-05 RX ADMIN — SODIUM CHLORIDE, POTASSIUM CHLORIDE, SODIUM LACTATE AND CALCIUM CHLORIDE 75 ML/HR: 600; 310; 30; 20 INJECTION, SOLUTION INTRAVENOUS at 08:11

## 2023-11-05 RX ADMIN — DEXTROSE AND SODIUM CHLORIDE 75 ML/HR: 5; 450 INJECTION, SOLUTION INTRAVENOUS at 00:58

## 2023-11-05 NOTE — PLAN OF CARE
Goal Outcome Evaluation:  Plan of Care Reviewed With: patient           Outcome Evaluation: Pt is a 69 y/o F admitted to [facility] with c/o N&V. Pt has a past med hx of anxiety, asthma, breast cancer, bone metastasis, liver metastasis, pancytopenia, hypertension, hyperlipidemia. Pt received in bed upon arrival and agreeable to PT eval. Pt reports she lives alone with 16 ALONSO and uses a QC for mobility at [place]. Pt presents to PT with generalized weakness and decreased endurance. Pt completed bed mobility, STS transfer, and ambulated 40' c RW requiring SBA/CGA. Pt demo's a slow pace but no LOB noted. Pt does fatigue quickly with minimal activity. Pt had c/o dizziness with activity but orthostatics were negative. PT recommends SNF vs home with assist and HHPT pending progress. RN and APRN notified.      Anticipated Discharge Disposition (PT): home with assist, home with home health, skilled nursing facility (pending progress)

## 2023-11-05 NOTE — THERAPY EVALUATION
Patient Name: Marialuisa Vivar  : 1955    MRN: [id number]                              Today's Date: 2023       Admit Date: 2023    Visit Dx:     ICD-10-CM ICD-9-CM   1. SARAH (acute kidney injury)  N17.9 584.9   2. Orthostasis  I95.1 458.0   3. Hyponatremia  E87.1 276.1   4. Increased anion gap metabolic acidosis  E87.29 276.2   5. Elevated troponin  R79.89 790.6   6. Hypoglycemia  E16.2 251.2     Patient Active Problem List   Diagnosis    Benign essential HTN    Depression    Vocal cord dysfunction    Malignant neoplasm metastatic to bone    Carcinoma of left breast metastatic to lung    Tachycardia    Therapeutic drug monitoring    Lung metastasis    Vitamin D deficiency    Class 1 obesity without serious comorbidity with body mass index (BMI) of 31.0 to 31.9 in adult    Tongue cancer    Anxiety    Bruxism    Cheilosis    Squamous cell carcinoma of skin    Hyperlipidemia    Ovarian mass, right    Adnexal mass    Abnormal CT scan, colon    SARAH (acute kidney injury)    Hyperkalemia    Metabolic acidosis    Dehydration    Diarrhea    Pancytopenia    Nausea & vomiting     Past Medical History:   Diagnosis Date    SARAH (acute kidney injury) 2023    Allergy     Anxiety     Asthma     Bone metastasis     Breast cancer     Left node positive    Cholelithiasis     Lap Marily    Colon polyp Past 10-15 yrs?    found at colonoscopies    Depression     Esophageal reflux     cough    History of colon polyps     Hyperlipidemia     Hypertension     Left breast cancer metastasized to lung     Obstructive sleep apnea     does not wear cpap    Ovarian cyst     Pancytopenia 2023    PONV (postoperative nausea and vomiting)     Tongue cancer 2019    by ENT at [facility] dr Quinn     Past Surgical History:   Procedure Laterality Date    CHOLECYSTECTOMY      COLONOSCOPY      H/O polyps    COLONOSCOPY N/A 2022    Procedure: COLONOSCOPY to cecum and TI with cold / hot snare polypectomies;  Surgeon:  Luis Enrique Galeas MD;  Location: [facility] ENDOSCOPY;  Service: Gastroenterology;  Laterality: N/A;  pre-abnormal ct  post-polyps, diverticulosis, hemorrhoids    KNEE ARTHROPLASTY      LUNG SURGERY  2010    Lung wedge resection    MASTECTOMY RADICAL Left 1993    TONGUE SURGERY  05/21/2019    tongue cancer    TOTAL LAPAROSCOPIC HYSTERECTOMY N/A 11/04/2021    Procedure: Robotic assisted total laparoscopic hysterectomy, bilateral salpingoophorecotmy, bilateral ureteralysis ;  Surgeon: Kaylynn Ricci DO;  Location: [facility] MAIN OR;  Service: Robotics - DaVinci;  Laterality: N/A;      General Information       Row Name 11/05/23 1258          Physical Therapy Time and Intention    Document Type evaluation  -CS     Mode of Treatment individual therapy;physical therapy  -CS       Row Name 11/05/23 1258          General Information    Patient Profile Reviewed yes  -CS     Prior Level of Function independent:;gait;transfer;bed mobility  QC  -CS     Existing Precautions/Restrictions fall  -CS     Barriers to Rehab none identified  -CS       Row Name 11/05/23 1258          Living Environment    People in Home alone  -CS       Row Name 11/05/23 1258          Home Main Entrance    Number of Stairs, Main Entrance other (see comments)  16 ALONSO  -CS     Stair Railings, Main Entrance railings safe and in good condition  -CS       Row Name 11/05/23 1258          Stairs Within Home, Primary    Number of Stairs, Within Home, Primary none  -CS       Row Name 11/05/23 1258          Cognition    Orientation Status (Cognition) oriented x 3  -CS       Row Name 11/05/23 1258          Safety Issues, Functional Mobility    Impairments Affecting Function (Mobility) endurance/activity tolerance;strength  -CS               User Key  (r) = Recorded By, (t) = Taken By, (c) = Cosigned By      Initials Name Provider Type    CS Rosenda Friedman PT Physical Therapist                   Mobility       Row Name 11/05/23 1300          Bed Mobility    Bed  Mobility supine-sit;sit-supine  -CS     Supine-Sit [place] (Bed Mobility) standby assist  -     Sit-Supine [place] (Bed Mobility) standby assist  -       Row Name 11/05/23 1300          Sit-Stand Transfer    Sit-Stand [place] (Transfers) standby assist  -     Assistive Device (Sit-Stand Transfers) walker, front-wheeled  -       Row Name 11/05/23 1300          Gait/Stairs (Locomotion)    [place] Level (Gait) standby assist;contact guard  -     Assistive Device (Gait) walker, front-wheeled  -     Distance in Feet (Gait) 40'  -     Deviations/Abnormal Patterns (Gait) eunice decreased;gait speed decreased  -     [place]idian Level (Stairs) not tested  -     Comment, (Gait/Stairs) slow pace; c/o dizziness with activity  -               User Key  (r) = Recorded By, (t) = Taken By, (c) = Cosigned By      Initials Name Provider Type    CS Rosenda Friedman, PT Physical Therapist                   Obj/Interventions       Row Name 11/05/23 1301          Range of Motion Comprehensive    General Range of Motion bilateral lower extremity ROM WFL  -       Row Name 11/05/23 1301          Strength Comprehensive (MMT)    General Manual Muscle Testing (MMT) Assessment other (see comments)  -     Comment, General Manual Muscle Testing (MMT) Assessment generalized weakness; B LE grossly 4-/5  -       Row Name 11/05/23 1301          Balance    Balance Assessment sitting static balance;sitting dynamic balance;standing static balance;standing dynamic balance  -     Static Sitting Balance standby assist  -     Dynamic Sitting Balance standby assist  -     Position, Sitting Balance unsupported;sitting edge of bed  -     Static Standing Balance standby assist  -     Dynamic Standing Balance standby assist;contact guard  -     Position/Device Used, Standing Balance supported;walker, front-wheeled  -               User Key  (r) = Recorded By, (t) = Taken By, (c) = Cosigned By       Initials Name Provider Type    CS Rosenda Friedman PT Physical Therapist                   Goals/Plan       Row Name 11/05/23 1305          Bed Mobility Goal 1 (PT)    Activity/Assistive Device (Bed Mobility Goal 1, PT) bed mobility activities, all  -CS     [place] Level/Cues Needed (Bed Mobility Goal 1, PT) supervision required  -CS     Time Frame (Bed Mobility Goal 1, PT) 2 weeks  -CS       Row Name 11/05/23 1305          Transfer Goal 1 (PT)    Activity/Assistive Device (Transfer Goal 1, PT) sit-to-stand/stand-to-sit;bed-to-chair/chair-to-bed  -CS     [place] Level/Cues Needed (Transfer Goal 1, PT) supervision required  -CS     Time Frame (Transfer Goal 1, PT) 2 weeks  -CS       Row Name 11/05/23 1305          Gait Training Goal 1 (PT)    Activity/Assistive Device (Gait Training Goal 1, PT) gait (walking locomotion);assistive device use  -CS     [place] Level (Gait Training Goal 1, PT) supervision required  -CS     Distance (Gait Training Goal 1, PT) 100'  -CS     Time Frame (Gait Training Goal 1, PT) 2 weeks  -CS               User Key  (r) = Recorded By, (t) = Taken By, (c) = Cosigned By      Initials Name Provider Type    CS Rosenda Friedman PT Physical Therapist                   Clinical Impression       Row Name 11/05/23 1301          Pain    Pretreatment Pain Rating 0/10 - no pain  -CS     Posttreatment Pain Rating 0/10 - no pain  -CS       Row Name 11/05/23 1301          Plan of Care Review    Plan of Care Reviewed With patient  -CS     Outcome Evaluation Pt is a 67 y/o F admitted to [facility] with c/o N&V. Pt has a past med hx of anxiety, asthma, breast cancer, bone metastasis, liver metastasis, pancytopenia, hypertension, hyperlipidemia. Pt received in bed upon arrival and agreeable to PT eval. Pt reports she lives alone with 16 ALONSO and uses a QC for mobility at [place]. Pt presents to PT with generalized weakness and decreased endurance. Pt completed bed mobility, STS transfer, and ambulated 40'  c RW requiring SBA/CGA. Pt demo's a slow pace but no LOB noted. Pt does fatigue quickly with minimal activity. Pt had c/o dizziness with activity but orthostatics were negative. PT recommends SNF vs home with assist and HHPT pending progress. RN and APRN notified.  -CS       Row Name 11/05/23 1301          Therapy Assessment/Plan (PT)    Rehab Potential (PT) good, to achieve stated therapy goals  -CS     Criteria for Skilled Interventions Met (PT) yes;meets criteria  -CS     Therapy Frequency (PT) 5 times/wk  -CS       Row Name 11/05/23 1301          Vital Signs    Pre Systolic BP Rehab 87  -CS     Pre Treatment Diastolic BP 63  -CS     Intra Systolic BP Rehab 98  -CS     Intra Treatment Diastolic BP 62  -CS     Post Systolic BP Rehab 91  -CS     Post Treatment Diastolic BP 68  -CS       Row Name 11/05/23 1301          Positioning and Restraints    Pre-Treatment Position in bed  -CS     Post Treatment Position bed  -CS     In Bed notified nsg;fowlers;encouraged to call for assist;call light within reach;exit alarm on;heels elevated  -CS               User Key  (r) = Recorded By, (t) = Taken By, (c) = Cosigned By      Initials Name Provider Type    CS Rosenda Friedman, PT Physical Therapist                   Outcome Measures       Row Name 11/05/23 1306 11/05/23 1215       How much help from another person do you currently need...    Turning from your back to your side while in flat bed without using bedrails? 4  -CS 3  -DM    Moving from lying on back to sitting on the side of a flat bed without bedrails? 3  -CS 3  -DM    Moving to and from a bed to a chair (including a wheelchair)? 4  -CS 3  -DM    Standing up from a chair using your arms (e.g., wheelchair, bedside chair)? 4  -CS 3  -DM    Climbing 3-5 steps with a railing? 3  -CS 2  -DM    To walk in hospital room? 3  -CS 2  -DM    AM-PAC 6 Clicks Score (PT) 21  -CS 16  -DM    Highest level of mobility 6 --> Walked 10 steps or more  -CS 5 --> Static standing  -DM       Row Name 11/05/23 0800 11/05/23 0145       How much help from another person do you currently need...    Turning from your back to your side while in flat bed without using bedrails? 3  -DM 3  -RJ    Moving from lying on back to sitting on the side of a flat bed without bedrails? 3  -DM 3  -RJ    Moving to and from a bed to a chair (including a wheelchair)? 3  -DM 3  -RJ    Standing up from a chair using your arms (e.g., wheelchair, bedside chair)? 3  -DM 3  -RJ    Climbing 3-5 steps with a railing? 2  -DM 2  -RJ    To walk in hospital room? 2  -DM 2  -RJ    AM-PAC 6 Clicks Score (PT) 16  -DM 16  -RJ    Highest level of mobility 5 --> Static standing  -DM 5 --> Static standing  -RJ      Row Name 11/05/23 0133          How much help from another person do you currently need...    Turning from your back to your side while in flat bed without using bedrails? 2  -RJ     Moving from lying on back to sitting on the side of a flat bed without bedrails? 2  -RJ     Moving to and from a bed to a chair (including a wheelchair)? 2  -RJ     Standing up from a chair using your arms (e.g., wheelchair, bedside chair)? 2  -RJ     Climbing 3-5 steps with a railing? 1  -RJ     To walk in hospital room? 1  -RJ     AM-PAC 6 Clicks Score (PT) 10  -RJ     Highest level of mobility 4 --> Transferred to chair/commode  -RJ       Row Name 11/05/23 1306          Functional Assessment    Outcome Measure Options AM-PAC 6 Clicks Basic Mobility (PT)  -               User Key  (r) = Recorded By, (t) = Taken By, (c) = Cosigned By      Initials Name Provider Type    DM Jace Uribe, RN Registered Nurse    Macho Wright RN Registered Nurse    Rosenda Sandoval PT Physical Therapist                                 Physical Therapy Education       Title: PT OT SLP Therapies (In Progress)       Topic: Physical Therapy (In Progress)       Point: Mobility training (Done)       Learning Progress Summary             Patient  Acceptance, E,TB, VU,DU by  at 11/5/2023 1306                         Point: Home exercise program (Not Started)       Learner Progress:  Not documented in this visit.              Point: Body mechanics (Done)       Learning Progress Summary             Patient Acceptance, E,TB, VU,DU by  at 11/5/2023 1306                         Point: Precautions (Done)       Learning Progress Summary             Patient Acceptance, E,TB, VU,DU by  at 11/5/2023 1306                                         User Key       Initials Effective Dates Name Provider Type Discipline     09/22/22 -  Rosenda Friedman, PT Physical Therapist PT                  PT Recommendation and Plan     Plan of Care Reviewed With: patient  Outcome Evaluation: Pt is a 69 y/o F admitted to [facility] with c/o N&V. Pt has a past med hx of anxiety, asthma, breast cancer, bone metastasis, liver metastasis, pancytopenia, hypertension, hyperlipidemia. Pt received in bed upon arrival and agreeable to PT eval. Pt reports she lives alone with 16 ALONSO and uses a QC for mobility at . Pt presents to PT with generalized weakness and decreased endurance. Pt completed bed mobility, STS transfer, and ambulated 40' c RW requiring SBA/CGA. Pt demo's a slow pace but no LOB noted. Pt does fatigue quickly with minimal activity. Pt had c/o dizziness with activity but orthostatics were negative. PT recommends SNF vs home with assist and HHPT pending progress. RN and APRN notified.     Time Calculation:         PT Charges       Row Name 11/05/23 1306             Time Calculation    Start Time 1220  -CS      Stop Time 1244  -CS      Time Calculation (min) 24 min  -CS      PT Received On 11/05/23  -      PT - Next Appointment 11/06/23  -      PT Goal Re-Cert Due Date 11/19/23  -         Time Calculation- PT    Total Timed Code Minutes- PT 16 minute(s)  -CS         Timed Charges    22612 - PT Therapeutic Activity Minutes 16  -CS         Total Minutes    Timed Charges Total  Minutes 16  -CS       Total Minutes 16  -CS                User Key  (r) = Recorded By, (t) = Taken By, (c) = Cosigned By      Initials Name Provider Type    CS Rosenda Friedman, PT Physical Therapist                  Therapy Charges for Today       Code Description Service Date Service Provider Modifiers Qty    [id number]  PT THERAPEUTIC ACT EA 15 MIN 11/5/2023 Rosenda Friedman, PT GP 1    [id number]  PT EVAL LOW COMPLEXITY 3 11/5/2023 Rosenda Friedman, PT GP 1            PT G-Codes  Outcome Measure Options: AM-PAC 6 Clicks Basic Mobility (PT)  AM-PAC 6 Clicks Score (PT): 21  PT Discharge Summary  Anticipated Discharge Disposition (PT): home with assist, home with home health, skilled nursing facility (pending progress)    Rosenda Friedman PT  11/5/2023

## 2023-11-05 NOTE — PROGRESS NOTES
MD ATTESTATION NOTE    The TORREY and I have discussed this patient's history, physical exam, and treatment plan.  I have reviewed the documentation and personally had a face to face interaction with the patient. I affirm the documentation and agree with the treatment and plan.  The attached note describes my personal findings.      I provided a substantive portion of the care of the patient.  I personally performed the physical exam in its entirety, and below are my findings.        Brief HPI: This patient is a 68-year-old female with a history of metastatic breast cancer admitted to the observation unit due to generalized weakness with associated nausea.  Associated with that, she reports some watery diarrhea yesterday that has since resolved.  She denies abdominal pain, chest pain, headache, or fever/chills.  This morning, she does report some improvement in symptoms and she is no longer nauseated.      PHYSICAL EXAM  ED Triage Vitals [11/04/23 1937]   Temp Heart Rate Resp BP SpO2   97.4 °F (36.3 °C) 111 16 90/54 98 %      Temp src Heart Rate Source Patient Position BP Location FiO2 (%)   Oral Monitor Lying Right arm --         GENERAL: Resting comfortably and in no acute distress, nontoxic in appearance  HENT: nares patent  EYES: no scleral icterus  CV: regular rhythm, normal rate, no M/R/G  RESPIRATORY: normal effort, lungs clear bilaterally  ABDOMEN: soft, nontender, no rebound or guarding  MUSCULOSKELETAL: no deformity, no edema  NEURO: alert, moves all extremities, follows commands  PSYCH:  calm, cooperative  SKIN: warm, dry    Vital signs and nursing notes reviewed.        Plan: The patient does continue to have some ongoing weakness, though nausea improved.  She is tolerating clear liquids and we will begin to advance her diet as tolerated.  We will ask oncology as well as physical therapy to see the patient and consultation.  Disposition planning to follow.

## 2023-11-05 NOTE — H&P
[facility]   HISTORY AND PHYSICAL    Patient Name: Marialuisa Vivar  : 1955  MRN: [id number]  Primary Care Physician:  Jennifer Mantilla MD  Date of admission: 2023    Subjective   Subjective     Chief Complaint:   Chief Complaint   Patient presents with    Dizziness    Nausea    Diarrhea         HPI:    Marialuisa Vivar is a 68 y.o. female, with a past medical history including, but not limited to, anxiety, asthma, breast cancer, bone metastasis, liver metastasis, pancytopenia, hypertension, hyperlipidemia, presented to the emergency department with a complaint of nausea, weakness, and dysuria since Wednesday.  She states that she has felt so bad that she has not wanting to eat or drink anything.  States that she finally made herself drink a little bit of water on Saturday and she was able to keep that down.  She also states that she has had several episodes of diarrhea on Saturday.  She denies any blood in her stools.  She denies any fever, chills, body aches, headache, chest pain, shortness of breath.  In the emergency department  high-sensitivity troponin was 27, 23, delta -4.  Sodium was 130, CO2 14.8, anion gap 20.2, BUN 28, creatinine 0.51, glucose 63.  All other lab work is unremarkable.  Urinalysis is pending EKG shows sinus tachycardia rate of 106.  And her chest x-ray shows no active disease in the chest with no change when compared to prior x-ray on 2023.  Previous left mastectomy and left axillary dissection.  There are stable blastic metastatic lesions to the T6, T9, and T10 thoracic vertebrae.  Chronic moderate elevation in the left hemidiaphragm with atelectasis or scarring at the left base and thought there are a chain of sutures in the right suprahilar region from previous right lung surgery.  Respiratory panel PCR is negative.  And repeat blood glucose was 129.      Review of Systems   All systems were reviewed and negative except for: What was mentioned above in the  HPI    Personal History     Past Medical History:   Diagnosis Date    SARAH (acute kidney injury) 8/27/2023    Allergy     Anxiety     Asthma     Bone metastasis     Breast cancer     Left node positive    Cholelithiasis 2000    Lap Marily    Colon polyp Past 10-15 yrs?    found at colonoscopies    Depression     Esophageal reflux     cough    History of colon polyps     Hyperlipidemia     Hypertension     Left breast cancer metastasized to lung     Obstructive sleep apnea     does not wear cpap    Ovarian cyst     Pancytopenia 8/29/2023    PONV (postoperative nausea and vomiting)     Tongue cancer 05/2019    by ENT at [facility] dr Quinn       Past Surgical History:   Procedure Laterality Date    CHOLECYSTECTOMY  2000    COLONOSCOPY  2014    H/O polyps    COLONOSCOPY N/A 6/1/2022    Procedure: COLONOSCOPY to cecum and TI with cold / hot snare polypectomies;  Surgeon: Luis Enrique Galeas MD;  Location: [facility] ENDOSCOPY;  Service: Gastroenterology;  Laterality: N/A;  pre-abnormal ct  post-polyps, diverticulosis, hemorrhoids    KNEE ARTHROPLASTY      LUNG SURGERY  2010    Lung wedge resection    MASTECTOMY RADICAL Left 1993    TONGUE SURGERY  05/21/2019    tongue cancer    TOTAL LAPAROSCOPIC HYSTERECTOMY N/A 11/04/2021    Procedure: Robotic assisted total laparoscopic hysterectomy, bilateral salpingoophorecotmy, bilateral ureteralysis ;  Surgeon: Kaylynn Ricci DO;  Location: [facility] MAIN OR;  Service: Robotics - DaVinci;  Laterality: N/A;       Family History: family history includes Alcohol abuse in her maternal aunt and another family member; COPD in her mother; Colon polyps in her father; Diabetes in her brother; Diabetes type II in her brother; Gallbladder disease in her brother and father; Glaucoma in her brother and another family member; Heart disease in her brother; Hyperlipidemia in her brother; Hypertension in her brother, brother, and mother; Leukemia (age of onset: 72) in her mother; Liver cancer in her  paternal uncle; Lupus in an other family member; Pancreatic cancer in her brother; Pancreatitis in her brother; Stomach cancer in her paternal aunt; Stroke in an other family member. Otherwise pertinent FHx was reviewed and not pertinent to current issue.    Social History:  reports that she quit smoking about 15 years ago. Her smoking use included cigarettes. She started smoking about 50 years ago. She has a 60.00 pack-year smoking history. She has never used smokeless tobacco. She reports that she does not drink alcohol and does not use drugs.    Home Medications:  ARIPiprazole, LORazepam, Omeprazole, PARoxetine, Palbociclib, atorvastatin, cholecalciferol, denosumab, eszopiclone, fulvestrant, meclizine, and ondansetron    Allergies:  Allergies   Allergen Reactions    Nickel Unknown - Low Severity    Ciprofloxacin Rash       Objective   Objective     Vitals:   Temp:  [97.4 °F (36.3 °C)] 97.4 °F (36.3 °C)  Heart Rate:  [] 120  Resp:  [16] 16  BP: ()/(46-75) 98/61  Physical Exam    Constitutional: Awake, alert   Eyes: PERRLA, sclerae anicteric, no conjunctival injection   HENT: NCAT, mucous membranes moist   Neck: Supple, no thyromegaly, no lymphadenopathy, trachea midline   Respiratory: Clear to auscultation bilaterally, nonlabored respirations    Cardiovascular: RRR, no murmurs, rubs, or gallops, palpable pedal pulses bilaterally   Gastrointestinal: Positive bowel sounds, soft, nontender, nondistended   Musculoskeletal: No bilateral ankle edema, no clubbing or cyanosis to extremities   Psychiatric: Appropriate affect, cooperative   Neurologic: Oriented x 3, strength symmetric in all extremities, Cranial Nerves grossly intact to confrontation, speech clear   Skin: No rashes     Result Review    Result Review:  I have personally reviewed the results from the time of this admission to 11/4/2023 22:56 EDT and agree with these findings:  [x]  Laboratory list / accordion  []  Microbiology  [x]  Radiology  [x]   EKG/Telemetry   []  Cardiology/Vascular   []  Pathology  [x]  Old records  []  Other:        Assessment & Plan   Assessment / Plan     Brief Patient Summary:  Marialuisa Vivar is a 68 y.o. female who was admitted to the observation unit for further evaluation and treatment of her nausea and vomiting.    Active Hospital Problems:  Active Hospital Problems    Diagnosis     **Nausea & vomiting      Plan:   Nausea\vomiting  -Vital signs every 4 hours  -Cardiac monitoring  -D5 0.45 NS at 75 mL/h  -Repeat labs in a.m.  -Orthostatic vital signs at 0800  -Point-of-care glucose every 6 hours  -Gastrointestinal panel PCR negative  -Urinalysis pending      DVT prophylaxis:  Mechanical DVT prophylaxis orders are present.    CODE STATUS:    Code Status (Patient has no pulse and is not breathing): CPR (Attempt to Resuscitate)  Medical Interventions (Patient has pulse or is breathing): Full Support    Admission Status:  I believe this patient meets observation status.    78 minutes have been spent by [facility] providers in the care of this patient while under observation status.      Appropriate PPE worn during patient encounter.  Hand hygeine performed before and after seeing the patient.      Electronically signed by ARON Perea, 11/04/23, 10:56 PM EDT.

## 2023-11-05 NOTE — PLAN OF CARE
Goal Outcome Evaluation:  Plan of Care Reviewed With: patient           Outcome Evaluation: ext female cath, IVF, bolus given in the ER, no diarrhea at this time

## 2023-11-05 NOTE — PROGRESS NOTES
"ED OBSERVATION PROGRESS/DISCHARGE SUMMARY    Date of Admission: 11/4/2023   LOS: 0 days   PCP: Jennifer Mantilla MD    Subjective patient reports feeling \"so-so\" this morning.  She is still having some nausea but feels more comfortable.  She has requested to advance her diet.    Hospital Outcome: 68-year-old female admitted to the observation unit for further evaluation of nausea, vomiting, diarrhea, and generalized weakness.  Patient has history of breast cancer with mets to lungs, bones, and liver.    Patient was seen by hematology oncology, she has not followed up for several visits.  She needs follow-up with Dr. Hawkins.  She did have some hypercalcemia and oncology ordered a dose of pamidronate while here to prevent recurrence of hypercalcemia.    Patient was seen by physical therapy, unfortunately they do not believe she is safe to go home.  Patient lives by herself.  Physical therapy recommending skilled nursing facility at discharge but patient is hesitant to this.  Agreeable for reevaluation by physical therapy tomorrow.  Pending physical therapy recommendations, patient may be discharged home tomorrow versus being admitted for placement.    ROS:  General: no fevers, chills  Respiratory: no cough, dyspnea  Cardiovascular: no chest pain, palpitations  Abdomen: No abdominal pain, nausea, vomiting, or diarrhea  Neurologic: No focal weakness    Objective   Physical Exam:  I have reviewed the vital signs.  Temp:  [97.4 °F (36.3 °C)-97.7 °F (36.5 °C)] 97.5 °F (36.4 °C)  Heart Rate:  [] 92  Resp:  [16-18] 18  BP: ()/(46-75) 100/63  General Appearance:    Alert, cooperative, no distress  Head:    Normocephalic, atraumatic  Eyes:    Sclerae anicteric  Neck:   Supple, no mass  Lungs: Clear to auscultation bilaterally, respirations unlabored  Heart: Regular rate and rhythm, S1 and S2 normal, no murmur, rub or gallop  Abdomen:  Soft, nontender, bowel sounds active, nondistended  Extremities: No clubbing, " cyanosis, or edema to lower extremities  Pulses:  2+ and symmetric in distal lower extremities  Skin: No rashes   Neurologic: Oriented x3, Normal strength to extremities    Results Review:    I have reviewed the labs, radiology results and diagnostic studies.    Results from last 7 days   Lab Units 11/05/23  0459   WBC 10*3/mm3 5.04   HEMOGLOBIN g/dL 11.0*   HEMATOCRIT % 32.5*   PLATELETS 10*3/mm3 267     Results from last 7 days   Lab Units 11/05/23 0459 11/04/23 1959   SODIUM mmol/L 130* 130*   POTASSIUM mmol/L 4.8 5.1   CHLORIDE mmol/L 102 95*   CO2 mmol/L 17.0* 14.8*   BUN mg/dL 26* 28*   CREATININE mg/dL 1.30* 1.50*   CALCIUM mg/dL 9.9 11.2*   BILIRUBIN mg/dL  --  0.9   ALK PHOS U/L  --  90   ALT (SGPT) U/L  --  11   AST (SGOT) U/L  --  24   GLUCOSE mg/dL 138* 63*     Imaging Results (Last 24 Hours)       Procedure Component Value Units Date/Time    XR Chest 1 View [[id number]] Collected: 11/04/23 2052     Updated: 11/04/23 2101    Narrative:      Emergency portable view of the chest on November 4, 2023     CLINICAL HISTORY: Dizziness     This is correlated to a prior portable chest x-ray on August 27, 2023  and a chest CT on July 17, 2023.     FINDINGS: There are multiple surgical clips in the left lower neck.  There is evidence of previous left mastectomy and there are clips in the  left axilla from a previous left axillary dissection. The  cardiomediastinal silhouette and the pulmonary vasculature are within  normal limits. There is moderate elevation of the left hemidiaphragm  with chronic atelectasis or scarring at the left lung base. There are  chain sutures in the suprahilar right lung from previous right lung  surgery. The remainder lungs are clear. The costophrenic angles are  sharp. There are blastic metastatic lesions involving the T6, T9 and T10  thoracic vertebrae.       Impression:      1. No active disease is seen in chest with no change when compared to  prior chest x-ray on August 27, 2023.      2. Previous left mastectomy and left axillary dissection. There are  stable blastic metastatic lesions to the T6, T9 and T10 thoracic  vertebrae. There is chronic moderate elevation left hemidiaphragm with  atelectasis or scarring at the left base and there are chain sutures in  the right suprahilar region from previous right lung surgery.        This report was finalized on 11/4/2023 8:58 PM by Dr. Papa Casas M.D  on Workstation: [id number]               I have reviewed the medications.  ---------------------------------------------------------------------------------------------  Assessment & Plan   Assessment/Problem List    Nausea & vomiting      Plan:    Nausea and vomiting   Generalized weakness  Orthostasis  -Vital signs every 4 hours  -Cardiac monitoring  -D5 0.45 NS at 75 mL/h  -Point-of-care glucose every 6 hours  -Gastrointestinal panel PCR negative  -Urinalysis 40 ketones, trace leukocytes, no evidence of infection  -Continue IV fluids overnight  -Advance diet  -Physical therapy to reevaluate tomorrow  -Repeat orthostatic vital signs in a.m.    Hypercalcemia  History of breast cancer with mets to lungs, liver, bone  -Patient needs short interval outpatient follow-up with oncology  -Patient received dose of pamidronate today per oncology to prevent recurrence of hypercalcemia    Disposition: Pending physical therapy reevaluation in a.m.    51 minutes has been spent by [facility] providers in the care of this patient while under observation status     This note will serve as progress note    RITO Caldwell 11/05/23 16:11 EST    I have worn appropriate PPE during this patient encounter and sanitized my hands with entering and exiting patient room.

## 2023-11-05 NOTE — CONSULTS
Subjective     REASON FOR CONSULTATION:  breast cancer, n/v/d  Provide an opinion on any further workup or treatment                             REQUESTING PHYSICIAN:  Rony    RECORDS OBTAINED:  Records of the patients history including those obtained from the referring provider were reviewed and summarized in detail.    HISTORY OF PRESENT ILLNESS:  The patient is a 68 y.o. year old female who is here for an opinion about the above issue.    History of Present Illness   This is a 67-year-old woman followed in our practice for metastatic breast cancer which is ER positive and metastatic to bone and lung.  She has most recently been on treatment with hormonal/targeted therapy with Faslodex every 4 weeks and Ibrance along with Xgeva every 3 months.  However reviewing records it appears she has not received Faslodex since 6/8/2023 and her last office visit was 9/18/2023 at which time Faslodex/Ibrance were held and the patient was given IV fluids for weakness and hypotension.  She was supposed to follow-up in 1 week but has apparently missed several appointments since that time.    The patient presented to the ER with complaints of nausea weakness and dysuria.  She states she has not been able to keep anything down for about 24 hours and had associated diarrhea.  Labs performed including CBC which was normal, CMP pertinent for BUN 28/creatinine 1.5 which is actually a bit better than her baseline, sodium 130 and elevated calcium 11.2 (has been elevated to some degree since September when 11.6).  She has been provided IV fluids and antiemetics.  BUNs/creatinine even better today 26/1.3, sodium 130 and calcium normalized 9.9.    The patient states she has been eating and drinking this morning mostly liquids and no BM since admission.    Past Medical History:   Diagnosis Date    SARAH (acute kidney injury) 8/27/2023    Allergy     Anxiety     Asthma     Bone metastasis     Breast cancer     Left node positive     Cholelithiasis 2000    Lap Marily    Colon polyp Past 10-15 yrs?    found at colonoscopies    Depression     Esophageal reflux     cough    History of colon polyps     Hyperlipidemia     Hypertension     Left breast cancer metastasized to lung     Obstructive sleep apnea     does not wear cpap    Ovarian cyst     Pancytopenia 8/29/2023    PONV (postoperative nausea and vomiting)     Tongue cancer 05/2019    by ENT at [facility] dr [facility]        Past Surgical History:   Procedure Laterality Date    CHOLECYSTECTOMY  2000    COLONOSCOPY  2014    H/O polyps    COLONOSCOPY N/A 6/1/2022    Procedure: COLONOSCOPY to cecum and TI with cold / hot snare polypectomies;  Surgeon: Luis Enrique Galeas MD;  Location: [facility] ENDOSCOPY;  Service: Gastroenterology;  Laterality: N/A;  pre-abnormal ct  post-polyps, diverticulosis, hemorrhoids    KNEE ARTHROPLASTY      LUNG SURGERY  2010    Lung wedge resection    MASTECTOMY RADICAL Left 1993    TONGUE SURGERY  05/21/2019    tongue cancer    TOTAL LAPAROSCOPIC HYSTERECTOMY N/A 11/04/2021    Procedure: Robotic assisted total laparoscopic hysterectomy, bilateral salpingoophorecotmy, bilateral ureteralysis ;  Surgeon: Kaylynn Ricci DO;  Location: [facility] MAIN OR;  Service: Robotics - DaVinci;  Laterality: N/A;        Current Facility-Administered Medications on File Prior to Encounter   Medication Dose Route Frequency Provider Last Rate Last Admin    chlorhexidine (PERIDEX) 0.12 % solution 15 mL  15 mL Mouth/Throat Q12H Kaylynn Ricci DO        mupirocin (BACTROBAN) 2 % nasal ointment   Nasal BID Kaylynn Ricci DO         Current Outpatient Medications on File Prior to Encounter   Medication Sig Dispense Refill    ARIPiprazole (ABILIFY) 2 MG tablet Take 2.5 tablets by mouth Every Night.      atorvastatin (LIPITOR) 10 MG tablet TAKE 1 TABLET BY MOUTH ONCE DAILY AT NIGHT 90 tablet 1    cholecalciferol (VITAMIN D3) 1.25 MG (02441 UT) capsule Take 5,000 Units by mouth 3 (Three) Times a  Week.      denosumab (Xgeva) 120 MG/1.7ML solution injection Inject  under the skin into the appropriate area as directed.      eszopiclone (LUNESTA) 3 MG tablet Take 1 tablet by mouth.      fulvestrant (FASLODEX) 250 MG/5ML chemo syringe Inject  into the appropriate muscle as directed by prescriber.      LORazepam (ATIVAN) 0.5 MG tablet Take 1 tablet by mouth Every 8 (Eight) Hours As Needed.      meclizine (ANTIVERT) 25 MG tablet Take 1 tablet by mouth 3 (Three) Times a Day As Needed for Dizziness. 20 tablet 0    Omeprazole 20 MG Tablet Delayed Release Dispersible Take 1 tablet by mouth As Needed.      ondansetron (ZOFRAN) 8 MG tablet TAKE 1 TABLET BY MOUTH THREE TIMES DAILY AS NEEDED FORNAUSEA AND VOMITING      Palbociclib 125 MG tablet       PARoxetine (PAXIL) 40 MG tablet Take 1 tablet by mouth Every Night.          ALLERGIES:    Allergies   Allergen Reactions    Nickel Unknown - Low Severity    Ciprofloxacin Rash        Social History     Socioeconomic History    Marital status: Single    Years of education: College   Tobacco Use    Smoking status: Former     Packs/day: 2.00     Years: 30.00     Additional pack years: 0.00     Total pack years: 60.00     Types: Cigarettes     Start date: 1973     Quit date: 2008     Years since quitting: 15.4    Smokeless tobacco: Never   Vaping Use    Vaping Use: Never used   Substance and Sexual Activity    Alcohol use: No    Drug use: No    Sexual activity: Not Currently     Birth control/protection: None        Family History   Problem Relation Age of Onset    Hypertension Mother     Leukemia Mother 72    COPD Mother         smoker    Diabetes Brother     Pancreatic cancer Brother     Glaucoma Brother     Heart disease Brother     Hypertension Brother     Gallbladder disease Brother     Pancreatitis Brother          from pancreatic csncer    Gallbladder disease Father     Colon polyps Father     Stroke Other         Grandmother    Lupus Other         Aunt     Alcohol abuse Other         Aunt    Glaucoma Other         Grandmother    Diabetes type II Brother     Hypertension Brother     Hyperlipidemia Brother     Liver cancer Paternal Uncle     Stomach cancer Paternal Aunt     Alcohol abuse Maternal Aunt     Malig Hyperthermia Neg Hx         Review of Systems   Constitutional:  Positive for activity change and appetite change. Negative for fever.   Eyes: Negative.    Respiratory: Negative.     Cardiovascular: Negative.    Gastrointestinal:  Positive for diarrhea, nausea and vomiting.   Musculoskeletal: Negative.    Neurological:  Positive for weakness.   Hematological: Negative.    Psychiatric/Behavioral:  Positive for dysphoric mood.           Objective     Vitals:    11/05/23 0335 11/05/23 0745 11/05/23 0751 11/05/23 0756   BP: 96/57 100/63 91/68 109/70   BP Location: Right arm Right arm Right arm Right arm   Patient Position: Lying Lying Standing Standing   Pulse: 104 92 120 (!) 122   Resp: 18 18     Temp: 97.7 °F (36.5 °C) 97.5 °F (36.4 °C)     TempSrc: Oral Oral     SpO2: 100% 100%     Weight:       Height:             9/21/2023     3:12 PM   Current Status   ECOG score 0       Physical Exam    CONSTITUTIONAL: pleasant somewhat frail-appearing woman in no acute distress but somewhat weak appearing  HEENT: no icterus, no thrush, moist membranes  LYMPH: no cervical or supraclavicular lad  CV: RRR, S1S2, no murmur  RESP: cta bilat, no wheezing, no rales  GI: soft, non-tender  NEURO: alert and oriented x3, normal strength  PSYCH: Somewhat depressed mood/flat affect      RECENT LABS:  Hematology WBC   Date Value Ref Range Status   11/05/2023 5.04 3.40 - 10.80 10*3/mm3 Final     RBC   Date Value Ref Range Status   11/05/2023 3.39 (L) 3.77 - 5.28 10*6/mm3 Final     Hemoglobin   Date Value Ref Range Status   11/05/2023 11.0 (L) 12.0 - 15.9 g/dL Final     Hematocrit   Date Value Ref Range Status   11/05/2023 32.5 (L) 34.0 - 46.6 % Final     Platelets   Date Value Ref Range  Status   11/05/2023 267 140 - 450 10*3/mm3 Final        Lab Results   Component Value Date    GLUCOSE 138 (H) 11/05/2023    BUN 26 (H) 11/05/2023    CREATININE 1.30 (H) 11/05/2023    EGFRRESULT 25.7 (L) 09/12/2023    EGFR 44.6 (L) 11/05/2023    BCR 20.0 11/05/2023    K 4.8 11/05/2023    CO2 17.0 (L) 11/05/2023    CALCIUM 9.9 11/05/2023    PROTENTOTREF 7.3 06/20/2023    ALBUMIN 4.1 11/04/2023    BILITOT 0.9 11/04/2023    AST 24 11/04/2023    ALT 11 11/04/2023       Assessment & Plan     *Metastatic ER positive breast cancer primarily to bone and lung nodules  Patient most recently has been on treatment with Faslodex/Ibrance and every 3-month Xgeva but appears to have been off treatment since June secondary to canceled appointment/medical nonadherence  She is scheduled for follow-up labs, imaging and MD visit Dr. Hawkins later this month for reassessment of her malignancy and to start back on treatment as indicated    *Nausea vomiting diarrhea with volume deplete meant-appears unrelated to cancer therapy since the patient has been off treatment    *Hypercalcemia  Calcium 11.2 on admission likely from volume depletion but could have a component of hypercalcemia of malignancy from her bone metastases  Since she has been off Xgeva several months I will give her a dose of pamidronate while here to prevent recurrence of hypercalcemia    CKD3a- creatinine at baseline 1.3    No opposition to discharge later today if the patient is able to tolerate diet and nausea/diarrhea controlled.

## 2023-11-06 ENCOUNTER — APPOINTMENT (OUTPATIENT)
Dept: CT IMAGING | Facility: HOSPITAL | Age: 68
End: 2023-11-06
Payer: MEDICARE

## 2023-11-06 PROBLEM — E87.1 HYPONATREMIA: Status: ACTIVE | Noted: 2023-11-06

## 2023-11-06 PROBLEM — R53.1 GENERALIZED WEAKNESS: Status: ACTIVE | Noted: 2023-11-06

## 2023-11-06 LAB
ANION GAP SERPL CALCULATED.3IONS-SCNC: 8.8 MMOL/L (ref 5–15)
BASOPHILS # BLD AUTO: 0.03 10*3/MM3 (ref 0–0.2)
BASOPHILS NFR BLD AUTO: 0.6 % (ref 0–1.5)
BUN SERPL-MCNC: 13 MG/DL (ref 8–23)
BUN/CREAT SERPL: 11.3 (ref 7–25)
CALCIUM SPEC-SCNC: 10.1 MG/DL (ref 8.6–10.5)
CANCER AG15-3 SERPL-ACNC: 17.2 U/ML
CHLORIDE SERPL-SCNC: 101 MMOL/L (ref 98–107)
CO2 SERPL-SCNC: 19.2 MMOL/L (ref 22–29)
CREAT SERPL-MCNC: 1.15 MG/DL (ref 0.57–1)
DEPRECATED RDW RBC AUTO: 43.4 FL (ref 37–54)
EGFRCR SERPLBLD CKD-EPI 2021: 51.7 ML/MIN/1.73
EOSINOPHIL # BLD AUTO: 0.33 10*3/MM3 (ref 0–0.4)
EOSINOPHIL NFR BLD AUTO: 6.1 % (ref 0.3–6.2)
ERYTHROCYTE [DISTWIDTH] IN BLOOD BY AUTOMATED COUNT: 12.3 % (ref 12.3–15.4)
GLUCOSE SERPL-MCNC: 117 MG/DL (ref 65–99)
HCT VFR BLD AUTO: 31.6 % (ref 34–46.6)
HGB BLD-MCNC: 10.9 G/DL (ref 12–15.9)
IMM GRANULOCYTES # BLD AUTO: 0.01 10*3/MM3 (ref 0–0.05)
IMM GRANULOCYTES NFR BLD AUTO: 0.2 % (ref 0–0.5)
LYMPHOCYTES # BLD AUTO: 1.11 10*3/MM3 (ref 0.7–3.1)
LYMPHOCYTES NFR BLD AUTO: 20.5 % (ref 19.6–45.3)
MCH RBC QN AUTO: 32.9 PG (ref 26.6–33)
MCHC RBC AUTO-ENTMCNC: 34.5 G/DL (ref 31.5–35.7)
MCV RBC AUTO: 95.5 FL (ref 79–97)
MONOCYTES # BLD AUTO: 0.58 10*3/MM3 (ref 0.1–0.9)
MONOCYTES NFR BLD AUTO: 10.7 % (ref 5–12)
NEUTROPHILS NFR BLD AUTO: 3.35 10*3/MM3 (ref 1.7–7)
NEUTROPHILS NFR BLD AUTO: 61.9 % (ref 42.7–76)
NRBC BLD AUTO-RTO: 0 /100 WBC (ref 0–0.2)
PLATELET # BLD AUTO: 263 10*3/MM3 (ref 140–450)
PMV BLD AUTO: 9.6 FL (ref 6–12)
POTASSIUM SERPL-SCNC: 4.5 MMOL/L (ref 3.5–5.2)
RBC # BLD AUTO: 3.31 10*6/MM3 (ref 3.77–5.28)
SODIUM SERPL-SCNC: 129 MMOL/L (ref 136–145)
WBC NRBC COR # BLD: 5.41 10*3/MM3 (ref 3.4–10.8)

## 2023-11-06 PROCEDURE — 97530 THERAPEUTIC ACTIVITIES: CPT

## 2023-11-06 PROCEDURE — 25810000003 SODIUM CHLORIDE 0.9 % SOLUTION: Performed by: NURSE PRACTITIONER

## 2023-11-06 PROCEDURE — 86300 IMMUNOASSAY TUMOR CA 15-3: CPT | Performed by: INTERNAL MEDICINE

## 2023-11-06 PROCEDURE — 85025 COMPLETE CBC W/AUTO DIFF WBC: CPT | Performed by: PHYSICIAN ASSISTANT

## 2023-11-06 PROCEDURE — 93005 ELECTROCARDIOGRAM TRACING: CPT | Performed by: PHYSICIAN ASSISTANT

## 2023-11-06 PROCEDURE — 25810000003 SODIUM CHLORIDE 0.9 % SOLUTION: Performed by: INTERNAL MEDICINE

## 2023-11-06 PROCEDURE — 25810000003 LACTATED RINGERS PER 1000 ML: Performed by: PHYSICIAN ASSISTANT

## 2023-11-06 PROCEDURE — 71250 CT THORAX DX C-: CPT

## 2023-11-06 PROCEDURE — 0 DIATRIZOATE MEGLUMINE & SODIUM PER 1 ML: Performed by: PHYSICIAN ASSISTANT

## 2023-11-06 PROCEDURE — 80048 BASIC METABOLIC PNL TOTAL CA: CPT | Performed by: PHYSICIAN ASSISTANT

## 2023-11-06 PROCEDURE — 74176 CT ABD & PELVIS W/O CONTRAST: CPT

## 2023-11-06 PROCEDURE — 99214 OFFICE O/P EST MOD 30 MIN: CPT | Performed by: INTERNAL MEDICINE

## 2023-11-06 PROCEDURE — 93010 ELECTROCARDIOGRAM REPORT: CPT | Performed by: INTERNAL MEDICINE

## 2023-11-06 PROCEDURE — 97166 OT EVAL MOD COMPLEX 45 MIN: CPT

## 2023-11-06 RX ORDER — SODIUM CHLORIDE 9 MG/ML
75 INJECTION, SOLUTION INTRAVENOUS CONTINUOUS
Status: ACTIVE | OUTPATIENT
Start: 2023-11-06 | End: 2023-11-07

## 2023-11-06 RX ADMIN — ATORVASTATIN CALCIUM 10 MG: 20 TABLET, FILM COATED ORAL at 20:35

## 2023-11-06 RX ADMIN — Medication 10 ML: at 20:37

## 2023-11-06 RX ADMIN — SODIUM CHLORIDE 500 ML: 9 INJECTION, SOLUTION INTRAVENOUS at 19:59

## 2023-11-06 RX ADMIN — Medication 5 MG: at 01:01

## 2023-11-06 RX ADMIN — SODIUM CHLORIDE 100 ML/HR: 9 INJECTION, SOLUTION INTRAVENOUS at 21:29

## 2023-11-06 RX ADMIN — SODIUM CHLORIDE, POTASSIUM CHLORIDE, SODIUM LACTATE AND CALCIUM CHLORIDE 100 ML/HR: 600; 310; 30; 20 INJECTION, SOLUTION INTRAVENOUS at 01:02

## 2023-11-06 RX ADMIN — PANTOPRAZOLE SODIUM 40 MG: 40 TABLET, DELAYED RELEASE ORAL at 08:28

## 2023-11-06 RX ADMIN — PAROXETINE HYDROCHLORIDE HEMIHYDRATE 40 MG: 20 TABLET, FILM COATED ORAL at 20:37

## 2023-11-06 RX ADMIN — SODIUM CHLORIDE 100 ML/HR: 9 INJECTION, SOLUTION INTRAVENOUS at 11:08

## 2023-11-06 RX ADMIN — DIATRIZOATE MEGLUMINE AND DIATRIZOATE SODIUM 30 ML: 660; 100 LIQUID ORAL; RECTAL at 12:17

## 2023-11-06 NOTE — PLAN OF CARE
Goal Outcome Evaluation:  Plan of Care Reviewed With: patient        Progress: no change  Outcome Evaluation: Patient is alert and oriented times 4. Patient is on room air. Blood pressure has been low today. Provider Hemalatha Coreas PA-C aware. IV fluid rate increased. Patient worked with physical therapy and ambulated with nursing in the hallway. Purewick in place. Patient denies nausea today. She tolerated her meals. Sinus rhythm on the monitor. Oncology seen patient today.

## 2023-11-06 NOTE — PLAN OF CARE
Goal Outcome Evaluation:  Plan of Care Reviewed With: patient        Progress: no change   Pt remains unsteady when standing. Pt has not slept the last 2 nights despite sleep aid medications. To be reevaluated by PT today.

## 2023-11-06 NOTE — THERAPY EVALUATION
Patient Name: Marialuisa Vivar  : 1955    MRN: [id number]                              Today's Date: 2023       Admit Date: 2023    Visit Dx:     ICD-10-CM ICD-9-CM   1. SARAH (acute kidney injury)  N17.9 584.9   2. Orthostasis  I95.1 458.0   3. Hyponatremia  E87.1 276.1   4. Increased anion gap metabolic acidosis  E87.29 276.2   5. Elevated troponin  R79.89 790.6   6. Hypoglycemia  E16.2 251.2     Patient Active Problem List   Diagnosis    Benign essential HTN    Depression    Vocal cord dysfunction    Malignant neoplasm metastatic to bone    Carcinoma of left breast metastatic to lung    Tachycardia    Therapeutic drug monitoring    Lung metastasis    Vitamin D deficiency    Class 1 obesity without serious comorbidity with body mass index (BMI) of 31.0 to 31.9 in adult    Tongue cancer    Anxiety    Bruxism    Cheilosis    Squamous cell carcinoma of skin    Hyperlipidemia    Ovarian mass, right    Adnexal mass    Abnormal CT scan, colon    SARAH (acute kidney injury)    Hyperkalemia    Metabolic acidosis    Dehydration    Diarrhea    Pancytopenia    Nausea & vomiting    Generalized weakness    Hyponatremia     Past Medical History:   Diagnosis Date    SARAH (acute kidney injury) 2023    Allergy     Anxiety     Asthma     Bone metastasis     Breast cancer     Left node positive    Cholelithiasis     Lap Marily    Class 1 obesity without serious comorbidity with body mass index (BMI) of 31.0 to 31.9 in adult 2019    Colon polyp Past 10-15 yrs?    found at colonoscopies    Depression     Esophageal reflux     cough    History of colon polyps     Hyperlipidemia     Hypertension     Left breast cancer metastasized to lung     Obstructive sleep apnea     does not wear cpap    Ovarian cyst     Pancytopenia 2023    PONV (postoperative nausea and vomiting)     Tongue cancer 2019    by ENT at [facility]     Past Surgical History:   Procedure Laterality Date    CHOLECYSTECTOMY       COLONOSCOPY  2014    H/O polyps    COLONOSCOPY N/A 6/1/2022    Procedure: COLONOSCOPY to cecum and TI with cold / hot snare polypectomies;  Surgeon: Luis Enrique Galeas MD;  Location: [facility] ENDOSCOPY;  Service: Gastroenterology;  Laterality: N/A;  pre-abnormal ct  post-polyps, diverticulosis, hemorrhoids    KNEE ARTHROPLASTY      LUNG SURGERY  2010    Lung wedge resection    MASTECTOMY RADICAL Left 1993    TONGUE SURGERY  05/21/2019    tongue cancer    TOTAL LAPAROSCOPIC HYSTERECTOMY N/A 11/04/2021    Procedure: Robotic assisted total laparoscopic hysterectomy, bilateral salpingoophorecotmy, bilateral ureteralysis ;  Surgeon: Kaylynn Ricci DO;  Location: [facility] MAIN OR;  Service: Robotics - DaVinci;  Laterality: N/A;      General Information       Row Name 11/06/23 1529          OT Time and Intention    Document Type evaluation  -RB     Mode of Treatment individual therapy;occupational therapy  -RB       Row Name 11/06/23 1529          General Information    Patient Profile Reviewed yes  -RB     Prior Level of Function independent:;ADL's;transfer  -RB     Existing Precautions/Restrictions fall  -RB     Barriers to Rehab medically complex;previous functional deficit  -RB       Row Name 11/06/23 1529          Living Environment    People in Home alone  -RB       Row Name 11/06/23 1529          Cognition    Orientation Status (Cognition) oriented x 3  -RB       Row Name 11/06/23 1529          Safety Issues, Functional Mobility    Safety Issues Affecting Function (Mobility) safety precautions follow-through/compliance;safety precaution awareness;awareness of need for assistance;insight into deficits/self-awareness  -RB     Impairments Affecting Function (Mobility) balance;endurance/activity tolerance;strength  -RB               User Key  (r) = Recorded By, (t) = Taken By, (c) = Cosigned By      Initials Name Provider Type    RB Roslyn Lara, MIL Occupational Therapist                     Mobility/ADL's        Row Name 11/06/23 1531          Bed Mobility    Bed Mobility supine-sit;sit-supine  -RB     Supine-Sit [place] (Bed Mobility) standby assist;verbal cues  -RB     Sit-Supine [place] (Bed Mobility) standby assist;verbal cues  -RB     Assistive Device (Bed Mobility) bed rails  -RB       Row Name 11/06/23 1531          Transfers    Transfers sit-stand transfer;stand-sit transfer;toilet transfer  -RB       Row Name 11/06/23 1531          Sit-Stand Transfer    Sit-Stand [place] (Transfers) verbal cues;contact guard  -RB     Assistive Device (Sit-Stand Transfers) walker, front-wheeled  -RB       Row Name 11/06/23 1531          Stand-Sit Transfer    Stand-Sit [place] (Transfers) contact guard;verbal cues  -RB     Assistive Device (Stand-Sit Transfers) walker, front-wheeled  -RB       Row Name 11/06/23 1531          Toilet Transfer    Type (Toilet Transfer) sit-stand;stand-sit  -RB     [place] Level (Toilet Transfer) contact guard;verbal cues  -RB     Assistive Device (Toilet Transfer) walker, front-wheeled;commode, 3-in-1  -RB       Row Name 11/06/23 1531          Functional Mobility    Functional Mobility- Comment did not feel well enough to mobilize further than the bedside commode  -RB       Row Name 11/06/23 1531          Activities of Daily Living    BADL Assessment/Intervention --  she declined to participate in ADL's - she has required assist for grooming, LBD and toileting today from nursing staff  -RB               User Key  (r) = Recorded By, (t) = Taken By, (c) = Cosigned By      Initials Name Provider Type    RB Roslyn Lara OT Occupational Therapist                   Obj/Interventions       Row Name 11/06/23 1532          Sensory Assessment (Somatosensory)    Sensory Assessment (Somatosensory) sensation intact  -RB       Row Name 11/06/23 1532          Vision Assessment/Intervention    Visual Impairment/Limitations WFL  -RB       Row Name 11/06/23 1532          Range of Motion  Comprehensive    General Range of Motion no range of motion deficits identified  -RB       Row Name 11/06/23 1532          Strength Comprehensive (MMT)    Comment, General Manual Muscle Testing (MMT) Assessment generalized weakness in her arms and legs. reports falls at home from weakness.  -RB       Row Name 11/06/23 1532          Balance    Comment, Balance SBA sitting balance at the EOB - CGA standing and pivoting to a commode  -RB               User Key  (r) = Recorded By, (t) = Taken By, (c) = Cosigned By      Initials Name Provider Type    RB Roslyn Lara OT Occupational Therapist                   Goals/Plan       Row Name 11/06/23 1534          Bed Mobility Goal 1 (OT)    Activity/Assistive Device (Bed Mobility Goal 1, OT) bed mobility activities, all  -RB     [place] Level/Cues Needed (Bed Mobility Goal 1, OT) modified independence  -RB     Time Frame (Bed Mobility Goal 1, OT) short term goal (STG);2 weeks  -RB     Progress/Outcomes (Bed Mobility Goal 1, OT) continuing progress toward goal  -RB       Row Name 11/06/23 1534          Transfer Goal 1 (OT)    Activity/Assistive Device (Transfer Goal 1, OT) transfers, all  -RB     [place] Level/Cues Needed (Transfer Goal 1, OT) modified independence  -RB     Time Frame (Transfer Goal 1, OT) short term goal (STG);2 weeks  -RB     Progress/Outcome (Transfer Goal 1, OT) continuing progress toward goal  -RB       Row Name 11/06/23 1534          Dressing Goal 1 (OT)    Activity/Device (Dressing Goal 1, OT) dressing skills, all  -RB     [place]/Cues Needed (Dressing Goal 1, OT) modified independence  -RB     Time Frame (Dressing Goal 1, OT) short term goal (STG);2 weeks  -RB     Progress/Outcome (Dressing Goal 1, OT) continuing progress toward goal  -RB       Row Name 11/06/23 1534          Toileting Goal 1 (OT)    Activity/Device (Toileting Goal 1, OT) toileting skills, all  -RB     [place] Level/Cues Needed (Toileting Goal 1, OT) modified  independence  -RB     Time Frame (Toileting Goal 1, OT) short term goal (STG);2 weeks  -RB     Progress/Outcome (Toileting Goal 1, OT) continuing progress toward goal  -RB       Row Name 11/06/23 1534          Grooming Goal 1 (OT)    Activity/Device (Grooming Goal 1, OT) grooming skills, all  -RB     [place] (Grooming Goal 1, OT) modified independence  -RB     Time Frame (Grooming Goal 1, OT) short term goal (STG);2 weeks  -RB     Progress/Outcome (Grooming Goal 1, OT) continuing progress toward goal  -RB       Row Name 11/06/23 1534          Therapy Assessment/Plan (OT)    Planned Therapy Interventions (OT) transfer/mobility retraining;strengthening exercise;ROM/therapeutic exercise;activity tolerance training;adaptive equipment training;BADL retraining;functional balance retraining;occupation/activity based interventions;patient/caregiver education/training  -RB               User Key  (r) = Recorded By, (t) = Taken By, (c) = Cosigned By      Initials Name Provider Type    RB Roslyn Lara, OT Occupational Therapist                   Clinical Impression       Row Name 11/06/23 1533          Pain Assessment    Pretreatment Pain Rating 0/10 - no pain  -RB     Posttreatment Pain Rating 0/10 - no pain  -RB       Row Name 11/06/23 1533          Plan of Care Review    Plan of Care Reviewed With patient  -RB     Progress no change  -RB     Outcome Evaluation .  -RB       Row Name 11/06/23 1533          Therapy Assessment/Plan (OT)    Criteria for Skilled Therapeutic Interventions Met (OT) yes;skilled treatment is necessary  -RB     Therapy Frequency (OT) 5 times/wk  -RB       Row Name 11/06/23 1533          Therapy Plan Review/Discharge Plan (OT)    Anticipated Discharge Disposition (OT) home with 24/7 care;home with home health;skilled nursing facility  -RB       Row Name 11/06/23 1533          Vital Signs    O2 Delivery Pre Treatment room air  -RB     O2 Delivery Intra Treatment room air  -RB     O2 Delivery  Post Treatment room air  -RB     Pre Patient Position Supine  -RB     Intra Patient Position Standing  -RB     Post Patient Position Supine  -RB       Row Name 11/06/23 1533          Positioning and Restraints    Pre-Treatment Position in bed  -RB     Post Treatment Position bed  -RB     In Bed notified nsg;supine;call light within reach;encouraged to call for assist;exit alarm on;fowlers;legs elevated  -RB               User Key  (r) = Recorded By, (t) = Taken By, (c) = Cosigned By      Initials Name Provider Type    RB Roslyn Lara OT Occupational Therapist                   Outcome Measures       Row Name 11/06/23 1535          How much help from another is currently needed...    Putting on and taking off regular lower body clothing? 2  -RB     Bathing (including washing, rinsing, and drying) 2  -RB     Toileting (which includes using toilet bed pan or urinal) 2  -RB     Putting on and taking off regular upper body clothing 3  -RB     Taking care of personal grooming (such as brushing teeth) 3  -RB     Eating meals 3  -RB     AM-PAC 6 Clicks Score (OT) 15  -RB       Row Name 11/06/23 0944 11/06/23 0811       How much help from another person do you currently need...    Turning from your back to your side while in flat bed without using bedrails? 3  -EE 3  -JA    Moving from lying on back to sitting on the side of a flat bed without bedrails? 3  -EE 3  -JA    Moving to and from a bed to a chair (including a wheelchair)? 3  -EE 3  -JA    Standing up from a chair using your arms (e.g., wheelchair, bedside chair)? 3  -EE 3  -JA    Climbing 3-5 steps with a railing? 3  -EE 3  -JA    To walk in hospital room? 3  -EE 3  -JA    AM-PAC 6 Clicks Score (PT) 18  -EE 18  -JA    Highest level of mobility 6 --> Walked 10 steps or more  -EE 6 --> Walked 10 steps or more  -JA      Row Name 11/06/23 1535          Modified [place] Scale    Modified [place] Scale 4 - Moderately severe disability.  Unable to walk without  assistance, and unable to attend to own bodily needs without assistance.  -RB       Row Name 11/06/23 1535          Functional Assessment    Outcome Measure Options AM-PAC 6 Clicks Daily Activity (OT);Modified [place]  -RB               User Key  (r) = Recorded By, (t) = Taken By, (c) = Cosigned By      Initials Name Provider Type    Valeria Jeter, PT Physical Therapist    Roslyn Ann, OT Occupational Therapist    Iris Hagan, RN Registered Nurse                    Occupational Therapy Education       Title: PT OT SLP Therapies (In Progress)       Topic: Occupational Therapy (Not Started)       Point: ADL training (Not Started)       Description:   Instruct learner(s) on proper safety adaptation and remediation techniques during self care or transfers.   Instruct in proper use of assistive devices.                  Learner Progress:  Not documented in this visit.              Point: Home exercise program (Not Started)       Description:   Instruct learner(s) on appropriate technique for monitoring, assisting and/or progressing therapeutic exercises/activities.                  Learner Progress:  Not documented in this visit.              Point: Precautions (Not Started)       Description:   Instruct learner(s) on prescribed precautions during self-care and functional transfers.                  Learner Progress:  Not documented in this visit.              Point: Body mechanics (Not Started)       Description:   Instruct learner(s) on proper positioning and spine alignment during self-care, functional mobility activities and/or exercises.                  Learner Progress:  Not documented in this visit.                                  OT Recommendation and Plan  Planned Therapy Interventions (OT): transfer/mobility retraining, strengthening exercise, ROM/therapeutic exercise, activity tolerance training, adaptive equipment training, BADL retraining, functional balance retraining, occupation/activity  based interventions, patient/caregiver education/training  Therapy Frequency (OT): 5 times/wk  Plan of Care Review  Plan of Care Reviewed With: patient  Progress: no change  Outcome Evaluation: .     Time Calculation:   Evaluation Complexity (OT)  Review Occupational Profile/Medical/Therapy History Complexity: expanded/moderate complexity  Assessment, Occupational Performance/Identification of Deficit Complexity: 3-5 performance deficits  Clinical Decision Making Complexity (OT): detailed assessment/moderate complexity  Overall Complexity of Evaluation (OT): moderate complexity     Time Calculation- OT       Row Name 11/06/23 1528             Time Calculation- OT    OT Start Time 1502  -RB      OT Stop Time 1521  -RB      OT Time Calculation (min) 19 min  -RB      Total Timed Code Minutes- OT 9 minute(s)  -RB      OT Received On 11/06/23  -RB      OT - Next Appointment 11/07/23  -RB      OT Goal Re-Cert Due Date 11/20/23  -RB         Timed Charges    17137 - OT Therapeutic Activity Minutes 9  -RB         Untimed Charges    OT Eval/Re-eval Minutes 10  -RB         Total Minutes    Timed Charges Total Minutes 9  -RB      Untimed Charges Total Minutes 10  -RB       Total Minutes 19  -RB                User Key  (r) = Recorded By, (t) = Taken By, (c) = Cosigned By      Initials Name Provider Type    RB Roslyn Lara OT Occupational Therapist                  Therapy Charges for Today       Code Description Service Date Service Provider Modifiers Qty    55238033165 HC OT THERAPEUTIC ACT EA 15 MIN 11/6/2023 Roslyn Lara OT GO 1    08229809056 HC OT EVAL MOD COMPLEXITY 2 11/6/2023 Roslyn Lara OT GO 1                 Roslyn Lara OT  11/6/2023

## 2023-11-06 NOTE — CONSULTS
Name: Marialuisa Vivar ADMIT: 2023   : 1955  PCP: Jennifer Mantilla MD    MRN: [id number] LOS: 0 days   AGE/SEX: 68 y.o. female  ROOM: [facility]/1     Subjective   Subjective   Appetite decreased but is tolerating some solid foods. Denies further n/v/d. Urinating without issues. Feels overall weak.        Objective   Objective   Vital Signs  Temp:  [97.3 °F (36.3 °C)-98.2 °F (36.8 °C)] 97.3 °F (36.3 °C)  Heart Rate:  [] 86  Resp:  [16-19] 16  BP: ()/(53-72) 116/66  SpO2:  [96 %-100 %] 96 %  on   ;   Device (Oxygen Therapy): room air  Body mass index is 22.27 kg/m².  Physical Exam  Vitals and nursing note reviewed.   Constitutional:       General: She is not in acute distress.     Comments: Weak, frail appearance   HENT:      Head: Normocephalic.      Mouth/Throat:      Mouth: Mucous membranes are moist.   Eyes:      Conjunctiva/sclera: Conjunctivae normal.   Cardiovascular:      Rate and Rhythm: Normal rate and regular rhythm.   Pulmonary:      Effort: Pulmonary effort is normal. No respiratory distress.      Breath sounds: No wheezing or rales.      Comments: On room air  Abdominal:      General: Bowel sounds are normal. There is no distension.      Palpations: Abdomen is soft.      Tenderness: There is no abdominal tenderness.   Musculoskeletal:      Cervical back: Neck supple.      Right lower leg: No edema.      Left lower leg: No edema.   Skin:     General: Skin is warm and dry.   Neurological:      Mental Status: She is alert and oriented to person, place, and time.   Psychiatric:         Mood and Affect: Mood normal.         Behavior: Behavior normal.       Results Review     I reviewed the patient's new clinical results.  Results from last 7 days   Lab Units 23   WBC 10*3/mm3 5.41 5.04 6.28   HEMOGLOBIN g/dL 10.9* 11.0* 13.4   PLATELETS 10*3/mm3 263 267 312     Results from last 7 days   Lab Units 23    SODIUM mmol/L 129* 130* 130*   POTASSIUM mmol/L 4.5 4.8 5.1   CHLORIDE mmol/L 101 102 95*   CO2 mmol/L 19.2* 17.0* 14.8*   BUN mg/dL 13 26* 28*   CREATININE mg/dL 1.15* 1.30* 1.50*   GLUCOSE mg/dL 117* 138* 63*   EGFR mL/min/1.73 51.7* 44.6* 37.6*     Results from last 7 days   Lab Units 11/04/23 1959   ALBUMIN g/dL 4.1   BILIRUBIN mg/dL 0.9   ALK PHOS U/L 90   AST (SGOT) U/L 24   ALT (SGPT) U/L 11     Results from last 7 days   Lab Units 11/06/23  0410 11/05/23  0459 11/04/23 1959   CALCIUM mg/dL 10.1 9.9 11.2*   ALBUMIN g/dL  --   --  4.1   MAGNESIUM mg/dL  --   --  1.7       Glucose   Date/Time Value Ref Range Status   11/05/2023 1757 306 (H) 70 - 130 mg/dL Final   11/05/2023 1118 134 (H) 70 - 130 mg/dL Final   11/05/2023 0624 112 70 - 130 mg/dL Final   11/05/2023 0056 129 70 - 130 mg/dL Final   11/04/2023 2321 64 (L) 70 - 130 mg/dL Final       XR Chest 1 View    Result Date: 11/4/2023  1. No active disease is seen in chest with no change when compared to prior chest x-ray on August 27, 2023.  2. Previous left mastectomy and left axillary dissection. There are stable blastic metastatic lesions to the T6, T9 and T10 thoracic vertebrae. There is chronic moderate elevation left hemidiaphragm with atelectasis or scarring at the left base and there are chain sutures in the right suprahilar region from previous right lung surgery.   This report was finalized on 11/4/2023 8:58 PM by Dr. Papa Casas M.D on Workstation: [id number]       I have personally reviewed all medications:  Scheduled Medications  atorvastatin, 10 mg, Oral, Nightly  diatrizoate meglumine-sodium, 30 mL, Oral, Once  miconazole, 1 application , Topical, Q12H  pantoprazole, 40 mg, Oral, Q AM  PARoxetine, 40 mg, Oral, Nightly  sodium chloride, 10 mL, Intravenous, Q12H    Infusions  sodium chloride, 100 mL/hr    Diet  Diet: Gastrointestinal Diets; Fiber-Restricted, Low Irritant; Texture: Regular Texture (IDDSI 7); Fluid Consistency: Thin (IDDSI 0)    I  have personally reviewed:  [x]  Laboratory   [x]  Microbiology   [x]  Radiology   [x]  EKG/Telemetry  [x]  Cardiology/Vascular   []  Pathology    []  Records       Assessment/Plan     Active Hospital Problems    Diagnosis  POA    **Nausea & vomiting [R11.2]  Yes    Generalized weakness [R53.1]  Yes    Hyponatremia [E87.1]  Yes    Carcinoma of left breast metastatic to lung [C50.912, C78.02]  Yes    Benign essential HTN [I10]  Yes    Depression [F32.A]  Yes    Malignant neoplasm metastatic to bone [C79.51]  Yes    Vocal cord dysfunction [J38.3]  Yes      Resolved Hospital Problems   No resolved problems to display.       68 y.o. female admitted with Nausea & vomiting.    Ms. Vivar is a 68 yr old female w/metastatic breat cancer to lung and bone, depression/anxiety, HTN, tongue cancer who presented to [facility]/4/23 c/o n/v, weakness, diarrhea. Workup revealed dehydration/SARAH, hypercalcemia, hyponatremia. Hem/Onc has been following. Started on IVF's, antiemetics. No further diarrhea since admission. Tolerating some solid food. Ca has normalized s/p pamidronate. No further n/v. Na 129. Cr has now normalized. Hem/Onc planning additional workup with CT C/A/P. PT has evaluated recommending SNF. [facility] has been consulted to assist w/medical management    N/V/Generalized weakness:  -No further n/v or diarrhea. Nutrition consult. Continue OT/PT. Plan SNF at discharge    Metastatic breast cancer:  -Hem/Onc following. CT C/A/P pending    Hyponatremia:  -Na 129, likely due to decreased po intake. Continue IVF's through today. Further workup if no improvement    Hypercalcemia:  -Now resolved. S/P pamidronate    HTN:  -BP low-normotensive. IVF's as above. Not on antihypertensive regimen    Depression/Anxiety:  -Stable. Continue paroxetine, prn lorazepam      SCDs for DVT prophylaxis.  Full code.  Discussed with patient.  Anticipate discharge to SNU facility later this week..      ARON Tijerina  [facility]  11/06/23  10:30 EST

## 2023-11-06 NOTE — THERAPY TREATMENT NOTE
Patient Name: Marialuisa Vivar  : 1955    MRN: [id number]                              Today's Date: 2023       Admit Date: 2023    Visit Dx:     ICD-10-CM ICD-9-CM   1. SARAH (acute kidney injury)  N17.9 584.9   2. Orthostasis  I95.1 458.0   3. Hyponatremia  E87.1 276.1   4. Increased anion gap metabolic acidosis  E87.29 276.2   5. Elevated troponin  R79.89 790.6   6. Hypoglycemia  E16.2 251.2     Patient Active Problem List   Diagnosis    Benign essential HTN    Depression    Vocal cord dysfunction    Malignant neoplasm metastatic to bone    Carcinoma of left breast metastatic to lung    Tachycardia    Therapeutic drug monitoring    Lung metastasis    Vitamin D deficiency    Class 1 obesity without serious comorbidity with body mass index (BMI) of 31.0 to 31.9 in adult    Tongue cancer    Anxiety    Bruxism    Cheilosis    Squamous cell carcinoma of skin    Hyperlipidemia    Ovarian mass, right    Adnexal mass    Abnormal CT scan, colon    SARAH (acute kidney injury)    Hyperkalemia    Metabolic acidosis    Dehydration    Diarrhea    Pancytopenia    Nausea & vomiting     Past Medical History:   Diagnosis Date    SARAH (acute kidney injury) 2023    Allergy     Anxiety     Asthma     Bone metastasis     Breast cancer     Left node positive    Cholelithiasis 2000    Lap Marily    Class 1 obesity without serious comorbidity with body mass index (BMI) of 31.0 to 31.9 in adult 2019    Colon polyp Past 10-15 yrs?    found at colonoscopies    Depression     Esophageal reflux     cough    History of colon polyps     Hyperlipidemia     Hypertension     Left breast cancer metastasized to lung     Obstructive sleep apnea     does not wear cpap    Ovarian cyst     Pancytopenia 2023    PONV (postoperative nausea and vomiting)     Tongue cancer 2019    by ENT at [facility]     Past Surgical History:   Procedure Laterality Date    CHOLECYSTECTOMY      COLONOSCOPY      H/O polyps     COLONOSCOPY N/A 6/1/2022    Procedure: COLONOSCOPY to cecum and TI with cold / hot snare polypectomies;  Surgeon: Luis Enrique Galeas MD;  Location: [facility] ENDOSCOPY;  Service: Gastroenterology;  Laterality: N/A;  pre-abnormal ct  post-polyps, diverticulosis, hemorrhoids    KNEE ARTHROPLASTY      LUNG SURGERY  2010    Lung wedge resection    MASTECTOMY RADICAL Left 1993    TONGUE SURGERY  05/21/2019    tongue cancer    TOTAL LAPAROSCOPIC HYSTERECTOMY N/A 11/04/2021    Procedure: Robotic assisted total laparoscopic hysterectomy, bilateral salpingoophorecotmy, bilateral ureteralysis ;  Surgeon: Kaylynn Ricci DO;  Location: [facility] MAIN OR;  Service: Robotics - DaVinci;  Laterality: N/A;      General Information       Row Name 11/06/23 0940          Physical Therapy Time and Intention    Document Type therapy note (daily note)  -EE     Mode of Treatment physical therapy;individual therapy  -EE       Row Name 11/06/23 0940          General Information    Existing Precautions/Restrictions fall  -EE     Barriers to Rehab medically complex  -EE       Row Name 11/06/23 0940          Cognition    Orientation Status (Cognition) oriented x 3  -EE       Row Name 11/06/23 0940          Safety Issues, Functional Mobility    Safety Issues Affecting Function (Mobility) --  slow processing, increased time required with cues  -EE     Impairments Affecting Function (Mobility) balance;endurance/activity tolerance;strength  -EE               User Key  (r) = Recorded By, (t) = Taken By, (c) = Cosigned By      Initials Name Provider Type    EE Valeria Mccracken PT Physical Therapist                   Mobility       Row Name 11/06/23 0941          Bed Mobility    Supine-Sit [place] (Bed Mobility) standby assist;verbal cues  -EE     Sit-Supine [place] (Bed Mobility) standby assist;verbal cues  -EE     Assistive Device (Bed Mobility) head of bed elevated  -EE       Row Name 11/06/23 0941          Sit-Stand Transfer    Sit-Stand  [place] (Transfers) contact guard;verbal cues  -EE     Assistive Device (Sit-Stand Transfers) walker, front-wheeled  -EE       Row Name 11/06/23 0941          Gait/Stairs (Locomotion)    [place] Level (Gait) contact guard;verbal cues  -EE     Assistive Device (Gait) walker, front-wheeled  -EE     Distance in Feet (Gait) 65' x 1  -EE     Deviations/Abnormal Patterns (Gait) eunice decreased;stride length decreased  -EE     Bilateral Gait Deviations forward flexed posture;heel strike decreased  -EE     Comment, (Gait/Stairs) Slow pace, c/o increased SOA with activity  -EE               User Key  (r) = Recorded By, (t) = Taken By, (c) = Cosigned By      Initials Name Provider Type    EE Valeria Mccracken PT Physical Therapist                   Obj/Interventions       Row Name 11/06/23 0942          Balance    Static Sitting Balance standby assist  -EE     Position, Sitting Balance unsupported;sitting edge of bed  -EE     Static Standing Balance standby assist;contact guard  -EE     Dynamic Standing Balance contact guard  -EE     Position/Device Used, Standing Balance supported;walker, front-wheeled  -EE               User Key  (r) = Recorded By, (t) = Taken By, (c) = Cosigned By      Initials Name Provider Type    Valeria Jeter PT Physical Therapist                   Goals/Plan    No documentation.                  Clinical Impression       Row Name 11/06/23 0942          Pain    Pretreatment Pain Rating 0/10 - no pain  -EE     Posttreatment Pain Rating 0/10 - no pain  -EE       Row Name 11/06/23 0942          Plan of Care Review    Plan of Care Reviewed With patient  -EE     Progress improving  -EE     Outcome Evaluation Pt demos gradual progress with PT, as evidenced by tolerance of increased ambulation distance. However, pt continues to demo generalized weakness, impaired balance, and decreased endurance. Pt requires assist x1 and cues for safety with mobility with rwx. Pt fatigued quickly after ambulating  65' and required prolonged rest break. Pt remains at increased risk of falls and is not safe to DC home currently as pt lives alone with a flight of stairs to enter home. Given medical history as well as current functional status, would recommend DC to SNF for more strengthening and mobility training.  -EE       Row Name 11/06/23 0942          Therapy Assessment/Plan (PT)    Therapy Frequency (PT) 5 times/wk  -EE       Row Name 11/06/23 0942          Vital Signs    O2 Delivery Pre Treatment room air  -EE     O2 Delivery Intra Treatment room air  -EE     Post SpO2 (%) 99  -EE     O2 Delivery Post Treatment room air  -EE     Pre Patient Position Supine  -EE     Intra Patient Position Standing  -EE     Post Patient Position Supine  -EE       Row Name 11/06/23 0942          Positioning and Restraints    Pre-Treatment Position in bed  -EE     Post Treatment Position bed  -EE     In Bed fowlers;call light within reach;encouraged to call for assist;exit alarm on;notified nsg  -EE               User Key  (r) = Recorded By, (t) = Taken By, (c) = Cosigned By      Initials Name Provider Type    Valeria Jeter, PT Physical Therapist                   Outcome Measures       Row Name 11/06/23 0944 11/06/23 0811       How much help from another person do you currently need...    Turning from your back to your side while in flat bed without using bedrails? 3  -EE 3  -JA    Moving from lying on back to sitting on the side of a flat bed without bedrails? 3  -EE 3  -JA    Moving to and from a bed to a chair (including a wheelchair)? 3  -EE 3  -JA    Standing up from a chair using your arms (e.g., wheelchair, bedside chair)? 3  -EE 3  -JA    Climbing 3-5 steps with a railing? 3  -EE 3  -JA    To walk in hospital room? 3  -EE 3  -JA    AM-PAC 6 Clicks Score (PT) 18  -EE 18  -JA    Highest level of mobility 6 --> Walked 10 steps or more  -EE 6 --> Walked 10 steps or more  -JA              User Key  (r) = Recorded By, (t) = Taken By, (c)  = Cosigned By      Initials Name Provider Type    EE Valeria Mccracken, PT Physical Therapist    Iris Hagan RN Registered Nurse                                 Physical Therapy Education       Title: PT OT SLP Therapies (In Progress)       Topic: Physical Therapy (In Progress)       Point: Mobility training (Done)       Learning Progress Summary             Patient Acceptance, E,TB, VU,NR by  at 11/6/2023 0945    Acceptance, E,TB, VU,DU by  at 11/5/2023 1306                         Point: Home exercise program (Not Started)       Learner Progress:  Not documented in this visit.              Point: Body mechanics (Done)       Learning Progress Summary             Patient Acceptance, E,TB, VU,DU by  at 11/5/2023 1306                         Point: Precautions (Done)       Learning Progress Summary             Patient Acceptance, E,TB, VU,NR by  at 11/6/2023 0945    Acceptance, E,TB, VU,DU by  at 11/5/2023 1306                                         User Key       Initials Effective Dates Name Provider Type Discipline     06/16/21 -  Valeria Mccracken, PT Physical Therapist PT     09/22/22 -  Rosenda Friedman PT Physical Therapist PT                  PT Recommendation and Plan     Plan of Care Reviewed With: patient  Progress: improving  Outcome Evaluation: Pt demos gradual progress with PT, as evidenced by tolerance of increased ambulation distance. However, pt continues to demo generalized weakness, impaired balance, and decreased endurance. Pt requires assist x1 and cues for safety with mobility with rwx. Pt fatigued quickly after ambulating 65' and required prolonged rest break. Pt remains at increased risk of falls and is not safe to DC home currently as pt lives alone with a flight of stairs to enter home. Given medical history as well as current functional status, would recommend DC to SNF for more strengthening and mobility training.     Time Calculation:         PT Charges       Row Name 11/06/23  0945             Time Calculation    Start Time 0852  -EE      Stop Time 0912  -EE      Time Calculation (min) 20 min  -EE      PT Received On 11/06/23  -EE      PT - Next Appointment 11/07/23  -EE         Time Calculation- PT    Total Timed Code Minutes- PT 20 minute(s)  -EE         Timed Charges    77475 - PT Therapeutic Activity Minutes 20  -EE         Total Minutes    Timed Charges Total Minutes 20  -EE       Total Minutes 20  -EE                User Key  (r) = Recorded By, (t) = Taken By, (c) = Cosigned By      Initials Name Provider Type    EE Valeria Mccracken, PT Physical Therapist                  Therapy Charges for Today       Code Description Service Date Service Provider Modifiers Qty    96151795214 HC PT THERAPEUTIC ACT EA 15 MIN 11/6/2023 Valeria Mccracken, PT GP 1            PT G-Codes  Outcome Measure Options: AM-PAC 6 Clicks Basic Mobility (PT)  AM-PAC 6 Clicks Score (PT): 18  PT Discharge Summary  Anticipated Discharge Disposition (PT): skilled nursing facility    Valeria Mccracken PT  11/6/2023

## 2023-11-06 NOTE — PROGRESS NOTES
ED OBSERVATION PROGRESS/DISCHARGE SUMMARY    Date of Admission: 11/4/2023   LOS: 0 days   PCP: Jennifer Mantilla MD      Subjective: Patient reports still feeling weak and tired today.  She worked with physical therapy and can only ambulate 65 feet before she became significantly fatigued.  She is agreeable to rehab placement.    Hospital Outcome:    68-year-old female admitted to the observation unit for further evaluation of nausea, vomiting, diarrhea, and generalized weakness.  Patient has history of breast cancer with mets to lungs, bones, and liver.     Patient was seen by hematology oncology, she has not followed up for several visits.  She needs follow-up with Dr. Hawkins.  She did have some hypercalcemia and oncology ordered a dose of pamidronate while here to prevent recurrence of hypercalcemia.     Patient was seen by physical therapy, unfortunately they do not believe she is safe to go home.  Patient lives by herself.  Physical therapy recommending skilled nursing facility at discharge but patient is hesitant to this.        11/6: Patient was reevaluated by oncology today, discussed with Dr. Suazo.  He would like patient to have scan of chest, abdomen, and pelvis today to reassess her cancer and reinitiate treatment as indicated.  Physical therapy reevaluated today, they continue to recommend SNF.  Patient is agreeable to this today.  I discussed the above with Dr. Fried who has graciously accepted the patient for admission to hospital under care of [facility].       ROS:  General: no fevers, chills  Respiratory: no cough, dyspnea  Cardiovascular: no chest pain, palpitations  Abdomen: No abdominal pain, nausea, vomiting, or diarrhea  Neurologic: No focal weakness    Objective   Physical Exam:  I have reviewed the vital signs.  Temp:  [97.5 °F (36.4 °C)-98.2 °F (36.8 °C)] 97.6 °F (36.4 °C)  Heart Rate:  [] 88  Resp:  [16-19] 16  BP: ()/(53-72) 113/72  General Appearance:    Alert, cooperative, no  distress  Head:    Normocephalic, atraumatic  Eyes:    Sclerae anicteric  Neck:   Supple, no mass  Lungs: Clear to auscultation bilaterally, respirations unlabored  Heart: Regular rate and rhythm, S1 and S2 normal, no murmur, rub or gallop  Abdomen:  Soft, non-tender, bowel sounds active, nondistended  Extremities: No clubbing, cyanosis, or edema to lower extremities  Pulses:  2+ and symmetric in distal lower extremities  Skin: No rashes   Neurologic: Oriented x3, Normal strength to extremities    Results Review:    I have reviewed the labs, radiology results and diagnostic studies.    Results from last 7 days   Lab Units 11/05/23 0459   WBC 10*3/mm3 5.04   HEMOGLOBIN g/dL 11.0*   HEMATOCRIT % 32.5*   PLATELETS 10*3/mm3 267     Results from last 7 days   Lab Units 11/05/23 0459 11/04/23 1959   SODIUM mmol/L 130* 130*   POTASSIUM mmol/L 4.8 5.1   CHLORIDE mmol/L 102 95*   CO2 mmol/L 17.0* 14.8*   BUN mg/dL 26* 28*   CREATININE mg/dL 1.30* 1.50*   CALCIUM mg/dL 9.9 11.2*   BILIRUBIN mg/dL  --  0.9   ALK PHOS U/L  --  90   ALT (SGPT) U/L  --  11   AST (SGOT) U/L  --  24   GLUCOSE mg/dL 138* 63*     Imaging Results (Last 24 Hours)       ** No results found for the last 24 hours. **            I have reviewed the medications.  ---------------------------------------------------------------------------------------------  Assessment & Plan   Assessment/Problem List    Nausea & vomiting      Plan:    Nausea and vomiting   Generalized weakness  Orthostasis  -Vital signs every 4 hours  -Cardiac monitoring  -D5 0.45 NS at 75 mL/h  -Point-of-care glucose every 6 hours  -Gastrointestinal panel PCR negative  -Urinalysis 40 ketones, trace leukocytes, no evidence of infection  -Continue IV fluids overnight  -Advance diet  -Physical therapy recommends SNF at discharge  -Admit to hospital, Dr. Fried/JASON     Hypercalcemia  History of breast cancer with mets to lungs, liver, bone  -Patient needs short interval outpatient follow-up  with oncology  -Patient received dose of pamidronate today per oncology to prevent recurrence of hypercalcemia  -CT abdomen pelvis without IV contrast, with oral contrast    Disposition: Admit to hospital, Dr. Fried/A    This note will serve as progress note/transfer summary    42 minutes have been spent by [facility] providers in the care of this patient while under observation status.    Appropriate PPE worn during patient encounter.  Hand hygeine performed before and after seeing the patient.      RITO Caldwell 11/06/23 10:30 EST

## 2023-11-06 NOTE — PLAN OF CARE
The pt was admitted to [facility] 2/2 to nausea, vomiting, diarrhea, and generalized weakness.  Patient has history of breast cancer with mets to lungs, bones, and liver. The pt lives alone in a apartment where she is independent with ADL's and mobility. Today, the pt completed bed mobility with SBA. CGA via HHA for OOB functional transfer to her BS. She reported weakness and + falls at home. She declined any further ADL's or OOB activity this afternoon. Will continue to follow.       Anticipated Discharge Disposition (OT): home with 24/7 care, home with home health, skilled nursing facility

## 2023-11-06 NOTE — PLAN OF CARE
Goal Outcome Evaluation:  Plan of Care Reviewed With: patient        Progress: improving  Outcome Evaluation: Pt demos gradual progress with PT, as evidenced by tolerance of increased ambulation distance. However, pt continues to demo generalized weakness, impaired balance, and decreased endurance. Pt requires assist x1 and cues for safety with mobility with rwx. Pt fatigued quickly after ambulating 65' and required prolonged rest break. Pt remains at increased risk of falls and is not safe to DC home currently as pt lives alone with a flight of stairs to enter home. Given medical history as well as current functional status, would recommend DC to SNF for more strengthening and mobility training.      Anticipated Discharge Disposition (PT): skilled nursing facility

## 2023-11-06 NOTE — CASE MANAGEMENT/SOCIAL WORK
"Discharge Planning Assessment  [facility]     Patient Name: Marialuisa Vivar  MRN: [id number]  Today's Date: 11/6/2023    Admit Date: 11/4/2023    Plan: Interested in [facility] at [facility]/Edgar Mcdaniel RN   Discharge Needs Assessment       Row Name 11/06/23 1508       Living Environment    People in Home alone    Current Living Arrangements apartment    Potentially Unsafe Housing Conditions none    Primary Care Provided by self    Provides Primary Care For no one    Family Caregiver if Needed friend(s)    Quality of Family Relationships supportive;other (see comments)  states that all of her family lives in [facility]    Able to Return to Prior Arrangements other (see comments)  Interested in ST rehab at this point       Resource/Environmental Concerns    Resource/Environmental Concerns environmental  15 steps to get to her single level ap't       Transition Planning    Patient/Family Anticipates Transition to other (see comments)  ST rehab    Patient/Family Anticipated Services at Transition rehabilitation services    Transportation Anticipated other (see comments)  Friend \"may\" be able to provide       Discharge Needs Assessment    Equipment Currently Used at Home cane, quad    Concerns to be Addressed discharge planning    Anticipated Changes Related to Illness inability to care for self    Equipment Needed After Discharge none    Provided Post Acute Provider List? Yes    Post Acute Provider List --  [facility]-ST rehab    Delivered To Patient    Method of Delivery In person    Patient's Choice of Community Agency(s) Pending                   Discharge Plan       Row Name 11/06/23 1512       Plan    Plan Interested in [facility] rehab at [facility] Mcdaniel RN    Patient/Family in Agreement with Plan yes    Provided Post Acute Provider List? Yes    Post Acute Provider List --  ST rehab    Delivered To Patient    Method of Delivery In person    Plan Comments Spoke w/ pt at bedside w/ her permission. Introduced self and explained role. All " "info on facesheet, incl. PCP as [facility] a[facility]ntilla, verified. Lives alone in single story ap't w/ 15 steps to enter ap't. Uses quad cane as needed. Able to navigate into and around ap't but some fatigue. has been independent w/ ADLs. Uses [facility] on [facility] at [facility] and is able to obtain and pay for meds. Interested in ST rehab; explained [facility] requirement. gave [facility] (ST rehab) and asked her to choose 3-4 facilities and CM will follow. States friend \"may\" be able to transport her at d/c. CM to follow-[facility] Mcdaniel RN                  Continued Care and Services - Admitted Since 11/4/2023    Coordination has not been started for this encounter.       Selected Continued Care - Episodes Includes continued care and service providers with selected services from the active episodes listed below      Oncology- External Fill Episode start date: 10/10/2023   There are no active outsourced providers for this episode.                 Selected Continued Care - Prior Encounters Includes continued care and service providers with selected services from prior encounters from 8/6/2023 to 11/6/2023      Discharged on 8/31/2023 Admission date: 8/27/2023 - Discharge disposition: Home or Self Care      Durable Medical Equipment       Service Provider Selected Services Address Phone Fax Patient Preferred    [facility] [facility] [address] [phone] --                             Demographic Summary       Row Name 11/06/23 1507       General Information    Admission Type observation    Arrived From emergency department    Referral Source admission list    Reason for Consult discharge planning    Preferred Language English       Contact Information    Permission Granted to Share Info With                    Functional Status       Row Name 11/06/23 1508       Functional Status    Usual Activity Tolerance fair    Current Activity Tolerance " "poor       Functional Status, IADL    Medications independent    IADL Comments has \"friends\" who can provide some assist       Mental Status    General Appearance WDL WDL       Mental Status Summary    Recent Changes in Mental Status/Cognitive Functioning no changes                   Psychosocial       Row Name 11/06/23 1508       Behavior WDL    Behavior WDL WDL       Emotion Mood WDL    Emotion/Mood/Affect WDL WDL       Speech WDL    Speech WDL WDL       Perceptual State WDL    Perceptual State WDL WDL       Thought Process WDL    Thought Process WDL WDL       Intellectual Performance WDL    Intellectual Performance WDL WDL    Level of Consciousness Alert                   Abuse/Neglect    No documentation.                  Legal    No documentation.                  Substance Abuse    No documentation.                  Patient Forms    No documentation.                     Fiona Mcdaniel RN    "

## 2023-11-06 NOTE — PROGRESS NOTES
History of Present Illness          This is a 67-year-old woman followed in our practice for metastatic breast cancer which is ER positive and metastatic to bone and lung.  She has most recently been on treatment with hormonal/targeted therapy with Faslodex every 4 weeks and Ibrance along with Xgeva every 3 months.  However reviewing records it appears she has not received Faslodex since 6/8/2023 and her last office visit was 9/18/2023 at which time Faslodex/Ibrance were held and the patient was given IV fluids for weakness and hypotension.  She was supposed to follow-up in 1 week but has apparently missed several appointments since that time.     The patient presented to the ER with complaints of nausea weakness and dysuria.  She states she has not been able to keep anything down for about 24 hours and had associated diarrhea.  Labs performed including CBC which was normal, CMP pertinent for BUN 28/creatinine 1.5 which is actually a bit better than her baseline, sodium 130 and elevated calcium 11.2 (has been elevated to some degree since September when 11.6).  She has been provided IV fluids and antiemetics.  BUNs/creatinine even better today 26/1.3, sodium 130 and calcium normalized 9.9.     The patient states she has been eating and drinking this morning mostly liquids and no BM since admission.    Interval history:  T97.6, pulse 88, respirations 16, /72  Discussed with the patient need to reevaluation by CT.  H&H 10.9 and 31.6, white count of 5410, platelet count of 263,000, BUN/creatinine of 13 and 1.15, sodium 129, calcium 10.1      Past Medical History, Past Surgical History, Social History, Family History have been reviewed and are without significant changes except as mentioned.    Review of Systems   A comprehensive 14 point review of systems was performed and was negative except as mentioned.  Positive for activity change, appetite change, diarrhea, nausea, vomiting, weakness and dysphoric  mood.      Medications:  The current medication list was reviewed in the EMR    ALLERGIES:    Allergies   Allergen Reactions    Nickel Unknown - Low Severity    Ciprofloxacin Rash       Objective      Vitals:    11/05/23 1858 11/05/23 1907 11/05/23 2323 11/06/23 0356   BP:  99/59 90/53 113/72   BP Location:  Right arm Right arm Right arm   Patient Position:  Sitting Lying Sitting   Pulse: 96 92  88   Resp:  18 16 16   Temp:  97.7 °F (36.5 °C) 97.5 °F (36.4 °C) 97.6 °F (36.4 °C)   TempSrc:  Oral Oral Oral   SpO2: 100% 96% 100%    Weight:       Height:             9/21/2023     3:12 PM   Current Status   ECOG score 0       Physical Exam      RECENT LABS:  Hematology WBC   Date Value Ref Range Status   11/06/2023 5.41 3.40 - 10.80 10*3/mm3 Final     RBC   Date Value Ref Range Status   11/06/2023 3.31 (L) 3.77 - 5.28 10*6/mm3 Final     Hemoglobin   Date Value Ref Range Status   11/06/2023 10.9 (L) 12.0 - 15.9 g/dL Final     Hematocrit   Date Value Ref Range Status   11/06/2023 31.6 (L) 34.0 - 46.6 % Final     Platelets   Date Value Ref Range Status   11/06/2023 263 140 - 450 10*3/mm3 Final              Assessment & Plan     *Metastatic ER positive breast cancer primarily to bone and lung nodules  Patient most recently has been on treatment with Faslodex/Ibrance and every 3-month Xgeva but appears to have been off treatment since June secondary to canceled appointment/medical nonadherence  Review of her record indicates initial diagnosis in 1993, evidence of bone involvement by 2016, subsequent pulmonary nodules and bony involvement by 2020 with localized radiation therapy given to C7 and T1 lesion in June 2020, metastatic disease involving bilateral ovaries and uterine serosa in 2021, status post resection and AI therapy continued, evidence of progression by March 2022, XRT provided to right hip by April 2022, 5/6/2020 to begin Faslodex and Ibrance, CT scans and bone scan 7/17/2023 with bony areas of increased uptake,,  patient continues CDK4 therapy.  She is scheduled for follow-up labs, imaging and MD visit Dr. Hawkins later this month for reassessment of her malignancy and to start back on treatment as indicated.  Patient seen in consultation in observation unit 11/6/2023 and repeat scans felt necessary.  Discussed with Dr. Gautam-CT of chest, abdomen, pelvis without IV contrast     *Nausea vomiting diarrhea with volume depletion-appears unrelated to cancer therapy since the patient has been off treatment     *Hypercalcemia  Calcium 11.2 on admission likely from volume depletion but could have a component of hypercalcemia of malignancy from her bone metastases  Since she has been off Xgeva several months I will give her a dose of pamidronate while here to prevent recurrence of hypercalcemia  Patient status post therapy with normocalcemia 11/6/2023     CKD3a- creatinine at baseline 1.3  Repeat assessment 11/6/2023 with being creatinine of 13 and 1.15               11/6/2023            1.3  Repeat assessment 11/6/2023 with being creatinine of 13 and 1.15               11/6/2023

## 2023-11-06 NOTE — NURSING NOTE
Pt noted to be coming out of her room and had unhooked her IV and pure wick. Pt stated that she felt disoriented in the room. Pt has not slept the past 2 nights despite being given medications to promote sleep. Pt walked back to her room and placed back into bed. Told pt that she needs to use her call light when she needs to get up for her safety. Pt verbalized understanding. Bed alarm enabled.

## 2023-11-06 NOTE — PROGRESS NOTES
Patient Care Team:  Jennifer Mantilla MD as PCP - General (Family Medicine)  Jarod Hawkins II, MD as Consulting Physician (Hematology and Oncology)  Yair Robin MD as Referring Physician (Otolaryngology)  Brianna Orozco MD as Consulting Physician (Radiation Oncology)     KEITH MANCIA Progress Note    I supervised care provided by the midlevel provider.    The TORREY and I have discussed this patient's history, physical exam, and treatment plan. I have reviewed the documentation and personally had a face to face interaction with the patient  I affirm the documentation and agree with the treatment and plan. I provided a substantive portion of the care of this patient.  I personally performed the physical exam, in its entirety.  My personal findings are documented in below:    Subjective:  Patient denies any nausea, reports that she was able to eat breakfast without difficulty.  Patient denies any complaints other than generalized weakness.    Physical Exam:  General: No acute distress.  HENT: NCAT, PERRL, Nares patent.  Eyes: no scleral icterus.  Neck: trachea midline, no ROM limitations.  CV: regular rhythm, regular rate.  Respiratory: normal effort, CTAB.  Abdomen: soft, nondistended, NTTP, no rebound tenderness, no guarding or rigidity.  Musculoskeletal: no deformity.  Neuro: alert, moves all extremities, follows commands.  Skin: warm, dry.    Assessment and Plan:  Frail patient with cancer admitted for nausea vomiting diarrhea SARAH and orthostasis.  Patient evaluated by physical therapy here and not safe for discharge, recommendation for SNF.  Admitted to the hospital for placement.

## 2023-11-07 ENCOUNTER — TELEPHONE (OUTPATIENT)
Dept: ONCOLOGY | Facility: CLINIC | Age: 68
End: 2023-11-07
Payer: MEDICARE

## 2023-11-07 LAB
ANION GAP SERPL CALCULATED.3IONS-SCNC: 4 MMOL/L (ref 5–15)
BUN SERPL-MCNC: 7 MG/DL (ref 8–23)
BUN/CREAT SERPL: 7.1 (ref 7–25)
CALCIUM SPEC-SCNC: 9 MG/DL (ref 8.6–10.5)
CHLORIDE SERPL-SCNC: 110 MMOL/L (ref 98–107)
CO2 SERPL-SCNC: 22 MMOL/L (ref 22–29)
CREAT SERPL-MCNC: 0.98 MG/DL (ref 0.57–1)
DEPRECATED RDW RBC AUTO: 42.3 FL (ref 37–54)
EGFRCR SERPLBLD CKD-EPI 2021: 62.6 ML/MIN/1.73
ERYTHROCYTE [DISTWIDTH] IN BLOOD BY AUTOMATED COUNT: 12.2 % (ref 12.3–15.4)
GLUCOSE SERPL-MCNC: 85 MG/DL (ref 65–99)
HCT VFR BLD AUTO: 28.4 % (ref 34–46.6)
HGB BLD-MCNC: 9.6 G/DL (ref 12–15.9)
MCH RBC QN AUTO: 32 PG (ref 26.6–33)
MCHC RBC AUTO-ENTMCNC: 33.8 G/DL (ref 31.5–35.7)
MCV RBC AUTO: 94.7 FL (ref 79–97)
PLATELET # BLD AUTO: 240 10*3/MM3 (ref 140–450)
PMV BLD AUTO: 9.4 FL (ref 6–12)
POTASSIUM SERPL-SCNC: 4.4 MMOL/L (ref 3.5–5.2)
QT INTERVAL: 362 MS
QTC INTERVAL: 438 MS
RBC # BLD AUTO: 3 10*6/MM3 (ref 3.77–5.28)
SODIUM SERPL-SCNC: 136 MMOL/L (ref 136–145)
WBC NRBC COR # BLD: 4.07 10*3/MM3 (ref 3.4–10.8)

## 2023-11-07 PROCEDURE — 97110 THERAPEUTIC EXERCISES: CPT

## 2023-11-07 PROCEDURE — 85027 COMPLETE CBC AUTOMATED: CPT | Performed by: NURSE PRACTITIONER

## 2023-11-07 PROCEDURE — 80048 BASIC METABOLIC PNL TOTAL CA: CPT | Performed by: NURSE PRACTITIONER

## 2023-11-07 PROCEDURE — 99232 SBSQ HOSP IP/OBS MODERATE 35: CPT | Performed by: INTERNAL MEDICINE

## 2023-11-07 PROCEDURE — 97530 THERAPEUTIC ACTIVITIES: CPT

## 2023-11-07 PROCEDURE — 25810000003 SODIUM CHLORIDE 0.9 % SOLUTION: Performed by: NURSE PRACTITIONER

## 2023-11-07 PROCEDURE — 25810000003 SODIUM CHLORIDE 0.9 % SOLUTION: Performed by: INTERNAL MEDICINE

## 2023-11-07 PROCEDURE — 97535 SELF CARE MNGMENT TRAINING: CPT

## 2023-11-07 RX ADMIN — ATORVASTATIN CALCIUM 10 MG: 20 TABLET, FILM COATED ORAL at 20:23

## 2023-11-07 RX ADMIN — PANTOPRAZOLE SODIUM 40 MG: 40 TABLET, DELAYED RELEASE ORAL at 05:56

## 2023-11-07 RX ADMIN — LORAZEPAM 0.5 MG: 0.5 TABLET ORAL at 09:49

## 2023-11-07 RX ADMIN — Medication 10 ML: at 08:50

## 2023-11-07 RX ADMIN — Medication 10 ML: at 21:47

## 2023-11-07 RX ADMIN — SODIUM CHLORIDE 75 ML/HR: 9 INJECTION, SOLUTION INTRAVENOUS at 17:54

## 2023-11-07 RX ADMIN — PAROXETINE HYDROCHLORIDE HEMIHYDRATE 40 MG: 20 TABLET, FILM COATED ORAL at 20:23

## 2023-11-07 RX ADMIN — SODIUM CHLORIDE 100 ML/HR: 9 INJECTION, SOLUTION INTRAVENOUS at 06:31

## 2023-11-07 RX ADMIN — ZOLPIDEM TARTRATE 5 MG: 5 TABLET ORAL at 22:20

## 2023-11-07 NOTE — CONSULTS
Nutrition Services    Patient Name:  Marialuisa Vivar  YOB: 1955  MRN: [id number]  Admit Date:  11/4/2023    Assessment Date:  11/07/23    Summary: Nutrition consult due to chronic poor PO intake, MST score of 2 per nurse admission screen.  Admitted with N/V, diarrhea, dizziness.  Not eating/drinking much since Wednesday.  Breast cancer with mets to bone and lung - recently being treated with hormonal/targeted therapy, but has missed several recent appointments.    50-75% at meals per chart PO data.  Patient reports weight loss, but says more recently she thinks her weight loss has been leveling out.  She reports appetite improving some.  Usually eats 3 meals/day.  Drinks Boost/[place] Instant Breakfast occasionally.  Decreased appetite over a few months with nothing really sounding good.    Per chart weight history, patient has lost 24 lb (15%) x 9 months/21 lb (13.4%) x 5 months.  Agrees to Boost Plus once/day (chocolate).    Patient meets ASPEN/AND criteria for nutrition diagnosis of moderate malnutrition of chronic illness based on: nutrition focused physical exam, weight loss, PO intake.    RD to continue to follow closely.    CLINICAL NUTRITION ASSESSMENT      Reason for Assessment MST score 2+, Nurse or Nurse Practitioner Consult, Unintentional Weight Loss      Diagnosis/Problem   N/V   Medical/Surgical History Past Medical History:   Diagnosis Date    SARAH (acute kidney injury) 08/27/2023    Allergy     Anxiety     Asthma     Bone metastasis     Breast cancer     Left node positive    Cholelithiasis 2000    Lap Marily    Class 1 obesity without serious comorbidity with body mass index (BMI) of 31.0 to 31.9 in adult 02/06/2019    Colon polyp Past 10-15 yrs?    found at colonoscopies    Depression     Esophageal reflux     cough    History of colon polyps     Hyperlipidemia     Hypertension     Left breast cancer metastasized to lung     Obstructive sleep apnea     does not wear cpap    Ovarian  "cyst     Pancytopenia 08/29/2023    PONV (postoperative nausea and vomiting)     Tongue cancer 05/2019    by ENT at [facility] dr Quinn       Past Surgical History:   Procedure Laterality Date    CHOLECYSTECTOMY  2000    COLONOSCOPY  2014    H/O polyps    COLONOSCOPY N/A 6/1/2022    Procedure: COLONOSCOPY to cecum and TI with cold / hot snare polypectomies;  Surgeon: Luis Enrique Galeas MD;  Location: [facility] ENDOSCOPY;  Service: Gastroenterology;  Laterality: N/A;  pre-abnormal ct  post-polyps, diverticulosis, hemorrhoids    KNEE ARTHROPLASTY      LUNG SURGERY  2010    Lung wedge resection    MASTECTOMY RADICAL Left 1993    TONGUE SURGERY  05/21/2019    tongue cancer    TOTAL LAPAROSCOPIC HYSTERECTOMY N/A 11/04/2021    Procedure: Robotic assisted total laparoscopic hysterectomy, bilateral salpingoophorecotmy, bilateral ureteralysis ;  Surgeon: Kaylynn Ricci DO;  Location: [facility] OR;  Service: Robotics - DaVinci;  Laterality: N/A;        Anthropometrics        Current Height  Current Weight  BMI kg/m2 Height: 165.1 cm (65\")  Weight: 61.9 kg (136 lb 6.4 oz) (11/06/23 2257)  Body mass index is 22.7 kg/m².   Adjusted BMI (if applicable)    BMI Category Normal/Healthy (18.4 - 24.9)   Ideal Body Weight (IBW) 125 lb (57 kg)   Usual Body Weight (UBW) Pt says her weight has \"leveled out\" at 135-140 lb   Weight Trend Loss, Amount/Timeframe: 21 lb (13.4%) x 5 months   Weight History Wt Readings from Last 30 Encounters:   11/06/23 2257 61.9 kg (136 lb 6.4 oz)   11/04/23 1937 60.7 kg (133 lb 13.1 oz)   09/21/23 1512 60.7 kg (133 lb 12.8 oz)   09/18/23 1502 61.3 kg (135 lb 3.2 oz)   09/12/23 1320 62.6 kg (138 lb)   08/22/23 1513 64.9 kg (143 lb)   08/07/23 0954 71.7 kg (158 lb)   06/08/23 1000 71.6 kg (157 lb 12.8 oz)   05/30/23 1426 69.4 kg (152 lb 14.4 oz)   05/02/23 1345 70.8 kg (156 lb 1.6 oz)   04/18/23 1417 70.8 kg (156 lb)   04/18/23 1056 72.1 kg (159 lb)   04/04/23 1354 72.1 kg (159 lb)   02/28/23 0759 73 kg (160 " lb 14.4 oz)   12/15/22 1332 74.6 kg (164 lb 6.4 oz)   12/07/22 1300 73.3 kg (161 lb 11.2 oz)   10/20/22 1349 75 kg (165 lb 6.4 oz)   10/14/22 1448 73.8 kg (162 lb 11.2 oz)   09/16/22 1507 75.5 kg (166 lb 6.4 oz)   08/19/22 1018 76.8 kg (169 lb 6.4 oz)   06/24/22 1241 75.4 kg (166 lb 4.8 oz)   06/03/22 1528 74.8 kg (165 lb)   06/01/22 1140 75.8 kg (167 lb)   05/31/22 1127 77.1 kg (170 lb)   05/20/22 1257 76.5 kg (168 lb 9.6 oz)   05/06/22 1400 77.1 kg (170 lb)   04/27/22 0807 76.1 kg (167 lb 12.8 oz)   04/25/22 1347 76.2 kg (168 lb)   04/22/22 1302 78 kg (172 lb)   04/07/22 1318 76.2 kg (168 lb)   03/24/22 1516 76.1 kg (167 lb 12.8 oz)      --  Estimated/Assessed Needs        Current Weight  Weight: 61.9 kg (136 lb 6.4 oz) (11/06/23 2257)       Energy Requirements    Weight for Calculation 136 lb (61.9 kg)   Method for Estimation  30-35 kcal/kg   EST Needs (kcal/day) 4237-7888       Protein Requirements    Weight for Calculation 136 lb (61.9 kg)   EST Protein Needs (g/kg) 1.2 - 1.5 gm/kg   EST Daily Needs (g/day) 74-93       Fluid Requirements     Method for Estimation 1 mL/kcal    EST Needs (mL/day)      Labs       Pertinent Labs    Results from last 7 days   Lab Units 11/07/23  0748 11/06/23  0410 11/05/23  0459 11/04/23 1959   SODIUM mmol/L 136 129* 130* 130*   POTASSIUM mmol/L 4.4 4.5 4.8 5.1   CHLORIDE mmol/L 110* 101 102 95*   CO2 mmol/L 22.0 19.2* 17.0* 14.8*   BUN mg/dL 7* 13 26* 28*   CREATININE mg/dL 0.98 1.15* 1.30* 1.50*   CALCIUM mg/dL 9.0 10.1 9.9 11.2*   BILIRUBIN mg/dL  --   --   --  0.9   ALK PHOS U/L  --   --   --  90   ALT (SGPT) U/L  --   --   --  11   AST (SGOT) U/L  --   --   --  24   GLUCOSE mg/dL 85 117* 138* 63*     Results from last 7 days   Lab Units 11/07/23  0748 11/05/23  0459 11/04/23 1959   MAGNESIUM mg/dL  --   --  1.7   HEMOGLOBIN g/dL 9.6*   < > 13.4   HEMATOCRIT % 28.4*   < > 39.5   WBC 10*3/mm3 4.07   < > 6.28   ALBUMIN g/dL  --   --  4.1    < > = values in this interval not  displayed.     Results from last 7 days   Lab Units 11/07/23  0748 11/06/23  0410 11/05/23  0459 11/04/23  1959   PLATELETS 10*3/mm3 240 263 267 312     COVID19   Date Value Ref Range Status   11/04/2023 Not Detected Not Detected - Ref. Range Final     Lab Results   Component Value Date    HGBA1C 5.40 10/20/2022          Medications           Scheduled Medications atorvastatin, 10 mg, Oral, Nightly  miconazole, 1 application , Topical, Q12H  pantoprazole, 40 mg, Oral, Q AM  PARoxetine, 40 mg, Oral, Nightly  sodium chloride, 10 mL, Intravenous, Q12H       Infusions sodium chloride, 75 mL/hr, Last Rate: 75 mL/hr (11/07/23 1302)       PRN Medications   acetaminophen    dextrose    LORazepam    melatonin    nitroglycerin    ondansetron **OR** ondansetron    Potassium Replacement - Follow Nurse / BPA Driven Protocol    sodium chloride    sodium chloride    zolpidem     Physical Findings          General Findings alert, loss of muscle mass, loss of subcutaneous fat, oriented, room air   Oral/Mouth Cavity tooth or teeth missing   Edema  no edema   Gastrointestinal diarrhea, normoactive, last bowel movement: 11/4   Skin  skin intact   Tubes/Drains/Lines none   NFPE See Malnutrition Severity Assessment, Date Completed: 11/7   --  Malnutrition Severity Assessment      Patient meets criteria for : Moderate (non-severe) Malnutrition  Malnutrition Type (last 8 hours)       Malnutrition Severity Assessment       Row Name 11/07/23 1548       Malnutrition Severity Assessment    Malnutrition Type Chronic Disease - Related Malnutrition      Row Name 11/07/23 1548       Insufficient Energy Intake     Insufficient Energy Intake Findings Moderate    Insufficient Energy Intake  <75% of est. energy requirement for > or equal to 3 months      Row Name 11/07/23 1548       Unintentional Weight Loss     Unintentional Weight Loss Findings Severe    Unintentional Weight Loss  Weight loss greater than 10% in six months      Row Name 11/07/23  1548       Muscle Loss    Loss of Muscle Mass Findings Moderate    [place] Region Moderate - slight depression    Clavicle Bone Region Moderate - some protrusion in females, visible in males    Dorsal Hand Region Moderate - slight depression    Patellar Region Moderate - patella more prominent, less muscle definition around patella      Row Name 11/07/23 1548       Fat Loss    Subcutaneous Fat Loss Findings Moderate    Orbital Region  Moderate -  somewhat hollowness, slightly dark circles    Upper Arm Region Moderate - some fat tissue, not ample      Row Name 11/07/23 1548       Criteria Met (Must meet criteria for severity in at least 2 of these categories: M Wasting, Fat Loss, Fluid, Secondary Signs, Wt. Status, Intake)    Patient meets criteria for  Moderate (non-severe) Malnutrition                     Current Nutrition Orders & Evaluation of Intake       Oral Nutrition     Food Allergies NKFA   Current PO Diet Diet: Gastrointestinal Diets; Fiber-Restricted, Low Irritant; Texture: Regular Texture (IDDSI 7); Fluid Consistency: Thin (IDDSI 0)   Supplement n/a   PO Evaluation     % PO Intake 50-75%    Factors Affecting Intake: decreased appetite, diarrhea, nausea, vomiting, weakness   --  PES STATEMENT / NUTRITION DIAGNOSIS      Nutrition Dx Problem  Problem: Malnutrition (moderate)  Etiology: Medical Diagnosis - breast cancer with mets to bone and lung    Signs/Symptoms: NFPE Results, Unintended Weight Change, and Report/Observation     NUTRITION INTERVENTION / PLAN OF CARE      Intervention Goal(s) Maintain nutrition status, Reduce/improve symptoms, Meet estimated needs, Disease management/therapy, Tolerate PO , Increase intake, Accepts oral nutrition supplement, and Maintain weight         RD Intervention/Action Continue to monitor, Care plan reviewed, and Recommend/order: ONS   --      Prescription/Orders:       PO Diet       Supplements Boost Plus daily (gogo)      Enteral Nutrition       Parenteral Nutrition     New Prescription Ordered? Yes   --      Monitor/Evaluation Per protocol, PO intake, Supplement intake, Pertinent labs, Weight, GI status, Symptoms   Discharge Plan/Needs Pending clinical course   --    RD to follow per protocol.      Electronically signed by:  Marycarmen Hernandez RD  11/07/23 15:43 EST

## 2023-11-07 NOTE — CONSULTS
ONCOLOGY SOCIAL WORKER PSYCHOSOCIAL ASSESSMENT    Date:  11/7/2023      Reason for Visit:  increased distress, psychosocial support    I.    PHYSICAL:     Diagnosis: Metastatic ER positive breast cancer to bone and lung nodules     Date of Diagnosis:  initial dx of BR CA was 1993. Metastasized to bone in 2016 , continued progression in 2020   Recurrent of same type:  Yes   Treatment:  Faslodex/Ibrance - Xgeva injections          II.   FINANCIAL:  Employment Status:  Retired  VA Benefits: No  Source of Income:  Social Security and [employer]    Transportation Issues:  No   Means of Transportation:  still driving   Prescription Drug Coverage:  Yes  Medications Unable to Afford:  none    III.  SOCIAL:    Marital Status:  Single  Significant Other:  No,   Children under 18:  No - never had children   Do the children know about the cancer diagnosis:  N/A  Does the patient have:  Living Will / Advanced Directive, Healthcare POA, and Financial POA - Patient needs all of these documents   Home Situation:  lives in a one story apartment with 15 steps to enter. She lives alone. She does report falling at home about 3 months ago.     Patient's Support System:  Very Limited .  She has 1 friend Carmela that lives local.  She has a father and brother that are still living and reside in [place], [place].     ADLs - Functioning Level at Home:  Independent  Able to (on own):  Walk, Bathe, Dress, and Cook  Current DME:  None ast this time, but will be going home with a rolling walker.   Current Home Health:  None presently.  Referral was [facility] to [facility]  Dialysis:  No,     IV.    MENTAL:    Emotional Status:  Acceptance and Calm    Mental Status:  Alert and Oriented  Any current psychiatric illness:  Yes, hx of depression and anxiety  History of psychiatric illness:  Yes,   Family history of psychiatric illness:  did not assess  Current Psychiatrist:  ARON Mckeon at [facility]  Current Therapist:  none  Taking any  psychiatric medications:  Yes, Paxil, Ativan and Abilify   Suicidal Ideation:  No;    Homicidal Ideation:  No;     V  GOALS / NEEDS:    Goals:  to begin to complete long term planning ( Living Will, Will, Executor/ POA and to begin thinking about assisted living or penitentiary home.  Home Health vs Hiring in home caregivers.     Needs:  to establish a  to help with long term planning.  To set up sally with her  who manages her 401K/TEREZA.  Patient also interested in Home delivered meals program.      Referrals Made:  [facility]    Gale Harding LCSW  11/07/23  16:06 EST

## 2023-11-07 NOTE — THERAPY TREATMENT NOTE
Patient Name: Marialuisa Vivar  : 1955    MRN: [id number]                              Today's Date: 2023       Admit Date: 2023    Visit Dx:     ICD-10-CM ICD-9-CM   1. SARAH (acute kidney injury)  N17.9 584.9   2. Orthostasis  I95.1 458.0   3. Hyponatremia  E87.1 276.1   4. Increased anion gap metabolic acidosis  E87.29 276.2   5. Elevated troponin  R79.89 790.6   6. Hypoglycemia  E16.2 251.2     Patient Active Problem List   Diagnosis    Benign essential HTN    Depression    Vocal cord dysfunction    Malignant neoplasm metastatic to bone    Carcinoma of left breast metastatic to lung    Tachycardia    Therapeutic drug monitoring    Lung metastasis    Vitamin D deficiency    Class 1 obesity without serious comorbidity with body mass index (BMI) of 31.0 to 31.9 in adult    Tongue cancer    Anxiety    Bruxism    Cheilosis    Squamous cell carcinoma of skin    Hyperlipidemia    Ovarian mass, right    Adnexal mass    Abnormal CT scan, colon    SARAH (acute kidney injury)    Hyperkalemia    Metabolic acidosis    Dehydration    Diarrhea    Pancytopenia    Nausea & vomiting    Generalized weakness    Hyponatremia     Past Medical History:   Diagnosis Date    SARAH (acute kidney injury) 2023    Allergy     Anxiety     Asthma     Bone metastasis     Breast cancer     Left node positive    Cholelithiasis     Lap Marily    Class 1 obesity without serious comorbidity with body mass index (BMI) of 31.0 to 31.9 in adult 2019    Colon polyp Past 10-15 yrs?    found at colonoscopies    Depression     Esophageal reflux     cough    History of colon polyps     Hyperlipidemia     Hypertension     Left breast cancer metastasized to lung     Obstructive sleep apnea     does not wear cpap    Ovarian cyst     Pancytopenia 2023    PONV (postoperative nausea and vomiting)     Tongue cancer 2019    by ENT at [facility]     Past Surgical History:   Procedure Laterality Date    CHOLECYSTECTOMY       COLONOSCOPY  2014    H/O polyps    COLONOSCOPY N/A 6/1/2022    Procedure: COLONOSCOPY to cecum and TI with cold / hot snare polypectomies;  Surgeon: Luis Enrique Galeas MD;  Location: [facility] ENDOSCOPY;  Service: Gastroenterology;  Laterality: N/A;  pre-abnormal ct  post-polyps, diverticulosis, hemorrhoids    KNEE ARTHROPLASTY      LUNG SURGERY  2010    Lung wedge resection    MASTECTOMY RADICAL Left 1993    TONGUE SURGERY  05/21/2019    tongue cancer    TOTAL LAPAROSCOPIC HYSTERECTOMY N/A 11/04/2021    Procedure: Robotic assisted total laparoscopic hysterectomy, bilateral salpingoophorecotmy, bilateral ureteralysis ;  Surgeon: Kaylynn Ricci DO;  Location: [facility] MAIN OR;  Service: Robotics - DaVinci;  Laterality: N/A;      General Information       Row Name 11/07/23 1035          OT Time and Intention    Document Type therapy note (daily note)  -CW     Mode of Treatment individual therapy;occupational therapy  -CW       Row Name 11/07/23 1035          General Information    Patient Profile Reviewed yes  -CW     Existing Precautions/Restrictions fall  -CW       Row Name 11/07/23 1035          Cognition    Orientation Status (Cognition) oriented x 3  -CW       Row Name 11/07/23 1035          Safety Issues, Functional Mobility    Safety Issues Affecting Function (Mobility) safety precaution awareness  -               User Key  (r) = Recorded By, (t) = Taken By, (c) = Cosigned By      Initials Name Provider Type    CW Estela Brian OTR Occupational Therapist                     Mobility/ADL's       Row Name 11/07/23 1036          Bed Mobility    Supine-Sit [place] (Bed Mobility) standby assist  -CW     Sit-Supine [place] (Bed Mobility) standby assist  -CW     Assistive Device (Bed Mobility) head of bed elevated;bed rails  -CW       Row Name 11/07/23 1036          Transfers    Transfers toilet transfer  -CW       Row Name 11/07/23 1036          Sit-Stand Transfer    Sit-Stand [place] (Transfers)  standby assist  -CW       Row Name 11/07/23 1036          Stand-Sit Transfer    Stand-Sit [place] (Transfers) standby assist  -CW       Row Name 11/07/23 1036          Toilet Transfer    Type (Toilet Transfer) stand pivot/stand step  -CW     [place] Level (Toilet Transfer) contact guard  -CW     Assistive Device (Toilet Transfer) grab bars/safety frame  -CW       Row Name 11/07/23 1036          Activities of Daily Living    BADL Assessment/Intervention lower body dressing;toileting;grooming  -CW       Row Name 11/07/23 1036          Toileting Assessment/Training    [place] Level (Toileting) toileting skills;minimum assist (75% patient effort);perform perineal hygiene;adjust/manage clothing  -CW     Position (Toileting) supported standing;supported sitting  -CW       Row Name 11/07/23 1036          Lower Body Dressing Assessment/Training    [place] Level (Lower Body Dressing) lower body dressing skills;doff;don;socks;standby assist  -CW     Position (Lower Body Dressing) edge of bed sitting  -CW       Row Name 11/07/23 1036          Grooming Assessment/Training    [place] Level (Grooming) grooming skills;hair care, combing/brushing;set up  -CW     Position (Grooming) edge of bed sitting  -CW               User Key  (r) = Recorded By, (t) = Taken By, (c) = Cosigned By      Initials Name Provider Type    Estela Aguilera OTR Occupational Therapist                   Obj/Interventions       Row Name 11/07/23 1040          Shoulder (Therapeutic Exercise)    Shoulder (Therapeutic Exercise) AROM (active range of motion)  -     Shoulder AROM (Therapeutic Exercise) bilateral;flexion;extension;horizontal aBduction/aDduction;10 repetitions;2 sets  -       Row Name 11/07/23 1040          Elbow/Forearm (Therapeutic Exercise)    Elbow/Forearm (Therapeutic Exercise) AROM (active range of motion)  -     Elbow/Forearm AROM (Therapeutic Exercise) bilateral;flexion;extension;supination;pronation;10  repetitions;2 sets  -CW       Row Name 11/07/23 1040          Wrist (Therapeutic Exercise)    Wrist (Therapeutic Exercise) AROM (active range of motion)  -CW     Wrist AROM (Therapeutic Exercise) bilateral;flexion;extension;10 repetitions;2 sets  -CW       Row Name 11/07/23 1040          Hand (Therapeutic Exercise)    Hand (Therapeutic Exercise) AROM (active range of motion)  -CW     Hand AROM/AAROM (Therapeutic Exercise) bilateral;AROM (active range of motion);finger flexion;finger extension;10 repetitions  -CW       Row Name 11/07/23 1040          Motor Skills    Motor Skills functional endurance  -CW     Functional Endurance fair  -CW     Therapeutic Exercise shoulder;elbow/forearm;wrist;hand  -CW       Row Name 11/07/23 1040          Balance    Static Standing Balance standby assist  -CW     Dynamic Standing Balance contact guard  -     Balance Interventions occupation based/functional task  -CW               User Key  (r) = Recorded By, (t) = Taken By, (c) = Cosigned By      Initials Name Provider Type    CW Estela Brian, OTR Occupational Therapist                   Goals/Plan    No documentation.                  Clinical Impression       Row Name 11/07/23 1042          Pain Assessment    Pretreatment Pain Rating 0/10 - no pain  -CW     Posttreatment Pain Rating 0/10 - no pain  -CW       Row Name 11/07/23 1042          Plan of Care Review    Plan of Care Reviewed With patient  -CW     Outcome Evaluation OT-Pt. report no pain at present. States she feels weak since in hosp. bed for so long. Ambulated to  with CGA and verbal cues. Used R hand rail transitioning sit to stand from commode. Ciara toileting and hygiene. Discussed safety at home for functional transfers. Pt. has a cane and is considering a grab bar in her bathroom. Functional endurance fair. LE dressing SBA with slight LOB seated EOB. Pt. may benefit from SNF to increase indep. with self care/strength  and activity tolerance before d/c to home.  OT to cont. to follow for stated goals.  -CW       Row Name 11/07/23 1042          Positioning and Restraints    Pre-Treatment Position in bed  -CW     Post Treatment Position bed  -CW     In Bed supine;encouraged to call for assist;call light within reach  -CW               User Key  (r) = Recorded By, (t) = Taken By, (c) = Cosigned By      Initials Name Provider Type    CW Estela Brian OTR Occupational Therapist                   Outcome Measures       Row Name 11/07/23 1048          How much help from another is currently needed...    Putting on and taking off regular lower body clothing? 3  -CW     Bathing (including washing, rinsing, and drying) 3  -CW     Toileting (which includes using toilet bed pan or urinal) 2  -CW     Putting on and taking off regular upper body clothing 3  -CW     Taking care of personal grooming (such as brushing teeth) 3  -CW     Eating meals 3  -CW     AM-PAC 6 Clicks Score (OT) 17  -CW       Row Name 11/07/23 1029          How much help from another person do you currently need...    Turning from your back to your side while in flat bed without using bedrails? 4  -CS     Moving from lying on back to sitting on the side of a flat bed without bedrails? 3  -CS     Moving to and from a bed to a chair (including a wheelchair)? 4  -CS     Standing up from a chair using your arms (e.g., wheelchair, bedside chair)? 4  -CS     Climbing 3-5 steps with a railing? 3  -CS     To walk in hospital room? 3  -CS     AM-PAC 6 Clicks Score (PT) 21  -CS     Highest level of mobility 6 --> Walked 10 steps or more  -CS       Row Name 11/07/23 1029          Functional Assessment    Outcome Measure Options AM-PAC 6 Clicks Basic Mobility (PT)  -CS               User Key  (r) = Recorded By, (t) = Taken By, (c) = Cosigned By      Initials Name Provider Type    Estela Aguilera OTR Occupational Therapist    Rosenda Sandoval, PT Physical Therapist                    Occupational Therapy Education        Title: PT OT SLP Therapies (In Progress)       Topic: Occupational Therapy (In Progress)       Point: ADL training (Done)       Description:   Instruct learner(s) on proper safety adaptation and remediation techniques during self care or transfers.   Instruct in proper use of assistive devices.                  Learning Progress Summary             Patient Acceptance, E, VU by ALPHONSE at 11/7/2023 1048                         Point: Home exercise program (Not Started)       Description:   Instruct learner(s) on appropriate technique for monitoring, assisting and/or progressing therapeutic exercises/activities.                  Learner Progress:  Not documented in this visit.              Point: Precautions (Not Started)       Description:   Instruct learner(s) on prescribed precautions during self-care and functional transfers.                  Learner Progress:  Not documented in this visit.              Point: Body mechanics (Not Started)       Description:   Instruct learner(s) on proper positioning and spine alignment during self-care, functional mobility activities and/or exercises.                  Learner Progress:  Not documented in this visit.                              User Key       Initials Effective Dates Name Provider Type Discipline    ALPHONSE 06/16/21 -  Estela Brian OTR Occupational Therapist OT                  OT Recommendation and Plan     Plan of Care Review  Plan of Care Reviewed With: patient  Outcome Evaluation: OT-Pt. report no pain at present. States she feels weak since in hosp. bed for so long. Ambulated to BR with CGA and verbal cues. Used R hand rail transitioning sit to stand from commode. Ciara toileting and hygiene. Discussed safety at home for functional transfers. Pt. has a cane and is considering a grab bar in her bathroom. Functional endurance fair. LE dressing SBA with slight LOB seated EOB. Pt. may benefit from SNF to increase indep. with self care/strength  and activity tolerance  before d/c to home. OT to cont. to follow for stated goals.     Time Calculation:         Time Calculation- OT       Row Name 11/07/23 1049             Time Calculation- OT    OT Start Time 0858  -CW      OT Stop Time 0928  -CW      OT Time Calculation (min) 30 min  -CW      Total Timed Code Minutes- OT 30 minute(s)  -CW      OT - Next Appointment 11/08/23  -CW         Timed Charges    40438 - OT Therapeutic Exercise Minutes 10  -CW      03842 - OT Self Care/Mgmt Minutes 20  -CW         Total Minutes    Timed Charges Total Minutes 30  -CW       Total Minutes 30  -CW                User Key  (r) = Recorded By, (t) = Taken By, (c) = Cosigned By      Initials Name Provider Type    CW Estela Brian OTR Occupational Therapist                  Therapy Charges for Today       Code Description Service Date Service Provider Modifiers Qty    52894847595 HC OT THER PROC EA 15 MIN 11/7/2023 Estela Brian OTR GO 1    98644421235 HC OT SELF CARE/MGMT/TRAIN EA 15 MIN 11/7/2023 Estela Brian OTR GO 1                 KHADRA Stone  11/7/2023

## 2023-11-07 NOTE — THERAPY TREATMENT NOTE
Patient Name: Marialuisa Vivar  : 1955    MRN: [id number]                              Today's Date: 2023       Admit Date: 2023    Visit Dx:     ICD-10-CM ICD-9-CM   1. SARAH (acute kidney injury)  N17.9 584.9   2. Orthostasis  I95.1 458.0   3. Hyponatremia  E87.1 276.1   4. Increased anion gap metabolic acidosis  E87.29 276.2   5. Elevated troponin  R79.89 790.6   6. Hypoglycemia  E16.2 251.2     Patient Active Problem List   Diagnosis    Benign essential HTN    Depression    Vocal cord dysfunction    Malignant neoplasm metastatic to bone    Carcinoma of left breast metastatic to lung    Tachycardia    Therapeutic drug monitoring    Lung metastasis    Vitamin D deficiency    Class 1 obesity without serious comorbidity with body mass index (BMI) of 31.0 to 31.9 in adult    Tongue cancer    Anxiety    Bruxism    Cheilosis    Squamous cell carcinoma of skin    Hyperlipidemia    Ovarian mass, right    Adnexal mass    Abnormal CT scan, colon    SARAH (acute kidney injury)    Hyperkalemia    Metabolic acidosis    Dehydration    Diarrhea    Pancytopenia    Nausea & vomiting    Generalized weakness    Hyponatremia     Past Medical History:   Diagnosis Date    SARAH (acute kidney injury) 2023    Allergy     Anxiety     Asthma     Bone metastasis     Breast cancer     Left node positive    Cholelithiasis     Lap Marily    Class 1 obesity without serious comorbidity with body mass index (BMI) of 31.0 to 31.9 in adult 2019    Colon polyp Past 10-15 yrs?    found at colonoscopies    Depression     Esophageal reflux     cough    History of colon polyps     Hyperlipidemia     Hypertension     Left breast cancer metastasized to lung     Obstructive sleep apnea     does not wear cpap    Ovarian cyst     Pancytopenia 2023    PONV (postoperative nausea and vomiting)     Tongue cancer 2019    by ENT at [facility]     Past Surgical History:   Procedure Laterality Date    CHOLECYSTECTOMY       COLONOSCOPY  2014    H/O polyps    COLONOSCOPY N/A 6/1/2022    Procedure: COLONOSCOPY to cecum and TI with cold / hot snare polypectomies;  Surgeon: Luis Enrique Galeas MD;  Location: [facility] ENDOSCOPY;  Service: Gastroenterology;  Laterality: N/A;  pre-abnormal ct  post-polyps, diverticulosis, hemorrhoids    KNEE ARTHROPLASTY      LUNG SURGERY  2010    Lung wedge resection    MASTECTOMY RADICAL Left 1993    TONGUE SURGERY  05/21/2019    tongue cancer    TOTAL LAPAROSCOPIC HYSTERECTOMY N/A 11/04/2021    Procedure: Robotic assisted total laparoscopic hysterectomy, bilateral salpingoophorecotmy, bilateral ureteralysis ;  Surgeon: Kaylynn Ricci DO;  Location: [facility] MAIN OR;  Service: Robotics - DaVinci;  Laterality: N/A;      General Information       Row Name 11/07/23 1025          Physical Therapy Time and Intention    Document Type therapy note (daily note)  -CS     Mode of Treatment individual therapy;physical therapy  -CS       Row Name 11/07/23 1025          General Information    Patient Profile Reviewed yes  -CS     Existing Precautions/Restrictions fall  -CS       Row Name 11/07/23 1025          Cognition    Orientation Status (Cognition) oriented x 3  -CS       Row Name 11/07/23 1025          Safety Issues, Functional Mobility    Impairments Affecting Function (Mobility) balance;endurance/activity tolerance;strength  -CS               User Key  (r) = Recorded By, (t) = Taken By, (c) = Cosigned By      Initials Name Provider Type    CS Rosenda Friedman PT Physical Therapist                   Mobility       Row Name 11/07/23 1025          Bed Mobility    Bed Mobility supine-sit;sit-supine  -CS     Supine-Sit [place] (Bed Mobility) standby assist  -CS     Sit-Supine [place] (Bed Mobility) standby assist  -CS     Assistive Device (Bed Mobility) head of bed elevated  -CS       Row Name 11/07/23 1025          Sit-Stand Transfer    Sit-Stand [place] (Transfers) standby assist  -CS     Assistive  Device (Sit-Stand Transfers) walker, front-wheeled  -CS       Row Name 11/07/23 1025          Gait/Stairs (Locomotion)    [place] Level (Gait) contact guard  -CS     Assistive Device (Gait) walker, front-wheeled  -CS     Distance in Feet (Gait) 40' + 40' + 40'  -CS     Deviations/Abnormal Patterns (Gait) eunice decreased;stride length decreased  -CS     Bilateral Gait Deviations forward flexed posture  -CS     Comment, (Gait/Stairs) slow pace; 2 short standing rest breaks  -CS               User Key  (r) = Recorded By, (t) = Taken By, (c) = Cosigned By      Initials Name Provider Type    Rosenda Sandoval, MIGUEL Physical Therapist                   Obj/Interventions       Row Name 11/07/23 1026          Balance    Balance Assessment sitting static balance;sitting dynamic balance;standing static balance;standing dynamic balance  -CS     Static Sitting Balance standby assist  -CS     Dynamic Sitting Balance standby assist  -CS     Position, Sitting Balance unsupported;sitting edge of bed  -CS     Static Standing Balance standby assist  -CS     Dynamic Standing Balance contact guard  -CS     Position/Device Used, Standing Balance supported;walker, front-wheeled  -CS     Comment, Balance SBA while standing at sink for ADL'S  -CS               User Key  (r) = Recorded By, (t) = Taken By, (c) = Cosigned By      Initials Name Provider Type    Rosenda Sandoval, MIGUEL Physical Therapist                   Goals/Plan    No documentation.                  Clinical Impression       Row Name 11/07/23 1026          Pain    Pretreatment Pain Rating 0/10 - no pain  -CS     Posttreatment Pain Rating 0/10 - no pain  -CS       Row Name 11/07/23 1026          Plan of Care Review    Plan of Care Reviewed With patient  -CS     Progress improving  -CS     Outcome Evaluation Pt received in bed upon arrival and agreeable to PT. Pt completed bed mobility and STS transfer with SBA. Pt ambulated in [facility] requiring CGA. Pt demo's a  slow pace requiring 2 short standing rest breaks due to fatigue. Pt returned to room and stood at sink and completed ADL'S - pt demo's good static standing. Pt improving as demonstrated with increased activity tolerance but would still benefit from SNF at D/C to address strength and edurance as pt lives alone and will need to be fully (I) with mobility and ADL's before returning.  -       Row Name 11/07/23 1026          Therapy Assessment/Plan (PT)    Criteria for Skilled Interventions Met (PT) yes;meets criteria  -CS     Therapy Frequency (PT) 5 times/wk  -CS       Row Name 11/07/23 1026          Positioning and Restraints    Pre-Treatment Position in bed  -CS     Post Treatment Position bed  -CS     In Bed supine;call light within reach;encouraged to call for assist;exit alarm on  -CS               User Key  (r) = Recorded By, (t) = Taken By, (c) = Cosigned By      Initials Name Provider Type    Rosenda Sandoval, PT Physical Therapist                   Outcome Measures       Row Name 11/07/23 1029          How much help from another person do you currently need...    Turning from your back to your side while in flat bed without using bedrails? 4  -CS     Moving from lying on back to sitting on the side of a flat bed without bedrails? 3  -CS     Moving to and from a bed to a chair (including a wheelchair)? 4  -CS     Standing up from a chair using your arms (e.g., wheelchair, bedside chair)? 4  -CS     Climbing 3-5 steps with a railing? 3  -CS     To walk in hospital room? 3  -CS     AM-PAC 6 Clicks Score (PT) 21  -CS     Highest level of mobility 6 --> Walked 10 steps or more  -       Row Name 11/07/23 1029          Functional Assessment    Outcome Measure Options AM-PAC 6 Clicks Basic Mobility (PT)  -CS               User Key  (r) = Recorded By, (t) = Taken By, (c) = Cosigned By      Initials Name Provider Type    Rosenda Sandoval, PT Physical Therapist                                 Physical Therapy  Education       Title: PT OT SLP Therapies (In Progress)       Topic: Physical Therapy (Done)       Point: Mobility training (Done)       Learning Progress Summary             Patient Acceptance, E,TB, VU,DU by  at 11/7/2023 1029    Acceptance, E,TB, VU,NR by  at 11/6/2023 0945    Acceptance, E,TB, VU,DU by  at 11/5/2023 1306                         Point: Home exercise program (Done)       Learning Progress Summary             Patient Acceptance, E,TB, VU,DU by  at 11/7/2023 1029                         Point: Body mechanics (Done)       Learning Progress Summary             Patient Acceptance, E,TB, VU,DU by  at 11/7/2023 1029    Acceptance, E,TB, VU,DU by  at 11/5/2023 1306                         Point: Precautions (Done)       Learning Progress Summary             Patient Acceptance, E,TB, VU,DU by  at 11/7/2023 1029    Acceptance, E,TB, VU,NR by  at 11/6/2023 0945    Acceptance, E,TB, VU,DU by  at 11/5/2023 1306                                         User Key       Initials Effective Dates Name Provider Type Discipline     06/16/21 -  Valeria Mccracken, PT Physical Therapist PT     09/22/22 -  Rosenda Friedman PT Physical Therapist PT                  PT Recommendation and Plan     Plan of Care Reviewed With: patient  Progress: improving  Outcome Evaluation: Pt received in bed upon arrival and agreeable to PT. Pt completed bed mobility and STS transfer with SBA. Pt ambulated in hallway c  requiring CGA. Pt demo's a slow pace requiring 2 short standing rest breaks due to fatigue. Pt returned to room and stood at sink and completed ADL'S - pt demo's good static standing. Pt improving as demonstrated with increased activity tolerance but would still benefit from SNF at D/C to address strength and edurance as pt lives alone and will need to be fully (I) with mobility and ADL's before returning.     Time Calculation:         PT Charges       Row Name 11/07/23 1030             Time Calculation     Start Time 0956  -CS      Stop Time 1014  -CS      Time Calculation (min) 18 min  -CS      PT Received On 11/07/23  -CS      PT - Next Appointment 11/08/23  -CS         Time Calculation- PT    Total Timed Code Minutes- PT 16 minute(s)  -CS         Timed Charges    75841 - PT Therapeutic Activity Minutes 16  -CS         Total Minutes    Timed Charges Total Minutes 16  -CS       Total Minutes 16  -CS                User Key  (r) = Recorded By, (t) = Taken By, (c) = Cosigned By      Initials Name Provider Type    CS Rosenda Friedman, PT Physical Therapist                  Therapy Charges for Today       Code Description Service Date Service Provider Modifiers Qty    51677355046 HC PT THERAPEUTIC ACT EA 15 MIN 11/7/2023 Rosenda Friedman, PT GP 1            PT G-Codes  Outcome Measure Options: AM-PAC 6 Clicks Basic Mobility (PT)  AM-PAC 6 Clicks Score (PT): 21  AM-PAC 6 Clicks Score (OT): 15  Modified [place] Scale: 4 - Moderately severe disability.  Unable to walk without assistance, and unable to attend to own bodily needs without assistance.  PT Discharge Summary  Anticipated Discharge Disposition (PT): skilled nursing facility    Rosenda Friedman PT  11/7/2023

## 2023-11-07 NOTE — DISCHARGE PLACEMENT REQUEST
"Marialuisa Beebe (68 y.o. Female)       Date of Birth   1955    Social Security Number       Address   [address] [address]    Home Phone   [phone]    MRN   [id number]       [facility]   [facility]    Marital Status   Single                            Admission Date   11/4/23    Admission Type   Emergency    Admitting Provider   Ivan Fried MD    Attending Provider   Uli Hays MD    Department, Room/Bed   [address], [address]/1       Discharge Date       Discharge Disposition       Discharge Destination                                 Attending Provider: Uli Hays MD    Allergies: Nickel, Ciprofloxacin    Isolation: None   Infection: None   Code Status: CPR    Ht: 165.1 cm (65\")   Wt: 61.9 kg (136 lb 6.4 oz)    Admission Cmt: None   Principal Problem: Nausea & vomiting [R11.2]                   Active Insurance as of 11/4/2023       Primary Coverage       Payor Plan Insurance Group Employer/Plan Group    MEDICARE MEDICARE A & B        Payor Plan Address Payor Plan Phone Number Payor Plan Fax Number Effective Dates    [address] [phone]  10/1/2019 - None Entered    [facility] [id number]         Subscriber Name Subscriber Birth Date Member ID       MARIALUISA BEEBE 1955 [id number]               Secondary Coverage       Payor Plan Insurance Group Employer/Plan Group    MISC MC SUP   MISC MC SUP              G       Coverage Address Coverage Phone Number Coverage Fax Number Effective Dates    [address] [address] [phone]  1/1/2020 - None Entered    [facility] [id number]         Subscriber Name Subscriber Birth Date Member ID       MARIALUISA BEEBE 1955 [id number]                     Emergency Contacts        (Rel.) Home Phone Work Phone Mobile Phone    WEN MCKEON (Friend) [phone] -- [phone]    GhazalaChas olguin (Brother) -- -- [phone]                "

## 2023-11-07 NOTE — PROGRESS NOTES
Name: Marialuisa Vivar ADMIT: 2023   : 1955  PCP: Jennifer Mantilla MD    MRN: [id number] LOS: 1 days   AGE/SEX: 68 y.o. female  ROOM: [facility]     Subjective   Subjective   Feeling better today. Improved endurance w/PT today. Eating without difficulty. Denies n/v.        Objective   Objective   Vital Signs  Temp:  [97.3 °F (36.3 °C)-98.1 °F (36.7 °C)] 98.1 °F (36.7 °C)  Heart Rate:  [] 94  Resp:  [16-18] 18  BP: ()/(52-76) 96/62  SpO2:  [96 %-100 %] 98 %  on   ;   Device (Oxygen Therapy): room air  Body mass index is 22.7 kg/m².  Physical Exam  Vitals and nursing note reviewed.   Constitutional:       General: She is not in acute distress.     Appearance: She is ill-appearing. She is not toxic-appearing.   HENT:      Head: Normocephalic.      Mouth/Throat:      Mouth: Mucous membranes are dry.   Eyes:      Conjunctiva/sclera: Conjunctivae normal.   Cardiovascular:      Rate and Rhythm: Normal rate and regular rhythm.   Pulmonary:      Effort: Pulmonary effort is normal. No respiratory distress.      Breath sounds: No wheezing or rales.   Abdominal:      General: Bowel sounds are normal.      Palpations: Abdomen is soft.   Musculoskeletal:      Cervical back: Neck supple.      Right lower leg: No edema.      Left lower leg: No edema.   Skin:     General: Skin is warm and dry.   Neurological:      Mental Status: She is alert and oriented to person, place, and time.   Psychiatric:         Mood and Affect: Mood normal.         Behavior: Behavior normal.       Results Review     I reviewed the patient's new clinical results.  Results from last 7 days   Lab Units 23  0748 23   WBC 10*3/mm3 4.07 5.41 5.04 6.28   HEMOGLOBIN g/dL 9.6* 10.9* 11.0* 13.4   PLATELETS 10*3/mm3 240 263 267 312     Results from last 7 days   Lab Units 23  0748 230 23   SODIUM mmol/L 136 129* 130* 130*   POTASSIUM mmol/L 4.4 4.5 4.8  5.1   CHLORIDE mmol/L 110* 101 102 95*   CO2 mmol/L 22.0 19.2* 17.0* 14.8*   BUN mg/dL 7* 13 26* 28*   CREATININE mg/dL 0.98 1.15* 1.30* 1.50*   GLUCOSE mg/dL 85 117* 138* 63*   EGFR mL/min/1.73 62.6 51.7* 44.6* 37.6*     Results from last 7 days   Lab Units 11/04/23 1959   ALBUMIN g/dL 4.1   BILIRUBIN mg/dL 0.9   ALK PHOS U/L 90   AST (SGOT) U/L 24   ALT (SGPT) U/L 11     Results from last 7 days   Lab Units 11/07/23  0748 11/06/23  0410 11/05/23  0459 11/04/23 1959   CALCIUM mg/dL 9.0 10.1 9.9 11.2*   ALBUMIN g/dL  --   --   --  4.1   MAGNESIUM mg/dL  --   --   --  1.7       Glucose   Date/Time Value Ref Range Status   11/05/2023 1757 306 (H) 70 - 130 mg/dL Final   11/05/2023 1118 134 (H) 70 - 130 mg/dL Final   11/05/2023 0624 112 70 - 130 mg/dL Final   11/05/2023 0056 129 70 - 130 mg/dL Final   11/04/2023 2321 64 (L) 70 - 130 mg/dL Final       CT Chest Without Contrast Diagnostic    Result Date: 11/6/2023  1. Small stable pulmonary nodules  2. Stable osseous metastatic disease  3. Stable nodular left adrenal thickening  4. Additional stable incidental findings as above.  This report was finalized on 11/6/2023 3:19 PM by Dr. Brendan Parks M.D on Workstation: [id number]      CT Abdomen Pelvis Without Contrast    Result Date: 11/6/2023  1. Small stable pulmonary nodules  2. Stable osseous metastatic disease  3. Stable nodular left adrenal thickening  4. Additional stable incidental findings as above.  This report was finalized on 11/6/2023 3:19 PM by Dr. Brendan Parks M.D on Workstation: [id number]       I have personally reviewed all medications:  Scheduled Medications  atorvastatin, 10 mg, Oral, Nightly  miconazole, 1 application , Topical, Q12H  pantoprazole, 40 mg, Oral, Q AM  PARoxetine, 40 mg, Oral, Nightly  sodium chloride, 10 mL, Intravenous, Q12H    Infusions  sodium chloride, 100 mL/hr, Last Rate: 100 mL/hr (11/07/23 0631)    Diet  Diet: Gastrointestinal Diets; Fiber-Restricted, Low Irritant;  Texture: Regular Texture (IDDSI 7); Fluid Consistency: Thin (IDDSI 0)    I have personally reviewed:  [x]  Laboratory   [x]  Microbiology   [x]  Radiology   [x]  EKG/Telemetry  [x]  Cardiology/Vascular   []  Pathology    []  Records       Assessment/Plan     Active Hospital Problems    Diagnosis  POA    **Nausea & vomiting [R11.2]  Yes    Generalized weakness [R53.1]  Yes    Hyponatremia [E87.1]  Yes    Carcinoma of left breast metastatic to lung [C50.912, C78.02]  Yes    Benign essential HTN [I10]  Yes    Depression [F32.A]  Yes    Malignant neoplasm metastatic to bone [C79.51]  Yes    Vocal cord dysfunction [J38.3]  Yes      Resolved Hospital Problems   No resolved problems to display.       68 y.o. female admitted with Nausea & vomiting.    Ms. Vivar is a 68 yr old female w/metastatic breat cancer to lung and bone, depression/anxiety, HTN, tongue cancer who presented to [facility] 11/4/23 c/o n/v, weakness, diarrhea. Workup revealed dehydration/SARAH, hypercalcemia, hyponatremia. Hem/Onc has been following. Started on IVF's, antiemetics. No further diarrhea since admission. Tolerating some solid food. Ca has normalized s/p pamidronate. No further n/v. Na 129. Cr has now normalized. Hem/Onc planning additional workup with CT C/A/P. PT has evaluated recommending SNF. [facility] has been consulted to assist w/medical management     N/V/Generalized weakness:  -No further n/v or diarrhea. Nutrition consult OT/PT following, recommending SNF at discharge     Metastatic breast cancer:  -Hem/Onc following. CT C/A/P overall stable     Hyponatremia:  -Na down to 129, likely due to decreased po intake. Normalized w/IVF's; will decrease rate     Hypercalcemia:  -Now resolved. S/P pamidronate     HTN:  -BP down to 80s yesterday evening, asymptomatic. Given IVF bolus. Continue to monitor. Not on antihypertensive regimen     Depression/Anxiety:  -Stable. Continue paroxetine, prn lorazepam      SCDs for DVT prophylaxis.  Full code.  Discussed  with patient.  Anticipate discharge to SNU facility once arrangements have been made..      ARON Tijerina  [facility]  11/07/23  12:42 EST

## 2023-11-07 NOTE — CASE MANAGEMENT/SOCIAL WORK
Continued Stay Note  [facility]     Patient Name: Marialuisa Vivar  MRN: [id number]  Today's Date: 11/7/2023    Admit Date: 11/4/2023    Plan: Plan home with [facility] if accepted.   BI Carmichael RN   Discharge Plan       Row Name 11/07/23 1432       Plan    Plan Plan home with [facility] if accepted.   BI Carmichael RN    Patient/Family in Agreement with Plan yes    Post Acute Provider List Home Health    Provided Post Acute Provider Quality & Resource List? Yes    Post Acute Provider Quality and Resource List Home Health    Delivered To Patient    Method of Delivery In person    Plan Comments Gale Harding, Oncology Social Worker meet with pt.  CCP met with pt and discussed HH and SNF options.  Pt states at this time she would like to consider HH.  Her choice for HH is [facility].  Louisa  ( [phone]) called to follow.   Pt would like a rolling walker for home use.  Pt's DME provider of choice is Norma Blackmon  ( [phone]) called to follow.  Plan home with [facility].  BI Carmichael RN                   Discharge Codes    No documentation.                 Expected Discharge Date and Time       Expected Discharge Date Expected Discharge Time    Nov 9, 2023               Yumiko Carmichael RN

## 2023-11-07 NOTE — PROGRESS NOTES
History of Present Illness          This is a 67-year-old woman followed in our practice for metastatic breast cancer which is ER positive and metastatic to bone and lung.  She has most recently been on treatment with hormonal/targeted therapy with Faslodex every 4 weeks and Ibrance along with Xgeva every 3 months.  However reviewing records it appears she has not received Faslodex since 6/8/2023 and her last office visit was 9/18/2023 at which time Faslodex/Ibrance were held and the patient was given IV fluids for weakness and hypotension.  She was supposed to follow-up in 1 week but has apparently missed several appointments since that time.     The patient presented to the ER with complaints of nausea weakness and dysuria.  She states she has not been able to keep anything down for about 24 hours and had associated diarrhea.  Labs performed including CBC which was normal, CMP pertinent for BUN 28/creatinine 1.5 which is actually a bit better than her baseline, sodium 130 and elevated calcium 11.2 (has been elevated to some degree since September when 11.6).  She has been provided IV fluids and antiemetics.  BUNs/creatinine even better today 26/1.3, sodium 130 and calcium normalized 9.9.     The patient states she has been eating and drinking this morning mostly liquids and no BM since admission.    Interval history:  11/6/2023  T97.6, pulse 88, respirations 16, /72  Discussed with the patient need to reevaluation by CT.  H&H 10.9 and 31.6, white count of 5410, platelet count of 263,000, BUN/creatinine of 13 and 1.15, sodium 129, calcium 10.1    11/7/2023  T98.1, pulse 94, respirations 18, BP 96/62  Patient now proceeding through physical therapy OT.  CT chest with minimal apical scarring, small pulmonary nodules that were stable, will granulomatous disease in liver and spleen.  There are postsurgical changes of thyroidectomy, left axillary clip, left mastectomy changes but no other abnormalities other  than numerous lesions within the bone that are stable.  Patient admitted with generalized weakness and plans for physical therapy to try to improve her clinical performance status.    Past Medical History, Past Surgical History, Social History, Family History have been reviewed and are without significant changes except as mentioned.    Review of Systems   A comprehensive 14 point review of systems was performed and was negative except as mentioned.  Positive for activity change, appetite change, diarrhea, nausea, vomiting, weakness and dysphoric mood.      Medications:  The current medication list was reviewed in the EMR    ALLERGIES:    Allergies   Allergen Reactions    Nickel Unknown - Low Severity    Ciprofloxacin Rash       Objective      Vitals:    11/06/23 1933 11/06/23 2124 11/06/23 2257 11/07/23 0720   BP: (!) 88/52 107/76 103/64 96/62   BP Location: Right arm Right arm Right arm Right arm   Patient Position: Lying Lying Sitting Sitting   Pulse:   84 94   Resp: 17 16 16 18   Temp:   97.3 °F (36.3 °C) 98.1 °F (36.7 °C)   TempSrc:   Oral Oral   SpO2:  96% 100% 98%   Weight:   61.9 kg (136 lb 6.4 oz)    Height:             9/21/2023     3:12 PM   Current Status   ECOG score 0       Physical Exam  Constitutional:       Appearance: She is normal weight.   HENT:      Nose: Nose normal.      Mouth/Throat:      Mouth: Mucous membranes are moist.      Pharynx: Oropharynx is clear.   Eyes:      Extraocular Movements: Extraocular movements intact.      Conjunctiva/sclera: Conjunctivae normal.      Pupils: Pupils are equal, round, and reactive to light.   Cardiovascular:      Rate and Rhythm: Normal rate and regular rhythm.      Pulses: Normal pulses.      Heart sounds: Normal heart sounds.   Pulmonary:      Effort: Pulmonary effort is normal.      Breath sounds: Normal breath sounds.   Abdominal:      General: Bowel sounds are normal.      Palpations: Abdomen is soft.   Musculoskeletal:         General: Normal range  of motion.      Cervical back: Normal range of motion and neck supple.   Skin:     General: Skin is warm and dry.   Neurological:      General: No focal deficit present.      Mental Status: She is oriented to person, place, and time.   Psychiatric:      Comments: Flat affect           RECENT LABS:  Hematology WBC   Date Value Ref Range Status   11/06/2023 5.41 3.40 - 10.80 10*3/mm3 Final     RBC   Date Value Ref Range Status   11/06/2023 3.31 (L) 3.77 - 5.28 10*6/mm3 Final     Hemoglobin   Date Value Ref Range Status   11/06/2023 10.9 (L) 12.0 - 15.9 g/dL Final     Hematocrit   Date Value Ref Range Status   11/06/2023 31.6 (L) 34.0 - 46.6 % Final     Platelets   Date Value Ref Range Status   11/06/2023 263 140 - 450 10*3/mm3 Final              Assessment & Plan     *Metastatic ER positive breast cancer primarily to bone and lung nodules  Patient most recently has been on treatment with Faslodex/Ibrance and every 3-month Xgeva but appears to have been off treatment since June secondary to canceled appointment/medical nonadherence  Review of her record indicates initial diagnosis in 1993, evidence of bone involvement by 2016, subsequent pulmonary nodules and bony involvement by 2020 with localized radiation therapy given to C7 and T1 lesion in June 2020, metastatic disease involving bilateral ovaries and uterine serosa in 2021, status post resection and AI therapy continued, evidence of progression by March 2022, XRT provided to right hip by April 2022, 5/6/2020 to begin Faslodex and Ibrance, CT scans and bone scan 7/17/2023 with bony areas of increased uptake,, patient continues CDK4 therapy.  She is scheduled for follow-up labs, imaging and MD visit Dr. Hawkins later this month for reassessment of her malignancy and to start back on treatment as indicated.  Patient seen in consultation in observation unit 11/6/2023 and repeat scans felt necessary.  Discussed with Dr. Hawkins-CT of chest, abdomen, pelvis without IV  contrast  Subsequent CT of chest abdomen pelvis with stable findings in the chest, stable osseous disease and stable nodule left adrenal thickening.  Additional CA 15-3 level of 17.2  Patient advised of near remission status even though she has been off treatment for some time.  She is encouraged to proceed through PT, OT and we will ask for oncologic  to see her as well.     *Nausea vomiting diarrhea with volume depletion-appears unrelated to cancer therapy since the patient has been off treatment     *Hypercalcemia  Calcium 11.2 on admission likely from volume depletion but could have a component of hypercalcemia of malignancy from her bone metastases  Since she has been off Xgeva several months I will give her a dose of pamidronate while here to prevent recurrence of hypercalcemia  Patient status post therapy with normocalcemia 11/6/2023, 11/11/2023     CKD3a- creatinine at baseline 1.3  Repeat assessment 11/6/2023 with being creatinine of 13 and 1.15, subsequent assessment 11/7/2023 with being creatinine of 7 and 0.98       11/7/2023

## 2023-11-07 NOTE — TELEPHONE ENCOUNTER
Please Follow If Calling for Orders  Caller: oLuisa Murray   Department/Office: [facility]   Best call back number: [phone]  Fax Number:   What orders are you requesting (i.e. lab or imaging): Need verbal order for PT visits   In what timeframe would you need the order: Today

## 2023-11-07 NOTE — PLAN OF CARE
Goal Outcome Evaluation:      Pt. Transferred to this unit from observation unit at 2245 with NS infusion @100ml/hr, Pt. Ambulated slowly with 1 person attended, used bathroom before got in bed, v/s stable with /63, no voiced c/o pain, alert, oriented x4, no s/s acute distress noted

## 2023-11-07 NOTE — PLAN OF CARE
Goal Outcome Evaluation:              Pt A&O x4 and not having any complaints of N/V. Work well with PT today. Plan of care ongoing.

## 2023-11-07 NOTE — PLAN OF CARE
Problem: Malnutrition  Goal: Improved Nutritional Intake  Outcome: Ongoing, Progressing  Intervention: Promote and Optimize Oral Intake  Flowsheets (Taken 11/7/2023 1556)  Oral Nutrition Promotion:   nutritional therapy counseling provided   calorie-dense liquids provided   Goal Outcome Evaluation:  Adding Boost Plus daily  Encouraged PO intake  RD to follow

## 2023-11-07 NOTE — PLAN OF CARE
Goal Outcome Evaluation:  Plan of Care Reviewed With: patient           Outcome Evaluation: OT-Pt. report no pain at present. States she feels weak since in hosp. bed for so long. Ambulated to BR with CGA and verbal cues. Used R hand rail transitioning sit to stand from commode. Ciara toileting and hygiene. Discussed safety at home for functional transfers. Pt. has a cane and is considering a grab bar in her bathroom. Functional endurance fair. LE dressing SBA with slight LOB seated EOB. Pt. may benefit from SNF to increase indep. with self care/strength  and activity tolerance before d/c to home. OT to cont. to follow for stated goals.

## 2023-11-07 NOTE — PLAN OF CARE
Goal Outcome Evaluation:  Plan of Care Reviewed With: patient        Progress: improving  Outcome Evaluation: Pt received in bed upon arrival and agreeable to PT. Pt completed bed mobility and STS transfer with SBA. Pt ambulated in hallway c RW requiring CGA. Pt demo's a slow pace requiring 2 short standing rest breaks due to fatigue. Pt returned to room and stood at sink and completed ADL'S - pt demo's good static standing. Pt improving as demonstrated with increased activity tolerance but would still benefit from SNF at D/C to address strength and edurance as pt lives alone and will need to be fully (I) with mobility and ADL's before returning.      Anticipated Discharge Disposition (PT): skilled nursing facility

## 2023-11-08 ENCOUNTER — READMISSION MANAGEMENT (OUTPATIENT)
Dept: CALL CENTER | Facility: HOSPITAL | Age: 68
End: 2023-11-08
Payer: MEDICARE

## 2023-11-08 VITALS
RESPIRATION RATE: 16 BRPM | BODY MASS INDEX: 22.73 KG/M2 | HEART RATE: 119 BPM | OXYGEN SATURATION: 99 % | HEIGHT: 65 IN | DIASTOLIC BLOOD PRESSURE: 73 MMHG | SYSTOLIC BLOOD PRESSURE: 119 MMHG | WEIGHT: 136.4 LBS | TEMPERATURE: 96.8 F

## 2023-11-08 PROBLEM — E44.0 MODERATE MALNUTRITION: Status: ACTIVE | Noted: 2023-11-08

## 2023-11-08 PROBLEM — R11.2 NAUSEA & VOMITING: Status: RESOLVED | Noted: 2023-11-04 | Resolved: 2023-11-08

## 2023-11-08 PROCEDURE — 97530 THERAPEUTIC ACTIVITIES: CPT

## 2023-11-08 PROCEDURE — 99231 SBSQ HOSP IP/OBS SF/LOW 25: CPT | Performed by: INTERNAL MEDICINE

## 2023-11-08 RX ADMIN — PANTOPRAZOLE SODIUM 40 MG: 40 TABLET, DELAYED RELEASE ORAL at 06:21

## 2023-11-08 RX ADMIN — Medication 10 ML: at 08:28

## 2023-11-08 NOTE — THERAPY TREATMENT NOTE
Patient Name: Marialuisa Vivar  : 1955    MRN: [id number]                              Today's Date: 2023       Admit Date: 2023    Visit Dx:     ICD-10-CM ICD-9-CM   1. SARAH (acute kidney injury)  N17.9 584.9   2. Orthostasis  I95.1 458.0   3. Hyponatremia  E87.1 276.1   4. Increased anion gap metabolic acidosis  E87.29 276.2   5. Elevated troponin  R79.89 790.6   6. Hypoglycemia  E16.2 251.2     Patient Active Problem List   Diagnosis    Benign essential HTN    Depression    Vocal cord dysfunction    Malignant neoplasm metastatic to bone    Carcinoma of left breast metastatic to lung    Tachycardia    Therapeutic drug monitoring    Lung metastasis    Vitamin D deficiency    Class 1 obesity without serious comorbidity with body mass index (BMI) of 31.0 to 31.9 in adult    Tongue cancer    Anxiety    Bruxism    Cheilosis    Squamous cell carcinoma of skin    Hyperlipidemia    Ovarian mass, right    Adnexal mass    Abnormal CT scan, colon    SARAH (acute kidney injury)    Hyperkalemia    Metabolic acidosis    Dehydration    Diarrhea    Pancytopenia    Nausea & vomiting    Generalized weakness    Hyponatremia    Moderate malnutrition     Past Medical History:   Diagnosis Date    SARAH (acute kidney injury) 2023    Allergy     Anxiety     Asthma     Bone metastasis     Breast cancer     Left node positive    Cholelithiasis 2000    Lap Marily    Class 1 obesity without serious comorbidity with body mass index (BMI) of 31.0 to 31.9 in adult 2019    Colon polyp Past 10-15 yrs?    found at colonoscopies    Depression     Esophageal reflux     cough    History of colon polyps     Hyperlipidemia     Hypertension     Left breast cancer metastasized to lung     Obstructive sleep apnea     does not wear cpap    Ovarian cyst     Pancytopenia 2023    PONV (postoperative nausea and vomiting)     Tongue cancer 2019    by ENT at [facility]     Past Surgical History:   Procedure Laterality Date     CHOLECYSTECTOMY  2000    COLONOSCOPY  2014    H/O polyps    COLONOSCOPY N/A 6/1/2022    Procedure: COLONOSCOPY to cecum and TI with cold / hot snare polypectomies;  Surgeon: Luis Enrique Galeas MD;  Location: [facility] ENDOSCOPY;  Service: Gastroenterology;  Laterality: N/A;  pre-abnormal ct  post-polyps, diverticulosis, hemorrhoids    KNEE ARTHROPLASTY      LUNG SURGERY  2010    Lung wedge resection    MASTECTOMY RADICAL Left 1993    TONGUE SURGERY  05/21/2019    tongue cancer    TOTAL LAPAROSCOPIC HYSTERECTOMY N/A 11/04/2021    Procedure: Robotic assisted total laparoscopic hysterectomy, bilateral salpingoophorecotmy, bilateral ureteralysis ;  Surgeon: aKylynn Ricci DO;  Location: [facility] MAIN OR;  Service: Robotics - DaVinci;  Laterality: N/A;      General Information       Row Name 11/08/23 0846          Physical Therapy Time and Intention    Document Type therapy note (daily note)  -     Mode of Treatment physical therapy  -       Row Name 11/08/23 0833          General Information    Existing Precautions/Restrictions fall  -PH     Barriers to Rehab medically complex;previous functional deficit  -       Row Name 11/08/23 0890          Safety Issues, Functional Mobility    Impairments Affecting Function (Mobility) balance;endurance/activity tolerance;strength  -PH     Comment, Safety Issues/Impairments (Mobility) gt belt and non skid socks donned  -PH               User Key  (r) = Recorded By, (t) = Taken By, (c) = Cosigned By      Initials Name Provider Type    PH Diamond Diggs PTA Physical Therapist Assistant                   Mobility       Row Name 11/08/23 0859          Bed Mobility    Bed Mobility supine-sit;sit-supine  -PH     Supine-Sit [place] (Bed Mobility) modified independence  -PH     Sit-Supine [place] (Bed Mobility) modified independence  -PH     Assistive Device (Bed Mobility) bed rails  -PH       Row Name 11/08/23 0839          Sit-Stand Transfer    Sit-Stand  [place] (Transfers) contact guard;minimum assist (75% patient effort)  -PH     Assistive Device (Sit-Stand Transfers) other (see comments)  No Ad  -PH     Comment, (Sit-Stand Transfer) mild LOB posteriorly when standing req min A to correct  -PH       Row Name 11/08/23 0847          Gait/Stairs (Locomotion)    [place] Level (Gait) contact guard  -PH     Assistive Device (Gait) other (see comments)  No AD  -PH     Distance in Feet (Gait) 360'  -PH     Deviations/Abnormal Patterns (Gait) eunice decreased;gait speed decreased;stride length decreased  -PH     [place] Level (Stairs) not tested  -PH     Comment, (Gait/Stairs) slow and guarded w/ limited B arm swing  -PH               User Key  (r) = Recorded By, (t) = Taken By, (c) = Cosigned By      Initials Name Provider Type     Diamond Diggs PTA Physical Therapist Assistant                   Obj/Interventions       Row Name 11/08/23 0848          Motor Skills    Therapeutic Exercise other (see comments)  BAP, LAQ, seated march, punches; x  10-15 reps each  -PH       Row Name 11/08/23 0848          Balance    Balance Assessment standing static balance  -PH     Static Sitting Balance modified independence  -PH     Static Standing Balance standby assist;contact guard  -PH     Position/Device Used, Standing Balance other (see comments)  no AD  -PH               User Key  (r) = Recorded By, (t) = Taken By, (c) = Cosigned By      Initials Name Provider Type    PH Diamond Diggs PTA Physical Therapist Assistant                   Goals/Plan    No documentation.                  Clinical Impression       Row Name 11/08/23 0849          Pain    Pretreatment Pain Rating 0/10 - no pain  -PH     Posttreatment Pain Rating 4/10  -PH     Pain Location lower  -PH     Pain Location - back  -PH     Pain Intervention(s) Ambulation/increased activity;Repositioned  -PH     Additional Documentation Pain Scale: Numbers Pre/Post-Treatment (Group)  -PH        Row Name 11/08/23 0849          Plan of Care Review    Plan of Care Reviewed With patient  -PH     Progress improving  -PH     Outcome Evaluation Pt seen by PT this AM for tx. Pt performed bed mobility w/ mod I. Pt stood from EOB w/ cga to min A w/ 1 mild posterior LOB. Pt then amb around nsg station 2x req CGA and no AD. Pt was slow and guarded w/ limited B arm swing. No overt LOB occurred. Pt performed ther ex for strengthening at EOB. Rec HH PT after dc to address deficits.  -PH       Row Name 11/08/23 0849          Vital Signs    O2 Delivery Pre Treatment room air  -PH     O2 Delivery Intra Treatment room air  -PH     O2 Delivery Post Treatment room air  -PH       Row Name 11/08/23 0849          Positioning and Restraints    Pre-Treatment Position in bed  -PH     Post Treatment Position bed  -PH     In Bed fowlers;call light within reach;encouraged to call for assist;exit alarm on;notified nsg  -PH               User Key  (r) = Recorded By, (t) = Taken By, (c) = Cosigned By      Initials Name Provider Type    Diamond Villalta PTA Physical Therapist Assistant                   Outcome Measures       Row Name 11/08/23 0851 11/08/23 0500       How much help from another person do you currently need...    Turning from your back to your side while in flat bed without using bedrails? 4  -PH 4  -JL    Moving from lying on back to sitting on the side of a flat bed without bedrails? 4  -PH 4  -JL    Moving to and from a bed to a chair (including a wheelchair)? 4  -PH 4  -JL    Standing up from a chair using your arms (e.g., wheelchair, bedside chair)? 3  -PH 3  -JL    Climbing 3-5 steps with a railing? 3  -PH 3  -JL    To walk in hospital room? 3  -PH 4  -JL    AM-PAC 6 Clicks Score (PT) 21  -PH 22  -JL    Highest level of mobility 6 --> Walked 10 steps or more  -PH 7 --> Walked 25 feet or more  -JL              User Key  (r) = Recorded By, (t) = Taken By, (c) = Cosigned By      Initials Name Provider Type     Diamond Villalta PTA Physical Therapist Assistant    Guilherme Stokes RN Registered Nurse                                 Physical Therapy Education       Title: PT OT SLP Therapies (In Progress)       Topic: Physical Therapy (Done)       Point: Mobility training (Done)       Learning Progress Summary             Patient Acceptance, E,TB, VU,DU by  at 11/7/2023 1029    Acceptance, E,TB, VU,NR by  at 11/6/2023 0945    Acceptance, E,TB, VU,DU by  at 11/5/2023 1306                         Point: Home exercise program (Done)       Learning Progress Summary             Patient Acceptance, E,TB, VU,DU by  at 11/7/2023 1029                         Point: Body mechanics (Done)       Learning Progress Summary             Patient Acceptance, E,TB, VU,DU by  at 11/7/2023 1029    Acceptance, E,TB, VU,DU by  at 11/5/2023 1306                         Point: Precautions (Done)       Learning Progress Summary             Patient Acceptance, E,TB, VU,DU by  at 11/7/2023 1029    Acceptance, E,TB, VU,NR by  at 11/6/2023 0945    Acceptance, E,TB, VU,DU by  at 11/5/2023 1306                                         User Key       Initials Effective Dates Name Provider Type Discipline     06/16/21 -  Valeria Mccracken, MIGUEL Physical Therapist PT     09/22/22 -  Rosenda Friedman PT Physical Therapist PT                  PT Recommendation and Plan     Plan of Care Reviewed With: patient  Progress: improving  Outcome Evaluation: Pt seen by PT this AM for tx. Pt performed bed mobility w/ mod I. Pt stood from EOB w/ cga to min A w/ 1 mild posterior LOB. Pt then amb around nsg station 2x req CGA and no AD. Pt was slow and guarded w/ limited B arm swing. No overt LOB occurred. Pt performed ther ex for strengthening at EOB. Rec HH PT after dc to address deficits.     Time Calculation:         PT Charges       Row Name 11/08/23 0854             Time Calculation    Start Time 0830  -PH      Stop Time 0846  -PH      Time  Calculation (min) 16 min  -PH      PT Received On 11/08/23  -PH      PT - Next Appointment 11/09/23  -PH         Timed Charges    54163 - PT Therapeutic Exercise Minutes 4  -PH      70745 - PT Therapeutic Activity Minutes 12  -PH         Total Minutes    Timed Charges Total Minutes 16  -PH       Total Minutes 16  -PH                User Key  (r) = Recorded By, (t) = Taken By, (c) = Cosigned By      Initials Name Provider Type     Diamond Diggs PTA Physical Therapist Assistant                  Therapy Charges for Today       Code Description Service Date Service Provider Modifiers Qty    [id number]  PT THERAPEUTIC ACT EA 15 MIN 11/8/2023 Diamond Diggs PTA GP 1            PT G-Codes  Outcome Measure Options: AM-PAC 6 Clicks Basic Mobility (PT)  AM-PAC 6 Clicks Score (PT): 21  AM-PAC 6 Clicks Score (OT): 17  Modified Hood Scale: 4 - Moderately severe disability.  Unable to walk without assistance, and unable to attend to own bodily needs without assistance.  PT Discharge Summary  Anticipated Discharge Disposition (PT): home with home health, home with assist    Diamond Diggs PTA  11/8/2023

## 2023-11-08 NOTE — PLAN OF CARE
Goal Outcome Evaluation:      Pt. Alert, oriented x4, ambulatory , used bathroom with 1 person assisted or attended, no voiced c/o pain, Pt. Wanted to walk around nurse station, walked with this nurse for a while at this shift, v/s stable, 02 sats 95% on room air, no s/s acute distress noted

## 2023-11-08 NOTE — OUTREACH NOTE
Prep Survey      Flowsheet Row Responses   [facility] patient discharged from? [place]   Is LACE score < 7 ? No   Eligibility [facility]   Date of Admission 11/04/23   Date of Discharge 11/08/23   Discharge Disposition Home-Health Care Sv   Discharge diagnosis SARAH (acute kidney injury)- Generalized weakness   Does the patient have one of the following disease processes/diagnoses(primary or secondary)? Other   Does the patient have Home health ordered? Yes   What is the Home health agency?  [facility]   Is there a DME ordered? Yes   What DME was ordered? [facility]   Prep survey completed? Yes            Fatuma MITCHELL - Registered Nurse

## 2023-11-08 NOTE — PROGRESS NOTES
"Enter Query Response Below      Query Response: SARAH on CKD3a              If applicable, please update the problem list.   If you have any questions about this query contact me at: [email or web address]     Dr. Hays,    Patient admitted with weakness, nausea/vomiting and diarrhea has conflicting documentation of SARAH vs CKD 3a. Oncology consult states \"CKD 3a-creatinine at baseline 1.3\". 11/6 IM PN states \"Workup revealed dehydration/SARAH\". Labs during the admission have revealed:    11/4/23 Bun/Creat 28/1.50, GFR 37  11/5/23 Bun/Creat 26/1.30, GFR 44  11/6/23 Bun/Creat 13/1.15, GFR 51  11/7/23 Bun/Creat 7/0.98, GFR 62    Treatment has included IVF and monitoring of serial labs.    Please clarify conflicting documentation as:    SARAH on CKD3a  SARAH only  CKD3a only  Other- specify__________  Unable to determine      By submitting this query, we are merely seeking further clarification of documentation to accurately reflect all conditions that you are monitoring, evaluating, treating or that extend the hospitalization or utilize additional resources of care. Please utilize your independent clinical judgment when addressing the question(s) above.     This query and your response, once completed, will be entered into the legal medical record.    Sincerely,  Rosa Muniz RN  Clinical Documentation Integrity Program     "

## 2023-11-08 NOTE — CASE MANAGEMENT/SOCIAL WORK
Case Management Discharge Note      Final Note: Pt discharged home with a walker provided by Jennifer and [facility]erged with [facility] CEASAR Lee RN    Provided Post Acute Provider List?: Yes  Post Acute Provider List: Home Health  Provided Post Acute Provider Quality & Resource List?: Yes  Post Acute Provider Quality and Resource List: Home Health  Delivered To: Patient  Method of Delivery: In person    Selected Continued Care - Admitted Since 11/4/2023       Destination    No services have been selected for the patient.                Durable Medical Equipment       Service Provider Selected Services Address Phone Fax Patient Preferred    [facility] [address], [facility] [address] [phone] --              Dialysis/Infusion    No services have been selected for the patient.                Home Medical Care       Service Provider Selected Services Address Phone Fax Patient Preferred    [facility] [address] [place] [address] [id number] [phone] --              Therapy    No services have been selected for the patient.                Community Resources    No services have been selected for the patient.                Community & DME    No services have been selected for the patient.                    Selected Continued Care - Episodes Includes continued care and service providers with selected services from the active episodes listed below      Oncology- External Fill Episode start date: 10/10/2023   There are no active outsourced providers for this episode.                 Selected Continued Care - Prior Encounters Includes continued care and service providers with selected services from prior encounters from 8/6/2023 to 11/8/2023      Discharged on 8/31/2023 Admission date: 8/27/2023 - Discharge disposition: Home or Self Care      Durable Medical Equipment       Service Provider Selected Services  Address Phone Fax Patient Preferred    [facility] [address] [facility], [place] [id number] [phone] --                          Transportation Services  Private: Car    Final Discharge Disposition Code: 06 - home with home health care

## 2023-11-08 NOTE — PROGRESS NOTES
History of Present Illness          This is a 67-year-old woman followed in our practice for metastatic breast cancer which is ER positive and metastatic to bone and lung.  She has most recently been on treatment with hormonal/targeted therapy with Faslodex every 4 weeks and Ibrance along with Xgeva every 3 months.  However reviewing records it appears she has not received Faslodex since 6/8/2023 and her last office visit was 9/18/2023 at which time Faslodex/Ibrance were held and the patient was given IV fluids for weakness and hypotension.  She was supposed to follow-up in 1 week but has apparently missed several appointments since that time.     The patient presented to the ER with complaints of nausea weakness and dysuria.  She states she has not been able to keep anything down for about 24 hours and had associated diarrhea.  Labs performed including CBC which was normal, CMP pertinent for BUN 28/creatinine 1.5 which is actually a bit better than her baseline, sodium 130 and elevated calcium 11.2 (has been elevated to some degree since September when 11.6).  She has been provided IV fluids and antiemetics.  BUNs/creatinine even better today 26/1.3, sodium 130 and calcium normalized 9.9.     The patient states she has been eating and drinking this morning mostly liquids and no BM since admission.    Interval history:  11/6/2023  T97.6, pulse 88, respirations 16, /72  Discussed with the patient need to reevaluation by CT.  H&H 10.9 and 31.6, white count of 5410, platelet count of 263,000, BUN/creatinine of 13 and 1.15, sodium 129, calcium 10.1    11/7/2023  T98.1, pulse 94, respirations 18, BP 96/62  Patient now proceeding through physical therapy OT.  CT chest with minimal apical scarring, small pulmonary nodules that were stable, will granulomatous disease in liver and spleen.  There are postsurgical changes of thyroidectomy, left axillary clip, left mastectomy changes but no other abnormalities other  than numerous lesions within the bone that are stable.  Patient admitted with generalized weakness and plans for physical therapy to try to improve her clinical performance status.    11/8/2023  T97.9, pulse 90, respirations 16, /67  Discussed follow-up 12/5/2023, plan to discontinue other scheduled scans.  H&H 9.6 and 28.4, white count of 4070, platelet count of 240,000  Patient now being seen for home health at discharge, seen by oncology social work to facilitate goals-long-term planning, review financial issues, home meals      Past Medical History, Past Surgical History, Social History, Family History have been reviewed and are without significant changes except as mentioned.    Review of Systems   A comprehensive 14 point review of systems was performed and was negative except as mentioned.  Positive for activity change, appetite change, diarrhea, nausea, vomiting, weakness and dysphoric mood.      Medications:  The current medication list was reviewed in the EMR    ALLERGIES:    Allergies   Allergen Reactions    Nickel Unknown - Low Severity    Ciprofloxacin Rash       Objective      Vitals:    11/07/23 0720 11/07/23 1300 11/07/23 1958 11/07/23 2352   BP: 96/62 113/64 93/57 100/67   BP Location: Right arm Right arm     Patient Position: Sitting Sitting     Pulse: 94 90 91 91   Resp: 18 16 16 16   Temp: 98.1 °F (36.7 °C) 98 °F (36.7 °C) 97.7 °F (36.5 °C) 97.9 °F (36.6 °C)   TempSrc: Oral Oral Oral Oral   SpO2: 98% 99% 98% 100%   Weight:       Height:             9/21/2023     3:12 PM   Current Status   ECOG score 0       Physical Exam  Constitutional:       Appearance: She is normal weight.   HENT:      Nose: Nose normal.      Mouth/Throat:      Mouth: Mucous membranes are moist.      Pharynx: Oropharynx is clear.   Eyes:      Extraocular Movements: Extraocular movements intact.      Conjunctiva/sclera: Conjunctivae normal.      Pupils: Pupils are equal, round, and reactive to light.   Cardiovascular:       Rate and Rhythm: Normal rate and regular rhythm.      Pulses: Normal pulses.      Heart sounds: Normal heart sounds.   Pulmonary:      Effort: Pulmonary effort is normal.      Breath sounds: Normal breath sounds.   Abdominal:      General: Bowel sounds are normal.      Palpations: Abdomen is soft.   Musculoskeletal:         General: Normal range of motion.      Cervical back: Normal range of motion and neck supple.   Skin:     General: Skin is warm and dry.   Neurological:      General: No focal deficit present.      Mental Status: She is oriented to person, place, and time.   Psychiatric:      Comments: Flat affect           RECENT LABS:  Hematology WBC   Date Value Ref Range Status   11/07/2023 4.07 3.40 - 10.80 10*3/mm3 Final     RBC   Date Value Ref Range Status   11/07/2023 3.00 (L) 3.77 - 5.28 10*6/mm3 Final     Hemoglobin   Date Value Ref Range Status   11/07/2023 9.6 (L) 12.0 - 15.9 g/dL Final     Hematocrit   Date Value Ref Range Status   11/07/2023 28.4 (L) 34.0 - 46.6 % Final     Platelets   Date Value Ref Range Status   11/07/2023 240 140 - 450 10*3/mm3 Final              Assessment & Plan     *Metastatic ER positive breast cancer primarily to bone and lung nodules  Patient most recently has been on treatment with Faslodex/Ibrance and every 3-month Xgeva but appears to have been off treatment since June secondary to canceled appointment/medical nonadherence  Review of her record indicates initial diagnosis in 1993, evidence of bone involvement by 2016, subsequent pulmonary nodules and bony involvement by 2020 with localized radiation therapy given to C7 and T1 lesion in June 2020, metastatic disease involving bilateral ovaries and uterine serosa in 2021, status post resection and AI therapy continued, evidence of progression by March 2022, XRT provided to right hip by April 2022, 5/6/2020 to begin Faslodex and Ibrance, CT scans and bone scan 7/17/2023 with bony areas of increased uptake,, patient  continues CDK4 therapy.  She is scheduled for follow-up labs, imaging and MD visit Dr. Hawkins later this month for reassessment of her malignancy and to start back on treatment as indicated.  Patient seen in consultation in observation unit 11/6/2023 and repeat scans felt necessary.  Discussed with Dr. Hawkins-CT of chest, abdomen, pelvis without IV contrast  Subsequent CT of chest abdomen pelvis with stable findings in the chest, stable osseous disease and stable nodule left adrenal thickening.  Additional CA 15-3 level of 17.2  Patient advised of near remission status even though she has been off treatment for some time.  She is encouraged to proceed through PT, OT and we will ask for oncologic  to see her as well.  Patient notified 11/8/2023, again, stable malignant status, no additional scans necessary request to have her seen December 5, 2023 as scheduled.     *Nausea vomiting diarrhea with volume depletion-appears unrelated to cancer therapy since the patient has been off treatment     *Hypercalcemia  Calcium 11.2 on admission likely from volume depletion but could have a component of hypercalcemia of malignancy from her bone metastases  Since she has been off Xgeva several months I will give her a dose of pamidronate while here to prevent recurrence of hypercalcemia  Patient status post therapy with normocalcemia 11/6/2023, 11/11/2023     CKD3a- creatinine at baseline 1.3  Repeat assessment 11/6/2023 with being creatinine of 13 and 1.15, subsequent assessment 11/7/2023 with being creatinine of 7 and 0.98       *No additional recommendations.  We will sign off at this point.    11/8/2023

## 2023-11-08 NOTE — DISCHARGE SUMMARY
Patient Name: Marialuisa Vivar  : 1955  MRN: [id number]    Date of Admission: 2023  Date of Discharge:  2023  Primary Care Physician: Jennifer Mantilla MD      Chief Complaint:   Dizziness, Nausea, and Diarrhea      Discharge Diagnoses     Active Hospital Problems    Diagnosis  POA    **Generalized weakness [R53.1]  Yes    Moderate malnutrition [E44.0]  Yes    Hyponatremia [E87.1]  Yes    Carcinoma of left breast metastatic to lung [C50.912, C78.02]  Yes    Benign essential HTN [I10]  Yes    Depression [F32.A]  Yes    Malignant neoplasm metastatic to bone [C79.51]  Yes    Vocal cord dysfunction [J38.3]  Yes      Resolved Hospital Problems    Diagnosis Date Resolved POA    Nausea & vomiting [R11.2] 2023 Yes        Hospital Course     Ms. Vivar is a 68 y.o. female with a history of w/metastatic breat cancer to lung and bone, depression/anxiety, HTN, tongue cancer who presented to [facility] initially complaining of n/v/d, weakness.  Please see the admitting history and physical for further details.  She was found to have SARAH, hypercalcemia, hyponatremia and was admitted to the hospital for further evaluation and treatment.  Started on IVF's, antiemetics. Hem/Onc followed. Ca normalized w/pamidronate. Cr and Na also normalized w/IVF's. Further workup with CT C/A/P per Oncology were stable without acute findings. OT/PT have followed initially recommending SNF but strength and endurance have improved, now safe for discharge with home health services. Dietician has followed, pt screened to have moderate malnutrition, recommending Boost Plus daily. Oncology social worker has also followed. GI symptoms have resolved. Medically stable for discharge.  Day of Discharge     Subjective:  Feeling better, stronger. Has been ambulating around nurses station. Ate more for breakfast. Denies n/v/d. Afebrile. Agrees with discharge. Rolling walker ordered    Physical Exam:  Temp:  [96.8 °F (36  °C)-98 °F (36.7 °C)] 96.8 °F (36 °C)  Heart Rate:  [] 119  Resp:  [16] 16  BP: ()/(57-73) 119/73  Body mass index is 22.7 kg/m².  Physical Exam  Vitals and nursing note reviewed.   Constitutional:       General: She is not in acute distress.  HENT:      Head: Normocephalic.      Mouth/Throat:      Mouth: Mucous membranes are moist.   Eyes:      Conjunctiva/sclera: Conjunctivae normal.   Cardiovascular:      Rate and Rhythm: Normal rate and regular rhythm.   Pulmonary:      Effort: Pulmonary effort is normal. No respiratory distress.      Breath sounds: No wheezing or rales.   Abdominal:      General: Bowel sounds are normal.      Palpations: Abdomen is soft.   Musculoskeletal:      Cervical back: Neck supple.      Right lower leg: No edema.      Left lower leg: No edema.   Skin:     General: Skin is warm and dry.   Neurological:      Mental Status: She is alert and oriented to person, place, and time.   Psychiatric:         Mood and Affect: Mood normal.         Behavior: Behavior normal.         Consultants     Consult Orders (all) (From admission, onward)       Start     Ordered    11/07/23 1150  Inpatient Case Management  Consult  Once        Provider:  (Not yet assigned)    11/07/23 1149    11/07/23 0912  Inpatient Oncology Social Worker Consult  Once        Provider:  (Not yet assigned)    11/07/23 0911    11/06/23 1558  Inpatient Nutrition Consult  Once        Provider:  (Not yet assigned)    11/06/23 1557    11/06/23 0923  Inpatient Hospitalist Consult  Once        Specialty:  Hospitalist  Provider:  Ivan Fried MD    11/06/23 0923    11/05/23 0759  Hematology & Oncology Inpatient Consult  Once        Specialty:  Hematology and Oncology  Provider:  Solo Yip MD    11/05/23 0801                  Procedures     * Surgery not found *    Imaging Results (All)       Procedure Component Value Units Date/Time    CT Chest Without Contrast Diagnostic [[id number]]  Collected: 11/06/23 1514     Updated: 11/06/23 1522    Narrative:      Examination: CT of the chest, abdomen, and pelvis without contrast     TECHNIQUE: CT of the chest, abdomen, and pelvis without contrast per  protocol. Radiation dose reduction techniques were utilized, including  automated exposure control and exposure modulation based on body size.     HISTORY: Metastatic breast cancer     COMPARISON: 7/17/2023     FINDINGS: Chest:     There is minimal apical pleural-parenchymal scarring. Small pulmonary  nodules are stable, for example in the right upper lobe measuring 0.9 x  0.7 cm on series 1, image 29. A suture line is seen in the right lung.  There is dependent atelectasis with areas of scarring bilaterally. There  is asymmetric elevation of the left hemidiaphragm. Tiny pulmonary  nodules and scarring in the left anterior and medial hemithorax are  stable. No new or enlarged mass, area of consolidation, pleural  effusion, or pneumothorax are seen. The central airways are patent.     Postsurgical changes are seen of thyroidectomy. There are left axillary  clips. Left mastectomy changes are seen. No enlarged supraclavicular,  right axillary, or mediastinal lymph nodes are demonstrated. The  mediastinal vasculature is normal in caliber. The heart is normal in  size, without pericardial effusion. Scant coronary calcifications are  seen.     Abdomen and pelvis:     Old granulomatous disease is seen in the liver and spleen. There is  stable nodular thickening of the left adrenal gland. The uterus is  absent. The gallbladder is absent.     The right adrenal gland, kidneys, pancreas, stomach, small bowel,  appendix, large bowel, urinary bladder, and abdominal vasculature are  normal as evaluated on this noncontrast examination. No intraperitoneal  fluid collection or free gas are seen. No enlarged lymph nodes are  demonstrated.     Bone windows demonstrate degenerative changes and numerous lesions,  most  prominently involving the vertebral bodies, stable       Impression:      1. Small stable pulmonary nodules     2. Stable osseous metastatic disease     3. Stable nodular left adrenal thickening     4. Additional stable incidental findings as above.     This report was finalized on 11/6/2023 3:19 PM by Dr. Brendan Parks M.D  on Workstation: [id number]       CT Abdomen Pelvis Without Contrast [[id number]] Collected: 11/06/23 1514     Updated: 11/06/23 1522    Narrative:      Examination: CT of the chest, abdomen, and pelvis without contrast     TECHNIQUE: CT of the chest, abdomen, and pelvis without contrast per  protocol. Radiation dose reduction techniques were utilized, including  automated exposure control and exposure modulation based on body size.     HISTORY: Metastatic breast cancer     COMPARISON: 7/17/2023     FINDINGS: Chest:     There is minimal apical pleural-parenchymal scarring. Small pulmonary  nodules are stable, for example in the right upper lobe measuring 0.9 x  0.7 cm on series 1, image 29. A suture line is seen in the right lung.  There is dependent atelectasis with areas of scarring bilaterally. There  is asymmetric elevation of the left hemidiaphragm. Tiny pulmonary  nodules and scarring in the left anterior and medial hemithorax are  stable. No new or enlarged mass, area of consolidation, pleural  effusion, or pneumothorax are seen. The central airways are patent.     Postsurgical changes are seen of thyroidectomy. There are left axillary  clips. Left mastectomy changes are seen. No enlarged supraclavicular,  right axillary, or mediastinal lymph nodes are demonstrated. The  mediastinal vasculature is normal in caliber. The heart is normal in  size, without pericardial effusion. Scant coronary calcifications are  seen.     Abdomen and pelvis:     Old granulomatous disease is seen in the liver and spleen. There is  stable nodular thickening of the left adrenal gland. The uterus  is  absent. The gallbladder is absent.     The right adrenal gland, kidneys, pancreas, stomach, small bowel,  appendix, large bowel, urinary bladder, and abdominal vasculature are  normal as evaluated on this noncontrast examination. No intraperitoneal  fluid collection or free gas are seen. No enlarged lymph nodes are  demonstrated.     Bone windows demonstrate degenerative changes and numerous lesions, most  prominently involving the vertebral bodies, stable       Impression:      1. Small stable pulmonary nodules     2. Stable osseous metastatic disease     3. Stable nodular left adrenal thickening     4. Additional stable incidental findings as above.     This report was finalized on 11/6/2023 3:19 PM by Dr. Brendan Parks M.D  on Workstation: [id number]       XR Chest 1 View [[id number]] Collected: 11/04/23 2052     Updated: 11/04/23 2101    Narrative:      Emergency portable view of the chest on November 4, 2023     CLINICAL HISTORY: Dizziness     This is correlated to a prior portable chest x-ray on August 27, 2023  and a chest CT on July 17, 2023.     FINDINGS: There are multiple surgical clips in the left lower neck.  There is evidence of previous left mastectomy and there are clips in the  left axilla from a previous left axillary dissection. The  cardiomediastinal silhouette and the pulmonary vasculature are within  normal limits. There is moderate elevation of the left hemidiaphragm  with chronic atelectasis or scarring at the left lung base. There are  chain sutures in the suprahilar right lung from previous right lung  surgery. The remainder lungs are clear. The costophrenic angles are  sharp. There are blastic metastatic lesions involving the T6, T9 and T10  thoracic vertebrae.       Impression:      1. No active disease is seen in chest with no change when compared to  prior chest x-ray on August 27, 2023.     2. Previous left mastectomy and left axillary dissection. There are  stable blastic  "metastatic lesions to the T6, T9 and T10 thoracic  vertebrae. There is chronic moderate elevation left hemidiaphragm with  atelectasis or scarring at the left base and there are chain sutures in  the right suprahilar region from previous right lung surgery.        This report was finalized on 11/4/2023 8:58 PM by Dr. Papa Casas M.D  on Workstation: [id number]             Results for orders placed during the hospital encounter of 04/27/23    Duplex Venous Lower Extremity - Left CAR    Interpretation Summary    Normal left lower extremity venous duplex scan.    2 x 2 mm hypoechoic, apparently nonvascular lesion in the proximal calf, in an area of concern.      Pertinent Labs     Results from last 7 days   Lab Units 11/07/23 0748 11/06/23 0410 11/05/23 0459 11/04/23 1959   WBC 10*3/mm3 4.07 5.41 5.04 6.28   HEMOGLOBIN g/dL 9.6* 10.9* 11.0* 13.4   PLATELETS 10*3/mm3 240 263 267 312     Results from last 7 days   Lab Units 11/07/23 0748 11/06/23 0410 11/05/23 0459 11/04/23 1959   SODIUM mmol/L 136 129* 130* 130*   POTASSIUM mmol/L 4.4 4.5 4.8 5.1   CHLORIDE mmol/L 110* 101 102 95*   CO2 mmol/L 22.0 19.2* 17.0* 14.8*   BUN mg/dL 7* 13 26* 28*   CREATININE mg/dL 0.98 1.15* 1.30* 1.50*   GLUCOSE mg/dL 85 117* 138* 63*   EGFR mL/min/1.73 62.6 51.7* 44.6* 37.6*     Results from last 7 days   Lab Units 11/04/23 1959   ALBUMIN g/dL 4.1   BILIRUBIN mg/dL 0.9   ALK PHOS U/L 90   AST (SGOT) U/L 24   ALT (SGPT) U/L 11     Results from last 7 days   Lab Units 11/07/23 0748 11/06/23 0410 11/05/23 0459 11/04/23 1959   CALCIUM mg/dL 9.0 10.1 9.9 11.2*   ALBUMIN g/dL  --   --   --  4.1   MAGNESIUM mg/dL  --   --   --  1.7       Results from last 7 days   Lab Units 11/04/23  2334 11/04/23 1959   HSTROP T ng/L 23* 27*   PROBNP pg/mL  --  109.0           Invalid input(s): \"LDLCALC\"      Results from last 7 days   Lab Units 11/04/23 2051   COVID19  Not Detected       Test Results Pending at Discharge       Discharge Details "        Discharge Medications        New Medications        Instructions Start Date   miconazole 2 % powder  Commonly known as: MICOTIN   1 application , Topical, Every 12 Hours Scheduled             Continue These Medications        Instructions Start Date   ARIPiprazole 2 MG tablet  Commonly known as: ABILIFY   5 mg, Oral, Nightly      atorvastatin 10 MG tablet  Commonly known as: LIPITOR   TAKE 1 TABLET BY MOUTH ONCE DAILY AT NIGHT      cholecalciferol 1.25 MG (21045 UT) capsule  Commonly known as: VITAMIN D3   5,000 Units, Oral, 3 Times Weekly      eszopiclone 3 MG tablet  Commonly known as: LUNESTA   3 mg, Oral      fulvestrant 250 MG/5ML chemo syringe  Commonly known as: FASLODEX   Intramuscular      LORazepam 0.5 MG tablet  Commonly known as: ATIVAN   1 tablet, Oral, Every 8 Hours PRN      meclizine 25 MG tablet  Commonly known as: ANTIVERT   25 mg, Oral, 3 Times Daily PRN      Omeprazole 20 MG Tablet Delayed Release Dispersible   20 mg, Oral, As Needed      ondansetron 8 MG tablet  Commonly known as: ZOFRAN   TAKE 1 TABLET BY MOUTH THREE TIMES DAILY AS NEEDED FORNAUSEA AND VOMITING      Palbociclib 125 MG tablet   No dose, route, or frequency recorded.      PARoxetine 40 MG tablet  Commonly known as: PAXIL   40 mg, Oral, Nightly      Xgeva 120 MG/1.7ML solution injection  Generic drug: denosumab   Subcutaneous               Allergies   Allergen Reactions    Nickel Unknown - Low Severity    Ciprofloxacin Rash       Discharge Disposition:  [place] [facility] Diet:  Diet Order   Procedures    Diet: Gastrointestinal Diets; Fiber-Restricted, Low Irritant; Texture: Regular Texture (IDDSI 7); Fluid Consistency: Thin (IDDSI 0)       Discharge Activity:   Activity Instructions       Activity as Tolerated              CODE STATUS:    Code Status and Medical Interventions:   Ordered at: 11/04/23 0790     Code Status (Patient has no pulse and is not breathing):    CPR (Attempt to Resuscitate)     Medical  Interventions (Patient has pulse or is breathing):    Full Support       Future Appointments   Date Time Provider Department Center   11/28/2023 10:30 AM [facility] ADMIN RM 1 BH [facility] NM [facility]   11/28/2023 11:00 AM [facility] PET CT BH [facility] PET [facility]   11/28/2023  1:30 PM [facility] NM 3 BH [facility] NM [facility]   12/5/2023  3:10 PM LAB CHAIR 1 [facility]   12/5/2023  3:40 PM Jarod Hawkins II, MD [facility]   12/5/2023  4:15 PM CHAIR [address] [facility]     Additional Instructions for the Follow-ups that You Need to Schedule       Ambulatory Referral to Home Health (Hospital)   As directed      Face to Face Visit Date: 11/8/2023   Follow-up provider for Plan of Care?: I treated the patient in an acute care facility and will not continue treatment after discharge.   Follow-up provider: JENNIFER MARQUEZ [[id number]]   Reason/Clinical Findings: Generalized weakness, moderate malnutrition   Describe mobility limitations that make leaving home difficult: Decreased strength, endurance,mobility   Nursing/Therapeutic Services Requested: Physical Therapy Occupational Therapy   PT orders: Therapeutic exercise Strengthening Home safety assessment   Occupational orders: Energy conservation Strengthening Activities of daily living Home safety assessment   Frequency: 1 Week 1               Contact information for follow-up providers       Jennifer Marquez MD. Schedule an appointment as soon as possible for a visit in 1 week(s).    Specialty: Family Medicine  Why: Hospital follow up  Contact information:  [address]  [address]  [facility] [id number] [phone]               Jarod Hawkins II, MD Follow up on 12/5/2023.    Specialties: Hematology and Oncology, Oncology, Hematology  Contact information:  [address] [facility]  [address]  [place] [id number] [phone]                       Contact information for after-discharge care       Durable Medical Equipment       [facility] .    Service:  Durable Medical Equipment  Contact information:  [address]  [facility] [address]  [phone]                     Home Medical Care       [facility] .    Service: Home Health Services  Contact information:  [address]  [facility] [address] [phone]                                   Additional Instructions for the Follow-ups that You Need to Schedule       Ambulatory Referral to Home Health (Hospital)   As directed      Face to Face Visit Date: 11/8/2023   Follow-up provider for Plan of Care?: I treated the patient in an acute care facility and will not continue treatment after discharge.   Follow-up provider: NAOMIE MARQUEZ [[id number]]   Reason/Clinical Findings: Generalized weakness, moderate malnutrition   Describe mobility limitations that make leaving home difficult: Decreased strength, endurance,mobility   Nursing/Therapeutic Services Requested: Physical Therapy Occupational Therapy   PT orders: Therapeutic exercise Strengthening Home safety assessment   Occupational orders: Energy conservation Strengthening Activities of daily living Home safety assessment   Frequency: 1 Week 1            Time Spent on Discharge:  Greater than 30 minutes      ARON Tijerina  [facility]  11/08/23  11:35 EST

## 2023-11-08 NOTE — PLAN OF CARE
Goal Outcome Evaluation:  Plan of Care Reviewed With: patient        Progress: improving  Outcome Evaluation: Pt seen by PT this AM for tx. Pt performed bed mobility w/ mod I. Pt stood from EOB w/ cga to min A w/ 1 mild posterior LOB. Pt then amb around nsg station 2x req CGA and no AD. Pt was slow and guarded w/ limited B arm swing. No overt LOB occurred. Pt performed ther ex for strengthening at EOB. Rec HH PT after dc to address deficits.      Anticipated Discharge Disposition (PT): home with home health, home with assist

## 2023-11-08 NOTE — CASE MANAGEMENT/SOCIAL WORK
Continued Stay Note  [facility]     Patient Name: Marialuisa Vivar  MRN: [id number]  Today's Date: 11/8/2023    Admit Date: 11/4/2023    Plan: Plan home with walker from [facility] and [facility]. BI Carmichael RN   Discharge Plan       Row Name 11/08/23 1051       Plan    Plan Plan home with walker from [facility] and [facility]. BI Carmichael RN    Patient/Family in Agreement with Plan yes    Plan Comments Order for walker received and Neymar  ( [phone]) delivered walker to pt's room.  Louisa ( [phone]) with [facility] accepted pt.  Plan home with walker from [facility] and [facility].  BI Carmichael RN                   Discharge Codes    No documentation.                 Expected Discharge Date and Time       Expected Discharge Date Expected Discharge Time    Nov 8, 2023               Yumiko Carmichael RN

## 2023-11-09 ENCOUNTER — TRANSITIONAL CARE MANAGEMENT TELEPHONE ENCOUNTER (OUTPATIENT)
Dept: CALL CENTER | Facility: HOSPITAL | Age: 68
End: 2023-11-09
Payer: MEDICARE

## 2023-11-09 ENCOUNTER — DOCUMENTATION (OUTPATIENT)
Dept: HOME HEALTH SERVICES | Facility: HOME HEALTHCARE | Age: 68
End: 2023-11-09
Payer: MEDICARE

## 2023-11-09 ENCOUNTER — HOME HEALTH ADMISSION (OUTPATIENT)
Dept: HOME HEALTH SERVICES | Facility: HOME HEALTHCARE | Age: 68
End: 2023-11-09
Payer: MEDICARE

## 2023-11-09 NOTE — OUTREACH NOTE
Call Center TCM Note      Flowsheet Row Responses   [facility] patient discharged from? [place]   Does the patient have one of the following disease processes/diagnoses(primary or secondary)? Other   TCM attempt successful? No   Unsuccessful attempts Attempt 1            Enma Urias MA    11/9/2023, 09:30 EST

## 2023-11-09 NOTE — OUTREACH NOTE
Call Center TCM Note      Flowsheet Row Responses   [facility] [place] patient discharged from? [place]   Does the patient have one of the following disease processes/diagnoses(primary or secondary)? Other   TCM attempt successful? Yes   Call start time 1552   Call end time 1558   Discharge diagnosis SARAH (acute kidney injury)- Generalized weakness   Meds reviewed with patient/caregiver? Yes   Is the patient having any side effects they believe may be caused by any medication additions or changes? No   Does the patient have all medications ordered at discharge? Yes   Is the patient taking all medications as directed (includes completed medication regime)? Yes   Does the patient have an appointment with their PCP within 7-14 days of discharge? No appointments available   Nursing Interventions Routed TCM call to PCP office, PCP office requested to make appointment - message sent   What is the Home health agency?  [facility]   Home health comments Pt has not heard from [facility] as yet.   What [facility]   Has all DME been delivered? Yes   Psychosocial issues? No   Did the patient receive a copy of their discharge instructions? Yes   Nursing interventions Reviewed instructions with patient   What is the patient's perception of their health status since discharge? Same   Is the patient/caregiver able to teach back signs and symptoms related to disease process for when to call PCP? Yes   Is the patient/caregiver able to teach back signs and symptoms related to disease process for when to call 911? Yes   Is the patient/caregiver able to teach back the hierarchy of who to call/visit for symptoms/problems? PCP, Specialist, Home health nurse, Urgent Care, ED, 911 Yes   If the patient is a current smoker, are they able to teach back resources for cessation? Not a smoker   TCM call completed? Yes   Wrap up additional comments D/C DX,  SARAH (acute kidney injury)- Generalized weakness -  Pt feels about the same as at d/c. Pt has not heard from [facility] as yet. New rx Miconazole in place. Pt states she is not eating as it is too much trouble to fix anything. PCP Dr Mantilla has no available times I could access to sched TCM APPT with PCP by 11/22/2023, but per AVS should be seen within a week. Pt states she has no family in [place], and little help.I am sending msg to [facility] for possible help with meal delivery.   Call end time 4099   Would this patient benefit from a Referral to [facility] Social Work? Yes   Reason for Social Work Referral Food            Enma Urias MA    11/9/2023, 16:02 EST

## 2023-11-09 NOTE — TELEPHONE ENCOUNTER
BAILEY CALLING BACK, SHE HAS NOT HEARD FROM ANYONE TO GIVE HER A VERBAL FOR PATIENT TO HAVE PT AND OT    [phone]

## 2023-11-09 NOTE — PROGRESS NOTES
[facility] following for home health needs.  Received verbal approval for home health per Millie with Dr Mantilla's office.  Verified all info with patient prior to D/C and she is agreeable.

## 2023-11-11 ENCOUNTER — HOME CARE VISIT (OUTPATIENT)
Dept: HOME HEALTH SERVICES | Facility: HOME HEALTHCARE | Age: 68
End: 2023-11-11
Payer: MEDICARE

## 2023-11-11 PROCEDURE — G0151 HHCP-SERV OF PT,EA 15 MIN: HCPCS

## 2023-11-12 VITALS
OXYGEN SATURATION: 99 % | RESPIRATION RATE: 18 BRPM | WEIGHT: 136 LBS | TEMPERATURE: 97.9 F | HEIGHT: 65 IN | SYSTOLIC BLOOD PRESSURE: 82 MMHG | HEART RATE: 91 BPM | DIASTOLIC BLOOD PRESSURE: 40 MMHG | BODY MASS INDEX: 22.66 KG/M2

## 2023-11-13 ENCOUNTER — PATIENT OUTREACH (OUTPATIENT)
Dept: CASE MANAGEMENT | Facility: OTHER | Age: 68
End: 2023-11-13
Payer: MEDICARE

## 2023-11-13 NOTE — HOME HEALTH
Patient is a 69yo female recently discharged from [facility] on 11/8/23 for acute kidney failure. Physical therapy FOC is acute kidney failure with comorbidities of HTN, breast cancer with mets to bone, lung. Physical therapy to provide skilled care of therapeutic exercises, gait training, patient education. Patient lives alone in lower level apartment, but has steps to negotiate to get to main floor.    PLAN FOR NEXT VISIT  --Continue therapeutic exercises  --Continue gait training

## 2023-11-13 NOTE — OUTREACH NOTE
MSW received referral from nurse call center regarding community resources for prepared meals for patient. MSW outreach to patient by phone and left voicemail and call back number. MSW to continue to outreach to patient for further assistance.     Lorraine ALEXANDER -   Ambulatory Case Management    11/13/2023, 10:46 EST

## 2023-11-14 ENCOUNTER — PATIENT OUTREACH (OUTPATIENT)
Dept: CASE MANAGEMENT | Facility: OTHER | Age: 68
End: 2023-11-14
Payer: MEDICARE

## 2023-11-14 ENCOUNTER — DOCUMENTATION (OUTPATIENT)
Dept: ONCOLOGY | Facility: CLINIC | Age: 68
End: 2023-11-14
Payer: MEDICARE

## 2023-11-14 ENCOUNTER — HOME CARE VISIT (OUTPATIENT)
Dept: HOME HEALTH SERVICES | Facility: HOME HEALTHCARE | Age: 68
End: 2023-11-14
Payer: MEDICARE

## 2023-11-14 VITALS
RESPIRATION RATE: 17 BRPM | OXYGEN SATURATION: 95 % | SYSTOLIC BLOOD PRESSURE: 124 MMHG | TEMPERATURE: 96.8 F | DIASTOLIC BLOOD PRESSURE: 72 MMHG | HEART RATE: 105 BPM

## 2023-11-14 PROCEDURE — G0151 HHCP-SERV OF PT,EA 15 MIN: HCPCS

## 2023-11-14 NOTE — PROGRESS NOTES
Please call her and tell her she should resume palbociclib when faslodex resumes.  It doesn't look like she has been taking faslodex, if this is true, please wait to resume palbociclib until faslodex resumes  ===View-only below this line===  ----- Message -----  From: Titus Traore, PT  Sent: 11/14/2023   2:00 PM EST  To: Jarod Hawkins II, MD    Patient reports that she has not been taking her Palbociclib medication becuase she missed a follow up appointments that she had to cancel and then she reports she got the schedule of the medication mixed up and so she stopped taking if the the past few month and is afraid to start taking the medication again until she follows up with you. I wanted to make you aware so that you can follow up with the patient and inform her if you would like to resume the medication or wait until her next follow up visit with you which she said is in early December.  Thanks,  Titus Traore PT

## 2023-11-14 NOTE — HOME HEALTH
Patient reports no falls or changes in medication since last visit.  Patient reports that she has a sore area on her coccxy region, she reports there is no open wound present but there is a sore spot that is red.  Patient declines to let therapy assess the coccxy region but again reports there is no open wound.  Therapy educated patient on pressure relief techniques including on position changes/weight shifting every few hours, standing at walker/short distance ambulation every few hours, for keeping skin clean and dry.  Patient also has a pressure relief cushion that she uses to sit on and reports it is more comfortable to sit on.  Therapy educated patient to continue to monitor the coccxy region and notify [facility] CARLOS or MD if a wound arises, patient verbalized understanding.      Plan for next visit:  -transfer training  -gait training  -education on HEP with progressions as appropriate  -standing balance exercises

## 2023-11-14 NOTE — OUTREACH NOTE
Social Work Assessment  Questions/Answers      Flowsheet Row Most Recent Value   Referral Source physician   Reason for Consult community resources, other (see comments)  [prepared meals]   Preferred Language English   Advance Care Planning Reviewed no concerns identified   People in Home alone   Current Living Arrangements apartment   In the past 12 months has the electric, gas, oil, or water company threatened to shut off services in your home? No   Primary Care Provided by self   Provides Primary Care For no one   Source of Income unable to assess   Application for Public Assistance not applied   Usual Activity Tolerance fair   Current Activity Tolerance fair   Medications independent   Meal Preparation independent   Housekeeping independent   Laundry independent   Shopping independent   Current Discharge Risk lack of support system/caregiver          SDOH updated and reviewed with the patient during this program:  Financial Resource Strain: Low Risk  (11/14/2023)    Overall Financial Resource Strain (CARDIA)     Difficulty of Paying Living Expenses: Not very hard      Food Insecurity: No Food Insecurity (11/14/2023)    Hunger Vital Sign     Worried About Running Out of Food in the Last Year: Never true     Ran Out of Food in the Last Year: Never true      Transportation Needs: No Transportation Needs (11/14/2023)    PRAPARE - Transportation     Lack of Transportation (Medical): No     Lack of Transportation (Non-Medical): No      Housing Stability: Low Risk  (11/14/2023)    Housing Stability Vital Sign     Unable to Pay for Housing in the Last Year: No     Number of Places Lived in the Last Year: 1     Unstable Housing in the Last Year: No      Continuing Care   Community & DME   [employer]    [address] [facility] [facility] [address]    Phone: [phone]    Resource for: Financial Resource Strain     Patient Outreach    MSW outreach to patient to discuss community resources available as requested per  nurse call center. Patient states that she is currently managing at home without cooking, patient states that she is able to eat simple meals, such as sandwiches or able to order food delivery.  Patient states that she would like information on prepared meal delivery that is available and is able to pay for the costs of these meals. MSW and patient discussed paying for [employer] for prepared meal delivery and patient was provided with contact information to call and discuss her selection of meals with [employer] directly. Patient states that she will call to get these set up. Patient states that she has not driven the car since her hospitalization but is still able to drive. Patient denies the need for additional assistance at home or any additional resources at this time. Patient has MSW contact information and will call back to MSW as needed.     Lorraine ALEXANDER -   Ambulatory Case Management    11/14/2023, 10:39 EST

## 2023-11-14 NOTE — Clinical Note
Patient reports that she has not been taking her Palbociclib medication becuase she missed a follow up appointments that she had to cancel and then she reports she got the schedule of the medication mixed up and so she stopped taking if the the past few month and is afraid to start taking the medication again until she follows up with you. I wanted to make you aware so that you can follow up with the patient and inform her if you would like to resume the medication or wait until her next follow up visit with you which she said is in early December.  Thanks,  Titus Traore PT

## 2023-11-17 ENCOUNTER — READMISSION MANAGEMENT (OUTPATIENT)
Dept: CALL CENTER | Facility: HOSPITAL | Age: 68
End: 2023-11-17
Payer: MEDICARE

## 2023-11-17 ENCOUNTER — HOME CARE VISIT (OUTPATIENT)
Dept: HOME HEALTH SERVICES | Facility: HOME HEALTHCARE | Age: 68
End: 2023-11-17
Payer: MEDICARE

## 2023-11-17 VITALS
DIASTOLIC BLOOD PRESSURE: 62 MMHG | RESPIRATION RATE: 17 BRPM | OXYGEN SATURATION: 99 % | SYSTOLIC BLOOD PRESSURE: 105 MMHG | HEART RATE: 103 BPM | TEMPERATURE: 97.8 F

## 2023-11-17 PROCEDURE — G0152 HHCP-SERV OF OT,EA 15 MIN: HCPCS

## 2023-11-17 PROCEDURE — G0151 HHCP-SERV OF PT,EA 15 MIN: HCPCS

## 2023-11-17 NOTE — OUTREACH NOTE
Medical Week 2 Survey      Flowsheet Row Responses   [facility] patient discharged from? [place]   Does the patient have one of the following disease processes/diagnoses(primary or secondary)? Other   Week 2 attempt successful? No   Unsuccessful attempts Attempt 1  [All numbers listed were attempted-no answer]            Betty H - Registered Nurse

## 2023-11-17 NOTE — HOME HEALTH
Patient reports no falls or changes in medication since last visit.  Patient reports her bottom is not hurting at all today but she had decided to let therapy assess her bottom to make sure there is no wound there.  Therapy assessed patient bottom with no wound present there is a small area of dry skin and some redness.  Patient was re-educated on following pressure relief techniqeus of frequent position changes every few hours, sitting on her pressure relief cushion, standing up/walking every few hours, and for keeping skin clean and dry; patient verbalized understanding and demonstrates knowledge.  Therapy spoke to patient about if MD office has gotten back to her about whether or not to start the Palbociclib again, she reports she has not heard from their office but will try to give them a call today to follow up.      Plan for next visit:  -transfer training  -gait training  -education on HEP with progressions as appropriate  -stair mobility training  -standing balance exercises  PT discharge next visit.

## 2023-11-17 NOTE — Clinical Note
OT evaluation completed, plan for 1x next week and 2x following week to address safety, adl training, functional transfers, mobility, DME education.

## 2023-11-20 ENCOUNTER — HOME CARE VISIT (OUTPATIENT)
Dept: HOME HEALTH SERVICES | Facility: HOME HEALTHCARE | Age: 68
End: 2023-11-20
Payer: MEDICARE

## 2023-11-20 VITALS
DIASTOLIC BLOOD PRESSURE: 58 MMHG | SYSTOLIC BLOOD PRESSURE: 102 MMHG | OXYGEN SATURATION: 99 % | RESPIRATION RATE: 17 BRPM | HEART RATE: 104 BPM | TEMPERATURE: 97.8 F

## 2023-11-20 VITALS
SYSTOLIC BLOOD PRESSURE: 100 MMHG | DIASTOLIC BLOOD PRESSURE: 62 MMHG | OXYGEN SATURATION: 99 % | TEMPERATURE: 97.8 F | HEART RATE: 108 BPM

## 2023-11-20 PROCEDURE — G0151 HHCP-SERV OF PT,EA 15 MIN: HCPCS

## 2023-11-20 PROCEDURE — G0152 HHCP-SERV OF OT,EA 15 MIN: HCPCS

## 2023-11-20 NOTE — HOME HEALTH
PT discharge on 11/20/23, patient has met all her goals for therapy.  OT continues to treat patient.

## 2023-11-20 NOTE — HOME HEALTH
Reason for Referral: Recent hospitalization due to acute kidney failure    Medical History: Depression, Anxiety, DAYLIN, Hyperlipidemia, Stage 4 CKD, Breast, lung and bone cancer    Subjective: Patient reports dizziness at times    Home Environment: Patient lives alone in apartment, has walker and cane, not using today    PLOF:Independent    Medical Necessity: Patient requires skilled occupational therapy for remediation of deficits to improve safety in home and improve self care, functional transfers, mobility, strength, endurance, DME/adaptive equipment    Plan for Next Visit: Safety with tub transfer, adl tasks

## 2023-11-21 ENCOUNTER — READMISSION MANAGEMENT (OUTPATIENT)
Dept: CALL CENTER | Facility: HOSPITAL | Age: 68
End: 2023-11-21
Payer: MEDICARE

## 2023-11-21 VITALS
SYSTOLIC BLOOD PRESSURE: 102 MMHG | TEMPERATURE: 97.5 F | OXYGEN SATURATION: 99 % | HEART RATE: 104 BPM | DIASTOLIC BLOOD PRESSURE: 58 MMHG

## 2023-11-21 NOTE — OUTREACH NOTE
Medical Week 2 Survey      Flowsheet Row Responses   [facility] patient discharged from? [place]   Does the patient have one of the following disease processes/diagnoses(primary or secondary)? Other   Week 2 attempt successful? No   Unsuccessful attempts Attempt 2            Cherise CRABTREE - Licensed Nurse

## 2023-11-21 NOTE — HOME HEALTH
Patient reports still being weak, having dizziness at times.  Plan to address safety with bathing task, next visit

## 2023-11-22 ENCOUNTER — TELEPHONE (OUTPATIENT)
Dept: INTERNAL MEDICINE | Facility: CLINIC | Age: 68
End: 2023-11-22
Payer: MEDICARE

## 2023-11-22 ENCOUNTER — APPOINTMENT (OUTPATIENT)
Dept: GENERAL RADIOLOGY | Facility: HOSPITAL | Age: 68
End: 2023-11-22
Payer: MEDICARE

## 2023-11-22 ENCOUNTER — HOSPITAL ENCOUNTER (EMERGENCY)
Facility: HOSPITAL | Age: 68
Discharge: HOME | End: 2023-11-22
Attending: EMERGENCY MEDICINE | Admitting: EMERGENCY MEDICINE
Payer: MEDICARE

## 2023-11-22 ENCOUNTER — APPOINTMENT (OUTPATIENT)
Dept: CT IMAGING | Facility: HOSPITAL | Age: 68
End: 2023-11-22
Payer: MEDICARE

## 2023-11-22 VITALS
SYSTOLIC BLOOD PRESSURE: 101 MMHG | TEMPERATURE: 98.1 F | BODY MASS INDEX: 22.49 KG/M2 | HEIGHT: 65 IN | WEIGHT: 135 LBS | OXYGEN SATURATION: 95 % | DIASTOLIC BLOOD PRESSURE: 68 MMHG | HEART RATE: 103 BPM | RESPIRATION RATE: 16 BRPM

## 2023-11-22 DIAGNOSIS — N39.0 URINARY TRACT INFECTION IN FEMALE: Primary | ICD-10-CM

## 2023-11-22 LAB
ALBUMIN SERPL-MCNC: 4.4 G/DL (ref 3.5–5.2)
ALBUMIN/GLOB SERPL: 1.6 G/DL
ALP SERPL-CCNC: 80 U/L (ref 39–117)
ALT SERPL W P-5'-P-CCNC: 11 U/L (ref 1–33)
ANION GAP SERPL CALCULATED.3IONS-SCNC: 12.1 MMOL/L (ref 5–15)
AST SERPL-CCNC: 20 U/L (ref 1–32)
B PARAPERT DNA SPEC QL NAA+PROBE: NOT DETECTED
B PERT DNA SPEC QL NAA+PROBE: NOT DETECTED
BACTERIA UR QL AUTO: ABNORMAL /HPF
BASOPHILS # BLD AUTO: 0.05 10*3/MM3 (ref 0–0.2)
BASOPHILS NFR BLD AUTO: 0.9 % (ref 0–1.5)
BILIRUB SERPL-MCNC: 0.7 MG/DL (ref 0–1.2)
BILIRUB UR QL STRIP: NEGATIVE
BUN SERPL-MCNC: 20 MG/DL (ref 8–23)
BUN/CREAT SERPL: 16.3 (ref 7–25)
C PNEUM DNA NPH QL NAA+NON-PROBE: NOT DETECTED
CALCIUM SPEC-SCNC: 10.9 MG/DL (ref 8.6–10.5)
CHLORIDE SERPL-SCNC: 104 MMOL/L (ref 98–107)
CLARITY UR: ABNORMAL
CO2 SERPL-SCNC: 19.9 MMOL/L (ref 22–29)
COLOR UR: ABNORMAL
CREAT SERPL-MCNC: 1.23 MG/DL (ref 0.57–1)
DEPRECATED RDW RBC AUTO: 41.4 FL (ref 37–54)
EGFRCR SERPLBLD CKD-EPI 2021: 47.7 ML/MIN/1.73
EOSINOPHIL # BLD AUTO: 0.36 10*3/MM3 (ref 0–0.4)
EOSINOPHIL NFR BLD AUTO: 6.3 % (ref 0.3–6.2)
ERYTHROCYTE [DISTWIDTH] IN BLOOD BY AUTOMATED COUNT: 11.8 % (ref 12.3–15.4)
FLUAV SUBTYP SPEC NAA+PROBE: NOT DETECTED
FLUBV RNA ISLT QL NAA+PROBE: NOT DETECTED
GLOBULIN UR ELPH-MCNC: 2.8 GM/DL
GLUCOSE SERPL-MCNC: 99 MG/DL (ref 65–99)
GLUCOSE UR STRIP-MCNC: NEGATIVE MG/DL
HADV DNA SPEC NAA+PROBE: NOT DETECTED
HCOV 229E RNA SPEC QL NAA+PROBE: NOT DETECTED
HCOV HKU1 RNA SPEC QL NAA+PROBE: NOT DETECTED
HCOV NL63 RNA SPEC QL NAA+PROBE: NOT DETECTED
HCOV OC43 RNA SPEC QL NAA+PROBE: NOT DETECTED
HCT VFR BLD AUTO: 35.9 % (ref 34–46.6)
HGB BLD-MCNC: 12.2 G/DL (ref 12–15.9)
HGB UR QL STRIP.AUTO: ABNORMAL
HMPV RNA NPH QL NAA+NON-PROBE: NOT DETECTED
HOLD SPECIMEN: NORMAL
HOLD SPECIMEN: NORMAL
HPIV1 RNA ISLT QL NAA+PROBE: NOT DETECTED
HPIV2 RNA SPEC QL NAA+PROBE: NOT DETECTED
HPIV3 RNA NPH QL NAA+PROBE: NOT DETECTED
HPIV4 P GENE NPH QL NAA+PROBE: NOT DETECTED
HYALINE CASTS UR QL AUTO: ABNORMAL /LPF
IMM GRANULOCYTES # BLD AUTO: 0.01 10*3/MM3 (ref 0–0.05)
IMM GRANULOCYTES NFR BLD AUTO: 0.2 % (ref 0–0.5)
KETONES UR QL STRIP: NEGATIVE
LEUKOCYTE ESTERASE UR QL STRIP.AUTO: ABNORMAL
LYMPHOCYTES # BLD AUTO: 1.76 10*3/MM3 (ref 0.7–3.1)
LYMPHOCYTES NFR BLD AUTO: 30.8 % (ref 19.6–45.3)
M PNEUMO IGG SER IA-ACNC: NOT DETECTED
MCH RBC QN AUTO: 32.4 PG (ref 26.6–33)
MCHC RBC AUTO-ENTMCNC: 34 G/DL (ref 31.5–35.7)
MCV RBC AUTO: 95.2 FL (ref 79–97)
MONOCYTES # BLD AUTO: 0.49 10*3/MM3 (ref 0.1–0.9)
MONOCYTES NFR BLD AUTO: 8.6 % (ref 5–12)
NEUTROPHILS NFR BLD AUTO: 3.04 10*3/MM3 (ref 1.7–7)
NEUTROPHILS NFR BLD AUTO: 53.2 % (ref 42.7–76)
NITRITE UR QL STRIP: POSITIVE
NRBC BLD AUTO-RTO: 0 /100 WBC (ref 0–0.2)
NT-PROBNP SERPL-MCNC: 102 PG/ML (ref 0–900)
PH UR STRIP.AUTO: <=5 [PH] (ref 5–8)
PLATELET # BLD AUTO: 367 10*3/MM3 (ref 140–450)
PMV BLD AUTO: 9.1 FL (ref 6–12)
POTASSIUM SERPL-SCNC: 4.5 MMOL/L (ref 3.5–5.2)
PROT SERPL-MCNC: 7.2 G/DL (ref 6–8.5)
PROT UR QL STRIP: ABNORMAL
QT INTERVAL: 361 MS
QTC INTERVAL: 464 MS
RBC # BLD AUTO: 3.77 10*6/MM3 (ref 3.77–5.28)
RBC # UR STRIP: ABNORMAL /HPF
REF LAB TEST METHOD: ABNORMAL
RHINOVIRUS RNA SPEC NAA+PROBE: NOT DETECTED
RSV RNA NPH QL NAA+NON-PROBE: NOT DETECTED
SARS-COV-2 RNA NPH QL NAA+NON-PROBE: NOT DETECTED
SODIUM SERPL-SCNC: 136 MMOL/L (ref 136–145)
SP GR UR STRIP: 1.02 (ref 1–1.03)
SQUAMOUS #/AREA URNS HPF: ABNORMAL /HPF
TROPONIN T SERPL HS-MCNC: 30 NG/L
UROBILINOGEN UR QL STRIP: ABNORMAL
WBC # UR STRIP: ABNORMAL /HPF
WBC CLUMPS # UR AUTO: ABNORMAL /HPF
WBC NRBC COR # BLD AUTO: 5.71 10*3/MM3 (ref 3.4–10.8)
WHOLE BLOOD HOLD COAG: NORMAL
WHOLE BLOOD HOLD SPECIMEN: NORMAL

## 2023-11-22 PROCEDURE — 84484 ASSAY OF TROPONIN QUANT: CPT | Performed by: PHYSICIAN ASSISTANT

## 2023-11-22 PROCEDURE — 96365 THER/PROPH/DIAG IV INF INIT: CPT

## 2023-11-22 PROCEDURE — 81001 URINALYSIS AUTO W/SCOPE: CPT | Performed by: PHYSICIAN ASSISTANT

## 2023-11-22 PROCEDURE — 87186 SC STD MICRODIL/AGAR DIL: CPT | Performed by: PHYSICIAN ASSISTANT

## 2023-11-22 PROCEDURE — 0202U NFCT DS 22 TRGT SARS-COV-2: CPT | Performed by: PHYSICIAN ASSISTANT

## 2023-11-22 PROCEDURE — 80053 COMPREHEN METABOLIC PANEL: CPT | Performed by: PHYSICIAN ASSISTANT

## 2023-11-22 PROCEDURE — 99285 EMERGENCY DEPT VISIT HI MDM: CPT

## 2023-11-22 PROCEDURE — 25810000003 SODIUM CHLORIDE 0.9 % SOLUTION: Performed by: EMERGENCY MEDICINE

## 2023-11-22 PROCEDURE — 93010 ELECTROCARDIOGRAM REPORT: CPT | Performed by: INTERNAL MEDICINE

## 2023-11-22 PROCEDURE — 25010000002 CEFTRIAXONE PER 250 MG: Performed by: EMERGENCY MEDICINE

## 2023-11-22 PROCEDURE — 25510000001 IOPAMIDOL PER 1 ML: Performed by: EMERGENCY MEDICINE

## 2023-11-22 PROCEDURE — 93005 ELECTROCARDIOGRAM TRACING: CPT | Performed by: PHYSICIAN ASSISTANT

## 2023-11-22 PROCEDURE — 83880 ASSAY OF NATRIURETIC PEPTIDE: CPT | Performed by: PHYSICIAN ASSISTANT

## 2023-11-22 PROCEDURE — 71045 X-RAY EXAM CHEST 1 VIEW: CPT

## 2023-11-22 PROCEDURE — 87086 URINE CULTURE/COLONY COUNT: CPT | Performed by: PHYSICIAN ASSISTANT

## 2023-11-22 PROCEDURE — 85025 COMPLETE CBC W/AUTO DIFF WBC: CPT | Performed by: PHYSICIAN ASSISTANT

## 2023-11-22 PROCEDURE — 87088 URINE BACTERIA CULTURE: CPT | Performed by: PHYSICIAN ASSISTANT

## 2023-11-22 PROCEDURE — 71275 CT ANGIOGRAPHY CHEST: CPT

## 2023-11-22 RX ORDER — CEPHALEXIN 500 MG/1
500 CAPSULE ORAL 3 TIMES DAILY
Qty: 21 CAPSULE | Refills: 0 | Status: SHIPPED | OUTPATIENT
Start: 2023-11-22

## 2023-11-22 RX ORDER — SODIUM CHLORIDE 0.9 % (FLUSH) 0.9 %
10 SYRINGE (ML) INJECTION AS NEEDED
Status: DISCONTINUED | OUTPATIENT
Start: 2023-11-22 | End: 2023-11-22 | Stop reason: HOSPADM

## 2023-11-22 RX ADMIN — IOPAMIDOL 85 ML: 755 INJECTION, SOLUTION INTRAVENOUS at 12:53

## 2023-11-22 RX ADMIN — SODIUM CHLORIDE 1000 ML: 9 INJECTION, SOLUTION INTRAVENOUS at 12:14

## 2023-11-22 RX ADMIN — CEFTRIAXONE 2000 MG: 2 INJECTION, POWDER, FOR SOLUTION INTRAMUSCULAR; INTRAVENOUS at 14:13

## 2023-11-22 NOTE — ED NOTES
Pt to ed from home via EMS    Pt c/o gen weakness, SOB, pt also reports burning while urinating. Pt also reports urgency.

## 2023-11-22 NOTE — ED NOTES
Permission from the patient to call her brother Chas.  Call placed to Chas to update him on pt status.

## 2023-11-22 NOTE — ED PROVIDER NOTES
EMERGENCY DEPARTMENT ENCOUNTER    Room Number:  24/24  Date of encounter:  11/24/2023  PCP: Jennifer Mantilla MD  Historian: Patient  Full history not obtainable due to: Poor historian    HPI:  Chief Complaint: Fatigue    Context: Marialuisa Vivar is a 68 y.o. female with a PMH significant for anxiety, left breast cancer with mets to the lung who presents to the ED c/o generalized fatigue and weakness that has been progressing over the past several days.  The patient reports increased shortness of breath as well over the course of that time.  Denies chest pain, fever, chills, nausea, vomiting.  No dizziness, syncope.  She is reportedly supposed to be taking oral medication for breast cancer but reports that she has been poorly compliant and has missed several appointments with her oncologist Dr. Hawkins.  She does admit to having some increased urinary urgency and frequency.  No flank pain.      MEDICAL RECORD REVIEW:    Upon review of the medical record it appears the patient was evaluated in the office with oncology for carcinoma of the left breast on 9/18/2023.  The patient had a normal glucose on 11/5/2023 and a normal BMP on 11//23.    PAST MEDICAL HISTORY    Active Ambulatory Problems     Diagnosis Date Noted    Benign essential HTN 01/20/2016    Depression 01/20/2016    Vocal cord dysfunction 01/20/2016    Malignant neoplasm metastatic to bone 01/20/2016    Carcinoma of left breast metastatic to lung 08/22/2016    Tachycardia 10/05/2016    Therapeutic drug monitoring 10/05/2016    Lung metastasis 06/01/2010    Vitamin D deficiency 05/09/2018    Class 1 obesity without serious comorbidity with body mass index (BMI) of 31.0 to 31.9 in adult 02/06/2019    Tongue cancer 08/13/2019    Anxiety 08/25/2020    Bruxism 08/25/2020    Cheilosis 08/25/2020    Squamous cell carcinoma of skin 08/25/2020    Hyperlipidemia 08/25/2020    Ovarian mass, right 10/19/2021    Adnexal mass 10/26/2021    Abnormal CT scan, colon 04/07/2022     SARAH (acute kidney injury) 08/27/2023    Hyperkalemia 08/28/2023    Metabolic acidosis 08/28/2023    Dehydration 08/28/2023    Diarrhea 08/28/2023    Pancytopenia 08/29/2023    Generalized weakness 11/06/2023    Hyponatremia 11/06/2023    Moderate malnutrition 11/08/2023     Resolved Ambulatory Problems     Diagnosis Date Noted    Fatigue 01/20/2016    Blood glucose elevated 01/20/2016    Leg swelling 10/05/2016    Dyspnea on exertion 10/05/2016    Benign essential hypertension 08/25/2020    Nausea & vomiting 11/04/2023     Past Medical History:   Diagnosis Date    Allergy     Asthma     Bone metastasis     Breast cancer     Cholelithiasis 2000    Colon polyp Past 10-15 yrs?    Esophageal reflux     History of colon polyps     Hypertension     Left breast cancer metastasized to lung     Obstructive sleep apnea     Ovarian cyst     PONV (postoperative nausea and vomiting)          PAST SURGICAL HISTORY  Past Surgical History:   Procedure Laterality Date    CHOLECYSTECTOMY  2000    COLONOSCOPY  2014    H/O polyps    COLONOSCOPY N/A 6/1/2022    Procedure: COLONOSCOPY to cecum and TI with cold / hot snare polypectomies;  Surgeon: Luis Enrique Galeas MD;  Location: [facility] ENDOSCOPY;  Service: Gastroenterology;  Laterality: N/A;  pre-abnormal ct  post-polyps, diverticulosis, hemorrhoids    KNEE ARTHROPLASTY      LUNG SURGERY  2010    Lung wedge resection    MASTECTOMY RADICAL Left 1993    TONGUE SURGERY  05/21/2019    tongue cancer    TOTAL LAPAROSCOPIC HYSTERECTOMY N/A 11/04/2021    Procedure: Robotic assisted total laparoscopic hysterectomy, bilateral salpingoophorecotmy, bilateral ureteralysis ;  Surgeon: Kaylynn Ricci DO;  Location: [facility] MAIN OR;  Service: Robotics - DaVinci;  Laterality: N/A;         FAMILY HISTORY  Family History   Problem Relation Age of Onset    Hypertension Mother     Leukemia Mother 72    COPD Mother         smoker    Diabetes Brother     Pancreatic cancer Brother     Glaucoma Brother      Heart disease Brother     Hypertension Brother     Gallbladder disease Brother     Pancreatitis Brother          from pancreatic csncer    Gallbladder disease Father     Colon polyps Father     Stroke Other         Grandmother    Lupus Other         Aunt    Alcohol abuse Other         Aunt    Glaucoma Other         Grandmother    Diabetes type II Brother     Hypertension Brother     Hyperlipidemia Brother     Liver cancer Paternal Uncle     Stomach cancer Paternal Aunt     Alcohol abuse Maternal Aunt     Malig Hyperthermia Neg Hx          SOCIAL HISTORY  Social History     Socioeconomic History    Marital status: Single    Years of education: College   Tobacco Use    Smoking status: Former     Packs/day: 2.00     Years: 30.00     Additional pack years: 0.00     Total pack years: 60.00     Types: Cigarettes     Start date: 1973     Quit date: 2008     Years since quitting: 15.4    Smokeless tobacco: Never   Vaping Use    Vaping Use: Never used   Substance and Sexual Activity    Alcohol use: No    Drug use: No    Sexual activity: Not Currently     Birth control/protection: None         ALLERGIES  Nickel and Ciprofloxacin        REVIEW OF SYSTEMS    All systems reviewed and marked as negative except as listed in HPI     PHYSICAL EXAM    I have reviewed the triage vital signs and nursing notes.    ED Triage Vitals [23 1011]   Temp Heart Rate Resp BP SpO2   98.1 °F (36.7 °C) 100 18 107/73 98 %      Temp src Heart Rate Source Patient Position BP Location FiO2 (%)   Tympanic Monitor Lying Right arm --       Physical Exam  Constitutional:       General: She is not in acute distress.     Appearance: She is well-developed and underweight. She is ill-appearing (Chronically).   HENT:      Head: Normocephalic and atraumatic.   Eyes:      General: No scleral icterus.     Conjunctiva/sclera: Conjunctivae normal.   Neck:      Trachea: No tracheal deviation.   Cardiovascular:      Rate and Rhythm: Regular  rhythm. Tachycardia present.   Pulmonary:      Effort: Pulmonary effort is normal.      Breath sounds: Normal breath sounds.   Abdominal:      Palpations: Abdomen is soft.      Tenderness: There is no abdominal tenderness.   Musculoskeletal:         General: No deformity.      Cervical back: Normal range of motion.   Lymphadenopathy:      Cervical: No cervical adenopathy.   Skin:     General: Skin is warm and dry.   Neurological:      Mental Status: She is alert and oriented to person, place, and time.   Psychiatric:         Behavior: Behavior normal.         Vital signs and nursing notes reviewed.            LAB RESULTS  No results found for this or any previous visit (from the past 24 hour(s)).    Ordered the above labs and independently reviewed the results.        RADIOLOGY  No Radiology Exams Resulted Within Past 24 Hours    I ordered the above noted radiological studies. Independently reviewed by me and discussed with radiologist.  See dictation above for official radiology interpretation.      PROCEDURES    Procedures        MEDICATIONS GIVEN IN ER    Medications   sodium chloride 0.9 % bolus 1,000 mL (0 mL Intravenous Stopped 11/22/23 1410)   iopamidol (ISOVUE-370) 76 % injection 100 mL (85 mL Intravenous Given by Other 11/22/23 1253)   cefTRIAXone (ROCEPHIN) 2,000 mg in sodium chloride 0.9 % 100 mL IVPB-VTB (0 mg Intravenous Stopped 11/22/23 1443)         PROGRESS, DATA ANALYSIS, CONSULTS, AND MEDICAL DECISION MAKING    All labs have been independently interpreted by me.  All radiology studies have been interpreted by me.  Discussion below represents my analysis of pertinent findings related to patient's condition, differential diagnosis, treatment plan and final disposition.      - Chronic or social conditions impacting care: None      DIFFERENTIAL DIAGNOSIS INCLUDE BUT NOT LIMITED TO:     UTI, dehydration, sepsis      Orders placed during this visit:  Orders Placed This Encounter   Procedures     Respiratory Panel PCR w/COVID-19(SARS-CoV-2) NICOLAS/ARPITA/EDNA/PAD/COR/PAMELA In-House, NP Swab in UTM/VTM, 2 HR TAT - Swab, Nasopharynx    Urine Culture - Urine,    XR Chest 1 View    CT Angiogram Chest    Cisco Draw    Comprehensive Metabolic Panel    BNP    Single High Sensitivity Troponin T    Urinalysis With Culture If Indicated - Urine, Clean Catch    CBC Auto Differential    Urinalysis, Microscopic Only - Urine, Clean Catch    Continuous Pulse Oximetry    Vital Signs    ECG 12 Lead Dyspnea    SCANNED - TELEMETRY      CBC & Differential    Green Top (Gel)    Lavender Top    Gold Top - SST    Light Blue Top         ED Course as of 11/24/23 0544   Wed Nov 22, 2023   1146 WBC: 5.71 [DC]   1146 Hemoglobin: 12.2 [DC]   1146 proBNP: 102.0 [DC]   1146 HS Troponin T(!): 30 [DC]   1146 Glucose: 99 [DC]   1146 BUN: 20 [DC]   1146 Creatinine(!): 1.23  0.98 two weeks ago [DC]   1146 Sodium: 136 [DC]   1146 Potassium: 4.5 [DC]   1146 ALT (SGPT): 11 [DC]   1146 AST (SGOT): 20 [DC]   1146 Alkaline Phosphatase: 80 [DC]   1146 Total Bilirubin: 0.7 [DC]   1146 HS Troponin T(!): 30 [DC]   1157 Nitrite, UA(!): Positive [DC]   1157 Leukocytes, UA(!): Moderate (2+) [DC]   1157 Ketones, UA: Negative [DC]   1158 XR Chest 1 View  Per my independent interpretation of the chest x-ray, no evidence of pneumothorax, left hemidiaphragm elevated [DC]   1159 XR Chest 1 View  Radiology report reviewed, no evidence of any acute emergent findings, minimal bilateral pleural effusions [DC]   1238 Respiratory Panel PCR w/COVID-19(SARS-CoV-2) NICOLAS/ARPITA/EDNA/PAD/COR/PAMELA In-House, NP Swab in UTM/VTM, 2 HR TAT - Swab, Nasopharynx  Pan-negative [DC]   1303 CT Angiogram Chest  Per my independent interpretation of the CT angiogram of the chest, no evidence of pneumothorax, no evidence of overt pneumonia, no large centralized PE noted [DC]   1347 CT Angiogram Chest  Radiology report reviewed, no evidence of PE, left lower lobe atelectasis with left hemidiaphragm  elevation.  1 cm right upper lobe pulmonary nodule stable from prior as well as left upper lobe pulm nodules also stable.  No acute emergent abnormalities otherwise. [DC]   1511 Plan for discharge on a course of antibiotics with close outpatient monitoring.  ED return for worsening symptoms as needed.  All questions and concerns addressed.  Patient is well-appearing in no acute distress. [DC]      ED Course User Index  [DC] Nilo Robles MD       AS OF 05:44 EST VITALS:    BP - 101/68  HR - 103  TEMP - 98.1 °F (36.7 °C) (Tympanic)  02 SATS - 95%      [facility] EMERGENCY DEPARTMENT  [address]  [facility] [address] [phone]    As needed, If symptoms worsen    Jennifer Mantilla MD  [address]  [address] [place], [place] [id number] [phone]    Schedule an appointment as soon as possible for a visit   within 1 week for recheck         Medication List        New Prescriptions      cephalexin 500 MG capsule  Commonly known as: KEFLEX  Take 1 capsule by mouth 3 (Three) Times a Day.               Where to Get Your Medications        These medications were sent to [id number] - [place], [place] - [address] AT [facility] - [phone] [facility] [phone] [place] [address] [facility] [address]      Phone: [phone]   cephalexin 500 MG capsule           DIAGNOSIS  Final diagnoses:   Urinary tract infection in female         DISPOSITION  D/c    Pt masked in first look. I wore a surgical mask throughout my encounters with the pt. I performed hand hygiene on entry into the pt room and upon exit.     Dictated utilizing Dragon dictation     Note Disclaimer: At [facility], we believe that sharing information builds trust and better relationships. You are receiving this note because you recently visited [facility]. It is possible you will see health information before a provider has talked with you about it. This kind of  information can be easy to misunderstand. To help you fully understand what it means for your health, we urge you to discuss this note with your provider.      Daryn Wolf PA  11/24/23 0545

## 2023-11-22 NOTE — TELEPHONE ENCOUNTER
Patient called complaining of shortness of breath when getting up from sitting position and has not had any energy and felt weak the past few days. She said that she has not been sick with fever or cough.I advised her to go to the ER and get checked out. She said if she did not have anyone to take her,  she would call EMS to take her. I told her to not delay and go to the ER.

## 2023-11-22 NOTE — ED PROVIDER NOTES
Pt presents to the ED c/o generalized fatigue and weakness for last couple days with some mild dysuria.  Maybe some mild shortness of breath as well.  No fevers, chills, chest pain.  No vomiting or diarrhea.     On exam,   General: No acute distress, nontoxic  HEENT: Mucous membranes moist, atraumatic, EOMI  Neck: Full ROM  Pulm: Symmetric chest rise, nonlabored, lungs CTAB  Cardiovascular: Regular rate and rhythm, intact distal pulses  GI: Soft, nontender, nondistended, no rebound, no guarding, bowel sounds present  MSK: Full ROM, no deformity  Skin: Warm, dry  Neuro: Awake, alert, oriented x 4, GCS 15, moving all extremities, no focal deficits  Psych: Calm, cooperative      EKG - Per my independent interpretation at 1125:    EKG Time: 1119  Rhythm/Rate: Sinus rhythm with a rate of 99  Normal axis  Normal intervals  No acute ischemic changes  No STEMI       No emergent changes compared to November 6, 2023      Plan:   ED Course as of 11/22/23 1513   Wed Nov 22, 2023   1146 WBC: 5.71 [DC]   1146 Hemoglobin: 12.2 [DC]   1146 proBNP: 102.0 [DC]   1146 HS Troponin T(!): 30 [DC]   1146 Glucose: 99 [DC]   1146 BUN: 20 [DC]   1146 Creatinine(!): 1.23  0.98 two weeks ago [DC]   1146 Sodium: 136 [DC]   1146 Potassium: 4.5 [DC]   1146 ALT (SGPT): 11 [DC]   1146 AST (SGOT): 20 [DC]   1146 Alkaline Phosphatase: 80 [DC]   1146 Total Bilirubin: 0.7 [DC]   1146 HS Troponin T(!): 30 [DC]   1157 Nitrite, UA(!): Positive [DC]   1157 Leukocytes, UA(!): Moderate (2+) [DC]   1157 Ketones, UA: Negative [DC]   1158 XR Chest 1 View  Per my independent interpretation of the chest x-ray, no evidence of pneumothorax, left hemidiaphragm elevated [DC]   1159 XR Chest 1 View  Radiology report reviewed, no evidence of any acute emergent findings, minimal bilateral pleural effusions [DC]   1238 Respiratory Panel PCR w/COVID-19(SARS-CoV-2) NICOLAS/ARPITA/EDNA/PAD/COR/PAMELA In-House, NP Swab in UTM/VTM, 2 HR TAT - Swab, Nasopharynx  Pan-negative [DC]   7699  CT Angiogram Chest  Per my independent interpretation of the CT angiogram of the chest, no evidence of pneumothorax, no evidence of overt pneumonia, no large centralized PE noted [DC]   1347 CT Angiogram Chest  Radiology report reviewed, no evidence of PE, left lower lobe atelectasis with left hemidiaphragm elevation.  1 cm right upper lobe pulmonary nodule stable from prior as well as left upper lobe pulm nodules also stable.  No acute emergent abnormalities otherwise. [DC]   1511 Plan for discharge on a course of antibiotics with close outpatient monitoring.  ED return for worsening symptoms as needed.  All questions and concerns addressed.  Patient is well-appearing in no acute distress. [DC]      ED Course User Index  [DC] Nilo Robles MD     Initial concern for viral process, pneumonia, PE, dehydration, renal failure, heart failure, lecture abnormalities, anemia, urinary tract infection, pyelonephritis, among others.  Plan for labs, CT chest, and reevaluation with results.    Findings today suggestive of mild UTI but no evidence anything more emergent.  Respiratory evaluation exam is nonemergent.  Safe for discharge with outpatient antibiotics and outpatient follow-up.  ED return for worsening symptoms as needed.     Diagnosis Plan   1. Urinary tract infection in female          DISCHARGE    Patient discharged in stable condition.    Reviewed implications of results, diagnosis, meds, responsibility to follow up, warning signs and symptoms of possible worsening, potential complications and reasons to return to ER. If your blood pressure > 120/80 please follow up with your primary care provider for further management.    Patient/Family voiced understanding of above instructions.    Discussed plan for discharge, as there is no emergent indication for admission. Pt/family is agreeable and understands need for follow up and repeat testing.  Pt is aware that discharge does not mean that nothing is wrong but it  indicates no emergency is present that requires admission and they must continue care with follow-up as given below or physician of their choice.     FOLLOW-UP  [facility] EMERGENCY DEPARTMENT  [address]  [place] [address] [phone]    As needed, If symptoms worsen    Jennifer Mantilla MD  [address]  [address]  [facility] [id number] [phone]    Schedule an appointment as soon as possible for a visit   within 1 week for recheck         Medication List        New Prescriptions      cephalexin 500 MG capsule  Commonly known as: KEFLEX  Take 1 capsule by mouth 3 (Three) Times a Day.               Where to Get Your Medications        These medications were sent to [id number] - [place], [place] - [address] AT [facility] - [phone] [facility] [phone]   [address] [facility], [facility] [address]      Phone: [phone]   cephalexin 500 MG capsule              Attestation:  The TORREY and I have discussed this patient's history, physical exam, and treatment plan.  I have reviewed the documentation and personally had a face to face interaction with the patient. I affirm the documentation and agree with the treatment and plan.  The attached note describes my personal findings.            Nilo Robles MD  11/22/23 [id number]

## 2023-11-22 NOTE — DISCHARGE INSTRUCTIONS
Complete course of antibiotics as prescribed, stay well-hydrated, continue current medications, close outpatient PCP follow-up for recheck within 1 week, ED return for worsening symptoms as needed.

## 2023-11-24 LAB — BACTERIA SPEC AEROBE CULT: ABNORMAL

## 2023-11-29 ENCOUNTER — HOME CARE VISIT (OUTPATIENT)
Dept: HOME HEALTH SERVICES | Facility: HOME HEALTHCARE | Age: 68
End: 2023-11-29
Payer: MEDICARE

## 2023-11-29 ENCOUNTER — READMISSION MANAGEMENT (OUTPATIENT)
Dept: CALL CENTER | Facility: HOSPITAL | Age: 68
End: 2023-11-29
Payer: MEDICARE

## 2023-11-29 NOTE — OUTREACH NOTE
Medical Week 3 Survey      Flowsheet Row Responses   [facility] patient discharged from? [place]   Does the patient have one of the following disease processes/diagnoses(primary or secondary)? Other   Week 3 attempt successful? No   Unsuccessful attempts Attempt 1            Cherise CRABTREE - Licensed Nurse

## 2023-12-01 ENCOUNTER — SPECIALTY PHARMACY (OUTPATIENT)
Dept: PHARMACY | Facility: HOSPITAL | Age: 68
End: 2023-12-01
Payer: MEDICARE

## 2023-12-01 ENCOUNTER — HOME CARE VISIT (OUTPATIENT)
Dept: HOME HEALTH SERVICES | Facility: HOME HEALTHCARE | Age: 68
End: 2023-12-01
Payer: MEDICARE

## 2023-12-01 NOTE — PROGRESS NOTES
Specialty Note- Ibrance and Faslodex    Call placed to assess adherence and side effects.  No answer.   direct line [phone]..

## 2023-12-01 NOTE — PROGRESS NOTES
.     REASONS FOR FOLLOWUP: Metastatic breast cancer, tongue cancer    HISTORY OF PRESENT ILLNESS:  The patient is a 68 y.o. year old female  who is here for follow-up with the above-mentioned history.    She states she does not remember how long she has been off Faslodex and Ibrance but it has been several months.  States she does not need any dental work and her last dental visit was about 1 year ago.    She has lost weight.  She has been struggling emotionally.  She saw her psychiatry NP recently.  She is trying to establish care with a new therapist.    Past Medical History:   Diagnosis Date    SARAH (acute kidney injury) 08/27/2023    Allergy     Anxiety     Asthma     Bone metastasis     Breast cancer     Left node positive    Cholelithiasis 2000    Lap Marily    Class 1 obesity without serious comorbidity with body mass index (BMI) of 31.0 to 31.9 in adult 02/06/2019    Colon polyp Past 10-15 yrs?    found at colonoscopies    Depression     Esophageal reflux     cough    History of colon polyps     Hyperlipidemia     Hypertension     Left breast cancer metastasized to lung     Nausea & vomiting     Obstructive sleep apnea     does not wear cpap    Ovarian cyst     Pancytopenia 08/29/2023    PONV (postoperative nausea and vomiting)     Tongue cancer 05/2019    by ENT at [facility] dr Quinn     Past Surgical History:   Procedure Laterality Date    CHOLECYSTECTOMY  2000    COLONOSCOPY  2014    H/O polyps    COLONOSCOPY N/A 6/1/2022    Procedure: COLONOSCOPY to cecum and TI with cold / hot snare polypectomies;  Surgeon: Luis Enrique Galeas MD;  Location: [facility] ENDOSCOPY;  Service: Gastroenterology;  Laterality: N/A;  pre-abnormal ct  post-polyps, diverticulosis, hemorrhoids    KNEE ARTHROPLASTY      LUNG SURGERY  2010    Lung wedge resection    MASTECTOMY RADICAL Left 1993    TONGUE SURGERY  05/21/2019    tongue cancer    TOTAL LAPAROSCOPIC HYSTERECTOMY N/A 11/04/2021    Procedure: Robotic assisted total  laparoscopic hysterectomy, bilateral salpingoophorecotmy, bilateral ureteralysis ;  Surgeon: Kaylynn Ricci DO;  Location: [facility] MAIN OR;  Service: Robotics - DaVinci;  Laterality: N/A;       MEDICATIONS    Current Outpatient Medications:     ARIPiprazole (ABILIFY) 5 MG tablet, Take 1 tablet by mouth Every Evening., Disp: , Rfl:     atorvastatin (LIPITOR) 10 MG tablet, TAKE 1 TABLET BY MOUTH ONCE DAILY AT NIGHT, Disp: 90 tablet, Rfl: 1    denosumab (Xgeva) 120 MG/1.7ML solution injection, Inject  under the skin into the appropriate area as directed., Disp: , Rfl:     eszopiclone (LUNESTA) 3 MG tablet, Take 1 tablet by mouth., Disp: , Rfl:     fulvestrant (FASLODEX) 250 MG/5ML chemo syringe, Inject  into the appropriate muscle as directed by prescriber., Disp: , Rfl:     LORazepam (ATIVAN) 0.5 MG tablet, Take 1 tablet by mouth Every 8 (Eight) Hours As Needed., Disp: , Rfl:     meclizine (ANTIVERT) 25 MG tablet, Take 1 tablet by mouth 3 (Three) Times a Day As Needed for Dizziness., Disp: 20 tablet, Rfl: 0    miconazole (MICOTIN) 2 % powder, Apply 1 application  topically to the appropriate area as directed Every 12 (Twelve) Hours., Disp: 43 g, Rfl: 0    Omeprazole 20 MG Tablet Delayed Release Dispersible, Take 1 tablet by mouth As Needed., Disp: , Rfl:     ondansetron (ZOFRAN) 8 MG tablet, TAKE 1 TABLET BY MOUTH THREE TIMES DAILY AS NEEDED FORNAUSEA AND VOMITING, Disp: , Rfl:     PARoxetine (PAXIL) 40 MG tablet, Take 1 tablet by mouth Every Night., Disp: , Rfl:     ARIPiprazole (ABILIFY) 2 MG tablet, Take 2.5 tablets by mouth Every Night., Disp: , Rfl:     cephalexin (KEFLEX) 500 MG capsule, Take 1 capsule by mouth 3 (Three) Times a Day., Disp: 21 capsule, Rfl: 0    cholecalciferol (VITAMIN D3) 1.25 MG (84087 UT) capsule, Take 5,000 Units by mouth 3 (Three) Times a Week. (Patient not taking: Reported on 12/5/2023), Disp: , Rfl:     Palbociclib 125 MG tablet, , Disp: , Rfl:   No current facility-administered  medications for this visit.    Facility-Administered Medications Ordered in Other Visits:     chlorhexidine (PERIDEX) 0.12 % solution 15 mL, 15 mL, Mouth/Throat, Q12H, Kaylynn Ricci DO    mupirocin (BACTROBAN) 2 % nasal ointment, , Nasal, BID, Kaylynn Ricci,     ALLERGIES:     Allergies   Allergen Reactions    Nickel Unknown - Low Severity    Ciprofloxacin Rash       SOCIAL HISTORY:       Social History     Socioeconomic History    Marital status: Single    Years of education: College   Tobacco Use    Smoking status: Former     Packs/day: 2.00     Years: 30.00     Additional pack years: 0.00     Total pack years: 60.00     Types: Cigarettes     Start date: 1973     Quit date: 2008     Years since quitting: 15.5    Smokeless tobacco: Never   Vaping Use    Vaping Use: Never used   Substance and Sexual Activity    Alcohol use: No    Drug use: No    Sexual activity: Not Currently     Birth control/protection: None         FAMILY HISTORY:  Family History   Problem Relation Age of Onset    Hypertension Mother     Leukemia Mother 72    COPD Mother         smoker    Diabetes Brother     Pancreatic cancer Brother     Glaucoma Brother     Heart disease Brother     Hypertension Brother     Gallbladder disease Brother     Pancreatitis Brother          from pancreatic csncer    Gallbladder disease Father     Colon polyps Father     Stroke Other         Grandmother    Lupus Other         Aunt    Alcohol abuse Other         Aunt    Glaucoma Other         Grandmother    Diabetes type II Brother     Hypertension Brother     Hyperlipidemia Brother     Liver cancer Paternal Uncle     Stomach cancer Paternal Aunt     Alcohol abuse Maternal Aunt     Malig Hyperthermia Neg Hx        REVIEW OF SYSTEMS:  Review of Systems   Constitutional:  Negative for activity change.   HENT:  Negative for nosebleeds and trouble swallowing.    Respiratory:  Negative for shortness of breath and wheezing.    Cardiovascular:   "Negative for chest pain and palpitations.   Gastrointestinal:  Negative for constipation, diarrhea and nausea.   Genitourinary:  Negative for dysuria and hematuria.   Musculoskeletal:  Negative for arthralgias and myalgias.   Skin:  Negative for wound.   Neurological:  Negative for seizures and syncope.   Hematological:  Negative for adenopathy. Does not bruise/bleed easily.   Psychiatric/Behavioral:  Negative for confusion.             Vitals:    12/05/23 1538   BP: 92/64   Pulse: 112   Resp: 18   Temp: 97.7 °F (36.5 °C)   TempSrc: Temporal   SpO2: 98%   Weight: 56.9 kg (125 lb 6.4 oz)   Height: 165 cm (64.96\")   PainSc:   2   PainLoc: Arm  Comment: LEFT UPPER           12/5/2023     3:39 PM   Current Status   ECOG score 0        PHYSICAL EXAM:        CONSTITUTIONAL:  Vital signs reviewed.  No distress, looks comfortable.  EYES:  Conjunctiva and lids unremarkable.  PERRLA  EARS,NOSE,MOUTH,THROAT:  Ears and nose appear unremarkable.  Lips, teeth, gums appear unremarkable.  RESPIRATORY:  Normal respiratory effort.  Lungs clear to auscultation bilaterally.  CARDIOVASCULAR:  Normal S1, S2.  No murmurs rubs or gallops.  No significant lower extremity edema.  GASTROINTESTINAL: Abdomen appears unremarkable.  Nontender.  No hepatomegaly.  No splenomegaly.  LYMPHATIC:  No cervical, supraclavicular, axillary lymphadenopathy.  SKIN:  Warm.  No rashes.  PSYCHIATRIC:  Normal judgment and insight.  Normal mood and affect.           RECENT LABS:        WBC   Date/Time Value Ref Range Status   12/05/2023 03:15 PM 5.68 3.40 - 10.80 10*3/mm3 Final     Hemoglobin   Date/Time Value Ref Range Status   12/05/2023 03:15 PM 13.3 12.0 - 15.9 g/dL Final     Platelets   Date/Time Value Ref Range Status   12/05/2023 03:15  140 - 450 10*3/mm3 Final       Assessment & Plan   Malignant neoplasm metastatic to bone  - Magnesium  - Phosphorus    Carcinoma of left breast metastatic to lung      Marialuisa Vivar        Assessment/Plan "       *Metastatic breast cancer to bilateral lungs and soft tissue in the mediastinum (likely the cause of her hoarseness). (Initial breast cancer diagnosed in 1993 treated with mastectomy, chemotherapy, radiation therapy and tamoxifen). Arimidex day 1 on 02/17/2010. PET scan late August 2011 shows no abnormal hypermetabolic activity. This has improved compared to the prior PET scan January 2010 which showed mild hypermetabolic activity in areas of metastasis. (In the past, her  T6 lesion did not show up on CT scans or bone scan. It was seen by PET scan.) We have been following with CT scans only every 6 months and PET scans on an as needed only basis. Sometimes her CT scanned pulmonary nodules are read as benign since they have been stable through the course of years but we know they are malignant because they have been surgically sampled in the past.    CT 8/16/16 shows increased sclerosis at T6 compared to 4/28/15.    PET 10/11/16: Slight uptake at T6, SUV 3.2.  mild thickening of right adrenal gland with an SUV of 11.   Continued Arimidex is minimal progression and had been on this since 2/17/10.  Not referred for radiation since no pain at T6  CT 1/27/17, unchanged.  CT 8/1/17: Unchanged except subtle suggestion of mild increase in thickening of the right adrenal gland.    CT 2/22/18, unchanged.  CT 9/6/18: Unchanged except nodular thickening right adrenal gland significantly decreased.  CT 9/5/2019: Unchanged.  On the 6/12/2020 visit, the following scans were reviewed:  CT neck and chest 5/11/2020, [facility], from 5/9/2019: Stable pulmonary nodules new lytic C7 lesion and posterior T1 lesion questionable T12 lesion.  No change in the T6 and T10 lesions.  PET, [facility], 5/19/2020: Mildly enlarged left neck nodes are mildly hypermetabolic.  Diffuse activity throughout the thickened left adrenal gland.  CT appearance unchanged from 5/9/2019.  Progressive T1 lesion seen on CT from 5/11/2020, mild some moderate  activity.  T10 low-grade activity with mixed lucent and sclerotic findings.  T6 is a mixed lucent and sclerotic appearance but no significant activity.   T12 (the biopsy report is labeled as T12 but patient insists this was a C7/T1 biopsy, not to T12) biopsy 6/15/2020, [facility]: Metastatic breast carcinoma.  ER >95%.  VA 0%.  HER-2 negative (1+)  It seems the main progression at the 6/12/2020 visit was a new C7 and new T1 lesion.  Those were radiated with CyberKnife through [facility] early June 2020.  Because Arimidex has been working well since 2010, continue same Arimidex.  Add Zometa every 3 months.  C7 and T1 found on CT 5/11/2020, [facility].  CyberKnife, Dr. Winston, early June 2020.  CT C and T-spine 9/17/2020: New C7 lesion from 9/6/2018, but no change from 5/11/2020 CT.  T1 lesion stable from 5/11/2020, but new compared to 2/22/2018.  T10 lesion unchanged from 9/5/2019, but new from 2/22/2018.  Subtle 8 mm T12 lesion new from 9/5/2019.  Therefore, no recent progression.  Considering her good tolerance and long-term effectiveness of Arimidex, continue same therapy.  CT CAP 12/10/2020: No progression  Therefore, continue Arimidex.  CA 15-3 normal through 12/14/2020  Because CA 15-3 fabian to 27 on 4/13/2021, then 29.3 on 7/20/2021, then 34.3 on 10/12/2021, CT scans done earlier than planned:  CT CAP with contrast 10/12/2021: New 5.2 x 4.6 cm mixed cystic and solid right ovarian mass compared to 12/10/2020.  Continued stability of bilateral pulmonary nodules and stable sclerotic bone metastasis.  11/4/2021: TLH/BSO (due to new 5 cm right ovarian mass on CT, 11/12/2021).  Metastatic breast cancer involving bilateral ovaries and posterior uterine serosa.  ER 81-90%.  VA 61-70%.  HER-2 negative  Caris NGS: ER 98%.  VA 90%.  HER-2/eb negative.  AKT1 pathogenic variant, exon 3.  AR+, 95%.  No other significant mutations including negative PIK3CA  Pelvic washings: Adenocarcinoma  Because this was the only area of progression  on CT, and has been resected, continue Arimidex which she has been on since 2/17/2010.  11/22/2021: Discussed with Dr. Blum, who has a focus on breast cancer.  We both agree: Continue Arimidex for now.  In the future, if significant progression is seen, then plan Faslodex injections with Ibrance  CT CAP 2/1/2022: A few T-spine sclerotic lesions progressively more sclerotic over multiple prior CTs of indeterminate significance.  No clear signs of progression of disease.  3/15/2022: Although tumor marker is not rising and CT showed no clear signs of progression.  CT revealed progressively more sclerotic bone metastasis over multiple prior CTs, she is having increased right hip discomfort.  Sometimes this can be due to healing of metastasis (but she has been on the same treatment since 2010), or this could mean progression of bone metastasis.  It is reassuring that her tumor marker is not worsening.  She would like to see [facility] to see if they feel radiation to the hip would help her discomfort.  We will do a bone scan before that appointment as this might help [facility].  (Although she has seen [facility] radiation for her neck, she would like to see [facility] for this hip issue).  3/18/2022, bone scan: Metastatic disease at T9 and T10, corresponding to CT lesions.  Uptake also at T6, but to lesser degree.  These areas are new compared to last bone scan, 6/3/2009.  Therefore, overall, appears consistent with progression.  3/24/2021: Discussed changing  Arimidex to Faslodex/Ibrance 125 mg D1-21/28 days.  However, Dr. Llanes will decide on 4/12/2022 if the patient needs any traditional XRT to hip/pelvis.  If so, this may cause cytopenias due to the location of XRT.  Ibrance can also cause cytopenias.  If this is the case, we will wait to begin Ibrance until at least a few weeks after XRT completes, provided blood counts allow.   4/22/2022: Dr. Llanes reviewed MRI  right hip from 4/19/2022, revealing L3 metastasis, similar size of prior CTs.  Therefore, she plans no XRT.  5/6/2022: Began Faslodex Ibrance  CT 7/18/2022: Some bone mets more sclerotic, which could reflect treatment response.  Slightly increased RUL nodule, 1.1 cm, from 0.9 cm on 2/1/2022  Bone scan 7/18/2022: No change from 3/18/2022  8/19/2022 (patient delayed follow-up to review scans until then): Continue same therapy since no significant progression of disease  Bone scan 1/26/2023: Under represents bone metastasis compared to CT scans.  Slightly more intense uptake at T12 which was noted to perhaps represent treatment response.  CT 1/26/2023: A few new subcentimeter indeterminate RLL pulmonary nodules.  Radiologist recommended follow-up chest imaging in 6 to 8 weeks.  Otherwise no change in the other bilateral pulmonary nodules.  No change in scattered bone metastasis except the T12 lesion continues to increase in sclerosis.  2/28/2023 office visit:  Missed the mid November 2022 Faslodex and the mid January 2023 Faslodex and the mid February 2023 Faslodex.  Although CT might represent slight progression, she has missed a few doses of Faslodex.  Would like a strong reason to change therapy since she has been doing well on Faslodex for quite some time.  Therefore, patient agrees: Maintain her same imaging interval, 6 months from last  As of 8/20/2023, last Faslodex was 6/8/2023 (typically is given every 4 weeks)  CT and bone scan 7/17/23, from 1/26/2023: Mostly stable.  New focus of uptake anterior left second rib and in hindsight radiologist saw subtle area of sclerosis and osteolysis on CT images from 7/17/2023, new from 1/26/2023.  Radiologist noted does not have the appearance for posttraumatic change or osteonecrosis and the most likely etiology is a new metastatic focus.  Interval decrease in activity large right frontal lesion.  Spine lesion stable.  She did not come to the office to review these  scans  Came to the ER 8/27/2023 for severe diarrhea.  CT AP 8/27/2023, from 7/17/2023: No significant change.   8/28/2023: She self stopped Ibrance for 5 days prior to admission due to feeling so weak with diarrhea.  When she recovers from her current issues and is able to return to the office, resume Faslodex every 4-week injections with Ibrance oral D1-21/28 days.  8/29/2023: Only 1 loose bowel movement in the last 24 hours.  She agrees: I asked our office to arrange MD or NP with Faslodex in 2 to 3 weeks.  8/30/2023: She agrees with the above plan.  CT CAP 11/6/2023, from 7/17/2023: Small stable pulmonary nodules.  Stable bone metastasis.  Stable nodular left adrenal thickening.  CT chest angiogram 11/22/2023: No PE.  No change.  Clinically no evidence of progression.     CA 15-3:  17.2 on 11/6/2023, from 31.7 on 2/28/2023, from 27.3 on 8/19/2022, from 22.5 on 5/6/2022, from 26.3 on 2/1/2022, from 32.6 on 11/22/2021, from 34.3 on 10/12/2021, from 29.3 on 7/20/2021, from 27 on 4/13/2021, from 23.3 on 12/14/2020.     *Bony metastases  T6 discovered by PET to August 2011 (did not show up by CT or bone scan)  C7 and T1 found on CT 5/11/2020, [facility].  CyberKnife, Dr. Winston, early June 2020.  June 2020 began Zometa every 3 months.  She was warned of possible osteonecrosis of the jaw and renal insufficiency.  She states she has good dental health and sees her dentist regularly.  Because of prior hypercalcemia requiring stopping calcium supplements and because of high normal current calcium level, began without supplemental calcium.  Add calcium if calcium becomes low.  1/7/2022: Tooth extracted.  Plan was to wait 5 weeks to resume Zometa.  Patient delayed return until 3/15/2022 (over 8 weeks)  May 2022: Changed Zometa to Xgeva every 3 months due to Cr up to 1.6  2/28/2023: She request to hold off on Xgeva because she is in the process of having dental work  12/5/2023.  She states she does not need any dental work and  saw her dentist within the past year.  Hypercalcemic again.  Proceed with monthly Xgeva (received Aredia 4 weeks ago as inpatient     *Bone health. Last bone density 10/11/16, worsened, but still normal with worst T score -0.9.  Patient stopped calcium January 2016, but remained on vitamin D.  I recommended she restart her calcium.  Stopped calcium early March 2018 due to hypercalcemia.  DEXA 9/5/2019: Normal bone density     *Hypercalcemia.  Repeat calcium 3/7/18 normal, but patient self stopped calcium a few days prior.  Recommended she stay off calcium.  Calcium was 11 on 6/19/2020  Calcium 11 again on 12/10/2020  Repeat calcium 10.5 on 12/14/2020  Calcium 10.6.  Minimally elevated.  (Normal range up to 10.5)  Calcium 10.8 on 3/15/2022.  Hopefully, Zometa will help with this.  12/5/2023: Calcium 11.3 on 12/5/2023.  Begin monthly Xgeva (received already as inpatient 4 weeks ago.  Otherwise has been off bisphosphonates for several months     *Hypophosphatemia  Phosphorus 2.3 on 6/24/2022.  Begin K-Phos Neutral 2 tablets twice daily  Patient self stopped this around mid July 2022.  Phosphorus normal, 3.6 on 2/28/2023     *On the 10/5/16 visit, Tachycardia, dyspnea on exertion, bilateral leg swelling, more sedentary recently.  CT PE protocol and bilateral lower extremity Dopplers 10/5/16, negative for clots.  She still has tachycardia and dyspnea on exertion.  Perhaps at least part of this is due to deconditioning.     *Previously complained of colon Right lower anterior rib discomfort.  CT unremarkable in that area.  No specific pain, just discomfort.  I explained to the patient if disease was found by PET scan or bone scan, I would not  since she has been doing well on Arimidex since 2010.   Currently denies pain.      *Depression/anxiety.  This limits compliance.  She sees a psychiatrist and a counselor regularly.  She states this is mostly due to body image issues instead of cancer.   She  continues with her counselor and psychiatrist.  Viral pandemic is making this a little more difficult.  4/27/2022: We will try to get her involved in some cancer support groups.  I also encouraged her to try out some small groups at [employer] (she struggles with loneliness).  2/28/2023: Has missed some visits due to emotional issues.  Offered behavioral oncology help.  She declines.  She is going to continue with her psychiatrist and counselor     *Noncompliance due to depression/anxiety.    Although she often misses appointments, she does reschedule and eventually come in.     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 Willie Quinn [facility], on 5/21/2019.  Positive sentinel node (1 of 3 nodes)..  Left neck dissection by Dr. Willie Quinn, [facility], on 6/5/2019.  22 nodes negative.  Negative margins.  No lymphovascular invasion or perineural invasion.  2 mm depth of invasion.  Grade 1.  Squamous cell carcinoma.  1.8 cm tumor.  Because the patient felt what she thought was left neck LAD, CT neck and chest and PET at [facility]. May 2020 performed.  She is being followed at [facility] for this.  On 6/19/2020, per verbal report from Dr. Senia [facility], [facility] ENT does not feel she has any active head and neck cancer.   She states she has a follow-up with AMADEO Whalen at [facility], in June with CT neck 1 week prior  No symptoms to suggest recurrence.  She follows with [facility].     *Previously complained of left hip pain x2 weeks.  This prompted an MRI pelvis 8/20/2019 at [facility] and open MRI which was negative for malignancy.     *Possible intolerance of Zometa  After first dose, June 2020, 2 days of chills, bone pain  She would like to try Zometa again.  If this occurs again, we will try to switch her to Xgeva monthly.  (No good data on every 3-month Xgeva)  She did fine with the second dose of Zometa.     *Thickening of wall of transverse  colon on CT 2/1/2022.  Last colonoscopy was 4/18/2019.  She wants the next colonoscopy done at [facility]  Dr. Galeas will see her on 4/7/2022 to evaluate this     *Depression  Is seeing a psychiatry NP and a therapist for many years.  6/24/2022: Reported she decided to stop seeing her therapist but is still interested in therapy.  She would like to see behavioral oncology at [facility].  This was arranged  Contacted by Julissa Centeno (behavioral oncology).  Patient canceled on the day of the appointment during her first scheduled appointment with Julissa.  She then no showed for the rescheduled appointment.  Julissa recommends the patient continue to follow with her therapist and psychiatrist and if she needs additional support, to use [employer]     *Pruritic rash  8/19/2022: Referred to dermatology     *Patient went to the ER 2/7/2023 for dizziness  2/28/2023: This resolved.  She understands if she has ongoing issues with dizziness she should contact us and we will arrange an MRI of the brain     *Admitted for significant diarrhea.  We were asked give her breast cancer treatment could be the cause of this.  Ibrance is associated with diarrhea about 25% of the time , but grade 3 diarrhea only occurs 1% of the time.  Faslodex has a listed diarrhea association of 6 to 25% of the time.  I would doubt these drugs are causing her diarrhea, especially considering she has been on them since May 2022     *Pancytopenia due to Ibrance     *Leukocytopenia  WBC 5.7     *Anemia  Hb 13.3     *Thrombocytopenia       *Acute kidney injury.  Nephrology following.  Baseline creatinine 1.5-1.7, CKD stage III.  Creatinine 1.68 on 12/5/2023     Monitoring on Ibrance:  CBC every 2 weeks for the first 2 months then monthly and as indicated.  After 6 months, if neutropenia grade 1 or 2 only, then CBC can be every 3 months     *12/5/2023:  More weak.  Lost 10 pounds.  States she is not able to drink much either.  She would like twice per  week IV fluids.    PLAN:   Twice per week normal saline  Weekly CBC and CMP  NP with Faslodex Xgeva 4 weeks.  At that NP visit, the NP can decide if she needs additional twice per week normal saline  MD Faslodex Xgeva 8 weeks  Planning roughly every 6 months CT neck, chest abdomen and pelvis without contrast, bone scan, CBC, CMP, CA 15-3   (Dr. Carmela Smith, [facility], requests neck CT when we do either CT imaging).       Typical plan is:  MD/NP monthly  Faslodex today and monthly.  MD/Faslodex 5 months.  A few days prior: CT chest abdomen and pelvis without contrast, bone scan, CBC, CMP, CA 15-3  Resume every 3-month Xgeva 1 to 2 months after completing all dental procedures (she states she will let us know).  Last dose was 7/1/2022     Continue Faslodex/Ibrance 125 mg D1-21/28 days     (Xgeva instead of Zometa due to intermittent high creatinine)  MD or NP with Faslodex every 4 weeks  Every 6 months CT CAP without IV contrast, bone scan (last 7/18/2022), CA 15-3  Previously arranged for her to get involved with some cancer support groups   CT scans WITHOUT IV CONTRAST (again, to avoid further kidney damage.)  She sees [facility], Dr. Bedoya annually, next is summer 2022     Send a copy of this note to   Dr. Willie Quinn, [facility], radiation medicine, [facility]     40 minutes.  Total time.  Same day.

## 2023-12-05 ENCOUNTER — INFUSION (OUTPATIENT)
Dept: ONCOLOGY | Facility: HOSPITAL | Age: 68
End: 2023-12-05
Payer: MEDICARE

## 2023-12-05 ENCOUNTER — LAB (OUTPATIENT)
Dept: OTHER | Facility: HOSPITAL | Age: 68
End: 2023-12-05
Payer: MEDICARE

## 2023-12-05 ENCOUNTER — OFFICE VISIT (OUTPATIENT)
Dept: ONCOLOGY | Facility: CLINIC | Age: 68
End: 2023-12-05
Payer: MEDICARE

## 2023-12-05 VITALS
WEIGHT: 125.4 LBS | RESPIRATION RATE: 18 BRPM | OXYGEN SATURATION: 98 % | DIASTOLIC BLOOD PRESSURE: 64 MMHG | SYSTOLIC BLOOD PRESSURE: 92 MMHG | HEART RATE: 112 BPM | HEIGHT: 65 IN | BODY MASS INDEX: 20.89 KG/M2 | TEMPERATURE: 97.7 F

## 2023-12-05 DIAGNOSIS — C79.51 MALIGNANT NEOPLASM METASTATIC TO BONE: Primary | ICD-10-CM

## 2023-12-05 DIAGNOSIS — C78.02 CARCINOMA OF LEFT BREAST METASTATIC TO LUNG: ICD-10-CM

## 2023-12-05 DIAGNOSIS — C50.912 CARCINOMA OF LEFT BREAST METASTATIC TO LUNG: ICD-10-CM

## 2023-12-05 DIAGNOSIS — C79.51 MALIGNANT NEOPLASM METASTATIC TO BONE: ICD-10-CM

## 2023-12-05 LAB
ALBUMIN SERPL-MCNC: 4.4 G/DL (ref 3.5–5.2)
ALBUMIN/GLOB SERPL: 1.4 G/DL
ALP SERPL-CCNC: 89 U/L (ref 39–117)
ALT SERPL W P-5'-P-CCNC: 6 U/L (ref 1–33)
ANION GAP SERPL CALCULATED.3IONS-SCNC: 12.3 MMOL/L (ref 5–15)
AST SERPL-CCNC: 18 U/L (ref 1–32)
BASOPHILS # BLD AUTO: 0.06 10*3/MM3 (ref 0–0.2)
BASOPHILS NFR BLD AUTO: 1.1 % (ref 0–1.5)
BILIRUB SERPL-MCNC: 0.5 MG/DL (ref 0–1.2)
BUN SERPL-MCNC: 19 MG/DL (ref 8–23)
BUN/CREAT SERPL: 11.3 (ref 7–25)
CALCIUM SPEC-SCNC: 11.3 MG/DL (ref 8.6–10.5)
CHLORIDE SERPL-SCNC: 100 MMOL/L (ref 98–107)
CO2 SERPL-SCNC: 21.7 MMOL/L (ref 22–29)
CREAT SERPL-MCNC: 1.68 MG/DL (ref 0.57–1)
DEPRECATED RDW RBC AUTO: 41.4 FL (ref 37–54)
EGFRCR SERPLBLD CKD-EPI 2021: 32.8 ML/MIN/1.73
EOSINOPHIL # BLD AUTO: 0.26 10*3/MM3 (ref 0–0.4)
EOSINOPHIL NFR BLD AUTO: 4.6 % (ref 0.3–6.2)
ERYTHROCYTE [DISTWIDTH] IN BLOOD BY AUTOMATED COUNT: 11.9 % (ref 12.3–15.4)
GLOBULIN UR ELPH-MCNC: 3.2 GM/DL
GLUCOSE SERPL-MCNC: 124 MG/DL (ref 65–99)
HCT VFR BLD AUTO: 39.9 % (ref 34–46.6)
HGB BLD-MCNC: 13.3 G/DL (ref 12–15.9)
IMM GRANULOCYTES # BLD AUTO: 0.02 10*3/MM3 (ref 0–0.05)
IMM GRANULOCYTES NFR BLD AUTO: 0.4 % (ref 0–0.5)
LYMPHOCYTES # BLD AUTO: 1.84 10*3/MM3 (ref 0.7–3.1)
LYMPHOCYTES NFR BLD AUTO: 32.4 % (ref 19.6–45.3)
MAGNESIUM SERPL-MCNC: 1.6 MG/DL (ref 1.6–2.4)
MCH RBC QN AUTO: 31.5 PG (ref 26.6–33)
MCHC RBC AUTO-ENTMCNC: 33.3 G/DL (ref 31.5–35.7)
MCV RBC AUTO: 94.5 FL (ref 79–97)
MONOCYTES # BLD AUTO: 0.55 10*3/MM3 (ref 0.1–0.9)
MONOCYTES NFR BLD AUTO: 9.7 % (ref 5–12)
NEUTROPHILS NFR BLD AUTO: 2.95 10*3/MM3 (ref 1.7–7)
NEUTROPHILS NFR BLD AUTO: 51.8 % (ref 42.7–76)
NRBC BLD AUTO-RTO: 0 /100 WBC (ref 0–0.2)
PHOSPHATE SERPL-MCNC: 3.7 MG/DL (ref 2.5–4.5)
PLATELET # BLD AUTO: 367 10*3/MM3 (ref 140–450)
PMV BLD AUTO: 8.9 FL (ref 6–12)
POTASSIUM SERPL-SCNC: 4.1 MMOL/L (ref 3.5–5.2)
PROT SERPL-MCNC: 7.6 G/DL (ref 6–8.5)
RBC # BLD AUTO: 4.22 10*6/MM3 (ref 3.77–5.28)
SODIUM SERPL-SCNC: 134 MMOL/L (ref 136–145)
WBC NRBC COR # BLD AUTO: 5.68 10*3/MM3 (ref 3.4–10.8)

## 2023-12-05 PROCEDURE — 80053 COMPREHEN METABOLIC PANEL: CPT | Performed by: INTERNAL MEDICINE

## 2023-12-05 PROCEDURE — 96372 THER/PROPH/DIAG INJ SC/IM: CPT

## 2023-12-05 PROCEDURE — 83735 ASSAY OF MAGNESIUM: CPT | Performed by: INTERNAL MEDICINE

## 2023-12-05 PROCEDURE — 25010000002 DENOSUMAB 120 MG/1.7ML SOLUTION: Performed by: INTERNAL MEDICINE

## 2023-12-05 PROCEDURE — 84100 ASSAY OF PHOSPHORUS: CPT | Performed by: INTERNAL MEDICINE

## 2023-12-05 PROCEDURE — 25010000002 FULVESTRANT PER 25 MG: Performed by: INTERNAL MEDICINE

## 2023-12-05 PROCEDURE — 36415 COLL VENOUS BLD VENIPUNCTURE: CPT

## 2023-12-05 PROCEDURE — 85025 COMPLETE CBC W/AUTO DIFF WBC: CPT | Performed by: INTERNAL MEDICINE

## 2023-12-05 PROCEDURE — 96402 CHEMO HORMON ANTINEOPL SQ/IM: CPT

## 2023-12-05 RX ORDER — LAMOTRIGINE 25 MG/1
500 TABLET ORAL ONCE
Start: 2024-01-24 | End: 2023-12-05

## 2023-12-05 RX ORDER — LAMOTRIGINE 25 MG/1
500 TABLET ORAL ONCE
Status: CANCELLED | OUTPATIENT
Start: 2023-12-05 | End: 2023-12-05

## 2023-12-05 RX ORDER — LAMOTRIGINE 25 MG/1
500 TABLET ORAL ONCE
Status: COMPLETED | OUTPATIENT
Start: 2023-12-05 | End: 2023-12-05

## 2023-12-05 RX ADMIN — DENOSUMAB 120 MG: 120 INJECTION SUBCUTANEOUS at 17:01

## 2023-12-05 RX ADMIN — FULVESTRANT 500 MG: 50 INJECTION, SOLUTION INTRAMUSCULAR at 17:05

## 2023-12-06 ENCOUNTER — SPECIALTY PHARMACY (OUTPATIENT)
Dept: PHARMACY | Facility: HOSPITAL | Age: 68
End: 2023-12-06
Payer: MEDICARE

## 2023-12-06 ENCOUNTER — READMISSION MANAGEMENT (OUTPATIENT)
Dept: CALL CENTER | Facility: HOSPITAL | Age: 68
End: 2023-12-06
Payer: MEDICARE

## 2023-12-06 NOTE — PROGRESS NOTES
Shruthi, MD [facility] Oral Onc Pool; Shefali Sam RN  Hi,    We gave her Faslodex today so she needs to resume her Ibrance.  Please call her follow-up with her to make sure she takes her Ibrance.      Call placed to Ms Vivar - no answer.  Left above directive on her voicemail with direct line for [facility] [phone] if she had any further questions.

## 2023-12-06 NOTE — OUTREACH NOTE
Medical Week 3 Survey      Flowsheet Row Responses   [facility] patient discharged from? [facility]   Does the patient have one of the following disease processes/diagnoses(primary or secondary)? Other   Week 3 attempt successful? No   Unsuccessful attempts Attempt 2   Discharge diagnosis SARAH (acute kidney injury)- Generalized weakness            JAIRON MENESES - Registered Nurse

## 2023-12-07 ENCOUNTER — SPECIALTY PHARMACY (OUTPATIENT)
Dept: PHARMACY | Facility: HOSPITAL | Age: 68
End: 2023-12-07
Payer: MEDICARE

## 2023-12-07 NOTE — HOME HEALTH
Spoke with patient by phone, requested discharge without visit, reports completing adl tasks with independence and verb good safety knowledge and DME options for tub.   Patient discharged from OT and agency.

## 2023-12-08 ENCOUNTER — SPECIALTY PHARMACY (OUTPATIENT)
Dept: PHARMACY | Facility: HOSPITAL | Age: 68
End: 2023-12-08
Payer: MEDICARE

## 2023-12-08 NOTE — PROGRESS NOTES
Specialty Note- Ibrance and Falsodex    Call placed to remind patient to restart Ibrance.  No answer again today.  Left message with directive to restart her Ibrance.

## 2023-12-11 ENCOUNTER — SPECIALTY PHARMACY (OUTPATIENT)
Dept: PHARMACY | Facility: HOSPITAL | Age: 68
End: 2023-12-11
Payer: MEDICARE

## 2023-12-12 ENCOUNTER — DOCUMENTATION (OUTPATIENT)
Dept: PHARMACY | Facility: HOSPITAL | Age: 68
End: 2023-12-12
Payer: MEDICARE

## 2023-12-12 NOTE — PROGRESS NOTES
Today I have faxed Ms. Vivar's 2024 renewal application for Ibrance. I will follow-up in 10 business days to confirm receipt.     The application was faxed to [phone]. I will call [phone] to follow-up.    Sarai Werner - Care Coordinator   12/12/2023  13:46 EST

## 2023-12-18 PROBLEM — N28.9 ACUTE RENAL INSUFFICIENCY: Status: ACTIVE | Noted: 2023-01-01

## 2023-12-18 PROBLEM — N17.9 ACUTE KIDNEY INJURY: Status: ACTIVE | Noted: 2023-01-01

## 2023-12-18 PROBLEM — E43 SEVERE MALNUTRITION: Status: ACTIVE | Noted: 2023-01-01

## 2023-12-18 PROBLEM — I95.9 HYPOTENSION: Status: ACTIVE | Noted: 2023-01-01

## 2023-12-18 PROBLEM — E87.29 HIGH ANION GAP METABOLIC ACIDOSIS: Status: ACTIVE | Noted: 2023-01-01

## 2023-12-18 NOTE — ED NOTES
Pt called out @ this time stating she had chest pain when drinking her cold water. Provider notified.     Per AISHA Hickman OK to send upstairs @ this time, EKG & troponin @ baseline.

## 2023-12-18 NOTE — CONSULTS
[facility] Consult Note      Patient Name: Marialuisa Vivar  : 1955  MRN: [id number]  Primary Care Physician:  Jennifer Mantilla MD  Referring Physician: Dino Gil,*  Date of admission: 2023    Subjective     Reason for Consult:  metabolic acidosis    HPI:   Marialuisa Vivar is a 68 y.o. female with CKD 3a-b (baseline SCr 1.5), metastatic breast cancer (with involvement of the lung and bones), tongue cancer, and untreated DAYLIN, admitted today for further evaluation of N/V/D.  Serum sodium 133 on arrival with potassium 5.4 and bicarbonate 15; today, those values are 128, 4.7, and 9.8.  Symptom onset easily 2-3 days ago; has had very little appetite for several days, along with dry heaves and diarrhea, although has had no diarrhea today.  No fever or chills.  Has developed recent onset of substernal pain that is worsened by leaning forward.    She is unsure whether her weight has changed, although by weights documented in epic she has lost 30 pounds since May this year  No urinary complaints  Has had diarrhea for several days, although has had no episode yet today  Very poor appetite for 3 days, with nausea and dry heaves  Describes orthostatic symptoms  No leg swelling; has chronic orthopnea    Review of Systems:   14 point review of systems is otherwise negative except for mentioned above on HPI    Personal History     Past Medical History:   Diagnosis Date   • SARAH (acute kidney injury) 2023   • Allergy    • Anxiety    • Asthma    • Bone metastasis    • Breast cancer     Left node positive   • Cholelithiasis     Lap Marily   • Class 1 obesity without serious comorbidity with body mass index (BMI) of 31.0 to 31.9 in adult 2019   • Colon polyp Past 10-15 yrs?    found at colonoscopies   • Depression    • Esophageal reflux     cough   • History of colon polyps    • Hyperlipidemia    • Hypertension    • Left breast cancer metastasized to lung    • Nausea &  vomiting    • Obstructive sleep apnea     does not wear cpap   • Ovarian cyst    • Pancytopenia 08/29/2023   • PONV (postoperative nausea and vomiting)    • Tongue cancer 05/2019    by ENT at [facility] dr Quinn       Past Surgical History:   Procedure Laterality Date   • CHOLECYSTECTOMY  2000   • COLONOSCOPY  2014    H/O polyps   • COLONOSCOPY N/A 6/1/2022    Procedure: COLONOSCOPY to cecum and TI with cold / hot snare polypectomies;  Surgeon: Luis Enrique Galeas MD;  Location: [facility] ENDOSCOPY;  Service: Gastroenterology;  Laterality: N/A;  pre-abnormal ct  post-polyps, diverticulosis, hemorrhoids   • KNEE ARTHROPLASTY     • LUNG SURGERY  2010    Lung wedge resection   • MASTECTOMY RADICAL Left 1993   • TONGUE SURGERY  05/21/2019    tongue cancer   • TOTAL LAPAROSCOPIC HYSTERECTOMY N/A 11/04/2021    Procedure: Robotic assisted total laparoscopic hysterectomy, bilateral salpingoophorecotmy, bilateral ureteralysis ;  Surgeon: Kaylynn Ricci DO;  Location: [facility] MAIN OR;  Service: Robotics - DaVinci;  Laterality: N/A;       Family History: family history includes Alcohol abuse in her maternal aunt and another family member; COPD in her mother; Colon polyps in her father; Diabetes in her brother; Diabetes type II in her brother; Gallbladder disease in her brother and father; Glaucoma in her brother and another family member; Heart disease in her brother; Hyperlipidemia in her brother; Hypertension in her brother, brother, and mother; Leukemia (age of onset: 72) in her mother; Liver cancer in her paternal uncle; Lupus in an other family member; Pancreatic cancer in her brother; Pancreatitis in her brother; Stomach cancer in her paternal aunt; Stroke in an other family member.    Social History:  reports that she quit smoking about 15 years ago. Her smoking use included cigarettes. She started smoking about 50 years ago. She has a 60.00 pack-year smoking history. She has never used smokeless tobacco. She reports that  she does not drink alcohol and does not use drugs.    Home Medications:  Prior to Admission medications    Medication Sig Start Date End Date Taking? Authorizing Provider   ARIPiprazole (ABILIFY) 2 MG tablet Take 2.5 tablets by mouth Every Night. 3/14/23  Yes Rolando Ambrosio MD   ARIPiprazole (ABILIFY) 5 MG tablet Take 1 tablet by mouth Every Evening. 11/2/23  Yes Rolando Ambrosio MD   atorvastatin (LIPITOR) 10 MG tablet TAKE 1 TABLET BY MOUTH ONCE DAILY AT NIGHT 8/29/23  Yes Jennifer Mantilla MD   eszopiclone (LUNESTA) 3 MG tablet Take 1 tablet by mouth. 4/4/22  Yes Rolando Ambrosio MD   fulvestrant (FASLODEX) 250 MG/5ML chemo syringe Inject  into the appropriate muscle as directed by prescriber.   Yes Rolando Ambrosio MD   LORazepam (ATIVAN) 0.5 MG tablet Take 1 tablet by mouth Every 8 (Eight) Hours As Needed. 3/28/14  Yes Rolando Ambrosio MD   meclizine (ANTIVERT) 25 MG tablet Take 1 tablet by mouth 3 (Three) Times a Day As Needed for Dizziness. 2/7/23  Yes Phi Pressley MD   miconazole (MICOTIN) 2 % powder Apply 1 application  topically to the appropriate area as directed Every 12 (Twelve) Hours. 11/8/23  Yes Stacy Flores APRN   Omeprazole 20 MG Tablet Delayed Release Dispersible Take 1 tablet by mouth As Needed. 7/2/21  Yes Rolando Ambrosio MD   ondansetron (ZOFRAN) 8 MG tablet TAKE 1 TABLET BY MOUTH THREE TIMES DAILY AS NEEDED FORNAUSEA AND VOMITING 3/21/23  Yes Rolando Ambrosio MD   denosumab (Xgeva) 120 MG/1.7ML solution injection Inject  under the skin into the appropriate area as directed.    Rolando Ambrosio MD   PARoxetine (PAXIL) 40 MG tablet Take 1 tablet by mouth Every Night.    Rolando Ambrosio MD   cephalexin (KEFLEX) 500 MG capsule Take 1 capsule by mouth 3 (Three) Times a Day. 11/22/23 12/18/23  Nilo Robles MD   cholecalciferol (VITAMIN D3) 1.25 MG (26531 UT) capsule Take 5,000 Units by mouth 3 (Three) Times a Week.  Patient not  taking: Reported on 12/5/2023 1/21/13 12/18/23  Provider, MD Rolando   Palbociclib 125 MG tablet  5/2/22 12/18/23  Provider, MD Rolando       Allergies:  Allergies   Allergen Reactions   • Nickel Unknown - Low Severity   • Ciprofloxacin Rash       Objective     Vitals:   Temp:  [98.2 °F (36.8 °C)-98.6 °F (37 °C)] 98.2 °F (36.8 °C)  Heart Rate:  [108-123] 123  Resp:  [16-18] 16  BP: ()/(40-81) 91/65    Intake/Output Summary (Last 24 hours) at 12/18/2023 1525  Last data filed at 12/18/2023 1227  Gross per 24 hour   Intake 1000 ml   Output --   Net 1000 ml       Physical Exam:   Constitutional: Awake, alert, NAD, gaunt, chronically ill, very hoarse  HEENT: Sclera anicteric, no conjunctival injection, MMM  Neck: Supple, bilateral carotid bruits, trachea at midline, no JVD  Respiratory: Diminished breath sounds both bases, nonlabored on room air  Cardiovascular: RR, tachycardic, no rub; 2/6M  Gastrointestinal: BS +, soft, nontender and nondistended  : No palpable bladder  Musculoskeletal: No edema, + clubbing  Psychiatric: Appropriate affect, cooperative, oriented  Neurologic: No asterixis but she is tremulous, moving all extremities, normal speech   Skin: Warm and dry       Scheduled Meds:     ARIPiprazole, 5 mg, Oral, Q PM  atorvastatin, 10 mg, Oral, Nightly  pantoprazole, 40 mg, Intravenous, Q AM  sodium chloride, 10 mL, Intravenous, Q12H      IV Meds:        Results Reviewed:   I have personally reviewed the results from the time of this admission to 12/18/2023 15:25 EST     Lab Results   Component Value Date    GLUCOSE 95 12/18/2023    CALCIUM 8.0 (L) 12/18/2023     (L) 12/18/2023    K 4.7 12/18/2023    CO2 9.8 (L) 12/18/2023     12/18/2023    BUN 28 (H) 12/18/2023    CREATININE 1.46 (H) 12/18/2023    EGFRIFAFRI 69 09/03/2021    EGFRIFNONA 46 (L) 02/01/2022    BCR 19.2 12/18/2023    ANIONGAP 16.2 (H) 12/18/2023      Lab Results   Component Value Date    MG 1.6 12/05/2023    PHOS 3.7  12/05/2023    ALBUMIN 4.8 12/17/2023           Assessment / Plan       Acute kidney injury    Depression with anxiety    Vocal cord dysfunction    Carcinoma of left breast metastatic to lung    Tachycardia    Tongue cancer    Hyperkalemia    High anion gap metabolic acidosis    Dehydration    Severe malnutrition    Hypotension      ASSESSMENT:  1.  [id number], with renal function stable and at baseline.  Suspect she is hypovolemic; has orthostatic symptoms.  Has had significant weight loss in the recent past combined with recent N/V/D.  NAGMA, multifactorial: Starvation ketosis, diarrhea, and CKD.  Mild hyperkalemia in the setting of acidosis.  2.  Hyponatremia, acute on chronic, with acute component likely from hypovolemia.  Suspect chronic component stems from malignancy/SIADH with lung involvement and overall poor nutrition  3.  Metastatic breast cancer with involvement of bone and lungs  4.  Tongue cancer  5.  Hypotension: Low blood pressure is another stimulus for ADH secretion  6.  Depression and anxiety: On SSRI at home    PLAN:  1.  Change IVF to bicarb drip to provide volume and alkali  2.  Urine osmolality, urine sodium, and serum uric acid  3.  Check TSH for completeness' sake  4.  Daily orthostatics to guide volume assessment  5.  Check serum phosphorus on blood already in the lab  6.  Discussed with Dr. Ham earlier today    Thank you for involving us in the care of Marialuisa Vivar.  Please feel free to call with any questions.    Panchito Hair MD  12/18/23  15:25 [facility]    [facility]  [phone]      Please note that portions of this note were completed with a voice recognition program.

## 2023-12-18 NOTE — H&P
Patient Name:  Marialuisa Vivar  YOB: 1955  MRN:  [id number]  Admit Date:  12/17/2023  Patient Care Team:  Jennifer Mantilla MD as PCP - General (Family Medicine)  Code, Jarod BLANCHARD II, MD as Consulting Physician (Hematology and Oncology)  Yair Robin MD as Referring Physician (Otolaryngology)  Brianna Orozco MD as Consulting Physician (Radiation Oncology)      Subjective   History Present Illness     Chief Complaint   Patient presents with   • Weakness - Generalized   • Hypotension       Ms. Vivar is a 68 y.o. female former smoker with a history of multiple medical issues, including but not limited to, metastatic breast cancer, asthma, HTN, HLD, SCC tongue, depression/anxiety that presents to [facility] complaining of weakness, fatigue, decreased appetite, n/v/d. Symptoms began Saturday. She was eating a sandwich and began dry heaving. Since then she has not been able to tolerate po. Began having watery diarrhea yesterday. Denies fever, dysuria. She has been having epigastric pain since this a.m and feels mildly soa. Not hypoxic. She has been hypotensive and tachycardic. Cr 1.61 on arrival, Na 134, CO2 15, anion gap 18. Procal 0.11. Lipase 47. HS trop 28-->26-->27. Lactate 2.8-->2.6-->1.9. RVP neg. C diff neg. GI PCR neg. UA neg for infection. Repeat BMP this a.m. K 5.4, Cr 1.40. CT A/P as below.       Review of Systems   Constitutional:  Positive for appetite change and fatigue. Negative for fever.   HENT:  Negative for congestion.    Respiratory:  Positive for shortness of breath.    Cardiovascular:  Positive for chest pain. Negative for palpitations.   Gastrointestinal:  Positive for abdominal pain, diarrhea, nausea and vomiting.   Genitourinary:  Negative for dysuria.   Musculoskeletal:  Negative for arthralgias.   Skin:  Negative for rash.   Neurological:  Positive for weakness.        Generalized weakness   Psychiatric/Behavioral:  Positive for sleep disturbance.          Personal History     Past Medical History:   Diagnosis Date   • SARAH (acute kidney injury) 08/27/2023   • Allergy    • Anxiety    • Asthma    • Bone metastasis    • Breast cancer     Left node positive   • Cholelithiasis 2000    Lap Marily   • Class 1 obesity without serious comorbidity with body mass index (BMI) of 31.0 to 31.9 in adult 02/06/2019   • Colon polyp Past 10-15 yrs?    found at colonoscopies   • Depression    • Esophageal reflux     cough   • History of colon polyps    • Hyperlipidemia    • Hypertension    • Left breast cancer metastasized to lung    • Nausea & vomiting    • Obstructive sleep apnea     does not wear cpap   • Ovarian cyst    • Pancytopenia 08/29/2023   • PONV (postoperative nausea and vomiting)    • Tongue cancer 05/2019    by ENT at [facility]     Past Surgical History:   Procedure Laterality Date   • CHOLECYSTECTOMY  2000   • COLONOSCOPY  2014    H/O polyps   • COLONOSCOPY N/A 6/1/2022    Procedure: COLONOSCOPY to cecum and TI with cold / hot snare polypectomies;  Surgeon: Luis Enrique Galeas MD;  Location: [facility] ENDOSCOPY;  Service: Gastroenterology;  Laterality: N/A;  pre-abnormal ct  post-polyps, diverticulosis, hemorrhoids   • KNEE ARTHROPLASTY     • LUNG SURGERY  2010    Lung wedge resection   • MASTECTOMY RADICAL Left 1993   • TONGUE SURGERY  05/21/2019    tongue cancer   • TOTAL LAPAROSCOPIC HYSTERECTOMY N/A 11/04/2021    Procedure: Robotic assisted total laparoscopic hysterectomy, bilateral salpingoophorecotmy, bilateral ureteralysis ;  Surgeon: Kaylynn Ricci DO;  Location: [facility] MAIN OR;  Service: Robotics - DaVinci;  Laterality: N/A;     Family History   Problem Relation Age of Onset   • Hypertension Mother    • Leukemia Mother 72   • COPD Mother         smoker   • Diabetes Brother    • Pancreatic cancer Brother    • Glaucoma Brother    • Heart disease Brother    • Hypertension Brother    • Gallbladder disease Brother    • Pancreatitis Brother           from pancreatic csncer   • Gallbladder disease Father    • Colon polyps Father    • Stroke Other         Grandmother   • Lupus Other         Aunt   • Alcohol abuse Other         Aunt   • Glaucoma Other         Grandmother   • Diabetes type II Brother    • Hypertension Brother    • Hyperlipidemia Brother    • Liver cancer Paternal Uncle    • Stomach cancer Paternal Aunt    • Alcohol abuse Maternal Aunt    • Malig Hyperthermia Neg Hx      Social History     Tobacco Use   • Smoking status: Former     Packs/day: 2.00     Years: 30.00     Additional pack years: 0.00     Total pack years: 60.00     Types: Cigarettes     Start date: 1973     Quit date: 2008     Years since quitting: 15.5   • Smokeless tobacco: Never   Vaping Use   • Vaping Use: Never used   Substance Use Topics   • Alcohol use: No   • Drug use: No     Current Facility-Administered Medications on File Prior to Encounter   Medication Dose Route Frequency Provider Last Rate Last Admin   • chlorhexidine (PERIDEX) 0.12 % solution 15 mL  15 mL Mouth/Throat Q12H Kaylynn Ricci DO       • mupirocin (BACTROBAN) 2 % nasal ointment   Nasal BID Kaylynn Ricci DO         Current Outpatient Medications on File Prior to Encounter   Medication Sig Dispense Refill   • ARIPiprazole (ABILIFY) 2 MG tablet Take 2.5 tablets by mouth Every Night.     • ARIPiprazole (ABILIFY) 5 MG tablet Take 1 tablet by mouth Every Evening.     • atorvastatin (LIPITOR) 10 MG tablet TAKE 1 TABLET BY MOUTH ONCE DAILY AT NIGHT 90 tablet 1   • eszopiclone (LUNESTA) 3 MG tablet Take 1 tablet by mouth.     • fulvestrant (FASLODEX) 250 MG/5ML chemo syringe Inject  into the appropriate muscle as directed by prescriber.     • LORazepam (ATIVAN) 0.5 MG tablet Take 1 tablet by mouth Every 8 (Eight) Hours As Needed.     • meclizine (ANTIVERT) 25 MG tablet Take 1 tablet by mouth 3 (Three) Times a Day As Needed for Dizziness. 20 tablet 0   • miconazole (MICOTIN) 2 % powder Apply 1  application  topically to the appropriate area as directed Every 12 (Twelve) Hours. 43 g 0   • Omeprazole 20 MG Tablet Delayed Release Dispersible Take 1 tablet by mouth As Needed.     • ondansetron (ZOFRAN) 8 MG tablet TAKE 1 TABLET BY MOUTH THREE TIMES DAILY AS NEEDED FORNAUSEA AND VOMITING     • cholecalciferol (VITAMIN D3) 1.25 MG (38351 UT) capsule Take 5,000 Units by mouth 3 (Three) Times a Week. (Patient not taking: Reported on 12/5/2023)     • denosumab (Xgeva) 120 MG/1.7ML solution injection Inject  under the skin into the appropriate area as directed.     • Palbociclib 125 MG tablet  (Patient not taking: Reported on 12/5/2023)     • PARoxetine (PAXIL) 40 MG tablet Take 1 tablet by mouth Every Night.     • [DISCONTINUED] cephalexin (KEFLEX) 500 MG capsule Take 1 capsule by mouth 3 (Three) Times a Day. 21 capsule 0     Allergies   Allergen Reactions   • Nickel Unknown - Low Severity   • Ciprofloxacin Rash       Objective    Objective     Vital Signs  Temp:  [98.2 °F (36.8 °C)-98.6 °F (37 °C)] 98.2 °F (36.8 °C)  Heart Rate:  [108-120] 114  Resp:  [16-18] 16  BP: ()/(40-81) 103/78  SpO2:  [97 %-100 %] 100 %  on   ;   Device (Oxygen Therapy): room air  Body mass index is 20.62 kg/m².    Physical Exam  Vitals and nursing note reviewed.   Constitutional:       General: She is not in acute distress.     Appearance: She is ill-appearing.      Comments: Weak, fatigued appearance   HENT:      Head: Normocephalic.      Mouth/Throat:      Mouth: Mucous membranes are dry.   Eyes:      Conjunctiva/sclera: Conjunctivae normal.   Cardiovascular:      Rate and Rhythm: Regular rhythm. Tachycardia present.   Pulmonary:      Effort: Pulmonary effort is normal. No respiratory distress.      Breath sounds: No wheezing or rales.      Comments: On room air  Abdominal:      General: Bowel sounds are normal.      Palpations: Abdomen is soft.      Tenderness: There is abdominal tenderness.      Comments: Epigastric tenderness    Musculoskeletal:      Cervical back: Neck supple.      Right lower leg: No edema.      Left lower leg: No edema.   Skin:     General: Skin is warm and dry.   Neurological:      Mental Status: She is alert and oriented to person, place, and time.   Psychiatric:         Mood and Affect: Mood and affect normal.         Results Review:  I reviewed the patient's new clinical results.  I reviewed the patient's new imaging results and agree with the interpretation.  I reviewed the patient's other test results and agree with the interpretation  I personally viewed and interpreted the patient's EKG/Telemetry data  Discussed with ED provider.    Lab Results (last 24 hours)       Procedure Component Value Units Date/Time    CBC & Differential [[id number]]  (Abnormal) Collected: 12/17/23 1952    Specimen: Blood Updated: 12/17/23 2059    Narrative:      The following orders were created for panel order CBC & Differential.  Procedure                               Abnormality         Status                     ---------                               -----------         ------                     CBC Auto Differential[[id number]]        Abnormal            Final result                 Please view results for these tests on the individual orders.    Comprehensive Metabolic Panel [[id number]]  (Abnormal) Collected: 12/17/23 1952    Specimen: Blood Updated: 12/17/23 2120     Glucose 101 mg/dL      BUN 36 mg/dL      Creatinine 1.61 mg/dL      Sodium 134 mmol/L      Potassium 5.0 mmol/L      Chloride 101 mmol/L      CO2 15.0 mmol/L      Calcium 10.0 mg/dL      Total Protein 7.6 g/dL      Albumin 4.8 g/dL      ALT (SGPT) 12 U/L      AST (SGOT) 21 U/L      Alkaline Phosphatase 94 U/L      Total Bilirubin 1.0 mg/dL      Globulin 2.8 gm/dL      A/G Ratio 1.7 g/dL      BUN/Creatinine Ratio 22.4     Anion Gap 18.0 mmol/L      eGFR 34.5 mL/min/1.73     Narrative:      GFR Normal >60  Chronic Kidney Disease <60  Kidney Failure <15      Lipase  "[[id number]]  (Normal) Collected: 12/17/23 1952    Specimen: Blood Updated: 12/17/23 2120     Lipase 47 U/L     CK [[id number]]  (Normal) Collected: 12/17/23 1952    Specimen: Blood Updated: 12/17/23 2120     Creatine Kinase 31 U/L     High Sensitivity Troponin T [[id number]]  (Abnormal) Collected: 12/17/23 1952    Specimen: Blood Updated: 12/17/23 2126     HS Troponin T 28 ng/L     Narrative:      High Sensitive Troponin T Reference Range:  <14.0 ng/L- Negative Female for AMI  <22.0 ng/L- Negative Male for AMI  >=14 - Abnormal Female indicating possible myocardial injury.  >=22 - Abnormal Male indicating possible myocardial injury.   Clinicians would have to utilize clinical acumen, EKG, Troponin, and serial changes to determine if it is an Acute Myocardial Infarction or myocardial injury due to an underlying chronic condition.         Procalcitonin [[id number]]  (Normal) Collected: 12/17/23 1952    Specimen: Blood Updated: 12/17/23 2126     Procalcitonin 0.11 ng/mL     Narrative:      As a Marker for Sepsis (Non-Neonates):    1. <0.5 ng/mL represents a low risk of severe sepsis and/or septic shock.  2. >2 ng/mL represents a high risk of severe sepsis and/or septic shock.    As a Marker for Lower Respiratory Tract Infections that require antibiotic therapy:    PCT on Admission    Antibiotic Therapy       6-12 Hrs later    >0.5                Strongly Recommended  >0.25 - <0.5        Recommended   0.1 - 0.25          Discouraged              Remeasure/reassess PCT  <0.1                Strongly Discouraged     Remeasure/reassess PCT    As 28 day mortality risk marker: \"Change in Procalcitonin Result\" (>80% or <=80%) if Day 0 (or Day 1) and Day 4 values are available. Refer to http://www.[employer]-pct-calculator.com    Change in PCT <=80%  A decrease of PCT levels below or equal to 80% defines a positive change in PCT test result representing a higher risk for 28-day all-cause mortality of patients diagnosed with severe " sepsis for septic shock.    Change in PCT >80%  A decrease of PCT levels of more than 80% defines a negative change in PCT result representing a lower risk for 28-day all-cause mortality of patients diagnosed with severe sepsis or septic shock.       CBC Auto Differential [[id number]]  (Abnormal) Collected: 12/17/23 1952    Specimen: Blood Updated: 12/17/23 2059     WBC 5.08 10*3/mm3      RBC 4.28 10*6/mm3      Hemoglobin 13.7 g/dL      Hematocrit 39.8 %      MCV 93.0 fL      MCH 32.0 pg      MCHC 34.4 g/dL      RDW 11.4 %      RDW-SD 38.4 fl      MPV 9.4 fL      Platelets 269 10*3/mm3      Neutrophil % 63.4 %      Lymphocyte % 31.3 %      Monocyte % 3.5 %      Eosinophil % 0.6 %      Basophil % 0.4 %      Immature Grans % 0.8 %      Neutrophils, Absolute 3.22 10*3/mm3      Lymphocytes, Absolute 1.59 10*3/mm3      Monocytes, Absolute 0.18 10*3/mm3      Eosinophils, Absolute 0.03 10*3/mm3      Basophils, Absolute 0.02 10*3/mm3      Immature Grans, Absolute 0.04 10*3/mm3      nRBC 0.0 /100 WBC     Protime-INR [[id number]]  (Normal) Collected: 12/17/23 1952    Specimen: Blood Updated: 12/17/23 2153     Protime 13.6 Seconds      INR 1.03    aPTT [[id number]]  (Normal) Collected: 12/17/23 1952    Specimen: Blood Updated: 12/17/23 2153     PTT 32.7 seconds     Lactic Acid, Plasma [[id number]]  (Abnormal) Collected: 12/17/23 2125    Specimen: Blood Updated: 12/17/23 2227     Lactate 2.8 mmol/L     Respiratory Panel PCR w/COVID-19(SARS-CoV-2) NICOLAS/ARPITA/EDNA/PAD/COR/PAMELA In-House, NP Swab in [facility], 2 HR TAT - Swab, Nasopharynx [[id number]]  (Normal) Collected: 12/17/23 2126    Specimen: Swab from Nasopharynx Updated: 12/17/23 2233     ADENOVIRUS, PCR Not Detected     Coronavirus 229E Not Detected     Coronavirus HKU1 Not Detected     Coronavirus NL63 Not Detected     Coronavirus OC43 Not Detected     COVID19 Not Detected     Human Metapneumovirus Not Detected     Human Rhinovirus/Enterovirus Not Detected     Influenza A PCR Not  Detected     Influenza B PCR Not Detected     Parainfluenza Virus 1 Not Detected     Parainfluenza Virus 2 Not Detected     Parainfluenza Virus 3 Not Detected     Parainfluenza Virus 4 Not Detected     RSV, PCR Not Detected     Bordetella pertussis pcr Not Detected     Bordetella parapertussis PCR Not Detected     Chlamydophila pneumoniae PCR Not Detected     Mycoplasma pneumo by PCR Not Detected    Narrative:      In the setting of a positive respiratory panel with a viral infection PLUS a negative procalcitonin without other underlying concern for bacterial infection, consider observing off antibiotics or discontinuation of antibiotics and continue supportive care. If the respiratory panel is positive for atypical bacterial infection (Bordetella pertussis, Chlamydophila pneumoniae, or Mycoplasma pneumoniae), consider antibiotic de-escalation to target atypical bacterial infection.    Blood Gas, Arterial - [[id number]]  (Abnormal) Collected: 12/17/23 2146    Specimen: Arterial Blood Updated: 12/17/23 2149     Site Right Radial     Danny's Test Positive     pH, Arterial 7.387 pH units      pCO2, Arterial 22.6 mm Hg      pO2, Arterial 100.3 mm Hg      HCO3, Arterial 13.6 mmol/L      Base Excess, Arterial -9.5 mmol/L      Comment: Serial Number: [id number]:  [id number]        O2 Saturation, Arterial 97.9 %      CO2 Content 14.3 mmol/L      Barometric Pressure for Blood Gas 745.8000 mmHg      Modality Room Air     Rate 20 Breaths/minute      Hemodilution No     Device Comment SpO2 98%    Blood Culture - Blood, Arm, Left [[id number]] Collected: 12/17/23 2157    Specimen: Blood from Arm, Left Updated: 12/17/23 2201    Blood Culture - Blood, Arm, Right [[id number]] Collected: 12/17/23 2255    Specimen: Blood from Arm, Right Updated: 12/17/23 2309    High Sensitivity Troponin T 2Hr [[id number]]  (Abnormal) Collected: 12/17/23 2255    Specimen: Blood Updated: 12/17/23 2334     HS Troponin T 26 ng/L      Troponin T Delta -2 ng/L      Narrative:      High Sensitive Troponin T Reference Range:  <14.0 ng/L- Negative Female for AMI  <22.0 ng/L- Negative Male for AMI  >=14 - Abnormal Female indicating possible myocardial injury.  >=22 - Abnormal Male indicating possible myocardial injury.   Clinicians would have to utilize clinical acumen, EKG, Troponin, and serial changes to determine if it is an Acute Myocardial Infarction or myocardial injury due to an underlying chronic condition.         Gastrointestinal Panel, PCR - Stool, Per Rectum [[id number]]  (Normal) Collected: 12/17/23 2256    Specimen: Stool from Per Rectum Updated: 12/18/23 0038     Campylobacter Not Detected     Plesiomonas shigelloides Not Detected     Salmonella Not Detected     Vibrio Not Detected     Vibrio cholerae Not Detected     Yersinia enterocolitica Not Detected     Enteroaggregative E. coli (EAEC) Not Detected     Enteropathogenic E. coli (EPEC) Not Detected     Enterotoxigenic E. coli (ETEC) lt/st Not Detected     Shiga-like toxin-producing E. coli (STEC) stx1/stx2 Not Detected     Shigella/Enteroinvasive E. coli (EIEC) Not Detected     Cryptosporidium Not Detected     Cyclospora cayetanensis Not Detected     Entamoeba histolytica Not Detected     Giardia lamblia Not Detected     Adenovirus F40/41 Not Detected     Astrovirus Not Detected     Norovirus GI/GII Not Detected     Rotavirus A Not Detected     Sapovirus (I, II, IV or V) Not Detected    Narrative:      If Aeromonas, Staphylococcus aureus or Bacillus cereus are suspected, please order [id number]: Stool Culture, Aeromonas, S aureus, B Cereus.    Clostridioides difficile Toxin - Stool, Per Rectum [[id number]]  (Normal) Collected: 12/17/23 2256    Specimen: Stool from Per Rectum Updated: 12/18/23 0008    Narrative:      The following orders were created for panel order Clostridioides difficile Toxin - Stool, Per Rectum.  Procedure                               Abnormality         Status                     ---------                                -----------         ------                     Clostridioides difficile...[[id number]]  Normal              Final result                 Please view results for these tests on the individual orders.    Clostridioides difficile Toxin, PCR - Stool, Per Rectum [[id number]]  (Normal) Collected: 12/17/23 2256    Specimen: Stool from Per Rectum Updated: 12/18/23 0008     Toxigenic C. difficile by PCR Negative    Narrative:      The result indicates the absence of toxigenic C. difficile from stool specimen.     Urinalysis With Microscopic If Indicated (No Culture) - Straight Cath [[id number]]  (Abnormal) Collected: 12/18/23 0052    Specimen: Urine from Straight Cath Updated: 12/18/23 0115     Color, UA Dark Yellow     Appearance, UA Clear     pH, UA 5.5     Specific Gravity, UA 1.019     Glucose, UA Negative     Ketones, UA 40 mg/dL (2+)     Bilirubin, UA Negative     Blood, UA Negative     Protein, UA 30 mg/dL (1+)     Leuk Esterase, UA Negative     Nitrite, UA Negative     Urobilinogen, UA 0.2 E.U./dL    STAT Lactic Acid, Reflex [[id number]]  (Abnormal) Collected: 12/18/23 0052    Specimen: Blood Updated: 12/18/23 0140     Lactate 2.6 mmol/L     Urinalysis, Microscopic Only - Straight Cath [[id number]] Collected: 12/18/23 0052    Specimen: Urine from Straight Cath Updated: 12/18/23 0115     RBC, UA 0-2 /HPF      WBC, UA 0-2 /HPF      Bacteria, UA None Seen /HPF      Squamous Epithelial Cells, UA 0-2 /HPF      Hyaline Casts, UA 0-2 /LPF      Methodology Automated Microscopy    Basic Metabolic Panel [[id number]]  (Abnormal) Collected: 12/18/23 0643    Specimen: Blood Updated: 12/18/23 0801     Glucose 86 mg/dL      BUN 32 mg/dL      Creatinine 1.40 mg/dL      Sodium 133 mmol/L      Potassium 5.4 mmol/L      Chloride 104 mmol/L      CO2 14.5 mmol/L      Calcium 8.6 mg/dL      BUN/Creatinine Ratio 22.9     Anion Gap 14.5 mmol/L      eGFR 40.8 mL/min/1.73     Narrative:      GFR Normal >60  Chronic Kidney  Disease <60  Kidney Failure <15      CBC (No Diff) [[id number]]  (Abnormal) Collected: 12/18/23 0643    Specimen: Blood Updated: 12/18/23 0807     WBC 4.02 10*3/mm3      RBC 3.68 10*6/mm3      Hemoglobin 11.6 g/dL      Hematocrit 33.9 %      MCV 92.1 fL      MCH 31.5 pg      MCHC 34.2 g/dL      RDW 11.2 %      RDW-SD 37.5 fl      MPV 9.6 fL      Platelets 176 10*3/mm3     High Sensitivity Troponin T [[id number]]  (Abnormal) Collected: 12/18/23 0643    Specimen: Blood Updated: 12/18/23 0803     HS Troponin T 27 ng/L     Narrative:      High Sensitive Troponin T Reference Range:  <14.0 ng/L- Negative Female for AMI  <22.0 ng/L- Negative Male for AMI  >=14 - Abnormal Female indicating possible myocardial injury.  >=22 - Abnormal Male indicating possible myocardial injury.   Clinicians would have to utilize clinical acumen, EKG, Troponin, and serial changes to determine if it is an Acute Myocardial Infarction or myocardial injury due to an underlying chronic condition.         STAT Lactic Acid, Reflex [[id number]]  (Normal) Collected: 12/18/23 0643    Specimen: Blood Updated: 12/18/23 0759     Lactate 1.9 mmol/L             Imaging Results (Last 24 Hours)       Procedure Component Value Units Date/Time    XR Chest 1 View [[id number]] Collected: 12/17/23 2322     Updated: 12/17/23 2322    Narrative:        Patient: REJI BEEBE  Time Out: 23:21  Exam(s): XR CXR 1 VIEW     EXAM:    XR Chest, 1 View    CLINICAL HISTORY:     Reason for exam: Tachypnea, shortness of breath.    TECHNIQUE:    Frontal view of the chest.    COMPARISON:    No previous studies.    FINDINGS:    Lungs:  Unremarkable.  No consolidation.    Pleural space:  Blunting of the right CP angle possibly in the base of   small pleural effusion.  No pneumothorax.    Heart:  Unremarkable.  No cardiomegaly.    Mediastinum:  Cardiomediastinal silhouette unremarkable.    Bones joints:  Osteopenia.  No acute fracture.    Soft tissues:  The patient is status post left  mastectomy.  Surgical   clips are noted at the left axilla.    Upper abdomen:  Elevation of the left hemidiaphragm.  Status post   cholecystectomy.    IMPRESSION:       1.  Status post left mastectomy.  2.  Surgical clips are noted at the left axilla.  3.  Elevation of the left hemidiaphragm.  4.  Scarring and subsegmental atelectasis suggested at the left lung base.    5.  Blunting of the CP angle possibly on the basis of small pleural   effusions.      Impression:          Electronically signed by Baldev Navarrete MD on 12-17-23 at 2321    CT Abdomen Pelvis Without Contrast [[id number]] Collected: 12/17/23 2254     Updated: 12/17/23 2304    Narrative:      CT ABDOMEN PELVIS WO CONTRAST-     Radiation dose reduction techniques were utilized, including automated  exposure control and exposure modulation based on body size.     Clinical: Abdominal pain, diarrhea     COMPARISON 8/27/2023     FINDINGS:  1. Bone metastasis are similar to the previous examination. Again  demonstrate elevation of the left hemidiaphragm. Lung bases clear.     2. The esophagus is distended, no wall thickening or mass seen, the  appearance is quite similar to the previous examination and likely  related to dysmotility.     3. Focal fatty infiltration left lobe of the liver. No biliary duct  dilatation status post cholecystectomy. The pancreas and spleen are  unremarkable. Calcification of both adrenal glands likely related to old  granulomatous disease. Small cyst lower pole left kidney, otherwise  unremarkable. No calculus or obstructive uropathy seen. Urinary bladder  is typical in appearance.     4. There is a paucity of formed fecal material demonstrated within the  colon. The colon is largely collapsed. No wall thickening. The appendix  is satisfactory in appearance. The small bowel is normal. Gas and fluid  is demonstrated within the stomach, no gastric wall thickening seen. The  duodenum is typical in appearance. No free air or free  intraperitoneal  fluid seen. The remainder is unremarkable.              This report was finalized on 12/17/2023 11:00 PM by Dr. Orion Cortez M.D on Workstation: [id number]                   ECG 12 Lead Chest Pain   Preliminary Result   HEART RATE= 120  bpm   RR Interval= 500  ms   ID Interval= 122  ms   P Horizontal Axis= -44  deg   P Front Axis= 74  deg   QRSD Interval= 92  ms   QT Interval= 345  ms   QTcB= 488  ms   QRS Axis= 77  deg   T Wave Axis= 59  deg   - BORDERLINE ECG -   Sinus tachycardia   Minimal ST elevation, inferior leads   Borderline prolonged QT interval   Electronically Signed By:    Date and Time of Study: 2023-12-18 09:30:04      ECG 12 Lead Chest Pain   Final Result   HEART RATE= 112  bpm   RR Interval= 536  ms   ID Interval= 111  ms   P Horizontal Axis= 30  deg   P Front Axis= 64  deg   QRSD Interval= 81  ms   QT Interval= 384  ms   QTcB= 525  ms   QRS Axis= 80  deg   T Wave Axis= 83  deg   - ABNORMAL ECG -   Sinus tachycardia   Borderline ST elevation, lateral leads   Prolonged QT interval   No change from previous tracing   Electronically Signed By: Zach Michelle ([facility]) 18-Dec-2023 08:03:16   Date and Time of Study: 2023-12-18 06:13:25      ECG 12 Lead Tachycardia   Final Result   HEART RATE= 116  bpm   RR Interval= 517  ms   ID Interval= 140  ms   P Horizontal Axis= -22  deg   P Front Axis= 74  deg   QRSD Interval= 89  ms   QT Interval= 326  ms   QTcB= 453  ms   QRS Axis= 74  deg   T Wave Axis= 46  deg   - OTHERWISE NORMAL ECG -   Sinus tachycardia   No change from previous tracing   Electronically Signed By: Zach Michelle ([facility]) 18-Dec-2023 08:03:09   Date and Time of Study: 2023-12-17 21:06:24      SCANNED - TELEMETRY     Final Result           Assessment/Plan     Active Hospital Problems    Diagnosis  POA   • **Acute renal insufficiency [N28.9]  Yes   • Hypotension [I95.9]  Yes   • Moderate malnutrition [E44.0]  Yes   • Hyperkalemia [E87.5]  Yes   • Dehydration [E86.0]  Yes   • High anion  gap metabolic acidosis [E87.29]  Yes   • Tongue cancer [C02.9]  Yes   • Tachycardia [R00.0]  Yes   • Carcinoma of left breast metastatic to lung [C50.912, C78.02]  Yes   • Vocal cord dysfunction [J38.3]  Yes   • Depression with anxiety [F41.8]  Yes      Resolved Hospital Problems   No resolved problems to display.       Ms. Vivar is a 68 y.o. female former smoker with a history of multiple medical issues, including but not limited to, metastatic breast cancer, asthma, HTN, HLD, SCC tongue, depression/anxiety who is admitted for SHONDA & HAGMA due to dehydration with n/v/d, decreased po intake, tachycardia and hypotension    SHONDA/HAGMA/Dehydration/Hyperkalemia/Malnutrition:  -Likely due to decreased po intake with N/V/D. GI PCR & C diff neg. UA noninfectious. Lactate normalized w/IVF's. Anion gap closed. Continue IVF's, PPI. CT A/P as below;  GI evaluation due to recurrent admissions for similar symptoms (last admitted 11/4/23-11/8/23). Antiemetics as needed. Nutrition consult. Avoid nephrotoxic meds. K 5.4 this a.m., give lokelma x 1 & repeat this afternoon    Hypotension/Tachycardia:  -Continue IVF's as above. HS trop 28-->26-->27. C/O epigastric pain. Repeat Troponin and EKG.  Serial EKG x 2 interpreted as ST, no change from previous. Monitor on telemetry    Metastatic breast cancer/Hx SCC tongue cancer:  -Consult Hem/Onc to follow    Depression/Anxiety:  -Continue abilify, prn lorazepam    OT/PT eval      I discussed the patient's findings and my recommendations with patient.    VTE Prophylaxis - SCDs.  Code Status - Full code.       ARON Tijerina  [facility]  12/18/23  09:58 EST

## 2023-12-18 NOTE — CONSULTS
Nutrition Services    Patient Name:  Marialuisa Vivar  YOB: 1955  MRN: [id number]  Admit Date:  12/17/2023    Assessment Date:  12/18/23    Summary: APRN consult for chronic poor intake    Pt is a 68 year old female admitted for weakness, n/v/d x 2-3 days. Admitted about 1 month ago for similar problem, and again in August. PMH pertinent for metastatic cancer. Pt's current diet is Regular/thins. Labs reviewed: Na 133, K 5.4, BUN 32, Cr 1.4, ^lactate. Skin is intact.     Weight trend is down significantly over the past year, 41 lb loss/12 mos (25%), 34 lb/6 mos (21%), 12 lb/3 mos (8.8%). D/w OP oncology RD who reports pt has had several missed appointments for IV fluids, hormonal/target therapy and MNT sessions. She has chronic issues with decreased PO intake, n/v/d. Visited today at lunch - she was eating the meatloaf & mashed potatoes, denies n/v - but appetite remains poor. She has supplements at home that she reports drinking if she doesn't feel like eating anything solid. Encouraged her to drink between meals as supplements instead of meal replacements.    Patient meets ASPEN/AND criteria for nutrition diagnosis of severe malnutrition of chronic illness based on:  insufficient energy intake >1 month, weight status/severe loss, and NFPE which reveal moderate muscle wasting and severe fat loss.     Plan/Recommend:  Initiate Boost/Ensure Plus chocolate BID. Try Mighty Shake in berry for lunch.   Monitor intake, labs, weight and skin status.   RD to follow.      CLINICAL NUTRITION ASSESSMENT      Reason for Assessment Chronic Poor Intake , Nurse or Nurse Practitioner Consult     Diagnosis/Problem   Generalized weakness, hypotension, kidney failure  -hx metastatic breast ca (mets to bone)  -former smoker  -n/v/d since 12/16/23   Medical/Surgical History Past Medical History:   Diagnosis Date    SARAH (acute kidney injury) 08/27/2023    Allergy     Anxiety     Asthma     Bone metastasis     Breast cancer   "   Left node positive    Cholelithiasis 2000    Lap Marily    Class 1 obesity without serious comorbidity with body mass index (BMI) of 31.0 to 31.9 in adult 02/06/2019    Colon polyp Past 10-15 yrs?    found at colonoscopies    Depression     Esophageal reflux     cough    History of colon polyps     Hyperlipidemia     Hypertension     Left breast cancer metastasized to lung     Nausea & vomiting     Obstructive sleep apnea     does not wear cpap    Ovarian cyst     Pancytopenia 08/29/2023    PONV (postoperative nausea and vomiting)     Tongue cancer 05/2019    by ENT at [facility]       Past Surgical History:   Procedure Laterality Date    CHOLECYSTECTOMY  2000    COLONOSCOPY  2014    H/O polyps    COLONOSCOPY N/A 6/1/2022    Procedure: COLONOSCOPY to cecum and TI with cold / hot snare polypectomies;  Surgeon: Luis Enrique Galeas MD;  Location: [facility] ENDOSCOPY;  Service: Gastroenterology;  Laterality: N/A;  pre-abnormal ct  post-polyps, diverticulosis, hemorrhoids    KNEE ARTHROPLASTY      LUNG SURGERY  2010    Lung wedge resection    MASTECTOMY RADICAL Left 1993    TONGUE SURGERY  05/21/2019    tongue cancer    TOTAL LAPAROSCOPIC HYSTERECTOMY N/A 11/04/2021    Procedure: Robotic assisted total laparoscopic hysterectomy, bilateral salpingoophorecotmy, bilateral ureteralysis ;  Surgeon: Kaylynn Ricci DO;  Location: [facility] MAIN OR;  Service: Robotics - DaVinci;  Laterality: N/A;        Anthropometrics        Current Height  Current Weight  BMI kg/m2 Height: 165.1 cm (65\")  Weight: 56.2 kg (123 lb 14.4 oz) (12/18/23 0420)  Body mass index is 20.62 kg/m².   Adjusted BMI (if applicable)    BMI Category Normal/Healthy (18.4 - 24.9)   Ideal Body Weight (IBW) 125#   Usual Body Weight (UBW) 160#   Weight Trend Loss, Amount/Timeframe: 12#/3 mos (8%), 34#/6 mos (21%), 41#/12 mos (25%)   Weight History Wt Readings from Last 30 Encounters:   12/18/23 0420 56.2 kg (123 lb 14.4 oz)   12/17/23 1919 56.7 kg (125 lb) "   12/05/23 1538 56.9 kg (125 lb 6.4 oz)   11/22/23 1022 61.2 kg (135 lb)   11/11/23 1129 61.7 kg (136 lb)   11/06/23 2257 61.9 kg (136 lb 6.4 oz)   11/04/23 1937 60.7 kg (133 lb 13.1 oz)   09/21/23 1512 60.7 kg (133 lb 12.8 oz)   09/18/23 1502 61.3 kg (135 lb 3.2 oz)   09/12/23 1320 62.6 kg (138 lb)   08/22/23 1513 64.9 kg (143 lb)   08/07/23 0954 71.7 kg (158 lb)   06/08/23 1000 71.6 kg (157 lb 12.8 oz)   05/30/23 1426 69.4 kg (152 lb 14.4 oz)   05/02/23 1345 70.8 kg (156 lb 1.6 oz)   04/18/23 1417 70.8 kg (156 lb)   04/18/23 1056 72.1 kg (159 lb)   04/04/23 1354 72.1 kg (159 lb)   02/28/23 0759 73 kg (160 lb 14.4 oz)   12/15/22 1332 74.6 kg (164 lb 6.4 oz)   12/07/22 1300 73.3 kg (161 lb 11.2 oz)   10/20/22 1349 75 kg (165 lb 6.4 oz)   10/14/22 1448 73.8 kg (162 lb 11.2 oz)   09/16/22 1507 75.5 kg (166 lb 6.4 oz)   08/19/22 1018 76.8 kg (169 lb 6.4 oz)   06/24/22 1241 75.4 kg (166 lb 4.8 oz)   06/03/22 1528 74.8 kg (165 lb)   06/01/22 1140 75.8 kg (167 lb)   05/31/22 1127 77.1 kg (170 lb)   05/20/22 1257 76.5 kg (168 lb 9.6 oz)   05/06/22 1400 77.1 kg (170 lb)   04/27/22 0807 76.1 kg (167 lb 12.8 oz)        Estimated/Assessed Needs       Energy Requirements    Weight for Calculation 56.2 kg   Method for Estimation  30-35 kcal/kg   EST Needs (kcal/day) 5805-8990       Protein Requirements    Weight for Calculation 56.2   EST Protein Needs (g/kg) 1.2 - 1.5 gm/kg   EST Daily Needs (g/day) 67-84       Fluid Requirements     Method for Estimation 1 mL/kcal    Estimated Needs (mL/day) 1686      Labs       Pertinent Labs    Results from last 7 days   Lab Units 12/18/23  0643 12/17/23 1952   SODIUM mmol/L 133* 134*   POTASSIUM mmol/L 5.4* 5.0   CHLORIDE mmol/L 104 101   CO2 mmol/L 14.5* 15.0*   BUN mg/dL 32* 36*   CREATININE mg/dL 1.40* 1.61*   CALCIUM mg/dL 8.6 10.0   BILIRUBIN mg/dL  --  1.0   ALK PHOS U/L  --  94   ALT (SGPT) U/L  --  12   AST (SGOT) U/L  --  21   GLUCOSE mg/dL 86 101*     Results from last 7 days    Lab Units 12/18/23  0643 12/17/23 1952   HEMOGLOBIN g/dL 11.6* 13.7   HEMATOCRIT % 33.9* 39.8   WBC 10*3/mm3 4.02 5.08   ALBUMIN g/dL  --  4.8     Results from last 7 days   Lab Units 12/18/23  0643 12/17/23 1952   INR   --  1.03   APTT seconds  --  32.7   PLATELETS 10*3/mm3 176 269     COVID19   Date Value Ref Range Status   12/17/2023 Not Detected Not Detected - Ref. Range Final     Lab Results   Component Value Date    HGBA1C 5.40 10/20/2022          Medications           Scheduled Medications ARIPiprazole, 5 mg, Oral, Q PM  atorvastatin, 10 mg, Oral, Nightly  pantoprazole, 40 mg, Intravenous, Q AM  sodium chloride, 10 mL, Intravenous, Q12H  sodium zirconium cyclosilicate, 10 g, Oral, Once       Infusions sodium chloride, 100 mL/hr, Last Rate: 100 mL/hr (12/18/23 0800)       PRN Medications   acetaminophen **OR** acetaminophen **OR** acetaminophen    calcium carbonate    LORazepam    meclizine    ondansetron **OR** ondansetron    [COMPLETED] Insert Peripheral IV **AND** sodium chloride    sodium chloride    sodium chloride     Physical Findings          General Findings alert, frail, loss of muscle mass, loss of subcutaneous fat, other: very hoarse, weak voice   Oral/Mouth Cavity WDL   Edema  no edema   Gastrointestinal nausea, last bowel movement: 12/18   Skin  skin intact   Tubes/Drains/Lines none   NFPE See Malnutrition Severity Assessment, Date Completed: 12/18     Malnutrition Severity Assessment      Patient meets criteria for : Severe Malnutrition  Malnutrition Type (last 8 hours)       Malnutrition Severity Assessment       Row Name 12/18/23 1207       Malnutrition Severity Assessment    Malnutrition Type Chronic Disease - Related Malnutrition      Row Name 12/18/23 1207       Insufficient Energy Intake     Insufficient Energy Intake Findings Severe    Insufficient Energy Intake  <75% of est. energy requirement for > or equal to 1 month      Row Name 12/18/23 1207       Unintentional Weight Loss      Unintentional Weight Loss Findings Severe    Unintentional Weight Loss  Weight loss greater than 10% in six months  34 lb (21%) over 6 months      Row Name 12/18/23 1207       Muscle Loss    Loss of Muscle Mass Findings Moderate    Denominational Region Moderate - slight depression    Clavicle Bone Region Moderate - some protrusion in females, visible in males    Acromion Bone Region Moderate - acromion may slightly protrude    Dorsal Hand Region Moderate - slight depression      Row Name 12/18/23 1207       Fat Loss    Subcutaneous Fat Loss Findings Severe    Orbital Region  Severe - pronounced hollowness/depression, dark circles, loose saggy skin    Upper Arm Region Moderate - some fat tissue, not ample      Row Name 12/18/23 1207       Criteria Met (Must meet criteria for severity in at least 2 of these categories: M Wasting, Fat Loss, Fluid, Secondary Signs, Wt. Status, Intake)    Patient meets criteria for  Severe Malnutrition                       Current Nutrition Orders & Evaluation of Intake       Oral Nutrition     Food Allergies NKFA   Current PO Diet Diet: Regular/House Diet; Texture: Regular Texture (IDDSI 7); Fluid Consistency: Thin (IDDSI 0)   Supplement n/a   PO Evaluation     % PO Intake 0% breakfast, 1/4 of lunch tray (meatloaf, potatoes, bottled H20)    Factors Affecting Intake: decreased appetite, fatigue, nausea, vomiting, weakness   --  PES STATEMENT / NUTRITION DIAGNOSIS      Nutrition Dx Problem  Problem: Malnutrition (severe)  Etiology: Medical Diagnosis - breast ca with mets    Signs/Symptoms: Report of Minimal PO Intake, NFPE Results, BMI, Unintended Weight Change, and Report/Observation     NUTRITION INTERVENTION / PLAN OF CARE      Intervention Goal(s) Reduce/improve symptoms, Disease management/therapy, Increase intake, and PO intake goal %: 75         RD Intervention/Action Interview for preferences, Encourage intake, Continue to monitor, and Recommend/order: supplements   --       Prescription/Orders:       PO Diet       Supplements Ensure Plus chocolate BID, Mighty Shakes lunch (berry)      Enteral Nutrition       Parenteral Nutrition    New Prescription Ordered? Yes   --      Monitor/Evaluation Per protocol   Discharge Plan/Needs Pending clinical course, Other: would benefit from OP oncology follow up   --    RD to follow per protocol.      Electronically signed by:  Fallon Stern RD  12/18/23 10:36 EST

## 2023-12-18 NOTE — ED PROVIDER NOTES
MD ATTESTATION NOTE    The TORREY and I have discussed this patient's history, physical exam, and treatment plan.    I provided a substantive portion of the care of this patient. I personally performed the physical exam, in its entirety. The attached note describes my personal findings.      Marialuisa Vivar is a 68 y.o. female who presents to the ED c/o generalized weakness with associated vomiting and diarrhea for the past 2 days.  She works having generalized abdominal discomfort.  She also complains of feeling fatigued.      On exam:  GENERAL: not distressed  HENT: nares patent, mucous membranes dry  EYES: no scleral icterus  CV: regular rhythm, regular rate  RESPIRATORY: normal effort, clear to auscultation bilaterally  ABDOMEN: soft, nontender  MUSCULOSKELETAL: no deformity  NEURO: alert, moves all extremities, follows commands  SKIN: warm, dry    Labs  Recent Results (from the past 24 hour(s))   Green Top (Gel)    Collection Time: 12/17/23  7:52 PM   Result Value Ref Range    Extra Tube Hold for add-ons.    Lavender Top    Collection Time: 12/17/23  7:52 PM   Result Value Ref Range    Extra Tube hold for add-on    Gold Top - SST    Collection Time: 12/17/23  7:52 PM   Result Value Ref Range    Extra Tube Hold for add-ons.    Light Blue Top    Collection Time: 12/17/23  7:52 PM   Result Value Ref Range    Extra Tube Hold for add-ons.    Comprehensive Metabolic Panel    Collection Time: 12/17/23  7:52 PM    Specimen: Blood   Result Value Ref Range    Glucose 101 (H) 65 - 99 mg/dL    BUN 36 (H) 8 - 23 mg/dL    Creatinine 1.61 (H) 0.57 - 1.00 mg/dL    Sodium 134 (L) 136 - 145 mmol/L    Potassium 5.0 3.5 - 5.2 mmol/L    Chloride 101 98 - 107 mmol/L    CO2 15.0 (L) 22.0 - 29.0 mmol/L    Calcium 10.0 8.6 - 10.5 mg/dL    Total Protein 7.6 6.0 - 8.5 g/dL    Albumin 4.8 3.5 - 5.2 g/dL    ALT (SGPT) 12 1 - 33 U/L    AST (SGOT) 21 1 - 32 U/L    Alkaline Phosphatase 94 39 - 117 U/L    Total Bilirubin 1.0 0.0 - 1.2 mg/dL     Globulin 2.8 gm/dL    A/G Ratio 1.7 g/dL    BUN/Creatinine Ratio 22.4 7.0 - 25.0    Anion Gap 18.0 (H) 5.0 - 15.0 mmol/L    eGFR 34.5 (L) >60.0 mL/min/1.73   Lipase    Collection Time: 12/17/23  7:52 PM    Specimen: Blood   Result Value Ref Range    Lipase 47 13 - 60 U/L   CK    Collection Time: 12/17/23  7:52 PM    Specimen: Blood   Result Value Ref Range    Creatine Kinase 31 20 - 180 U/L   High Sensitivity Troponin T    Collection Time: 12/17/23  7:52 PM    Specimen: Blood   Result Value Ref Range    HS Troponin T 28 (H) <14 ng/L   Procalcitonin    Collection Time: 12/17/23  7:52 PM    Specimen: Blood   Result Value Ref Range    Procalcitonin 0.11 0.00 - 0.25 ng/mL   CBC Auto Differential    Collection Time: 12/17/23  7:52 PM    Specimen: Blood   Result Value Ref Range    WBC 5.08 3.40 - 10.80 10*3/mm3    RBC 4.28 3.77 - 5.28 10*6/mm3    Hemoglobin 13.7 12.0 - 15.9 g/dL    Hematocrit 39.8 34.0 - 46.6 %    MCV 93.0 79.0 - 97.0 fL    MCH 32.0 26.6 - 33.0 pg    MCHC 34.4 31.5 - 35.7 g/dL    RDW 11.4 (L) 12.3 - 15.4 %    RDW-SD 38.4 37.0 - 54.0 fl    MPV 9.4 6.0 - 12.0 fL    Platelets 269 140 - 450 10*3/mm3    Neutrophil % 63.4 42.7 - 76.0 %    Lymphocyte % 31.3 19.6 - 45.3 %    Monocyte % 3.5 (L) 5.0 - 12.0 %    Eosinophil % 0.6 0.3 - 6.2 %    Basophil % 0.4 0.0 - 1.5 %    Immature Grans % 0.8 (H) 0.0 - 0.5 %    Neutrophils, Absolute 3.22 1.70 - 7.00 10*3/mm3    Lymphocytes, Absolute 1.59 0.70 - 3.10 10*3/mm3    Monocytes, Absolute 0.18 0.10 - 0.90 10*3/mm3    Eosinophils, Absolute 0.03 0.00 - 0.40 10*3/mm3    Basophils, Absolute 0.02 0.00 - 0.20 10*3/mm3    Immature Grans, Absolute 0.04 0.00 - 0.05 10*3/mm3    nRBC 0.0 0.0 - 0.2 /100 WBC   Protime-INR    Collection Time: 12/17/23  7:52 PM    Specimen: Blood   Result Value Ref Range    Protime 13.6 11.7 - 14.2 Seconds    INR 1.03 0.90 - 1.10   aPTT    Collection Time: 12/17/23  7:52 PM    Specimen: Blood   Result Value Ref Range    PTT 32.7 22.7 - 35.4 seconds   ECG  12 Lead Tachycardia    Collection Time: 12/17/23  9:06 PM   Result Value Ref Range    QT Interval 326 ms    QTC Interval 453 ms   Type & Screen    Collection Time: 12/17/23  9:25 PM    Specimen: Blood   Result Value Ref Range    ABO Type O     RH type Positive     Antibody Screen Negative     T&S Expiration Date 12/20/2023 11:59:59 PM    Blood Gas, Arterial -    Collection Time: 12/17/23  9:46 PM    Specimen: Arterial Blood   Result Value Ref Range    Site Right Radial     Danny's Test Positive     pH, Arterial 7.387 7.350 - 7.450 pH units    pCO2, Arterial 22.6 (L) 35.0 - 45.0 mm Hg    pO2, Arterial 100.3 (H) 80.0 - 100.0 mm Hg    HCO3, Arterial 13.6 (L) 22.0 - 28.0 mmol/L    Base Excess, Arterial -9.5 (L) 0.0 - 2.0 mmol/L    O2 Saturation, Arterial 97.9 92.0 - 98.5 %    CO2 Content 14.3 (L) 23 - 27 mmol/L    Barometric Pressure for Blood Gas 745.8000 mmHg    Modality Room Air     Rate 20 Breaths/minute    Hemodilution No     Device Comment SpO2 98%        Radiology  No Radiology Exams Resulted Within Past 24 Hours    Medications given in the ED:  Medications   sodium chloride 0.9 % flush 10 mL (has no administration in time range)   lactated ringers bolus 1,000 mL (has no administration in time range)       Orders placed during this visit:  Orders Placed This Encounter   Procedures    Respiratory Panel PCR w/COVID-19(SARS-CoV-2) NICOLAS/ARPITA/EDNA/PAD/COR/PAMELA In-House, NP Swab in UTM/VTM, 2 HR TAT - Swab, Nasopharynx    Blood Culture - Blood,    Blood Culture - Blood,    Gastrointestinal Panel, PCR - Stool, Per Rectum    Clostridioides difficile Toxin - Stool, Per Rectum    XR Chest 1 View    CT Abdomen Pelvis Without Contrast    Peel Draw    Comprehensive Metabolic Panel    Lipase    Urinalysis With Microscopic If Indicated (No Culture) - Urine, Clean Catch    CK    High Sensitivity Troponin T    Lactic Acid, Plasma    Procalcitonin    Blood Gas, Arterial -    CBC Auto Differential    Protime-INR    aPTT    High  Sensitivity Troponin T 2Hr    Blood Gas, Arterial -    Pulse Oximetry, Continuous    Patient Isolation Contact Spore    ECG 12 Lead Tachycardia    Type & Screen    Insert Peripheral IV    Green Top (Gel)    Lavender Top    Gold Top - SST    Light Blue Top    CBC & Differential       Medical Decision Making:  ED Course as of 12/18/23 2145   Sun Dec 17, 2023   2037 BP: 94/66 [RC]   2037 Heart Rate: 112 [RC]   2037 Resp: 16 [RC]   2037 SpO2: 97 %  RA [RC]   2156 Glucose(!): 101 [RC]   2243 Hemoglobin: 13.7 [RC]   2243 Hematocrit: 39.8 [RC]   2243 RBC: 4.28 [RC]   2243 WBC: 5.08 [RC]   2243 Platelets: 269 [RC]   2243 Creatinine(!): 1.61  This was 0.98 x 1 month ago.  Patient clearly dry.  She is currently getting IVF. [RC]   2244 CO2(!): 15.0 [RC]   2244 Anion Gap(!): 18.0 [RC]   2244 Lipase: 47 [RC]   2244 CK [RC]   2244 HS Troponin T(!): 28  This appears to be chronically elevated and this appears baseline from troponins checked over the course of the past month. [RC]   2244 Lactate(!!): 2.8  Noted [RC]   2245 RVP negative [RC]   2310 EKG reviewed.  QT interval okay.  Will go ahead and proceed with IV Zofran for the nausea. [RC]   Mon Dec 18, 2023   0009 My independent interpretation the patient CT abdomen pelvis [RC]   0048 Discussed patient's case with TANISHA Stephens with [facility].  To admit to Dr. Gil's care and telemetry bed. [RC]      ED Course User Index  [RC] Hood Hickman III, PA           Diagnosis  Final diagnoses:   Generalized weakness   Compensated metabolic acidosis   Acute renal insufficiency   Dehydration   Nausea vomiting and diarrhea   Metastatic malignant neoplasm, unspecified site   Hypotension, unspecified hypotension type          Hector Bach II, MD  12/18/23 2145

## 2023-12-18 NOTE — ED PROVIDER NOTES
EMERGENCY DEPARTMENT ENCOUNTER    Room Number:  05/05  PCP: Jennifer Mantilla MD      HPI:  Chief Complaint: Generalized weakness, hypotension, and fatigue  A complete HPI/ROS/PMH/PSH/SH/FH are unobtainable due to: Nothing  Context: Marialuisa Vivar is a 68 y.o. female with a significant PMH of hypertension, neoplasm metastatic to bone, hyperlipidemia, metabolic acidosis, dehydration, pancytopenia, SARAH who presents to the ED c/o generalized weakness, fatigue.  Patient states 2 days ago she began to feel nauseated lose her appetite.  She has been able to hold down any solids and liquids over the past 24 to 48 hours.  There is been nonbloody/nonbilious vomiting.  She also reports nonbloody/nonbilious diarrhea.  Patient reports chills but denies fever.  There is some abdominal cramping but no outright pain.  Patient denies UTI-like symptoms.  She denies chest pain, palpitations, shortness of breath.  She states over the past 24 hours she has done nothing to lay in bed.  Patient was allegedly found to be hypotensive in the field by EMS at their initial evaluation.  She was brought to the ED for further evaluation.    Reviewing a discharge note dictated by ARON Mott 11/8/2023 patient was admitted for SARAH, hypercalcemia, hyponatremia.  She was started on IVF, antiemetics, heme-onc saw followed.  Calcium normalized with pamidronate.  CR and a normalized with IV fluid.  CT of the C/A/P per oncology were stable without acute findings.  OT PT followed closely and recommended SNF but strength and endurance improved and the patient was discharged home with home health services.    PAST MEDICAL HISTORY  Active Ambulatory Problems     Diagnosis Date Noted    Benign essential HTN 01/20/2016    Depression 01/20/2016    Vocal cord dysfunction 01/20/2016    Malignant neoplasm metastatic to bone 01/20/2016    Carcinoma of left breast metastatic to lung 08/22/2016    Tachycardia 10/05/2016    Therapeutic drug monitoring  10/05/2016    Lung metastasis 06/01/2010    Vitamin D deficiency 05/09/2018    Class 1 obesity without serious comorbidity with body mass index (BMI) of 31.0 to 31.9 in adult 02/06/2019    Tongue cancer 08/13/2019    Anxiety 08/25/2020    Bruxism 08/25/2020    Cheilosis 08/25/2020    Squamous cell carcinoma of skin 08/25/2020    Hyperlipidemia 08/25/2020    Ovarian mass, right 10/19/2021    Adnexal mass 10/26/2021    Abnormal CT scan, colon 04/07/2022    SARAH (acute kidney injury) 08/27/2023    Hyperkalemia 08/28/2023    Metabolic acidosis 08/28/2023    Dehydration 08/28/2023    Diarrhea 08/28/2023    Pancytopenia 08/29/2023    Generalized weakness 11/06/2023    Hyponatremia 11/06/2023    Moderate malnutrition 11/08/2023     Resolved Ambulatory Problems     Diagnosis Date Noted    Fatigue 01/20/2016    Blood glucose elevated 01/20/2016    Leg swelling 10/05/2016    Dyspnea on exertion 10/05/2016    Benign essential hypertension 08/25/2020    Nausea & vomiting 11/04/2023     Past Medical History:   Diagnosis Date    Allergy     Asthma     Bone metastasis     Breast cancer     Cholelithiasis 2000    Colon polyp Past 10-15 yrs?    Esophageal reflux     History of colon polyps     Hypertension     Left breast cancer metastasized to lung     Obstructive sleep apnea     Ovarian cyst     PONV (postoperative nausea and vomiting)          PAST SURGICAL HISTORY  Past Surgical History:   Procedure Laterality Date    CHOLECYSTECTOMY  2000    COLONOSCOPY  2014    H/O polyps    COLONOSCOPY N/A 6/1/2022    Procedure: COLONOSCOPY to cecum and TI with cold / hot snare polypectomies;  Surgeon: Luis Enrique Galeas MD;  Location: [facility] ENDOSCOPY;  Service: Gastroenterology;  Laterality: N/A;  pre-abnormal ct  post-polyps, diverticulosis, hemorrhoids    KNEE ARTHROPLASTY      LUNG SURGERY  2010    Lung wedge resection    MASTECTOMY RADICAL Left 1993    TONGUE SURGERY  05/21/2019    tongue cancer    TOTAL LAPAROSCOPIC HYSTERECTOMY N/A  2021    Procedure: Robotic assisted total laparoscopic hysterectomy, bilateral salpingoophorecotmy, bilateral ureteralysis ;  Surgeon: Kaylynn Ricci DO;  Location: [facility] OR;  Service: Robotics - DaVinci;  Laterality: N/A;         FAMILY HISTORY  Family History   Problem Relation Age of Onset    Hypertension Mother     Leukemia Mother 72    COPD Mother         smoker    Diabetes Brother     Pancreatic cancer Brother     Glaucoma Brother     Heart disease Brother     Hypertension Brother     Gallbladder disease Brother     Pancreatitis Brother          from pancreatic csncer    Gallbladder disease Father     Colon polyps Father     Stroke Other         Grandmother    Lupus Other         Aunt    Alcohol abuse Other         Aunt    Glaucoma Other         Grandmother    Diabetes type II Brother     Hypertension Brother     Hyperlipidemia Brother     Liver cancer Paternal Uncle     Stomach cancer Paternal Aunt     Alcohol abuse Maternal Aunt     Malig Hyperthermia Neg Hx          SOCIAL HISTORY  Social History     Socioeconomic History    Marital status: Single    Years of education: College   Tobacco Use    Smoking status: Former     Packs/day: 2.00     Years: 30.00     Additional pack years: 0.00     Total pack years: 60.00     Types: Cigarettes     Start date: 1973     Quit date: 2008     Years since quitting: 15.5    Smokeless tobacco: Never   Vaping Use    Vaping Use: Never used   Substance and Sexual Activity    Alcohol use: No    Drug use: No    Sexual activity: Not Currently     Birth control/protection: None         ALLERGIES  Nickel and Ciprofloxacin        REVIEW OF SYSTEMS  Review of Systems     All systems reviewed and negative except for those discussed in HPI.       PHYSICAL EXAM  ED Triage Vitals [23]   Temp Heart Rate Resp BP SpO2   98.6 °F (37 °C) 120 18 (!) 86/40 97 %      Temp src Heart Rate Source Patient Position BP Location FiO2 (%)   Oral Monitor Lying  Right arm --       Physical Exam      GENERAL: Moderate distress, smells of ketones   HENT: nares patent  EYES: no scleral icterus  CV: regular rhythm, normal rate  RESPIRATORY: normal effort  ABDOMEN: Mild epigastric tenderness, no guarding, no rebound   MUSCULOSKELETAL: no deformity  NEURO: alert, moves all extremities, follows commands  PSYCH:  calm, cooperative  SKIN: warm, dry    Vital signs and nursing notes reviewed.          LAB RESULTS  Recent Results (from the past 24 hour(s))   Green Top (Gel)    Collection Time: 12/17/23  7:52 PM   Result Value Ref Range    Extra Tube Hold for add-ons.    Lavender Top    Collection Time: 12/17/23  7:52 PM   Result Value Ref Range    Extra Tube hold for add-on    Gold Top - SST    Collection Time: 12/17/23  7:52 PM   Result Value Ref Range    Extra Tube Hold for add-ons.    Light Blue Top    Collection Time: 12/17/23  7:52 PM   Result Value Ref Range    Extra Tube Hold for add-ons.    Comprehensive Metabolic Panel    Collection Time: 12/17/23  7:52 PM    Specimen: Blood   Result Value Ref Range    Glucose 101 (H) 65 - 99 mg/dL    BUN 36 (H) 8 - 23 mg/dL    Creatinine 1.61 (H) 0.57 - 1.00 mg/dL    Sodium 134 (L) 136 - 145 mmol/L    Potassium 5.0 3.5 - 5.2 mmol/L    Chloride 101 98 - 107 mmol/L    CO2 15.0 (L) 22.0 - 29.0 mmol/L    Calcium 10.0 8.6 - 10.5 mg/dL    Total Protein 7.6 6.0 - 8.5 g/dL    Albumin 4.8 3.5 - 5.2 g/dL    ALT (SGPT) 12 1 - 33 U/L    AST (SGOT) 21 1 - 32 U/L    Alkaline Phosphatase 94 39 - 117 U/L    Total Bilirubin 1.0 0.0 - 1.2 mg/dL    Globulin 2.8 gm/dL    A/G Ratio 1.7 g/dL    BUN/Creatinine Ratio 22.4 7.0 - 25.0    Anion Gap 18.0 (H) 5.0 - 15.0 mmol/L    eGFR 34.5 (L) >60.0 mL/min/1.73   Lipase    Collection Time: 12/17/23  7:52 PM    Specimen: Blood   Result Value Ref Range    Lipase 47 13 - 60 U/L   CK    Collection Time: 12/17/23  7:52 PM    Specimen: Blood   Result Value Ref Range    Creatine Kinase 31 20 - 180 U/L   High Sensitivity  Troponin T    Collection Time: 12/17/23  7:52 PM    Specimen: Blood   Result Value Ref Range    HS Troponin T 28 (H) <14 ng/L   Procalcitonin    Collection Time: 12/17/23  7:52 PM    Specimen: Blood   Result Value Ref Range    Procalcitonin 0.11 0.00 - 0.25 ng/mL   CBC Auto Differential    Collection Time: 12/17/23  7:52 PM    Specimen: Blood   Result Value Ref Range    WBC 5.08 3.40 - 10.80 10*3/mm3    RBC 4.28 3.77 - 5.28 10*6/mm3    Hemoglobin 13.7 12.0 - 15.9 g/dL    Hematocrit 39.8 34.0 - 46.6 %    MCV 93.0 79.0 - 97.0 fL    MCH 32.0 26.6 - 33.0 pg    MCHC 34.4 31.5 - 35.7 g/dL    RDW 11.4 (L) 12.3 - 15.4 %    RDW-SD 38.4 37.0 - 54.0 fl    MPV 9.4 6.0 - 12.0 fL    Platelets 269 140 - 450 10*3/mm3    Neutrophil % 63.4 42.7 - 76.0 %    Lymphocyte % 31.3 19.6 - 45.3 %    Monocyte % 3.5 (L) 5.0 - 12.0 %    Eosinophil % 0.6 0.3 - 6.2 %    Basophil % 0.4 0.0 - 1.5 %    Immature Grans % 0.8 (H) 0.0 - 0.5 %    Neutrophils, Absolute 3.22 1.70 - 7.00 10*3/mm3    Lymphocytes, Absolute 1.59 0.70 - 3.10 10*3/mm3    Monocytes, Absolute 0.18 0.10 - 0.90 10*3/mm3    Eosinophils, Absolute 0.03 0.00 - 0.40 10*3/mm3    Basophils, Absolute 0.02 0.00 - 0.20 10*3/mm3    Immature Grans, Absolute 0.04 0.00 - 0.05 10*3/mm3    nRBC 0.0 0.0 - 0.2 /100 WBC   Protime-INR    Collection Time: 12/17/23  7:52 PM    Specimen: Blood   Result Value Ref Range    Protime 13.6 11.7 - 14.2 Seconds    INR 1.03 0.90 - 1.10   aPTT    Collection Time: 12/17/23  7:52 PM    Specimen: Blood   Result Value Ref Range    PTT 32.7 22.7 - 35.4 seconds   ECG 12 Lead Tachycardia    Collection Time: 12/17/23  9:06 PM   Result Value Ref Range    QT Interval 326 ms    QTC Interval 453 ms   Lactic Acid, Plasma    Collection Time: 12/17/23  9:25 PM    Specimen: Blood   Result Value Ref Range    Lactate 2.8 (C) 0.5 - 2.0 mmol/L   Type & Screen    Collection Time: 12/17/23  9:25 PM    Specimen: Blood   Result Value Ref Range    ABO Type O     RH type Positive      Antibody Screen Negative     T&S Expiration Date 12/20/2023 11:59:59 PM    Respiratory Panel PCR w/COVID-19(SARS-CoV-2) NICOLAS/ARPITA/EDNA/PAD/COR/PAMELA In-House, NP Swab in UTM/VTM, 2 HR TAT - Swab, Nasopharynx    Collection Time: 12/17/23  9:26 PM    Specimen: Nasopharynx; Swab   Result Value Ref Range    ADENOVIRUS, PCR Not Detected Not Detected    Coronavirus 229E Not Detected Not Detected    Coronavirus HKU1 Not Detected Not Detected    Coronavirus NL63 Not Detected Not Detected    Coronavirus OC43 Not Detected Not Detected    COVID19 Not Detected Not Detected - Ref. Range    Human Metapneumovirus Not Detected Not Detected    Human Rhinovirus/Enterovirus Not Detected Not Detected    Influenza A PCR Not Detected Not Detected    Influenza B PCR Not Detected Not Detected    Parainfluenza Virus 1 Not Detected Not Detected    Parainfluenza Virus 2 Not Detected Not Detected    Parainfluenza Virus 3 Not Detected Not Detected    Parainfluenza Virus 4 Not Detected Not Detected    RSV, PCR Not Detected Not Detected    Bordetella pertussis pcr Not Detected Not Detected    Bordetella parapertussis PCR Not Detected Not Detected    Chlamydophila pneumoniae PCR Not Detected Not Detected    Mycoplasma pneumo by PCR Not Detected Not Detected   Blood Gas, Arterial -    Collection Time: 12/17/23  9:46 PM    Specimen: Arterial Blood   Result Value Ref Range    Site Right Radial     Danny's Test Positive     pH, Arterial 7.387 7.350 - 7.450 pH units    pCO2, Arterial 22.6 (L) 35.0 - 45.0 mm Hg    pO2, Arterial 100.3 (H) 80.0 - 100.0 mm Hg    HCO3, Arterial 13.6 (L) 22.0 - 28.0 mmol/L    Base Excess, Arterial -9.5 (L) 0.0 - 2.0 mmol/L    O2 Saturation, Arterial 97.9 92.0 - 98.5 %    CO2 Content 14.3 (L) 23 - 27 mmol/L    Barometric Pressure for Blood Gas 745.8000 mmHg    Modality Room Air     Rate 20 Breaths/minute    Hemodilution No     Device Comment SpO2 98%    High Sensitivity Troponin T 2Hr    Collection Time: 12/17/23 10:55 PM     Specimen: Blood   Result Value Ref Range    HS Troponin T 26 (H) <14 ng/L    Troponin T Delta -2 >=-4 - <+4 ng/L   Gastrointestinal Panel, PCR - Stool, Per Rectum    Collection Time: 12/17/23 10:56 PM    Specimen: Per Rectum; Stool   Result Value Ref Range    Campylobacter Not Detected Not Detected    Plesiomonas shigelloides Not Detected Not Detected    Salmonella Not Detected Not Detected    Vibrio Not Detected Not Detected    Vibrio cholerae Not Detected Not Detected    Yersinia enterocolitica Not Detected Not Detected    Enteroaggregative E. coli (EAEC) Not Detected Not Detected    Enteropathogenic E. coli (EPEC) Not Detected Not Detected    Enterotoxigenic E. coli (ETEC) lt/st Not Detected Not Detected    Shiga-like toxin-producing E. coli (STEC) stx1/stx2 Not Detected Not Detected    Shigella/Enteroinvasive E. coli (EIEC) Not Detected Not Detected    Cryptosporidium Not Detected Not Detected    Cyclospora cayetanensis Not Detected Not Detected    Entamoeba histolytica Not Detected Not Detected    Giardia lamblia Not Detected Not Detected    Adenovirus F40/41 Not Detected Not Detected    Astrovirus Not Detected Not Detected    Norovirus GI/GII Not Detected Not Detected    Rotavirus A Not Detected Not Detected    Sapovirus (I, II, IV or V) Not Detected Not Detected   Clostridioides difficile Toxin, PCR - Stool, Per Rectum    Collection Time: 12/17/23 10:56 PM    Specimen: Per Rectum; Stool   Result Value Ref Range    Toxigenic C. difficile by PCR Negative Negative   Urinalysis With Microscopic If Indicated (No Culture) - Straight Cath    Collection Time: 12/18/23 12:52 AM    Specimen: Straight Cath; Urine   Result Value Ref Range    Color, UA Dark Yellow (A) Yellow, Straw    Appearance, UA Clear Clear    pH, UA 5.5 5.0 - 8.0    Specific Gravity, UA 1.019 1.005 - 1.030    Glucose, UA Negative Negative    Ketones, UA 40 mg/dL (2+) (A) Negative    Bilirubin, UA Negative Negative    Blood, UA Negative Negative     Protein, UA 30 mg/dL (1+) (A) Negative    Leuk Esterase, UA Negative Negative    Nitrite, UA Negative Negative    Urobilinogen, UA 0.2 E.U./dL 0.2 - 1.0 E.U./dL   STAT Lactic Acid, Reflex    Collection Time: 12/18/23 12:52 AM    Specimen: Blood   Result Value Ref Range    Lactate 2.6 (C) 0.5 - 2.0 mmol/L   Urinalysis, Microscopic Only - Straight Cath    Collection Time: 12/18/23 12:52 AM    Specimen: Straight Cath; Urine   Result Value Ref Range    RBC, UA 0-2 None Seen, 0-2 /HPF    WBC, UA 0-2 None Seen, 0-2 /HPF    Bacteria, UA None Seen None Seen /HPF    Squamous Epithelial Cells, UA 0-2 None Seen, 0-2 /HPF    Hyaline Casts, UA 0-2 None Seen /LPF    Methodology Automated Microscopy        Ordered the above labs and reviewed the results.        RADIOLOGY  XR Chest 1 View    Result Date: 12/17/2023  Patient: REJI BEEBE  Time Out: 23:21 Exam(s): XR CXR 1 VIEW EXAM:   XR Chest, 1 View CLINICAL HISTORY:    Reason for exam: Tachypnea, shortness of breath. TECHNIQUE:   Frontal view of the chest. COMPARISON:   No previous studies. FINDINGS:   Lungs:  Unremarkable.  No consolidation.   Pleural space:  Blunting of the right CP angle possibly in the base of small pleural effusion.  No pneumothorax.   Heart:  Unremarkable.  No cardiomegaly.   Mediastinum:  Cardiomediastinal silhouette unremarkable.   Bones joints:  Osteopenia.  No acute fracture.   Soft tissues:  The patient is status post left mastectomy.  Surgical clips are noted at the left axilla.   Upper abdomen:  Elevation of the left hemidiaphragm.  Status post cholecystectomy. IMPRESSION:     1.  Status post left mastectomy. 2.  Surgical clips are noted at the left axilla. 3.  Elevation of the left hemidiaphragm. 4.  Scarring and subsegmental atelectasis suggested at the left lung base. 5.  Blunting of the CP angle possibly on the basis of small pleural effusions.     Electronically signed by Baldev Navarrete MD on 12-17-23 at 2321    CT Abdomen Pelvis Without  Contrast    Result Date: 12/17/2023  CT ABDOMEN PELVIS WO CONTRAST-  Radiation dose reduction techniques were utilized, including automated exposure control and exposure modulation based on body size.  Clinical: Abdominal pain, diarrhea  COMPARISON 8/27/2023  FINDINGS: 1. Bone metastasis are similar to the previous examination. Again demonstrate elevation of the left hemidiaphragm. Lung bases clear.  2. The esophagus is distended, no wall thickening or mass seen, the appearance is quite similar to the previous examination and likely related to dysmotility.  3. Focal fatty infiltration left lobe of the liver. No biliary duct dilatation status post cholecystectomy. The pancreas and spleen are unremarkable. Calcification of both adrenal glands likely related to old granulomatous disease. Small cyst lower pole left kidney, otherwise unremarkable. No calculus or obstructive uropathy seen. Urinary bladder is typical in appearance.  4. There is a paucity of formed fecal material demonstrated within the colon. The colon is largely collapsed. No wall thickening. The appendix is satisfactory in appearance. The small bowel is normal. Gas and fluid is demonstrated within the stomach, no gastric wall thickening seen. The duodenum is typical in appearance. No free air or free intraperitoneal fluid seen. The remainder is unremarkable.     This report was finalized on 12/17/2023 11:00 PM by Dr. Orion Cortez M.D on Workstation: [id number]       Ordered the above noted radiological studies. Reviewed by me in PACS.          PROCEDURES  Procedures          MEDICATIONS GIVEN IN ER  Medications   sodium chloride 0.9 % flush 10 mL (has no administration in time range)   sodium chloride 0.9 % flush 10 mL (10 mL Intravenous Given 12/18/23 0051)   sodium chloride 0.9 % flush 10 mL (has no administration in time range)   sodium chloride 0.9 % infusion 40 mL (has no administration in time range)   sodium chloride 0.9 % infusion (100 mL/hr  Intravenous New Bag 12/18/23 0105)   acetaminophen (TYLENOL) tablet 650 mg (has no administration in time range)     Or   acetaminophen (TYLENOL) 160 MG/5ML oral solution 650 mg (has no administration in time range)     Or   acetaminophen (TYLENOL) suppository 650 mg (has no administration in time range)   ondansetron (ZOFRAN) tablet 4 mg (has no administration in time range)     Or   ondansetron (ZOFRAN) injection 4 mg (has no administration in time range)   calcium carbonate (TUMS) chewable tablet 500 mg (200 mg elemental) (has no administration in time range)   pantoprazole (PROTONIX) injection 40 mg (has no administration in time range)   lactated ringers bolus 1,000 mL (0 mL Intravenous Stopped 12/18/23 0050)   ondansetron (ZOFRAN) injection 8 mg (8 mg Intravenous Given 12/17/23 2323)   pantoprazole (PROTONIX) injection 80 mg (80 mg Intravenous Given 12/18/23 0051)         MEDICAL DECISION MAKING, PROGRESS, and CONSULTS    Discussion below represents my analysis of pertinent findings related to patient's condition, differential diagnosis, treatment plan and final disposition.      Orders placed during this visit:  Orders Placed This Encounter   Procedures    Respiratory Panel PCR w/COVID-19(SARS-CoV-2) NICOLAS/ARPITA/EDNA/PAD/COR/PAMELA In-House, NP Swab in UTM/VTM, 2 HR TAT - Swab, Nasopharynx    Blood Culture - Blood,    Blood Culture - Blood,    Gastrointestinal Panel, PCR - Stool, Per Rectum    Clostridioides difficile Toxin - Stool, Per Rectum    Clostridioides difficile Toxin, PCR - Stool, Per Rectum    XR Chest 1 View    CT Abdomen Pelvis Without Contrast    [place] Draw    Comprehensive Metabolic Panel    Lipase    Urinalysis With Microscopic If Indicated (No Culture) - Urine, Clean Catch    CK    High Sensitivity Troponin T    Lactic Acid, Plasma    Procalcitonin    Blood Gas, Arterial -    CBC Auto Differential    Protime-INR    aPTT    High Sensitivity Troponin T 2Hr    Blood Gas, Arterial -    STAT Lactic  Acid, Reflex    Basic Metabolic Panel    CBC (No Diff)    High Sensitivity Troponin T    Urinalysis, Microscopic Only - Urine, Clean Catch    STAT Lactic Acid, Reflex    Diet: Regular/House Diet; Texture: Regular Texture (IDDSI 7); Fluid Consistency: Thin (IDDSI 0)    Pulse Oximetry, Continuous    Vital Signs    Intake & Output    Weigh Patient    Oral Care    Saline Lock & Maintain IV Access    Place Sequential Compression Device    Maintain Sequential Compression Device    Code Status and Medical Interventions:    LHA (on-call MD unless specified) Details    Patient Isolation Contact Spore    ECG 12 Lead Tachycardia    Type & Screen    Insert Peripheral IV    Insert Peripheral IV    Initiate Observation Status    Green Top (Gel)    Lavender Top    Gold Top - SST    Light Blue Top    CBC & Differential         Additional sources:  - Discussed/obtained information from independent historians: None available  Additional information was obtained to confirm the patient's history.    - External (non-ED) record review: As above in the main body of the note            - Chronic or social conditions impacting care: None        Differential diagnosis:    Dehydration, sepsis, acute hemorrhage, cardiogenic hypotension.  Will obtain CBC, CMP, UA, chest x-ray, lactate, procalcitonin, CK, PT/INR, PTT, type and screen, CT abdomen pelvis, respiratory panel to initiate evaluation.          Independent interpretation of labs, radiology studies, and discussions with consultants:  ED Course as of 12/18/23 0354   Sun Dec 17, 2023   2037 BP: 94/66 [RC]   2037 Heart Rate: 112 [RC]   2037 Resp: 16 [RC]   2037 SpO2: 97 %  RA [RC]   2156 Glucose(!): 101 [RC]   2243 Hemoglobin: 13.7 [RC]   2243 Hematocrit: 39.8 [RC]   2243 RBC: 4.28 [RC]   2243 WBC: 5.08 [RC]   2243 Platelets: 269 [RC]   2243 Creatinine(!): 1.61  This was 0.98 x 1 month ago.  Patient clearly dry.  She is currently getting IVF. [RC]   2244 CO2(!): 15.0 [RC]   2244 Anion  Gap(!): 18.0 [RC]   2244 Lipase: 47 [RC]   2244 CK [RC]   2244 HS Troponin T(!): 28  This appears to be chronically elevated and this appears baseline from troponins checked over the course of the past month. [RC]   2244 Lactate(!!): 2.8  Noted [RC]   2245 RVP negative [RC]   2310 EKG reviewed.  QT interval okay.  Will go ahead and proceed with IV Zofran for the nausea. [RC]   Mon Dec 18, 2023   0009 My independent interpretation the patient CT abdomen pelvis [RC]   0048 Discussed patient's case with TANISHA Stephens with [facility].  To admit to Dr. Gil's care and telemetry bed. [RC]      ED Course User Index  [RC] Hood Hickman III, PA               DIAGNOSIS  Final diagnoses:   Generalized weakness   Compensated metabolic acidosis   Acute renal insufficiency   Dehydration   Nausea vomiting and diarrhea   Metastatic malignant neoplasm, unspecified site   Hypotension, unspecified hypotension type         DISPOSITION  Admission      Latest Documented Vital Signs:  As of 03:54 EST  BP- 92/62 HR- 108 Temp- 98.6 °F (37 °C) (Oral) O2 sat- 100%      --    Please note that portions of this were completed with a voice recognition program.       Note Disclaimer: At [facility], we believe that sharing information builds trust and better relationships. You are receiving this note because you are receiving care at [facility] or recently visited. It is possible you will see health information before a provider has talked with you about it. This kind of information can be easy to misunderstand. To help you fully understand what it means for your health, we urge you to discuss this note with your provider.    Tachypneic, no wheezes rales or rhonchi     Hood Hickman III, PA  12/18/23 6794

## 2023-12-18 NOTE — ED NOTES
.Nursing report ED to floor  Marialuisa Vivar  68 y.o.  female    HPI :   Chief Complaint   Patient presents with    Weakness - Generalized    Hypotension      Marialuisa Vivar is a 68 y.o. female with a significant PMH of hypertension, neoplasm metastatic to bone, hyperlipidemia, metabolic acidosis, dehydration, pancytopenia, SARAH who presents to the ED c/o generalized weakness, fatigue.  Patient states 2 days ago she began to feel nauseated lose her appetite.  She has been able to hold down any solids and liquids over the past 24 to 48 hours.  There is been nonbloody/nonbilious vomiting.  She also reports nonbloody/nonbilious diarrhea.  Patient reports chills but denies fever.  There is some abdominal cramping but no outright pain.  Patient denies UTI-like symptoms.  She denies chest pain, palpitations, shortness of breath.  She states over the past 24 hours she has done nothing to lay in bed.  Patient was allegedly found to be hypotensive in the field by EMS at their initial evaluation.  She was brought to the ED for further evaluation.     Reviewing a discharge note dictated by ARON Mott 11/8/2023 patient was admitted for SARAH, hypercalcemia, hyponatremia.  She was started on IVF, antiemetics, heme-onc saw followed.  Calcium normalized with pamidronate.  CR and a normalized with IV fluid.  CT of the C/A/P per oncology were stable without acute findings.  OT PT followed closely and recommended SNF but strength and endurance improved and the patient was discharged home with home health services.     PAST MEDICAL HISTORY       Active Ambulatory Problems     Diagnosis Date Noted    Benign essential HTN 01/20/2016    Depression 01/20/2016    Vocal cord dysfunction 01/20/2016    Malignant neoplasm metastatic to bone 01/20/2016    Carcinoma of left breast metastatic to lung 08/22/2016    Tachycardia 10/05/2016    Therapeutic drug monitoring 10/05/2016    Lung metastasis 06/01/2010    Vitamin D deficiency 05/09/2018     Class 1 obesity without serious comorbidity with body mass index (BMI) of 31.0 to 31.9 in adult 02/06/2019    Tongue cancer 08/13/2019    Anxiety 08/25/2020    Bruxism 08/25/2020    Cheilosis 08/25/2020    Squamous cell carcinoma of skin 08/25/2020    Hyperlipidemia 08/25/2020    Ovarian mass, right 10/19/2021    Adnexal mass 10/26/2021    Abnormal CT scan, colon 04/07/2022    SARAH (acute kidney injury) 08/27/2023    Hyperkalemia 08/28/2023    Metabolic acidosis 08/28/2023    Dehydration 08/28/2023    Diarrhea 08/28/2023    Pancytopenia 08/29/2023    Generalized weakness 11/06/2023    Hyponatremia 11/06/2023    Moderate malnutrition 11/08/2023     Admitting doctor:   Dino Gil MD    Admitting diagnosis:   The primary encounter diagnosis was Generalized weakness. Diagnoses of Compensated metabolic acidosis, Acute renal insufficiency, Dehydration, Nausea vomiting and diarrhea, Metastatic malignant neoplasm, unspecified site, and Hypotension, unspecified hypotension type were also pertinent to this visit.    Code status:   Current Code Status       Date Active Code Status Order ID Comments User Context       12/18/2023 0045 CPR (Attempt to Resuscitate) [id number]  Tabby Sexton APRN ED        Question Answer    Code Status (Patient has no pulse and is not breathing) CPR (Attempt to Resuscitate)    Medical Interventions (Patient has pulse or is breathing) Full Support                Allergies:   Nickel and Ciprofloxacin    Isolation:   No active isolations    Intake and Output    Intake/Output Summary (Last 24 hours) at 12/18/2023 0236  Last data filed at 12/18/2023 0050  Gross per 24 hour   Intake 1000 ml   Output --   Net 1000 ml     Weight:       12/17/23  1919   Weight: 56.7 kg (125 lb)     Most recent vitals:   Vitals:    12/17/23 2228 12/17/23 2309 12/18/23 0100 12/18/23 0214   BP:   93/57    BP Location:   Right arm    Patient Position:   Lying    Pulse: 117 115 114 109   Resp:   18 18    Temp:       TempSrc:       SpO2: 99%      Weight:       Height:         Active LDAs/IV Access:   Lines, Drains & Airways       Active LDAs       Name Placement date Placement time Site Days    Peripheral IV 12/17/23 1949 Right Antecubital 12/17/23 1949  Antecubital  less than 1                Labs (abnormal labs have a star):   Labs Reviewed   COMPREHENSIVE METABOLIC PANEL - Abnormal; Notable for the following components:       Result Value    Glucose 101 (*)     BUN 36 (*)     Creatinine 1.61 (*)     Sodium 134 (*)     CO2 15.0 (*)     Anion Gap 18.0 (*)     eGFR 34.5 (*)     All other components within normal limits    Narrative:     GFR Normal >60  Chronic Kidney Disease <60  Kidney Failure <15     URINALYSIS W/ MICROSCOPIC IF INDICATED (NO CULTURE) - Abnormal; Notable for the following components:    Color, UA Dark Yellow (*)     Ketones, UA 40 mg/dL (2+) (*)     Protein, UA 30 mg/dL (1+) (*)     All other components within normal limits   TROPONIN - Abnormal; Notable for the following components:    HS Troponin T 28 (*)     All other components within normal limits    Narrative:     High Sensitive Troponin T Reference Range:  <14.0 ng/L- Negative Female for AMI  <22.0 ng/L- Negative Male for AMI  >=14 - Abnormal Female indicating possible myocardial injury.  >=22 - Abnormal Male indicating possible myocardial injury.   Clinicians would have to utilize clinical acumen, EKG, Troponin, and serial changes to determine if it is an Acute Myocardial Infarction or myocardial injury due to an underlying chronic condition.        LACTIC ACID, PLASMA - Abnormal; Notable for the following components:    Lactate 2.8 (*)     All other components within normal limits   CBC WITH AUTO DIFFERENTIAL - Abnormal; Notable for the following components:    RDW 11.4 (*)     Monocyte % 3.5 (*)     Immature Grans % 0.8 (*)     All other components within normal limits   HIGH SENSITIVITIY TROPONIN T 2HR - Abnormal; Notable for the  following components:    HS Troponin T 26 (*)     All other components within normal limits    Narrative:     High Sensitive Troponin T Reference Range:  <14.0 ng/L- Negative Female for AMI  <22.0 ng/L- Negative Male for AMI  >=14 - Abnormal Female indicating possible myocardial injury.  >=22 - Abnormal Male indicating possible myocardial injury.   Clinicians would have to utilize clinical acumen, EKG, Troponin, and serial changes to determine if it is an Acute Myocardial Infarction or myocardial injury due to an underlying chronic condition.        BLOOD GAS, ARTERIAL - Abnormal; Notable for the following components:    pCO2, Arterial 22.6 (*)     pO2, Arterial 100.3 (*)     HCO3, Arterial 13.6 (*)     Base Excess, Arterial -9.5 (*)     CO2 Content 14.3 (*)     All other components within normal limits   LACTIC ACID, REFLEX - Abnormal; Notable for the following components:    Lactate 2.6 (*)     All other components within normal limits   RESPIRATORY PANEL PCR W/ COVID-19 (SARS-COV-2), NP SWAB IN UTM/VTP, 2 HR TAT - Normal    Narrative:     In the setting of a positive respiratory panel with a viral infection PLUS a negative procalcitonin without other underlying concern for bacterial infection, consider observing off antibiotics or discontinuation of antibiotics and continue supportive care. If the respiratory panel is positive for atypical bacterial infection (Bordetella pertussis, Chlamydophila pneumoniae, or Mycoplasma pneumoniae), consider antibiotic de-escalation to target atypical bacterial infection.   GASTROINTESTINAL PANEL, PCR (PREFERRED) DOES NOT INCLUDE CDIFF - Normal    Narrative:     If Aeromonas, Staphylococcus aureus or Bacillus cereus are suspected, please order [id number]: Stool Culture, Aeromonas, S aureus, B Cereus.   CLOSTRIDIOIDES DIFFICILE TOXIN, PCR - Normal    Narrative:     The result indicates the absence of toxigenic C. difficile from stool specimen.    LIPASE - Normal   CK - Normal  "  PROCALCITONIN - Normal    Narrative:     As a Marker for Sepsis (Non-Neonates):    1. <0.5 ng/mL represents a low risk of severe sepsis and/or septic shock.  2. >2 ng/mL represents a high risk of severe sepsis and/or septic shock.    As a Marker for Lower Respiratory Tract Infections that require antibiotic therapy:    PCT on Admission    Antibiotic Therapy       6-12 Hrs later    >0.5                Strongly Recommended  >0.25 - <0.5        Recommended   0.1 - 0.25          Discouraged              Remeasure/reassess PCT  <0.1                Strongly Discouraged     Remeasure/reassess PCT    As 28 day mortality risk marker: \"Change in Procalcitonin Result\" (>80% or <=80%) if Day 0 (or Day 1) and Day 4 values are available. Refer to http://www.BridgesWowopct-calculator.com    Change in PCT <=80%  A decrease of PCT levels below or equal to 80% defines a positive change in PCT test result representing a higher risk for 28-day all-cause mortality of patients diagnosed with severe sepsis for septic shock.    Change in PCT >80%  A decrease of PCT levels of more than 80% defines a negative change in PCT result representing a lower risk for 28-day all-cause mortality of patients diagnosed with severe sepsis or septic shock.      PROTIME-INR - Normal   APTT - Normal   CLOSTRIDIOIDES DIFFICILE TOXIN    Narrative:     The following orders were created for panel order Clostridioides difficile Toxin - Stool, Per Rectum.  Procedure                               Abnormality         Status                     ---------                               -----------         ------                     Clostridioides difficile...[[id number]]  Normal              Final result                 Please view results for these tests on the individual orders.   BLOOD CULTURE   BLOOD CULTURE   RAINBOW DRAW    Narrative:     The following orders were created for panel order [place] Draw.  Procedure                               Abnormality         " Status                     ---------                               -----------         ------                     Green Top (Gel)[[id number]]                                  Final result               Lavender Top[[id number]]                                     Final result               Gold Top - SST[[id number]]                                   Final result               Light Blue Top[[id number]]                                   Final result                 Please view results for these tests on the individual orders.   BLOOD GAS, ARTERIAL   URINALYSIS, MICROSCOPIC ONLY   BASIC METABOLIC PANEL   CBC (NO DIFF)   TROPONIN   LACTIC ACID, REFLEX   TYPE AND SCREEN   GREEN TOP   LAVENDER TOP   GOLD TOP - SST   LIGHT BLUE TOP   CBC AND DIFFERENTIAL    Narrative:     The following orders were created for panel order CBC & Differential.  Procedure                               Abnormality         Status                     ---------                               -----------         ------                     CBC Auto Differential[[id number]]        Abnormal            Final result                 Please view results for these tests on the individual orders.     EKG:   ECG 12 Lead Tachycardia   Preliminary Result   HEART RATE= 116  bpm   RR Interval= 517  ms   OK Interval= 140  ms   P Horizontal Axis= -22  deg   P Front Axis= 74  deg   QRSD Interval= 89  ms   QT Interval= 326  ms   QTcB= 453  ms   QRS Axis= 74  deg   T Wave Axis= 46  deg   - OTHERWISE NORMAL ECG -   Sinus tachycardia   Electronically Signed By:    Date and Time of Study: 2023-12-17 21:06:24        Meds given in ED:   Medications   sodium chloride 0.9 % flush 10 mL (has no administration in time range)   sodium chloride 0.9 % flush 10 mL (10 mL Intravenous Given 12/18/23 0051)   sodium chloride 0.9 % flush 10 mL (has no administration in time range)   sodium chloride 0.9 % infusion 40 mL (has no administration in time range)   sodium chloride 0.9 % infusion  (100 mL/hr Intravenous New Bag 12/18/23 0105)   acetaminophen (TYLENOL) tablet 650 mg (has no administration in time range)     Or   acetaminophen (TYLENOL) 160 MG/5ML oral solution 650 mg (has no administration in time range)     Or   acetaminophen (TYLENOL) suppository 650 mg (has no administration in time range)   ondansetron (ZOFRAN) tablet 4 mg (has no administration in time range)     Or   ondansetron (ZOFRAN) injection 4 mg (has no administration in time range)   calcium carbonate (TUMS) chewable tablet 500 mg (200 mg elemental) (has no administration in time range)   pantoprazole (PROTONIX) injection 40 mg (has no administration in time range)   lactated ringers bolus 1,000 mL (0 mL Intravenous Stopped 12/18/23 0050)   ondansetron (ZOFRAN) injection 8 mg (8 mg Intravenous Given 12/17/23 2323)   pantoprazole (PROTONIX) injection 80 mg (80 mg Intravenous Given 12/18/23 0051)       Imaging results:  XR Chest 1 View    Result Date: 12/17/2023  Electronically signed by Baldev Navarrete MD on 12-17-23 at 2321     Ambulatory status:   - standby    Social issues:   Social History     Socioeconomic History    Marital status: Single    Years of education: College   Tobacco Use    Smoking status: Former     Packs/day: 2.00     Years: 30.00     Additional pack years: 0.00     Total pack years: 60.00     Types: Cigarettes     Start date: 1/1/1973     Quit date: 6/1/2008     Years since quitting: 15.5    Smokeless tobacco: Never   Vaping Use    Vaping Use: Never used   Substance and Sexual Activity    Alcohol use: No    Drug use: No    Sexual activity: Not Currently     Birth control/protection: None     NIH Stroke Scale:     Stacy Barraza RN  12/18/23 02:36 EST

## 2023-12-19 NOTE — CODE DOCUMENTATION
[facility] CODE BLUE MD DOCUMENTATION    Responded to code blue overhead.  Patient being bagged. Severe met acidosis noted.   CPR ongoing  Bicarb, epi calcium pushed  Bicarb bicarb pushed  Intubated  Rosc achieved  Transferred to icu.    Electronically signed by Issac Wallace MD, 12/18/23, 7:52 PM EST.

## 2023-12-19 NOTE — PROCEDURES
Endotracheal Intubation Procedure Note      Indication for endotracheal intubation: airway compromise and respiratory failure.  Sedation: none.  Equipment: A video GlideScope was used and Deb 3 laryngoscope blade and 7.5mm cuffed endotracheal tube.  Number of attempts: 1.  ETT location confirmed by auscultation and cxr ordered pending.  Confirmed by second look video laryngoscopy .      Electronically signed by Issac Wallace MD, 12/18/23, 7:51 PM EST.

## 2023-12-19 NOTE — PROGRESS NOTES
Limited echo done for pericardial effusion evaluation. Technically difficult study. There appears to be large pericardial effusion on subcostal views but not adequate study for for tamponade. Results discussed with Dr. Woodward.

## 2023-12-19 NOTE — PROGRESS NOTES
MURIEL MANCIA CODE BLUE Documentation    Responded at bedside to low BP, PEA  Compressions started again  Epi, bicarb, calcium given ivfs bolus  Despite multiple rounds of emergency medicines and compressions as well as maximum doses of 4 vasopressors patient had no ROSC.  Upon discussion with son at bedside he agreed the best thing at this point was for her to be at peace.  Code concluded.    Electronically signed by Issac Wallace MD, 12/19/23, 1:15 AM EST.

## 2023-12-19 NOTE — CONSULTS
[facility] Consult    Patient Name: Marialuisa Vivar  Age/Sex: 68 y.o. female  : 1955  MRN: [id number]    Date of Admission: 2023  Date of Encounter Visit: 23  Encounter Provider: Irma Davis MD  Referring Provider: Dino Gil,*  Place of Service: [facility] CARDIOLOGY  Patient Care Team:  Jennifer Mantilla MD as PCP - General (Family Medicine)  Shruthi, Jarod BLANCHARD II, MD as Consulting Physician (Hematology and Oncology)  Yair Robin MD as Referring Physician (Otolaryngology)  Brianna Orozco MD as Consulting Physician (Radiation Oncology)    Subjective:     Consulted for: Pericardial effusion    History of Present Illness:  Marialuisa Vivar is a 68 y.o. female with metastatic breast cancer, depression, hypertension, hyperlipidemia who presented to the hospital on  with generalized weakness, fatigue, decreased appetite, nausea, vomiting and diarrhea.     The symptoms began the day prior to her presentation.  She also reported epigastric pain and shortness of breath.  On arrival to the hospital she was noted to have acute kidney injury and evidence of lactic acidosis.  Troponins unremarkable.  EKG with sinus tachycardia but was otherwise unremarkable.  CT of the abdomen and pelvis with no acute disease reported.      Today the patient suffered a PEA arrest on the floor and was subsequently intubated and transferred to unit following resuscitation.  She suffered another arrest in the ICU that reportedly was associated with ventricular tachycardia and required defibrillation.  She developed progressively worsening hypotension despite aggressive fluid resuscitation, initiation of 4 pressors, and correction of metabolic acidosis.  Repeat EKG's showed diffused ST elevations consistent with pericarditis.  I was consulted at this point and recommended proceeding with echocardiogram.    Past Medical History:  Past Medical History:    Diagnosis Date    SARAH (acute kidney injury) 08/27/2023    Allergy     Anxiety     Asthma     Bone metastasis     Breast cancer     Left node positive    Cholelithiasis 2000    Lap Marily    Class 1 obesity without serious comorbidity with body mass index (BMI) of 31.0 to 31.9 in adult 02/06/2019    Colon polyp Past 10-15 yrs?    found at colonoscopies    Depression     Esophageal reflux     cough    History of colon polyps     Hyperlipidemia     Hypertension     Left breast cancer metastasized to lung     Nausea & vomiting     Obstructive sleep apnea     does not wear cpap    Ovarian cyst     Pancytopenia 08/29/2023    PONV (postoperative nausea and vomiting)     Tongue cancer 05/2019    by ENT at [facility] dr [facility]       Past Surgical History:   Procedure Laterality Date    CHOLECYSTECTOMY  2000    COLONOSCOPY  2014    H/O polyps    COLONOSCOPY N/A 6/1/2022    Procedure: COLONOSCOPY to cecum and TI with cold / hot snare polypectomies;  Surgeon: Luis Enrique Galeas MD;  Location: [facility] ENDOSCOPY;  Service: Gastroenterology;  Laterality: N/A;  pre-abnormal ct  post-polyps, diverticulosis, hemorrhoids    KNEE ARTHROPLASTY      LUNG SURGERY  2010    Lung wedge resection    MASTECTOMY RADICAL Left 1993    TONGUE SURGERY  05/21/2019    tongue cancer    TOTAL LAPAROSCOPIC HYSTERECTOMY N/A 11/04/2021    Procedure: Robotic assisted total laparoscopic hysterectomy, bilateral salpingoophorecotmy, bilateral ureteralysis ;  Surgeon: Kaylynn Ricci DO;  Location: [facility] MAIN OR;  Service: Robotics - DaVinci;  Laterality: N/A;       Home Medications:   Medications Prior to Admission   Medication Sig Dispense Refill Last Dose    ARIPiprazole (ABILIFY) 2 MG tablet Take 2.5 tablets by mouth Every Night.       ARIPiprazole (ABILIFY) 5 MG tablet Take 1 tablet by mouth Every Evening.       atorvastatin (LIPITOR) 10 MG tablet TAKE 1 TABLET BY MOUTH ONCE DAILY AT NIGHT 90 tablet 1     eszopiclone (LUNESTA) 3 MG tablet Take 1 tablet  by mouth.       fulvestrant (FASLODEX) 250 MG/5ML chemo syringe Inject  into the appropriate muscle as directed by prescriber.       LORazepam (ATIVAN) 0.5 MG tablet Take 1 tablet by mouth Every 8 (Eight) Hours As Needed.       meclizine (ANTIVERT) 25 MG tablet Take 1 tablet by mouth 3 (Three) Times a Day As Needed for Dizziness. 20 tablet 0     miconazole (MICOTIN) 2 % powder Apply 1 application  topically to the appropriate area as directed Every 12 (Twelve) Hours. 43 g 0     Omeprazole 20 MG Tablet Delayed Release Dispersible Take 1 tablet by mouth As Needed.       ondansetron (ZOFRAN) 8 MG tablet TAKE 1 TABLET BY MOUTH THREE TIMES DAILY AS NEEDED FORNAUSEA AND VOMITING       denosumab (Xgeva) 120 MG/1.7ML solution injection Inject  under the skin into the appropriate area as directed.       PARoxetine (PAXIL) 40 MG tablet Take 1 tablet by mouth Every Night.          Allergies:  Allergies   Allergen Reactions    Nickel Unknown - Low Severity    Ciprofloxacin Rash       Past Social History:  Social History     Socioeconomic History    Marital status: Single    Years of education: College   Tobacco Use    Smoking status: Former     Packs/day: 2.00     Years: 30.00     Additional pack years: 0.00     Total pack years: 60.00     Types: Cigarettes     Start date: 1973     Quit date: 2008     Years since quitting: 15.5    Smokeless tobacco: Never   Vaping Use    Vaping Use: Never used   Substance and Sexual Activity    Alcohol use: No    Drug use: No    Sexual activity: Not Currently     Birth control/protection: None       Past Family History:  Family History   Problem Relation Age of Onset    Hypertension Mother     Leukemia Mother 72    COPD Mother         smoker    Diabetes Brother     Pancreatic cancer Brother     Glaucoma Brother     Heart disease Brother     Hypertension Brother     Gallbladder disease Brother     Pancreatitis Brother          from pancreatic csncer    Gallbladder disease Father      Colon polyps Father     Stroke Other         Grandmother    Lupus Other         Aunt    Alcohol abuse Other         Aunt    Glaucoma Other         Grandmother    Diabetes type II Brother     Hypertension Brother     Hyperlipidemia Brother     Liver cancer Paternal Uncle     Stomach cancer Paternal Aunt     Alcohol abuse Maternal Aunt     Malig Hyperthermia Neg Hx        Review of Systems:   All systems reviewed. Pertinent positives identified in HPI. All other systems are negative.    Objective:   Temp:  [97.9 °F (36.6 °C)-100 °F (37.8 °C)] 97.9 °F (36.6 °C)  Heart Rate:  [] 98  Resp:  [16-31] 31  BP: ()/() 43/30  FiO2 (%):  [97 %-98 %] 97 %     Intake/Output Summary (Last 24 hours) at 12/19/2023 0038  Last data filed at 12/19/2023 0013  Gross per 24 hour   Intake 1890.53 ml   Output --   Net 1890.53 ml     Body mass index is 20.57 kg/m².      12/17/23  1919 12/18/23  0420 12/18/23  2351   Weight: 56.7 kg (125 lb) 56.2 kg (123 lb 14.4 oz) 56 kg (123 lb 7.3 oz)     Weight change: -0.7 kg (-1 lb 8.7 oz)    Physical Exam:   General Appearance:    intubated   Head:    Normocephalic, without obvious abnormality, atraumatic   Eyes:            Lids and lashes normal, conjunctivae and sclerae normal, no   icterus, no pallor, corneas clear, PERRLA   Ears:    Ears appear intact with no abnormalities noted   Neck:   No adenopathy, supple, trachea midline, no thyromegaly, no   carotid bruit, no JVD   Lungs:     Clear to auscultation,respirations regular, even and unlabored    Heart:    Regular rhythm and normal rate, normal S1 and S2, no murmur, no gallop, no rub, no click   Chest Wall:    No abnormalities observed   Abdomen:     Normal bowel sounds, no masses, no organomegaly, soft        non-tender, non-distended, no guarding, no rebound  tenderness   Extremities:   Moves all extremities well, no edema, no cyanosis, no redness   Pulses:   Pulses palpable and equal bilaterally. Normal radial, carotid,  "femoral, dorsalis pedis and posterior tibial pulses bilaterally. Normal abdominal aorta   Skin:  Psychiatric:   No bleeding, bruising or rash    unresponsive       Lab Review:   Results from last 7 days   Lab Units 12/18/23 2307 12/18/23 2250 12/18/23 2131 12/18/23 2050 12/18/23 1725 12/18/23  1402 12/18/23  0643 12/17/23 1952   SODIUM mmol/L  --  145 146*  --  129* 128* 133* 134*   POTASSIUM mmol/L  --  3.6 3.8  --  4.9 4.7 5.4* 5.0   CHLORIDE mmol/L  --  105 105  --  102 102 104 101   CO2 mmol/L  --  9.0* 14.9*  --  4.9* 9.8* 14.5* 15.0*   BUN mg/dL  --  29* 29*  --  28* 28* 32* 36*   CREATININE mg/dL 1.99 2.19* 2.15* 1.97 1.87* 1.46* 1.40* 1.61*   GLUCOSE mg/dL  --  139* 158*  --  92 95 86 101*   CALCIUM mg/dL  --  8.7 9.5  --  8.2* 8.0* 8.6 10.0   AST (SGOT) U/L  --   --  530*  --  38*  --   --  21   ALT (SGPT) U/L  --   --  353*  --  14  --   --  12     Results from last 7 days   Lab Units 12/18/23 2131 12/18/23 1725 12/18/23 1402 12/18/23  1133 12/18/23 0643 12/17/23 2255 12/17/23 1952   CK TOTAL U/L  --   --   --   --   --   --  31   HSTROP T ng/L 50* 42* 32* 32* 27* 26* 28*     Results from last 7 days   Lab Units 12/18/23 2307 12/18/23 2250 12/18/23 2131 12/18/23 2050 12/18/23 0643 12/17/23 1952   WBC 10*3/mm3  --  5.24 6.57  --  4.02 5.08   HEMOGLOBIN g/dL  --  8.4* 8.6*  --  11.6* 13.7   HEMOGLOBIN, POC g/dL 7.0*  --   --  8.9*  --   --    HEMATOCRIT %  --  25.3* 25.2*  --  33.9* 39.8   HEMATOCRIT POC % 21*  --   --  26*  --   --    PLATELETS 10*3/mm3  --  155 151  --  176 269     Results from last 7 days   Lab Units 12/18/23  2131 12/17/23 1952   INR  2.08* 1.03   APTT seconds 64.2* 32.7     Results from last 7 days   Lab Units 12/18/23  2250 12/18/23 2131   MAGNESIUM mg/dL 1.7 1.6           Invalid input(s): \"LDLCALC\"      Results from last 7 days   Lab Units 12/18/23  1542   TSH uIU/mL 4.780*           Imaging:  Imaging Results (Most Recent)       Procedure Component Value Units " Date/Time    XR Chest 1 View [[id number]] Collected: 12/18/23 2010     Updated: 12/18/23 2314    Narrative:      PORTABLE CHEST     HISTORY: Endotracheal tube placement.     TECHNIQUE: A single portable view of the chest was obtained and compared  to 12/17/2023.     FINDINGS: The patient has been intubated. The endotracheal tube is just  above the christiana. Could be withdrawn by approximately 3 cm. The heart is  within normal limits in size. There is elevation of the left  hemidiaphragm, unchanged. There is no evidence of consolidation or gross  effusion.     The above information was called to the patient's nurse prior to the  dictation. The patient's nurse is immediately relay the information to  the clinical service.     This report was finalized on 12/18/2023 11:11 PM by Dr. Dino Junior M.D on Workstation: [id number]       XR Chest 1 View [[id number]] Collected: 12/18/23 2119     Updated: 12/18/23 2314    Narrative:      PORTABLE CHEST     HISTORY: Status post code, intubated.     COMPARISON: Chest x-ray 12/18/2023.     FINDINGS: An endotracheal tube is appreciated. The endotracheal tube has  been withdrawn by approximately 2 cm. A defibrillator paddle is noted  overlying the left lung base. The heart is within normal limits in size.  There is new interstitial prominence involving the parahilar regions  bilaterally as well as infiltrate present at the left lung base with  consolidation. The above information was immediately called to the  patient's nurse at the time of dictation.     This report was finalized on 12/18/2023 11:11 PM by Dr. Dino Junior M.D on Workstation: [id number]       XR Chest Post CVA Port [[id number]] Collected: 12/18/23 2157     Updated: 12/18/23 2202    Narrative:      PORTABLE CHEST     HISTORY: Check line placement.     COMPARISON: Chest x-ray 12/18/2023 at 2045 hours.     FINDINGS: The patient is intubated. The endotracheal tube is been  withdrawn further slightly. It now is at the level  of the thoracic  inlet. A right internal jugular central line has been placed. The tip is  at the junction of the superior vena cava and right atrium. There is no  evidence of pneumothorax on this supine patient. Left lower lobe  consolidation is again appreciated which is unchanged versus 2045 hours  but new versus 1954 hours. There is prominence of pulmonary vasculature  and interstitium in the parahilar regions consistent with congestive  changes similar to 2045 hours.     This report was finalized on 12/18/2023 9:58 PM by Dr. Dino Junior M.D on Workstation: [id number]       XR Chest 1 View [[id number]] Collected: 12/17/23 2322     Updated: 12/17/23 2322    Narrative:        Patient: REJI BEEBE  Time Out: 23:21  Exam(s): XR CXR 1 VIEW     EXAM:    XR Chest, 1 View    CLINICAL HISTORY:     Reason for exam: Tachypnea, shortness of breath.    TECHNIQUE:    Frontal view of the chest.    COMPARISON:    No previous studies.    FINDINGS:    Lungs:  Unremarkable.  No consolidation.    Pleural space:  Blunting of the right CP angle possibly in the base of   small pleural effusion.  No pneumothorax.    Heart:  Unremarkable.  No cardiomegaly.    Mediastinum:  Cardiomediastinal silhouette unremarkable.    Bones joints:  Osteopenia.  No acute fracture.    Soft tissues:  The patient is status post left mastectomy.  Surgical   clips are noted at the left axilla.    Upper abdomen:  Elevation of the left hemidiaphragm.  Status post   cholecystectomy.    IMPRESSION:       1.  Status post left mastectomy.  2.  Surgical clips are noted at the left axilla.  3.  Elevation of the left hemidiaphragm.  4.  Scarring and subsegmental atelectasis suggested at the left lung base.    5.  Blunting of the CP angle possibly on the basis of small pleural   effusions.      Impression:          Electronically signed by Baldev Navarrete MD on 12-17-23 at 2321    CT Abdomen Pelvis Without Contrast [[id number]] Collected: 12/17/23 7622     Updated:  12/17/23 2304    Narrative:      CT ABDOMEN PELVIS WO CONTRAST-     Radiation dose reduction techniques were utilized, including automated  exposure control and exposure modulation based on body size.     Clinical: Abdominal pain, diarrhea     COMPARISON 8/27/2023     FINDINGS:  1. Bone metastasis are similar to the previous examination. Again  demonstrate elevation of the left hemidiaphragm. Lung bases clear.     2. The esophagus is distended, no wall thickening or mass seen, the  appearance is quite similar to the previous examination and likely  related to dysmotility.     3. Focal fatty infiltration left lobe of the liver. No biliary duct  dilatation status post cholecystectomy. The pancreas and spleen are  unremarkable. Calcification of both adrenal glands likely related to old  granulomatous disease. Small cyst lower pole left kidney, otherwise  unremarkable. No calculus or obstructive uropathy seen. Urinary bladder  is typical in appearance.     4. There is a paucity of formed fecal material demonstrated within the  colon. The colon is largely collapsed. No wall thickening. The appendix  is satisfactory in appearance. The small bowel is normal. Gas and fluid  is demonstrated within the stomach, no gastric wall thickening seen. The  duodenum is typical in appearance. No free air or free intraperitoneal  fluid seen. The remainder is unremarkable.              This report was finalized on 12/17/2023 11:00 PM by Dr. Orion Cortez M.D on Workstation: [id number]               I personally viewed and interpreted the patient's EKG    Assessment/Plan:     Pericardial effusion.  Large effusion noted on echo.  Unable to see signs of tamponade by echo but may have contributed to arrest and rapid decline.    Metastatic breast cancer  Acute hypoxic respiratory failure.   Metabolic acidosis  SARAH    Echocardiogram showed circumferential pericardial effusion which was large anteriorly.  Images were limited and we were unable  to assess for evidence of tamponade.  The patient suffered an additional PEA arrest by this point.  I came in to evaluate the patient and discuss further with the patient's brother who drove in from [place].  I discussed options with the brother including the possibility of pericardiocentesis which would be high risk due to her hemodynamic instability and would be of uncertain benefit at that point.  During our discussion at bedside the patient had another PEA arrest.  After unsuccessful resuscitation after several minutes and a discussion with the patient's brother and Dr. Wallace it was decided to stop any further attempts and the patient soon .     Thank you for allowing me to participate in the care of Marialuisa Vivar. Feel free to contact me directly with any further questions or concerns.    Irma Davis MD  [facility]  23  00:38 EST

## 2023-12-19 NOTE — CODE DOCUMENTATION
Patient Name:  Marialuisa Vivar  YOB: 1955  MRN:  [id number]  Admit Date:  12/17/2023    Visit Diagnoses:     ICD-10-CM ICD-9-CM   1. Generalized weakness  R53.1 780.79   2. Compensated metabolic acidosis  E87.20 276.2   3. Acute renal insufficiency  N28.9 593.9   4. Dehydration  E86.0 276.51   5. Nausea vomiting and diarrhea  R11.2 787.91    R19.7 787.01   6. Metastatic malignant neoplasm, unspecified site  C79.9 199.1   7. Hypotension, unspecified hypotension type  I95.9 458.9       Reason For Rapid:   rapid initially called for hypotension and tachycardia.    RN Communicated With:  ARON Barriga w/ [facility]; asked ARON Winston to come bedside d/t respiratory distress, situation then disintegrated into CODE BLUE and Dr. Issac Wallace responded along abena/ Angelia Olivera    Rapid Outcome:  ROSC achieved, pt transferred to ICU    Communication From Rapid Team:   rapid response called for hypotension and tachycardia. ABG drawn, showing severe metabolic acidosis. Bicarb gtt started (recently ordered by renal) during rapid response. Plan made for transfer to ICU bed as soon as one was available & urgent pulmonology consult. Pt breathing pattern then changed, FiO2 increased. Started Ambu-bagging pt at 100%, but pt then went into respiratory arrested/PEA. CODE BLUE called at 1922. ACLS followed. 2 amps of Epi given, 3 amps of Bicarb, 1 amp of Calcium Chloride during code. Intubated at 1926. ROSC achieved at 1929.     Family confirmed code status during events.       Most Recent Vital Signs  Temp:  [98.2 °F (36.8 °C)] 98.2 °F (36.8 °C)  Heart Rate:  [108-138] 113  Resp:  [16-18] 16  BP: ()/() 112/88  SpO2:  [78 %-100 %] 80 %  on  Flow (L/min):  [2-15] 15;   Device (Oxygen Therapy): manual resuscitator    Labs:  Results from last 7 days   Lab Units 12/17/23  2126   COVID19  Not Detected     Glucose   Date/Time Value Ref Range Status   12/18/2023 1803 107 70 - 130 mg/dL Final     Site   Date  Value Ref Range Status   12/18/2023 Right Radial  Final     Danny's Test   Date Value Ref Range Status   12/18/2023 Positive  Final     pH, Arterial   Date Value Ref Range Status   12/18/2023 7.174 (C) 7.350 - 7.450 pH units Final     pCO2, Arterial   Date Value Ref Range Status   12/18/2023 18.3 (C) 35.0 - 45.0 mm Hg Final     pO2, Arterial   Date Value Ref Range Status   12/18/2023 98.2 80.0 - 100.0 mm Hg Final     HCO3, Arterial   Date Value Ref Range Status   12/18/2023 6.7 (L) 22.0 - 28.0 mmol/L Final     Base Excess, Arterial   Date Value Ref Range Status   12/18/2023 -19.7 (L) 0.0 - 2.0 mmol/L Final     Comment:     Serial Number: [id number]:  [id number]     O2 Saturation, Arterial   Date Value Ref Range Status   12/18/2023 95.8 92.0 - 98.5 % Final     CO2 Content   Date Value Ref Range Status   12/18/2023 7.3 (L) 23 - 27 mmol/L Final     Barometric Pressure for Blood Gas   Date Value Ref Range Status   12/18/2023 750.7000 mmHg Final     Modality   Date Value Ref Range Status   12/18/2023 Cannula  Final     Results from last 7 days   Lab Units 12/18/23  0643 12/17/23 1952   WBC 10*3/mm3 4.02 5.08   HEMOGLOBIN g/dL 11.6* 13.7   PLATELETS 10*3/mm3 176 269     Results from last 7 days   Lab Units 12/18/23  1402 12/18/23  0643 12/17/23 1952   SODIUM mmol/L 128* 133* 134*   POTASSIUM mmol/L 4.7 5.4* 5.0   CHLORIDE mmol/L 102 104 101   CO2 mmol/L 9.8* 14.5* 15.0*   BUN mg/dL 28* 32* 36*   CREATININE mg/dL 1.46* 1.40* 1.61*   GLUCOSE mg/dL 95 86 101*   ALBUMIN g/dL  --   --  4.8   BILIRUBIN mg/dL  --   --  1.0   ALK PHOS U/L  --   --  94   AST (SGOT) U/L  --   --  21   ALT (SGPT) U/L  --   --  12   Estimated Creatinine Clearance: 32.3 mL/min (A) (by C-G formula based on SCr of 1.46 mg/dL (H)).  Results from last 7 days   Lab Units 12/18/23  1542 12/18/23  1133 12/18/23  0643 12/17/23  2255 12/17/23 1952   CK TOTAL U/L  --   --   --   --  31   HSTROP T ng/L  --  32* 27* 26* 28*   URIC ACID mg/dL 6.4*  --   --   --   " --      Results from last 7 days   Lab Units 12/18/23  0643 12/18/23  0052 12/17/23 2125 12/17/23 1952   PROCALCITONIN ng/mL  --   --   --  0.11   LACTATE mmol/L 1.9 2.6* 2.8*  --      Results from last 7 days   Lab Units 12/18/23  1912 12/17/23  2146   PH, ARTERIAL pH units 7.174* 7.387   PO2 ART mm Hg 98.2 100.3*   PCO2, ARTERIAL mm Hg 18.3* 22.6*   HCO3 ART mmol/L 6.7* 13.6*   O2 SATURATION ART % 95.8 97.9   MODALITY  Cannula Room Air   No results found for: \"STREPPNEUAG\", \"LEGANTIGENUR\"    Results from last 7 days   Lab Units 12/17/23 2256 12/17/23 2126   ADENOVIRUS DETECTION BY PCR   --  Not Detected   ADENOVIRUS  Not Detected  --    CORONAVIRUS 229E   --  Not Detected   CORONAVIRUS HKU1   --  Not Detected   CORONAVIRUS NL63   --  Not Detected   CORONAVIRUS OC43   --  Not Detected   HUMAN METAPNEUMOVIRUS   --  Not Detected   HUMAN RHINOVIRUS/ENTEROVIRUS   --  Not Detected   INFLUENZA B PCR   --  Not Detected   PARAINFLUENZA 1   --  Not Detected   PARAINFLUENZA VIRUS 2   --  Not Detected   PARAINFLUENZA VIRUS 3   --  Not Detected   PARAINFLUENZA VIRUS 4   --  Not Detected   BORDETELLA PERTUSSIS PCR   --  Not Detected   CHLAMYDOPHILA PNEUMONIAE PCR   --  Not Detected   MYCOPLAMA PNEUMO PCR   --  Not Detected   INFLUENZA A PCR   --  Not Detected   RSV, PCR   --  Not Detected       NIH Stroke Scale:                                                         Please refer to full rapid documentation on summary page under Index / Code TimelinePatient Name:  Marialuisa Vivar  YOB: 1955  MRN:  [id number]  Admit Date:  12/17/2023    Visit Diagnoses:     ICD-10-CM ICD-9-CM   1. Generalized weakness  R53.1 780.79   2. Compensated metabolic acidosis  E87.20 276.2   3. Acute renal insufficiency  N28.9 593.9   4. Dehydration  E86.0 276.51   5. Nausea vomiting and diarrhea  R11.2 787.91    R19.7 787.01   6. Metastatic malignant neoplasm, unspecified site  C79.9 199.1   7. Hypotension, unspecified hypotension " type  I95.9 458.9       Reason For Rapid:     RN Communicated With:      Rapid Outcome:    Communication From Rapid Team:       Most Recent Vital Signs  Temp:  [98.2 °F (36.8 °C)] 98.2 °F (36.8 °C)  Heart Rate:  [108-138] 113  Resp:  [16-18] 16  BP: ()/() 112/88  SpO2:  [78 %-100 %] 80 %  on  Flow (L/min):  [2-15] 15;   Device (Oxygen Therapy): manual resuscitator    Labs:  Results from last 7 days   Lab Units 12/17/23 2126   COVID19  Not Detected     Glucose   Date/Time Value Ref Range Status   12/18/2023 1803 107 70 - 130 mg/dL Final     Site   Date Value Ref Range Status   12/18/2023 Right Radial  Final     Danny's Test   Date Value Ref Range Status   12/18/2023 Positive  Final     pH, Arterial   Date Value Ref Range Status   12/18/2023 7.174 (C) 7.350 - 7.450 pH units Final     pCO2, Arterial   Date Value Ref Range Status   12/18/2023 18.3 (C) 35.0 - 45.0 mm Hg Final     pO2, Arterial   Date Value Ref Range Status   12/18/2023 98.2 80.0 - 100.0 mm Hg Final     HCO3, Arterial   Date Value Ref Range Status   12/18/2023 6.7 (L) 22.0 - 28.0 mmol/L Final     Base Excess, Arterial   Date Value Ref Range Status   12/18/2023 -19.7 (L) 0.0 - 2.0 mmol/L Final     Comment:     Serial Number: [id number]:  [id number]     O2 Saturation, Arterial   Date Value Ref Range Status   12/18/2023 95.8 92.0 - 98.5 % Final     CO2 Content   Date Value Ref Range Status   12/18/2023 7.3 (L) 23 - 27 mmol/L Final     Barometric Pressure for Blood Gas   Date Value Ref Range Status   12/18/2023 750.7000 mmHg Final     Modality   Date Value Ref Range Status   12/18/2023 Cannula  Final     Results from last 7 days   Lab Units 12/18/23  0643 12/17/23  1952   WBC 10*3/mm3 4.02 5.08   HEMOGLOBIN g/dL 11.6* 13.7   PLATELETS 10*3/mm3 176 269     Results from last 7 days   Lab Units 12/18/23  1402 12/18/23  0643 12/17/23 1952   SODIUM mmol/L 128* 133* 134*   POTASSIUM mmol/L 4.7 5.4* 5.0   CHLORIDE mmol/L 102 104 101   CO2 mmol/L 9.8*  "14.5* 15.0*   BUN mg/dL 28* 32* 36*   CREATININE mg/dL 1.46* 1.40* 1.61*   GLUCOSE mg/dL 95 86 101*   ALBUMIN g/dL  --   --  4.8   BILIRUBIN mg/dL  --   --  1.0   ALK PHOS U/L  --   --  94   AST (SGOT) U/L  --   --  21   ALT (SGPT) U/L  --   --  12   Estimated Creatinine Clearance: 32.3 mL/min (A) (by C-G formula based on SCr of 1.46 mg/dL (H)).  Results from last 7 days   Lab Units 12/18/23  1542 12/18/23  1133 12/18/23  0643 12/17/23 2255 12/17/23 1952   CK TOTAL U/L  --   --   --   --  31   HSTROP T ng/L  --  32* 27* 26* 28*   URIC ACID mg/dL 6.4*  --   --   --   --      Results from last 7 days   Lab Units 12/18/23  0643 12/18/23  0052 12/17/23 2125 12/17/23 1952   PROCALCITONIN ng/mL  --   --   --  0.11   LACTATE mmol/L 1.9 2.6* 2.8*  --      Results from last 7 days   Lab Units 12/18/23 1912 12/17/23 2146   PH, ARTERIAL pH units 7.174* 7.387   PO2 ART mm Hg 98.2 100.3*   PCO2, ARTERIAL mm Hg 18.3* 22.6*   HCO3 ART mmol/L 6.7* 13.6*   O2 SATURATION ART % 95.8 97.9   MODALITY  Cannula Room Air   No results found for: \"STREPPNEUAG\", \"LEGANTIGENUR\"    Results from last 7 days   Lab Units 12/17/23 2256 12/17/23 2126   ADENOVIRUS DETECTION BY PCR   --  Not Detected   ADENOVIRUS  Not Detected  --    CORONAVIRUS 229E   --  Not Detected   CORONAVIRUS HKU1   --  Not Detected   CORONAVIRUS NL63   --  Not Detected   CORONAVIRUS OC43   --  Not Detected   HUMAN METAPNEUMOVIRUS   --  Not Detected   HUMAN RHINOVIRUS/ENTEROVIRUS   --  Not Detected   INFLUENZA B PCR   --  Not Detected   PARAINFLUENZA 1   --  Not Detected   PARAINFLUENZA VIRUS 2   --  Not Detected   PARAINFLUENZA VIRUS 3   --  Not Detected   PARAINFLUENZA VIRUS 4   --  Not Detected   BORDETELLA PERTUSSIS PCR   --  Not Detected   CHLAMYDOPHILA PNEUMONIAE PCR   --  Not Detected   MYCOPLAMA PNEUMO PCR   --  Not Detected   INFLUENZA A PCR   --  Not Detected   RSV, PCR   --  Not Detected       NIH Stroke Scale:   n/a                                          "                Please refer to full rapid documentation on summary page under Index / Code Timeline

## 2023-12-19 NOTE — CODE DOCUMENTATION
CODE BLUE DOCUMENTATION    Responded to Code Blue- high quality CPR ongoing.  PEA intially.  Epi x 4, sodium bicarb x 4 injections given.  ROSC achieved.    See separate code charting for complete details.

## 2023-12-19 NOTE — PROGRESS NOTES
"[facility] Progress Note:    Patient Identification:  Marialuisa Vivar  68 y.o.  female  1955  [id number]               Reason for visit: worsening hemodynamics    S:  Awaiting stat echo. Verified with house that tech being called in.  Hg drop lactate 20 and worsening hemodynamics despite 4 vasopressors.    O:  BP (!) 58/32   Pulse 103   Temp 99.7 °F (37.6 °C)   Resp 16   Ht 165.1 cm (65\")   Wt 56.2 kg (123 lb 14.4 oz)   SpO2 93%   BMI 20.62 kg/m²   Vent Settings          Resp Rate (Set): 30     FiO2 (%): 97 %  PEEP/CPAP (cm H2O): 7 cm H20    Minute Ventilation (L/min) (Obs): 14.2 L/min  Resp Rate (Observed) Vent: 30  I:E Ratio (Set): 1:1.22  I:E Ratio (Obs): 1:1.2    PIP Observed (cm H2O): 23 cm H2O  Plateau Pressure (cm H2O): 0 cm H2O  Driving Pressure (cm H2O): -7.4 cm H2O    Ill on mech ventilation  Et tube  Rij central line  Mary  Distal cool mottled    Results from last 7 days   Lab Units 12/18/23  2307 12/18/23  2250 12/18/23 2131 12/18/23 2050 12/18/23  0643 12/17/23 1952   WBC 10*3/mm3  --  5.24 6.57  --  4.02 5.08   HEMOGLOBIN g/dL  --  8.4* 8.6*  --  11.6* 13.7   HEMOGLOBIN, POC g/dL 7.0*  --   --  8.9*  --   --    PLATELETS 10*3/mm3  --  155 151  --  176 269     Results from last 7 days   Lab Units 12/18/23  2307 12/18/23  2131 12/18/23 2050 12/18/23  1725 12/18/23  1402 12/18/23  0643 12/17/23 1952   SODIUM mmol/L  --  146*  --  129* 128* 133* 134*   POTASSIUM mmol/L  --  3.8  --  4.9 4.7 5.4* 5.0   CHLORIDE mmol/L  --  105  --  102 102 104 101   CO2 mmol/L  --  14.9*  --  4.9* 9.8* 14.5* 15.0*   BUN mg/dL  --  29*  --  28* 28* 32* 36*   CREATININE mg/dL 1.99 2.15* 1.97 1.87* 1.46* 1.40* 1.61*   GLUCOSE mg/dL  --  158*  --  92 95 86 101*   CALCIUM mg/dL  --  9.5  --  8.2* 8.0* 8.6 10.0   MAGNESIUM mg/dL  --  1.6  --   --   --   --   --    PHOSPHORUS mg/dL  --  4.6*  --  2.6  --   --   --    Estimated Creatinine Clearance: 23.7 mL/min (by C-G formula based on SCr of 1.99 mg/dL).  Results from last " 7 days   Lab Units 12/18/23  2307 12/18/23 2050 12/18/23  1912 12/17/23  2146   PH, ARTERIAL pH units 7.161* 7.226* 7.174* 7.387   PCO2, ARTERIAL mm Hg 27.6* 39.7 18.3* 22.6*   PO2 ART mm Hg 471.2* 313.6* 98.2 100.3*   HCO3 ART mmol/L 9.9* 16.4* 6.7* 13.6*   Lactic acid 18.2  High-sensitivity troponin 50 from 2200      A/P:  Cardiac arrest s/p rosc  Severe metabolic acidosis  Abnormal ECG - pericarditis vs ami  Airway compromise AHRF s/p intubation  Mgmt of [facility] ventilation  Shock  Metastatic Breast cancer (mets to one and lungs)  Tongue cancer  History of HT  History of anxiety depressoin  HLD  Hyponatremia  CKD  N/V/D  Anemia  Lactic acidosis       Stat ct chest abd pelvis ordered for concern for pericardial effusion versus catostrophic intra-abd pathology.  Repeat bicarb, stress dose steroids, vasopressors, IVF bolus.  Will readdress code status with family.  Vent to hyperventilate  Trend labs  Transfuse PRBCs  Calling family to update.  Vitamin K  FFP  Update Dr Ham coordinating care earlier this evening post code.  Orlin Olivera    Additional critical care time was 40minutes, excluding any separately billable procedure time.  Time did not overlap with any other provider.    Electronically signed by Issac Wallace MD, 12/18/23, 11:23 PM EST.

## 2023-12-19 NOTE — PROCEDURES
Ultrasound Guided Central Venous Catheter Insertion Procedure Note      Indications:  vascular access    Procedure Details   Informed consent was obtained for the procedure, including sedation.  Risks of lung perforation, hemorrhage, arrhythmia, and adverse drug reaction were discussed.     Maximum sterile technique was used including usual patient drapes, antiseptics and physician sterile garments.    Under sterile conditions the skin above the on the right internal jugular vein was prepped with chlorhexidine and covered with a sterile drape. A sterile ultrasound probe was used to localize the target vein. It was clearly visualized. Local anesthesia was applied to the skin and subcutaneous tissues with lidocaine 1%. An 18-gauge needle was then inserted into the vein under ultrasound guidance. A guide wire was then threaded into the vein. A central venous catheter was then inserted into the vessel over the guide wire. The catheter was sutured into place and dressed following sterile protocol with Biopatch placed. All 4 ports were flushed and deemed patent.    Findings:  There were no changes to vital signs. All catheter ports were flushed with saline. Patient did tolerate procedure well.    Recommendations:  CXR reviewed at bedside and is okay to use.    Marielena Chase, APRN  12/18/2023

## 2023-12-19 NOTE — DISCHARGE SUMMARY
Name: Marialuisa Vivar  :  1955  MRN: [id number]         Primary Care Physician: Jennifer Mantilla MD      Date of Admission:  2023  Date and Time of Death:  2023 at 1:08 AM    Principle Cause of Death:   Cardiac tamponade  Pericardial effusion, suspected malignant  Metastatic breast cancer    Secondary Diagnoses:     Acute kidney injury    Depression with anxiety    Vocal cord dysfunction    Carcinoma of left breast metastatic to lung    Tachycardia    Tongue cancer    Hyperkalemia    High anion gap metabolic acidosis    Dehydration    Severe malnutrition    Hypotension      Hospital Course  Patient was a 68 y.o. female who presented to [facility] with complaint of nausea, vomiting and diarrhea. Please see the admitting history and physical for further details.  She had CT scan of her abdomen and pelvis done in emergency department that showed no significant findings.  She had renal failure and multiple electrolyte abnormalities and was admitted to the hospital because of this.  By the next day her GI symptoms had improved.  She had no further nausea, vomiting or diarrhea but she did have some epigastric or lower substernal chest pain.  She described it as a bone pain and it was reproducible with palpation of her lower sternum.  She had mild tachycardia but was otherwise hemodynamically stable.  Initial EKG showed sinus tachycardia with no change in prior studies.  Later in the day nursing staff was obtaining orthostatic vital signs and upon laying patient flat in the bed she became hypotensive.  Her mental status worsened and repeat ABG showed worsening acidosis.  She had been seen by nephrology due to kidney injury and metabolic acidosis and had started on sodium bicarb infusion.  Repeat EKGs started showing more diffuse ST changes possibly consistent with pericarditis.  Troponin elevations were minimal.  Unfortunately shortly after this patient went into PEA.  She was intubated  and with treatment redeveloped sinus rhythm.  She was transferred to the intensive care unit and eventually underwent echocardiogram and seen by cardiology.  Large pericardial effusion was identified on echocardiogram but they were unable to detect signs of tamponade.  Patient coded 2 additional times and had to be placed on maximum pressor therapy.  Family came in from out of town.  During third PEA arrest they were unable to successfully resuscitate and after discussion with family it was decided to stop any further attempts and the patient soon .    Case discussed with pulmonology/critical care last night as well as with nephrology and oncology today.  Suspect large pericardial effusion did result in cardiac tamponade and hypotension with decreased systemic perfusion resulting in severe acidosis and recurrent PEA arrest.  Discussed with Dr. Christensen who felt likely pericardial effusion would have likely been malignant in origin.      Vijay Ham MD  23  15:23 EST

## 2023-12-19 NOTE — PROGRESS NOTES
Dw Dr Davis echo with large pericardial effusion.  She is on way in to discuss with family.  Electronically signed by Issac Wallace MD, 12/19/23, 12:36 AM EST.

## 2023-12-19 NOTE — SIGNIFICANT NOTE
Spoke w/ Dr. Davila w/ [facility] who states he is not the intervention cardiologist, that is Dr. Susan milton. He will notify her of our call in this very complex pt.

## 2023-12-19 NOTE — CONSULTS
[facility] INITIAL INPATIENT CONSULTATION NOTE    REASON FOR CONSULTATION:    Metastatic breast cancer    HISTORY OF PRESENT ILLNESS:  Marialuisa Vivar is a 68 y.o. female who we are asked to see today in consultation for the above issue.    Patient has past medical history significant for hypertension, hyperlipidemia, obstructive sleep apnea, anxiety/depression, asthma, GERD, CKD3.    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  Patient underwent left neck dissection 6/5/2019 with 22 negative lymph nodes, negative margins, 1.8 cm tumor with 2 mm depth of invasion, grade 1.    Patient has metastatic breast cancer and is followed by Dr. aHwkins in our practice in the outpatient setting.  Patient was initially diagnosed with breast cancer in 1993, underwent mastectomy followed by  adjuvant chemotherapy and radiation with subsequent tamoxifen.  Patient was found to have metastatic to bone and lung in 2010 and was started on Arimidex.  Patient developed progressive disease, biopsy of T12 on 6/15/2023 showed metastatic breast cancer, ER positive (greater than 95%), KY negative, HER2/eb negative entheses 1+ IHC).  Patient received radiation with CyberKnife to C7 and T1 in June 2020.  Arimidex continued with addition of Zometa every 3 months with subsequent transition to Xgeva every 3 months due to increased creatinine.  Developed ovarian mass, status post TLH/BSO 11/4/2021 with pathology showing metastatic breast cancer involving bilateral ovaries and uterine serosa, ER positive (81-90%), KY positive (61-70%), HER2/eb negative.  Caris analysis showed pathogenic variant AKT1, negative PIK3CA mutation.  Continued Arimidex postoperatively.  Progression in bone, transitioned to Faslodex/Ibrance 5/6/2022 (Ibrance 125 mg daily for 21/28 days).  Patient with difficulty regarding compliance, missed multiple Faslodex injections in late 2022, early 2023.  Break in therapy after Faslodex  6/8/2023.  Evidence of slight bone progression on scans in July 2023.  Patient stopped Ibrance due to diarrhea and generalized weakness in August 2023 briefly.    Patient presented on 12/5/2023 with recurrent hypercalcemia, calcium up to 11.3, resumed Xgeva with plans to treat monthly.  Patient received IV normal saline with plans for IV fluid support twice per week.  Resumed Faslodex on 12/5/2023.  Patient was asked to resume Ibrance as well.    Patient presented to emergency department on 12/17/2023 with generalized weakness, nausea/vomiting, diarrhea x 2 days.  She was experiencing some generalized abdominal pain.  She was tachycardic and hypotensive.    Labs on 12/17/2023 with renal function at baseline, BUN 36, creatinine 1.61, mild hyponatremia with sodium 134, calcium normal at 10.0, lactate elevated at 2.8 with normal procalcitonin 0.11, normal lipase 47.  Respiratory viral panel was negative.  CT abdomen pelvis 12/17/2023 showed bone metastasis similar to CT 8/27/2023, distended esophagus related to dysmotility, unchanged, focal fatty infiltration of the left liver.  Stool C. difficile and GI PCR negative.  Urinalysis unremarkable.    On 12/18/2023, patient developed progressive acidosis with PEA arrest.  Patient was resuscitated and intubated, transferred to intensive care unit.  Patient is currently awake and communicative on ventilator.  She is tachycardic, hypotensive with pressors being added currently.        Past Medical History:   Diagnosis Date    SARAH (acute kidney injury) 08/27/2023    Allergy     Anxiety     Asthma     Bone metastasis     Breast cancer     Left node positive    Cholelithiasis 2000    [facility]    Class 1 obesity without serious comorbidity with body mass index (BMI) of 31.0 to 31.9 in adult 02/06/2019    Colon polyp Past 10-15 yrs?    found at colonoscopies    Depression     Esophageal reflux     cough    History of colon polyps     Hyperlipidemia     Hypertension     Left breast  cancer metastasized to lung     Nausea & vomiting     Obstructive sleep apnea     does not wear cpap    Ovarian cyst     Pancytopenia 08/29/2023    PONV (postoperative nausea and vomiting)     Tongue cancer 05/2019    by ENT at [facility] dr Quinn       Past Surgical History:   Procedure Laterality Date    CHOLECYSTECTOMY  2000    COLONOSCOPY  2014    H/O polyps    COLONOSCOPY N/A 6/1/2022    Procedure: COLONOSCOPY to cecum and TI with cold / hot snare polypectomies;  Surgeon: Luis Enrique Galeas MD;  Location: [facility] ENDOSCOPY;  Service: Gastroenterology;  Laterality: N/A;  pre-abnormal ct  post-polyps, diverticulosis, hemorrhoids    KNEE ARTHROPLASTY      LUNG SURGERY  2010    Lung wedge resection    MASTECTOMY RADICAL Left 1993    TONGUE SURGERY  05/21/2019    tongue cancer    TOTAL LAPAROSCOPIC HYSTERECTOMY N/A 11/04/2021    Procedure: Robotic assisted total laparoscopic hysterectomy, bilateral salpingoophorecotmy, bilateral ureteralysis ;  Surgeon: Kaylynn Ricci DO;  Location: [facility] MAIN OR;  Service: Robotics - DaVinci;  Laterality: N/A;       SOCIAL HISTORY:   reports that she quit smoking about 15 years ago. Her smoking use included cigarettes. She started smoking about 50 years ago. She has a 60.00 pack-year smoking history. She has never used smokeless tobacco. She reports that she does not drink alcohol and does not use drugs.    FAMILY HISTORY:  family history includes Alcohol abuse in her maternal aunt and another family member; COPD in her mother; Colon polyps in her father; Diabetes in her brother; Diabetes type II in her brother; Gallbladder disease in her brother and father; Glaucoma in her brother and another family member; Heart disease in her brother; Hyperlipidemia in her brother; Hypertension in her brother, brother, and mother; Leukemia (age of onset: 72) in her mother; Liver cancer in her paternal uncle; Lupus in an other family member; Pancreatic cancer in her brother; Pancreatitis in  her brother; Stomach cancer in her paternal aunt; Stroke in an other family member.    ALLERGIES:  Allergies   Allergen Reactions    Nickel Unknown - Low Severity    Ciprofloxacin Rash       MEDICATIONS:  As listed in the electronic medical record.    Review of Systems  Unable to obtain as patient is intubated    Vitals:    12/18/23 0423 12/18/23 0742 12/18/23 1050 12/18/23 1750   BP: 108/62 103/78 91/65 (!) 82/61   BP Location: Right arm Right arm Right arm    Patient Position: Lying Lying Lying    Pulse:  114 (!) 123 (!) 138   Resp: 16 16 16    Temp: 98.2 °F (36.8 °C) 98.2 °F (36.8 °C)     TempSrc: Oral Oral Oral    SpO2:  100% 98% 97%   Weight:       Height:           Physical Exam  Constitutional:       Comments: Thin chronically ill female intubated but awake on ventilator   Eyes:      Conjunctiva/sclera: Conjunctivae normal.   Neck:      Thyroid: No thyromegaly.   Cardiovascular:      Rate and Rhythm: Regular rhythm. Tachycardia present.      Heart sounds: No murmur heard.     No friction rub. No gallop.   Pulmonary:      Effort: No respiratory distress.      Breath sounds: Normal breath sounds.   Abdominal:      General: Bowel sounds are normal. There is no distension.      Palpations: Abdomen is soft.      Tenderness: There is no abdominal tenderness.   Lymphadenopathy:      Head:      Right side of head: No submandibular adenopathy.      Cervical: No cervical adenopathy.      Upper Body:      Right upper body: No supraclavicular adenopathy.      Left upper body: No supraclavicular adenopathy.   Skin:     General: Skin is warm and dry.      Findings: No rash.   Neurological:      Mental Status: She is alert and oriented to person, place, and time.      Cranial Nerves: No cranial nerve deficit.      Motor: No abnormal muscle tone.      Deep Tendon Reflexes: Reflexes normal.   Psychiatric:         Behavior: Behavior normal.         DIAGNOSTIC DATA:    Results from last 7 days   Lab Units 12/18/23  0606  12/17/23 1952   WBC 10*3/mm3 4.02 5.08   HEMOGLOBIN g/dL 11.6* 13.7   HEMATOCRIT % 33.9* 39.8   PLATELETS 10*3/mm3 176 269      Results from last 7 days   Lab Units 12/18/23  1402 12/18/23  0643 12/17/23 1952   SODIUM mmol/L 128* 133* 134*   POTASSIUM mmol/L 4.7 5.4* 5.0   CHLORIDE mmol/L 102 104 101   CO2 mmol/L 9.8* 14.5* 15.0*   BUN mg/dL 28* 32* 36*   CREATININE mg/dL 1.46* 1.40* 1.61*   CALCIUM mg/dL 8.0* 8.6 10.0   BILIRUBIN mg/dL  --   --  1.0   ALK PHOS U/L  --   --  94   ALT (SGPT) U/L  --   --  12   AST (SGOT) U/L  --   --  21   GLUCOSE mg/dL 95 86 101*          Assessment & Plan   ASSESSMENT:  This is a 68 y.o. female with:    *Metastatic breast cancer (ER/OK positive, HER2/eb negative)  Patient has metastatic breast cancer and is followed by Dr. Hawkins in our practice in the outpatient setting.    Patient was initially diagnosed with breast cancer in 1993, underwent mastectomy followed by  adjuvant chemotherapy and radiation with subsequent tamoxifen.    Patient was found to have metastatic to bone and lung in 2010 and was started on Arimidex.    Patient developed progressive disease, biopsy of T12 on 6/15/2023 showed metastatic breast cancer, ER positive (greater than 95%), OK negative, HER2/eb negative entheses 1+ IHC).    Patient received radiation with CyberKnife to C7 and T1 in June 2020.  Arimidex continued with addition of Zometa every 3 months with subsequent transition to Xgeva every 3 months due to increased creatinine.    Developed ovarian mass, status post TLH/BSO 11/4/2021 with pathology showing metastatic breast cancer involving bilateral ovaries and uterine serosa, ER positive (81-90%), OK positive (61-70%), HER2/eb negative.  Caris analysis showed pathogenic variant AKT1, negative PIK3CA mutation.  Continued Arimidex postoperatively.    Progression in bone, transitioned to Faslodex/Ibrance 5/6/2022 (Ibrance 125 mg daily for 21/28 days).    Patient with difficulty regarding compliance,  missed multiple Faslodex injections in late 2022, early 2023.    Break in therapy after Faslodex 6/8/2023.    Evidence of slight bone progression on scans in July 2023.    Patient stopped Ibrance due to diarrhea and generalized weakness in August 2023 briefly.  CT chest abdomen pelvis 11/6/2023 with stable small pulmonary nodules, stable bony metastatic disease, stable nodular left adrenal thickening.  CT angiogram chest on 11/22/2023 with no change in elevated left hemidiaphragm, stable mediastinal and hilar lymph nodes, no change in 1 cm right upper lobe pulmonary nodule or 2 subcentimeter left upper lobe pulmonary nodules stable sclerotic appearing bone metastases.  Left adrenal enlargement slightly more prominent and indeterminate  Patient presented on 12/5/2023 with recurrent hypercalcemia, calcium up to 11.3, resumed Xgeva with plans to treat monthly.  Patient received IV normal saline with plans for IV fluid support twice per week.  Resumed Faslodex on 12/5/2023.  Patient was asked to resume Ibrance as well.  Patient presented to emergency department on 12/17/2023 with generalized weakness, nausea/vomiting, diarrhea x 2 days.  She was experiencing some generalized abdominal pain.  She was tachycardic and hypotensive.  CT abdomen pelvis 12/17/2023 showed bone metastasis similar to CT 8/27/2023, distended esophagus related to dysmotility, unchanged, focal fatty infiltration of the left liver.  Patient did recently resume Ibrance on 12/5/2023, unable to communicate with patient as to whether she had been taking this prior to hospitalization.  Possible that Ibrance contributed to GI symptoms of nausea, vomiting and diarrhea that prompted hospitalization.  For now, Ibrance will be held.  Patient appears to have relatively indolent disease without significant evidence of recent progression despite lapse in therapy earlier this year.  Recheck CA 15-3 in a.m.    *Severe metabolic acidosis  Patient being followed by  nephrology  Patient felt to have starvation ketosis with exacerbation by recent diarrhea and CKD  Patient currently receiving bicarbonate drip    *PEA arrest with respiratory failure  Patient reportedly became orthostatic/hypotensive and developed PEA arrest with successful resuscitation  Patient required intubation, currently on ventilator  Patient hypotensive, pressor support being added    *CKD3  Creatinine baseline 1.3-1.7  Nephrology following  Creatinine on admission 12/17/2023 1.61, at baseline    *Nausea/vomiting  Occurred for 2 days prior to current admission, unclear whether this could have been related to recently resuming Ibrance  Patient currently receiving Protonix 40 mg IV daily  Zofran 4 mg p.o./IV every 6 hours as needed    *Diarrhea  Occurred for 2 days prior to current admission, unclear whether this could have been related to recently resuming Ibrance  On 12/17/2023, stool negative for C. 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    Patient underwent left neck dissection 6/5/2019 with 22 negative lymph nodes, negative margins, 1.8 cm tumor with 2 mm depth of invasion, grade 1.    *Depression/anxiety  Patient continues on Abilify 5 mg nightly      PLAN:   Patient status post PEA arrest  Patient currently intubated  Pressor support being added  Unclear whether recent addition of Ibrance on 12/5/2023 contributed to nausea, vomiting, dehydration prior to admission  Ibrance will remain on hold  CBC, CMP, CA 15-3 in a.m.    Discussed with Dr. Madison Christensen MD

## 2023-12-19 NOTE — CODE DOCUMENTATION
[facility] MD CODE DOCUMENTATION    Responddd to code blue.  Cpr ongoing  Access lost - No access   IO established  Epi bicarb calcium given  Vtach on check, shock delivered.  CPR epi  Rosc achieved.    See RN details.  Electronically signed by Issac Wallace MD, 12/18/23, 8:58 PM EST.

## 2023-12-19 NOTE — PROCEDURES
Ultrasound Guided Arterial Catheter Insertion Procedure Note      Indications:  Hemodynamic Monitoring    Procedure Details:    Informed consent was obtained for the procedure, including sedation.  Risks and benefits were discussed including possible vascular compromise resulting in limb ischemia.    Maximum sterile technique was used including usual patient drapes, antiseptics and physician sterile garments.    Under sterile conditions the right Femoral site was prepped with chlorhexidine and covered with a sterile drape. A ultrasound probe with sterile sheath was used to localize the target artery. It was clearly visualized. Local anesthesia was applied to the skin and subcutaneous tissues with lidocaine 1%. An  needle was then inserted into the artery under ultrasound guidance. A guide wire was then threaded into the Femoral artery over the guide wire. The catheter was sutured into place and dressed following sterile protocol with Biopatch placed. Confirmation of position by waveform analysis on the monitor.    Complications:  None.    Electronically signed by ARON Sebastian, 12/18/23, 9:28 PM EST.

## 2023-12-19 NOTE — CONSULTS
Group: [facility]         CONSULT NOTE    Patient Identification:  Marialuisa Vivar  68 y.o.  female  1955  [id number]            Reason for Consultation:  ICU transfer post CODE BLUE    CC: weakness, dehydration    History of Present Illness:  Marialuisa Vivar is a 68-year-old female with metastatic breast cancer (metastasis to bone and lungs), asthma, hypertension, hyperlipidemia, and anxiety/depression.    She presented to the emergency department on 12/17/2023 with complaints of generalized weakness and fatigue.  She reported nausea with loss of appetite and inability to eat for 48 hours.  Endorses nonbloody/nonbilious vomiting and nonbloody/nonbilious diarrhea.  Denies chest pain, shortness of breath, fever.  ED evaluation revealed a creatinine of 1.61, serum bicarbonate 15.0, sodium 134, lactate 2.8.  Procalcitonin was 0.11, WBC 5.08.    Patient was admitted with high anion gap metabolic acidosis admitted to [facility] with nephrology consulted.      Experienced worsening mental status and RR team was called.  Repeat abg obtained showing severe met acidosis and patient experienced code blue.  ROSC achieved pt intubated and moved to ICU.    Was responsive post rosc, labs being resent however experienced secondar cardiac arrest.  See code documentation.  Vt shocked.    ECG showing ST segment changes. Reviewed from earlier today and post code.    Stat cardiology consult placed Dw Dr Davis.  We will get a stat echo to evaluate LV function, regional wall motion and for any pericardial effusion given her malignancy.    Family updated wish all effort, on way in to hospital from [facility].      Review of Systems unable to obtain.    Past Medical History:  Past Medical History:   Diagnosis Date    SARAH (acute kidney injury) 08/27/2023    Allergy     Anxiety     Asthma     Bone metastasis     Breast cancer     Left node positive    Cholelithiasis 2000    Lap Marily    Class 1 obesity without serious  comorbidity with body mass index (BMI) of 31.0 to 31.9 in adult 02/06/2019    Colon polyp Past 10-15 yrs?    found at colonoscopies    Depression     Esophageal reflux     cough    History of colon polyps     Hyperlipidemia     Hypertension     Left breast cancer metastasized to lung     Nausea & vomiting     Obstructive sleep apnea     does not wear cpap    Ovarian cyst     Pancytopenia 08/29/2023    PONV (postoperative nausea and vomiting)     Tongue cancer 05/2019    by ENT at [facility] dr Quinn       Past Surgical History:  Past Surgical History:   Procedure Laterality Date    CHOLECYSTECTOMY  2000    COLONOSCOPY  2014    H/O polyps    COLONOSCOPY N/A 6/1/2022    Procedure: COLONOSCOPY to cecum and TI with cold / hot snare polypectomies;  Surgeon: Luis Enrique Galeas MD;  Location: [facility] ENDOSCOPY;  Service: Gastroenterology;  Laterality: N/A;  pre-abnormal ct  post-polyps, diverticulosis, hemorrhoids    KNEE ARTHROPLASTY      LUNG SURGERY  2010    Lung wedge resection    MASTECTOMY RADICAL Left 1993    TONGUE SURGERY  05/21/2019    tongue cancer    TOTAL LAPAROSCOPIC HYSTERECTOMY N/A 11/04/2021    Procedure: Robotic assisted total laparoscopic hysterectomy, bilateral salpingoophorecotmy, bilateral ureteralysis ;  Surgeon: Kaylynn Ricci DO;  Location: [facility] MAIN OR;  Service: Robotics - DaVinci;  Laterality: N/A;        Home Meds:  Medications Prior to Admission   Medication Sig Dispense Refill Last Dose    ARIPiprazole (ABILIFY) 2 MG tablet Take 2.5 tablets by mouth Every Night.       ARIPiprazole (ABILIFY) 5 MG tablet Take 1 tablet by mouth Every Evening.       atorvastatin (LIPITOR) 10 MG tablet TAKE 1 TABLET BY MOUTH ONCE DAILY AT NIGHT 90 tablet 1     eszopiclone (LUNESTA) 3 MG tablet Take 1 tablet by mouth.       fulvestrant (FASLODEX) 250 MG/5ML chemo syringe Inject  into the appropriate muscle as directed by prescriber.       LORazepam (ATIVAN) 0.5 MG tablet Take 1 tablet by mouth Every 8 (Eight)  Hours As Needed.       meclizine (ANTIVERT) 25 MG tablet Take 1 tablet by mouth 3 (Three) Times a Day As Needed for Dizziness. 20 tablet 0     miconazole (MICOTIN) 2 % powder Apply 1 application  topically to the appropriate area as directed Every 12 (Twelve) Hours. 43 g 0     Omeprazole 20 MG Tablet Delayed Release Dispersible Take 1 tablet by mouth As Needed.       ondansetron (ZOFRAN) 8 MG tablet TAKE 1 TABLET BY MOUTH THREE TIMES DAILY AS NEEDED FORNAUSEA AND VOMITING       denosumab (Xgeva) 120 MG/1.7ML solution injection Inject  under the skin into the appropriate area as directed.       PARoxetine (PAXIL) 40 MG tablet Take 1 tablet by mouth Every Night.          Allergies:  Allergies   Allergen Reactions    Nickel Unknown - Low Severity    Ciprofloxacin Rash       Social History:   Social History     Socioeconomic History    Marital status: Single    Years of education: College   Tobacco Use    Smoking status: Former     Packs/day: 2.00     Years: 30.00     Additional pack years: 0.00     Total pack years: 60.00     Types: Cigarettes     Start date: 1973     Quit date: 2008     Years since quitting: 15.5    Smokeless tobacco: Never   Vaping Use    Vaping Use: Never used   Substance and Sexual Activity    Alcohol use: No    Drug use: No    Sexual activity: Not Currently     Birth control/protection: None       Family History:  Family History   Problem Relation Age of Onset    Hypertension Mother     Leukemia Mother 72    COPD Mother         smoker    Diabetes Brother     Pancreatic cancer Brother     Glaucoma Brother     Heart disease Brother     Hypertension Brother     Gallbladder disease Brother     Pancreatitis Brother          from pancreatic csncer    Gallbladder disease Father     Colon polyps Father     Stroke Other         Grandmother    Lupus Other         Aunt    Alcohol abuse Other         Aunt    Glaucoma Other         Grandmother    Diabetes type II Brother     Hypertension  "Brother     Hyperlipidemia Brother     Liver cancer Paternal Uncle     Stomach cancer Paternal Aunt     Alcohol abuse Maternal Aunt     Malig Hyperthermia Neg Hx        Physical Exam:  /88   Pulse 113   Temp 98.2 °F (36.8 °C) (Oral)   Resp 16   Ht 165.1 cm (65\")   Wt 56.2 kg (123 lb 14.4 oz)   SpO2 (!) 80%   BMI 20.62 kg/m²  Body mass index is 20.62 kg/m². (!) 80% 56.2 kg (123 lb 14.4 oz)  Physical Exam  [facility]  +ET tube  Mary diminished left base  No rub tachy  Abd non rigid soft  Mottled le right IO tibial      LABS:  COVID19   Date Value Ref Range Status   12/17/2023 Not Detected Not Detected - Ref. Range Final       Lab Results   Component Value Date    CALCIUM 8.0 (L) 12/18/2023    PHOS 2.6 12/18/2023     Results from last 7 days   Lab Units 12/18/23  2050 12/18/23  1725 12/18/23  1402 12/18/23  0643 12/17/23  1952   SODIUM mmol/L  --  129* 128* 133* 134*   POTASSIUM mmol/L  --  4.9 4.7 5.4* 5.0   CHLORIDE mmol/L  --  102 102 104 101   CO2 mmol/L  --  4.9* 9.8* 14.5* 15.0*   BUN mg/dL  --  28* 28* 32* 36*   CREATININE mg/dL 1.97 1.87* 1.46* 1.40* 1.61*   GLUCOSE mg/dL  --  92 95 86 101*   CALCIUM mg/dL  --  8.2* 8.0* 8.6 10.0   WBC 10*3/mm3  --   --   --  4.02 5.08   HEMOGLOBIN g/dL  --   --   --  11.6* 13.7   HEMOGLOBIN, POC g/dL 8.9*  --   --   --   --    PLATELETS 10*3/mm3  --   --   --  176 269   ALT (SGPT) U/L  --  14  --   --  12   AST (SGOT) U/L  --  38*  --   --  21   PROCALCITONIN ng/mL  --   --   --   --  0.11     Lab Results   Component Value Date    CKTOTAL 31 12/17/2023    TROPONINT 32 (H) 12/18/2023     Results from last 7 days   Lab Units 12/18/23  1725 12/18/23  1402 12/18/23  1133 12/17/23  2255 12/17/23 1952   CK TOTAL U/L  --   --   --   --  31   HSTROP T ng/L 42* 32* 32*   < > 28*    < > = values in this interval not displayed.         Results from last 7 days   Lab Units 12/18/23  0643 12/18/23  0052 12/17/23 2125 12/17/23 1952 "   PROCALCITONIN ng/mL  --   --   --  0.11   LACTATE mmol/L 1.9 2.6* 2.8*  --      Results from last 7 days   Lab Units 12/18/23 2050 12/18/23 1912 12/17/23 2146   PH, ARTERIAL pH units 7.226* 7.174* 7.387   PCO2, ARTERIAL mm Hg 39.7 18.3* 22.6*   PO2 ART mm Hg 313.6* 98.2 100.3*   O2 SATURATION ART % 99.9* 95.8 97.9   FLOW RATE lpm  --  2.0000  --    MODALITY  Adult Vent Cannula Room Air     Results from last 7 days   Lab Units 12/17/23 2256 12/17/23 2126   ADENOVIRUS DETECTION BY PCR   --  Not Detected   ADENOVIRUS  Not Detected  --    CORONAVIRUS 229E   --  Not Detected   CORONAVIRUS HKU1   --  Not Detected   CORONAVIRUS NL63   --  Not Detected   CORONAVIRUS OC43   --  Not Detected   HUMAN METAPNEUMOVIRUS   --  Not Detected   HUMAN RHINOVIRUS/ENTEROVIRUS   --  Not Detected   INFLUENZA B PCR   --  Not Detected   PARAINFLUENZA 1   --  Not Detected   PARAINFLUENZA VIRUS 2   --  Not Detected   PARAINFLUENZA VIRUS 3   --  Not Detected   PARAINFLUENZA VIRUS 4   --  Not Detected   BORDETELLA PERTUSSIS PCR   --  Not Detected   BORDETELLA PARAPERTUSSIS PCR   --  Not Detected   CHLAMYDOPHILA PNEUMONIAE PCR   --  Not Detected   MYCOPLAMA PNEUMO PCR   --  Not Detected   RSV, PCR   --  Not Detected     Results from last 7 days   Lab Units 12/17/23 1952   INR  1.03         Lab Results   Component Value Date    TSH 4.780 (H) 12/18/2023     Estimated Creatinine Clearance: 32.3 mL/min (A) (by C-G formula based on SCr of 1.46 mg/dL (H)).  Results from last 7 days   Lab Units 12/18/23  0052   NITRITE UA  Negative   WBC UA /HPF 0-2   BACTERIA UA /HPF None Seen   SQUAM EPITHEL UA /HPF 0-2        Imaging: I personally visualized the images of scans/x-rays performed within last 3 days.    +cxr et tube left lower lobe infiltrate  A/P:  Cardiac arrest s/p rosc  Severe metabolic acidosis  Abnormal ECG - pericarditis vs ami  Airway compromise AHRF s/p intubation  Mgmt of mech ventilation  Shock  Metastatic Breast cancer (mets to one  and lungs)  Tongue cancer  History of HT  History of anxiety depressoin  HLD  Hyponatremia  CKD  N/V/D         Stat repeat labs  Cxr reviewed  Need better iv access central line  Remove io when central line placed  Vent adjusted abg reviewed - improved.  Send cbc cmp lactic trop   Repeat ecg reviewed dw cards Dr Davis will get stat echo - eval for effusion regional wall motion abd,   When can stabilize will send down for ct scan chest abd pelvis - ? Change to cause worsening met acidosis?  Empiric abx, bcs  Family updated on way in from out of town.  Vasopressors - ne vaso, phenyleprine epi  Bolus    Dw Dr Christensen regarding outpt chemotherapy regiment status of malignancy appreciate hs concern.    Total critical care time was 87 minutes from 730-9:17, excluding any separately billable procedure time.  Time did not overlap with any other provider.    Electronically signed by Issac Wallace MD, 12/18/23, 9:17 PM EST.

## 2023-12-20 ENCOUNTER — SPECIALTY PHARMACY (OUTPATIENT)
Dept: PHARMACY | Facility: HOSPITAL | Age: 68
End: 2023-12-20
Payer: MEDICARE

## 2023-12-20 LAB
BH CV ECHO MEAS - EDV(CUBED): 25.3 ML
BH CV ECHO MEAS - ESV(CUBED): 8.4 ML
BH CV ECHO MEAS - FS: 30.8 %
BH CV ECHO MEAS - IVS/LVPW: 1 CM
BH CV ECHO MEAS - IVSD: 1.08 CM
BH CV ECHO MEAS - LV MASS(C)D: 89.7 GRAMS
BH CV ECHO MEAS - LVIDD: 2.9 CM
BH CV ECHO MEAS - LVIDS: 2.03 CM
BH CV ECHO MEAS - LVPWD: 1.08 CM

## 2023-12-20 NOTE — PROGRESS NOTES
Today I spoke with Gavino at [facility] to request that Ms. Vivar be dis enrolled form the Patient Assistance Program and that her 2024 Renewal Application be withdrawn.    Gavino confirmed the dis enrollment and withdrawal.    Sarai Werner - Care Coordinator   12/20/2023  08:42 EST

## 2023-12-22 LAB
BACTERIA SPEC AEROBE CULT: NORMAL
BACTERIA SPEC AEROBE CULT: NORMAL

## 2023-12-23 LAB
BACTERIA SPEC AEROBE CULT: NORMAL
BACTERIA SPEC AEROBE CULT: NORMAL
